# Patient Record
Sex: MALE | Race: WHITE | Employment: OTHER | URBAN - METROPOLITAN AREA
[De-identification: names, ages, dates, MRNs, and addresses within clinical notes are randomized per-mention and may not be internally consistent; named-entity substitution may affect disease eponyms.]

---

## 2018-05-22 ENCOUNTER — TRANSFERRED RECORDS (OUTPATIENT)
Dept: HEALTH INFORMATION MANAGEMENT | Facility: CLINIC | Age: 68
End: 2018-05-22

## 2018-05-23 ENCOUNTER — TRANSFERRED RECORDS (OUTPATIENT)
Dept: HEALTH INFORMATION MANAGEMENT | Facility: CLINIC | Age: 68
End: 2018-05-23

## 2018-08-23 ENCOUNTER — TRANSFERRED RECORDS (OUTPATIENT)
Dept: HEALTH INFORMATION MANAGEMENT | Facility: CLINIC | Age: 68
End: 2018-08-23

## 2018-08-27 ENCOUNTER — TRANSFERRED RECORDS (OUTPATIENT)
Dept: HEALTH INFORMATION MANAGEMENT | Facility: CLINIC | Age: 68
End: 2018-08-27

## 2018-08-29 ENCOUNTER — TRANSFERRED RECORDS (OUTPATIENT)
Dept: HEALTH INFORMATION MANAGEMENT | Facility: CLINIC | Age: 68
End: 2018-08-29

## 2018-08-30 ENCOUNTER — TRANSFERRED RECORDS (OUTPATIENT)
Dept: HEALTH INFORMATION MANAGEMENT | Facility: CLINIC | Age: 68
End: 2018-08-30

## 2018-08-30 ENCOUNTER — MEDICAL CORRESPONDENCE (OUTPATIENT)
Dept: HEALTH INFORMATION MANAGEMENT | Facility: CLINIC | Age: 68
End: 2018-08-30

## 2018-08-31 ENCOUNTER — TRANSFERRED RECORDS (OUTPATIENT)
Dept: HEALTH INFORMATION MANAGEMENT | Facility: CLINIC | Age: 68
End: 2018-08-31

## 2018-08-31 LAB
CREAT SERPL-MCNC: 0.89 MG/DL (ref 0.7–1.2)
GFR SERPL CREATININE-BSD FRML MDRD: >60 ML/MIN/1.73M2
GLUCOSE SERPL-MCNC: 91 MG/DL (ref 70–100)
INR PPP: 1.3 (ref 0.9–1.1)
POTASSIUM SERPL-SCNC: 4 MEQ/L (ref 3.4–5.1)

## 2018-09-02 ENCOUNTER — TRANSFERRED RECORDS (OUTPATIENT)
Dept: HEALTH INFORMATION MANAGEMENT | Facility: CLINIC | Age: 68
End: 2018-09-02

## 2018-09-06 NOTE — TELEPHONE ENCOUNTER
Date of appointment: 9/10/18    Diagnosis/reason for appointment: Malignant spinal tumor  Referring provider/facility: Aron Pedraza / [facility]   Who called: pt daughter Vane    Recent Studies  Imagin18 MR Thoracic Spine, 18 CT needle Bx, 18 MR Lumbar Spine,                  18 CT Abd Pelv   Pathology: 18  Labs: 9/3/18  Previous chemo/radiation (if known):    Records requested from: [facility] (in Care Everywhere)  Records received from:    Additional information: imaging is viewable

## 2018-09-07 ENCOUNTER — TRANSFERRED RECORDS (OUTPATIENT)
Dept: HEALTH INFORMATION MANAGEMENT | Facility: CLINIC | Age: 68
End: 2018-09-07

## 2018-09-10 ENCOUNTER — ONCOLOGY VISIT (OUTPATIENT)
Dept: ONCOLOGY | Facility: CLINIC | Age: 68
End: 2018-09-10
Attending: INTERNAL MEDICINE
Payer: COMMERCIAL

## 2018-09-10 ENCOUNTER — PRE VISIT (OUTPATIENT)
Dept: ONCOLOGY | Facility: CLINIC | Age: 68
End: 2018-09-10

## 2018-09-10 VITALS
SYSTOLIC BLOOD PRESSURE: 127 MMHG | OXYGEN SATURATION: 96 % | TEMPERATURE: 95.9 F | BODY MASS INDEX: 32.65 KG/M2 | RESPIRATION RATE: 16 BRPM | DIASTOLIC BLOOD PRESSURE: 72 MMHG | HEART RATE: 56 BPM | HEIGHT: 72 IN | WEIGHT: 241.06 LBS

## 2018-09-10 DIAGNOSIS — C64.1 RENAL CELL CARCINOMA, RIGHT (H): Primary | ICD-10-CM

## 2018-09-10 PROCEDURE — 99205 OFFICE O/P NEW HI 60 MIN: CPT | Mod: GC | Performed by: INTERNAL MEDICINE

## 2018-09-10 PROCEDURE — G0463 HOSPITAL OUTPT CLINIC VISIT: HCPCS | Mod: ZF

## 2018-09-10 RX ORDER — POLYETHYLENE GLYCOL 3350 17 G/17G
17 POWDER, FOR SOLUTION ORAL DAILY PRN
COMMUNITY
Start: 2018-09-07

## 2018-09-10 RX ORDER — AMOXICILLIN 250 MG
2 CAPSULE ORAL 2 TIMES DAILY
Status: ON HOLD | COMMUNITY
Start: 2018-09-07 | End: 2019-01-01

## 2018-09-10 RX ORDER — OXYCODONE HCL 20 MG/1
20 TABLET, FILM COATED, EXTENDED RELEASE ORAL
COMMUNITY
Start: 2018-09-07 | End: 2018-09-26

## 2018-09-10 RX ORDER — CYCLOBENZAPRINE HCL 10 MG
10 TABLET ORAL
COMMUNITY
Start: 2018-09-07 | End: 2018-11-08 | Stop reason: ALTCHOICE

## 2018-09-10 RX ORDER — LANOLIN ALCOHOL/MO/W.PET/CERES
3 CREAM (GRAM) TOPICAL
COMMUNITY
Start: 2018-09-07 | End: 2019-01-01

## 2018-09-10 RX ORDER — ATORVASTATIN CALCIUM 20 MG/1
20 TABLET, FILM COATED ORAL
COMMUNITY
Start: 2017-11-24 | End: 2019-04-08

## 2018-09-10 RX ORDER — DEXAMETHASONE 4 MG/1
4 TABLET ORAL
COMMUNITY
Start: 2018-09-07 | End: 2019-02-11

## 2018-09-10 RX ORDER — OXYCODONE HYDROCHLORIDE 10 MG/1
10 TABLET ORAL
COMMUNITY
Start: 2018-09-07 | End: 2019-02-14 | Stop reason: DRUGHIGH

## 2018-09-10 ASSESSMENT — PAIN SCALES - GENERAL: PAINLEVEL: MODERATE PAIN (5)

## 2018-09-10 NOTE — LETTER
9/10/2018       RE: Javon Bianchi  [address] [id number]     Dear Colleague,    Thank you for referring your patient, Javon Bianchi, to the [facility]. Please see a copy of my visit note below.    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 At that time, the pain was a stabbing like in the middle of lower back with burning like pain wrapping around this left hip to knee area. No trauma. He had MRI on 5/23/18 showed mild degenerative change with bulging disks L2-S1. A small benign hemangioma was noted in L2, no other marrow signal abnormality. Since then, the pain failed to improve despite steroid injections. In addition, he actually lost 25 lbs weight unintentionally since June along with chills few time a day. Overtime, the pain got worse to the point he could not ambulate after short distance and developed muscle weakness in lower extremities over time requiring a walker. Finally, he went to ER ([facility] in [place]) on 8/23/18, CT A/P was unremarkable. He was referred to neurology with obtaining MRI L spine. MRI on 8/30/18 revealed a pathological fracture at L3 vertebral body with height loss at 40% and diffuse involvement of a neoplastic process. IR guided biopsy on 8/31/18 showed poorly differentiated carcinoma. Its IHC was suggestive of possible renal cell carcinoma per pathology report (Renal cell immunochemistry is positive and along with the negative staining for CK7 and CK20 suggests the possibility of  a renal carcinoma, however most renal cell carcinomas also express PAX8 which is negative in this case). The patient was evaluated by neurosurgery who recommended no surgical intervention. He was discharged with TLSO brace and walker. He is scheduled to start palliative XRT locally from 9/11, planned 10 fractions. Today he was referred to Dr. De Luna for further evaluation. Of note, CT chest/abdomen/pelvis at OSH was negative for primary lesion.          Review of Systems:   ROS: Comprehensive 10 point ROS negative except for that detailed above.         Past Medical History:   Hyperlipidemia on lipitor and aspirin  Cholecystectomy         Past Surgical History:   Cholecystectomy         Social History:     Social History     Social History     Marital status: Single     Spouse name: N/A     Number of children: N/A     Years of education: N/A     Occupational History     Not on file.     Social History Main Topics     Smoking status: Never Smoker     Smokeless tobacco: Never Used     Alcohol use Not on file     Drug use: Not on file     Sexual activity: Not on file     Other Topics Concern     Not on file     Social History Narrative     No narrative on file            Family History:   Father - cancer involved kidney/prostate (?prostate cancer)  No breast cancer         Allergies:   No Known Allergies         Home Medications:     Current Outpatient Prescriptions   Medication     aspirin 81 MG tablet     atorvastatin (LIPITOR) 20 MG tablet     cyclobenzaprine (FLEXERIL) 10 MG tablet     dexamethasone (DECADRON) 4 MG tablet     melatonin 3 MG tablet     oxyCODONE (OXYCONTIN) 20 MG 12 hr tablet     oxyCODONE IR (ROXICODONE) 10 MG tablet     polyethylene glycol (MIRALAX/GLYCOLAX) Packet     senna-docusate (SENOKOT-S;PERICOLACE) 8.6-50 MG per tablet     Pseudoephedrine-APAP  MG TABS     No current facility-administered medications for this visit.             Physical Exam:     /72  Pulse 56  Temp 95.9  F  (35.5  C) (Tympanic)  Resp 16  Ht 1.829 m (6')  Wt 109.3 kg (241 lb 1 oz)  SpO2 96%  BMI 32.69 kg/m2  Vitals:    09/10/18 1043   Weight: 109.3 kg (241 lb 1 oz)     ECO  General:  no acute distress. On TLSO brace.  Heme/Lymph: No overt bleeding. No cervical, axillary, or inguinal  lymph adenopathy.  Skin: No concerning lesions or rash on exposed surfaces.  HEENT: NCAT. anicteric sclera. Oral mucosa pink and moist with no lesions or thrush.  Respiratory: Non-labored breathing, good air exchange, lungs clear to auscultation bilaterally.  Cardiovascular: Regular rate and rhythm. No murmur or rub.   Abdomen: Normoactive bowel sounds. Abdomen soft, non-distended, and non-tender. No palpable masses or organomegaly.  Extremities: grossly normal, non-tender, no edema. Good strength and ROM.  Neurologic: A&O x 3, CNs 2-12 grossly intact, speech normal, Lt LE strength 4 out of 5, Rt LE intact strength   Psychiatric: Mentation and affect appear normal.           Data:          Labs:   OSH labs on 18 showed mild anemia (Hgb 12.4), WBC 8.1K, . Calcium was normal 9.9.         Images:     18 Lt Hip Xray  Impression:Degenerative change of both hips. No acute fracture.   CT A/P 18  Impression:  No acute findings in the abdomen or pelvis.    MR LUMBAR SPINE WO W CONTRAST 18     Impression:  1. There is a pathological fracture at L3 vertebral body with height loss at 40%. Diffuse involvement of a neoplastic process. This extends into the canal and there is moderate to severe canal stenosis at this level. Soft tissue involvement particularly involves the left foramen at L3-4. Canal is also involved at L2-3 and extending into the left foramen. Multiple posterior elements are involved with neoplastic process and soft tissue involvement is concerning for metastatic involvement as well posteriorly along with the left ilium. Did speak with the MRI intact. Patient is following up with neurology tomorrow  morning. The canal stenosis at L3-4 was also evident and similar on most recent CT 8/23/2018.    MRI T spine 9/2/2018  IMPRESSION: No evidence of metastatic disease to the thoracic spine. Multiple chronic compression deformities with accentuated thoracic kyphosis noted.   CT chest 9/6/2018  IMPRESSION: Small bilateral noncalcified indeterminate pulmonary nodules. No dominant mass or adenopathy.         Pathology:     08/31/2018  Bone, L3, biopsy- positive for poorly differentiated carcinoma  The H&E sections show extensively remodeled bone with a limited amount of fibrotic marrow space.  There are a few cells entrapped within the fibrous connective tissue which cannot be further characterized on H&E sections due to significant crush artifact.  By immunochemical analysis these cells appear strongly positive for pan keratin but are negative for cytokeratin 7 and 20.  The cells also coexpress .  PSA , PSAP, and NKX3.1 are negative excluding prostate carcinoma.  P40 is negative excluding squamous carcinoma.  Renal cell immunochemistry is positive and along with the negative staining for CK7 and CK20 suggests the possibility of a renal carcinoma, however most renal cell carcinomas also express PAX8 which is negative in this case.  Clinical/radiographic correlation is suggested.         Assessment :   The patient was found L3 mass lesion after 3 month refractory back pain and Lt radiculopathy. Initial MRI L spine in 5/2018 was unremarkable but repeated MRI detected a mass lesion which biopsy showed poorly differentiated carcinoma. IHC findings suggested possible renal cell carcinoma but PAX8 was negative. CT images did not show any primary mass in kidneys nor showed other metastatic lesions. Plan to receive palliative XRT locally.    Given atypical IHC finding in a setting of absence of primary renal cancer, we would like to have our pathologist to review the slide to confirm the diagnosis. If it turns out renal  cell carcinoma, his disease is so far at least intermediate risk based on performance status. In this case, the patient could be eligible for the trial with IL2 and Nivolumab. Once he completed XRT, we will have MRI Abdomen to evaluate possible renal mass. He will also have CT chest to rule out metastatic lesions. His case will be reviewed in the tumor conference.           Plan:   - will have pathologist to review the slides  - MRI A/P to assess renal mass after completing palliative XRT  - CT chest for staging  - Case will be discussed in tumor conference    Patient was seen, care plan discussed and staffed with Dr. De Luna.  Se maria e Mace MD  [facility]  Hematology oncology and transplantation fellow  Pager [phone]    Provider Disclosure:  Please refer to the note by the fellow for H&P and physical exam and assessment and plan for details.     I agree with above History, Review of Systems, Physical exam and Plan. I have reviewed the content of the documentation and have edited it as needed. I have personally performed the services documented here and the documentation accurately represents those services and the decisions I have made.     I spent 60 minutes with the patient and over 50% of the time was spent on  counseling and coordinating care.    Malathi De Luna MD  Hematology Oncology and Transplantation  [facility]           Again, thank you for allowing me to participate in the care of your patient.      Sincerely,    Malathi De Luna MD

## 2018-09-10 NOTE — PROGRESS NOTES
[facility] CANCER CLINIC AT [facility]  HEMATOLOGY AND ONCOLOGY  NEW PATIENT VISIT    NAME: Javon Bianchi LEON: Sep 10, 2018   MRN: [id number]      REFERRING PHYSICIAN: LIANNE Wellington       Chief complaint:   Metastatic L3 lesion from poorly differentiated carcinoma, possible renal origin         History of Present Illness:     Javon Bianchi is a 68 year old male with a history significant for hyperlipidemia who had been in his usual health until he developed back pain in May this year. At that time, the pain was a stabbing like in the middle of lower back with burning like pain wrapping around this left hip to knee area. No trauma. He had MRI on 5/23/18 showed mild degenerative change with bulging disks L2-S1. A small benign hemangioma was noted in L2, no other marrow signal abnormality. Since then, the pain failed to improve despite steroid injections. In addition, he actually lost 25 lbs weight unintentionally since June along with chills few time a day. Overtime, the pain got worse to the point he could not ambulate after short distance and developed muscle weakness in lower extremities over time requiring a walker. Finally, he went to ER ([facility] in [place]) on 8/23/18, CT A/P was unremarkable. He was referred to neurology with obtaining MRI L spine. MRI on 8/30/18 revealed a pathological fracture at L3 vertebral body with height loss at 40% and diffuse involvement of a neoplastic process. IR guided biopsy on 8/31/18 showed poorly differentiated carcinoma. Its IHC was suggestive of possible renal cell carcinoma per pathology report (Renal cell immunochemistry is positive and along with the negative staining for CK7 and CK20 suggests the possibility of a renal carcinoma, however most renal cell carcinomas also express PAX8 which is negative in this case). The patient was evaluated by neurosurgery who recommended no surgical intervention. He was discharged with TLSO brace and walker. He is  scheduled to start palliative XRT locally from , planned 10 fractions. Today he was referred to Dr. De Luna for further evaluation. Of note, CT chest/abdomen/pelvis at OSH was negative for primary lesion.          Review of Systems:   ROS: Comprehensive 10 point ROS negative except for that detailed above.         Past Medical History:   Hyperlipidemia on lipitor and aspirin  Cholecystectomy         Past Surgical History:   Cholecystectomy         Social History:     Social History     Social History     Marital status: Single     Spouse name: N/A     Number of children: N/A     Years of education: N/A     Occupational History     Not on file.     Social History Main Topics     Smoking status: Never Smoker     Smokeless tobacco: Never Used     Alcohol use Not on file     Drug use: Not on file     Sexual activity: Not on file     Other Topics Concern     Not on file     Social History Narrative     No narrative on file            Family History:   Father - cancer involved kidney/prostate (?prostate cancer)  No breast cancer         Allergies:   No Known Allergies         Home Medications:     Current Outpatient Prescriptions   Medication     aspirin 81 MG tablet     atorvastatin (LIPITOR) 20 MG tablet     cyclobenzaprine (FLEXERIL) 10 MG tablet     dexamethasone (DECADRON) 4 MG tablet     melatonin 3 MG tablet     oxyCODONE (OXYCONTIN) 20 MG 12 hr tablet     oxyCODONE IR (ROXICODONE) 10 MG tablet     polyethylene glycol (MIRALAX/GLYCOLAX) Packet     senna-docusate (SENOKOT-S;PERICOLACE) 8.6-50 MG per tablet     Pseudoephedrine-APAP  MG TABS     No current facility-administered medications for this visit.             Physical Exam:     /72  Pulse 56  Temp 95.9  F (35.5  C) (Tympanic)  Resp 16  Ht 1.829 m (6')  Wt 109.3 kg (241 lb 1 oz)  SpO2 96%  BMI 32.69 kg/m2  Vitals:    09/10/18 1043   Weight: 109.3 kg (241 lb 1 oz)     ECO  General:  no acute distress. On TLSO brace.  Heme/Lymph: No overt  bleeding. No cervical, axillary, or inguinal  lymph adenopathy.  Skin: No concerning lesions or rash on exposed surfaces.  HEENT: NCAT. anicteric sclera. Oral mucosa pink and moist with no lesions or thrush.  Respiratory: Non-labored breathing, good air exchange, lungs clear to auscultation bilaterally.  Cardiovascular: Regular rate and rhythm. No murmur or rub.   Abdomen: Normoactive bowel sounds. Abdomen soft, non-distended, and non-tender. No palpable masses or organomegaly.  Extremities: grossly normal, non-tender, no edema. Good strength and ROM.  Neurologic: A&O x 3, CNs 2-12 grossly intact, speech normal, Lt LE strength 4 out of 5, Rt LE intact strength   Psychiatric: Mentation and affect appear normal.           Data:          Labs:   OSH labs on 8/31/18 showed mild anemia (Hgb 12.4), WBC 8.1K, . Calcium was normal 9.9.         Images:     8/23/18 Lt Hip Xray  Impression:Degenerative change of both hips. No acute fracture.   CT A/P 8/23/18  Impression:  No acute findings in the abdomen or pelvis.    MR LUMBAR SPINE WO W CONTRAST 8/29/18     Impression:  1. There is a pathological fracture at L3 vertebral body with height loss at 40%. Diffuse involvement of a neoplastic process. This extends into the canal and there is moderate to severe canal stenosis at this level. Soft tissue involvement particularly involves the left foramen at L3-4. Canal is also involved at L2-3 and extending into the left foramen. Multiple posterior elements are involved with neoplastic process and soft tissue involvement is concerning for metastatic involvement as well posteriorly along with the left ilium. Did speak with the MRI intact. Patient is following up with neurology tomorrow morning. The canal stenosis at L3-4 was also evident and similar on most recent CT 8/23/2018.    MRI T spine 9/2/2018  IMPRESSION: No evidence of metastatic disease to the thoracic spine. Multiple chronic compression deformities with accentuated  thoracic kyphosis noted.   CT chest 9/6/2018  IMPRESSION: Small bilateral noncalcified indeterminate pulmonary nodules. No dominant mass or adenopathy.         Pathology:     08/31/2018  Bone, L3, biopsy- positive for poorly differentiated carcinoma  The H&E sections show extensively remodeled bone with a limited amount of fibrotic marrow space.  There are a few cells entrapped within the fibrous connective tissue which cannot be further characterized on H&E sections due to significant crush artifact.  By immunochemical analysis these cells appear strongly positive for pan keratin but are negative for cytokeratin 7 and 20.  The cells also coexpress .  PSA , PSAP, and NKX3.1 are negative excluding prostate carcinoma.  P40 is negative excluding squamous carcinoma.  Renal cell immunochemistry is positive and along with the negative staining for CK7 and CK20 suggests the possibility of a renal carcinoma, however most renal cell carcinomas also express PAX8 which is negative in this case.  Clinical/radiographic correlation is suggested.         Assessment :   The patient was found L3 mass lesion after 3 month refractory back pain and Lt radiculopathy. Initial MRI L spine in 5/2018 was unremarkable but repeated MRI detected a mass lesion which biopsy showed poorly differentiated carcinoma. IHC findings suggested possible renal cell carcinoma but PAX8 was negative. CT images did not show any primary mass in kidneys nor showed other metastatic lesions. Plan to receive palliative XRT locally.    Given atypical IHC finding in a setting of absence of primary renal cancer, we would like to have our pathologist to review the slide to confirm the diagnosis. If it turns out renal cell carcinoma, his disease is so far at least intermediate risk based on performance status. In this case, the patient could be eligible for the trial with IL2 and Nivolumab. Once he completed XRT, we will have MRI Abdomen to evaluate possible  renal mass. He will also have CT chest to rule out metastatic lesions. His case will be reviewed in the tumor conference.           Plan:   - will have pathologist to review the slides  - MRI A/P to assess renal mass after completing palliative XRT  - CT chest for staging  - Case will be discussed in tumor conference    Patient was seen, care plan discussed and staffed with Dr. De Luna.  Se maria e Mace MD  [facility]  Hematology oncology and transplantation fellow  Pager [phone]    Provider Disclosure:  Please refer to the note by the fellow for H&P and physical exam and assessment and plan for details.     I agree with above History, Review of Systems, Physical exam and Plan. I have reviewed the content of the documentation and have edited it as needed. I have personally performed the services documented here and the documentation accurately represents those services and the decisions I have made.     I spent 60 minutes with the patient and over 50% of the time was spent on  counseling and coordinating care.    Malathi De Luna MD  Hematology Oncology and Transplantation  [facility]

## 2018-09-10 NOTE — MR AVS SNAPSHOT
After Visit Summary   9/10/2018    Javon Bianchi    MRN: [id number]           Patient Information     Date Of Birth          1950        Visit Information        Provider Department      9/10/2018 10:30 AM Malathi De Luna MD [facility]        Today's Diagnoses     Renal cell carcinoma, right (H)    -  1       Follow-ups after your visit        Your next 10 appointments already scheduled     Sep 25, 2018  4:00 PM CDT   MR ABDOMEN & PELVIS W/O & W CONTRAST with UC1   [facility] ([facility])    [address] [address] [phone]           Take your medicines as usual, unless your doctor tells you not to. Bring a list of your current medicines to your exam (including vitamins, minerals and over-the-counter drugs). Also bring the results of similar scans you may have had.    You may or may not receive IV contrast for this exam pending the discretion of the Radiologist.   Do not eat or drink for 6 hours prior to exam.  The MRI machine uses a strong magnet. Please wear clothes without metal (snaps, zippers). A sweatsuit works well, or we may give you a hospital gown.  Please remove any body piercings and hair extensions before you arrive. You will also remove watches, jewelry, hairpins, wallets, dentures, partial dental plates and hearing aids. You may wear contact lenses, and you may be able to wear your rings. We have a safe place to keep your personal items, but it is safer to leave them at home.  **IMPORTANT** THE INSTRUCTIONS BELOW ARE ONLY FOR THOSE PATIENTS WHO HAVE BEEN PRESCRIBED SEDATION OR GENERAL ANESTHESIA DURING THEIR MRI PROCEDURE:  IF YOUR DOCTOR PRESCRIBED ORAL SEDATION (take medicine to help you relax during your exam):   You must get the medicine from your doctor (oral medication) before you arrive. Bring the medicine to the exam. Do not take it at home. You ll be told when to take it  upon arriving for your exam.   Arrive one hour early. Bring someone who can take you home after the test. Your medicine will make you sleepy. After the exam, you may not drive, take a bus or take a taxi by yourself.  IF YOUR DOCTOR PRESCRIBED IV SEDATION:   Arrive one hour early. Bring someone who can take you home after the test. Your medicine will make you sleepy. After the exam, you may not drive, take a bus or take a taxi by yourself.   No eating 6 hours before your exam. You may have clear liquids up until 4 hours before your exam. (Clear liquids include water, clear tea, black coffee and fruit juice without pulp.)  IF YOUR DOCTOR PRESCRIBED ANESTHESIA (be asleep for your exam):   Arrive 1 1/2 hours early. Bring someone who can take you home after the test. You may not drive, take a bus or take a taxi by yourself.   No eating 8 hours before your exam. You may have clear liquids up until 4 hours before your exam. (Clear liquids include water, clear tea, black coffee and fruit juice without pulp.)   You will spend four to five hours in the recovery room.  If you have any questions, please contact your Imaging Department exam site.            Sep 25, 2018  5:00 PM CDT   CT CHEST W/O & W CONTRAST with UCCT2   [facility] ([facility])    [address] [place] [address] [phone]           How do I prepare for my exam? (Food and drink instructions) **You will have contrast for this exam.** Do not eat or drink for 2 hours before your exam. If you need to take medicine, you may take it with small sips of water. (We may ask you to take liquid medicine as well.)  The day before your exam, drink extra fluids at least six 8-ounce glasses (unless your doctor tells you to restrict your fluids).  How do I prepare for my exam? (Other instructions) Patients over 70 or patients with diabetes or kidney problems: If you haven t had a blood test (creatinine  test) within the last 30 days, the Cardiologist/Radiologist may require you to get this test prior to your exam.  What should I wear: Please wear loose clothing, such as a sweat suit or jogging clothes.  Avoid snaps, zippers and other metal. We may ask you to undress and put on a hospital gown.  How long does the exam take: Most scans take less than 20 minutes.  What should I bring: Please bring any scans or X-rays taken at other hospitals, if similar tests were done. Also bring a list of your medicines, including vitamins, minerals and over-the-counter drugs. It is safest to leave personal items at home.  Do I need a :  No  is needed.  What do I need to tell my doctor? Be sure to tell your doctor: * If you have any allergies. * If there s any chance you are pregnant. * If you are breastfeeding. * If you have diabetes as your medication may need to be adjusted for this exam.  What should I do after the exam: No restrictions, You may resume normal activities.  What is this test: A CT (computed tomography) scan is a series of pictures that allows us to look inside your body. The scanner creates images of the body in cross sections, much like slices of bread. This helps us see any problems more clearly. You may receive contrast (X-ray dye) before or during your scan. Contrast is given through an IV (small needle in your arm).  Who should I call with questions: If you have any questions, please call the Imaging Department where you will have your exam. Directions, parking instructions, and other information is available on our website, [email or web address].            Sep 26, 2018  8:45 AM CDT   (Arrive by 8:30 AM)   Return Visit with Malathi De Luna MD   [facility] ([facility])    [address]  [address]  [facility] [address] [phone]              Future tests that were ordered for you today     Open Future Orders        Priority Expected Expires  Ordered    MRI Abdomen & pelvis w & wo contrast Routine 9/25/2018 12/9/2018 9/10/2018    CT Chest w & w/o contrast Routine 9/13/2018 9/10/2019 9/10/2018    MRI Abdomen w & w/o contrast Routine  12/9/2018 9/10/2018    CT Outside Read Routine  9/10/2019 9/10/2018            Who to contact     If you have questions or need follow up information about today's clinic visit or your schedule please contact [facility] directly at [phone].  Normal or non-critical lab and imaging results will be communicated to you by WOW[employer]hart, letter or phone within 4 business days after the clinic has received the results. If you do not hear from us within 7 days, please contact the clinic through [employer] or phone. If you have a critical or abnormal lab result, we will notify you by phone as soon as possible.  Submit refill requests through [employer] or call your pharmacy and they will forward the refill request to us. Please allow 3 business days for your refill to be completed.          Additional Information About Your Visit        WOWashhart Information     [employer] gives you secure access to your electronic health record. If you see a primary care provider, you can also send messages to your care team and make appointments. If you have questions, please call your primary care clinic.  If you do not have a primary care provider, please call [phone] and they will assist you.        Care EveryWhere ID     This is your Care EveryWhere ID. This could be used by other organizations to access your [facility] medical records  [id number]        Your Vitals Were     Pulse Temperature Respirations Height Pulse Oximetry BMI (Body Mass Index)    56 95.9  F (35.5  C) (Tympanic) 16 1.829 m (6') 96% 32.69 kg/m2       Blood Pressure from Last 3 Encounters:   09/10/18 127/72    Weight from Last 3 Encounters:   09/10/18 109.3 kg (241 lb 1 oz)              Information about OPIOIDS     PRESCRIPTION OPIOIDS: WHAT YOU NEED TO KNOW    We gave you an opioid (narcotic) pain medicine. It is important to manage your pain, but opioids are not always the best choice. You should first try all the other options your care team gave you. Take this medicine for as short a time (and as few doses) as possible.    Some activities can increase your pain, such as bandage changes or therapy sessions. It may help to take your pain medicine 30 to 60 minutes before these activities. Reduce your stress by getting enough sleep, working on hobbies you enjoy and practicing relaxation or meditation. Talk to your care team about ways to manage your pain beyond prescription opioids.    These medicines have risks:    DO NOT drive when on new or higher doses of pain medicine. These medicines can affect your alertness and reaction times, and you could be arrested for driving under the influence (DUI). If you need to use opioids long-term, talk to your care team about driving.    DO NOT operate heavy machinery    DO NOT do any other dangerous activities while taking these medicines.    DO NOT drink any alcohol while taking these medicines.     If the opioid prescribed includes acetaminophen, DO NOT take with any other medicines that contain acetaminophen. Read all labels carefully. Look for the word  acetaminophen  or  Tylenol.  Ask your pharmacist if you have questions or are unsure.    You can get addicted to pain medicines, especially if you have a history of addiction (chemical, alcohol or substance dependence). Talk to your care team about ways to reduce this risk.    All opioids tend to cause constipation. Drink plenty of water and eat foods that have a lot of fiber, such as fruits, vegetables, prune juice, apple juice and high-fiber cereal. Take a laxative (Miralax, milk of magnesia, Colace, Senna) if you don t move your bowels at least every other day. Other side effects include upset stomach, sleepiness, dizziness, throwing up, tolerance (needing more of the  medicine to have the same effect), physical dependence and slowed breathing.    Store your pills in a secure place, locked if possible. We will not replace any lost or stolen medicine. If you don t finish your medicine, please throw away (dispose) as directed by your pharmacist. The [place] Pollution Control Agency has more information about safe disposal: https://www.pca.[facility].mn.us/living-green/managing-unwanted-medications         Primary Care Provider Office Phone # Fax #    S Conrado Wellington [phone]       [facility] [address] [place] [address]        Equal Access to Services     [facility] : Hadii mohamud dawn hadabida Somary, waaxsara gutiérrez, qamelvin kaalmasara wayne, george iverson . So [facility] [phone].    ATENCIÓN: Si habla español, tiene a ortega disposición servicios gratuitos de asistencia lingüística. [facility] [phone].    We comply with applicable federal civil rights laws and [place] laws. We do not discriminate on the basis of race, color, national origin, age, disability, sex, sexual orientation, or gender identity.            Thank you!     Thank you for choosing [facility]  for your care. Our goal is always to provide you with excellent care. Hearing back from our patients is one way we can continue to improve our services. Please take a few minutes to complete the written survey that you may receive in the mail after your visit with us. Thank you!             Your Updated Medication List - Protect others around you: Learn how to safely use, store and throw away your medicines at www.disposemymeds.org.          This list is accurate as of 9/10/18 12:16 PM.  Always use your most recent med list.                   Brand Name Dispense Instructions for use Diagnosis    aspirin 81 MG tablet      Take 81 mg by mouth        atorvastatin 20 MG tablet    LIPITOR     Take 20 mg by mouth        cyclobenzaprine 10 MG tablet     FLEXERIL     Take 10 mg by mouth        dexamethasone 4 MG tablet    DECADRON     Take 4 mg by mouth        melatonin 3 MG tablet      Take 3 mg by mouth        * oxyCODONE IR 10 MG tablet    ROXICODONE     Take 10 mg by mouth        * oxyCODONE 20 MG 12 hr tablet    OxyCONTIN     Take 20 mg by mouth        polyethylene glycol Packet    MIRALAX/GLYCOLAX     Take 1 packet by mouth        Pseudoephedrine-APAP  MG Tabs           senna-docusate 8.6-50 MG per tablet    SENOKOT-S;PERICOLACE          * Notice:  This list has 2 medication(s) that are the same as other medications prescribed for you. Read the directions carefully, and ask your doctor or other care provider to review them with you.

## 2018-09-10 NOTE — NURSING NOTE
Oncology Rooming Note    September 10, 2018 10:44 AM   Javon Bianchi is a 68 year old male who presents for:    Chief Complaint   Patient presents with     Oncology Clinic Visit     New Pt. Renal Carcinoma     Initial Vitals: /72  Pulse 56  Temp 95.9  F (35.5  C) (Tympanic)  Resp 16  Ht 1.829 m (6')  Wt 109.3 kg (241 lb 1 oz)  SpO2 96%  BMI 32.69 kg/m2 Estimated body mass index is 32.69 kg/(m^2) as calculated from the following:    Height as of this encounter: 1.829 m (6').    Weight as of this encounter: 109.3 kg (241 lb 1 oz). Body surface area is 2.36 meters squared.  Moderate Pain (5) Comment: L2 thru L4   No LMP for male patient.  Allergies reviewed: Yes  Medications reviewed: Yes    Medications: Medication refills not needed today.  Pharmacy name entered into [employer]: [facility] - [address]    Clinical concerns: new patient here today for consult for Malignant spinal tumor and possible Renal Carcinoma. Dr. De Luna was notified.    10 minutes for nursing intake (face to face time)     Negro Cárdenas CMA

## 2018-09-11 ENCOUNTER — HOSPITAL ENCOUNTER (INPATIENT)
Dept: GENERAL RADIOLOGY | Facility: CLINIC | Age: 68
End: 2018-09-11
Attending: INTERNAL MEDICINE

## 2018-09-12 ENCOUNTER — DOCUMENTATION ONLY (OUTPATIENT)
Dept: ONCOLOGY | Facility: CLINIC | Age: 68
End: 2018-09-12

## 2018-09-12 NOTE — PROGRESS NOTES
Per RN[facility]'s request,  completed and faxed [facility] referral for lodging dates 9/25 - 9/27. [facility] will contact Patient directly for confirmation of reservation.  will continue to provide support as needed.      Sissy Scales (Martens), JIL, MSW   - [facility]  Phone: [phone]  Pager: [phone]  9/12/2018

## 2018-09-24 ENCOUNTER — TRANSFERRED RECORDS (OUTPATIENT)
Dept: HEALTH INFORMATION MANAGEMENT | Facility: CLINIC | Age: 68
End: 2018-09-24

## 2018-09-25 ENCOUNTER — RADIANT APPOINTMENT (OUTPATIENT)
Dept: MRI IMAGING | Facility: CLINIC | Age: 68
End: 2018-09-25
Attending: INTERNAL MEDICINE
Payer: MEDICARE

## 2018-09-25 ENCOUNTER — RADIANT APPOINTMENT (OUTPATIENT)
Dept: CT IMAGING | Facility: CLINIC | Age: 68
End: 2018-09-25
Attending: INTERNAL MEDICINE
Payer: MEDICARE

## 2018-09-25 DIAGNOSIS — C64.1 RENAL CELL CARCINOMA, RIGHT (H): ICD-10-CM

## 2018-09-25 LAB
ALBUMIN SERPL-MCNC: 2.3 G/DL (ref 3.4–5)
ALP SERPL-CCNC: 78 U/L (ref 40–150)
ALT SERPL W P-5'-P-CCNC: 16 U/L (ref 0–70)
ANION GAP SERPL CALCULATED.3IONS-SCNC: 6 MMOL/L (ref 3–14)
AST SERPL W P-5'-P-CCNC: 22 U/L (ref 0–45)
BASOPHILS # BLD AUTO: 0 10E9/L (ref 0–0.2)
BASOPHILS NFR BLD AUTO: 0 %
BILIRUB SERPL-MCNC: 0.6 MG/DL (ref 0.2–1.3)
BUN SERPL-MCNC: 16 MG/DL (ref 7–30)
CALCIUM SERPL-MCNC: 7.8 MG/DL (ref 8.5–10.1)
CHLORIDE SERPL-SCNC: 96 MMOL/L (ref 94–109)
CO2 SERPL-SCNC: 28 MMOL/L (ref 20–32)
CREAT SERPL-MCNC: 0.68 MG/DL (ref 0.66–1.25)
DIFFERENTIAL METHOD BLD: ABNORMAL
EOSINOPHIL # BLD AUTO: 0 10E9/L (ref 0–0.7)
EOSINOPHIL NFR BLD AUTO: 0 %
ERYTHROCYTE [DISTWIDTH] IN BLOOD BY AUTOMATED COUNT: 14.7 % (ref 10–15)
GFR SERPL CREATININE-BSD FRML MDRD: >90 ML/MIN/1.7M2
GLUCOSE SERPL-MCNC: 112 MG/DL (ref 70–99)
HCT VFR BLD AUTO: 29.7 % (ref 40–53)
HGB BLD-MCNC: 9.9 G/DL (ref 13.3–17.7)
IMM GRANULOCYTES # BLD: 0 10E9/L (ref 0–0.4)
IMM GRANULOCYTES NFR BLD: 0.8 %
LYMPHOCYTES # BLD AUTO: 0.2 10E9/L (ref 0.8–5.3)
LYMPHOCYTES NFR BLD AUTO: 3.9 %
MCH RBC QN AUTO: 30.6 PG (ref 26.5–33)
MCHC RBC AUTO-ENTMCNC: 33.3 G/DL (ref 31.5–36.5)
MCV RBC AUTO: 92 FL (ref 78–100)
MONOCYTES # BLD AUTO: 0.9 10E9/L (ref 0–1.3)
MONOCYTES NFR BLD AUTO: 22.9 %
NEUTROPHILS # BLD AUTO: 2.8 10E9/L (ref 1.6–8.3)
NEUTROPHILS NFR BLD AUTO: 72.4 %
NRBC # BLD AUTO: 0 10*3/UL
NRBC BLD AUTO-RTO: 0 /100
PLATELET # BLD AUTO: 182 10E9/L (ref 150–450)
PLATELET # BLD EST: ABNORMAL 10*3/UL
POTASSIUM SERPL-SCNC: 4 MMOL/L (ref 3.4–5.3)
PROT SERPL-MCNC: 5.8 G/DL (ref 6.8–8.8)
PSA SERPL-MCNC: 5.26 UG/L (ref 0–4)
RBC # BLD AUTO: 3.24 10E12/L (ref 4.4–5.9)
SODIUM SERPL-SCNC: 130 MMOL/L (ref 133–144)
WBC # BLD AUTO: 3.9 10E9/L (ref 4–11)

## 2018-09-25 RX ORDER — GADOBUTROL 604.72 MG/ML
10 INJECTION INTRAVENOUS ONCE
Status: COMPLETED | OUTPATIENT
Start: 2018-09-25 | End: 2018-09-25

## 2018-09-25 RX ORDER — IOPAMIDOL 755 MG/ML
118 INJECTION, SOLUTION INTRAVASCULAR ONCE
Status: COMPLETED | OUTPATIENT
Start: 2018-09-25 | End: 2018-09-25

## 2018-09-25 RX ADMIN — GADOBUTROL 10 ML: 604.72 INJECTION INTRAVENOUS at 14:43

## 2018-09-25 RX ADMIN — IOPAMIDOL 118 ML: 755 INJECTION, SOLUTION INTRAVASCULAR at 15:36

## 2018-09-25 NOTE — DISCHARGE INSTRUCTIONS

## 2018-09-25 NOTE — DISCHARGE INSTRUCTIONS
MRI Contrast Discharge Instructions    The IV contrast you received today will pass out of your body in your  urine. This will happen in the next 24 hours. You will not feel this process.  Your urine will not change color.    Drink at least 4 extra glasses of water or juice today (unless your doctor  has restricted your fluids). This reduces the stress on your kidneys.  You may take your regular medicines.    If you are on dialysis: It is best to have dialysis today.    If you have a reaction: Most reactions happen right away. If you have  any new symptoms after leaving the hospital (such as hives or swelling),  call your hospital at the correct number below. Or call your family doctor.  If you have breathing distress or wheezing, call 911.    Special instructions: ***    I have read and understand the above information.    Signature:______________________________________ Date:___________    Staff:__________________________________________ Date:___________     Time:__________    [facility] Departments:    ___[place]: [phone]  ___[facility]: [phone]  ___[place]: [phone] ___[facility]: [phone]  ___[facility]: [phone]  ___[facility]: [phone]  ___[phone]  ___[facility]: [phone]  ___[phone]

## 2018-09-26 ENCOUNTER — CARE COORDINATION (OUTPATIENT)
Dept: ONCOLOGY | Facility: CLINIC | Age: 68
End: 2018-09-26

## 2018-09-26 ENCOUNTER — ONCOLOGY VISIT (OUTPATIENT)
Dept: ONCOLOGY | Facility: CLINIC | Age: 68
End: 2018-09-26
Attending: INTERNAL MEDICINE
Payer: COMMERCIAL

## 2018-09-26 ENCOUNTER — ONCOLOGY VISIT (OUTPATIENT)
Dept: ONCOLOGY | Facility: CLINIC | Age: 68
End: 2018-09-26

## 2018-09-26 ENCOUNTER — TELEPHONE (OUTPATIENT)
Dept: ONCOLOGY | Facility: CLINIC | Age: 68
End: 2018-09-26

## 2018-09-26 VITALS
DIASTOLIC BLOOD PRESSURE: 79 MMHG | TEMPERATURE: 97 F | SYSTOLIC BLOOD PRESSURE: 135 MMHG | WEIGHT: 241.13 LBS | BODY MASS INDEX: 32.66 KG/M2 | HEART RATE: 70 BPM | RESPIRATION RATE: 16 BRPM | OXYGEN SATURATION: 96 % | HEIGHT: 72 IN

## 2018-09-26 DIAGNOSIS — Z79.899 ENCOUNTER FOR LONG-TERM (CURRENT) USE OF MEDICATIONS: ICD-10-CM

## 2018-09-26 DIAGNOSIS — C79.51 BONE METASTASIS: ICD-10-CM

## 2018-09-26 DIAGNOSIS — C64.1 RENAL CELL CARCINOMA, RIGHT (H): ICD-10-CM

## 2018-09-26 DIAGNOSIS — C64.2 RENAL CELL CARCINOMA, LEFT (H): Primary | ICD-10-CM

## 2018-09-26 DIAGNOSIS — C64.1 RENAL CELL CARCINOMA, RIGHT (H): Primary | ICD-10-CM

## 2018-09-26 PROCEDURE — 99215 OFFICE O/P EST HI 40 MIN: CPT | Mod: ZP | Performed by: INTERNAL MEDICINE

## 2018-09-26 PROCEDURE — G0463 HOSPITAL OUTPT CLINIC VISIT: HCPCS | Mod: ZF

## 2018-09-26 RX ORDER — OXYCODONE HCL 20 MG/1
20 TABLET, FILM COATED, EXTENDED RELEASE ORAL
COMMUNITY
Start: 2018-09-14 | End: 2018-11-08 | Stop reason: ALTCHOICE

## 2018-09-26 RX ORDER — METHYLPREDNISOLONE 4 MG
TABLET, DOSE PACK ORAL
COMMUNITY
Start: 2018-07-16 | End: 2018-11-08

## 2018-09-26 RX ORDER — OXYCODONE HCL 20 MG/1
20 TABLET, FILM COATED, EXTENDED RELEASE ORAL EVERY 8 HOURS
Qty: 90 TABLET | Refills: 0 | Status: SHIPPED | OUTPATIENT
Start: 2018-09-26 | End: 2018-10-22

## 2018-09-26 RX ORDER — DEXAMETHASONE 4 MG/1
TABLET ORAL
Refills: 0 | COMMUNITY
Start: 2018-09-24 | End: 2018-11-08

## 2018-09-26 RX ORDER — OXYCODONE HYDROCHLORIDE 10 MG/1
10 TABLET ORAL
COMMUNITY
Start: 2018-09-14 | End: 2019-02-14 | Stop reason: DRUGHIGH

## 2018-09-26 ASSESSMENT — PAIN SCALES - GENERAL: PAINLEVEL: MODERATE PAIN (5)

## 2018-09-26 NOTE — ORAL ONC MGMT
Oral Chemotherapy Monitoring Program    Primary Oncologist: Dr. De Luna  Primary Oncology Clinic: [facility]  Cancer Diagnosis: RCC    Drug: Cabometyx  Start Date: 9/26/2018  Dose is appropriate for patient.   Expected duration of therapy: Until disease progression or unacceptable toxicity    Drug Interaction Assessment: No drug interactions found at this time.     Drugs checked include: Aspirin -AtorvaSTATin -Cabozantinib -Cyclobenzaprine -Dexamethasone -Melatonin -MethylPREDNISolone -OxyCODONE -Polyethylene Glycol 3350 -Pseudoephedrine -Acetaminophen -RaNITIdine -Senna -Docusate      Lab Monitoring Plan  CBC and CMP monthly  Subjective/Objective:  Javon Bianchi is a 68 year old male seen in clinic for an initial visit for oral chemotherapy education.     ORAL CHEMOTHERAPY 9/26/2018   Drug Name Cabometyx (cabozantinib)   Current Dosage 60mg   Current Schedule Daily   Cycle Details Continuous   Any new drug interactions? No   Is the dose as ordered appropriate for the patient? Yes       Vitals:  BP:   BP Readings from Last 1 Encounters:   09/26/18 135/79     Wt Readings from Last 1 Encounters:   09/26/18 109.4 kg (241 lb 2 oz)     Estimated body surface area is 2.36 meters squared as calculated from the following:    Height as of an earlier encounter on 9/26/18: 1.829 m (6').    Weight as of an earlier encounter on 9/26/18: 109.4 kg (241 lb 2 oz).      Labs:  _  Result Component Current Result Ref Range   Sodium 130 (L) (9/25/2018) 133 - 144 mmol/L     _  Result Component Current Result Ref Range   Potassium 4.0 (9/25/2018) 3.4 - 5.3 mmol/L     _  Result Component Current Result Ref Range   Calcium 7.8 (L) (9/25/2018) 8.5 - 10.1 mg/dL     No results found for Mag within last 30 days.     No results found for Phos within last 30 days.     _  Result Component Current Result Ref Range   Albumin 2.3 (L) (9/25/2018) 3.4 - 5.0 g/dL     _  Result Component Current Result Ref Range   Urea Nitrogen 16 (9/25/2018) 7  - 30 mg/dL     _  Result Component Current Result Ref Range   Creatinine 0.68 (9/25/2018) 0.66 - 1.25 mg/dL       _  Result Component Current Result Ref Range   AST 22 (9/25/2018) 0 - 45 U/L     _  Result Component Current Result Ref Range   ALT 16 (9/25/2018) 0 - 70 U/L     _  Result Component Current Result Ref Range   Bilirubin Total 0.6 (9/25/2018) 0.2 - 1.3 mg/dL       _  Result Component Current Result Ref Range   WBC 3.9 (L) (9/25/2018) 4.0 - 11.0 10e9/L     _  Result Component Current Result Ref Range   Hemoglobin 9.9 (L) (9/25/2018) 13.3 - 17.7 g/dL     _  Result Component Current Result Ref Range   Platelet Count 182 (9/25/2018) 150 - 450 10e9/L     _  Result Component Current Result Ref Range   Absolute Neutrophil 2.8 (9/25/2018) 1.6 - 8.3 10e9/L       Assessment:  Patient is appropriate to start therapy.    Plan:  Basic chemotherapy teaching was reviewed with the patient and the patient's family including indication, start date of therapy, dose, administration, adverse effects, missed doses, food and drug interactions, monitoring, side effect management, office contact information, and safe handling. Written materials were provided and all questions answered to Omega's stated satisfaction.    Follow-Up:  About one week after start of Cabometyx     Ash Mullins PharmD  [facility]  [phone]  September 26, 2018

## 2018-09-26 NOTE — MR AVS SNAPSHOT
After Visit Summary   9/26/2018    Javon Bianchi    MRN: [id number]           Patient Information     Date Of Birth          1950        Visit Information        Provider Department      9/26/2018 10:31 AM Ash Mullins RPH [facility]        Today's Diagnoses     Renal cell carcinoma, left (H)    -  1    Encounter for long-term (current) use of medications           Follow-ups after your visit        Future tests that were ordered for you today     Open Standing Orders        Priority Remaining Interval Expires Ordered    Comprehensive metabolic panel Routine 99/99 every 4 weeks and as needed 9/26/2019 9/26/2018    CBC with platelets differential Routine 99/99 every 4 weeks and as needed 9/26/2019 9/26/2018    Protein  random urine with Creat Ratio Routine 12/12 every 4 weeks 9/26/2019 9/26/2018          Open Future Orders        Priority Expected Expires Ordered    CBC with platelets differential Routine 10/15/2018 9/26/2019 9/26/2018    Comprehensive metabolic panel Routine 10/15/2018 9/26/2019 9/26/2018    TSH with free T4 reflex Routine 10/15/2018 9/26/2019 9/26/2018            Who to contact     If you have questions or need follow up information about today's clinic visit or your schedule please contact [facility] directly at [phone].  Normal or non-critical lab and imaging results will be communicated to you by MyChart, letter or phone within 4 business days after the clinic has received the results. If you do not hear from us within 7 days, please contact the clinic through MyChart or phone. If you have a critical or abnormal lab result, we will notify you by phone as soon as possible.  Submit refill requests through [employer] or call your pharmacy and they will forward the refill request to us. Please allow 3 business days for your refill to be completed.          Additional Information About Your Visit        MyChart Information      [employer] gives you secure access to your electronic health record. If you see a primary care provider, you can also send messages to your care team and make appointments. If you have questions, please call your primary care clinic.  If you do not have a primary care provider, please call [phone] and they will assist you.        Care EveryWhere ID     This is your Care EveryWhere ID. This could be used by other organizations to access your [facility] medical records  [id number]         Blood Pressure from Last 3 Encounters:   09/26/18 135/79   09/10/18 127/72    Weight from Last 3 Encounters:   09/26/18 109.4 kg (241 lb 2 oz)   09/10/18 109.3 kg (241 lb 1 oz)                 Today's Medication Changes          These changes are accurate as of 9/26/18 10:51 AM.  If you have any questions, ask your nurse or doctor.               Start taking these medicines.        Dose/Directions    Cabozantinib S-Malate 60 MG   Commonly known as:  CABOMETYX   Used for:  Renal cell carcinoma, right (H)   Started by:  Malathi De Luna MD        Dose:  60 mg   Take 1 tablet (60 mg) by mouth daily   Quantity:  30 tablet   Refills:  11         These medicines have changed or have updated prescriptions.        Dose/Directions    * oxyCODONE IR 10 MG tablet   Commonly known as:  ROXICODONE   This may have changed:  Another medication with the same name was changed. Make sure you understand how and when to take each.   Changed by:  Malathi De Luna MD        Dose:  10 mg   Take 10 mg by mouth   Refills:  0       * oxyCODONE 20 MG 12 hr tablet   Commonly known as:  OxyCONTIN   This may have changed:  Another medication with the same name was changed. Make sure you understand how and when to take each.   Changed by:  Malathi De Luna MD        Dose:  20 mg   Take 20 mg by mouth   Refills:  0       * oxyCODONE 20 MG 12 hr tablet   Commonly known as:  OxyCONTIN   This may have changed:  when to take this   Used for:  Renal cell carcinoma, right  (H)   Changed by:  Malathi De Luna MD        Dose:  20 mg   Take 1 tablet (20 mg) by mouth every 8 hours   Quantity:  90 tablet   Refills:  0       * Notice:  This list has 3 medication(s) that are the same as other medications prescribed for you. Read the directions carefully, and ask your doctor or other care provider to review them with you.         Where to get your medicines      Some of these will need a paper prescription and others can be bought over the counter.  Ask your nurse if you have questions.     Bring a paper prescription for each of these medications     oxyCODONE 20 MG 12 hr tablet         Call your pharmacy to confirm that your medication is ready for pickup. It may take up to 24 hours for them to receive the prescription. If the prescription is not ready within 3 business days, please contact your clinic or your provider.     We will let you know when these medications are ready. If you don't hear back within 3 business days, please contact us.     Cabozantinib S-Malate 60 MG                Primary Care Provider Office Phone # Fax #    S Conrado [place] [phone]       [place] [address] [place] [address]        Equal Access to Services     [facility]: Hadii mohamud Lopez, shaka gutiérrez, george lawrence . So [facility] [phone].    ATENCIÓN: Si habla español, tiene a ortega disposición servicios gratuitos de asistencia lingüística. [facility] [phone].    We comply with applicable federal civil rights laws and [place] laws. We do not discriminate on the basis of race, color, national origin, age, disability, sex, sexual orientation, or gender identity.            Thank you!     Thank you for choosing [facility]  for your care. Our goal is always to provide you with excellent care. Hearing back from our patients is one way we can continue to improve our services. Please take a few  minutes to complete the written survey that you may receive in the mail after your visit with us. Thank you!             Your Updated Medication List - Protect others around you: Learn how to safely use, store and throw away your medicines at www.disposemymeds.org.          This list is accurate as of 9/26/18 10:51 AM.  Always use your most recent med list.                   Brand Name Dispense Instructions for use Diagnosis    aspirin 81 MG tablet      Take 81 mg by mouth        atorvastatin 20 MG tablet    LIPITOR     Take 20 mg by mouth        Cabozantinib S-Malate 60 MG    CABOMETYX    30 tablet    Take 1 tablet (60 mg) by mouth daily    Renal cell carcinoma, right (H)       cyclobenzaprine 10 MG tablet    FLEXERIL     Take 10 mg by mouth        * dexamethasone 4 MG tablet    DECADRON     Take 4 mg by mouth        * dexamethasone 4 MG tablet    DECADRON     TAKE BY MOUTH FOUR TIMES DAILY ACCORDING TO TAPER SCHEDULE        melatonin 3 MG tablet      Take 3 mg by mouth        methylPREDNISolone 4 MG tablet    MEDROL DOSEPAK          * oxyCODONE IR 10 MG tablet    ROXICODONE     Take 10 mg by mouth        * oxyCODONE 20 MG 12 hr tablet    OxyCONTIN     Take 20 mg by mouth        * oxyCODONE 20 MG 12 hr tablet    OxyCONTIN    90 tablet    Take 1 tablet (20 mg) by mouth every 8 hours    Renal cell carcinoma, right (H)       polyethylene glycol Packet    MIRALAX/GLYCOLAX     Take 1 packet by mouth        Pseudoephedrine-APAP  MG Tabs           senna-docusate 8.6-50 MG per tablet    SENOKOT-S;PERICOLACE          ZANTAC 150 MG tablet   Generic drug:  ranitidine      Take 150 mg by mouth 2 times daily        * Notice:  This list has 5 medication(s) that are the same as other medications prescribed for you. Read the directions carefully, and ask your doctor or other care provider to review them with you.

## 2018-09-26 NOTE — MR AVS SNAPSHOT
After Visit Summary   9/26/2018    Javon Bianchi    MRN: [id number]           Patient Information     Date Of Birth          1950        Visit Information        Provider Department      9/26/2018 8:45 AM Malathi De Luna MD [facility]        Today's Diagnoses     Renal cell carcinoma, right (H)    -  1       Follow-ups after your visit        Additional Services     PALLIATIVE CARE REFERRAL       Your provider has referred you to Palliative Care Services.        Please be aware that coverage of these services is subject to the terms and limitations of your health insurance plan.  Call member services at your health plan with any benefit or coverage questions.      Please bring the following with you to your appointment:    (1) Any X-Rays, CTs or MRIs which have been performed.  Contact the facility where they were done to arrange for  prior to your scheduled appointment.   (2) If you have recently seen a provider outside of the [facility], please bring your most recent clinic note and/or imaging results  (3) List of current medications - please bring ALL of the medications that you are currently taking (in their original bottles) to your appointment  (4) This referral request  (5) Any documents/labs given to you for this referral    Services Requested: Consult and make recommendations                  Future tests that were ordered for you today     Open Standing Orders        Priority Remaining Interval Expires Ordered    Comprehensive metabolic panel Routine 99/99 every 4 weeks and as needed 9/26/2019 9/26/2018    CBC with platelets differential Routine 99/99 every 4 weeks and as needed 9/26/2019 9/26/2018    Protein  random urine with Creat Ratio Routine 12/12 every 4 weeks 9/26/2019 9/26/2018          Open Future Orders        Priority Expected Expires Ordered    CBC with platelets differential Routine 10/15/2018 9/26/2019 9/26/2018    Comprehensive metabolic  panel Routine 10/15/2018 9/26/2019 9/26/2018    TSH with free T4 reflex Routine 10/15/2018 9/26/2019 9/26/2018            Who to contact     If you have questions or need follow up information about today's clinic visit or your schedule please contact [facility] directly at [phone].  Normal or non-critical lab and imaging results will be communicated to you by [employer], letter or phone within 4 business days after the clinic has received the results. If you do not hear from us within 7 days, please contact the clinic through [employer] or phone. If you have a critical or abnormal lab result, we will notify you by phone as soon as possible.  Submit refill requests through [employer] or call your pharmacy and they will forward the refill request to us. Please allow 3 business days for your refill to be completed.          Additional Information About Your Visit        [employer] Information     [employer] gives you secure access to your electronic health record. If you see a primary care provider, you can also send messages to your care team and make appointments. If you have questions, please call your primary care clinic.  If you do not have a primary care provider, please call [phone] and they will assist you.        Care EveryWhere ID     This is your Care EveryWhere ID. This could be used by other organizations to access your [facility] medical records  [id number]        Your Vitals Were     Pulse Temperature Respirations Height Pulse Oximetry BMI (Body Mass Index)    70 97  F (36.1  C) (Oral) 16 1.829 m (6') 96% 32.7 kg/m2       Blood Pressure from Last 3 Encounters:   09/26/18 135/79   09/10/18 127/72    Weight from Last 3 Encounters:   09/26/18 109.4 kg (241 lb 2 oz)   09/10/18 109.3 kg (241 lb 1 oz)              We Performed the Following     PALLIATIVE CARE REFERRAL          Today's Medication Changes          These changes are accurate as of 9/26/18 10:53 AM.  If you have any questions, ask  your nurse or doctor.               Start taking these medicines.        Dose/Directions    Cabozantinib S-Malate 60 MG   Commonly known as:  CABOMETYX   Used for:  Renal cell carcinoma, right (H)   Started by:  Malathi De Luna MD        Dose:  60 mg   Take 1 tablet (60 mg) by mouth daily   Quantity:  30 tablet   Refills:  11         These medicines have changed or have updated prescriptions.        Dose/Directions    * oxyCODONE IR 10 MG tablet   Commonly known as:  ROXICODONE   This may have changed:  Another medication with the same name was changed. Make sure you understand how and when to take each.   Changed by:  Malathi De Luna MD        Dose:  10 mg   Take 10 mg by mouth   Refills:  0       * oxyCODONE 20 MG 12 hr tablet   Commonly known as:  OxyCONTIN   This may have changed:  Another medication with the same name was changed. Make sure you understand how and when to take each.   Changed by:  Malathi De Luna MD        Dose:  20 mg   Take 20 mg by mouth   Refills:  0       * oxyCODONE 20 MG 12 hr tablet   Commonly known as:  OxyCONTIN   This may have changed:  when to take this   Used for:  Renal cell carcinoma, right (H)   Changed by:  Malathi De Luna MD        Dose:  20 mg   Take 1 tablet (20 mg) by mouth every 8 hours   Quantity:  90 tablet   Refills:  0       * Notice:  This list has 3 medication(s) that are the same as other medications prescribed for you. Read the directions carefully, and ask your doctor or other care provider to review them with you.         Where to get your medicines      Some of these will need a paper prescription and others can be bought over the counter.  Ask your nurse if you have questions.     Bring a paper prescription for each of these medications     oxyCODONE 20 MG 12 hr tablet         Call your pharmacy to confirm that your medication is ready for pickup. It may take up to 24 hours for them to receive the prescription. If the prescription is not ready within 3 business  days, please contact your clinic or your provider.     We will let you know when these medications are ready. If you don't hear back within 3 business days, please contact us.     Cabozantinib S-Malate 60 MG               Information about OPIOIDS     PRESCRIPTION OPIOIDS: WHAT YOU NEED TO KNOW   We gave you an opioid (narcotic) pain medicine. It is important to manage your pain, but opioids are not always the best choice. You should first try all the other options your care team gave you. Take this medicine for as short a time (and as few doses) as possible.    Some activities can increase your pain, such as bandage changes or therapy sessions. It may help to take your pain medicine 30 to 60 minutes before these activities. Reduce your stress by getting enough sleep, working on hobbies you enjoy and practicing relaxation or meditation. Talk to your care team about ways to manage your pain beyond prescription opioids.    These medicines have risks:    DO NOT drive when on new or higher doses of pain medicine. These medicines can affect your alertness and reaction times, and you could be arrested for driving under the influence (DUI). If you need to use opioids long-term, talk to your care team about driving.    DO NOT operate heavy machinery    DO NOT do any other dangerous activities while taking these medicines.    DO NOT drink any alcohol while taking these medicines.     If the opioid prescribed includes acetaminophen, DO NOT take with any other medicines that contain acetaminophen. Read all labels carefully. Look for the word  acetaminophen  or  Tylenol.  Ask your pharmacist if you have questions or are unsure.    You can get addicted to pain medicines, especially if you have a history of addiction (chemical, alcohol or substance dependence). Talk to your care team about ways to reduce this risk.    All opioids tend to cause constipation. Drink plenty of water and eat foods that have a lot of fiber, such as  fruits, vegetables, prune juice, apple juice and high-fiber cereal. Take a laxative (Miralax, milk of magnesia, Colace, Senna) if you don t move your bowels at least every other day. Other side effects include upset stomach, sleepiness, dizziness, throwing up, tolerance (needing more of the medicine to have the same effect), physical dependence and slowed breathing.    Store your pills in a secure place, locked if possible. We will not replace any lost or stolen medicine. If you don t finish your medicine, please throw away (dispose) as directed by your pharmacist. The [place] Pollution Control Agency has more information about safe disposal: https://www.pca.[email or web address].us/living-green/managing-unwanted-medications         Primary Care Provider Office Phone # Fax #    S Conrado [place] [phone]       [place] [address] [place] [address]        Equal Access to Services     [facility]KO[facility] RAMIREZ : Hadii aad ku hadasho Somary, waaxda luqadaha, qaybta kaalmada ademarcelinoyasara, george iverson . So [facility] [phone].    ATENCIÓN: Si habla español, tiene a ortega disposición servicios gratuitos de asistencia lingüística. Kuldip al [phone].    We comply with applicable federal civil rights laws and [place] laws. We do not discriminate on the basis of race, color, national origin, age, disability, sex, sexual orientation, or gender identity.            Thank you!     Thank you for choosing [facility]  for your care. Our goal is always to provide you with excellent care. Hearing back from our patients is one way we can continue to improve our services. Please take a few minutes to complete the written survey that you may receive in the mail after your visit with us. Thank you!             Your Updated Medication List - Protect others around you: Learn how to safely use, store and throw away your medicines at www.disposemymeds.org.          This list is accurate  as of 9/26/18 10:53 AM.  Always use your most recent med list.                   Brand Name Dispense Instructions for use Diagnosis    aspirin 81 MG tablet      Take 81 mg by mouth        atorvastatin 20 MG tablet    LIPITOR     Take 20 mg by mouth        Cabozantinib S-Malate 60 MG    CABOMETYX    30 tablet    Take 1 tablet (60 mg) by mouth daily    Renal cell carcinoma, right (H)       cyclobenzaprine 10 MG tablet    FLEXERIL     Take 10 mg by mouth        * dexamethasone 4 MG tablet    DECADRON     Take 4 mg by mouth        * dexamethasone 4 MG tablet    DECADRON     TAKE BY MOUTH FOUR TIMES DAILY ACCORDING TO TAPER SCHEDULE        melatonin 3 MG tablet      Take 3 mg by mouth        methylPREDNISolone 4 MG tablet    MEDROL DOSEPAK          * oxyCODONE IR 10 MG tablet    ROXICODONE     Take 10 mg by mouth        * oxyCODONE 20 MG 12 hr tablet    OxyCONTIN     Take 20 mg by mouth        * oxyCODONE 20 MG 12 hr tablet    OxyCONTIN    90 tablet    Take 1 tablet (20 mg) by mouth every 8 hours    Renal cell carcinoma, right (H)       polyethylene glycol Packet    MIRALAX/GLYCOLAX     Take 1 packet by mouth        Pseudoephedrine-APAP  MG Tabs           senna-docusate 8.6-50 MG per tablet    SENOKOT-S;PERICOLACE          ZANTAC 150 MG tablet   Generic drug:  ranitidine      Take 150 mg by mouth 2 times daily        * Notice:  This list has 5 medication(s) that are the same as other medications prescribed for you. Read the directions carefully, and ask your doctor or other care provider to review them with you.

## 2018-09-26 NOTE — PROGRESS NOTES
September 26, 2018       Chief complaint:   Metastatic Carcinoma with bone metastases, likely renal origin.  Cabozantinib: Start Sep end 2018         History of Present Illness:     Javon Bianchi is a 68 year old male with a history significant for hyperlipidemia who had been in his usual health until he developed back pain in May this year. At that time, the pain was a stabbing like in the middle of lower back with burning like pain wrapping around this left hip to knee area. No trauma. He had MRI on 5/23/18 showed mild degenerative change with bulging disks L2-S1. A small benign hemangioma was noted in L2, no other marrow signal abnormality. Since then, the pain failed to improve despite steroid injections. In addition, he actually lost 25 lbs weight unintentionally since June along with chills few time a day. Overtime, the pain got worse to the point he could not ambulate after short distance and developed muscle weakness in lower extremities over time requiring a walker. Finally, he went to ER ([facility] in [place]) on 8/23/18, CT A/P was unremarkable. He was referred to neurology with obtaining MRI L spine. MRI on 8/30/18 revealed a pathological fracture at L3 vertebral body with height loss at 40% and diffuse involvement of a neoplastic process. IR guided biopsy on 8/31/18 showed poorly differentiated carcinoma. Its IHC was suggestive of possible renal cell carcinoma per pathology report (Renal cell immunochemistry is positive and along with the negative staining for CK7 and CK20 suggests the possibility of a renal carcinoma, however most renal cell carcinomas also express PAX8 which is negative in this case). The patient was evaluated by neurosurgery who recommended no surgical intervention. He was discharged with TLSO brace and walker. He is scheduled to start palliative XRT locally from 9/11, planned 10 fractions. Today he was referred to Dr. De Luna for further evaluation. Of note, CT  chest/abdomen/pelvis at OSH was negative for primary lesion.     We had recommended additional workup to complete the staging and he presents today to discuss the results and treatment plan.    Interval history: No new complaints since last visit.  He has completed radiation to his lumbar spine.  Reports some improvement in pain although currently does have a pain at the level of 5.  Taking OxyContin 20 mg twice a day and as needed oxycodone.    PAST MEDICAL SURGICAL HISTORY:Reviewed.  SOCIAL HISTORY: Reviewed.     MEDICATIONS: Reviewed.          Home Medications:     Current Outpatient Prescriptions   Medication     aspirin 81 MG tablet     atorvastatin (LIPITOR) 20 MG tablet     Cabozantinib S-Malate (CABOMETYX) 60 MG     cyclobenzaprine (FLEXERIL) 10 MG tablet     melatonin 3 MG tablet     methylPREDNISolone (MEDROL DOSEPAK) 4 MG tablet     oxyCODONE (OXYCONTIN) 20 MG 12 hr tablet     oxyCODONE (OXYCONTIN) 20 MG 12 hr tablet     oxyCODONE IR (ROXICODONE) 10 MG tablet     polyethylene glycol (MIRALAX/GLYCOLAX) Packet     ranitidine (ZANTAC) 150 MG tablet     senna-docusate (SENOKOT-S;PERICOLACE) 8.6-50 MG per tablet     dexamethasone (DECADRON) 4 MG tablet     Pseudoephedrine-APAP  MG TABS     No current facility-administered medications for this visit.        ECOG performance status of 1.   In a brace.   HEENT: No icterus, no pallor. Moist mucous membranes. Oropharynx is clear.   NECK: Supple  LUNGS: Bilateral clear to ausculation  CARDIOVASCULAR: Regular rate and rhythm, no murmurs, gallops or rubs.   ABDOMEN: Soft, nontender and nondistended.   EXTREMITIES: No cyanosis, no clubbing, no edema.   NEUROLOGIC: No focal deficits.  Labs: Not clinically significant    IMAGING: Reviewed images and report of CT Chest and MRI A/P  No significant change in size of numerous bilateral solid pulmonary  nodules compared to 9/6/2018.   1. No intra-abdominal source of the patient's poorly differentiated  carcinoma  identified.  2. Large infiltrative L3 metastasis which involves the L2 and L4  vertebral bodies as detailed above. Additionally, there is extension  into the left posterior iliac bone and paraspinal musculature  posterior to the iliac bones bilaterally. At the L3 level, the tumor  extends into the spinal canal resulting in severe spinal cord  narrowing and obliteration of the left L3-4 neural foramen, similar to  8/29/2018.         Assessment /Plan     68-year-old male with metastatic infiltrative lesion in L3 extending to L2 and L4, likely primary malignancy being renal cell carcinoma.  However he does not have any evidence of a primary renal mass on imaging.  We are awaiting the pathology consult for the biopsies.  I had a long discussion with the patient that based on the available pathology data, we will start treatment as a renal cell carcinoma.  PAX8 stain was negative,  and the clear cell features were not seen (it was a poorly differentiated carcinoma).     I had a long discussion with him that I would recommend cabozantinib given the extensive disease burden of this agent in the bones and recent data from CABOSUN demonstrating superiority over sunitinib in initial setting.   157 patients were randomly assigned (cabozantinib, n = 79; sunitinib, n = 78). Compared with sunitinib, cabozantinib treatment significantly increased median PFS (8.2 v 5.6 months) and was associated with a 34% reduction in rate of progression or death (adjusted hazard ratio, 0.66; 95% CI, 0.46 to 0.95; one-sided P = .012). MENA was 33% (95% CI, 23% to 44%) for cabozantinib versus 12% (95% CI, 5.4% to 21%) for sunitinib. All-causality grade 3 or 4 adverse events were 67% for cabozantinib and 68% for sunitinib and included diarrhea (cabozantinib, 10% v sunitinib, 11%), fatigue (6% v 15%), hypertension (28% v 22%), palmar-plantar erythrodysesthesia (8% v 4%), and hematologic adverse events (3% v 22%).    I discussed all the side effects of  cabozantinib and after verbalizing understanding, patient provided verbal consent.  We will obtain prior authorization and arrange for drug asap.  Pain: I provided him prescription for OxyContin 20 mg to be taken every 8 hours instead of every 12 hours.  Continue as needed oxycodone.  I will also set him up for pain team referral ASAP.   We will set him up for physical therapy referral as well.    Return to clinic in third week of October with labs and see me.      Malathi De Luna MD  Hematology Oncology and Transplantation  [facility]

## 2018-09-26 NOTE — NURSING NOTE
Oncology Rooming Note    September 26, 2018 8:56 AM   Javon Bianchi is a 68 year old male who presents for:    Chief Complaint   Patient presents with     Oncology Clinic Visit     Return: Malignant Spinal Tumor, Poss Renal Carcinoma     Initial Vitals: /79  Pulse 70  Temp 97  F (36.1  C) (Oral)  Resp 16  Ht 1.829 m (6')  Wt 109.4 kg (241 lb 2 oz)  SpO2 96%  BMI 32.7 kg/m2 Estimated body mass index is 32.7 kg/(m^2) as calculated from the following:    Height as of this encounter: 1.829 m (6').    Weight as of this encounter: 109.4 kg (241 lb 2 oz). Body surface area is 2.36 meters squared.  Moderate Pain (5) Comment: left leg 4   No LMP for male patient.  Allergies reviewed: Yes  Medications reviewed: Yes    Medications: MEDICATION REFILLS NEEDED TODAY. Provider was notified.  ?  Pharmacy name entered into [facility]: [facility] - [address]    Clinical concerns: new concerns are that he is struggling with Bowels,  Dr. De Luna was notified.    10 minutes for nursing intake (face to face time)     Negro Cárdenas CMA

## 2018-09-26 NOTE — LETTER
9/26/2018       RE: Javon Bianchi  [address] [address]     Dear Colleague,    Thank you for referring your patient, Javon Bianchi, to the [facility]. Please see a copy of my visit note below.    See Oral Onc Mgmt note.    Again, thank you for allowing me to participate in the care of your patient.      Sincerely,    Ash Mullins RPH

## 2018-09-26 NOTE — LETTER
9/26/2018       RE: Javon Bianchi  [address] [address]     Dear Colleague,    Thank you for referring your patient, Javon Bianchi, to the [facility]. Please see a copy of my visit note below.            September 26, 2018       Chief complaint:   Metastatic Carcinoma with bone metastases, likely renal origin.  Cabozantinib: Start Sep end 2018         History of Present Illness:     Javon Bianchi is a 68 year old male with a history significant for hyperlipidemia who had been in his usual health until he developed back pain in May this year. At that time, the pain was a stabbing like in the middle of lower back with burning like pain wrapping around this left hip to knee area. No trauma. He had MRI on 5/23/18 showed mild degenerative change with bulging disks L2-S1. A small benign hemangioma was noted in L2, no other marrow signal abnormality. Since then, the pain failed to improve despite steroid injections. In addition, he actually lost 25 lbs weight unintentionally since June along with chills few time a day. Overtime, the pain got worse to the point he could not ambulate after short distance and developed muscle weakness in lower extremities over time requiring a walker. Finally, he went to ER ([facility] in [place]) on 8/23/18, CT A/P was unremarkable. He was referred to neurology with obtaining MRI L spine. MRI on 8/30/18 revealed a pathological fracture at L3 vertebral body with height loss at 40% and diffuse involvement of a neoplastic process. IR guided biopsy on 8/31/18 showed poorly differentiated carcinoma. Its IHC was suggestive of possible renal cell carcinoma per pathology report (Renal cell immunochemistry is positive and along with the negative staining for CK7 and CK20 suggests the possibility of a renal carcinoma, however most renal cell carcinomas also express PAX8 which is negative in this case). The patient was evaluated by neurosurgery who  recommended no surgical intervention. He was discharged with TLSO brace and walker. He is scheduled to start palliative XRT locally from 9/11, planned 10 fractions. Today he was referred to Dr. De Luna for further evaluation. Of note, CT chest/abdomen/pelvis at OSH was negative for primary lesion.     We had recommended additional workup to complete the staging and he presents today to discuss the results and treatment plan.    Interval history: No new complaints since last visit.  He has completed radiation to his lumbar spine.  Reports some improvement in pain although currently does have a pain at the level of 5.  Taking OxyContin 20 mg twice a day and as needed oxycodone.    PAST MEDICAL SURGICAL HISTORY:Reviewed.  SOCIAL HISTORY: Reviewed.     MEDICATIONS: Reviewed.          Home Medications:     Current Outpatient Prescriptions   Medication     aspirin 81 MG tablet     atorvastatin (LIPITOR) 20 MG tablet     Cabozantinib S-Malate (CABOMETYX) 60 MG     cyclobenzaprine (FLEXERIL) 10 MG tablet     melatonin 3 MG tablet     methylPREDNISolone (MEDROL DOSEPAK) 4 MG tablet     oxyCODONE (OXYCONTIN) 20 MG 12 hr tablet     oxyCODONE (OXYCONTIN) 20 MG 12 hr tablet     oxyCODONE IR (ROXICODONE) 10 MG tablet     polyethylene glycol (MIRALAX/GLYCOLAX) Packet     ranitidine (ZANTAC) 150 MG tablet     senna-docusate (SENOKOT-S;PERICOLACE) 8.6-50 MG per tablet     dexamethasone (DECADRON) 4 MG tablet     Pseudoephedrine-APAP  MG TABS     No current facility-administered medications for this visit.        ECOG performance status of 1.   In a brace.   HEENT: No icterus, no pallor. Moist mucous membranes. Oropharynx is clear.   NECK: Supple  LUNGS: Bilateral clear to ausculation  CARDIOVASCULAR: Regular rate and rhythm, no murmurs, gallops or rubs.   ABDOMEN: Soft, nontender and nondistended.   EXTREMITIES: No cyanosis, no clubbing, no edema.   NEUROLOGIC: No focal deficits.  Labs: Not clinically significant    IMAGING:  Reviewed images and report of CT Chest and MRI A/P  No significant change in size of numerous bilateral solid pulmonary  nodules compared to 9/6/2018.   1. No intra-abdominal source of the patient's poorly differentiated  carcinoma identified.  2. Large infiltrative L3 metastasis which involves the L2 and L4  vertebral bodies as detailed above. Additionally, there is extension  into the left posterior iliac bone and paraspinal musculature  posterior to the iliac bones bilaterally. At the L3 level, the tumor  extends into the spinal canal resulting in severe spinal cord  narrowing and obliteration of the left L3-4 neural foramen, similar to  8/29/2018.         Assessment /Plan     68-year-old male with metastatic infiltrative lesion in L3 extending to L2 and L4, likely primary malignancy being renal cell carcinoma.  However he does not have any evidence of a primary renal mass on imaging.  We are awaiting the pathology consult for the biopsies.  I had a long discussion with the patient that based on the available pathology data, we will start treatment as a renal cell carcinoma.  PAX8 stain was negative,  and the clear cell features were not seen (it was a poorly differentiated carcinoma).     I had a long discussion with him that I would recommend cabozantinib given the extensive disease burden of this agent in the bones and recent data from CABOSUN demonstrating superiority over sunitinib in initial setting.   157 patients were randomly assigned (cabozantinib, n = 79; sunitinib, n = 78). Compared with sunitinib, cabozantinib treatment significantly increased median PFS (8.2 v 5.6 months) and was associated with a 34% reduction in rate of progression or death (adjusted hazard ratio, 0.66; 95% CI, 0.46 to 0.95; one-sided P = .012). MENA was 33% (95% CI, 23% to 44%) for cabozantinib versus 12% (95% CI, 5.4% to 21%) for sunitinib. All-causality grade 3 or 4 adverse events were 67% for cabozantinib and 68% for sunitinib  and included diarrhea (cabozantinib, 10% v sunitinib, 11%), fatigue (6% v 15%), hypertension (28% v 22%), palmar-plantar erythrodysesthesia (8% v 4%), and hematologic adverse events (3% v 22%).    I discussed all the side effects of cabozantinib and after verbalizing understanding, patient provided verbal consent.  We will obtain prior authorization and arrange for drug asap.  Pain: I provided him prescription for OxyContin 20 mg to be taken every 8 hours instead of every 12 hours.  Continue as needed oxycodone.  I will also set him up for pain team referral ASAP.   We will set him up for physical therapy referral as well.    Return to clinic in third week of October with labs and see me.      Malathi De Luna MD  Hematology Oncology and Transplantation  [facility]

## 2018-09-27 ENCOUNTER — TELEPHONE (OUTPATIENT)
Dept: ONCOLOGY | Facility: CLINIC | Age: 68
End: 2018-09-27

## 2018-09-27 NOTE — TELEPHONE ENCOUNTER
Prior Authorization Approval    Authorization Effective Date: 9/27/2018  Authorization Expiration Date: 9/27/2019  Medication: Cabometyx-PA Approved  Approved Dose/Quantity: 60mg, 30 for 30 day supply  Reference #: [id number]   Insurance Company: [employer] - Phone [phone] Fax [phone]  Expected CoPay:    $3,135.87.  CoPay Card Available: No   Foundation Assistance Needed:  Yes-[facility] is open for Renal Cell, will call pt after 9 am to see if he qualifies  Which Pharmacy is filling the prescription (Not needed for infusion/clinic administered): [facility] [place], [place] - [address] [place] [address] [phone]  Pharmacy Notified: Yes  Patient Notified: Yes

## 2018-09-27 NOTE — TELEPHONE ENCOUNTER
Patient is approved to receive a $5,900 zee through the [facility] for Renal Cell Carcinoma related copays. His Cabometyx copay will be $0 and first shipment being received on 9/28.

## 2018-09-28 PROBLEM — C64.1 RENAL CELL CARCINOMA, RIGHT (H): Status: ACTIVE | Noted: 2018-09-28

## 2018-09-28 LAB — TESTOST SERPL-MCNC: 22 NG/DL (ref 240–950)

## 2018-09-28 NOTE — PROGRESS NOTES
Met with patient, and family to discuss resources available at the [facility]. Provided patient with  My Cancer Guidebook . Reviewed administration, side effects and ongoing symptom support management. Provided phone numbers for triage and after hours care. Reviewed most concerning symptoms that warrant an immediate call to the clinic nurse line.

## 2018-10-01 ENCOUNTER — CARE COORDINATION (OUTPATIENT)
Dept: ONCOLOGY | Facility: CLINIC | Age: 68
End: 2018-10-01

## 2018-10-01 ENCOUNTER — DOCUMENTATION ONLY (OUTPATIENT)
Dept: ONCOLOGY | Facility: CLINIC | Age: 68
End: 2018-10-01

## 2018-10-01 DIAGNOSIS — C64.1 RENAL CELL CARCINOMA, RIGHT (H): Primary | ICD-10-CM

## 2018-10-01 DIAGNOSIS — C79.51 BONE METASTASIS: ICD-10-CM

## 2018-10-01 NOTE — PROGRESS NOTES
TC from pt asking why his testosterone level is so low - he viewed it on My Chart. Message sent to Dr. De Luna to review and give feedback. Await response.     Pt also would like to see palliative care in [place] as this is much closer to his home, however palliative care services are not available at [facility]. Pt stated he would be fine going to [place]. Referral placed.

## 2018-10-01 NOTE — PROGRESS NOTES
Per RNCC's request,  completed and faxed [facility] referral for lodging dates 10/21 - 10/22. [facility] will contact Patient directly for confirmation of reservation.  will continue to provide support as needed.      Sissy Scales (Martens), JIL, MSW   - [facility]  Phone: [phone]  Pager: [phone]  10/1/2018

## 2018-10-04 NOTE — PROGRESS NOTES
TC to pt per Dr. De Luna:    RE: Lab Result  Received: Yesterday       Malathi De Luna MD Jankovich, Diana, PAUL                   I do not know the reason.   must have been a chronic issue just never detected. Does not impact his current treatment.            Previous Messages       ----- Message -----      From: Vivienne Rashid RN      Sent: 10/2/2018   9:23 AM        To: Malathi De Luna MD   Subject: Lab Result                                       Hello!     [employer] called about his testosterone level and why it is so low. Would you please review and let me know what to tell him? Thanks!     -Vivienne          No answer. LM reviewed Dr. De Luna's response per above. Message sent to pt on My Chart as well.

## 2018-10-12 ENCOUNTER — DOCUMENTATION ONLY (OUTPATIENT)
Dept: PHARMACY | Facility: CLINIC | Age: 68
End: 2018-10-12

## 2018-10-12 NOTE — PROGRESS NOTES
Oral Chemotherapy Monitoring Program  Patient Follow Up    Primary Oncologist: Dr. De Luna  Primary Oncology Clinic: [facility]  Cancer Diagnosis: RCC    Therapy History:  Cabozantinib 60mg daily started 9/28/18    Drug Interaction Assessment: None    Lab Monitoring Plan  Monitoring plan:   C1D1+   CMP, CBC, urine random protein, BP C2D1+ Call, BP, CMP, CBC, urine random protein  C3D1+ Call, BP, CMP, CBC, urine random protein C4D1+ Call, BP, CMP, CBC, urine random protein C5D1+ Call, BP, CMP, CBC, urine random protein C6D1+ Call, BP, CMP, CBC, urine random protein   C1D8+  C2D8+  C3D8+  C4D8+  C5D8+  C6D8+    C1D15+ Call, BP C2D15+  C3D15+  C4D15+  C5D15+  C6D15+    C1D22+  C2D22+  C3D22+  C4D22+  C5D22+  C6D22+        Subjective/Objective:  Javon Bianchi is a 68 year old male contacted by phone for a follow-up visit for oral chemotherapy.    ORAL CHEMOTHERAPY 9/26/2018 10/12/2018   Drug Name Cabometyx (cabozantinib) Cabometyx (cabozantinib)   Current Dosage 60mg 60mg   Current Schedule Daily Daily   Cycle Details Continuous Continuous   Start Date of Last Cycle - 9/28/2018   Planned next cycle start date - 10/28/2018   Doses missed in last 2 weeks - 0   Adherence Assessment - Adherent   Adverse Effects - No AE identified during assessment   Home BPs - all BPs<140/90   Any new drug interactions? No No   Is the dose as ordered appropriate for the patient? Yes Yes   Is the patient currently in pain? - No   Has the patient been assessed within the past 6 months for depression? - Yes   Has the patient missed any days of school, work, or other routine activity? - No       Last PHQ-2 Score on record: No flowsheet data found.    Patient does not report depression symptoms.      Vitals:  BP:   BP Readings from Last 1 Encounters:   09/26/18 135/79     Wt Readings from Last 1 Encounters:   09/26/18 109.4 kg (241 lb 2 oz)     Estimated body surface area is 2.36 meters squared as calculated from the following:    Height as of  9/26/18: 1.829 m (6').    Weight as of 9/26/18: 109.4 kg (241 lb 2 oz).    Labs:  Lab Results   Component Value Date     09/25/2018      Lab Results   Component Value Date    POTASSIUM 4.0 09/25/2018     Lab Results   Component Value Date    AUREA 7.8 09/25/2018     Lab Results   Component Value Date    ALBUMIN 2.3 09/25/2018     No results found for: MAG  No results found for: PHOS  Lab Results   Component Value Date    BUN 16 09/25/2018     Lab Results   Component Value Date    CR 0.68 09/25/2018       Lab Results   Component Value Date    AST 22 09/25/2018     Lab Results   Component Value Date    ALT 16 09/25/2018     Lab Results   Component Value Date    BILITOTAL 0.6 09/25/2018       Lab Results   Component Value Date    WBC 3.9 09/25/2018     Lab Results   Component Value Date    HGB 9.9 09/25/2018     Lab Results   Component Value Date     09/25/2018     Lab Results   Component Value Date    ANEU 2.8 09/25/2018         Assessment & Plan:  I spoke with Javon today. He reports starting therapy on 9/28/18. He reports doing very well with on issues or concerns. He has not missed any doses. He is compliant with therapy. He reported minor mouth sores which have resolved. He will be in 10/22 to see Dr. De Luna. We will follow up at that time and assess labs.    Follow-Up:  In 10 days at MD appt    Refill Due:  Due by 10/28    Paulette Nobles, PharmD, BCPS, BCOP   Hematology/Oncology Clinical Pharmacist  [facility]   of Pharmacy  [facility]   [email or web address]

## 2018-10-18 ENCOUNTER — TELEPHONE (OUTPATIENT)
Dept: ONCOLOGY | Facility: CLINIC | Age: 68
End: 2018-10-18

## 2018-10-18 DIAGNOSIS — C64.1 RENAL CELL CARCINOMA, RIGHT (H): Primary | ICD-10-CM

## 2018-10-18 RX ORDER — DOXYCYCLINE HYCLATE 100 MG
100 TABLET ORAL 2 TIMES DAILY
Qty: 14 TABLET | Refills: 0 | Status: SHIPPED | OUTPATIENT
Start: 2018-10-18 | End: 2019-02-11

## 2018-10-18 NOTE — TELEPHONE ENCOUNTER
[facility] Telephone Triage Note    Assessment: Patient's spouse Patrizia called in to triage reporting the following symptoms: fever of 100.8 currently (Tmax 101.4 this AM). Also reporting pimple like wound inside nose. Red/warm with discharge. Pt' most recent WBC 3.9, ANC 2.8.    Recommendations: Discussed with Dr. DeL una at 1455.  Recommends Doxycycline 100mg BID for 7 days. If patient is not improving in 48 hours (fever not coming down, fever rising, symptoms worsening) proceed to ED. If patient is improving, call triage line with update. Also recommend holding oral chemo until wound has healed. Writer instructed patient on recommendations, agrees with plan. Rx Escribed to [facility].     Follow-Up: Instructed patient to seek care immediately for worsening symptoms, including: fever, chest pain, shortness of breath, dizziness. Patient voiced understanding of advice and/or instructions given.     Ricarda Calles RN   [facility] Triage

## 2018-10-22 ENCOUNTER — APPOINTMENT (OUTPATIENT)
Dept: LAB | Facility: CLINIC | Age: 68
End: 2018-10-22
Attending: INTERNAL MEDICINE
Payer: MEDICARE

## 2018-10-22 ENCOUNTER — ONCOLOGY VISIT (OUTPATIENT)
Dept: ONCOLOGY | Facility: CLINIC | Age: 68
End: 2018-10-22
Attending: INTERNAL MEDICINE
Payer: MEDICARE

## 2018-10-22 VITALS
SYSTOLIC BLOOD PRESSURE: 122 MMHG | RESPIRATION RATE: 16 BRPM | DIASTOLIC BLOOD PRESSURE: 81 MMHG | TEMPERATURE: 98.6 F | BODY MASS INDEX: 32.95 KG/M2 | WEIGHT: 242.95 LBS | HEART RATE: 85 BPM | OXYGEN SATURATION: 96 %

## 2018-10-22 DIAGNOSIS — Z79.899 ENCOUNTER FOR LONG-TERM (CURRENT) USE OF MEDICATIONS: ICD-10-CM

## 2018-10-22 DIAGNOSIS — C64.1 RENAL CELL CARCINOMA, RIGHT (H): ICD-10-CM

## 2018-10-22 DIAGNOSIS — C64.2 RENAL CELL CARCINOMA, LEFT (H): ICD-10-CM

## 2018-10-22 LAB
ALBUMIN SERPL-MCNC: 2.5 G/DL (ref 3.4–5)
ALP SERPL-CCNC: 116 U/L (ref 40–150)
ALT SERPL W P-5'-P-CCNC: 39 U/L (ref 0–70)
ANION GAP SERPL CALCULATED.3IONS-SCNC: 6 MMOL/L (ref 3–14)
ANISOCYTOSIS BLD QL SMEAR: SLIGHT
AST SERPL W P-5'-P-CCNC: 52 U/L (ref 0–45)
BASOPHILS # BLD AUTO: 0 10E9/L (ref 0–0.2)
BASOPHILS NFR BLD AUTO: 0 %
BILIRUB SERPL-MCNC: 1.2 MG/DL (ref 0.2–1.3)
BUN SERPL-MCNC: 9 MG/DL (ref 7–30)
CALCIUM SERPL-MCNC: 8.2 MG/DL (ref 8.5–10.1)
CHLORIDE SERPL-SCNC: 104 MMOL/L (ref 94–109)
CO2 SERPL-SCNC: 26 MMOL/L (ref 20–32)
CREAT SERPL-MCNC: 0.86 MG/DL (ref 0.66–1.25)
DIFFERENTIAL METHOD BLD: ABNORMAL
EOSINOPHIL # BLD AUTO: 0.1 10E9/L (ref 0–0.7)
EOSINOPHIL NFR BLD AUTO: 1.8 %
ERYTHROCYTE [DISTWIDTH] IN BLOOD BY AUTOMATED COUNT: 17.2 % (ref 10–15)
GFR SERPL CREATININE-BSD FRML MDRD: 88 ML/MIN/1.7M2
GLUCOSE SERPL-MCNC: 82 MG/DL (ref 70–99)
HCT VFR BLD AUTO: 35.5 % (ref 40–53)
HGB BLD-MCNC: 12.1 G/DL (ref 13.3–17.7)
LYMPHOCYTES # BLD AUTO: 0.6 10E9/L (ref 0.8–5.3)
LYMPHOCYTES NFR BLD AUTO: 21.4 %
MCH RBC QN AUTO: 30.7 PG (ref 26.5–33)
MCHC RBC AUTO-ENTMCNC: 34.1 G/DL (ref 31.5–36.5)
MCV RBC AUTO: 90 FL (ref 78–100)
MONOCYTES # BLD AUTO: 1.1 10E9/L (ref 0–1.3)
MONOCYTES NFR BLD AUTO: 38.4 %
NEUTROPHILS # BLD AUTO: 1.1 10E9/L (ref 1.6–8.3)
NEUTROPHILS NFR BLD AUTO: 38.4 %
PLATELET # BLD AUTO: 152 10E9/L (ref 150–450)
PLATELET # BLD EST: ABNORMAL 10*3/UL
POIKILOCYTOSIS BLD QL SMEAR: SLIGHT
POLYCHROMASIA BLD QL SMEAR: SLIGHT
POTASSIUM SERPL-SCNC: 3.6 MMOL/L (ref 3.4–5.3)
PROT SERPL-MCNC: 6.1 G/DL (ref 6.8–8.8)
RBC # BLD AUTO: 3.94 10E12/L (ref 4.4–5.9)
SODIUM SERPL-SCNC: 137 MMOL/L (ref 133–144)
TSH SERPL DL<=0.005 MIU/L-ACNC: 2.31 MU/L (ref 0.4–4)
WBC # BLD AUTO: 2.8 10E9/L (ref 4–11)

## 2018-10-22 PROCEDURE — G0008 ADMIN INFLUENZA VIRUS VAC: HCPCS

## 2018-10-22 PROCEDURE — 99215 OFFICE O/P EST HI 40 MIN: CPT | Mod: ZP | Performed by: INTERNAL MEDICINE

## 2018-10-22 PROCEDURE — 25000128 H RX IP 250 OP 636: Mod: ZF | Performed by: INTERNAL MEDICINE

## 2018-10-22 PROCEDURE — 90662 IIV NO PRSV INCREASED AG IM: CPT | Mod: ZF | Performed by: INTERNAL MEDICINE

## 2018-10-22 PROCEDURE — 80053 COMPREHEN METABOLIC PANEL: CPT | Performed by: INTERNAL MEDICINE

## 2018-10-22 PROCEDURE — 36415 COLL VENOUS BLD VENIPUNCTURE: CPT

## 2018-10-22 PROCEDURE — 84443 ASSAY THYROID STIM HORMONE: CPT | Performed by: INTERNAL MEDICINE

## 2018-10-22 PROCEDURE — 85025 COMPLETE CBC W/AUTO DIFF WBC: CPT | Performed by: INTERNAL MEDICINE

## 2018-10-22 PROCEDURE — G0463 HOSPITAL OUTPT CLINIC VISIT: HCPCS | Mod: ZF

## 2018-10-22 RX ORDER — OXYCODONE HCL 20 MG/1
20 TABLET, FILM COATED, EXTENDED RELEASE ORAL EVERY 8 HOURS
Qty: 90 TABLET | Refills: 0 | Status: SHIPPED | OUTPATIENT
Start: 2018-10-22 | End: 2018-10-22

## 2018-10-22 RX ORDER — MORPHINE SULFATE 30 MG/1
30 TABLET, FILM COATED, EXTENDED RELEASE ORAL EVERY 8 HOURS
Qty: 90 TABLET | Refills: 0 | Status: SHIPPED | OUTPATIENT
Start: 2018-10-22 | End: 2018-11-08

## 2018-10-22 RX ADMIN — INFLUENZA A VIRUS A/MICHIGAN/45/2015 X-275 (H1N1) ANTIGEN (FORMALDEHYDE INACTIVATED), INFLUENZA A VIRUS A/SINGAPORE/INFIMH-16-0019/2016 IVR-186 (H3N2) ANTIGEN (FORMALDEHYDE INACTIVATED), AND INFLUENZA B VIRUS B/MARYLAND/15/2016 BX-69A (A B/COLORADO/6/2017-LIKE VIRUS) ANTIGEN (FORMALDEHYDE INACTIVATED) 0.5 ML: 60; 60; 60 INJECTION, SUSPENSION INTRAMUSCULAR at 11:12

## 2018-10-22 ASSESSMENT — PAIN SCALES - GENERAL: PAINLEVEL: MODERATE PAIN (4)

## 2018-10-22 NOTE — NURSING NOTE
Javon Bianchi      1.  Has the patient received the information for the influenza vaccine? YES    2.  Does the patient have any of the following contraindications?     Allergy to eggs? No     Allergic reaction to previous influenza vaccines? No     Any other problems to previous influenza vaccines? No     Paralyzed by Guillain-[place] syndrome? No     Currently pregnant? NO     Current moderate or severe illness? No     Allergy to contact lens solution? No    3.  The vaccine has been administered in the usual fashion and the patient was instructed to wait 20 minutes before leaving the building in the event of an allergic reaction: YES    Vaccination given by Leonila Navarrete    Recorded by Leonila Navarrete

## 2018-10-22 NOTE — NURSING NOTE
Oncology Rooming Note    October 22, 2018 10:25 AM   Javon Bianchi is a 68 year old male who presents for:    Chief Complaint   Patient presents with     Labs Only     Labs via venipuncture     Oncology Clinic Visit     Return; Renal Cell Carcinoma     Initial Vitals: /81  Pulse 85  Temp 98.6  F (37  C) (Oral)  Resp 16  Wt 110.2 kg (242 lb 15.2 oz)  SpO2 96%  BMI 32.95 kg/m2 Estimated body mass index is 32.95 kg/(m^2) as calculated from the following:    Height as of 9/26/18: 1.829 m (6').    Weight as of this encounter: 110.2 kg (242 lb 15.2 oz). Body surface area is 2.37 meters squared.  Moderate Pain (4) Comment: Data Unavailable   No LMP for male patient.  Allergies reviewed: Yes  Medications reviewed: Yes    Medications: MEDICATION REFILLS NEEDED TODAY. Provider was notified.  Pharmacy name entered into [employer]: [facility] - [address]    Clinical concerns: Patient states he will need a refill of Oxycontin however, he would like to inquire about another Pain Medication, due to insurance purposes. Patient states he is very tired today and his wife states he seems more lethargic the past several days. Also would like a flu shot. Annamarie was notified.    8 minutes for nursing intake (face to face time)     Greta Mcintosh MA

## 2018-10-22 NOTE — LETTER
10/22/2018       RE: Javon Bianchi  [address] [id number]     Dear Colleague,    Thank you for referring your patient, Javon Bianchi, to the [facility]. Please see a copy of my visit note below.            October 22, 2018       Chief complaint:   Metastatic Carcinoma with bone metastases, likely renal origin.  Cabozantinib: Start September 28, 2018         History of Present Illness:     Javon Bianchi is a 68 year old male with a history significant for hyperlipidemia who had been in his usual health until he developed back pain in May this year. At that time, the pain was a stabbing like in the middle of lower back with burning like pain wrapping around this left hip to knee area. No trauma. He had MRI on 5/23/18 showed mild degenerative change with bulging disks L2-S1. A small benign hemangioma was noted in L2, no other marrow signal abnormality. Since then, the pain failed to improve despite steroid injections. In addition, he actually lost 25 lbs weight unintentionally since June along with chills few time a day. Overtime, the pain got worse to the point he could not ambulate after short distance and developed muscle weakness in lower extremities over time requiring a walker. Finally, he went to ER ([facility] in [place]) on 8/23/18, CT A/P was unremarkable. He was referred to neurology with obtaining MRI L spine. MRI on 8/30/18 revealed a pathological fracture at L3 vertebral body with height loss at 40% and diffuse involvement of a neoplastic process. IR guided biopsy on 8/31/18 showed poorly differentiated carcinoma. Its IHC was suggestive of possible renal cell carcinoma per pathology report (Renal cell immunochemistry is positive and along with the negative staining for CK7 and CK20 suggests the possibility of a renal carcinoma, however most renal cell carcinomas also express PAX8 which is negative in this case). The patient was evaluated by neurosurgery  who recommended no surgical intervention. He was discharged with TLSO brace and walker. He is scheduled to start palliative XRT locally from 9/11, planned 10 fractions. Today he was referred to Dr. De Luna for further evaluation. Of note, CT chest/abdomen/pelvis at OSH was negative for primary lesion.     Cabozantinib 60 mg started on September 29, 2018.  He returns today for interval visit, to ensure he is tolerating the cabozantinib well.    Interval history: Mr. Bianchi returns today with his family.  He is feeling better since starting doxycycline and his fevers have decreased.  He thinks that his nasal wound is healing, though he is still blowing his nose and finding some clots.  Today is day #4 of 7 on the doxycycline.  Has been holding his cabozantinib during this time.  No fatigue or diarrhea now or prior to this infection.  He is taking MS Contin for pain control three times per day with no breakthrough pain medication requirement.  He requests to switch to morphine due to insurance reasons.      PAST MEDICAL SURGICAL HISTORY:Reviewed.  SOCIAL HISTORY: Reviewed.     MEDICATIONS: Reviewed.          Home Medications:     Current Outpatient Prescriptions   Medication     atorvastatin (LIPITOR) 20 MG tablet     cyclobenzaprine (FLEXERIL) 10 MG tablet     doxycycline (VIBRA-TABS) 100 MG tablet     morphine (MS CONTIN) 30 MG 12 hr tablet     senna-docusate (SENOKOT-S;PERICOLACE) 8.6-50 MG per tablet     aspirin 81 MG tablet     Cabozantinib S-Malate (CABOMETYX) 60 MG     dexamethasone (DECADRON) 4 MG tablet     melatonin 3 MG tablet     methylPREDNISolone (MEDROL DOSEPAK) 4 MG tablet     oxyCODONE (OXYCONTIN) 20 MG 12 hr tablet     polyethylene glycol (MIRALAX/GLYCOLAX) Packet     Pseudoephedrine-APAP  MG TABS     ranitidine (ZANTAC) 150 MG tablet     Current Facility-Administered Medications   Medication     influenza Vac Split High-Dose (FLUZONE) injection 0.5 mL       ECOG performance status of 1.   In a  brace.    HEENT: No icterus, no pallor. TMs clear bilaterally; nasal mucosa bilaterally is injected, swollen and with scabs; no exudates  NECK: Supple  LUNGS: Bilateral clear to ausculation  CARDIOVASCULAR: Regular rate and rhythm, no murmurs, gallops or rubs; 2+ pitting edema to the knees bilaterally  ABDOMEN: Soft, nontender and nondistended.   EXTREMITIES: No cyanosis, no clubbing, no edema.   NEUROLOGIC: Significant LLE weakness at the hip and knee  Labs: Kidney and liver function good.  WBC stable, Hgb improved.    IMAGING: Reviewed images and report of CT Chest and MRI A/P  No significant change in size of numerous bilateral solid pulmonary  nodules compared to 9/6/2018.   1. No intra-abdominal source of the patient's poorly differentiated  carcinoma identified.  2. Large infiltrative L3 metastasis which involves the L2 and L4  vertebral bodies as detailed above. Additionally, there is extension  into the left posterior iliac bone and paraspinal musculature  posterior to the iliac bones bilaterally. At the L3 level, the tumor  extends into the spinal canal resulting in severe spinal cord  narrowing and obliteration of the left L3-4 neural foramen, similar to  8/29/2018.         Assessment /Plan     68-year-old male with metastatic infiltrative lesion in L3 extending to L2 and L4, likely primary malignancy being renal cell carcinoma.  However he does not have any evidence of a primary renal mass on imaging.    Based on the available pathology data,  We offered treatment for renal cell carcinoma with cabozantinib (CABOSUN data demonstrating cabozantinib superiority over sunitinib in the initial setting).  PAX8 stain was negative,  and the clear cell features were not seen (it was a poorly differentiated carcinoma).   He has been on cabozantinib for over 3 weeks,  doing well on cabozantinib.    Nasal wound appears to be healing, so okay to restart this on Friday of this week (10/26).    Pain: Will switch to MS  contin for insurance reasons, provided refills to last until his Palliative Care visit in November.      Agent and his family had multiple questions about monitoring the cancer without scans and we reassured them that at this point unless patient has significantly new or worsening symptoms, restaging scans are the best way to assess for disease response.    Return to clinic on November 14, 2018 with MRI, CT, labs and MD visit.    Annabelle Calderón MD  Hematology-Oncology-Transplant Fellow    Provider Disclosure:  Please refer to the note by the fellow for H&P and physical exam and assessment and plan for details.     I agree with above History, Review of Systems, Physical exam and Plan. I have reviewed the content of the documentation and have edited it as needed. I have personally performed the services documented here and the documentation accurately represents those services and the decisions I have made.       Malathi De Luna MD  Hematology Oncology and Transplantation  [facility]

## 2018-10-22 NOTE — MR AVS SNAPSHOT
After Visit Summary   10/22/2018    Javon Bianchi    MRN: [id number]           Patient Information     Date Of Birth          1950        Visit Information        Provider Department      10/22/2018 10:15 AM Malathi De Luna MD [facility]        Today's Diagnoses     Renal cell carcinoma, right (H)        Renal cell carcinoma, left (H)        Encounter for long-term (current) use of medications           Follow-ups after your visit        Your next 10 appointments already scheduled     Nov 08, 2018  1:15 PM CST   New Visit with Nan Gonzalez MD   [facility]    [address] [place] [address] [id number] [phone]            Nov 13, 2018  4:15 PM CST   LAB with  LAB   [facility] ([facility])    [address] [place] [address] [id number] [phone]           Please do not eat 10-12 hours before your appointment if you are coming in fasting for labs on lipids, cholesterol, or glucose (sugar). This does not apply to pregnant women. Water, hot tea and black coffee (with nothing added) are okay. Do not drink other fluids, diet soda or chew gum.            Nov 13, 2018  4:40 PM CST   CT CHEST/ABDOMEN/PELVIS W CONTRAST with UCCT1   [facility] CT ([facility])    [address] [place] [address] [id number] [phone]           How do I prepare for my exam? (Food and drink instructions) To prepare: Do not eat or drink for 2 hours before your exam. If you need to take medicine, you may take it with small sips of water. (We may ask you to take liquid medicine as well.)  How do I prepare for my exam? (Other instructions) Please arrive 30 minutes early for your CT.  Once in the department you might be asked to drink water 15-20 minutes prior to your exam.  If indicated you may be asked to drink an oral contrast in advance  of your CT.  If this is the case, the imaging team will let you know or be in contact with you prior to your appointment  Patients over 70 or patients with diabetes or kidney problems: If you haven t had a blood test (creatinine test) within the last 30 days, the Cardiologist/Radiologist may require you to get this test prior to your exam.  If you have diabetes:  Continue to take your metformin medication on the day of your exam  What should I wear: Please wear loose clothing, such as a sweat suit or jogging clothes. Avoid snaps, zippers and other metal. We may ask you to undress and put on a hospital gown.  How long does the exam take: Most scans take less than 20 minutes.  What should I bring: Please bring any scans or X-rays taken at other hospitals, if similar tests were done. Also bring a list of your medicines, including vitamins, minerals and over-the-counter drugs. It is safest to leave personal items at home.  Do I need a : No  is needed.  What do I need to tell my doctor? Be sure to tell your doctor: * If you have any allergies. * If there s any chance you are pregnant. * If you are breastfeeding.  What should I do after the exam: No restrictions, You may resume normal activities.  What is this test: A CT (computed tomography) scan is a series of pictures that allows us to look inside your body. The scanner creates images of the body in cross sections, much like slices of bread. This helps us see any problems more clearly. You may receive contrast (X-ray dye) before or during your scan. You will be asked to drink the contrast.  Who should I call with questions: If you have any questions, please call the Imaging Department where you will have your exam. Directions, parking instructions, and other information is available on our website, [email or web address].            Nov 13, 2018  5:30 PM CST   MR LUMBAR SPINE W CONTRAST with [id number]   [facility] ([facility]  [place])    [address] [id number] [phone]           How do I prepare for my exam? (Food and drink instructions) **If you will be receiving sedation or general anesthesia, please see special notes below.**  How do I prepare for my exam? (Other instructions) Take your medicines as usual, unless your doctor tells you not to. You may or may not receive intravenous (IV) contrast for this exam pending the discretion of the Radiologist.  You do not need to do anything special to prepare.  **If you will be receiving sedation or general anesthesia, please see special notes below.**  What should I wear: The MRI machine uses a strong magnet. Please wear clothes without metal (snaps, zippers). A sweatsuit works well, or we may give you a hospital gown. Please remove any body piercings and hair extensions before you arrive. You will also remove watches, jewelry, hairpins, wallets, dentures, partial dental plates and hearing aids. You may wear contact lenses, and you may be able to wear your rings. We have a safe place to keep your personal items, but it is safer to leave them at home.  How long does the exam take: Most tests take 30 to 60 minutes.  HOWEVER, IF YOUR DOCTOR PRESCRIBES ANESTHESIA please plan on spending four to five hours in the recovery room.  What should I bring:  Bring a list of your current medicines to your exam (including vitamins, minerals and over-the-counter drugs).  Do I need a :  **If you will be receiving sedation or general anesthesia, please see special notes below.**  What should I do after the exam: No Restrictions, You may resume normal activities.  What is this test: MRI (magnetic resonance imaging) uses a strong magnet and radio waves to look inside the body. An MRA (magnetic resonance angiogram) does the same thing, but it lets us look at your blood vessels. A computer turns the radio waves into pictures showing cross sections of the body, much  like slices of bread. This helps us see any problems more clearly. You may receive fluid (called  contrast ) before or during your scan. The fluid helps us see the pictures better. We give the fluid through an IV (small needle in your arm).  Who should I call with questions:  Please call the Imaging Department at your exam site with any questions. Directions, parking instructions, and other information is available on our website, [email or web address].  How do I prepare if I m having sedation or anesthesia? **IMPORTANT** THE INSTRUCTIONS BELOW ARE ONLY FOR THOSE PATIENTS WHO HAVE BEEN TOLD THEY WILL RECEIVE SEDATION OR GENERAL ANESTHESIA DURING THEIR MRI PROCEDURE:  IF YOU WILL RECEIVE SEDATION (take medicine to help you relax during your exam): You must get the medicine from your doctor before you arrive. Bring the medicine to the exam. Do not take it at home. Arrive one hour early. Bring someone who can take you home after the test. Your medicine will make you sleepy. After the exam, you may not drive, take a bus or take a taxi by yourself. No eating 8 hours before your exam. You may have clear liquids up until 4 hours before your exam. (Clear liquids include water, clear tea, black coffee and fruit juice without pulp.)  IF YOU WILL RECEIVE ANESTHESIA (be asleep for your exam): Arrive 1 1/2 hours early. Bring someone who can take you home after the test. You may not drive, take a bus or take a taxi by yourself. No eating 8 hours before your exam. You may have clear liquids up until 4 hours before your exam. (Clear liquids include water, clear tea, black coffee and fruit juice without pulp.)            Nov 14, 2018  8:15 AM CST   (Arrive by 8:00 AM)   Return Visit with Malathi De Luna MD   [facility] ([facility])    [address]  [address]  [facility] [address] [phone]              Future tests that were ordered for you today     Open Future Orders         Priority Expected Expires Ordered    CBC with platelets differential Routine 11/13/2018 10/22/2019 10/22/2018    Comprehensive metabolic panel Routine 11/13/2018 10/22/2019 10/22/2018    TSH with free T4 reflex Routine 11/13/2018 10/22/2019 10/22/2018    CT Chest/Abdomen/Pelvis w Contrast Routine 11/13/2018 10/22/2019 10/22/2018    MR Lumbar Spine w Contrast Routine 11/13/2018 10/22/2019 10/22/2018            Who to contact     If you have questions or need follow up information about today's clinic visit or your schedule please contact [facility] directly at [phone].  Normal or non-critical lab and imaging results will be communicated to you by [employer], letter or phone within 4 business days after the clinic has received the results. If you do not hear from us within 7 days, please contact the clinic through [employer] or phone. If you have a critical or abnormal lab result, we will notify you by phone as soon as possible.  Submit refill requests through [employer] or call your pharmacy and they will forward the refill request to us. Please allow 3 business days for your refill to be completed.          Additional Information About Your Visit        [employer] [facility] Information     [employer] gives you secure access to your electronic health record. If you see a primary care provider, you can also send messages to your care team and make appointments. If you have questions, please call your primary care clinic.  If you do not have a primary care provider, please call [phone] and they will assist you.        Care EveryWhere ID     This is your Care EveryWhere ID. This could be used by other organizations to access your [facility] medical records  [id number]        Your Vitals Were     Pulse Temperature Respirations Pulse Oximetry BMI (Body Mass Index)       85 98.6  F (37  C) (Oral) 16 96% 32.95 kg/m2        Blood Pressure from Last 3 Encounters:   10/22/18 122/81   09/26/18 135/79   09/10/18 127/72     Weight from Last 3 Encounters:   10/22/18 110.2 kg (242 lb 15.2 oz)   09/26/18 109.4 kg (241 lb 2 oz)   09/10/18 109.3 kg (241 lb 1 oz)              We Performed the Following     CBC with platelets differential     Comprehensive metabolic panel     TSH with free T4 reflex          Today's Medication Changes          These changes are accurate as of 10/22/18 11:33 AM.  If you have any questions, ask your nurse or doctor.               Start taking these medicines.        Dose/Directions    morphine 30 MG 12 hr tablet   Commonly known as:  MS CONTIN   Used for:  Renal cell carcinoma, right (H), Renal cell carcinoma, left (H)   Started by:  Malathi De Luna MD        Dose:  30 mg   Take 1 tablet (30 mg) by mouth every 8 hours   Quantity:  90 tablet   Refills:  0         These medicines have changed or have updated prescriptions.        Dose/Directions    oxyCODONE 20 MG 12 hr tablet   Commonly known as:  OxyCONTIN   This may have changed:  Another medication with the same name was removed. Continue taking this medication, and follow the directions you see here.   Changed by:  Malathi De Luna MD        Dose:  20 mg   Take 20 mg by mouth   Refills:  0            Where to get your medicines      Some of these will need a paper prescription and others can be bought over the counter.  Ask your nurse if you have questions.     Bring a paper prescription for each of these medications     morphine 30 MG 12 hr tablet               Information about OPIOIDS     PRESCRIPTION OPIOIDS: WHAT YOU NEED TO KNOW   We gave you an opioid (narcotic) pain medicine. It is important to manage your pain, but opioids are not always the best choice. You should first try all the other options your care team gave you. Take this medicine for as short a time (and as few doses) as possible.    Some activities can increase your pain, such as bandage changes or therapy sessions. It may help to take your pain medicine 30 to 60 minutes before these  activities. Reduce your stress by getting enough sleep, working on hobbies you enjoy and practicing relaxation or meditation. Talk to your care team about ways to manage your pain beyond prescription opioids.    These medicines have risks:    DO NOT drive when on new or higher doses of pain medicine. These medicines can affect your alertness and reaction times, and you could be arrested for driving under the influence (DUI). If you need to use opioids long-term, talk to your care team about driving.    DO NOT operate heavy machinery    DO NOT do any other dangerous activities while taking these medicines.    DO NOT drink any alcohol while taking these medicines.     If the opioid prescribed includes acetaminophen, DO NOT take with any other medicines that contain acetaminophen. Read all labels carefully. Look for the word  acetaminophen  or  Tylenol.  Ask your pharmacist if you have questions or are unsure.    You can get addicted to pain medicines, especially if you have a history of addiction (chemical, alcohol or substance dependence). Talk to your care team about ways to reduce this risk.    All opioids tend to cause constipation. Drink plenty of water and eat foods that have a lot of fiber, such as fruits, vegetables, prune juice, apple juice and high-fiber cereal. Take a laxative (Miralax, milk of magnesia, Colace, Senna) if you don t move your bowels at least every other day. Other side effects include upset stomach, sleepiness, dizziness, throwing up, tolerance (needing more of the medicine to have the same effect), physical dependence and slowed breathing.    Store your pills in a secure place, locked if possible. We will not replace any lost or stolen medicine. If you don t finish your medicine, please throw away (dispose) as directed by your pharmacist. The [place] Pollution Control Agency has more information about safe disposal: [email or web address]://[email or web address].[email or web address]/living-[email or web address]/[email or web address]-unwanted-medications          Primary Care Provider Office Phone # Fax #    S Conrado [place] [phone]       [place] [address] [place] [address]        Equal Access to Services     NIKOLE RAMIREZ : Hadii aad ku hadshayanjaymie Analisaali, wajoelda luzeyad, qateresata kabuzzda tone, george subramanian kevinmarcelino marymaurifrederick messina. So [facility] [phone].    ATENCIÓN: Si habla español, tiene a ortega disposición servicios gratuitos de asistencia lingüística. Llame al [phone].    We comply with applicable federal civil rights laws and [place] laws. We do not discriminate on the basis of race, color, national origin, age, disability, sex, sexual orientation, or gender identity.            Thank you!     Thank you for choosing [facility]  for your care. Our goal is always to provide you with excellent care. Hearing back from our patients is one way we can continue to improve our services. Please take a few minutes to complete the written survey that you may receive in the mail after your visit with us. Thank you!             Your Updated Medication List - Protect others around you: Learn how to safely use, store and throw away your medicines at www.disposemymeds.org.          This list is accurate as of 10/22/18 11:33 AM.  Always use your most recent med list.                   Brand Name Dispense Instructions for use Diagnosis    aspirin 81 MG tablet      Take 81 mg by mouth        atorvastatin 20 MG tablet    LIPITOR     Take 20 mg by mouth        Cabozantinib S-Malate 60 MG    CABOMETYX    30 tablet    Take 1 tablet (60 mg) by mouth daily    Renal cell carcinoma, right (H)       cyclobenzaprine 10 MG tablet    FLEXERIL     Take 10 mg by mouth        dexamethasone 4 MG tablet    DECADRON     TAKE BY MOUTH FOUR TIMES DAILY ACCORDING TO TAPER SCHEDULE        doxycycline 100 MG tablet    VIBRA-TABS    14 tablet    Take 1 tablet (100 mg) by mouth 2 times daily for 7 days    Renal cell carcinoma, right (H)        melatonin 3 MG tablet      Take 3 mg by mouth        methylPREDNISolone 4 MG tablet    MEDROL DOSEPAK          morphine 30 MG 12 hr tablet    MS CONTIN    90 tablet    Take 1 tablet (30 mg) by mouth every 8 hours    Renal cell carcinoma, right (H), Renal cell carcinoma, left (H)       oxyCODONE 20 MG 12 hr tablet    OxyCONTIN     Take 20 mg by mouth        polyethylene glycol Packet    MIRALAX/GLYCOLAX     Take 1 packet by mouth        Pseudoephedrine-APAP  MG Tabs           senna-docusate 8.6-50 MG per tablet    SENOKOT-S;PERICOLACE          ZANTAC 150 MG tablet   Generic drug:  ranitidine      Take 150 mg by mouth 2 times daily

## 2018-10-22 NOTE — PROGRESS NOTES
October 22, 2018       Chief complaint:   Metastatic Carcinoma with bone metastases, likely renal origin.  Cabozantinib: Start September 28, 2018         History of Present Illness:     Javon Bianchi is a 68 year old male with a history significant for hyperlipidemia who had been in his usual health until he developed back pain in May this year. At that time, the pain was a stabbing like in the middle of lower back with burning like pain wrapping around this left hip to knee area. No trauma. He had MRI on 5/23/18 showed mild degenerative change with bulging disks L2-S1. A small benign hemangioma was noted in L2, no other marrow signal abnormality. Since then, the pain failed to improve despite steroid injections. In addition, he actually lost 25 lbs weight unintentionally since June along with chills few time a day. Overtime, the pain got worse to the point he could not ambulate after short distance and developed muscle weakness in lower extremities over time requiring a walker. Finally, he went to ER ([facility] in [place]) on 8/23/18, CT A/P was unremarkable. He was referred to neurology with obtaining MRI L spine. MRI on 8/30/18 revealed a pathological fracture at L3 vertebral body with height loss at 40% and diffuse involvement of a neoplastic process. IR guided biopsy on 8/31/18 showed poorly differentiated carcinoma. Its IHC was suggestive of possible renal cell carcinoma per pathology report (Renal cell immunochemistry is positive and along with the negative staining for CK7 and CK20 suggests the possibility of a renal carcinoma, however most renal cell carcinomas also express PAX8 which is negative in this case). The patient was evaluated by neurosurgery who recommended no surgical intervention. He was discharged with TLSO brace and walker. He is scheduled to start palliative XRT locally from 9/11, planned 10 fractions. Today he was referred to Dr. De Luna for further evaluation. Of note, CT  chest/abdomen/pelvis at OSH was negative for primary lesion.     Cabozantinib 60 mg started on September 29, 2018.  He returns today for interval visit, to ensure he is tolerating the cabozantinib well.    Interval history: Mr. Bianchi returns today with his family.  He is feeling better since starting doxycycline and his fevers have decreased.  He thinks that his nasal wound is healing, though he is still blowing his nose and finding some clots.  Today is day #4 of 7 on the doxycycline.  Has been holding his cabozantinib during this time.  No fatigue or diarrhea now or prior to this infection.  He is taking MS Contin for pain control three times per day with no breakthrough pain medication requirement.  He requests to switch to morphine due to insurance reasons.      PAST MEDICAL SURGICAL HISTORY:Reviewed.  SOCIAL HISTORY: Reviewed.     MEDICATIONS: Reviewed.          Home Medications:     Current Outpatient Prescriptions   Medication     atorvastatin (LIPITOR) 20 MG tablet     cyclobenzaprine (FLEXERIL) 10 MG tablet     doxycycline (VIBRA-TABS) 100 MG tablet     morphine (MS CONTIN) 30 MG 12 hr tablet     senna-docusate (SENOKOT-S;PERICOLACE) 8.6-50 MG per tablet     aspirin 81 MG tablet     Cabozantinib S-Malate (CABOMETYX) 60 MG     dexamethasone (DECADRON) 4 MG tablet     melatonin 3 MG tablet     methylPREDNISolone (MEDROL DOSEPAK) 4 MG tablet     oxyCODONE (OXYCONTIN) 20 MG 12 hr tablet     polyethylene glycol (MIRALAX/GLYCOLAX) Packet     Pseudoephedrine-APAP  MG TABS     ranitidine (ZANTAC) 150 MG tablet     Current Facility-Administered Medications   Medication     influenza Vac Split High-Dose (FLUZONE) injection 0.5 mL       ECOG performance status of 1.   In a brace.    HEENT: No icterus, no pallor. TMs clear bilaterally; nasal mucosa bilaterally is injected, swollen and with scabs; no exudates  NECK: Supple  LUNGS: Bilateral clear to ausculation  CARDIOVASCULAR: Regular rate and rhythm, no  murmurs, gallops or rubs; 2+ pitting edema to the knees bilaterally  ABDOMEN: Soft, nontender and nondistended.   EXTREMITIES: No cyanosis, no clubbing, no edema.   NEUROLOGIC: Significant LLE weakness at the hip and knee  Labs: Kidney and liver function good.  WBC stable, Hgb improved.    IMAGING: Reviewed images and report of CT Chest and MRI A/P  No significant change in size of numerous bilateral solid pulmonary  nodules compared to 9/6/2018.   1. No intra-abdominal source of the patient's poorly differentiated  carcinoma identified.  2. Large infiltrative L3 metastasis which involves the L2 and L4  vertebral bodies as detailed above. Additionally, there is extension  into the left posterior iliac bone and paraspinal musculature  posterior to the iliac bones bilaterally. At the L3 level, the tumor  extends into the spinal canal resulting in severe spinal cord  narrowing and obliteration of the left L3-4 neural foramen, similar to  8/29/2018.         Assessment /Plan     68-year-old male with metastatic infiltrative lesion in L3 extending to L2 and L4, likely primary malignancy being renal cell carcinoma.  However he does not have any evidence of a primary renal mass on imaging.    Based on the available pathology data,  We offered treatment for renal cell carcinoma with cabozantinib (CABOSUN data demonstrating cabozantinib superiority over sunitinib in the initial setting).  PAX8 stain was negative,  and the clear cell features were not seen (it was a poorly differentiated carcinoma).   He has been on cabozantinib for over 3 weeks,  doing well on cabozantinib.    Nasal wound appears to be healing, so okay to restart this on Friday of this week (10/26).    Pain: Will switch to MS contin for insurance reasons, provided refills to last until his Palliative Care visit in November.      Agent and his family had multiple questions about monitoring the cancer without scans and we reassured them that at this point  unless patient has significantly new or worsening symptoms, restaging scans are the best way to assess for disease response.    Return to clinic on November 14, 2018 with MRI, CT, labs and MD visit.    Annabelle Calderón MD  Hematology-Oncology-Transplant Fellow    Provider Disclosure:  Please refer to the note by the fellow for H&P and physical exam and assessment and plan for details.     I agree with above History, Review of Systems, Physical exam and Plan. I have reviewed the content of the documentation and have edited it as needed. I have personally performed the services documented here and the documentation accurately represents those services and the decisions I have made.       Malathi De Luna MD  Hematology Oncology and Transplantation  [facility]

## 2018-10-29 ENCOUNTER — CARE COORDINATION (OUTPATIENT)
Dept: ONCOLOGY | Facility: CLINIC | Age: 68
End: 2018-10-29

## 2018-10-29 NOTE — PROGRESS NOTES
"TC from pt's wife, Patrizia with a condition update. Pt has had increased anxiety lately and had to go to the ED when it became intolerable. He was given Vistaril to be taken PRN, but has now started to take it on a schedule rather than PRN, and now feels better. He completed his doxy last Friday, and then developed a rash on his peter area that has since improved. He restarted his chemo on 10/26/2018, and has c/o episodes of hot flashes alternating with chills. She also stated that he sometimes looks \"ashen\". He's been afebrile. Pt also has been experiencing some claustrophobia with his clamshell brace, but this is better with the Vistaril as well. She was wondering if pt could start seeing a palliative care provider in [place] rather than driving to the [facility]. Writer told her this is just fine and to let clinic know if he needs a referral, and also to call back with further questions or concerns. Patrizia stated understanding.     "

## 2018-11-02 ENCOUNTER — TELEPHONE (OUTPATIENT)
Dept: PHARMACY | Facility: CLINIC | Age: 68
End: 2018-11-02

## 2018-11-02 NOTE — TELEPHONE ENCOUNTER
Oral Chemotherapy Monitoring Program  Patient Follow Up     Primary Oncologist: Dr. De Luna  Primary Oncology Clinic: [facility]  Cancer Diagnosis: RCC     Therapy History:  Cabozantinib 60mg daily started 9/28/18     Drug Interaction Assessment: None     Lab Monitoring Plan                Monitoring plan:   C1D1+   CMP, CBC, urine random protein, BP C2D1+ Call, BP, CMP, CBC, urine random protein  C3D1+ Call, BP, CMP, CBC, urine random protein C4D1+ Call, BP, CMP, CBC, urine random protein C5D1+ Call, BP, CMP, CBC, urine random protein C6D1+ Call, BP, CMP, CBC, urine random protein   C1D8+   C2D8+   C3D8+   C4D8+   C5D8+   C6D8+     C1D15+ Call, BP C2D15+   C3D15+   C4D15+   C5D15+   C6D15+     C1D22+   C2D22+   C3D22+   C4D22+   C5D22+   C6D22+        Subjective/Objective:  Javon Bianchi is a 68 year old male contacted by phone for a follow-up visit for oral chemotherapy.  Omega stated he is doing well and has not noticed any new side effects since restarting the cabozantinib 10/26/18. He didn't like the way the urea cream felt on his hands so he just switched to a hand lotion without urea. Denies any s/sx of HFS, diarrhea, constipation, N/V. He is checking his blood pressure and they are all ~120s/80.    ORAL CHEMOTHERAPY 9/26/2018 10/12/2018 11/2/2018   Drug Name Cabometyx (cabozantinib) Cabometyx (cabozantinib) Cabometyx (cabozantinib)   Current Dosage 60mg 60mg 60mg   Current Schedule Daily Daily Daily   Cycle Details Continuous Continuous Continuous   Start Date of Last Cycle - 9/28/2018 -   Planned next cycle start date - 10/28/2018 -   Doses missed in last 2 weeks - 0 0   Adherence Assessment - Adherent Adherent   Adverse Effects - No AE identified during assessment No AE identified during assessment   Home BPs - all BPs<140/90 all BPs<140/90   Any new drug interactions? No No No   Is the dose as ordered appropriate for the patient? Yes Yes Yes   Is the patient currently in pain? - No No   Has the patient been  assessed within the past 6 months for depression? - Yes -   Has the patient missed any days of school, work, or other routine activity? - No -       Last PHQ-2 Score on record: No flowsheet data found.    Patient does not report depression symptoms.      Vitals:  BP:   BP Readings from Last 1 Encounters:   10/22/18 122/81     Wt Readings from Last 1 Encounters:   10/22/18 110.2 kg (242 lb 15.2 oz)     Estimated body surface area is 2.37 meters squared as calculated from the following:    Height as of 9/26/18: 1.829 m (6').    Weight as of 10/22/18: 110.2 kg (242 lb 15.2 oz).    Labs:  _  Result Component Current Result Ref Range   Sodium 137 (10/22/2018) 133 - 144 mmol/L     _  Result Component Current Result Ref Range   Potassium 3.6 (10/22/2018) 3.4 - 5.3 mmol/L     _  Result Component Current Result Ref Range   Calcium 8.2 (L) (10/22/2018) 8.5 - 10.1 mg/dL     No results found for Mag within last 30 days.     No results found for Phos within last 30 days.     _  Result Component Current Result Ref Range   Albumin 2.5 (L) (10/22/2018) 3.4 - 5.0 g/dL     _  Result Component Current Result Ref Range   Urea Nitrogen 9 (10/22/2018) 7 - 30 mg/dL     _  Result Component Current Result Ref Range   Creatinine 0.86 (10/22/2018) 0.66 - 1.25 mg/dL       _  Result Component Current Result Ref Range   AST 52 (H) (10/22/2018) 0 - 45 U/L     _  Result Component Current Result Ref Range   ALT 39 (10/22/2018) 0 - 70 U/L     _  Result Component Current Result Ref Range   Bilirubin Total 1.2 (10/22/2018) 0.2 - 1.3 mg/dL       _  Result Component Current Result Ref Range   WBC 2.8 (L) (10/22/2018) 4.0 - 11.0 10e9/L     _  Result Component Current Result Ref Range   Hemoglobin 12.1 (L) (10/22/2018) 13.3 - 17.7 g/dL     _  Result Component Current Result Ref Range   Platelet Count 152 (10/22/2018) 150 - 450 10e9/L     _  Result Component Current Result Ref Range   Absolute Neutrophil 1.1 (L) (10/22/2018) 1.6 - 8.3 10e9/L            Assessment:  Omega is tolerating the cabozantinib therapy well.     Plan:  Continue cabozantinib as prescribed.    Follow-Up:  Will follow up with Dr. De Luna's appointment and lab results 11/14/18.    Reddy Alexander, PharmD, [facility]  Clinical Oncology Pharmacist  November 2, 2018

## 2018-11-07 NOTE — PROGRESS NOTES
Palliative Care Outpatient Clinic Consultation Note    Patient:  Javon Bianchi    This note was written using voice recognition. I did proof read, but might have missed some things. Please contact me for major errors.    Chief Complaint:   Javon Bianchi 68 year old male who is presenting to the palliative medicine clinic today at the request of Dr De Luna for a palliative care consultation secondary to renal cell carcinoma, pain.   The patient's primary care provider is:  LIANNE Wellington.   The patient is here ***    History of Present Illness:  Medical History:  - presumed Renal Cell Carcinoma metastatic to spine, diagnosed Aug 2018. No renal mass on imaging   - on cabozantinib   - pathological fracture at L3, managed conservatively     Introduced the scope of our practice to ***. Discussed our potential roles for symptom management, support/coping, and decisional support (aka goals of care).  ***    Patient's Disease Understanding: ***  Prognostic information: ***    Coping:  ***    Social History  Living Situation: ***  Children: ***  Actual/Potential Caregiver(s): ***  Support System: ***  Occupation: ***  Hobbies: ***  Substance Use/History of misuse: ***  Financial Concerns: ***  Spiritual Background: ***  Spiritual Concerns/Needs: ***  Social History   Substance Use Topics     Smoking status: Never Smoker     Smokeless tobacco: Never Used     Alcohol use Not on file         Sexual Health:  How has the illness/treatment affected intimacy and body image? ***  - physical: ***  - emotional: ***  - relationship: ***  - environmental: ***    Advance Care Planning:  Goals of Care: ***  Decision Making Capacity: ***  Advance Directive:    No document in EHR ***  Where is written copy located: ***  Health Care Agent Contact Information: ***  POLST:   ***    Recommendations & Counseling:  ***      Return to clinic in ***.    Data and Chart Review:    REVIEW OF SYSTEMS:   ROS: 10 point ROS neg other than the symptoms  noted above in the HPI *** and here:  Palliative Symptom Review (0=no symptom/no concern, 1=mild, 2=moderate, 3=severe):      Pain: ***      Fatigue: ***      Nausea: ***      Constipation: ***      Diarrhea: ***      Depressive Symptoms: ***      Anxiety: ***      Drowsiness: ***      Poor Appetite: ***      Shortness of Breath: ***      Insomnia: ***      Other: ***      Overall (0 good/no concerns, 3 very poor):  ***    Impressions:  Palliative Performance Score:  ***      Physical Exam:  There were no vitals taken for this visit.    CONSTIT: awake, appears ***comfortable  EENT: MM***, EOMI, no icterus  NECK:  RESP: reg, *** effort  CARDIOVASC: RRR, no m/r/g  GI: ***  :  MSK:moves x4, *** edema  SKIN:  warm, no rash, no obvious lesions  LYMPH:  NEURO: alert, oriented x3  PSYCH: *** affect, memory and thought process intact    Medications, current, pertinent:  MS Contin 30mg q8h  Oxycontin ***  Cyclobenzaprine 10mg ***    Dexamethasone 4mg daily ***    : ***    No Known Allergies  Past Medical History:   Diagnosis Date     Bone metastasis (H) 09/28/2018     Renal cell carcinoma, right (H) 09/28/2018     No past surgical history on file.    Family History  No family history on file.  Patient's Involvement with Prior History of Serious Illness in Family: ***    Data Reviewed:  LABS:   GFR 88  Albumin 2.5    WBC 2.8        Opioid Risk Tool:   Opioid Risk Tool (ORT):    Family History of Substance Abuse:        Alcohol = {ORT FHalcohol:724345}       Illegal Drugs = {ORT FHillicits:757902}       Prescription Drugs = {ORT FHRx:701524}      Personal History of Substance Abuse:       Alcohol = {ORT PHalcohol:011027}       Illegal Drugs = {ORT PHillicits:379431}       Prescription Drugs = {ORT PHRx:646542}        Age: {ORT age:172639}      History of Pre-adolescent Sexual Abuse: {ORT sexual abuse:225099}      Psychological Disease: {ORT psych 2:811695}      ORT Score = ***        0-3: Low risk for opioid abuse        4-7: Moderate risk for opioid abuse       >/= 8: High risk for opioid abuse    [facility] Outpatient Palliative Care Opioid Prescribing Safety Plan    Opioid Safety Education: Reviewed by *** on ***  Opioid Risk Assessment: Performed by *** on ***.  ORT score of ***.   Mood Disorder Assessment: Performed by *** on ***.     reviewed with every prescription, last reviewed by Nan Gonzalez on 11/07/2018 ***    Additional recommendations based on patient's prognosis and indication for opioids include the following:    {{Clinicians, these are for chronic opioid prescribing in palliative clinic. Not necessarily for one-time acute pain prescribing. Delete unnecessary text including opioid indication descriptions and non-applicable safety plans, but leave the appropriate safety plan in your note and carry it forward to subsequent notes so it is easily accessible.     Strong opioid indication = Pain directly from active cancer or other direct/obvious tissue injury pain including recent surgical pain and wound pain.    Moderate indication = chronic pain after curative cancer treatment (eg neuropathy, osteonecrosis, in-remission myeloma bone pain, radiation plexopathies); pain from serious, life-limiting, non-modifiable, functionally impairing illness that is not primarily amenable to rehabilitation and has not responded adequately to non-opioid therapies (this could include ALS, sickle cell disease, other neurodegenerative diseases/spasticity syndromes, severe systemic sclerosis or inflammatory arthritides, chronic chest wall pain from late stage CF, abdominal pain from decompensated cirrhosis without available disease modifying options, etc).    Weak indication = all others, including chronic non-cancer pain (eg cLBP, fibromyalgia, chronic headaches) in patients who also have active cancer. With rare exceptions we should not prescribe patients opioids for those indications, outside of helping patients to taper off. For  patients with short prognoses who are chronically on opioids, however, the burdens to the patient of tapering off is typically not worth the benefit due to lack of time for rehabilitation, etc.}}        Expected prognosis: shorter  Risk: Low or Medium (ORT 0-7)  No further recommendations.     Expected prognosis: shorter  Risk: High (ORT>7)  Baseline UDT performed on ***.   Pill Counts to be performed, plan and last count: ***.   Naloxone Script provided: ***.   Counseling Support Plan:  ***.     Expected prognosis >1 year  Risk: Low (ORT<4)  Indication for Opioid Use: Weak  Baseline and Annual UDT: Date of last urine drug screen was ***.   Counseling Support Plan: ***.   Naloxone Script provided if patient also on benzodiazepine: ***  Tapering plan: ***    Expected prognosis >1 year  Risk: Low (ORT>4)  Indication for Opioid Use: Moderate or Strong  Baseline UDT performed on: ***  Naloxone Script provided if patient also on benzodiazepine: ***  Indications for Tapering reviewed: ***    Prognosis >1 year  Risk: Medium (ORT4-7)  Indication for Opioid Use: Weak  Counseling Support Plan: ***  Baseline and Annual UDT last performed on: ***  Naloxone Script provided if patient also on benzodiazepine: ***  Tapering Plan: ***    Prognosis >1 year  Risk: Medium (ORT4-7)  Indication for Opioid Use: Moderate or Strong  Baseline and Annual UDT last performed on: ***  Naloxone Script provided if patient also on benzodiazepine: ***   Indications for Tapering reviewed: ***    Prognosis >1 year  Risk: High (ORT >7)  Indication for Opioid Use: Weak  Avoid Opioids  Tapering plan: ***    Prognosis >1 year  Risk: High (ORT>7)  Indication for Opioid Use: Moderate or Strong  Baseline and Ongoing UDT last performed on:***  Naloxone Script provided on: ***.   Pill Counts to be performed, plan & last pill count: ***  Indications for Tapering reviewed: ***  Counseling Support: ***    Nan Gonzalez MD  Palliative Medicine  Pager  [phone]

## 2018-11-08 ENCOUNTER — ONCOLOGY VISIT (OUTPATIENT)
Dept: ONCOLOGY | Facility: CLINIC | Age: 68
End: 2018-11-08
Payer: COMMERCIAL

## 2018-11-08 VITALS
SYSTOLIC BLOOD PRESSURE: 125 MMHG | HEIGHT: 72 IN | DIASTOLIC BLOOD PRESSURE: 77 MMHG | TEMPERATURE: 97.6 F | HEART RATE: 65 BPM | OXYGEN SATURATION: 97 % | BODY MASS INDEX: 32.94 KG/M2 | RESPIRATION RATE: 16 BRPM | WEIGHT: 243.19 LBS

## 2018-11-08 DIAGNOSIS — C64.1 RENAL CELL CARCINOMA, RIGHT (H): ICD-10-CM

## 2018-11-08 DIAGNOSIS — C64.2 RENAL CELL CARCINOMA, LEFT (H): ICD-10-CM

## 2018-11-08 PROCEDURE — 99205 OFFICE O/P NEW HI 60 MIN: CPT | Performed by: HOSPITALIST

## 2018-11-08 RX ORDER — MORPHINE SULFATE 15 MG/1
15 TABLET, FILM COATED, EXTENDED RELEASE ORAL EVERY 24 HOURS
Qty: 30 TABLET | Refills: 0 | Status: SHIPPED | OUTPATIENT
Start: 2018-11-08 | End: 2019-02-12

## 2018-11-08 RX ORDER — MORPHINE SULFATE 30 MG/1
30 TABLET, FILM COATED, EXTENDED RELEASE ORAL EVERY 24 HOURS
Qty: 90 TABLET | Refills: 0 | COMMUNITY
Start: 2018-11-08 | End: 2019-02-12

## 2018-11-08 ASSESSMENT — PAIN SCALES - GENERAL: PAINLEVEL: MODERATE PAIN (4)

## 2018-11-08 NOTE — PATIENT INSTRUCTIONS
Patient Instructions      Expand All Collapse All    Thank you for coming into the Palliative Care Clinic today.     1. Pain:  - take the long-acting morphine (MS Contin) as follows: 15mg in the morning and 30mg in the evening. Please continue taking the doses about 12 hours apart  - take tylenol Extra Strength 2 tablets up to every 6 hours as needed for pain  - take oxycodone 5mg up to every 4 hours as needed for pain that is not controlled with tylenol    2. Anxiety:   - we will call you with more information about a counselor closer to home or an appointment with ALEJANDRO Xiao (the palliative  her at [place])    Call if your symptoms change and the medications we have prescribed are not working. Do not take your medications at any other dose or frequently than how they are prescribed.     Call us at least a week in advance if you need a medication refill    Please bring all your pill bottles to your next appointment.     Failure to follow these instructions may result in the palliative care team not being able to prescribe your medications.    Return to clinic in 1 month for a follow up.     You can reach the Palliative Care Team during business hours at the following number:    - [phone]    To reach the Palliative Care Provider on-call After-hours or on holidays and weekends, call: [phone].  Please note that we are not able to provide pain medication refills on evenings or weekends.

## 2018-11-08 NOTE — LETTER
11/8/2018         RE: Javon Bianchi  [address] [address]        Dear Colleague,    Thank you for referring your patient, Javon Bianchi, to the [facility]. Please see a copy of my visit note below.    Palliative Care Outpatient Clinic Consultation Note    Patient:  Javon Bianchi    This note was written using voice recognition. I did proof read, but might have missed some things. Please contact me for major errors.    Chief Complaint:   Javon Bianchi 68 year old male who is presenting to the palliative medicine clinic today at the request of Dr De Luna for a palliative care consultation secondary to renal cell carcinoma, pain.   The patient's primary care provider is:  LIANNE Wellington.   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 No renal mass on imaging   - on cabozantinib   - pathological fracture at L3, managed conservatively     Introduced the scope of our practice to Omega and Patrizia. Discussed our potential roles for symptom management, support/coping, and decisional support (aka goals of care).    Omega had back soreness and stiffness throughout the past spring. He initially addressed those with chiropractic treatments and massage. Since his pain worsened he eventually got more workup and eventually a spinal mass was found after initial treatment for degenerative disc disease. Pathology consistent with renal origin, but imaging hasn't shown any renal mass.    His pain has improved markedly by now. It's worst in the morning and at those times he currently has a level of 2-3/10. He is taking MS Contin 30mg every 12h. Initially he took it every 8 hours, which was reduced to every 12 hours by his PCP last week. Hasn't needed any short-acting pain med since he increased the long-acting oxycontin weeks ago.   He wears a clam-shell brace during the  "day which provides comfort.  Endorses feeling sleepy and having difficulties focusing since he started the long-acting opioids.   Has mild intermittent constipation, manages with prune juice and senna 1-2 bid.    He developed anxiety attacks in September. No h/o anxiety prior to the cancer diagnosis. He gets relief with hydroxyzine, now takes four doses a day scheduled. The attacks tend to occur in the early mornings. Pt states that he needs to be in control and feels he has no control in his current situation. We discussed the option of starting an serotonin specific reuptake inhibitor, which he declines at this time. He is very interested in counseling, though.     His sleep quality is satisfactory. He has always slept in stretches of a few hours and continues to do so. He feels well rested in the mornings.     Coping:  Anxiety as above. In addition he has scans coming up next week, which he is understandably anxious about. Both state that they \"haven't thought about a plan B\" should the scans show a lack of response to the treatments.     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 Course complicated by significant anxiety    Anxiety: currently has acceptable control with hydroxyzine q6h. Declines serotonin specific reuptake inhibitor at this time  - will work on finding a counselor closer to his home. Pt ok coming here to meet with ALEJANDRO Xiao should we not find someone who has a timely opening    Pain: much improved. It is unclear to me whether this is due to his relative rapid increase " in opioid dosing or whether we are beginning to see a response to the cabozantinib.   - decrease MS Contin to 15mg in am, continue 30mg in pm  - discussed appropriate use of oxycodone prn breakthrough pain  - encouraged patient to use acetaminophen over oxycodone as much as possible  - encouraged patient to continue with PT exercises    Return to clinic in 1 month.    Data and Chart Review:    REVIEW OF SYSTEMS:   ROS: 10 point ROS neg other than the symptoms noted above in the HPI    Impressions:  Palliative Performance Score:  60      Physical Exam:  /77 (BP Location: Right arm)  Pulse 65  Temp 97.6  F (36.4  C) (Oral)  Resp 16  Ht 1.829 m (6')  Wt 110.3 kg (243 lb 3 oz)  SpO2 97%  BMI 32.98 kg/m2    CONSTIT: awake, appears comfortable, but anxious  EENT: MMM, EOMI, no icterus  RESP: reg, nl effort  MSK:moves x4, wears clam-shell brace  SKIN:  warm, no rash, no obvious lesions  NEURO: alert, oriented x3  PSYCH: mildly anxious affect, memory and thought process intact    Medications, current, pertinent:  MS Contin 30mg q12h  Oxycodone 5mg prn - hasn't used for weeks  acetaminophen prn - hasn't been using  Cyclobenzaprine 10mg  - not taking    Hydroxyzine 25mg q6h    : ok    No Known Allergies  Past Medical History:   Diagnosis Date     Bone metastasis (H) 09/28/2018     Renal cell carcinoma, right (H) 09/28/2018     No past surgical history on file.    Family History  No family history on file.    Data Reviewed:  LABS:   GFR 88  Albumin 2.5    WBC 2.8        Opioid Risk Tool:   Opioid Risk Tool (ORT):    Family History of Substance Abuse:        Alcohol = 0 pt (no)       Illegal Drugs = 0 pt (no)       Prescription Drugs = 0 pt (no)      Personal History of Substance Abuse:       Alcohol = 0 pt (no)       Illegal Drugs = 0 pt (no)       Prescription Drugs = 0 pt (no)        Age: 0 pt (age < 16; age > 45)      History of Pre-adolescent Sexual Abuse: 0 pt (no)      Psychological Disease: 0 pt  (none)      ORT Score = 0        0-3: Low risk for opioid abuse       4-7: Moderate risk for opioid abuse       >/= 8: High risk for opioid abuse    [facility] Outpatient Palliative Care Opioid Prescribing Safety Plan    Opioid Safety Education: Reviewed by Nan Gonzalez  on 11/8/2018  Opioid Risk Assessment: Performed by Nan Gonzalez  on 11/8/2018 .  ORT score of 0.   Mood Disorder Assessment: Performed by Nan Gonzalez  on 11/8/2018.     reviewed with every prescription, last reviewed by Nan Gonzalez on 11/08/2018     Additional recommendations based on patient's prognosis and indication for opioids include the following:    Expected prognosis: shorter  Risk: Low or Medium (ORT 0-7)  No further recommendations.       Nan Gonzalez MD  Palliative Medicine  Pager [phone]        Again, thank you for allowing me to participate in the care of your patient.        Sincerely,        Nan Gonzalez MD

## 2018-11-08 NOTE — NURSING NOTE
Oncology Rooming Note    November 8, 2018 1:25 PM   Javon Bianchi is a 68 year old male who presents for:    Chief Complaint   Patient presents with     Oncology Clinic Visit     New Patient      Initial Vitals: /77 (BP Location: Right arm)  Pulse 65  Temp 97.6  F (36.4  C) (Oral)  Resp 16  Ht 1.829 m (6')  Wt 110.3 kg (243 lb 3 oz)  SpO2 97%  BMI 32.98 kg/m2 Estimated body mass index is 32.98 kg/(m^2) as calculated from the following:    Height as of this encounter: 1.829 m (6').    Weight as of this encounter: 110.3 kg (243 lb 3 oz). Body surface area is 2.37 meters squared.  Moderate Pain (4) Comment: Data Unavailable   No LMP for male patient.  Allergies reviewed: Yes  Medications reviewed: Yes    Medications: Medication refills not needed today.  Pharmacy name entered into [facility]: [facility] - [address]      5 minutes for nursing intake (face to face time)     Geeta Dumont LPN

## 2018-11-08 NOTE — PROGRESS NOTES
Palliative Care Outpatient Clinic Consultation Note    Patient:  Javon Bianchi    This note was written using voice recognition. I did proof read, but might have missed some things. Please contact me for major errors.    Chief Complaint:   Javon Bianchi 68 year old male who is presenting to the palliative medicine clinic today at the request of Dr De Luna for a palliative care consultation secondary to renal cell carcinoma, pain.   The patient's primary care provider is:  LIANNE Wellington.   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 No renal mass on imaging   - on cabozantinib   - pathological fracture at L3, managed conservatively     Introduced the scope of our practice to Omega and Patrizia. Discussed our potential roles for symptom management, support/coping, and decisional support (aka goals of care).    Omega had back soreness and stiffness throughout the past spring. He initially addressed those with chiropractic treatments and massage. Since his pain worsened he eventually got more workup and eventually a spinal mass was found after initial treatment for degenerative disc disease. Pathology consistent with renal origin, but imaging hasn't shown any renal mass.    His pain has improved markedly by now. It's worst in the morning and at those times he currently has a level of 2-3/10. He is taking MS Contin 30mg every 12h. Initially he took it every 8 hours, which was reduced to every 12 hours by his PCP last week. Hasn't needed any short-acting pain med since he increased the long-acting oxycontin weeks ago.   He wears a clam-shell brace during the day which provides comfort.  Endorses feeling sleepy and having difficulties focusing since he started the long-acting opioids.   Has mild intermittent constipation, manages with prune juice and senna 1-2 bid.    He developed anxiety attacks in  "September. No h/o anxiety prior to the cancer diagnosis. He gets relief with hydroxyzine, now takes four doses a day scheduled. The attacks tend to occur in the early mornings. Pt states that he needs to be in control and feels he has no control in his current situation. We discussed the option of starting an serotonin specific reuptake inhibitor, which he declines at this time. He is very interested in counseling, though.     His sleep quality is satisfactory. He has always slept in stretches of a few hours and continues to do so. He feels well rested in the mornings.     Coping:  Anxiety as above. In addition he has scans coming up next week, which he is understandably anxious about. Both state that they \"haven't thought about a plan B\" should the scans show a lack of response to the treatments.     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 Course complicated by significant anxiety    Anxiety: currently has acceptable control with hydroxyzine q6h. Declines serotonin specific reuptake inhibitor at this time  - will work on finding a counselor closer to his home. Pt ok coming here to meet with ALEJANDRO Xiao should we not find someone who has a timely opening    Pain: much improved. It is unclear to me whether this is due to his relative rapid increase in opioid dosing or whether we are beginning to see a response to the cabozantinib.   - decrease MS Contin to 15mg in am, continue 30mg in pm  - discussed appropriate use of oxycodone prn breakthrough pain  - encouraged patient to use " acetaminophen over oxycodone as much as possible  - encouraged patient to continue with PT exercises    Return to clinic in 1 month.    Data and Chart Review:    REVIEW OF SYSTEMS:   ROS: 10 point ROS neg other than the symptoms noted above in the HPI    Impressions:  Palliative Performance Score:  60      Physical Exam:  /77 (BP Location: Right arm)  Pulse 65  Temp 97.6  F (36.4  C) (Oral)  Resp 16  Ht 1.829 m (6')  Wt 110.3 kg (243 lb 3 oz)  SpO2 97%  BMI 32.98 kg/m2    CONSTIT: awake, appears comfortable, but anxious  EENT: MMM, EOMI, no icterus  RESP: reg, nl effort  MSK:moves x4, wears clam-shell brace  SKIN:  warm, no rash, no obvious lesions  NEURO: alert, oriented x3  PSYCH: mildly anxious affect, memory and thought process intact    Medications, current, pertinent:  MS Contin 30mg q12h  Oxycodone 5mg prn - hasn't used for weeks  acetaminophen prn - hasn't been using  Cyclobenzaprine 10mg  - not taking    Hydroxyzine 25mg q6h    : ok    No Known Allergies  Past Medical History:   Diagnosis Date     Bone metastasis (H) 09/28/2018     Renal cell carcinoma, right (H) 09/28/2018     No past surgical history on file.    Family History  No family history on file.    Data Reviewed:  LABS:   GFR 88  Albumin 2.5    WBC 2.8        Opioid Risk Tool:   Opioid Risk Tool (ORT):    Family History of Substance Abuse:        Alcohol = 0 pt (no)       Illegal Drugs = 0 pt (no)       Prescription Drugs = 0 pt (no)      Personal History of Substance Abuse:       Alcohol = 0 pt (no)       Illegal Drugs = 0 pt (no)       Prescription Drugs = 0 pt (no)        Age: 0 pt (age < 16; age > 45)      History of Pre-adolescent Sexual Abuse: 0 pt (no)      Psychological Disease: 0 pt (none)      ORT Score = 0        0-3: Low risk for opioid abuse       4-7: Moderate risk for opioid abuse       >/= 8: High risk for opioid abuse    [facility] Opioid Prescribing Safety Plan    Opioid Safety Education:  Reviewed by Nan Gonzalez  on 11/8/2018  Opioid Risk Assessment: Performed by Nan Gonzalez  on 11/8/2018 .  ORT score of 0.   Mood Disorder Assessment: Performed by Nan Gonzalez  on 11/8/2018.     reviewed with every prescription, last reviewed by Nan Gonzalez on 11/08/2018     Additional recommendations based on patient's prognosis and indication for opioids include the following:    Expected prognosis: shorter  Risk: Low or Medium (ORT 0-7)  No further recommendations.       Nan Gonzalez MD  Palliative Medicine  Pager [phone]

## 2018-11-08 NOTE — MR AVS SNAPSHOT
After Visit Summary   11/8/2018    Javon Bianchi    MRN: [id number]           Patient Information     Date Of Birth          1950        Visit Information        Provider Department      11/8/2018 1:15 PM Nan Gonzalez MD [facility]        Today's Diagnoses     Renal cell carcinoma, right (H)        Renal cell carcinoma, left (H)          Care Instructions    Patient Instructions      Expand All Collapse All    Thank you for coming into the Palliative Care Clinic today.     1. Pain:  - take the long-acting morphine (MS Contin) as follows: 15mg in the morning and 30mg in the evening. Please continue taking the doses about 12 hours apart  - take tylenol Extra Strength 2 tablets up to every 6 hours as needed for pain  - take oxycodone 5mg up to every 4 hours as needed for pain that is not controlled with tylenol    2. Anxiety:   - we will call you with more information about a counselor closer to home or an appointment with ALEJANDRO Xiao (the palliative  her at [place])    Call if your symptoms change and the medications we have prescribed are not working. Do not take your medications at any other dose or frequently than how they are prescribed.     Call us at least a week in advance if you need a medication refill    Please bring all your pill bottles to your next appointment.     Failure to follow these instructions may result in the palliative care team not being able to prescribe your medications.    Return to clinic in 1 month for a follow up.     You can reach the Palliative Care Team during business hours at the following number:    - [phone]    To reach the Palliative Care Provider on-call After-hours or on holidays and weekends, call: [phone].  Please note that we are not able to provide pain medication refills on evenings or weekends.                     Follow-ups after your visit        Your next 10 appointments already  scheduled     Nov 13, 2018  4:15 PM CST   LAB with  LAB    Health Lab ([facility])    [address]  1st Floor  [facility] [address] [phone]           Please do not eat 10-12 hours before your appointment if you are coming in fasting for labs on lipids, cholesterol, or glucose (sugar). This does not apply to pregnant women. Water, hot tea and black coffee (with nothing added) are okay. Do not drink other fluids, diet soda or chew gum.            Nov 13, 2018  4:40 PM CST   CT CHEST/ABDOMEN/PELVIS W CONTRAST with [id number]   [facility] CT ([facility])    [address]  1st Floor  [facility] [address] [phone]           How do I prepare for my exam? (Food and drink instructions) To prepare: Do not eat or drink for 2 hours before your exam. If you need to take medicine, you may take it with small sips of water. (We may ask you to take liquid medicine as well.)  How do I prepare for my exam? (Other instructions) Please arrive 30 minutes early for your CT.  Once in the department you might be asked to drink water 15-20 minutes prior to your exam.  If indicated you may be asked to drink an oral contrast in advance of your CT.  If this is the case, the imaging team will let you know or be in contact with you prior to your appointment  Patients over 70 or patients with diabetes or kidney problems: If you haven t had a blood test (creatinine test) within the last 30 days, the Cardiologist/Radiologist may require you to get this test prior to your exam.  If you have diabetes:  Continue to take your metformin medication on the day of your exam  What should I wear: Please wear loose clothing, such as a sweat suit or jogging clothes. Avoid snaps, zippers and other metal. We may ask you to undress and put on a hospital gown.  How long does the exam take: Most scans take less than 20 minutes.  What should I bring: Please bring any  scans or X-rays taken at other hospitals, if similar tests were done. Also bring a list of your medicines, including vitamins, minerals and over-the-counter drugs. It is safest to leave personal items at home.  Do I need a : No  is needed.  What do I need to tell my doctor? Be sure to tell your doctor: * If you have any allergies. * If there s any chance you are pregnant. * If you are breastfeeding.  What should I do after the exam: No restrictions, You may resume normal activities.  What is this test: A CT (computed tomography) scan is a series of pictures that allows us to look inside your body. The scanner creates images of the body in cross sections, much like slices of bread. This helps us see any problems more clearly. You may receive contrast (X-ray dye) before or during your scan. You will be asked to drink the contrast.  Who should I call with questions: If you have any questions, please call the Imaging Department where you will have your exam. Directions, parking instructions, and other information is available on our website, [email or web address].            Nov 13, 2018  5:30 PM CST   MR LUMBAR SPINE W CONTRAST with [id number]   [facility] MRI ([facility])    [address] [address] [phone]           How do I prepare for my exam? (Food and drink instructions) **If you will be receiving sedation or general anesthesia, please see special notes below.**  How do I prepare for my exam? (Other instructions) Take your medicines as usual, unless your doctor tells you not to. You may or may not receive intravenous (IV) contrast for this exam pending the discretion of the Radiologist.  You do not need to do anything special to prepare.  **If you will be receiving sedation or general anesthesia, please see special notes below.**  What should I wear: The MRI machine uses a strong magnet. Please wear clothes without metal  (snaps, zippers). A sweatsuit works well, or we may give you a hospital gown. Please remove any body piercings and hair extensions before you arrive. You will also remove watches, jewelry, hairpins, wallets, dentures, partial dental plates and hearing aids. You may wear contact lenses, and you may be able to wear your rings. We have a safe place to keep your personal items, but it is safer to leave them at home.  How long does the exam take: Most tests take 30 to 60 minutes.  HOWEVER, IF YOUR DOCTOR PRESCRIBES ANESTHESIA please plan on spending four to five hours in the recovery room.  What should I bring:  Bring a list of your current medicines to your exam (including vitamins, minerals and over-the-counter drugs).  Do I need a :  **If you will be receiving sedation or general anesthesia, please see special notes below.**  What should I do after the exam: No Restrictions, You may resume normal activities.  What is this test: MRI (magnetic resonance imaging) uses a strong magnet and radio waves to look inside the body. An MRA (magnetic resonance angiogram) does the same thing, but it lets us look at your blood vessels. A computer turns the radio waves into pictures showing cross sections of the body, much like slices of bread. This helps us see any problems more clearly. You may receive fluid (called  contrast ) before or during your scan. The fluid helps us see the pictures better. We give the fluid through an IV (small needle in your arm).  Who should I call with questions:  Please call the Imaging Department at your exam site with any questions. Directions, parking instructions, and other information is available on our website, [email or web address].  How do I prepare if I m having sedation or anesthesia? **IMPORTANT** THE INSTRUCTIONS BELOW ARE ONLY FOR THOSE PATIENTS WHO HAVE BEEN TOLD THEY WILL RECEIVE SEDATION OR GENERAL ANESTHESIA DURING THEIR MRI PROCEDURE:  IF YOU WILL RECEIVE SEDATION (take medicine  to help you relax during your exam): You must get the medicine from your doctor before you arrive. Bring the medicine to the exam. Do not take it at home. Arrive one hour early. Bring someone who can take you home after the test. Your medicine will make you sleepy. After the exam, you may not drive, take a bus or take a taxi by yourself. No eating 8 hours before your exam. You may have clear liquids up until 4 hours before your exam. (Clear liquids include water, clear tea, black coffee and fruit juice without pulp.)  IF YOU WILL RECEIVE ANESTHESIA (be asleep for your exam): Arrive 1 1/2 hours early. Bring someone who can take you home after the test. You may not drive, take a bus or take a taxi by yourself. No eating 8 hours before your exam. You may have clear liquids up until 4 hours before your exam. (Clear liquids include water, clear tea, black coffee and fruit juice without pulp.)            Nov 14, 2018  8:15 AM CST   (Arrive by 8:00 AM)   Return Visit with Malathi De Luna MD   [facility] ([facility])    [address]  [address]  [facility] [address] [phone]            Dec 27, 2018  2:45 PM CST   [facility]    [address] [place] [address] [phone]              Who to contact     If you have questions or need follow up information about today's clinic visit or your schedule please contact [facility] directly at [phone].  Normal or non-critical lab and imaging results will be communicated to you by MyChart, letter or phone within 4 business days after the clinic has received the results. If you do not hear from us within 7 days, please contact the clinic through MyChart or phone. If you have a critical or abnormal lab result, we will notify you by phone as soon as possible.  Submit refill requests through  [employer] or call your pharmacy and they will forward the refill request to us. Please allow 3 business days for your refill to be completed.          Additional Information About Your Visit        [employer] Information     [employer] gives you secure access to your electronic health record. If you see a primary care provider, you can also send messages to your care team and make appointments. If you have questions, please call your primary care clinic.  If you do not have a primary care provider, please call [phone] and they will assist you.      [employer] is an electronic gateway that provides easy, online access to your medical records. With [employer], you can request a clinic appointment, read your test results, renew a prescription or communicate with your care team.     To access your existing account, please contact your [facility] or call [phone] for assistance.        Care EveryWhere ID     This is your Care EveryWhere ID. This could be used by other organizations to access your [place] medical records  [id number]        Your Vitals Were     Pulse Temperature Respirations Height Pulse Oximetry BMI (Body Mass Index)    65 97.6  F (36.4  C) (Oral) 16 1.829 m (6') 97% 32.98 kg/m2       Blood Pressure from Last 3 Encounters:   11/08/18 125/77   10/22/18 122/81   09/26/18 135/79    Weight from Last 3 Encounters:   11/08/18 110.3 kg (243 lb 3 oz)   10/22/18 110.2 kg (242 lb 15.2 oz)   09/26/18 109.4 kg (241 lb 2 oz)              Today, you had the following     No orders found for display         Today's Medication Changes          These changes are accurate as of 11/8/18  2:26 PM.  If you have any questions, ask your nurse or doctor.               These medicines have changed or have updated prescriptions.        Dose/Directions    * morphine 30 MG 12 hr tablet   Commonly known as:  MS CONTIN   This may have changed:    - when to take this  - additional instructions   Used for:   Renal cell carcinoma, right (H), Renal cell carcinoma, left (H)   Changed by:  Nan Gonzalez MD        Dose:  30 mg   Take 1 tablet (30 mg) by mouth every 24 hours Take at 6pm   Quantity:  90 tablet   Refills:  0       * morphine 15 MG 12 hr tablet   Commonly known as:  MS CONTIN   This may have changed:  You were already taking a medication with the same name, and this prescription was added. Make sure you understand how and when to take each.   Used for:  Renal cell carcinoma, right (H)   Changed by:  Nan Gonzalez MD        Dose:  15 mg   Take 1 tablet (15 mg) by mouth every 24 hours Take at 6am   Quantity:  30 tablet   Refills:  0       * Notice:  This list has 2 medication(s) that are the same as other medications prescribed for you. Read the directions carefully, and ask your doctor or other care provider to review them with you.      Stop taking these medicines if you haven't already. Please contact your care team if you have questions.     cyclobenzaprine 10 MG tablet   Commonly known as:  FLEXERIL   Stopped by:  Nan Gonzalez MD           oxyCODONE 20 MG 12 hr tablet   Commonly known as:  OxyCONTIN   Stopped by:  Nan Gonzalez MD                Where to get your medicines      Some of these will need a paper prescription and others can be bought over the counter.  Ask your nurse if you have questions.     Bring a paper prescription for each of these medications     morphine 15 MG 12 hr tablet               Information about OPIOIDS     PRESCRIPTION OPIOIDS: WHAT YOU NEED TO KNOW   We gave you an opioid (narcotic) pain medicine. It is important to manage your pain, but opioids are not always the best choice. You should first try all the other options your care team gave you. Take this medicine for as short a time (and as few doses) as possible.    Some activities can increase your pain, such as bandage changes or therapy sessions. It may help to take your pain  medicine 30 to 60 minutes before these activities. Reduce your stress by getting enough sleep, working on hobbies you enjoy and practicing relaxation or meditation. Talk to your care team about ways to manage your pain beyond prescription opioids.    These medicines have risks:    DO NOT drive when on new or higher doses of pain medicine. These medicines can affect your alertness and reaction times, and you could be arrested for driving under the influence (DUI). If you need to use opioids long-term, talk to your care team about driving.    DO NOT operate heavy machinery    DO NOT do any other dangerous activities while taking these medicines.    DO NOT drink any alcohol while taking these medicines.     If the opioid prescribed includes acetaminophen, DO NOT take with any other medicines that contain acetaminophen. Read all labels carefully. Look for the word  acetaminophen  or  Tylenol.  Ask your pharmacist if you have questions or are unsure.    You can get addicted to pain medicines, especially if you have a history of addiction (chemical, alcohol or substance dependence). Talk to your care team about ways to reduce this risk.    All opioids tend to cause constipation. Drink plenty of water and eat foods that have a lot of fiber, such as fruits, vegetables, prune juice, apple juice and high-fiber cereal. Take a laxative (Miralax, milk of magnesia, Colace, Senna) if you don t move your bowels at least every other day. Other side effects include upset stomach, sleepiness, dizziness, throwing up, tolerance (needing more of the medicine to have the same effect), physical dependence and slowed breathing.    Store your pills in a secure place, locked if possible. We will not replace any lost or stolen medicine. If you don t finish your medicine, please throw away (dispose) as directed by your pharmacist. The [place] Pollution Control Agency has more information about safe disposal:  https://[email or web address].[email or web address].mn.us/living-green/managing-unwanted-medications         Primary Care Provider Office Phone # Fax #    S Conrado [place] [phone]       [place] [address] [address] [place] [address]        Equal Access to Services     NIKOLE RAMIREZ : Patrick dawn hadshayano Soomaali, waaxda luqadaha, qaybta kaalmada adeegyada, george hernandezmaurifrederick messina. So [facility] [phone].    ATENCIÓN: Si habla español, tiene a ortega disposición servicios gratuitos de asistencia lingüística. Kuldip al [phone].    We comply with applicable federal civil rights laws and [place] laws. We do not discriminate on the basis of race, color, national origin, age, disability, sex, sexual orientation, or gender identity.            Thank you!     Thank you for choosing [facility]  for your care. Our goal is always to provide you with excellent care. Hearing back from our patients is one way we can continue to improve our services. Please take a few minutes to complete the written survey that you may receive in the mail after your visit with us. Thank you!             Your Updated Medication List - Protect others around you: Learn how to safely use, store and throw away your medicines at www.disposemymeds.org.          This list is accurate as of 11/8/18  2:26 PM.  Always use your most recent med list.                   Brand Name Dispense Instructions for use Diagnosis    aspirin 81 MG tablet      Take 81 mg by mouth        atorvastatin 20 MG tablet    LIPITOR     Take 20 mg by mouth        Cabozantinib S-Malate 60 MG    CABOMETYX    30 tablet    Take 1 tablet (60 mg) by mouth daily    Renal cell carcinoma, right (H)       melatonin 3 MG tablet      Take 3 mg by mouth        * morphine 30 MG 12 hr tablet    MS CONTIN    90 tablet    Take 1 tablet (30 mg) by mouth every 24 hours Take at 6pm    Renal cell carcinoma, right (H), Renal cell carcinoma, left (H)       * morphine 15 MG 12  hr tablet    MS CONTIN    30 tablet    Take 1 tablet (15 mg) by mouth every 24 hours Take at 6am    Renal cell carcinoma, right (H)       polyethylene glycol Packet    MIRALAX/GLYCOLAX     Take 1 packet by mouth        Pseudoephedrine-APAP  MG Tabs           senna-docusate 8.6-50 MG per tablet    SENOKOT-S;PERICOLACE          ZANTAC 150 MG tablet   Generic drug:  ranitidine      Take 150 mg by mouth 2 times daily        * Notice:  This list has 2 medication(s) that are the same as other medications prescribed for you. Read the directions carefully, and ask your doctor or other care provider to review them with you.

## 2018-11-09 ENCOUNTER — TELEPHONE (OUTPATIENT)
Dept: ONCOLOGY | Facility: CLINIC | Age: 68
End: 2018-11-09

## 2018-11-09 NOTE — TELEPHONE ENCOUNTER
Called Omega at home to follow up on distress screen indicating increased worry and depressed mood.  Omega had an appointment with Dr. Gonzalez (palliative care) on 11/8/18 during which he asked for mental health resources in the [place] area.      I spoke to Omega on the phone and confirmed this.  Sent him several mental health resources via e-mail.  He is aware of how to contact me should needs continue.    FABIAN Xiao, [facility]   Palliative Care    Pgr:[phone]  Ph: [phone]

## 2018-11-13 ENCOUNTER — TELEPHONE (OUTPATIENT)
Dept: ONCOLOGY | Facility: CLINIC | Age: 68
End: 2018-11-13

## 2018-11-13 ENCOUNTER — RADIANT APPOINTMENT (OUTPATIENT)
Dept: CT IMAGING | Facility: CLINIC | Age: 68
End: 2018-11-13
Attending: INTERNAL MEDICINE
Payer: MEDICARE

## 2018-11-13 ENCOUNTER — RADIANT APPOINTMENT (OUTPATIENT)
Dept: MRI IMAGING | Facility: CLINIC | Age: 68
End: 2018-11-13
Attending: INTERNAL MEDICINE
Payer: MEDICARE

## 2018-11-13 DIAGNOSIS — C64.2 RENAL CELL CARCINOMA, LEFT (H): ICD-10-CM

## 2018-11-13 DIAGNOSIS — Z79.899 ENCOUNTER FOR LONG-TERM (CURRENT) USE OF MEDICATIONS: ICD-10-CM

## 2018-11-13 DIAGNOSIS — C64.1 RENAL CELL CARCINOMA, RIGHT (H): ICD-10-CM

## 2018-11-13 LAB
ALBUMIN SERPL-MCNC: 2.2 G/DL (ref 3.4–5)
ALP SERPL-CCNC: 124 U/L (ref 40–150)
ALT SERPL W P-5'-P-CCNC: 42 U/L (ref 0–70)
ANION GAP SERPL CALCULATED.3IONS-SCNC: 6 MMOL/L (ref 3–14)
ANISOCYTOSIS BLD QL SMEAR: SLIGHT
AST SERPL W P-5'-P-CCNC: 43 U/L (ref 0–45)
BASOPHILS # BLD AUTO: 0 10E9/L (ref 0–0.2)
BASOPHILS NFR BLD AUTO: 0 %
BILIRUB SERPL-MCNC: 0.8 MG/DL (ref 0.2–1.3)
BUN SERPL-MCNC: 6 MG/DL (ref 7–30)
CALCIUM SERPL-MCNC: 7.3 MG/DL (ref 8.5–10.1)
CHLORIDE SERPL-SCNC: 105 MMOL/L (ref 94–109)
CO2 SERPL-SCNC: 26 MMOL/L (ref 20–32)
CREAT SERPL-MCNC: 0.78 MG/DL (ref 0.66–1.25)
CREAT UR-MCNC: 93 MG/DL
DIFFERENTIAL METHOD BLD: ABNORMAL
EOSINOPHIL # BLD AUTO: 0.1 10E9/L (ref 0–0.7)
EOSINOPHIL NFR BLD AUTO: 2 %
ERYTHROCYTE [DISTWIDTH] IN BLOOD BY AUTOMATED COUNT: 19 % (ref 10–15)
GFR SERPL CREATININE-BSD FRML MDRD: >90 ML/MIN/1.7M2
GLUCOSE SERPL-MCNC: 82 MG/DL (ref 70–99)
HCT VFR BLD AUTO: 30.9 % (ref 40–53)
HGB BLD-MCNC: 10.2 G/DL (ref 13.3–17.7)
LYMPHOCYTES # BLD AUTO: 0.7 10E9/L (ref 0.8–5.3)
LYMPHOCYTES NFR BLD AUTO: 22 %
MCH RBC QN AUTO: 30.5 PG (ref 26.5–33)
MCHC RBC AUTO-ENTMCNC: 33 G/DL (ref 31.5–36.5)
MCV RBC AUTO: 93 FL (ref 78–100)
MONOCYTES # BLD AUTO: 0.9 10E9/L (ref 0–1.3)
MONOCYTES NFR BLD AUTO: 29 %
NEUTROPHILS # BLD AUTO: 1.4 10E9/L (ref 1.6–8.3)
NEUTROPHILS NFR BLD AUTO: 47 %
PLATELET # BLD AUTO: 121 10E9/L (ref 150–450)
POIKILOCYTOSIS BLD QL SMEAR: SLIGHT
POLYCHROMASIA BLD QL SMEAR: SLIGHT
POTASSIUM SERPL-SCNC: 3.6 MMOL/L (ref 3.4–5.3)
PROT SERPL-MCNC: 4.8 G/DL (ref 6.8–8.8)
PROT UR-MCNC: 0.07 G/L
PROT/CREAT 24H UR: 0.08 G/G CR (ref 0–0.2)
RBC # BLD AUTO: 3.34 10E12/L (ref 4.4–5.9)
SODIUM SERPL-SCNC: 137 MMOL/L (ref 133–144)
TSH SERPL DL<=0.005 MIU/L-ACNC: 3.44 MU/L (ref 0.4–4)
WBC # BLD AUTO: 3 10E9/L (ref 4–11)

## 2018-11-13 RX ORDER — IOPAMIDOL 755 MG/ML
135 INJECTION, SOLUTION INTRAVASCULAR ONCE
Status: COMPLETED | OUTPATIENT
Start: 2018-11-13 | End: 2018-11-13

## 2018-11-13 RX ORDER — GADOBUTROL 604.72 MG/ML
10 INJECTION INTRAVENOUS ONCE
Status: COMPLETED | OUTPATIENT
Start: 2018-11-13 | End: 2018-11-13

## 2018-11-13 RX ADMIN — IOPAMIDOL 135 ML: 755 INJECTION, SOLUTION INTRAVASCULAR at 16:03

## 2018-11-13 RX ADMIN — GADOBUTROL 10 ML: 604.72 INJECTION INTRAVENOUS at 17:59

## 2018-11-13 NOTE — DISCHARGE INSTRUCTIONS

## 2018-11-13 NOTE — TELEPHONE ENCOUNTER
Per Jennifer West's request,  completed and faxed [facility] [place] referral for lodging dates 11/13 - 11/14. [place] will contact Patient directly for confirmation of reservation.  will continue to provide support as needed.        Sissy Scales ([place]), [facility], MSW   - [facility]  Phone: [phone]  Pager: [phone]  11/13/2018

## 2018-11-14 ENCOUNTER — ONCOLOGY VISIT (OUTPATIENT)
Dept: ONCOLOGY | Facility: CLINIC | Age: 68
End: 2018-11-14
Attending: INTERNAL MEDICINE
Payer: COMMERCIAL

## 2018-11-14 VITALS
HEIGHT: 72 IN | DIASTOLIC BLOOD PRESSURE: 87 MMHG | TEMPERATURE: 97.4 F | BODY MASS INDEX: 32.92 KG/M2 | HEART RATE: 67 BPM | RESPIRATION RATE: 16 BRPM | OXYGEN SATURATION: 95 % | SYSTOLIC BLOOD PRESSURE: 140 MMHG | WEIGHT: 243.06 LBS

## 2018-11-14 DIAGNOSIS — C79.51 BONE METASTASIS: Primary | ICD-10-CM

## 2018-11-14 PROCEDURE — 99214 OFFICE O/P EST MOD 30 MIN: CPT | Mod: GC | Performed by: INTERNAL MEDICINE

## 2018-11-14 PROCEDURE — G0463 HOSPITAL OUTPT CLINIC VISIT: HCPCS | Mod: ZF

## 2018-11-14 RX ORDER — HYDROXYZINE HYDROCHLORIDE 25 MG/1
TABLET, FILM COATED ORAL
Refills: 3 | Status: ON HOLD | COMMUNITY
Start: 2018-11-01 | End: 2019-03-14

## 2018-11-14 ASSESSMENT — PAIN SCALES - GENERAL: PAINLEVEL: MILD PAIN (2)

## 2018-11-14 NOTE — DISCHARGE INSTRUCTIONS
MRI Contrast Discharge Instructions    The IV contrast you received today will pass out of your body in your  urine. This will happen in the next 24 hours. You will not feel this process.  Your urine will not change color.    Drink at least 4 extra glasses of water or juice today (unless your doctor  has restricted your fluids). This reduces the stress on your kidneys.  You may take your regular medicines.    If you are on dialysis: It is best to have dialysis today.    If you have a reaction: Most reactions happen right away. If you have  any new symptoms after leaving the hospital (such as hives or swelling),  call your hospital at the correct number below. Or call your family doctor.  If you have breathing distress or wheezing, call 911.    Special instructions: ***    I have read and understand the above information.    Signature:______________________________________ Date:___________    Staff:__________________________________________ Date:___________     Time:__________    [place] [facility] Departments:    ___[place]: [phone]  ___[facility]: [phone]  ___[place]: [phone] ___[facility]: [phone]  ___[facility]: [phone]  ___[facility]: [phone]  ___[phone]  ___[facility]: [phone]  ___[phone]

## 2018-11-14 NOTE — PROGRESS NOTES
Nov 14, 2018       Chief complaint:   Metastatic Carcinoma with bone metastases, likely renal origin (no primary renal mass)  Cabozantinib: Start September 28, 2018          History of Present Illness:      Javon Bianchi is a 68 year old male with a history significant for hyperlipidemia who had been in his usual health until he developed back pain in May this year. At that time, the pain was a stabbing like in the middle of lower back with burning like pain wrapping around this left hip to knee area. No trauma. He had MRI on 5/23/18 showed mild degenerative change with bulging disks L2-S1. A small benign hemangioma was noted in L2, no other marrow signal abnormality. Since then, the pain failed to improve despite steroid injections. In addition, he actually lost 25 lbs weight unintentionally since June along with chills few time a day. Overtime, the pain got worse to the point he could not ambulate after short distance and developed muscle weakness in lower extremities over time requiring a walker. Finally, he went to ER ([facility] in [place]) on 8/23/18, CT A/P was unremarkable. He was referred to neurology with obtaining MRI L spine. MRI on 8/30/18 revealed a pathological fracture at L3 vertebral body with height loss at 40% and diffuse involvement of a neoplastic process. IR guided biopsy on 8/31/18 showed poorly differentiated carcinoma. Its IHC was suggestive of possible renal cell carcinoma per pathology report (Renal cell immunochemistry is positive and along with the negative staining for CK7 and CK20 suggests the possibility of a renal carcinoma, however most renal cell carcinomas also express PAX8 which is negative in this case). Of note, CT chest/abdomen/pelvis at OSH was negative for primary lesion. The patient was evaluated by neurosurgery who recommended no surgical intervention. He was discharged with TLSO brace and walker. He had palliative XRT locally from 9/11 for 10 fractions (Done in  Brained).      Cabozantinib 60 mg started on September 28, 2018.  He returns today for interval visit.      Interval history: Mr. Bianchi returns today with his family. He reports he is feeling better, his back and leg pain has been better controlled, his MS contin dose was decreased from 30mg q12h to 15mg q12h. He has better feeling and muscle strength of both legs, less numbness of tingling sensation of legs as well.  He is still having intermittent hot flush and chills,and anxiety.  His weight is relatively stable for the past 2-3 months. He feels his energy level is improving overall, though at times he feels tired. He was having intermittent diarrhea after initiated on Cabozantinib, symptoms are not severe and he has not concerns about it so far.    No chest pain, no sob, no abdominal pain, no dysuria, no hematuria, other review of systems are all negative.        PAST MEDICAL SURGICAL HISTORY:Reviewed.  SOCIAL HISTORY: Reviewed.      MEDICATIONS: Reviewed.           Home Medications:          Current Outpatient Prescriptions   Medication     atorvastatin (LIPITOR) 20 MG tablet     cyclobenzaprine (FLEXERIL) 10 MG tablet     doxycycline (VIBRA-TABS) 100 MG tablet     morphine (MS CONTIN) 30 MG 12 hr tablet     senna-docusate (SENOKOT-S;PERICOLACE) 8.6-50 MG per tablet     aspirin 81 MG tablet     Cabozantinib S-Malate (CABOMETYX) 60 MG     dexamethasone (DECADRON) 4 MG tablet     melatonin 3 MG tablet     methylPREDNISolone (MEDROL DOSEPAK) 4 MG tablet     oxyCODONE (OXYCONTIN) 20 MG 12 hr tablet     polyethylene glycol (MIRALAX/GLYCOLAX) Packet     Pseudoephedrine-APAP  MG TABS     ranitidine (ZANTAC) 150 MG tablet          Current Facility-Administered Medications   Medication     influenza Vac Split High-Dose (FLUZONE) injection 0.5 mL       Temp: 97.4  F (36.3  C) Temp src: Oral BP: 140/87 Pulse: 67   Resp: 16 SpO2: 95 %      ECOG performance status of 1.     In a brace.    HEENT: No icterus, no  pallor. TMs clear bilaterally; nasal mucosa bilaterally is injected, swollen and with scabs; no exudates  NECK: Supple, no cervical lymphadenopathy   LUNGS: Bilateral clear to ausculation, no wheezing or crackles.   CARDIOVASCULAR: Regular rate and rhythm, no murmurs, gallops or rubs; 2+ pitting edema to the knees bilaterally  ABDOMEN: Soft, nontender and nondistended.   EXTREMITIES: No cyanosis, no clubbing, no edema.   NEUROLOGIC: Still has weakness of LLE weakness at the hip and knee, but it is improving.   Labs: Kidney and liver function good.  WBC stable, Hgb down from 12.1 to 10.2, TSH 3.44 (normal).     IMAGING: Reviewed images and report of CT Chest and MRI A/P with patient and family.   CT c/a/p of 11/13/18 result:   1. Bilateral pulmonary nodules measuring up to 7 mm in the right upper  lobe, not significantly changed when compared to 9/6/2018. No new or  enlarging pulmonary nodules. Attention on follow-up studies.  2. Enhancing tubular structure in hepatic segment 5/8, likely  representing a vascular shunt, as described above. No suspicious liver lesions.  3. No intra-abdominal source of the patient's poorly differentiated carcinoma.  4. Again noted stable metastatic bone lesions as described above,  predominantly a compression pathologic fracture of the vertebral body  L3 extending along the posterior elements, left paraspinal  musculature, left psoas muscle extending within the spinal canal  compressing the spinal cord and obliterating the left lateral recess  with nerve root compression at this level, as described above.  [Result: Compression pathologic fracture at the vertebral body L3 with  involvement of the spinal canal, as described above.]              Assessment /Plan      68-year-old male with metastatic infiltrative lesion in L3 extending to L2 and L4, likely primary malignancy being renal cell carcinoma.  However he does not have any evidence of a primary renal mass on imaging.    Based on  the available pathology data,  We offered treatment for renal cell carcinoma with cabozantinib (CABOS data demonstrating cabozantinib superiority over sunitinib in the initial setting).  PAX8 stain was negative, and the clear cell features were not seen (it was a poorly differentiated carcinoma).     - He has been on cabozantinib for over 6 weeks,  doing well on cabozantinib with minimal side effects. Repeat image on 11/13/18 showed overall stable disease. Patient is doing better clinically. So will continued with Cabozantinib at 60mg daily, and follow his at 6 weeks with repeat lab of CBC and CMP.   - Follow with orthopedic surgeon regarding to when to take off his brace and the level of activity he can be involved.  - Restaging image result reviewed with patient and family. Explained he has overall stable disease and improvement of his clinical symptoms which is promsing, since he has only been on cabozantinib for 6 weeks, it is likely too soon to determine an effect.     Pain: On MS contin, dose decreased to 15mg q12h (50% dose compared to initiation of treatment), he is following with Palliative Medicine for pain control, next visit in the end of December.          Return to clinic in 6 weeks with labs and follow up with Dr De Luna.    Patient was discussed with Dr De Luna.       Dao Schneider MD/MPH  Hematology-Oncology-Transplant Fellow  Pager#[phone]    Provider Disclosure:  Please refer to the note by the fellow for H&P and physical exam and assessment and plan for details.     I agree with above History, Review of Systems, Physical exam and Plan. I have reviewed the content of the documentation and have edited it as needed. I have personally performed the services documented here and the documentation accurately represents those services and the decisions I have made.       Malathi De Luna MD  Hematology Oncology and Transplantation  [facility]

## 2018-11-14 NOTE — NURSING NOTE
"Oncology Rooming Note    November 14, 2018 8:17 AM   Javon Bianchi is a 68 year old male who presents for:    Chief Complaint   Patient presents with     Oncology Clinic Visit     Return: Renal CA     Initial Vitals: /87  Pulse 67  Temp 97.4  F (36.3  C) (Oral)  Resp 16  Ht 1.829 m (6')  Wt 110.3 kg (243 lb 1 oz)  SpO2 95%  BMI 32.97 kg/m2 Estimated body mass index is 32.97 kg/(m^2) as calculated from the following:    Height as of this encounter: 1.829 m (6').    Weight as of this encounter: 110.3 kg (243 lb 1 oz). Body surface area is 2.37 meters squared.  Mild Pain (2) Comment: Data Unavailable   No LMP for male patient.  Allergies reviewed: Yes  Medications reviewed: Yes    Medications: Medication refills not needed today.  Pharmacy name entered into [facility]: [facility] - [address]    Clinical concerns: new concerns today are that he is having \"hot flashes and chills\" at any given time or point, doesn't seem to be any rhyme or reason.   He describes it as being \"chilled to the core\" Dr. De Luna was notified.    10 minutes for nursing intake (face to face time)     Negro Cárdenas CMA              "

## 2018-11-14 NOTE — LETTER
11/14/2018       RE: Javon Bianchi  [address] [address]     Dear Colleague,    Thank you for referring your patient, Javon Bianchi, to the [facility]. Please see a copy of my visit note below.    Nov 14, 2018       Chief complaint:   Metastatic Carcinoma with bone metastases, likely renal origin (no primary renal mass)  Cabozantinib: Start September 28, 2018          History of Present Illness:      Javon Bianchi is a 68 year old male with a history significant for hyperlipidemia who had been in his usual health until he developed back pain in May this year. At that time, the pain was a stabbing like in the middle of lower back with burning like pain wrapping around this left hip to knee area. No trauma. He had MRI on 5/23/18 showed mild degenerative change with bulging disks L2-S1. A small benign hemangioma was noted in L2, no other marrow signal abnormality. Since then, the pain failed to improve despite steroid injections. In addition, he actually lost 25 lbs weight unintentionally since June along with chills few time a day. Overtime, the pain got worse to the point he could not ambulate after short distance and developed muscle weakness in lower extremities over time requiring a walker. Finally, he went to ER ([facility] in [place]) on 8/23/18, CT A/P was unremarkable. He was referred to neurology with obtaining MRI L spine. MRI on 8/30/18 revealed a pathological fracture at L3 vertebral body with height loss at 40% and diffuse involvement of a neoplastic process. IR guided biopsy on 8/31/18 showed poorly differentiated carcinoma. Its IHC was suggestive of possible renal cell carcinoma per pathology report (Renal cell immunochemistry is positive and along with the negative staining for CK7 and CK20 suggests the possibility of a renal carcinoma, however most renal cell carcinomas also express PAX8 which is negative in this case). Of note, CT  chest/abdomen/pelvis at OSH was negative for primary lesion. The patient was evaluated by neurosurgery who recommended no surgical intervention. He was discharged with TLSO brace and walker. He had palliative XRT locally from 9/11 for 10 fractions (Done in [place]rained).      Cabozantinib 60 mg started on September 28, 2018.  He returns today for interval visit.      Interval history: Mr. Bianchi returns today with his family. He  reports he is feeling better, his back and leg pain has been better controlled, his MS contin dose was decreased from 30mg q12h to 15mg q12h. He has better feeling and muscle strength of both legs, less numbness of tingling sensation of legs as well.  He is still having intermittent hot flush and chills,and anxiety.  His weight is relatively stable for the past 2-3 months. He feels his energy level is improving overall, though at times he feels tired. He was having intermittent diarrhea after initiated on Cabozantinib, symptoms are not severe and he has not concerns about it so far.    No chest pain, no sob, no abdominal pain, no dysuria, no hematuria, other review of systems are all negative.        PAST MEDICAL SURGICAL HISTORY:Reviewed.  SOCIAL HISTORY: Reviewed.      MEDICATIONS: Reviewed.           Home Medications:          Current Outpatient Prescriptions   Medication     atorvastatin (LIPITOR) 20 MG tablet     cyclobenzaprine (FLEXERIL) 10 MG tablet     doxycycline (VIBRA-TABS) 100 MG tablet     morphine (MS CONTIN) 30 MG 12 hr tablet     senna-docusate (SENOKOT-S;PERICOLACE) 8.6-50 MG per tablet     aspirin 81 MG tablet     Cabozantinib S-Malate (CABOMETYX) 60 MG     dexamethasone (DECADRON) 4 MG tablet     melatonin 3 MG tablet     methylPREDNISolone (MEDROL DOSEPAK) 4 MG tablet     oxyCODONE (OXYCONTIN) 20 MG 12 hr tablet     polyethylene glycol (MIRALAX/GLYCOLAX) Packet     Pseudoephedrine-APAP  MG TABS     ranitidine (ZANTAC) 150 MG tablet          Current  Facility-Administered Medications   Medication     influenza Vac Split High-Dose (FLUZONE) injection 0.5 mL       Temp: 97.4  F (36.3  C) Temp src: Oral BP: 140/87 Pulse: 67   Resp: 16 SpO2: 95 %      ECOG performance status of 1.     In a brace.    HEENT: No icterus, no pallor. TMs clear bilaterally; nasal mucosa bilaterally is injected, swollen and with scabs; no exudates  NECK: Supple, no cervical lymphadenopathy   LUNGS: Bilateral clear to ausculation, no wheezing or crackles.   CARDIOVASCULAR: Regular rate and rhythm, no murmurs, gallops or rubs; 2+ pitting edema to the knees bilaterally  ABDOMEN: Soft, nontender and nondistended.   EXTREMITIES: No cyanosis, no clubbing, no edema.   NEUROLOGIC: Still has weakness of LLE weakness at the hip and knee, but it is improving.   Labs: Kidney and liver function good.  WBC stable, Hgb down from 12.1 to 10.2, TSH 3.44 (normal).     IMAGING: Reviewed images and report of CT Chest and MRI A/P with patient and family.   CT c/a/p of 11/13/18 result:   1. Bilateral pulmonary nodules measuring up to 7 mm in the right upper  lobe, not significantly changed when compared to 9/6/2018. No new or  enlarging pulmonary nodules. Attention on follow-up studies.  2. Enhancing tubular structure in hepatic segment 5/8, likely  representing a vascular shunt, as described above. No suspicious liver lesions.  3. No intra-abdominal source of the patient's poorly differentiated carcinoma.  4. Again noted stable metastatic bone lesions as described above,  predominantly a compression pathologic fracture of the vertebral body  L3 extending along the posterior elements, left paraspinal  musculature, left psoas muscle extending within the spinal canal  compressing the spinal cord and obliterating the left lateral recess  with nerve root compression at this level, as described above.  [Result: Compression pathologic fracture at the vertebral body L3 with  involvement of the spinal canal, as  described above.]              Assessment /Plan      68-year-old male with metastatic infiltrative lesion in L3 extending to L2 and L4, likely primary malignancy being renal cell carcinoma.  However he does not have any evidence of a primary renal mass on imaging.    Based on the available pathology data,  We offered treatment for renal cell carcinoma with cabozantinib (CABOSUN data demonstrating cabozantinib superiority over sunitinib in the initial setting).  PAX8 stain was negative, and the clear cell features were not seen (it was a poorly differentiated carcinoma).     - He has been on cabozantinib for over 6 weeks,  doing well on cabozantinib with minimal side effects. Repeat image on 11/13/18 showed overall stable disease. Patient is doing better clinically. So will continued with Cabozantinib at 60mg daily, and follow his at 6 weeks with repeat lab of CBC and CMP.   - Follow with orthopedic surgeon regarding to when to take off his brace and the level of activity he can be involved.  - Restaging image result reviewed with patient and family. Explained he has overall stable disease and improvement of his clinical symptoms which is promsing, since he has only been on cabozantinib for 6 weeks, it is likely too soon to determine an effect.     Pain: On MS contin, dose decreased to 15mg q12h (50% dose compared to initiation of treatment), he is following with Palliative Medicine for pain control, next visit in the end of December.          Return to clinic in 6 weeks with labs and follow up with Dr De Luna.    Patient was discussed with Dr De Luna.       Dao Schneider MD/MPH  Hematology-Oncology-Transplant Fellow  Pager#[phone]      Again, thank you for allowing me to participate in the care of your patient.      Sincerely,    Malathi De Luna MD

## 2018-11-14 NOTE — MR AVS SNAPSHOT
After Visit Summary   11/14/2018    Javon Bianchi    MRN: [id number]           Patient Information     Date Of Birth          1950        Visit Information        Provider Department      11/14/2018 8:15 AM Malathi De Luna MD [facility]        Today's Diagnoses     Bone metastasis (H)    -  1       Follow-ups after your visit        Your next 10 appointments already scheduled     Dec 27, 2018  2:45 PM CST   Return Visit with Nan Gonzalez MD   [facility]    [address] [place] [address] [id number] [phone]            Jan 17, 2019  9:30 AM CST   LAB with  LAB   [facility] ([facility])    [address] [place] [address] Floor  [facility] [address] [phone]           Please do not eat 10-12 hours before your appointment if you are coming in fasting for labs on lipids, cholesterol, or glucose (sugar). This does not apply to pregnant women. Water, hot tea and black coffee (with nothing added) are okay. Do not drink other fluids, diet soda or chew gum.            Jan 17, 2019 10:15 AM CST   (Arrive by 10:00 AM)   Return Visit with Malathi De Luna MD   [facility] ([facility])    [address] [place] [address]  [address] [place] [address] [phone]              Future tests that were ordered for you today     Open Future Orders        Priority Expected Expires Ordered    Comprehensive metabolic panel Routine 1/7/2019 11/14/2019 11/14/2018    CBC with platelets differential Routine 1/7/2019 11/14/2019 11/14/2018            Who to contact     If you have questions or need follow up information about today's clinic visit or your schedule please contact [facility] directly at [phone].  Normal or non-critical lab and imaging results will be communicated to you by MyChart, letter or phone within 4 business days after  the clinic has received the results. If you do not hear from us within 7 days, please contact the clinic through [employer] or phone. If you have a critical or abnormal lab result, we will notify you by phone as soon as possible.  Submit refill requests through [employer] or call your pharmacy and they will forward the refill request to us. Please allow 3 business days for your refill to be completed.          Additional Information About Your Visit        [employer] Information     [employer] gives you secure access to your electronic health record. If you see a primary care provider, you can also send messages to your care team and make appointments. If you have questions, please call your primary care clinic.  If you do not have a primary care provider, please call [phone] and they will assist you.        Care EveryWhere ID     This is your Care EveryWhere ID. This could be used by other organizations to access your [place] medical records  [id number]        Your Vitals Were     Pulse Temperature Respirations Height Pulse Oximetry BMI (Body Mass Index)    67 97.4  F (36.3  C) (Oral) 16 1.829 m (6') 95% 32.97 kg/m2       Blood Pressure from Last 3 Encounters:   11/14/18 140/87   11/08/18 125/77   10/22/18 122/81    Weight from Last 3 Encounters:   11/14/18 110.3 kg (243 lb 1 oz)   11/08/18 110.3 kg (243 lb 3 oz)   10/22/18 110.2 kg (242 lb 15.2 oz)               Primary Care Provider Office Phone # Fax #    S Conr[address] [place] [phone]       [place] [address] [address] [place] [address]        Equal Access to Services     [facility] : Hadii mohamud alcantara Somary, waaxda luqadaha, qaybta kaalgeorge lujan. So [facility] [phone].    ATENCIÓN: Si habla español, tiene a ortega disposición servicios gratuitos de asistencia lingüística. Llame al [phone].    We comply with applicable federal civil rights laws and [place] laws. We do not discriminate on the  basis of race, color, national origin, age, disability, sex, sexual orientation, or gender identity.            Thank you!     Thank you for choosing [facility]  for your care. Our goal is always to provide you with excellent care. Hearing back from our patients is one way we can continue to improve our services. Please take a few minutes to complete the written survey that you may receive in the mail after your visit with us. Thank you!             Your Updated Medication List - Protect others around you: Learn how to safely use, store and throw away your medicines at www.disposemymeds.org.          This list is accurate as of 11/14/18  9:03 AM.  Always use your most recent med list.                   Brand Name Dispense Instructions for use Diagnosis    aspirin 81 MG tablet      Take 81 mg by mouth        atorvastatin 20 MG tablet    LIPITOR     Take 20 mg by mouth        Cabozantinib S-Malate 60 MG    CABOMETYX    30 tablet    Take 1 tablet (60 mg) by mouth daily    Renal cell carcinoma, right (H)       hydrOXYzine 25 MG tablet    ATARAX     TAKE 1 TABLET EVERY SIX HOURS AS NEEDED FOR ANXIETY        melatonin 3 MG tablet      Take 3 mg by mouth        * morphine 30 MG 12 hr tablet    MS CONTIN    90 tablet    Take 1 tablet (30 mg) by mouth every 24 hours Take at 6pm    Renal cell carcinoma, right (H), Renal cell carcinoma, left (H)       * morphine 15 MG 12 hr tablet    MS CONTIN    30 tablet    Take 1 tablet (15 mg) by mouth every 24 hours Take at 6am    Renal cell carcinoma, right (H)       polyethylene glycol Packet    MIRALAX/GLYCOLAX     Take 1 packet by mouth        Pseudoephedrine-APAP  MG Tabs           senna-docusate 8.6-50 MG per tablet    SENOKOT-S;PERICOLACE          ZANTAC 150 MG tablet   Generic drug:  ranitidine      Take 150 mg by mouth 2 times daily        * Notice:  This list has 2 medication(s) that are the same as other medications prescribed for you. Read the  directions carefully, and ask your doctor or other care provider to review them with you.

## 2018-11-14 NOTE — TELEPHONE ENCOUNTER
FUTURE VISIT INFORMATION      FUTURE VISIT INFORMATION:    Date: 11/15/18    Time: 1300    Location: ORTHO  REFERRAL INFORMATION:    Referring provider:  DR DE JESUS    Referring providers clinic:  [facility]    Reason for visit/diagnosis  SPINE FX    RECORDS REQUESTED FROM:       Clinic name Comments Records Status Imaging Status                                         RECORDS AND IMAGES IN CHART

## 2018-11-15 ENCOUNTER — OFFICE VISIT (OUTPATIENT)
Dept: ORTHOPEDICS | Facility: CLINIC | Age: 68
End: 2018-11-15
Payer: MEDICARE

## 2018-11-15 ENCOUNTER — PRE VISIT (OUTPATIENT)
Dept: ORTHOPEDICS | Facility: CLINIC | Age: 68
End: 2018-11-15

## 2018-11-15 ENCOUNTER — RADIANT APPOINTMENT (OUTPATIENT)
Dept: GENERAL RADIOLOGY | Facility: CLINIC | Age: 68
End: 2018-11-15
Attending: ORTHOPAEDIC SURGERY
Payer: MEDICARE

## 2018-11-15 VITALS — HEIGHT: 72 IN | BODY MASS INDEX: 32.91 KG/M2 | WEIGHT: 243 LBS

## 2018-11-15 DIAGNOSIS — M48.50XA PATHOLOGIC COMPRESSION FRACTURE OF SPINE (H): ICD-10-CM

## 2018-11-15 DIAGNOSIS — M48.50XA PATHOLOGIC COMPRESSION FRACTURE OF SPINE (H): Primary | ICD-10-CM

## 2018-11-15 DIAGNOSIS — M48.50XA PATHOLOGICAL COMPRESSION FRACTURE OF VERTEBRA, INITIAL ENCOUNTER (H): Primary | ICD-10-CM

## 2018-11-15 ASSESSMENT — PATIENT HEALTH QUESTIONNAIRE - PHQ9
SUM OF ALL RESPONSES TO PHQ QUESTIONS 1-9: 13
SUM OF ALL RESPONSES TO PHQ QUESTIONS 1-9: 13

## 2018-11-15 ASSESSMENT — ENCOUNTER SYMPTOMS
INCREASED ENERGY: 0
WHEEZING: 0
BLOATING: 0
POLYDIPSIA: 0
ABDOMINAL PAIN: 0
JAUNDICE: 0
DYSPNEA ON EXERTION: 1
SORE THROAT: 1
SNORES LOUDLY: 0
BLOOD IN STOOL: 0
COUGH: 1
PALPITATIONS: 0
RECTAL PAIN: 0
POLYPHAGIA: 0
HALLUCINATIONS: 0
LEG PAIN: 0
VOMITING: 0
SKIN CHANGES: 0
SHORTNESS OF BREATH: 1
WEIGHT GAIN: 0
HOARSE VOICE: 1
NAUSEA: 0
DIARRHEA: 1
PANIC: 1
TROUBLE SWALLOWING: 1
POOR WOUND HEALING: 1
LIGHT-HEADEDNESS: 1
POSTURAL DYSPNEA: 0
BOWEL INCONTINENCE: 0
INSOMNIA: 0
EXERCISE INTOLERANCE: 1
SMELL DISTURBANCE: 0
NERVOUS/ANXIOUS: 1
SINUS PAIN: 0
CONSTIPATION: 1
HEARTBURN: 0
NAIL CHANGES: 0
CHILLS: 1
HYPOTENSION: 0
SYNCOPE: 0
SLEEP DISTURBANCES DUE TO BREATHING: 0
DECREASED CONCENTRATION: 1
WEIGHT LOSS: 0
FATIGUE: 1
ALTERED TEMPERATURE REGULATION: 1
SINUS CONGESTION: 1
NIGHT SWEATS: 1
NECK MASS: 0
SPUTUM PRODUCTION: 1
TASTE DISTURBANCE: 1
HYPERTENSION: 0
ORTHOPNEA: 0
HEMOPTYSIS: 0
DECREASED APPETITE: 1
COUGH DISTURBING SLEEP: 0
FEVER: 1
DEPRESSION: 1

## 2018-11-15 NOTE — LETTER
"11/15/2018       RE: Javon Bianchi  [address] [facility] [address]     Dear Colleague,    Thank you for referring your patient, Javon Bianchi, to the [facility]. Please see a copy of my visit note below.    REASON FOR CONSULTATION: Consult (pathological fx of vertebra. )     REFERRING PHYSICIAN: Referred Self   PCP:LIANNE Wellington    Prior surgeries: none    History of Present Illness:  Mr. Bianchi is a 69 yo M presenting for evaluation of pathologic fracture of his L3 vertebrae from a presumed Renal Cell Carcinoma metastatic lesion. Patient reports he has had mild thoracic back pain for several years but developed a new lower back pain this past spring with associated left leg anterior thigh numbness and weakness.  The patient subsequently underwent 4 steroid injections into his lumbar spine without noted benefit.  Due to the persistence of his pain he was re-evaluated again in August with an MRI of his spine and found to have a large lesion involving his L3 vertebra.  He was initially seen by a neurosurgeon in [place] who performed a CT-guided biopsy that was concerning for renal cell carcinoma based on pathology results.  Per the patient, the neurosurgeon stated his vertebral lesion was \"inoperable,\" and subsequently prescribed him a TLSO brace and walker.  Since that time the patient has been seeing Dr. De Luna for oncologic treatment. Further imaging has not revealed a primary kidney mass and the patient has initiated chemotherapy as well as finished 10 sessions of radiation to the L3 vertebral lesion.  Patient is also managed by the palliative care team.  He is currently taking morphine 30 mg nightly, MS Contin 15 mg once every morning and Tylenol as needed.  Per the patient, after extensive workup with imaging there is no obvious source of primary tumor and he is unsure of his ultimate prognosis.  However, in the last month he " has noticed significant improvement in his back and left leg pain with continued treatment - he notes that he now has complete resolution of his left anterior thigh pain.  He has been begun physical therapy and his main question today are regarding how long he should continue to wear his TLSO brace and if he can safely proceed with advancing physical therapy.    Back 100%, Leg 0%, (Left leg symptoms prior, now resolved)    Previous treatment:   Palliative XRT 10 sessions - complete  Chemotherapy - Cabozantinib  TLSO brace  PT    Oswestry (SPIKE) Questionnaire    OSWESTRY DISABILITY INDEX 11/15/2018   Count 10   Sum 17   Oswestry Score (%) 34      Visual Analog Pain Scale  Back Pain Scale 0-10: 3  Right leg pain: 0  Left leg pain: 1    PROMIS-10 Scores  Global Mental Health Score: (P) 8  Global Physical Health Score: (P) 12  PROMIS TOTAL - SUBSCORES: (P) 20    ROS:  A 12-point review of systems was completed and is negative except for otherwise noted above in the history of present illness.    Med Hx:  Past Medical History:   Diagnosis Date     Bone metastasis (H) 09/28/2018     Renal cell carcinoma, right (H) 09/28/2018       Surg Hx:  No past surgical history on file.    Allergies:  No Known Allergies    Meds:  Current Outpatient Prescriptions   Medication     aspirin 81 MG tablet     atorvastatin (LIPITOR) 20 MG tablet     Cabozantinib S-Malate (CABOMETYX) 60 MG     hydrOXYzine (ATARAX) 25 MG tablet     melatonin 3 MG tablet     morphine (MS CONTIN) 15 MG 12 hr tablet     morphine (MS CONTIN) 30 MG 12 hr tablet     polyethylene glycol (MIRALAX/GLYCOLAX) Packet     Pseudoephedrine-APAP  MG TABS     ranitidine (ZANTAC) 150 MG tablet     senna-docusate (SENOKOT-S;PERICOLACE) 8.6-50 MG per tablet     No current facility-administered medications for this visit.        Fam Hx:  No family history on file.    P/S Hx:  Social History   Substance Use Topics     Smoking status: Never Smoker     Smokeless tobacco: Never  Used     Alcohol use Not on file         Physical Exam:  Very pleasant, healthy appearing, alert, oriented x 3, cooperative.  Normal mood and affect.  Not in cardiorespiratory distress.  Ht 1.829 m (6')  Wt 110.2 kg (243 lb)  BMI 32.96 kg/m2  Slight forward leaning kyphotic posture.  Normal gait without assistive device.  No antalgia / imbalance.  Back: no deformity, no skin lesions or surgical scars.  Localizes pain at midline lumbar spine in region of L3  Tenderness: (+) midline, (-) paraspinal, (-) R and L PSIS.       Lumbar Spine:    Appearance - No gross stepoffs or deformities    Motor -     L2-3: Hip flexion R 4/5  And L 3/5 strength          L3/4:  Knee extension R 5/5 and L 5/5 strength         L4/5:  Foot dorsiflexion R 5/5 L 4/5 and       EHL dorsiflexion R 5/5 L 5/5 strength         S1:  Plantarflexion/Peroneal Muscles  R 5/5 and L 5/5 strength    Sensation: intact to light touch L3-S1 distribution BLE, however notes it is diminished on left L3-L5        Straight leg raise negative b/l      Neurologic:      REFLEXES Right Left   Patella 2+ 2+   Ankle jerk 2+ 2+   Babinski No upgoing great toe No upgoing great toe   Clonus 0 beats 0 beats     Hip Exam:  No pain with hip log roll and no tenderness over the greater trochanters.    Alignment:  Patient stands with positive forward sagittal balance.    Imaging:  EOS complete standing xrays of spine reveal minimal vertebral body collapse of the L3 vertebra. In addition, there is sagittal imbalance primarily through the thoracic spine secondary to spondylosis with resulting hyperkyphosis.  MRI of the lumbar spine on 11/13/18 demonstrates diffuse tumor involvement at the level of L3 vertebra that is ventral and dorsal with adjacent dorsal involvement at the levels of L2 and L4. There is improvement in spinal canal stenosis at the L3-4 level from prior MRI studies and evidence of continued left L3-4 foraminal stenosis.     Impression:   - Metastatic Renal Cell  Carcinoma with lesion of the L3 vertebral body without significant vertebral body collapse     Plan:   At this time it appears that the patient has had minimal collapse and height loss of his L3 vertebra. The patient can discontinue the TLSO brace at this time and use his smaller soft lumbar brace as needed for comfort. The patient can also proceed with physical therapy core stabilizing exercises, lower extremity strengthening and general conditioning. He is safe to use a cane for ambulation at this time, as well. We did note that the patient should gradually increase his activity and stop with new onset of pain. If he does develop worsening back pain this would warrant a repeat CT of his lumbar spine for further evaluation of lumbar vertebral body collapse. We also discussed the possible roles for surgical intervention. We do not think that surgical resection is an option at this time, but discussed the possible role for ablation and kyphoplasty should he begin to develop more profound collapse of his L3 vertebra. At this time we will plan on seeing him back in clinic for follow-up in January with repeat EOS lumbar lateral xray.    All questions and concerns were answered to the patient's apparent satisfaction before leaving the clinic.     Nino Rush MD  Orthopaedic Surgery PGY1  Pager: [phone]    Attestation:  I (Dr. Vickey Brower - Spine Surgeon) have personally evaluated patient with PGY-1 Adri, and agree with findings and plan outlined in the note.  I discussed at length with the patient/family, explained the nature of spinal condition, and formulated workup and/or treatment plan together.  All questions were answered to the best of my ability and to patient's apparent satisfaction.    68/m, with metastatic L3 vertebral body lesion 2' to RCCA.  Initially presented with LBP; workup revealed the lesion; biopsy performed, consistent with RCCA, although workup failed to identify primary lesion.   Underwent radiation tx and chemotx.  Currently back pain much improved.  Main question is whether he should continue fulltime TLSO wear.  Neuro intact.    I discussed with patient and family.  May now wean off TLSO wear.  If sxs recu, may need further intervention treatment.  Options:  - Kyphoplasty with radiofrequency ablation (RFA)   - Curative wide resection L3, with spinal reconstruction, fusion, instrumentation.  I discussed above options with patient.    I also subsequently discussed with oncologist Dr. Malathi De Luna over the phone.  We agreed that if patient will need something done in future, preferable to do smaller intervention (kyphoplasty RFA) instead of bigger, riskier more morbid wide resection.    RTC Jan/Feb 2019 (at same time as next Oncology appt) with rpt EOS lumbar lat.  TT > 45 mins, > 50% CC.      Again, thank you for allowing me to participate in the care of your patient.      Sincerely,    Vickey Brower MD

## 2018-11-15 NOTE — NURSING NOTE
Reason For Visit:   Chief Complaint   Patient presents with     Consult     pathological fx of vertebra.        Primary MD: LIANNE Wellington      ?  No  Occupation retired salesman   Currently working? No.  Work status?  Retired.  Date of injury: None    Date of surgery: None    Smoker: No      Ht 1.829 m (6')  Wt 110.2 kg (243 lb)  BMI 32.96 kg/m2    Pain Assessment  Patient Currently in Pain: Yes  0-10 Pain Scale: 3  Primary Pain Location: Back  Pain Descriptors: Discomfort, Dull    Oswestry (SPIKE) Questionnaire    OSWESTRY DISABILITY INDEX 11/15/2018   Count 10   Sum 17   Oswestry Score (%) 34        Visual Analog Pain Scale  Back Pain Scale 0-10: 3  Right leg pain: 0  Left leg pain: 1    Promis 10 Assessment    PROMIS 10 11/15/2018   In general, would you say your health is: Good   In general, would you say your quality of life is: Good   In general, how would you rate your physical health? Fair   In general, how would you rate your mental health, including your mood and your ability to think? Fair   In general, how would you rate your satisfaction with your social activities and relationships? Fair   In general, please rate how well you carry out your usual social activities and roles Fair   To what extent are you able to carry out your everyday physical activities such as walking, climbing stairs, carrying groceries, or moving a chair? A little   How often have you been bothered by emotional problems such as feeling anxious, depressed or irritable? Always   How would you rate your fatigue on average? Mild   How would you rate your pain on average?   0 = No Pain  to  10 = Worst Imaginable Pain 3   In general, would you say your health is: 3   In general, would you say your quality of life is: 3   In general, how would you rate your physical health? 2   In general, how would you rate your mental health, including your mood and your ability to think? 2   In general, how would you rate your  satisfaction with your social activities and relationships? 2   In general, please rate how well you carry out your usual social activities and roles. (This includes activities at home, at work and in your community, and responsibilities as a parent, child, spouse, employee, friend, etc.) 2   To what extent are you able to carry out your everyday physical activities such as walking, climbing stairs, carrying groceries, or moving a chair? 2   In the past 7 days, how often have you been bothered by emotional problems such as feeling anxious, depressed, or irritable? 5   In the past 7 days, how would you rate your fatigue on average? 2   In the past 7 days, how would you rate your pain on average, where 0 means no pain, and 10 means worst imaginable pain? 3   Global Mental Health Score 8   Global Physical Health Score 12   PROMIS TOTAL - SUBSCORES 20                Alondra Villa LPN

## 2018-11-15 NOTE — PROGRESS NOTES
"REASON FOR CONSULTATION: Consult (pathological fx of vertebra. )     REFERRING PHYSICIAN: Referred Self   PCP:LIANNE Wellington    Prior surgeries: none    History of Present Illness:  Mr. Bianchi is a 67 yo M presenting for evaluation of pathologic fracture of his L3 vertebrae from a presumed Renal Cell Carcinoma metastatic lesion. Patient reports he has had mild thoracic back pain for several years but developed a new lower back pain this past spring with associated left leg anterior thigh numbness and weakness.  The patient subsequently underwent 4 steroid injections into his lumbar spine without noted benefit.  Due to the persistence of his pain he was re-evaluated again in August with an MRI of his spine and found to have a large lesion involving his L3 vertebra.  He was initially seen by a neurosurgeon in [place] who performed a CT-guided biopsy that was concerning for renal cell carcinoma based on pathology results.  Per the patient, the neurosurgeon stated his vertebral lesion was \"inoperable,\" and subsequently prescribed him a TLSO brace and walker.  Since that time the patient has been seeing Dr. De Luna for oncologic treatment. Further imaging has not revealed a primary kidney mass and the patient has initiated chemotherapy as well as finished 10 sessions of radiation to the L3 vertebral lesion.  Patient is also managed by the palliative care team.  He is currently taking morphine 30 mg nightly, MS Contin 15 mg once every morning and Tylenol as needed.  Per the patient, after extensive workup with imaging there is no obvious source of primary tumor and he is unsure of his ultimate prognosis.  However, in the last month he has noticed significant improvement in his back and left leg pain with continued treatment - he notes that he now has complete resolution of his left anterior thigh pain.  He has been begun physical therapy and his main question today are regarding how long he should continue to wear his " TLSO brace and if he can safely proceed with advancing physical therapy.    Back 100%, Leg 0%, (Left leg symptoms prior, now resolved)    Previous treatment:   Palliative XRT 10 sessions - complete  Chemotherapy - Cabozantinib  TLSO brace  PT    Oswestry (SPIKE) Questionnaire    OSWESTRY DISABILITY INDEX 11/15/2018   Count 10   Sum 17   Oswestry Score (%) 34      Visual Analog Pain Scale  Back Pain Scale 0-10: 3  Right leg pain: 0  Left leg pain: 1    PROMIS-10 Scores  Global Mental Health Score: (P) 8  Global Physical Health Score: (P) 12  PROMIS TOTAL - SUBSCORES: (P) 20    ROS:  A 12-point review of systems was completed and is negative except for otherwise noted above in the history of present illness.    Med Hx:  Past Medical History:   Diagnosis Date     Bone metastasis (H) 09/28/2018     Renal cell carcinoma, right (H) 09/28/2018       Surg Hx:  No past surgical history on file.    Allergies:  No Known Allergies    Meds:  Current Outpatient Prescriptions   Medication     aspirin 81 MG tablet     atorvastatin (LIPITOR) 20 MG tablet     Cabozantinib S-Malate (CABOMETYX) 60 MG     hydrOXYzine (ATARAX) 25 MG tablet     melatonin 3 MG tablet     morphine (MS CONTIN) 15 MG 12 hr tablet     morphine (MS CONTIN) 30 MG 12 hr tablet     polyethylene glycol (MIRALAX/GLYCOLAX) Packet     Pseudoephedrine-APAP  MG TABS     ranitidine (ZANTAC) 150 MG tablet     senna-docusate (SENOKOT-S;PERICOLACE) 8.6-50 MG per tablet     No current facility-administered medications for this visit.        Fam Hx:  No family history on file.    P/S Hx:  Social History   Substance Use Topics     Smoking status: Never Smoker     Smokeless tobacco: Never Used     Alcohol use Not on file         Physical Exam:  Very pleasant, healthy appearing, alert, oriented x 3, cooperative.  Normal mood and affect.  Not in cardiorespiratory distress.  Ht 1.829 m (6')  Wt 110.2 kg (243 lb)  BMI 32.96 kg/m2  Slight forward leaning kyphotic  posture.  Normal gait without assistive device.  No antalgia / imbalance.  Back: no deformity, no skin lesions or surgical scars.  Localizes pain at midline lumbar spine in region of L3  Tenderness: (+) midline, (-) paraspinal, (-) R and L PSIS.       Lumbar Spine:    Appearance - No gross stepoffs or deformities    Motor -     L2-3: Hip flexion R 4/5  And L 3/5 strength          L3/4:  Knee extension R 5/5 and L 5/5 strength         L4/5:  Foot dorsiflexion R 5/5 L 4/5 and       EHL dorsiflexion R 5/5 L 5/5 strength         S1:  Plantarflexion/Peroneal Muscles  R 5/5 and L 5/5 strength    Sensation: intact to light touch L3-S1 distribution BLE, however notes it is diminished on left L3-L5        Straight leg raise negative b/l      Neurologic:      REFLEXES Right Left   Patella 2+ 2+   Ankle jerk 2+ 2+   Babinski No upgoing great toe No upgoing great toe   Clonus 0 beats 0 beats     Hip Exam:  No pain with hip log roll and no tenderness over the greater trochanters.    Alignment:  Patient stands with positive forward sagittal balance.    Imaging:  EOS complete standing xrays of spine reveal minimal vertebral body collapse of the L3 vertebra. In addition, there is sagittal imbalance primarily through the thoracic spine secondary to spondylosis with resulting hyperkyphosis.  MRI of the lumbar spine on 11/13/18 demonstrates diffuse tumor involvement at the level of L3 vertebra that is ventral and dorsal with adjacent dorsal involvement at the levels of L2 and L4. There is improvement in spinal canal stenosis at the L3-4 level from prior MRI studies and evidence of continued left L3-4 foraminal stenosis.     Impression:   - Metastatic Renal Cell Carcinoma with lesion of the L3 vertebral body without significant vertebral body collapse     Plan:   At this time it appears that the patient has had minimal collapse and height loss of his L3 vertebra. The patient can discontinue the TLSO brace at this time and use his  smaller soft lumbar brace as needed for comfort. The patient can also proceed with physical therapy core stabilizing exercises, lower extremity strengthening and general conditioning. He is safe to use a cane for ambulation at this time, as well. We did note that the patient should gradually increase his activity and stop with new onset of pain. If he does develop worsening back pain this would warrant a repeat CT of his lumbar spine for further evaluation of lumbar vertebral body collapse. We also discussed the possible roles for surgical intervention. We do not think that surgical resection is an option at this time, but discussed the possible role for ablation and kyphoplasty should he begin to develop more profound collapse of his L3 vertebra. At this time we will plan on seeing him back in clinic for follow-up in January with repeat EOS lumbar lateral xray.    All questions and concerns were answered to the patient's apparent satisfaction before leaving the clinic.     Nino Rush MD  Orthopaedic Surgery PGY1  Pager: [phone]    Attestation:  I (Dr. Vickey Brower - Spine Surgeon) have personally evaluated patient with PGY-1 Adri, and agree with findings and plan outlined in the note.  I discussed at length with the patient/family, explained the nature of spinal condition, and formulated workup and/or treatment plan together.  All questions were answered to the best of my ability and to patient's apparent satisfaction.    68/m, with metastatic L3 vertebral body lesion 2' to RCCA.  Initially presented with LBP; workup revealed the lesion; biopsy performed, consistent with RCCA, although workup failed to identify primary lesion.  Underwent radiation tx and chemotx.  Currently back pain much improved.  Main question is whether he should continue fulltime TLSO wear.  Neuro intact.    I discussed with patient and family.  May now wean off TLSO wear.  If sxs recu, may need further intervention treatment.   Options:  - Kyphoplasty with radiofrequency ablation (RFA)   - Curative wide resection L3, with spinal reconstruction, fusion, instrumentation.  I discussed above options with patient.    I also subsequently discussed with oncologist Dr. Malathi De Luna over the phone.  We agreed that if patient will need something done in future, preferable to do smaller intervention (kyphoplasty RFA) instead of bigger, riskier more morbid wide resection.    RTC Jan/Feb 2019 (at same time as next Oncology appt) with rpt EOS lumbar lat.  TT > 45 mins, > 50% CC.    Answers for HPI/ROS submitted by the patient on 11/15/2018   General Symptoms: Yes  Skin Symptoms: Yes  HENT Symptoms: Yes  EYE SYMPTOMS: No  HEART SYMPTOMS: Yes  LUNG SYMPTOMS: Yes  INTESTINAL SYMPTOMS: Yes  URINARY SYMPTOMS: No  REPRODUCTIVE SYMPTOMS: No  SKELETAL SYMPTOMS: No  BLOOD SYMPTOMS: No  NERVOUS SYSTEM SYMPTOMS: No  MENTAL HEALTH SYMPTOMS: Yes  Fever: Yes  Loss of appetite: Yes  Weight loss: No  Weight gain: No  Fatigue: Yes  Night sweats: Yes  Chills: Yes  Increased stress: Yes  Excessive hunger: No  Excessive thirst: No  Feeling hot or cold when others believe the temperature is normal: Yes  Loss of height: No  Post-operative complications: No  Surgical site pain: No  Hallucinations: No  Change in or Loss of Energy: No  Hyperactivity: No  Confusion: No  Changes in hair: No  Changes in moles/birth marks: No  Itching: No  Rashes: Yes  Changes in nails: No  Acne: No  Change in facial hair: No  Warts: No  Non-healing sores: Yes  Scarring: No  Flaking of skin: No  Color changes of hands/feet in cold : No  Sun sensitivity: No  Skin thickening: No  Ear pain: No  Ear discharge: No  Hearing loss: No  Tinnitus: Yes  Nosebleeds: No  Congestion: Yes  Sinus pain: No  Trouble swallowing: Yes   Voice hoarseness: Yes  Mouth sores: Yes  Sore throat: Yes  Tooth pain: No  Gum tenderness: No  Bleeding gums: No  Change in taste: Yes  Change in sense of smell: No  Dry mouth:  Yes  Hearing aid used: No  Neck lump: No  Cough: Yes  Sputum or phlegm: Yes  Coughing up blood: No  Difficulty breating or shortness of breath: Yes  Snoring: No  Wheezing: No  Difficulty breathing on exertion: Yes  Nighttime Cough: No  Difficulty breathing when lying flat: No  Chest pain or pressure: No  Fast or irregular heartbeat: No  Pain in legs with walking: No  Trouble breathing while lying down: No  Fingers or toes appear blue: No  High blood pressure: No  Low blood pressure: No  Fainting: No  Murmurs: No  Pacemaker: No  Varicose veins: No  Edema or swelling: Yes  Wake up at night with shortness of breath: No  Light-headedness: Yes  Exercise intolerance: Yes  Heart burn or indigestion: No  Nausea: No  Vomiting: No  Abdominal pain: No  Bloating: No  Constipation: Yes  Diarrhea: Yes  Blood in stool: No  Black stools: No  Rectal or Anal pain: No  Fecal incontinence: No  Yellowing of skin or eyes: No  Vomit with blood: No  Change in stools: No  Nervous or Anxious: Yes  Depression: Yes  Trouble sleeping: No  Trouble thinking or concentrating: Yes  Mood changes: Yes  Panic attacks: Yes  PHQ-2 Score: 5  PHQ9 TOTAL SCORE: 13

## 2018-11-15 NOTE — MR AVS SNAPSHOT
After Visit Summary   11/15/2018    Javon Bianchi    MRN: [id number]           Patient Information     Date Of Birth          1950        Visit Information        Provider Department      11/15/2018 1:00 PM Vickey Brower MD [facility]        Today's Diagnoses     Pathological compression fracture of vertebra, initial encounter (H)    -  1       Follow-ups after your visit        Additional Services     PHYSICAL THERAPY REFERRAL       If you have not heard from the scheduling office within 2 business days, please call [phone] for all locations, with the exception of [place], please call [phone] and [place], please call [phone].    Please be aware that coverage of these services is subject to the terms and limitations of your health insurance plan.  Call member services at your health plan with any benefit or coverage questions.                  Follow-up notes from your care team     Return in about 3 months (around 2/15/2019).      Your next 10 appointments already scheduled     Dec 27, 2018  2:45 PM CST   [facility] Visit with Nan Gonzalez MD   [facility]    [address] [place] [id number] [phone]            Jan 17, 2019  9:30 AM CST   LAB with  LAB   [facility] ([facility])    [address] [place] [id number] [phone]           Please do not eat 10-12 hours before your appointment if you are coming in fasting for labs on lipids, cholesterol, or glucose (sugar). This does not apply to pregnant women. Water, hot tea and black coffee (with nothing added) are okay. Do not drink other fluids, diet soda or chew gum.            Jan 17, 2019 10:15 AM CST   (Arrive by 10:00 AM)   Return Visit with Malathi De Luna MD   [facility] ([facility])    [address] [place] [address]  [address]  [facility] [address] [phone]              Who to contact     Please call your clinic at [phone] to:    Ask questions about your health    Make or cancel appointments    Discuss your medicines    Learn about your test results    Speak to your doctor            Additional Information About Your Visit        MyChart Information     [employer] gives you secure access to your electronic health record. If you see a primary care provider, you can also send messages to your care team and make appointments. If you have questions, please call your primary care clinic.  If you do not have a primary care provider, please call [phone] and they will assist you.      [employer] is an electronic gateway that provides easy, online access to your medical records. With [employer], you can request a clinic appointment, read your test results, renew a prescription or communicate with your care team.     To access your existing account, please contact your [facility] or call [phone] for assistance.        Care EveryWhere ID     This is your Care EveryWhere ID. This could be used by other organizations to access your [place] medical records  [id number]        Your Vitals Were     Height BMI (Body Mass Index)                1.829 m (6') 32.96 kg/m2           Blood Pressure from Last 3 Encounters:   11/14/18 140/87   11/08/18 125/77   10/22/18 122/81    Weight from Last 3 Encounters:   11/15/18 110.2 kg (243 lb)   11/14/18 110.3 kg (243 lb 1 oz)   11/08/18 110.3 kg (243 lb 3 oz)               Primary Care Provider Office Phone # Fax #    S Conrado [place] [phone]       [place] [address] [place] [address]        Equal Access to Services     [facility] : Hadii mohamud Lopez, shaka gutiérrez, george lawrence. So [facility] [phone].    ATENCIÓN: Si habla español, tiene a ortega disposición  servicios gratuitos de asistencia lingüística. Kuldip redd [phone].    We comply with applicable federal civil rights laws and [place] laws. We do not discriminate on the basis of race, color, national origin, age, disability, sex, sexual orientation, or gender identity.            Thank you!     Thank you for choosing [facility]  for your care. Our goal is always to provide you with excellent care. Hearing back from our patients is one way we can continue to improve our services. Please take a few minutes to complete the written survey that you may receive in the mail after your visit with us. Thank you!             Your Updated Medication List - Protect others around you: Learn how to safely use, store and throw away your medicines at www.disposemymeds.org.          This list is accurate as of 11/15/18 11:59 PM.  Always use your most recent med list.                   Brand Name Dispense Instructions for use Diagnosis    aspirin 81 MG tablet    ASA     Take 81 mg by mouth        atorvastatin 20 MG tablet    LIPITOR     Take 20 mg by mouth        Cabozantinib S-Malate 60 MG    CABOMETYX    30 tablet    Take 1 tablet (60 mg) by mouth daily    Renal cell carcinoma, right (H)       hydrOXYzine 25 MG tablet    ATARAX     TAKE 1 TABLET EVERY SIX HOURS AS NEEDED FOR ANXIETY        melatonin 3 MG tablet      Take 3 mg by mouth        * morphine 30 MG CR tablet    MS CONTIN    90 tablet    Take 1 tablet (30 mg) by mouth every 24 hours Take at 6pm    Renal cell carcinoma, right (H), Renal cell carcinoma, left (H)       * morphine 15 MG CR tablet    MS CONTIN    30 tablet    Take 1 tablet (15 mg) by mouth every 24 hours Take at 6am    Renal cell carcinoma, right (H)       polyethylene glycol Packet    MIRALAX/GLYCOLAX     Take 1 packet by mouth        Pseudoephedrine-APAP  MG Tabs           senna-docusate 8.6-50 MG per tablet    SENOKOT-S;PERICOLACE          ZANTAC 150 MG tablet   Generic drug:   ranitidine      Take 150 mg by mouth 2 times daily        * Notice:  This list has 2 medication(s) that are the same as other medications prescribed for you. Read the directions carefully, and ask your doctor or other care provider to review them with you.

## 2018-11-16 ENCOUNTER — TELEPHONE (OUTPATIENT)
Dept: ONCOLOGY | Facility: CLINIC | Age: 68
End: 2018-11-16

## 2018-11-16 ASSESSMENT — PATIENT HEALTH QUESTIONNAIRE - PHQ9: SUM OF ALL RESPONSES TO PHQ QUESTIONS 1-9: 13

## 2018-11-16 NOTE — TELEPHONE ENCOUNTER
Pt called in to triage reporting R thumb pain x2 days. Joint/crease of thumb is red, swollen and painful. Has been applying Triple A antibiotic ointment to the area twice daily and it looks the same. Denied any fevers, chills, drainage or radiating pain down the hand or arm. Advised pt be seen by local PCP and if any questions regarding treatment call us back. Pt verbalized understanding.

## 2018-11-24 ENCOUNTER — TELEPHONE (OUTPATIENT)
Dept: OTHER | Facility: CLINIC | Age: 68
End: 2018-11-24

## 2018-11-24 ENCOUNTER — NURSE TRIAGE (OUTPATIENT)
Dept: NURSING | Facility: CLINIC | Age: 68
End: 2018-11-24

## 2018-11-24 NOTE — TELEPHONE ENCOUNTER
Javon Larsen's wife was the caller.  Javon is being treated for cellulitis of his left thumb with clindamycin. He started this 11/16/2018.  He has since developed same symptoms of his other thumb.  I'm unsure if Javon's oncologist was made aware of this.  Now, Javon has same redness and tenderness of his left heel.  He's still on the clindamycin.    I asked to have the on call for Dr LIANNE De Luna paged to call Suzie, [phone]. I was told Dr Pirmo Mason is the on call.    The page went out at 11:05 a.m.  I asked Suzie to call JOSE back if she hasn't received a call by 11:15 a.m. and request a second page.  She stated understanding and agreement.    Marlena HUTCHINSON RN [facility]

## 2018-11-25 ENCOUNTER — NURSE TRIAGE (OUTPATIENT)
Dept: NURSING | Facility: CLINIC | Age: 68
End: 2018-11-25

## 2018-11-25 NOTE — TELEPHONE ENCOUNTER
Spots on thumb, It was treated. Then one on other thumb. Now it's moved to his heel. Yesterday spoke with on call MD. It was recommended to go to the ER. Told they should do IV treatment. ER didn't think that is necessary. Attributed spot on heel to a bone spur. He sent them home. Spot is there today and it's blistery and bubbly. Can a picture be sent and evaluated?  The ER took a picture in [place], [place].  The appointment is at 9:45 a.m. Tomorrow. I advised they bring the photo they wanted to send today. I explained I have no way of receiving the picture and entering it into the chart. They feel the MD at the ER yesterday didn't take his symptoms seriously enough.

## 2018-11-25 NOTE — TELEPHONE ENCOUNTER
I received call yesterday regarding Mr Bianchi having redness of his thumb and heal. He was recently treated with clindamycin for redness of his other thumb, that got better.  No fever or pain of involved areas. No peeling or blistering. 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 However I would like him to be evaluated to make sure he does not have cellulitis. Advised him to go to local ED to be evaluated as he lives in [place]. Advised him to hold dose on Sunday and call the cancer clinic on Monday for further advise.

## 2018-11-26 ENCOUNTER — TELEPHONE (OUTPATIENT)
Dept: ONCOLOGY | Facility: CLINIC | Age: 68
End: 2018-11-26

## 2018-11-26 NOTE — TELEPHONE ENCOUNTER
Attempted to reach Vane. No answer. [facility] clinic. Await response.     Spoke with pt who stated Vane is looking for resources for covering the cost of his medications. Message sent to oral chemo pharmacists regarding help with financial concerns. Message also contained condition update as pt is being treated for a skin infection on both his hand and one of his foot. Message sent to Dr. De Luna as well.

## 2018-11-26 NOTE — TELEPHONE ENCOUNTER
----- Message from Alice Thomas sent at 11/26/2018 10:53 AM CST -----  Regarding: Call daughter Vane?  Arvind Palafox  Javon's daughter called and was hoping to connect with you regarding resources for her dad, who sees Dr. De Luna. She wasn't sure if you are the correct point person, but it sounds like he and his wife and daughter are maybe feeling a little overwhelmed by all of the doctors appointments (with different specialties, it sounds like) and information they are getting. Unfortunately at the time she called in, I didn't have a chance to explore it further, so I asked if she'd like you to give her a call and she said yes. I told her I'd pass along the message. Her name is Vane and her contact # is [phone].    Thank you,  Alice CHAMPION, Clinic Coordinator  [facility]  [phone]

## 2018-11-27 ENCOUNTER — TELEPHONE (OUTPATIENT)
Dept: PHARMACY | Facility: CLINIC | Age: 68
End: 2018-11-27

## 2018-11-27 NOTE — ORAL ONC MGMT
Oral Chemotherapy Monitoring Program  Patient Follow Up      Primary Oncologist: Dr. De Luna  Primary Oncology Clinic: [facility]  Cancer Diagnosis: RCC      Therapy History:  Cabozantinib 60mg daily started 9/28/18      Drug Interaction Assessment: None      Lab Monitoring Plan                            Monitoring plan:   C1D1+   CMP, CBC, urine random protein, BP C2D1+ Call, BP, CMP, CBC, urine random protein  C3D1+ Call, BP, CMP, CBC, urine random protein C4D1+ Call, BP, CMP, CBC, urine random protein C5D1+ Call, BP, CMP, CBC, urine random protein C6D1+ Call, BP, CMP, CBC, urine random protein   C1D8+    C2D8+    C3D8+    C4D8+    C5D8+    C6D8+      C1D15+ Call, BP C2D15+    C3D15+    C4D15+    C5D15+    C6D15+      C1D22+    C2D22+    C3D22+    C4D22+    C5D22+    C6D22+          Subjective/Objective:  Javon Bianchi is a 68 year old male contacted by phone for a follow-up visit for oral chemotherapy. He confirmed correct dosing of cabometyx 60 mg daily. He denies any missed doses or medication changes. Medication list reviewed and no concerning drug interactions. He had an I&D done for a lower leg infection and Dr. De Luna would like him to hold cabometyx for 4 days to promote wound healing. Patient verbalized understanding.     He is experiencing mouth sores associated with mild pain. No open sores are present in the mouth and the soreness doesn't interfere with ability to eat or drink. He is using salt water rinses once daily. He denies diarrhea and reports more constipation likely secondary to oral morphine. Having regular, soft stools. Having mild hand foot skin reaction associated with cabometyx. Skin is soft and tender with some cracking but no pain and isn't interfering with daily activities. Not currently utilizing any intervention for this.     ORAL CHEMOTHERAPY 9/26/2018 10/12/2018 11/2/2018 11/27/2018   Drug Name Cabometyx (cabozantinib) Cabometyx (cabozantinib) Cabometyx (cabozantinib) Cabometyx  (cabozantinib)   Current Dosage 60mg 60mg 60mg 60mg   Current Schedule Daily Daily Daily Daily   Cycle Details Continuous Continuous Continuous Continuous   Start Date of Last Cycle - 9/28/2018 - 11/27/2018   Planned next cycle start date - 10/28/2018 - 12/27/2018   Doses missed in last 2 weeks - 0 0 0   Adherence Assessment - Adherent Adherent Adherent   Adverse Effects - No AE identified during assessment No AE identified during assessment Palmar-plantar erythrodysethesia syndrome;Oral mucositis   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 No No No No   Is the dose as ordered appropriate for the patient? Yes Yes Yes Yes   Is the patient currently in pain? - No No No   Has the patient been assessed within the past 6 months for depression? - Yes - -   Has the patient missed any days of school, work, or other routine activity? - No - -       Vitals:  BP:   BP Readings from Last 1 Encounters:   11/14/18 140/87     Wt Readings from Last 1 Encounters:   11/15/18 110.2 kg (243 lb)     Estimated body surface area is 2.37 meters squared as calculated from the following:    Height as of 11/15/18: 1.829 m (6').    Weight as of 11/15/18: 110.2 kg (243 lb).    Labs:  _  Result Component Current Result Ref Range   Sodium 137 (11/13/2018) 133 - 144 mmol/L     _  Result Component Current Result Ref Range   Potassium 3.6 (11/13/2018) 3.4 - 5.3 mmol/L     _  Result Component Current Result Ref Range   Calcium 7.3 (L) (11/13/2018) 8.5 - 10.1 mg/dL     No results found for Mag within last 30 days.     No results found for Phos within last 30 days.     _  Result Component Current Result Ref Range   Albumin 2.2 (L) (11/13/2018) 3.4 - 5.0 g/dL      _  Result Component Current Result Ref Range   Urea Nitrogen 6 (L) (11/13/2018) 7 - 30 mg/dL     _  Result Component Current Result Ref Range   Creatinine 0.78 (11/13/2018) 0.66 - 1.25 mg/dL       _  Result Component Current Result Ref Range   AST 43 (11/13/2018) 0 - 45 U/L     _  Result Component Current Result Ref Range   ALT 42 (11/13/2018) 0 - 70 U/L     _  Result Component Current Result Ref Range   Bilirubin Total 0.8 (11/13/2018) 0.2 - 1.3 mg/dL       _  Result Component Current Result Ref Range   WBC 3.0 (L) (11/13/2018) 4.0 - 11.0 10e9/L     _  Result Component Current Result Ref Range   Hemoglobin 10.2 (L) (11/13/2018) 13.3 - 17.7 g/dL     _  Result Component Current Result Ref Range   Platelet Count 121 (L) (11/13/2018) 150 - 450 10e9/L     _  Result Component Current Result Ref Range   Absolute Neutrophil 1.4 (L) (11/13/2018) 1.6 - 8.3 10e9/L       Assessment:  Omega is tolerating therapy with mild side effects. Mouth sores and hand foot skin reaction plan discussed below.     Plan:  Hold cabometyx from 11/27 through 11/30. Patient instructed to call us if wound looks like it is worsening (i.e. Increased redness, swelling, drainage) on Friday prior to restarting.   Mouth sores: recommended continued use of salt water and baking soda rinse. Omega said it wasn't particularly bothersome to him.  Hand foot skin reaction: apply urea based moisturizer 1-2 times daily. Patient verbalized understanding of the plan above.     Follow-Up:  12/4/18 - determine if restarted cabometyx  12/27/18 - monthly follow up assessment    Refill Due:  Last picked up 10/31.     Dimitri Hoffman, PharmD, MS  Hematology/Oncology Clinical Pharmacist  [facility]  [facility]

## 2018-11-30 ENCOUNTER — TRANSFERRED RECORDS (OUTPATIENT)
Dept: HEALTH INFORMATION MANAGEMENT | Facility: CLINIC | Age: 68
End: 2018-11-30

## 2018-12-03 ENCOUNTER — NURSE TRIAGE (OUTPATIENT)
Dept: NURSING | Facility: CLINIC | Age: 68
End: 2018-12-03

## 2018-12-03 ENCOUNTER — TELEPHONE (OUTPATIENT)
Dept: ONCOLOGY | Facility: CLINIC | Age: 68
End: 2018-12-03

## 2018-12-03 DIAGNOSIS — G62.9 NEUROPATHY: Primary | ICD-10-CM

## 2018-12-03 RX ORDER — GABAPENTIN 100 MG/1
100 CAPSULE ORAL 2 TIMES DAILY
Qty: 90 CAPSULE | Refills: 1 | Status: CANCELLED | OUTPATIENT
Start: 2018-12-03

## 2018-12-03 NOTE — ORAL ONC MGMT
Oral Chemotherapy Monitoring Program     Placed call to patient in follow up of symptoms. Javon reports that he resumed Cabometyx 60mg daily on 12/1/2018. He said that since then is not feeling as well. He said that he has cracks on his hands with some associated pain 4/10. He has some pain in his feet near his toes 5-6/10. He said he is using a gold bond lotion on his hands that has some urea in it. He said he is drinking about 30 ounces of water per day. He said his appetite is reduced.     He would like to continue with cabometyx but also be seen by one of our providers this week to determine the best course of treatment going forward.    Sent message to schedulers to set an appointment for him here in the clinic this week. All questions answered to Javon's stated satisfaction. He will call with interim questions or concerns. He thanked me for the call and care.    Ash MoralesD  [facility]  [phone]  December 3, 2018

## 2018-12-03 NOTE — TELEPHONE ENCOUNTER
Clinic Action Needed:Please call back Pt to advise.   Reason for Call: From   Pt called.  Seen by  [facility] Dr De Luna- [facility] .  Prefers to speak to [facility] clinic directly and declines triage .   Patient Recommendations/Teaching:  Routed to:  Verbalizes understanding and denies further questions and will call back if triage of  symptoms  or questions requested   ..  Mona Muller RN  - [facility]

## 2018-12-03 NOTE — TELEPHONE ENCOUNTER
[facility] Telephone Triage Note     Assessment: Patient's wife Patrizia called in to triage reporting the following symptoms: aching pain located hand and foot and rated  moderate. Reporting ongoing hand/foot pain. Has been to ED for this issue, seen PCP and Podiatry (see notes in chart).Reports red blotches and swelling on both hands and feet. Denies fever or any other symptoms. Does report increase nausea lately. Patrizia reports patient's chemo was held 11/26 after ED visit, and resumed 12/1. Would like to discuss with care team if restarting chemo is the best choice at this time? Would it be beneficial for patient to see Dr. De Luna sooner than scheduled 1/17/19 visit?      Recommendations: In123peopleet message routed to care team for recommendations. See chemo pharmacy note for f/u plan.     Ricarda Calles RN   [facility] Triage

## 2018-12-04 ENCOUNTER — TELEPHONE (OUTPATIENT)
Dept: ONCOLOGY | Facility: CLINIC | Age: 68
End: 2018-12-04

## 2018-12-04 NOTE — ORAL ONC MGMT
Oral Chemotherapy Monitoring Program      Placed call to patient in follow up Cabometyx. Javon reports that he resumed Cabometyx 60mg daily on 12/1/2018. He confirmed his last dose of Cabometyx was the evening of 12/3/2018.   Per Dr. De Luna EPIC in-basket- Informed Javon to HOLD Cabometyx until HFS better and appointment with Dr. De Luna and labs scheduled at 12:15 on 12/10/2018 and will determine plan of care.   Javon verbalized understanding he will HOLD Cabometyx starting today 12/4/2018 and plan for Dr. De Luna appointment 12/10/2018.     He appreciated call and will call back with questions or if symptoms worsen.       Thank you for the opportunity to participate in the care of the above patient,  Reddy Marshall, PharmD  Hematology/Oncology Clinical Pharmacist  [facility]   [phone]      
No significant past surgical history

## 2018-12-10 ENCOUNTER — ONCOLOGY VISIT (OUTPATIENT)
Dept: ONCOLOGY | Facility: CLINIC | Age: 68
End: 2018-12-10
Attending: INTERNAL MEDICINE
Payer: MEDICARE

## 2018-12-10 ENCOUNTER — APPOINTMENT (OUTPATIENT)
Dept: LAB | Facility: CLINIC | Age: 68
End: 2018-12-10
Attending: INTERNAL MEDICINE
Payer: MEDICARE

## 2018-12-10 VITALS
DIASTOLIC BLOOD PRESSURE: 76 MMHG | WEIGHT: 236.6 LBS | SYSTOLIC BLOOD PRESSURE: 124 MMHG | TEMPERATURE: 97.9 F | HEART RATE: 80 BPM | OXYGEN SATURATION: 96 % | BODY MASS INDEX: 32.09 KG/M2 | RESPIRATION RATE: 16 BRPM

## 2018-12-10 DIAGNOSIS — G62.9 NEUROPATHY: ICD-10-CM

## 2018-12-10 LAB
ALBUMIN SERPL-MCNC: 3 G/DL (ref 3.4–5)
ALP SERPL-CCNC: 106 U/L (ref 40–150)
ALT SERPL W P-5'-P-CCNC: 35 U/L (ref 0–70)
ANION GAP SERPL CALCULATED.3IONS-SCNC: 7 MMOL/L (ref 3–14)
ANISOCYTOSIS BLD QL SMEAR: ABNORMAL
AST SERPL W P-5'-P-CCNC: 38 U/L (ref 0–45)
BASOPHILS # BLD AUTO: 0 10E9/L (ref 0–0.2)
BASOPHILS NFR BLD AUTO: 0 %
BILIRUB SERPL-MCNC: 1.2 MG/DL (ref 0.2–1.3)
BUN SERPL-MCNC: 8 MG/DL (ref 7–30)
CALCIUM SERPL-MCNC: 8.3 MG/DL (ref 8.5–10.1)
CHLORIDE SERPL-SCNC: 107 MMOL/L (ref 94–109)
CO2 SERPL-SCNC: 26 MMOL/L (ref 20–32)
CREAT SERPL-MCNC: 0.71 MG/DL (ref 0.66–1.25)
DIFFERENTIAL METHOD BLD: ABNORMAL
EOSINOPHIL # BLD AUTO: 0.1 10E9/L (ref 0–0.7)
EOSINOPHIL NFR BLD AUTO: 2.6 %
ERYTHROCYTE [DISTWIDTH] IN BLOOD BY AUTOMATED COUNT: 20 % (ref 10–15)
GFR SERPL CREATININE-BSD FRML MDRD: >90 ML/MIN/1.7M2
GLUCOSE SERPL-MCNC: 91 MG/DL (ref 70–99)
HCT VFR BLD AUTO: 40.9 % (ref 40–53)
HGB BLD-MCNC: 13.6 G/DL (ref 13.3–17.7)
LYMPHOCYTES # BLD AUTO: 0.9 10E9/L (ref 0.8–5.3)
LYMPHOCYTES NFR BLD AUTO: 20 %
MCH RBC QN AUTO: 31.7 PG (ref 26.5–33)
MCHC RBC AUTO-ENTMCNC: 33.3 G/DL (ref 31.5–36.5)
MCV RBC AUTO: 95 FL (ref 78–100)
MONOCYTES # BLD AUTO: 1.3 10E9/L (ref 0–1.3)
MONOCYTES NFR BLD AUTO: 30.4 %
NEUTROPHILS # BLD AUTO: 2 10E9/L (ref 1.6–8.3)
NEUTROPHILS NFR BLD AUTO: 47 %
PLATELET # BLD AUTO: 176 10E9/L (ref 150–450)
PLATELET # BLD EST: ABNORMAL 10*3/UL
POIKILOCYTOSIS BLD QL SMEAR: SLIGHT
POTASSIUM SERPL-SCNC: 4.1 MMOL/L (ref 3.4–5.3)
PROT SERPL-MCNC: 6.4 G/DL (ref 6.8–8.8)
RBC # BLD AUTO: 4.29 10E12/L (ref 4.4–5.9)
SODIUM SERPL-SCNC: 140 MMOL/L (ref 133–144)
WBC # BLD AUTO: 4.3 10E9/L (ref 4–11)

## 2018-12-10 PROCEDURE — 99214 OFFICE O/P EST MOD 30 MIN: CPT | Mod: ZP | Performed by: INTERNAL MEDICINE

## 2018-12-10 PROCEDURE — 36415 COLL VENOUS BLD VENIPUNCTURE: CPT

## 2018-12-10 PROCEDURE — 85025 COMPLETE CBC W/AUTO DIFF WBC: CPT | Performed by: INTERNAL MEDICINE

## 2018-12-10 PROCEDURE — G0463 HOSPITAL OUTPT CLINIC VISIT: HCPCS | Mod: ZF

## 2018-12-10 PROCEDURE — 80053 COMPREHEN METABOLIC PANEL: CPT | Performed by: INTERNAL MEDICINE

## 2018-12-10 RX ORDER — DIAZEPAM 10 MG
10 TABLET ORAL ONCE
Qty: 5 TABLET | Refills: 0 | Status: SHIPPED | OUTPATIENT
Start: 2018-12-10 | End: 2018-12-10

## 2018-12-10 RX ORDER — DIAZEPAM 10 MG
10 TABLET ORAL ONCE
Qty: 5 TABLET | Refills: 0 | Status: SHIPPED | OUTPATIENT
Start: 2018-12-10 | End: 2019-04-24

## 2018-12-10 ASSESSMENT — PAIN SCALES - GENERAL: PAINLEVEL: MODERATE PAIN (5)

## 2018-12-10 NOTE — NURSING NOTE
Chief Complaint   Patient presents with     Blood Draw     labs drawn by venipuncture by rn in lab.  vs taken.     Labs drawn with IV start by RN in lab.  Vital signs taken.  Pt checked in to next appointment.  Christina Villavicencio RN

## 2018-12-10 NOTE — NURSING NOTE
Oncology Rooming Note    December 10, 2018 12:22 PM   Javon Bianchi is a 68 year old male who presents for:    Chief Complaint   Patient presents with     Blood Draw     labs drawn by venipuncture by rn in lab.  vs taken.     Oncology Clinic Visit     Renal Cell CA; Return     Initial Vitals: /76 (BP Location: Right arm, Patient Position: Sitting, Cuff Size: Adult Regular)   Pulse 80   Temp 97.9  F (36.6  C) (Oral)   Resp 16   Wt 107.3 kg (236 lb 9.6 oz)   SpO2 96%   BMI 32.09 kg/m   Estimated body mass index is 32.09 kg/m  as calculated from the following:    Height as of 11/15/18: 1.829 m (6').    Weight as of this encounter: 107.3 kg (236 lb 9.6 oz). Body surface area is 2.33 meters squared.  Moderate Pain (5) Comment: hands, feet and back   No LMP for male patient.  Allergies reviewed: Yes  Medications reviewed: Yes    Medications: Medication refills not needed today.  Pharmacy name entered into [facility]: [facility] - [address]    Clinical concerns:  Pt concerned about pain in hands and feet.  Provider notified    8 minutes for nursing intake (face to face time)     Suzie Gonzalez CMA

## 2018-12-10 NOTE — LETTER
12/10/2018       RE: Javon Bianchi  [address]  [place] [address]     Dear Colleague,    Thank you for referring your patient, Javon Bianchi, to the [facility]. Please see a copy of my visit note below.    Dec 10, 2018          Chief complaint:   Metastatic Carcinoma with bone metastases, likely renal origin (no primary renal mass)  Cabozantinib: Start September 28, 2018          History of Present Illness:      Javon Bianchi is a 68 year old male with a history significant for hyperlipidemia who had been in his usual health until he developed back pain in May this year. At that time, the pain was a stabbing like in the middle of lower back with burning like pain wrapping around this left hip to knee area. No trauma. He had MRI on 5/23/18 showed mild degenerative change with bulging disks L2-S1. A small benign hemangioma was noted in L2, no other marrow signal abnormality. Since then, the pain failed to improve despite steroid injections. In addition, he actually lost 25 lbs weight unintentionally since June along with chills few time a day. Overtime, the pain got worse to the point he could not ambulate after short distance and developed muscle weakness in lower extremities over time requiring a walker. Finally, he went to ER ([facility] in [place]) on 8/23/18, CT A/P was unremarkable. He was referred to neurology with obtaining MRI L spine. MRI on 8/30/18 revealed a pathological fracture at L3 vertebral body with height loss at 40% and diffuse involvement of a neoplastic process. IR guided biopsy on 8/31/18 showed poorly differentiated carcinoma. Its IHC was suggestive of possible renal cell carcinoma per pathology report (Renal cell immunochemistry is positive and along with the negative staining for CK7 and CK20 suggests the possibility of a renal carcinoma, however most renal cell carcinomas also express PAX8 which is negative in this case). Of note, CT  chest/abdomen/pelvis at OSH was negative for primary lesion. The patient was evaluated by neurosurgery who recommended no surgical intervention. He was discharged with TLSO brace and walker. He had palliative XRT locally from 9/11 for 10 fractions (Done in [place]).      Cabozantinib 60 mg started on September 28, 2018.  He returns today for interval visit.      Interval history:   Patient had developed hand foot syndrome, we asked him to hold off cabozantinib until gets better.  Denies diarrhea, rash.   Overall doing well.   His pain is much better.       PAST MEDICAL SURGICAL HISTORY:Reviewed.  SOCIAL HISTORY: Reviewed.      MEDICATIONS: Reviewed.           Home Medications:          Current Outpatient Prescriptions   Medication     atorvastatin (LIPITOR) 20 MG tablet     cyclobenzaprine (FLEXERIL) 10 MG tablet     doxycycline (VIBRA-TABS) 100 MG tablet     morphine (MS CONTIN) 30 MG 12 hr tablet     senna-docusate (SENOKOT-S;PERICOLACE) 8.6-50 MG per tablet     aspirin 81 MG tablet     Cabozantinib S-Malate (CABOMETYX) 60 MG     dexamethasone (DECADRON) 4 MG tablet     melatonin 3 MG tablet     methylPREDNISolone (MEDROL DOSEPAK) 4 MG tablet     oxyCODONE (OXYCONTIN) 20 MG 12 hr tablet     polyethylene glycol (MIRALAX/GLYCOLAX) Packet     Pseudoephedrine-APAP  MG TABS     ranitidine (ZANTAC) 150 MG tablet          Current Facility-Administered Medications   Medication     influenza Vac Split High-Dose (FLUZONE) injection 0.5 mL                          ECOG performance status of 1.   Vital signs: reviewed from chart   HEENT: No icterus, no pallor. Moist mucous membranes. Oropharynx is clear.   NECK: Supple  LUNGS: Bilateral clear to ausculation  CARDIOVASCULAR: Regular rate and rhythm, no murmurs, gallops or rubs.   ABDOMEN: Soft, nontender and nondistended.   EXTREMITIES: No cyanosis, no clubbing, no edema.   NEUROLOGIC: No focal deficits.  SKIN: no rash   Labs: not clinically significant              Assessment /Plan      68-year-old male with metastatic infiltrative lesion in L3 extending to L2 and L4, likely primary malignancy being renal cell carcinoma.  However he does not have any evidence of a primary renal mass on imaging.    Based on the available pathology data,  We offered treatment for renal cell carcinoma with cabozantinib (CABOSUN data demonstrating cabozantinib superiority over sunitinib in the initial setting).  PAX8 stain was negative, and the clear cell features were not seen (it was a poorly differentiated carcinoma).   He had a favorable response to cabozantinib on recent scans.  We discussed that we will reduce cabozantinib dose to 40 mg for better tolerability.  We also discussed measures for hand foot syndrome and that he needs to hold cabozantinib if gets worse again.  Pain: On MS contin, following with Palliative Medicine for pain control, next visit in the end of December.    Anxiety; Encouraged to follow with psychologist.     Return to clinic in 4 weeks with labs and imaging.     Malathi De Luna MD  Hematology Oncology and Transplantation  [facility]

## 2018-12-11 ENCOUNTER — TELEPHONE (OUTPATIENT)
Dept: SPIRITUAL SERVICES | Facility: CLINIC | Age: 68
End: 2018-12-11

## 2018-12-11 ENCOUNTER — TELEPHONE (OUTPATIENT)
Dept: NUTRITION | Facility: CLINIC | Age: 68
End: 2018-12-11

## 2018-12-11 NOTE — TELEPHONE ENCOUNTER
Nutrition Services:    Called patient today per his request to speak with RD from oncology distress screening.  He has concern with his weight (8), losing weight and concern with poor taste of foods.      He would like to meet face-to-face if able as well as set up appt via phone when his wife is available.   He will plan to call RD at a later time when him, his wife and RD are avail.      RD contact provided.     Deepthi Sotelo RD, LD  [facility]  [phone]  Pager: [phone]

## 2018-12-11 NOTE — TELEPHONE ENCOUNTER
"SPIRITUAL HEALTH SERVICES  Telephone Encounter     Reason: Referred to contact Omega through his response to the oncology distress screen. \"Do you struggle with the loss of meaning and blanca in your life? : QUITE A BIT\"    Intervention: Reviewed documentation. I contacted Omega and talked briefly about his serious illness circumstances. He indicated his desire and need to process his situation and related meanings and that he had connected with a psychologist in the [place] area. He plans to return to return to this psychologist \"to talk about the cancer.\"     He also requested information about chaplaincy care, including how to contact chaplains.    Plan: I will send him an introductory email. I have no plans for additional follow-up.    Charli Frazier MDiv, [facility]  Staff       "

## 2018-12-21 ENCOUNTER — DOCUMENTATION ONLY (OUTPATIENT)
Dept: ONCOLOGY | Facility: CLINIC | Age: 68
End: 2018-12-21

## 2018-12-21 NOTE — PROGRESS NOTES
Oral Chemotherapy Monitoring Program    Encounter for documentation only - updated oral chemo flowsheet to reflect two previous Cabometyx holds due to infection (11/26-11/30) and then due to adverse effects of drug (HFS and nausea - 12/4-12/10).     Patient restarted drug 12/10. Our team will call patient to conduct an adherence and safety assessment next week.    Deepthi King  Pharmacy Intern   [facility]  Oral Chemotherapy Monitoring Program  [phone]

## 2018-12-26 ENCOUNTER — TELEPHONE (OUTPATIENT)
Dept: ONCOLOGY | Facility: CLINIC | Age: 68
End: 2018-12-26

## 2018-12-26 NOTE — ORAL ONC MGMT
"Oral Chemotherapy Monitoring Program    Primary Oncologist: Annamarie   Primary Oncology Clinic: [facility]  Cancer Diagnosis: Likely renal cell with bone mets    Therapy History:  Cabometyx started: 18  Cabometyx 60mg: HELD - due to infection  Cabometyx 60m2018 - 12/3/18  Cabometyx 60mg: HELD 18 - 18 due to N and HFS  Cabometyx 60m18 - 18  Cabometyx 40m18 - 18  Cabometyx 40mg: HELD  - in order to enjoy the holidays.     Drug Interaction Assessment: No new medications at this time    Lab Monitoring Plan  Monthly CMP, CBC, urine protein and BP.     Subjective/Objective:  Javon Bianchi is a 68 year old male contacted by phone for a follow-up visit for oral chemotherapy.  Patient reports holding therapy since the , in effort to enjoy the holiday time with his family. Prior to holding therapy, he was struggling with anxiety induced nausea, without vomiting; starting symptoms of HFS with pain and fatigue/lethargy. Patient has chronic anxiety and nasal drainage issues.     Anxiety: Since last speaking, with the patient, he did go into the ER due to severe anxiety on the . They did a chest xray and the patient reported that it was \"good\". The patient received a dose of ativan and reported it provided a lot of relief. Patient continues on hydroxyzine 25mg q6 hours (6am, noon, 6pm and midnight) for anxiety. Reports his anxiety correlates with his thickening of drainage. It starts to increase in the afternoon and then worsens through the night with difficulty sleeping.   Drainage: He also went to see an ENT and was told to try mucinex. He has been taking mucinex since the  and says it might be helping a bit. The patient's biggest concern is his drainage and anxiety in the evenings due to the thick, dried mucus feeling like he is choking. This is a chronic issue that he has had for a long time.   Fatigue: The patients wife " said they decided to hold is cabometyx over the holidays so he could be alert. And she reports he is doing much better over these last few days, even able to walk without his assistance devices. Asked if they could decrease his morphine a bit to help with his sedation. He does spend more than half his waking hours in bed or in a chair, while on cabo, but is able to dress himself, bath himself and complete most ADLs.   Nausea Patient reported that his nausea was due to uncontrolled anxiety, not his cabometyx. Appetite is decreased and would be interested in seeing a dietitian. Limited diet (pudding, jello, soup, no milk products).   HFS Patient's HFS symptoms resolved about 2 days ago, was able to walk without assistance device, due to increased energy as well as lack of pain in his feet. His pain was there around 12/21, but since he stopped treatment, his symptoms have resolved.   Patient also reports intermittent hot flash with shivering, that occur at random. Denies fevers.   Sleeping well.   BP: <140/90, except one during anxiety attach of 138/101.     ORAL CHEMOTHERAPY 11/26/2018 11/27/2018 11/30/2018 12/3/2018 12/4/2018 12/9/2018 12/26/2018   Drug Name Cabometyx (cabozantinib) Cabometyx (cabozantinib) Cabometyx (cabozantinib) Cabometyx (cabozantinib) Cabometyx (cabozantinib) Cabometyx (cabozantinib) Cabometyx (cabozantinib)   Current Dosage 60mg 60mg 60mg 60mg 60mg 60mg 40mg   Current Schedule Daily Daily Daily Daily Daily Daily Daily   Cycle Details Drug on Hold Continuous Drug on Hold Continuous Drug on Hold Drug on Hold Drug on Hold   Start Date of Last Cycle - 11/27/2018 - 12/1/2018 - 12/10/2018 12/12/2018   Planned next cycle start date - 12/27/2018 - - - - -   Doses missed in last 2 weeks - 0 - 0 - - -   Adherence Assessment - Adherent - Adherent - - Non-adherent   Reason for Non-adherence - - - - - - Drug on hold;Wanted to avoid side-effects   Adverse Effects - Palmar-plantar erythrodysethesia  syndrome;Oral mucositis - Palmar-plantar erythrodysethesia syndrome;Nausea - - -   Nausea - - - Grade 2 - - -   Pharmacist Intervention(nausea) - - - Yes - - -   Intervention(s) - - - Referral to oncology provider - - -   Palmar-plantar Erythrodysethesia syndrome[hand-foot syndrome] - Grade 1 - Grade 2 - - -   Pharmacist Intervention(Palmar-plantar) - Yes - Yes - - -   Intervention(s) - OTC recommendation - Referral to oncology provider - - -   Oral Mucositis - Grade 1 - - - - -   Pharmacist Intervention(mucositis) - No - - - - -   Home BPs - all BPs<140/90 - - - - all BPs<140/90   Any new drug interactions? - No - - - - -   Is the dose as ordered appropriate for the patient? - Yes - - - - -   Is the patient currently in pain? - No - - - - -   Has the patient been assessed within the past 6 months for depression? - - - - - - -   Has the patient missed any days of school, work, or other routine activity? - - - - - - -       Last PHQ-2 Score on record:   PHQ-2 ( 1999 [employer]) 11/15/2018   Q1: Little interest or pleasure in doing things 2   Q2: Feeling down, depressed or hopeless 3   PHQ-2 Score 5   Q1: Little interest or pleasure in doing things More than half the days   Q2: Feeling down, depressed or hopeless Nearly every day   PHQ-2 Score 5     Vitals:  BP:   BP Readings from Last 1 Encounters:   12/10/18 124/76     Wt Readings from Last 1 Encounters:   12/10/18 107.3 kg (236 lb 9.6 oz)     Estimated body surface area is 2.33 meters squared as calculated from the following:    Height as of 11/15/18: 1.829 m (6').    Weight as of 12/10/18: 107.3 kg (236 lb 9.6 oz).    Labs:  _  Result Component Current Result Ref Range   Sodium 140 (12/10/2018) 133 - 144 mmol/L     _  Result Component Current Result Ref Range   Potassium 4.1 (12/10/2018) 3.4 - 5.3 mmol/L     _  Result Component Current Result Ref Range   Calcium 8.3 (L) (12/10/2018) 8.5 - 10.1 mg/dL     No results found for Mag within last 30 days.     No results  found for Phos within last 30 days.     _  Result Component Current Result Ref Range   Albumin 3.0 (L) (12/10/2018) 3.4 - 5.0 g/dL     _  Result Component Current Result Ref Range   Urea Nitrogen 8 (12/10/2018) 7 - 30 mg/dL     _  Result Component Current Result Ref Range   Creatinine 0.71 (12/10/2018) 0.66 - 1.25 mg/dL       _  Result Component Current Result Ref Range   AST 38 (12/10/2018) 0 - 45 U/L     _  Result Component Current Result Ref Range   ALT 35 (12/10/2018) 0 - 70 U/L     _  Result Component Current Result Ref Range   Bilirubin Total 1.2 (12/10/2018) 0.2 - 1.3 mg/dL       _  Result Component Current Result Ref Range   WBC 4.3 (12/10/2018) 4.0 - 11.0 10e9/L     _  Result Component Current Result Ref Range   Hemoglobin 13.6 (12/10/2018) 13.3 - 17.7 g/dL     _  Result Component Current Result Ref Range   Platelet Count 176 (12/10/2018) 150 - 450 10e9/L     _  Result Component Current Result Ref Range   Absolute Neutrophil 2.0 (12/10/2018) 1.6 - 8.3 10e9/L       Assessment:  Patient has been holding cabozantinib for 4 days, in effort to enjoy the holidays.   Due to resolution of HFS and fatigue, patient will restart treatment tonight, cabozantinib 40mg daily. Patient will try to do Palliative care visit over the phone tomorrow.   BP: controlled  Anxiety: not controlled, recent ER visit. Will recommend SNRI duloxetine 30mg, x 1 week, then increase to 60mg daily. Current treatment of hydroxyzine has anticholinergic properties which may be contributing to the drying/thickening of his drainage. This maybe helping lessen the drainage, but may be worsening the thickening/choking sensation he experiences in the evening.    Nausea: due to anxiety. Will recommend low does ativan 0.5mg prn severe anxiety/nausea.   HFS: resolved now, may necessitate lower cabo dose to facilitate function and ability to walk.   Plan:  Will send in[employer]ceptet message to Dr. De Luna and Dr. Gonzalez regarding symptoms and restarting of therapy.      Follow-Up:  12/28/18 - check inbasket to see if Dr. De Luna or Carlos has responded regarding plan of care.   1/17/19 - review Micah visit    Refill Due:    Dec 2019.       Cayla Davis, PharmD, MPH, BCOP  Hematology/Oncology Clinical Pharmacist   [facility]   [email or web address]

## 2019-01-01 ENCOUNTER — HOSPITAL ENCOUNTER (INPATIENT)
Facility: CLINIC | Age: 69
Setting detail: SURGERY ADMIT
End: 2019-01-01
Attending: ORTHOPAEDIC SURGERY | Admitting: ORTHOPAEDIC SURGERY
Payer: MEDICARE

## 2019-01-01 ENCOUNTER — TELEPHONE (OUTPATIENT)
Dept: ONCOLOGY | Facility: CLINIC | Age: 69
End: 2019-01-01

## 2019-01-01 ENCOUNTER — ONCOLOGY VISIT (OUTPATIENT)
Dept: ONCOLOGY | Facility: CLINIC | Age: 69
End: 2019-01-01
Attending: INTERNAL MEDICINE
Payer: MEDICARE

## 2019-01-01 ENCOUNTER — OFFICE VISIT (OUTPATIENT)
Dept: ORTHOPEDICS | Facility: CLINIC | Age: 69
End: 2019-01-01
Payer: MEDICARE

## 2019-01-01 ENCOUNTER — ANCILLARY PROCEDURE (OUTPATIENT)
Dept: GENERAL RADIOLOGY | Facility: CLINIC | Age: 69
End: 2019-01-01
Attending: ORTHOPAEDIC SURGERY
Payer: MEDICARE

## 2019-01-01 ENCOUNTER — APPOINTMENT (OUTPATIENT)
Dept: GENERAL RADIOLOGY | Facility: CLINIC | Age: 69
DRG: 478 | End: 2019-01-01
Attending: ORTHOPAEDIC SURGERY
Payer: MEDICARE

## 2019-01-01 ENCOUNTER — TELEPHONE (OUTPATIENT)
Dept: ORTHOPEDICS | Facility: CLINIC | Age: 69
End: 2019-01-01

## 2019-01-01 ENCOUNTER — CARE COORDINATION (OUTPATIENT)
Dept: ONCOLOGY | Facility: CLINIC | Age: 69
End: 2019-01-01

## 2019-01-01 ENCOUNTER — HOSPITAL ENCOUNTER (OUTPATIENT)
Dept: CT IMAGING | Facility: CLINIC | Age: 69
End: 2019-11-19
Attending: INTERNAL MEDICINE
Payer: MEDICARE

## 2019-01-01 ENCOUNTER — INFUSION THERAPY VISIT (OUTPATIENT)
Dept: TRANSPLANT | Facility: CLINIC | Age: 69
End: 2019-01-01
Attending: INTERNAL MEDICINE
Payer: MEDICARE

## 2019-01-01 ENCOUNTER — APPOINTMENT (OUTPATIENT)
Dept: LAB | Facility: CLINIC | Age: 69
End: 2019-01-01
Attending: INTERNAL MEDICINE
Payer: MEDICARE

## 2019-01-01 ENCOUNTER — HOSPITAL ENCOUNTER (OUTPATIENT)
Facility: CLINIC | Age: 69
Setting detail: OBSERVATION
Discharge: HOME | End: 2019-11-13
Attending: EMERGENCY MEDICINE | Admitting: INTERNAL MEDICINE
Payer: MEDICARE

## 2019-01-01 ENCOUNTER — HOSPITAL ENCOUNTER (OUTPATIENT)
Dept: NUCLEAR MEDICINE | Facility: CLINIC | Age: 69
Setting detail: NUCLEAR MEDICINE
End: 2019-11-19
Attending: INTERNAL MEDICINE
Payer: MEDICARE

## 2019-01-01 ENCOUNTER — OFFICE VISIT (OUTPATIENT)
Dept: FAMILY MEDICINE | Facility: CLINIC | Age: 69
End: 2019-01-01
Payer: MEDICARE

## 2019-01-01 ENCOUNTER — OFFICE VISIT (OUTPATIENT)
Dept: SURGERY | Facility: CLINIC | Age: 69
End: 2019-01-01
Payer: MEDICARE

## 2019-01-01 ENCOUNTER — HOSPITAL ENCOUNTER (OUTPATIENT)
Facility: CLINIC | Age: 69
Discharge: HOME | DRG: 478 | End: 2019-11-02
Attending: ORTHOPAEDIC SURGERY | Admitting: ORTHOPAEDIC SURGERY
Payer: MEDICARE

## 2019-01-01 ENCOUNTER — TELEPHONE (OUTPATIENT)
Dept: CARE COORDINATION | Facility: CLINIC | Age: 69
End: 2019-01-01

## 2019-01-01 ENCOUNTER — ANESTHESIA EVENT (OUTPATIENT)
Dept: SURGERY | Facility: CLINIC | Age: 69
DRG: 478 | End: 2019-01-01
Payer: MEDICARE

## 2019-01-01 ENCOUNTER — ANESTHESIA (OUTPATIENT)
Dept: SURGERY | Facility: CLINIC | Age: 69
DRG: 478 | End: 2019-01-01
Payer: MEDICARE

## 2019-01-01 ENCOUNTER — PREP FOR PROCEDURE (OUTPATIENT)
Dept: ORTHOPEDICS | Facility: CLINIC | Age: 69
End: 2019-01-01

## 2019-01-01 ENCOUNTER — PRE VISIT (OUTPATIENT)
Dept: SURGERY | Facility: CLINIC | Age: 69
End: 2019-01-01

## 2019-01-01 ENCOUNTER — APPOINTMENT (OUTPATIENT)
Dept: CT IMAGING | Facility: CLINIC | Age: 69
End: 2019-01-01
Attending: EMERGENCY MEDICINE
Payer: MEDICARE

## 2019-01-01 ENCOUNTER — PATIENT OUTREACH (OUTPATIENT)
Dept: CARE COORDINATION | Facility: CLINIC | Age: 69
End: 2019-01-01

## 2019-01-01 ENCOUNTER — HOSPITAL ENCOUNTER (OUTPATIENT)
Dept: MRI IMAGING | Facility: CLINIC | Age: 69
Discharge: HOME | End: 2019-11-19
Attending: INTERNAL MEDICINE | Admitting: INTERNAL MEDICINE
Payer: MEDICARE

## 2019-01-01 VITALS
DIASTOLIC BLOOD PRESSURE: 61 MMHG | SYSTOLIC BLOOD PRESSURE: 115 MMHG | OXYGEN SATURATION: 97 % | HEART RATE: 67 BPM | BODY MASS INDEX: 28.63 KG/M2 | WEIGHT: 200 LBS | TEMPERATURE: 98.2 F | RESPIRATION RATE: 14 BRPM | HEIGHT: 70 IN

## 2019-01-01 VITALS
HEIGHT: 70 IN | WEIGHT: 187 LBS | SYSTOLIC BLOOD PRESSURE: 113 MMHG | TEMPERATURE: 98.5 F | RESPIRATION RATE: 14 BRPM | DIASTOLIC BLOOD PRESSURE: 58 MMHG | HEART RATE: 70 BPM | BODY MASS INDEX: 26.77 KG/M2 | OXYGEN SATURATION: 96 %

## 2019-01-01 VITALS
HEIGHT: 70 IN | HEART RATE: 59 BPM | DIASTOLIC BLOOD PRESSURE: 81 MMHG | BODY MASS INDEX: 29.29 KG/M2 | RESPIRATION RATE: 14 BRPM | WEIGHT: 204.6 LBS | SYSTOLIC BLOOD PRESSURE: 134 MMHG | OXYGEN SATURATION: 99 % | TEMPERATURE: 98.8 F

## 2019-01-01 VITALS
SYSTOLIC BLOOD PRESSURE: 120 MMHG | BODY MASS INDEX: 26.99 KG/M2 | HEIGHT: 70 IN | RESPIRATION RATE: 18 BRPM | TEMPERATURE: 97.9 F | OXYGEN SATURATION: 97 % | WEIGHT: 188.49 LBS | DIASTOLIC BLOOD PRESSURE: 72 MMHG | HEART RATE: 60 BPM

## 2019-01-01 VITALS
DIASTOLIC BLOOD PRESSURE: 68 MMHG | HEART RATE: 63 BPM | WEIGHT: 205 LBS | HEIGHT: 70 IN | BODY MASS INDEX: 29.35 KG/M2 | SYSTOLIC BLOOD PRESSURE: 130 MMHG | OXYGEN SATURATION: 98 %

## 2019-01-01 VITALS
OXYGEN SATURATION: 95 % | RESPIRATION RATE: 12 BRPM | SYSTOLIC BLOOD PRESSURE: 103 MMHG | HEART RATE: 93 BPM | BODY MASS INDEX: 29.31 KG/M2 | DIASTOLIC BLOOD PRESSURE: 64 MMHG | TEMPERATURE: 97.5 F | WEIGHT: 204.3 LBS

## 2019-01-01 VITALS
TEMPERATURE: 97.8 F | SYSTOLIC BLOOD PRESSURE: 109 MMHG | DIASTOLIC BLOOD PRESSURE: 68 MMHG | BODY MASS INDEX: 29.35 KG/M2 | HEART RATE: 55 BPM | HEIGHT: 70 IN | WEIGHT: 205 LBS | RESPIRATION RATE: 12 BRPM | OXYGEN SATURATION: 98 %

## 2019-01-01 VITALS
RESPIRATION RATE: 16 BRPM | TEMPERATURE: 97.5 F | DIASTOLIC BLOOD PRESSURE: 66 MMHG | SYSTOLIC BLOOD PRESSURE: 115 MMHG | WEIGHT: 191.8 LBS | OXYGEN SATURATION: 96 % | BODY MASS INDEX: 27.52 KG/M2 | HEART RATE: 76 BPM

## 2019-01-01 VITALS
OXYGEN SATURATION: 96 % | SYSTOLIC BLOOD PRESSURE: 129 MMHG | DIASTOLIC BLOOD PRESSURE: 73 MMHG | BODY MASS INDEX: 27.96 KG/M2 | TEMPERATURE: 97.3 F | HEART RATE: 66 BPM | WEIGHT: 195.3 LBS | HEIGHT: 70 IN

## 2019-01-01 VITALS — HEIGHT: 70 IN | BODY MASS INDEX: 29.36 KG/M2 | WEIGHT: 205.1 LBS

## 2019-01-01 VITALS
HEIGHT: 70 IN | WEIGHT: 200.4 LBS | SYSTOLIC BLOOD PRESSURE: 112 MMHG | RESPIRATION RATE: 16 BRPM | TEMPERATURE: 98.2 F | BODY MASS INDEX: 28.69 KG/M2 | HEART RATE: 68 BPM | DIASTOLIC BLOOD PRESSURE: 68 MMHG | OXYGEN SATURATION: 98 %

## 2019-01-01 DIAGNOSIS — R63.4 WEIGHT LOSS: ICD-10-CM

## 2019-01-01 DIAGNOSIS — C64.9 RENAL CELL CARCINOMA, UNSPECIFIED LATERALITY (H): ICD-10-CM

## 2019-01-01 DIAGNOSIS — M79.2 NEUROPATHIC PAIN: ICD-10-CM

## 2019-01-01 DIAGNOSIS — R21 RASH: ICD-10-CM

## 2019-01-01 DIAGNOSIS — M54.16 LUMBAR RADICULOPATHY: ICD-10-CM

## 2019-01-01 DIAGNOSIS — G89.3 CANCER ASSOCIATED PAIN: ICD-10-CM

## 2019-01-01 DIAGNOSIS — Z51.5 ENCOUNTER FOR PALLIATIVE CARE: ICD-10-CM

## 2019-01-01 DIAGNOSIS — D64.9 SEVERE ANEMIA: ICD-10-CM

## 2019-01-01 DIAGNOSIS — Z01.818 PREOP EXAMINATION: Primary | ICD-10-CM

## 2019-01-01 DIAGNOSIS — C79.51 BONE METASTASIS: ICD-10-CM

## 2019-01-01 DIAGNOSIS — C64.9 RENAL CELL CARCINOMA, UNSPECIFIED LATERALITY (H): Primary | ICD-10-CM

## 2019-01-01 DIAGNOSIS — E03.9 HYPOTHYROIDISM, UNSPECIFIED TYPE: ICD-10-CM

## 2019-01-01 DIAGNOSIS — C64.9 METASTATIC RENAL CELL CARCINOMA, UNSPECIFIED LATERALITY (H): ICD-10-CM

## 2019-01-01 DIAGNOSIS — G89.29 CHRONIC MIDLINE LOW BACK PAIN WITHOUT SCIATICA: ICD-10-CM

## 2019-01-01 DIAGNOSIS — D64.9 SEVERE ANEMIA: Primary | ICD-10-CM

## 2019-01-01 DIAGNOSIS — E83.39 HYPOPHOSPHATEMIA: ICD-10-CM

## 2019-01-01 DIAGNOSIS — M54.16 LUMBAR RADICULOPATHY: Primary | ICD-10-CM

## 2019-01-01 DIAGNOSIS — M48.062 LUMBAR STENOSIS WITH NEUROGENIC CLAUDICATION: ICD-10-CM

## 2019-01-01 DIAGNOSIS — G89.3 CHRONIC PAIN DUE TO NEOPLASM: ICD-10-CM

## 2019-01-01 DIAGNOSIS — C79.51 BONE METASTASIS: Primary | ICD-10-CM

## 2019-01-01 DIAGNOSIS — Z98.890 S/P SPINAL SURGERY: Primary | ICD-10-CM

## 2019-01-01 DIAGNOSIS — R60.9 EDEMA, UNSPECIFIED TYPE: ICD-10-CM

## 2019-01-01 DIAGNOSIS — C79.51 METASTATIC CANCER TO SPINE (H): ICD-10-CM

## 2019-01-01 DIAGNOSIS — K59.03 CONSTIPATION DUE TO OPIOID THERAPY: ICD-10-CM

## 2019-01-01 DIAGNOSIS — Z85.528 PERSONAL HISTORY OF RENAL CELL CANCER: ICD-10-CM

## 2019-01-01 DIAGNOSIS — T40.2X5A CONSTIPATION DUE TO OPIOID THERAPY: ICD-10-CM

## 2019-01-01 DIAGNOSIS — B02.8 HERPES ZOSTER WITH OTHER COMPLICATION: Primary | ICD-10-CM

## 2019-01-01 DIAGNOSIS — Z01.818 PRE-OPERATIVE EXAMINATION: ICD-10-CM

## 2019-01-01 DIAGNOSIS — R11.0 NAUSEA: ICD-10-CM

## 2019-01-01 DIAGNOSIS — B02.9 HERPES ZOSTER WITHOUT COMPLICATION: ICD-10-CM

## 2019-01-01 DIAGNOSIS — M54.50 CHRONIC MIDLINE LOW BACK PAIN WITHOUT SCIATICA: ICD-10-CM

## 2019-01-01 DIAGNOSIS — D64.9 ANEMIA, UNSPECIFIED TYPE: Primary | ICD-10-CM

## 2019-01-01 DIAGNOSIS — C64.2 RENAL CELL CARCINOMA, LEFT (H): Primary | ICD-10-CM

## 2019-01-01 DIAGNOSIS — Z01.818 PRE-OPERATIVE EXAMINATION: Primary | ICD-10-CM

## 2019-01-01 LAB
ABO + RH BLD: NORMAL
ABO + RH BLD: NORMAL
ALBUMIN SERPL-MCNC: 2.2 G/DL (ref 3.4–5)
ALBUMIN SERPL-MCNC: 2.4 G/DL (ref 3.4–5)
ALBUMIN SERPL-MCNC: 2.7 G/DL (ref 3.4–5)
ALBUMIN SERPL-MCNC: 2.8 G/DL (ref 3.4–5)
ALP SERPL-CCNC: 82 U/L (ref 40–150)
ALP SERPL-CCNC: 82 U/L (ref 40–150)
ALP SERPL-CCNC: 83 U/L (ref 40–150)
ALP SERPL-CCNC: 92 U/L (ref 40–150)
ALT SERPL W P-5'-P-CCNC: 11 U/L (ref 0–70)
ALT SERPL W P-5'-P-CCNC: 16 U/L (ref 0–70)
ALT SERPL W P-5'-P-CCNC: <6 U/L (ref 0–70)
ALT SERPL W P-5'-P-CCNC: <6 U/L (ref 0–70)
ANION GAP SERPL CALCULATED.3IONS-SCNC: 4 MMOL/L (ref 3–14)
ANION GAP SERPL CALCULATED.3IONS-SCNC: 6 MMOL/L (ref 3–14)
ANION GAP SERPL CALCULATED.3IONS-SCNC: 6 MMOL/L (ref 3–14)
ANION GAP SERPL CALCULATED.3IONS-SCNC: 8 MMOL/L (ref 3–14)
ANION GAP SERPL CALCULATED.3IONS-SCNC: 8 MMOL/L (ref 3–14)
ANISOCYTOSIS BLD QL SMEAR: SLIGHT
AST SERPL W P-5'-P-CCNC: 24 U/L (ref 0–45)
AST SERPL W P-5'-P-CCNC: 35 U/L (ref 0–45)
AST SERPL W P-5'-P-CCNC: 41 U/L (ref 0–45)
AST SERPL W P-5'-P-CCNC: 53 U/L (ref 0–45)
BASOPHILS # BLD AUTO: 0 10E9/L (ref 0–0.2)
BASOPHILS # BLD AUTO: 0.1 10E9/L (ref 0–0.2)
BASOPHILS NFR BLD AUTO: 0 %
BASOPHILS NFR BLD AUTO: 0 %
BASOPHILS NFR BLD AUTO: 0.2 %
BASOPHILS NFR BLD AUTO: 1 %
BILIRUB SERPL-MCNC: 0.3 MG/DL (ref 0.2–1.3)
BILIRUB SERPL-MCNC: 0.3 MG/DL (ref 0.2–1.3)
BILIRUB SERPL-MCNC: 0.4 MG/DL (ref 0.2–1.3)
BILIRUB SERPL-MCNC: 0.4 MG/DL (ref 0.2–1.3)
BLD GP AB SCN SERPL QL: NORMAL
BLD PROD TYP BPU: NORMAL
BLD PROD TYP BPU: NORMAL
BLD UNIT ID BPU: 0
BLOOD BANK CMNT PATIENT-IMP: NORMAL
BLOOD PRODUCT CODE: NORMAL
BPU ID: NORMAL
BUN SERPL-MCNC: 10 MG/DL (ref 7–30)
BUN SERPL-MCNC: 10 MG/DL (ref 7–30)
BUN SERPL-MCNC: 12 MG/DL (ref 7–30)
BUN SERPL-MCNC: 6 MG/DL (ref 7–30)
BUN SERPL-MCNC: 7 MG/DL (ref 7–30)
CALCIUM SERPL-MCNC: 7.4 MG/DL (ref 8.5–10.1)
CALCIUM SERPL-MCNC: 7.6 MG/DL (ref 8.5–10.1)
CALCIUM SERPL-MCNC: 8 MG/DL (ref 8.5–10.1)
CALCIUM SERPL-MCNC: 8.1 MG/DL (ref 8.5–10.1)
CALCIUM SERPL-MCNC: 8.1 MG/DL (ref 8.5–10.1)
CHLORIDE SERPL-SCNC: 102 MMOL/L (ref 94–109)
CHLORIDE SERPL-SCNC: 105 MMOL/L (ref 94–109)
CHLORIDE SERPL-SCNC: 110 MMOL/L (ref 94–109)
CO2 SERPL-SCNC: 23 MMOL/L (ref 20–32)
CO2 SERPL-SCNC: 24 MMOL/L (ref 20–32)
CO2 SERPL-SCNC: 26 MMOL/L (ref 20–32)
CO2 SERPL-SCNC: 27 MMOL/L (ref 20–32)
CO2 SERPL-SCNC: 29 MMOL/L (ref 20–32)
COPATH REPORT: NORMAL
CREAT BLD-MCNC: 0.8 MG/DL (ref 0.66–1.25)
CREAT SERPL-MCNC: 0.78 MG/DL (ref 0.66–1.25)
CREAT SERPL-MCNC: 0.8 MG/DL (ref 0.66–1.25)
CREAT SERPL-MCNC: 0.87 MG/DL (ref 0.66–1.25)
CREAT SERPL-MCNC: 0.92 MG/DL (ref 0.66–1.25)
CREAT SERPL-MCNC: 1.09 MG/DL (ref 0.66–1.25)
DACRYOCYTES BLD QL SMEAR: SLIGHT
DIFFERENTIAL METHOD BLD: ABNORMAL
EOSINOPHIL # BLD AUTO: 0 10E9/L (ref 0–0.7)
EOSINOPHIL # BLD AUTO: 0.1 10E9/L (ref 0–0.7)
EOSINOPHIL NFR BLD AUTO: 0 %
EOSINOPHIL NFR BLD AUTO: 0 %
EOSINOPHIL NFR BLD AUTO: 0.7 %
EOSINOPHIL NFR BLD AUTO: 1.7 %
ERYTHROCYTE [DISTWIDTH] IN BLOOD BY AUTOMATED COUNT: 15.1 % (ref 10–15)
ERYTHROCYTE [DISTWIDTH] IN BLOOD BY AUTOMATED COUNT: 16 % (ref 10–15)
ERYTHROCYTE [DISTWIDTH] IN BLOOD BY AUTOMATED COUNT: 16 % (ref 10–15)
ERYTHROCYTE [DISTWIDTH] IN BLOOD BY AUTOMATED COUNT: 16.3 % (ref 10–15)
ERYTHROCYTE [DISTWIDTH] IN BLOOD BY AUTOMATED COUNT: 17 % (ref 10–15)
FERRITIN SERPL-MCNC: 1068 NG/ML (ref 26–388)
FOLATE SERPL-MCNC: 3.4 NG/ML
GFR SERPL CREATININE-BSD FRML MDRD: 68 ML/MIN/{1.73_M2}
GFR SERPL CREATININE-BSD FRML MDRD: 83 ML/MIN/{1.73_M2}
GFR SERPL CREATININE-BSD FRML MDRD: 87 ML/MIN/{1.73_M2}
GFR SERPL CREATININE-BSD FRML MDRD: 90 ML/MIN/{1.73_M2}
GFR SERPL CREATININE-BSD FRML MDRD: >90 ML/MIN/{1.73_M2}
GFR SERPL CREATININE-BSD FRML MDRD: >90 ML/MIN/{1.73_M2}
GLUCOSE BLDC GLUCOMTR-MCNC: 85 MG/DL (ref 70–99)
GLUCOSE BLDC GLUCOMTR-MCNC: 98 MG/DL (ref 70–99)
GLUCOSE SERPL-MCNC: 102 MG/DL (ref 70–99)
GLUCOSE SERPL-MCNC: 105 MG/DL (ref 70–99)
GLUCOSE SERPL-MCNC: 88 MG/DL (ref 70–99)
GLUCOSE SERPL-MCNC: 91 MG/DL (ref 70–99)
GLUCOSE SERPL-MCNC: 94 MG/DL (ref 70–99)
HCT VFR BLD AUTO: 23.8 % (ref 40–53)
HCT VFR BLD AUTO: 24.9 % (ref 40–53)
HCT VFR BLD AUTO: 25.6 % (ref 40–53)
HCT VFR BLD AUTO: 26.1 % (ref 40–53)
HCT VFR BLD AUTO: 35.4 % (ref 40–53)
HGB BLD-MCNC: 10.9 G/DL (ref 13.3–17.7)
HGB BLD-MCNC: 7.4 G/DL (ref 13.3–17.7)
HGB BLD-MCNC: 7.5 G/DL (ref 13.3–17.7)
HGB BLD-MCNC: 7.7 G/DL (ref 13.3–17.7)
HGB BLD-MCNC: 7.7 G/DL (ref 13.3–17.7)
HGB BLD-MCNC: 8.1 G/DL (ref 13.3–17.7)
HGB BLD-MCNC: 8.2 G/DL (ref 13.3–17.7)
HGB BLD-MCNC: 8.6 G/DL (ref 13.3–17.7)
IMM GRANULOCYTES # BLD: 0 10E9/L (ref 0–0.4)
IMM GRANULOCYTES NFR BLD: 0.9 %
IRON SATN MFR SERPL: 19 % (ref 15–46)
IRON SERPL-MCNC: 22 UG/DL (ref 35–180)
LIPASE SERPL-CCNC: 41 U/L (ref 73–393)
LYMPHOCYTES # BLD AUTO: 0 10E9/L (ref 0.8–5.3)
LYMPHOCYTES # BLD AUTO: 0.2 10E9/L (ref 0.8–5.3)
LYMPHOCYTES # BLD AUTO: 0.2 10E9/L (ref 0.8–5.3)
LYMPHOCYTES # BLD AUTO: 0.4 10E9/L (ref 0.8–5.3)
LYMPHOCYTES NFR BLD AUTO: 0 %
LYMPHOCYTES NFR BLD AUTO: 2 %
LYMPHOCYTES NFR BLD AUTO: 5.2 %
LYMPHOCYTES NFR BLD AUTO: 7.8 %
MAGNESIUM SERPL-MCNC: 1.4 MG/DL (ref 1.6–2.3)
MAGNESIUM SERPL-MCNC: 1.6 MG/DL (ref 1.6–2.3)
MAGNESIUM SERPL-MCNC: 1.8 MG/DL (ref 1.6–2.3)
MAGNESIUM SERPL-MCNC: 1.9 MG/DL (ref 1.6–2.3)
MCH RBC QN AUTO: 32.1 PG (ref 26.5–33)
MCH RBC QN AUTO: 32.5 PG (ref 26.5–33)
MCH RBC QN AUTO: 33.1 PG (ref 26.5–33)
MCH RBC QN AUTO: 34.4 PG (ref 26.5–33)
MCH RBC QN AUTO: 34.6 PG (ref 26.5–33)
MCHC RBC AUTO-ENTMCNC: 30.8 G/DL (ref 31.5–36.5)
MCHC RBC AUTO-ENTMCNC: 30.9 G/DL (ref 31.5–36.5)
MCHC RBC AUTO-ENTMCNC: 31.1 G/DL (ref 31.5–36.5)
MCHC RBC AUTO-ENTMCNC: 31.4 G/DL (ref 31.5–36.5)
MCHC RBC AUTO-ENTMCNC: 31.6 G/DL (ref 31.5–36.5)
MCV RBC AUTO: 104 FL (ref 78–100)
MCV RBC AUTO: 104 FL (ref 78–100)
MCV RBC AUTO: 105 FL (ref 78–100)
MCV RBC AUTO: 110 FL (ref 78–100)
MCV RBC AUTO: 111 FL (ref 78–100)
METAMYELOCYTES # BLD: 0.1 10E9/L
METAMYELOCYTES NFR BLD MANUAL: 1 %
MONOCYTES # BLD AUTO: 1.1 10E9/L (ref 0–1.3)
MONOCYTES # BLD AUTO: 1.7 10E9/L (ref 0–1.3)
MONOCYTES # BLD AUTO: 1.8 10E9/L (ref 0–1.3)
MONOCYTES # BLD AUTO: 2.3 10E9/L (ref 0–1.3)
MONOCYTES NFR BLD AUTO: 26.5 %
MONOCYTES NFR BLD AUTO: 28 %
MONOCYTES NFR BLD AUTO: 30 %
MONOCYTES NFR BLD AUTO: 37.4 %
MYCOBACTERIUM SPEC CULT: NORMAL
MYCOBACTERIUM SPEC CULT: NORMAL
NEUTROPHILS # BLD AUTO: 2.5 10E9/L (ref 1.6–8.3)
NEUTROPHILS # BLD AUTO: 2.8 10E9/L (ref 1.6–8.3)
NEUTROPHILS # BLD AUTO: 4.2 10E9/L (ref 1.6–8.3)
NEUTROPHILS # BLD AUTO: 5.1 10E9/L (ref 1.6–8.3)
NEUTROPHILS NFR BLD AUTO: 53.1 %
NEUTROPHILS NFR BLD AUTO: 66.5 %
NEUTROPHILS NFR BLD AUTO: 67 %
NEUTROPHILS NFR BLD AUTO: 71 %
NRBC # BLD AUTO: 0 10*3/UL
NRBC BLD AUTO-RTO: 0 /100
NUM BPU REQUESTED: 1
OVALOCYTES BLD QL SMEAR: SLIGHT
PHOSPHATE SERPL-MCNC: 1.9 MG/DL (ref 2.5–4.5)
PHOSPHATE SERPL-MCNC: 2.8 MG/DL (ref 2.5–4.5)
PLATELET # BLD AUTO: 210 10E9/L (ref 150–450)
PLATELET # BLD AUTO: 242 10E9/L (ref 150–450)
PLATELET # BLD AUTO: 256 10E9/L (ref 150–450)
PLATELET # BLD AUTO: 263 10E9/L (ref 150–450)
PLATELET # BLD AUTO: 287 10E9/L (ref 150–450)
PLATELET # BLD EST: ABNORMAL 10*3/UL
POIKILOCYTOSIS BLD QL SMEAR: SLIGHT
POTASSIUM SERPL-SCNC: 3.3 MMOL/L (ref 3.4–5.3)
POTASSIUM SERPL-SCNC: 3.5 MMOL/L (ref 3.4–5.3)
POTASSIUM SERPL-SCNC: 3.5 MMOL/L (ref 3.4–5.3)
POTASSIUM SERPL-SCNC: 3.6 MMOL/L (ref 3.4–5.3)
POTASSIUM SERPL-SCNC: 4.3 MMOL/L (ref 3.4–5.3)
PROCALCITONIN SERPL-MCNC: 0.19 NG/ML
PROT SERPL-MCNC: 6 G/DL (ref 6.8–8.8)
PROT SERPL-MCNC: 6.4 G/DL (ref 6.8–8.8)
PROT SERPL-MCNC: 6.4 G/DL (ref 6.8–8.8)
PROT SERPL-MCNC: 6.6 G/DL (ref 6.8–8.8)
RBC # BLD AUTO: 2.24 10E12/L (ref 4.4–5.9)
RBC # BLD AUTO: 2.28 10E12/L (ref 4.4–5.9)
RBC # BLD AUTO: 2.37 10E12/L (ref 4.4–5.9)
RBC # BLD AUTO: 2.45 10E12/L (ref 4.4–5.9)
RBC # BLD AUTO: 3.4 10E12/L (ref 4.4–5.9)
SODIUM SERPL-SCNC: 134 MMOL/L (ref 133–144)
SODIUM SERPL-SCNC: 135 MMOL/L (ref 133–144)
SODIUM SERPL-SCNC: 138 MMOL/L (ref 133–144)
SODIUM SERPL-SCNC: 139 MMOL/L (ref 133–144)
SODIUM SERPL-SCNC: 142 MMOL/L (ref 133–144)
SPECIMEN EXP DATE BLD: NORMAL
SPECIMEN SOURCE: NORMAL
T4 FREE SERPL-MCNC: 0.81 NG/DL (ref 0.76–1.46)
TIBC SERPL-MCNC: 114 UG/DL (ref 240–430)
TRANSFUSION STATUS PATIENT QL: NORMAL
TRANSFUSION STATUS PATIENT QL: NORMAL
TSH SERPL DL<=0.005 MIU/L-ACNC: 6.25 MU/L (ref 0.4–4)
VIT B12 SERPL-MCNC: 490 PG/ML (ref 193–986)
WBC # BLD AUTO: 4.3 10E9/L (ref 4–11)
WBC # BLD AUTO: 4.8 10E9/L (ref 4–11)
WBC # BLD AUTO: 5.8 10E9/L (ref 4–11)
WBC # BLD AUTO: 5.9 10E9/L (ref 4–11)
WBC # BLD AUTO: 7.6 10E9/L (ref 4–11)

## 2019-01-01 PROCEDURE — 82746 ASSAY OF FOLIC ACID SERUM: CPT | Performed by: EMERGENCY MEDICINE

## 2019-01-01 PROCEDURE — 25000128 H RX IP 250 OP 636: Performed by: NURSE ANESTHETIST, CERTIFIED REGISTERED

## 2019-01-01 PROCEDURE — 25500064 ZZH RX 255 OP 636: Performed by: INTERNAL MEDICINE

## 2019-01-01 PROCEDURE — 83735 ASSAY OF MAGNESIUM: CPT | Performed by: PHYSICIAN ASSISTANT

## 2019-01-01 PROCEDURE — 99217 ZZC OBSERVATION CARE DISCHARGE: CPT | Performed by: PHYSICIAN ASSISTANT

## 2019-01-01 PROCEDURE — 99214 OFFICE O/P EST MOD 30 MIN: CPT | Mod: ZP | Performed by: PHYSICIAN ASSISTANT

## 2019-01-01 PROCEDURE — 25000125 ZZHC RX 250: Performed by: NURSE ANESTHETIST, CERTIFIED REGISTERED

## 2019-01-01 PROCEDURE — 96375 TX/PRO/DX INJ NEW DRUG ADDON: CPT

## 2019-01-01 PROCEDURE — 36415 COLL VENOUS BLD VENIPUNCTURE: CPT | Performed by: HOSPITALIST

## 2019-01-01 PROCEDURE — 25000132 ZZH RX MED GY IP 250 OP 250 PS 637: Mod: GY | Performed by: NURSE PRACTITIONER

## 2019-01-01 PROCEDURE — 80053 COMPREHEN METABOLIC PANEL: CPT | Performed by: INTERNAL MEDICINE

## 2019-01-01 PROCEDURE — 12000001 ZZH R&B MED SURG/OB UMMC

## 2019-01-01 PROCEDURE — 25000128 H RX IP 250 OP 636: Performed by: EMERGENCY MEDICINE

## 2019-01-01 PROCEDURE — 84443 ASSAY THYROID STIM HORMONE: CPT | Performed by: INTERNAL MEDICINE

## 2019-01-01 PROCEDURE — 40000986 XR LUMBAR SPINE 2-3 VIEWS

## 2019-01-01 PROCEDURE — 36000076 ZZH SURGERY LEVEL 6 EA 15 ADDTL MIN - UMMC: Performed by: ORTHOPAEDIC SURGERY

## 2019-01-01 PROCEDURE — A9503 TC99M MEDRONATE: HCPCS | Performed by: INTERNAL MEDICINE

## 2019-01-01 PROCEDURE — G0378 HOSPITAL OBSERVATION PER HR: HCPCS

## 2019-01-01 PROCEDURE — 36000078 ZZH SURGERY LEVEL 6 W FLUORO 1ST 30 MIN - UMMC: Performed by: ORTHOPAEDIC SURGERY

## 2019-01-01 PROCEDURE — 86901 BLOOD TYPING SEROLOGIC RH(D): CPT | Performed by: INTERNAL MEDICINE

## 2019-01-01 PROCEDURE — 99215 OFFICE O/P EST HI 40 MIN: CPT | Performed by: INTERNAL MEDICINE

## 2019-01-01 PROCEDURE — 25000566 ZZH SEVOFLURANE, EA 15 MIN: Performed by: ORTHOPAEDIC SURGERY

## 2019-01-01 PROCEDURE — 99225 ZZC SUBSEQUENT OBSERVATION CARE,LEVEL II: CPT | Performed by: PHYSICIAN ASSISTANT

## 2019-01-01 PROCEDURE — 96374 THER/PROPH/DIAG INJ IV PUSH: CPT | Mod: 59

## 2019-01-01 PROCEDURE — 83540 ASSAY OF IRON: CPT | Performed by: PHYSICIAN ASSISTANT

## 2019-01-01 PROCEDURE — 36415 COLL VENOUS BLD VENIPUNCTURE: CPT | Performed by: PHYSICIAN ASSISTANT

## 2019-01-01 PROCEDURE — 74177 CT ABD & PELVIS W/CONTRAST: CPT

## 2019-01-01 PROCEDURE — 25800030 ZZH RX IP 258 OP 636: Performed by: EMERGENCY MEDICINE

## 2019-01-01 PROCEDURE — 25000128 H RX IP 250 OP 636: Performed by: STUDENT IN AN ORGANIZED HEALTH CARE EDUCATION/TRAINING PROGRAM

## 2019-01-01 PROCEDURE — 82728 ASSAY OF FERRITIN: CPT | Performed by: PHYSICIAN ASSISTANT

## 2019-01-01 PROCEDURE — 71000014 ZZH RECOVERY PHASE 1 LEVEL 2 FIRST HR: Performed by: ORTHOPAEDIC SURGERY

## 2019-01-01 PROCEDURE — 27211024 ZZHC OR SUPPLY OTHER OPNP: Performed by: ORTHOPAEDIC SURGERY

## 2019-01-01 PROCEDURE — 78306 BONE IMAGING WHOLE BODY: CPT

## 2019-01-01 PROCEDURE — 25000132 ZZH RX MED GY IP 250 OP 250 PS 637: Mod: GY | Performed by: INTERNAL MEDICINE

## 2019-01-01 PROCEDURE — 99205 OFFICE O/P NEW HI 60 MIN: CPT | Performed by: NURSE PRACTITIONER

## 2019-01-01 PROCEDURE — 0QU03JZ SUPPLEMENT LUMBAR VERTEBRA WITH SYNTHETIC SUBSTITUTE, PERCUTANEOUS APPROACH: ICD-10-PCS | Performed by: ORTHOPAEDIC SURGERY

## 2019-01-01 PROCEDURE — 85025 COMPLETE CBC W/AUTO DIFF WBC: CPT | Performed by: HOSPITALIST

## 2019-01-01 PROCEDURE — 86900 BLOOD TYPING SEROLOGIC ABO: CPT | Performed by: INTERNAL MEDICINE

## 2019-01-01 PROCEDURE — 00000146 ZZHCL STATISTIC GLUCOSE BY METER IP

## 2019-01-01 PROCEDURE — 88311 DECALCIFY TISSUE: CPT | Performed by: ORTHOPAEDIC SURGERY

## 2019-01-01 PROCEDURE — 99285 EMERGENCY DEPT VISIT HI MDM: CPT | Mod: 25

## 2019-01-01 PROCEDURE — 88307 TISSUE EXAM BY PATHOLOGIST: CPT | Performed by: ORTHOPAEDIC SURGERY

## 2019-01-01 PROCEDURE — 25000132 ZZH RX MED GY IP 250 OP 250 PS 637: Mod: GY | Performed by: ANESTHESIOLOGY

## 2019-01-01 PROCEDURE — 88311 DECALCIFY TISSUE: CPT | Mod: 26 | Performed by: ORTHOPAEDIC SURGERY

## 2019-01-01 PROCEDURE — 25000125 ZZHC RX 250: Performed by: ANESTHESIOLOGY

## 2019-01-01 PROCEDURE — 25000125 ZZHC RX 250: Performed by: INTERNAL MEDICINE

## 2019-01-01 PROCEDURE — 27810169 ZZH OR IMPLANT GENERAL: Performed by: ORTHOPAEDIC SURGERY

## 2019-01-01 PROCEDURE — 0QS03ZZ REPOSITION LUMBAR VERTEBRA, PERCUTANEOUS APPROACH: ICD-10-PCS | Performed by: ORTHOPAEDIC SURGERY

## 2019-01-01 PROCEDURE — 71260 CT THORAX DX C+: CPT

## 2019-01-01 PROCEDURE — 83550 IRON BINDING TEST: CPT | Performed by: PHYSICIAN ASSISTANT

## 2019-01-01 PROCEDURE — 85025 COMPLETE CBC W/AUTO DIFF WBC: CPT | Performed by: INTERNAL MEDICINE

## 2019-01-01 PROCEDURE — 40000278 XR SURGERY CARM FLUORO LESS THAN 5 MIN: Mod: TC

## 2019-01-01 PROCEDURE — 25000125 ZZHC RX 250: Performed by: ORTHOPAEDIC SURGERY

## 2019-01-01 PROCEDURE — 25800030 ZZH RX IP 258 OP 636: Performed by: ANESTHESIOLOGY

## 2019-01-01 PROCEDURE — 99220 ZZC INITIAL OBSERVATION CARE,LEVL III: CPT | Performed by: INTERNAL MEDICINE

## 2019-01-01 PROCEDURE — 25000132 ZZH RX MED GY IP 250 OP 250 PS 637: Mod: GY | Performed by: STUDENT IN AN ORGANIZED HEALTH CARE EDUCATION/TRAINING PROGRAM

## 2019-01-01 PROCEDURE — 99207 ZZC CDG-CODE CATEGORY CHANGED: CPT | Performed by: NURSE PRACTITIONER

## 2019-01-01 PROCEDURE — G0463 HOSPITAL OUTPT CLINIC VISIT: HCPCS | Mod: ZF

## 2019-01-01 PROCEDURE — 80048 BASIC METABOLIC PNL TOTAL CA: CPT | Performed by: PHYSICIAN ASSISTANT

## 2019-01-01 PROCEDURE — 80053 COMPREHEN METABOLIC PANEL: CPT | Performed by: PHYSICIAN ASSISTANT

## 2019-01-01 PROCEDURE — 0QB03ZX EXCISION OF LUMBAR VERTEBRA, PERCUTANEOUS APPROACH, DIAGNOSTIC: ICD-10-PCS | Performed by: ORTHOPAEDIC SURGERY

## 2019-01-01 PROCEDURE — 25000128 H RX IP 250 OP 636: Performed by: ANESTHESIOLOGY

## 2019-01-01 PROCEDURE — 86850 RBC ANTIBODY SCREEN: CPT | Performed by: INTERNAL MEDICINE

## 2019-01-01 PROCEDURE — 82565 ASSAY OF CREATININE: CPT

## 2019-01-01 PROCEDURE — 83735 ASSAY OF MAGNESIUM: CPT | Performed by: EMERGENCY MEDICINE

## 2019-01-01 PROCEDURE — 36415 COLL VENOUS BLD VENIPUNCTURE: CPT | Performed by: STUDENT IN AN ORGANIZED HEALTH CARE EDUCATION/TRAINING PROGRAM

## 2019-01-01 PROCEDURE — 36415 COLL VENOUS BLD VENIPUNCTURE: CPT

## 2019-01-01 PROCEDURE — 40000170 ZZH STATISTIC PRE-PROCEDURE ASSESSMENT II: Performed by: ORTHOPAEDIC SURGERY

## 2019-01-01 PROCEDURE — 27210794 ZZH OR GENERAL SUPPLY STERILE: Performed by: ORTHOPAEDIC SURGERY

## 2019-01-01 PROCEDURE — 25000132 ZZH RX MED GY IP 250 OP 250 PS 637: Mod: GY | Performed by: PHYSICIAN ASSISTANT

## 2019-01-01 PROCEDURE — 34300033 ZZH RX 343: Performed by: INTERNAL MEDICINE

## 2019-01-01 PROCEDURE — 82607 VITAMIN B-12: CPT | Performed by: EMERGENCY MEDICINE

## 2019-01-01 PROCEDURE — 85018 HEMOGLOBIN: CPT | Performed by: STUDENT IN AN ORGANIZED HEALTH CARE EDUCATION/TRAINING PROGRAM

## 2019-01-01 PROCEDURE — P9016 RBC LEUKOCYTES REDUCED: HCPCS | Performed by: INTERNAL MEDICINE

## 2019-01-01 PROCEDURE — 40000893 ZZH STATISTIC PT IP EVAL DEFER: Performed by: PHYSICAL THERAPIST

## 2019-01-01 PROCEDURE — 01NB3ZZ RELEASE LUMBAR NERVE, PERCUTANEOUS APPROACH: ICD-10-PCS | Performed by: ORTHOPAEDIC SURGERY

## 2019-01-01 PROCEDURE — 84439 ASSAY OF FREE THYROXINE: CPT | Performed by: INTERNAL MEDICINE

## 2019-01-01 PROCEDURE — 96365 THER/PROPH/DIAG IV INF INIT: CPT

## 2019-01-01 PROCEDURE — 96366 THER/PROPH/DIAG IV INF ADDON: CPT

## 2019-01-01 PROCEDURE — 25000128 H RX IP 250 OP 636: Performed by: PHYSICIAN ASSISTANT

## 2019-01-01 PROCEDURE — 85018 HEMOGLOBIN: CPT | Performed by: ANESTHESIOLOGY

## 2019-01-01 PROCEDURE — 25500064 ZZH RX 255 OP 636: Performed by: ORTHOPAEDIC SURGERY

## 2019-01-01 PROCEDURE — 86923 COMPATIBILITY TEST ELECTRIC: CPT | Performed by: INTERNAL MEDICINE

## 2019-01-01 PROCEDURE — 88307 TISSUE EXAM BY PATHOLOGIST: CPT | Mod: 26 | Performed by: ORTHOPAEDIC SURGERY

## 2019-01-01 PROCEDURE — 37000009 ZZH ANESTHESIA TECHNICAL FEE, EACH ADDTL 15 MIN: Performed by: ORTHOPAEDIC SURGERY

## 2019-01-01 PROCEDURE — 99214 OFFICE O/P EST MOD 30 MIN: CPT | Performed by: INTERNAL MEDICINE

## 2019-01-01 PROCEDURE — 36415 COLL VENOUS BLD VENIPUNCTURE: CPT | Performed by: ANESTHESIOLOGY

## 2019-01-01 PROCEDURE — 84145 PROCALCITONIN (PCT): CPT | Performed by: EMERGENCY MEDICINE

## 2019-01-01 PROCEDURE — 96361 HYDRATE IV INFUSION ADD-ON: CPT

## 2019-01-01 PROCEDURE — 83690 ASSAY OF LIPASE: CPT | Performed by: PHYSICIAN ASSISTANT

## 2019-01-01 PROCEDURE — 85018 HEMOGLOBIN: CPT | Performed by: PHYSICIAN ASSISTANT

## 2019-01-01 PROCEDURE — 36430 TRANSFUSION BLD/BLD COMPNT: CPT

## 2019-01-01 PROCEDURE — 25000125 ZZHC RX 250: Performed by: EMERGENCY MEDICINE

## 2019-01-01 PROCEDURE — A9585 GADOBUTROL INJECTION: HCPCS | Performed by: INTERNAL MEDICINE

## 2019-01-01 PROCEDURE — 80053 COMPREHEN METABOLIC PANEL: CPT | Performed by: EMERGENCY MEDICINE

## 2019-01-01 PROCEDURE — G0463 HOSPITAL OUTPT CLINIC VISIT: HCPCS

## 2019-01-01 PROCEDURE — 25000131 ZZH RX MED GY IP 250 OP 636 PS 637: Mod: GY | Performed by: NURSE PRACTITIONER

## 2019-01-01 PROCEDURE — 85025 COMPLETE CBC W/AUTO DIFF WBC: CPT | Performed by: EMERGENCY MEDICINE

## 2019-01-01 PROCEDURE — 84100 ASSAY OF PHOSPHORUS: CPT | Performed by: PHYSICIAN ASSISTANT

## 2019-01-01 PROCEDURE — 84100 ASSAY OF PHOSPHORUS: CPT | Performed by: EMERGENCY MEDICINE

## 2019-01-01 PROCEDURE — 25800030 ZZH RX IP 258 OP 636: Performed by: INTERNAL MEDICINE

## 2019-01-01 PROCEDURE — 37000008 ZZH ANESTHESIA TECHNICAL FEE, 1ST 30 MIN: Performed by: ORTHOPAEDIC SURGERY

## 2019-01-01 PROCEDURE — 71000015 ZZH RECOVERY PHASE 1 LEVEL 2 EA ADDTL HR: Performed by: ORTHOPAEDIC SURGERY

## 2019-01-01 PROCEDURE — 83735 ASSAY OF MAGNESIUM: CPT | Performed by: INTERNAL MEDICINE

## 2019-01-01 PROCEDURE — 25800030 ZZH RX IP 258 OP 636: Performed by: NURSE ANESTHETIST, CERTIFIED REGISTERED

## 2019-01-01 PROCEDURE — 0S503ZZ DESTRUCTION OF LUMBAR VERTEBRAL JOINT, PERCUTANEOUS APPROACH: ICD-10-PCS | Performed by: ORTHOPAEDIC SURGERY

## 2019-01-01 PROCEDURE — 72197 MRI PELVIS W/O & W/DYE: CPT

## 2019-01-01 PROCEDURE — 99214 OFFICE O/P EST MOD 30 MIN: CPT | Performed by: NURSE PRACTITIONER

## 2019-01-01 PROCEDURE — 99215 OFFICE O/P EST HI 40 MIN: CPT | Mod: ZP | Performed by: INTERNAL MEDICINE

## 2019-01-01 DEVICE — BONE CEMENT KYPHX HV-R C01A: Type: IMPLANTABLE DEVICE | Site: SPINE LUMBAR | Status: FUNCTIONAL

## 2019-01-01 RX ORDER — MAGNESIUM SULFATE HEPTAHYDRATE 40 MG/ML
4 INJECTION, SOLUTION INTRAVENOUS EVERY 4 HOURS PRN
Status: DISCONTINUED | OUTPATIENT
Start: 2019-01-01 | End: 2019-01-01

## 2019-01-01 RX ORDER — MORPHINE SULFATE 15 MG/1
TABLET, FILM COATED, EXTENDED RELEASE ORAL
Qty: 60 TABLET | Refills: 0 | Status: SHIPPED | OUTPATIENT
Start: 2019-01-01 | End: 2020-01-01

## 2019-01-01 RX ORDER — DIPHENHYDRAMINE HCL 12.5MG/5ML
12.5 LIQUID (ML) ORAL EVERY 6 HOURS PRN
Status: DISCONTINUED | OUTPATIENT
Start: 2019-01-01 | End: 2019-01-01 | Stop reason: HOSPADM

## 2019-01-01 RX ORDER — FLUTICASONE PROPIONATE 50 MCG
1 SPRAY, SUSPENSION (ML) NASAL DAILY PRN
Status: DISCONTINUED | OUTPATIENT
Start: 2019-01-01 | End: 2019-01-01 | Stop reason: HOSPADM

## 2019-01-01 RX ORDER — FENTANYL CITRATE 50 UG/ML
INJECTION, SOLUTION INTRAMUSCULAR; INTRAVENOUS PRN
Status: DISCONTINUED | OUTPATIENT
Start: 2019-01-01 | End: 2019-01-01

## 2019-01-01 RX ORDER — POLYETHYLENE GLYCOL 3350 17 G/17G
17 POWDER, FOR SOLUTION ORAL DAILY
Status: DISCONTINUED | OUTPATIENT
Start: 2019-01-01 | End: 2019-01-01 | Stop reason: HOSPADM

## 2019-01-01 RX ORDER — DIMENHYDRINATE 50 MG/ML
25 INJECTION, SOLUTION INTRAMUSCULAR; INTRAVENOUS
Status: DISCONTINUED | OUTPATIENT
Start: 2019-01-01 | End: 2019-01-01 | Stop reason: HOSPADM

## 2019-01-01 RX ORDER — LIDOCAINE 40 MG/G
CREAM TOPICAL
Status: DISCONTINUED | OUTPATIENT
Start: 2019-01-01 | End: 2019-01-01 | Stop reason: HOSPADM

## 2019-01-01 RX ORDER — MORPHINE SULFATE 15 MG/1
15 TABLET, FILM COATED, EXTENDED RELEASE ORAL 2 TIMES DAILY
Status: DISCONTINUED | OUTPATIENT
Start: 2019-01-01 | End: 2019-01-01 | Stop reason: HOSPADM

## 2019-01-01 RX ORDER — MAGNESIUM SULFATE HEPTAHYDRATE 40 MG/ML
4 INJECTION, SOLUTION INTRAVENOUS EVERY 4 HOURS PRN
Status: DISCONTINUED | OUTPATIENT
Start: 2019-01-01 | End: 2019-01-01 | Stop reason: HOSPADM

## 2019-01-01 RX ORDER — HYDROMORPHONE HYDROCHLORIDE 2 MG/1
2-4 TABLET ORAL
Status: DISCONTINUED | OUTPATIENT
Start: 2019-01-01 | End: 2019-01-01

## 2019-01-01 RX ORDER — METOPROLOL TARTRATE 1 MG/ML
1-2 INJECTION, SOLUTION INTRAVENOUS EVERY 5 MIN PRN
Status: DISCONTINUED | OUTPATIENT
Start: 2019-01-01 | End: 2019-01-01 | Stop reason: HOSPADM

## 2019-01-01 RX ORDER — VALACYCLOVIR HYDROCHLORIDE 1 G/1
1000 TABLET, FILM COATED ORAL 3 TIMES DAILY
Qty: 21 TABLET | Refills: 0 | Status: SHIPPED | OUTPATIENT
Start: 2019-01-01 | End: 2019-01-01

## 2019-01-01 RX ORDER — AMOXICILLIN 250 MG
3-4 CAPSULE ORAL 2 TIMES DAILY
Status: DISCONTINUED | OUTPATIENT
Start: 2019-01-01 | End: 2019-01-01 | Stop reason: HOSPADM

## 2019-01-01 RX ORDER — ONDANSETRON 2 MG/ML
4 INJECTION INTRAMUSCULAR; INTRAVENOUS ONCE
Status: COMPLETED | OUTPATIENT
Start: 2019-01-01 | End: 2019-01-01

## 2019-01-01 RX ORDER — MULTIVITAMIN,THERAPEUTIC
1 TABLET ORAL AT BEDTIME
Status: DISCONTINUED | OUTPATIENT
Start: 2019-01-01 | End: 2019-01-01 | Stop reason: HOSPADM

## 2019-01-01 RX ORDER — DULOXETIN HYDROCHLORIDE 60 MG/1
60 CAPSULE, DELAYED RELEASE ORAL DAILY
Status: DISCONTINUED | OUTPATIENT
Start: 2019-01-01 | End: 2019-01-01 | Stop reason: HOSPADM

## 2019-01-01 RX ORDER — PROCHLORPERAZINE MALEATE 5 MG
5 TABLET ORAL EVERY 6 HOURS PRN
Status: DISCONTINUED | OUTPATIENT
Start: 2019-01-01 | End: 2019-01-01 | Stop reason: HOSPADM

## 2019-01-01 RX ORDER — LIDOCAINE HYDROCHLORIDE 20 MG/ML
INJECTION, SOLUTION INFILTRATION; PERINEURAL PRN
Status: DISCONTINUED | OUTPATIENT
Start: 2019-01-01 | End: 2019-01-01

## 2019-01-01 RX ORDER — LIDOCAINE 4 G/G
1 PATCH TOPICAL
Status: DISCONTINUED | OUTPATIENT
Start: 2019-01-01 | End: 2019-01-01

## 2019-01-01 RX ORDER — ACETAMINOPHEN 650 MG/1
650 SUPPOSITORY RECTAL EVERY 4 HOURS PRN
Status: DISCONTINUED | OUTPATIENT
Start: 2019-01-01 | End: 2019-01-01

## 2019-01-01 RX ORDER — ACETAMINOPHEN 325 MG/1
650 TABLET ORAL EVERY 4 HOURS PRN
Status: DISCONTINUED | OUTPATIENT
Start: 2019-01-01 | End: 2019-01-01

## 2019-01-01 RX ORDER — MORPHINE SULFATE 15 MG/1
15 TABLET, FILM COATED, EXTENDED RELEASE ORAL DAILY
Status: DISCONTINUED | OUTPATIENT
Start: 2019-01-01 | End: 2019-01-01 | Stop reason: HOSPADM

## 2019-01-01 RX ORDER — FOLIC ACID 1 MG/1
1 TABLET ORAL DAILY
Qty: 30 TABLET | Refills: 0 | Status: SHIPPED | OUTPATIENT
Start: 2019-01-01 | End: 2019-01-01

## 2019-01-01 RX ORDER — ACETAMINOPHEN 325 MG/1
650 TABLET ORAL EVERY 4 HOURS PRN
Status: DISCONTINUED | OUTPATIENT
Start: 2019-01-01 | End: 2019-01-01 | Stop reason: HOSPADM

## 2019-01-01 RX ORDER — HYDROMORPHONE HYDROCHLORIDE 2 MG/1
2-4 TABLET ORAL
Status: DISCONTINUED | OUTPATIENT
Start: 2019-01-01 | End: 2019-01-01 | Stop reason: HOSPADM

## 2019-01-01 RX ORDER — PROCHLORPERAZINE MALEATE 5 MG
5 TABLET ORAL EVERY 6 HOURS PRN
Qty: 180 TABLET | COMMUNITY
Start: 2019-01-01 | End: 2019-01-01

## 2019-01-01 RX ORDER — MORPHINE SULFATE 15 MG/1
30 TABLET, FILM COATED, EXTENDED RELEASE ORAL 2 TIMES DAILY
Status: DISCONTINUED | OUTPATIENT
Start: 2019-01-01 | End: 2019-01-01 | Stop reason: HOSPADM

## 2019-01-01 RX ORDER — TRIAMCINOLONE ACETONIDE 1 MG/G
CREAM TOPICAL 2 TIMES DAILY
Qty: 30 G | Refills: 3 | Status: SHIPPED | OUTPATIENT
Start: 2019-01-01

## 2019-01-01 RX ORDER — ONDANSETRON 8 MG/1
8 TABLET, FILM COATED ORAL EVERY 8 HOURS PRN
COMMUNITY
End: 2020-01-01

## 2019-01-01 RX ORDER — NALOXONE HYDROCHLORIDE 0.4 MG/ML
.1-.4 INJECTION, SOLUTION INTRAMUSCULAR; INTRAVENOUS; SUBCUTANEOUS
Status: DISCONTINUED | OUTPATIENT
Start: 2019-01-01 | End: 2019-01-01 | Stop reason: HOSPADM

## 2019-01-01 RX ORDER — CEFAZOLIN SODIUM 1 G/3ML
1 INJECTION, POWDER, FOR SOLUTION INTRAMUSCULAR; INTRAVENOUS EVERY 8 HOURS
Status: COMPLETED | OUTPATIENT
Start: 2019-01-01 | End: 2019-01-01

## 2019-01-01 RX ORDER — PROPOFOL 10 MG/ML
INJECTION, EMULSION INTRAVENOUS PRN
Status: DISCONTINUED | OUTPATIENT
Start: 2019-01-01 | End: 2019-01-01

## 2019-01-01 RX ORDER — MORPHINE SULFATE 30 MG/1
30 TABLET, FILM COATED, EXTENDED RELEASE ORAL DAILY
Qty: 30 TABLET | Refills: 0 | Status: SHIPPED | OUTPATIENT
Start: 2019-01-01 | End: 2019-01-01

## 2019-01-01 RX ORDER — GABAPENTIN 100 MG/1
100 CAPSULE ORAL 3 TIMES DAILY
Status: DISCONTINUED | OUTPATIENT
Start: 2019-01-01 | End: 2019-01-01 | Stop reason: HOSPADM

## 2019-01-01 RX ORDER — LORATADINE 10 MG/1
10 TABLET ORAL
Status: DISCONTINUED | OUTPATIENT
Start: 2019-01-01 | End: 2019-01-01 | Stop reason: HOSPADM

## 2019-01-01 RX ORDER — FENTANYL CITRATE 50 UG/ML
25-50 INJECTION, SOLUTION INTRAMUSCULAR; INTRAVENOUS
Status: DISCONTINUED | OUTPATIENT
Start: 2019-01-01 | End: 2019-01-01 | Stop reason: HOSPADM

## 2019-01-01 RX ORDER — SODIUM CHLORIDE, SODIUM LACTATE, POTASSIUM CHLORIDE, CALCIUM CHLORIDE 600; 310; 30; 20 MG/100ML; MG/100ML; MG/100ML; MG/100ML
INJECTION, SOLUTION INTRAVENOUS CONTINUOUS PRN
Status: DISCONTINUED | OUTPATIENT
Start: 2019-01-01 | End: 2019-01-01

## 2019-01-01 RX ORDER — CALCIUM CARBONATE 500 MG/1
1000 TABLET, CHEWABLE ORAL 4 TIMES DAILY PRN
Status: DISCONTINUED | OUTPATIENT
Start: 2019-01-01 | End: 2019-01-01 | Stop reason: HOSPADM

## 2019-01-01 RX ORDER — GADOBUTROL 604.72 MG/ML
10 INJECTION INTRAVENOUS ONCE
Status: COMPLETED | OUTPATIENT
Start: 2019-01-01 | End: 2019-01-01

## 2019-01-01 RX ORDER — HYDROMORPHONE HYDROCHLORIDE 2 MG/1
2-4 TABLET ORAL EVERY 6 HOURS PRN
Qty: 30 TABLET | Refills: 0 | Status: SHIPPED | OUTPATIENT
Start: 2019-01-01 | End: 2019-01-01

## 2019-01-01 RX ORDER — MAGNESIUM SULFATE HEPTAHYDRATE 40 MG/ML
2 INJECTION, SOLUTION INTRAVENOUS DAILY PRN
Status: DISCONTINUED | OUTPATIENT
Start: 2019-01-01 | End: 2019-01-01 | Stop reason: HOSPADM

## 2019-01-01 RX ORDER — LIDOCAINE 4 G/G
1-2 PATCH TOPICAL
Status: DISCONTINUED | OUTPATIENT
Start: 2019-01-01 | End: 2019-01-01 | Stop reason: HOSPADM

## 2019-01-01 RX ORDER — BUPIVACAINE HYDROCHLORIDE AND EPINEPHRINE 2.5; 5 MG/ML; UG/ML
INJECTION, SOLUTION INFILTRATION; PERINEURAL PRN
Status: DISCONTINUED | OUTPATIENT
Start: 2019-01-01 | End: 2019-01-01 | Stop reason: HOSPADM

## 2019-01-01 RX ORDER — ONDANSETRON 4 MG/1
4 TABLET, ORALLY DISINTEGRATING ORAL 3 TIMES DAILY
Status: DISCONTINUED | OUTPATIENT
Start: 2019-01-01 | End: 2019-01-01 | Stop reason: HOSPADM

## 2019-01-01 RX ORDER — HYDROMORPHONE HYDROCHLORIDE 1 MG/ML
0.3 INJECTION, SOLUTION INTRAMUSCULAR; INTRAVENOUS; SUBCUTANEOUS
Status: DISCONTINUED | OUTPATIENT
Start: 2019-01-01 | End: 2019-01-01 | Stop reason: HOSPADM

## 2019-01-01 RX ORDER — DEXAMETHASONE 4 MG/1
8 TABLET ORAL DAILY
Qty: 4 TABLET | Refills: 0 | Status: SHIPPED | OUTPATIENT
Start: 2019-01-01 | End: 2019-01-01

## 2019-01-01 RX ORDER — PREGABALIN 50 MG/1
50 CAPSULE ORAL 2 TIMES DAILY
Qty: 60 CAPSULE | Refills: 1 | Status: SHIPPED | OUTPATIENT
Start: 2019-01-01 | End: 2020-01-01

## 2019-01-01 RX ORDER — MORPHINE SULFATE 15 MG/1
TABLET, FILM COATED, EXTENDED RELEASE ORAL
Qty: 60 TABLET | Refills: 0 | Status: SHIPPED | OUTPATIENT
Start: 2019-01-01 | End: 2019-01-01

## 2019-01-01 RX ORDER — POTASSIUM CL/LIDO/0.9 % NACL 10MEQ/0.1L
10 INTRAVENOUS SOLUTION, PIGGYBACK (ML) INTRAVENOUS
Status: DISCONTINUED | OUTPATIENT
Start: 2019-01-01 | End: 2019-01-01 | Stop reason: HOSPADM

## 2019-01-01 RX ORDER — HEPARIN SODIUM,PORCINE 10 UNIT/ML
5 VIAL (ML) INTRAVENOUS
Status: CANCELLED | OUTPATIENT
Start: 2019-01-01

## 2019-01-01 RX ORDER — MEPERIDINE HYDROCHLORIDE 25 MG/ML
12.5 INJECTION INTRAMUSCULAR; INTRAVENOUS; SUBCUTANEOUS
Status: DISCONTINUED | OUTPATIENT
Start: 2019-01-01 | End: 2019-01-01 | Stop reason: HOSPADM

## 2019-01-01 RX ORDER — ALBUTEROL SULFATE 0.83 MG/ML
2.5 SOLUTION RESPIRATORY (INHALATION) EVERY 4 HOURS PRN
Status: DISCONTINUED | OUTPATIENT
Start: 2019-01-01 | End: 2019-01-01 | Stop reason: HOSPADM

## 2019-01-01 RX ORDER — CEFAZOLIN SODIUM 1 G/3ML
1 INJECTION, POWDER, FOR SOLUTION INTRAMUSCULAR; INTRAVENOUS SEE ADMIN INSTRUCTIONS
Status: DISCONTINUED | OUTPATIENT
Start: 2019-01-01 | End: 2019-01-01 | Stop reason: HOSPADM

## 2019-01-01 RX ORDER — HYDROMORPHONE HYDROCHLORIDE 1 MG/ML
.2-.4 INJECTION, SOLUTION INTRAMUSCULAR; INTRAVENOUS; SUBCUTANEOUS
Status: DISCONTINUED | OUTPATIENT
Start: 2019-01-01 | End: 2019-01-01 | Stop reason: HOSPADM

## 2019-01-01 RX ORDER — UREA 40 %
CREAM (GRAM) TOPICAL 2 TIMES DAILY
Qty: 28 G | Refills: 3 | Status: SHIPPED | OUTPATIENT
Start: 2019-01-01 | End: 2020-01-01

## 2019-01-01 RX ORDER — FOLIC ACID 1 MG/1
1 TABLET ORAL DAILY
Qty: 30 TABLET | Refills: 0 | Status: SHIPPED | OUTPATIENT
Start: 2019-01-01 | End: 2020-01-01

## 2019-01-01 RX ORDER — DIPHENHYDRAMINE HYDROCHLORIDE 50 MG/ML
12.5 INJECTION INTRAMUSCULAR; INTRAVENOUS EVERY 6 HOURS PRN
Status: DISCONTINUED | OUTPATIENT
Start: 2019-01-01 | End: 2019-01-01 | Stop reason: HOSPADM

## 2019-01-01 RX ORDER — POTASSIUM CHLORIDE 1.5 G/1.58G
20-40 POWDER, FOR SOLUTION ORAL
Status: DISCONTINUED | OUTPATIENT
Start: 2019-01-01 | End: 2019-01-01 | Stop reason: HOSPADM

## 2019-01-01 RX ORDER — HYDROMORPHONE HYDROCHLORIDE 2 MG/1
2-4 TABLET ORAL
Qty: 60 TABLET | Refills: 0 | Status: SHIPPED | OUTPATIENT
Start: 2019-01-01 | End: 2019-01-01

## 2019-01-01 RX ORDER — MORPHINE SULFATE 15 MG/1
TABLET, FILM COATED, EXTENDED RELEASE ORAL 3 TIMES DAILY
COMMUNITY
End: 2019-01-01

## 2019-01-01 RX ORDER — SODIUM CHLORIDE, SODIUM LACTATE, POTASSIUM CHLORIDE, CALCIUM CHLORIDE 600; 310; 30; 20 MG/100ML; MG/100ML; MG/100ML; MG/100ML
INJECTION, SOLUTION INTRAVENOUS CONTINUOUS
Status: DISCONTINUED | OUTPATIENT
Start: 2019-01-01 | End: 2019-01-01 | Stop reason: HOSPADM

## 2019-01-01 RX ORDER — FOLIC ACID 1 MG/1
1 TABLET ORAL DAILY
Status: DISCONTINUED | OUTPATIENT
Start: 2019-01-01 | End: 2019-01-01 | Stop reason: HOSPADM

## 2019-01-01 RX ORDER — GUAIFENESIN AND DEXTROMETHORPHAN HYDROBROMIDE 600; 30 MG/1; MG/1
1 TABLET, EXTENDED RELEASE ORAL EVERY 12 HOURS
Status: DISCONTINUED | OUTPATIENT
Start: 2019-01-01 | End: 2019-01-01 | Stop reason: HOSPADM

## 2019-01-01 RX ORDER — ACETAMINOPHEN 325 MG/1
975 TABLET ORAL EVERY 8 HOURS
Status: DISCONTINUED | OUTPATIENT
Start: 2019-01-01 | End: 2019-01-01 | Stop reason: HOSPADM

## 2019-01-01 RX ORDER — ONDANSETRON 2 MG/ML
4 INJECTION INTRAMUSCULAR; INTRAVENOUS EVERY 6 HOURS PRN
Status: DISCONTINUED | OUTPATIENT
Start: 2019-01-01 | End: 2019-01-01 | Stop reason: HOSPADM

## 2019-01-01 RX ORDER — LIDOCAINE 4 G/G
1 PATCH TOPICAL
Status: DISCONTINUED | OUTPATIENT
Start: 2019-01-01 | End: 2019-01-01 | Stop reason: HOSPADM

## 2019-01-01 RX ORDER — CEFAZOLIN SODIUM 2 G/100ML
2 INJECTION, SOLUTION INTRAVENOUS
Status: COMPLETED | OUTPATIENT
Start: 2019-01-01 | End: 2019-01-01

## 2019-01-01 RX ORDER — ONDANSETRON 4 MG/1
4 TABLET, ORALLY DISINTEGRATING ORAL EVERY 6 HOURS PRN
Status: DISCONTINUED | OUTPATIENT
Start: 2019-01-01 | End: 2019-01-01 | Stop reason: HOSPADM

## 2019-01-01 RX ORDER — AMOXICILLIN 250 MG
2 CAPSULE ORAL 2 TIMES DAILY
Status: DISCONTINUED | OUTPATIENT
Start: 2019-01-01 | End: 2019-01-01 | Stop reason: HOSPADM

## 2019-01-01 RX ORDER — IPRATROPIUM BROMIDE AND ALBUTEROL SULFATE 2.5; .5 MG/3ML; MG/3ML
3 SOLUTION RESPIRATORY (INHALATION)
Status: DISCONTINUED | OUTPATIENT
Start: 2019-01-01 | End: 2019-01-01 | Stop reason: HOSPADM

## 2019-01-01 RX ORDER — METOCLOPRAMIDE 5 MG/1
5 TABLET ORAL EVERY 6 HOURS PRN
Status: DISCONTINUED | OUTPATIENT
Start: 2019-01-01 | End: 2019-01-01 | Stop reason: HOSPADM

## 2019-01-01 RX ORDER — ACETAMINOPHEN 500 MG
1000 TABLET ORAL 3 TIMES DAILY
Status: DISCONTINUED | OUTPATIENT
Start: 2019-01-01 | End: 2019-01-01 | Stop reason: HOSPADM

## 2019-01-01 RX ORDER — DEXAMETHASONE 4 MG/1
4 TABLET ORAL DAILY
Qty: 3 TABLET | Refills: 0 | Status: SHIPPED | OUTPATIENT
Start: 2019-01-01 | End: 2019-01-01

## 2019-01-01 RX ORDER — AMOXICILLIN 250 MG
2 CAPSULE ORAL 2 TIMES DAILY
Status: DISCONTINUED | OUTPATIENT
Start: 2019-01-01 | End: 2019-01-01

## 2019-01-01 RX ORDER — BISACODYL 10 MG
10 SUPPOSITORY, RECTAL RECTAL DAILY PRN
Status: DISCONTINUED | OUTPATIENT
Start: 2019-01-01 | End: 2019-01-01 | Stop reason: HOSPADM

## 2019-01-01 RX ORDER — IOPAMIDOL 755 MG/ML
123 INJECTION, SOLUTION INTRAVASCULAR ONCE
Status: COMPLETED | OUTPATIENT
Start: 2019-01-01 | End: 2019-01-01

## 2019-01-01 RX ORDER — MORPHINE SULFATE 15 MG/1
TABLET, FILM COATED, EXTENDED RELEASE ORAL
Qty: 120 TABLET | Refills: 0 | Status: SHIPPED | OUTPATIENT
Start: 2019-01-01 | End: 2019-01-01

## 2019-01-01 RX ORDER — HYDROMORPHONE HYDROCHLORIDE 2 MG/1
2-4 TABLET ORAL
Qty: 60 TABLET | Refills: 0 | Status: SHIPPED | OUTPATIENT
Start: 2019-01-01 | End: 2020-01-01

## 2019-01-01 RX ORDER — FLUTICASONE PROPIONATE 50 MCG
1 SPRAY, SUSPENSION (ML) NASAL DAILY PRN
COMMUNITY

## 2019-01-01 RX ORDER — GABAPENTIN 100 MG/1
100 CAPSULE ORAL
Status: COMPLETED | OUTPATIENT
Start: 2019-01-01 | End: 2019-01-01

## 2019-01-01 RX ORDER — MORPHINE SULFATE 30 MG/1
30 TABLET, FILM COATED, EXTENDED RELEASE ORAL DAILY
Qty: 30 TABLET | Refills: 0 | Status: SHIPPED | OUTPATIENT
Start: 2019-01-01 | End: 2020-01-01

## 2019-01-01 RX ORDER — HYDROMORPHONE HYDROCHLORIDE 1 MG/ML
.3-.5 INJECTION, SOLUTION INTRAMUSCULAR; INTRAVENOUS; SUBCUTANEOUS EVERY 10 MIN PRN
Status: DISCONTINUED | OUTPATIENT
Start: 2019-01-01 | End: 2019-01-01 | Stop reason: HOSPADM

## 2019-01-01 RX ORDER — ONDANSETRON 2 MG/ML
INJECTION INTRAMUSCULAR; INTRAVENOUS PRN
Status: DISCONTINUED | OUTPATIENT
Start: 2019-01-01 | End: 2019-01-01

## 2019-01-01 RX ORDER — KETOROLAC TROMETHAMINE 30 MG/ML
INJECTION, SOLUTION INTRAMUSCULAR; INTRAVENOUS PRN
Status: DISCONTINUED | OUTPATIENT
Start: 2019-01-01 | End: 2019-01-01

## 2019-01-01 RX ORDER — PREGABALIN 25 MG/1
50 CAPSULE ORAL 2 TIMES DAILY
Status: DISCONTINUED | OUTPATIENT
Start: 2019-01-01 | End: 2019-01-01 | Stop reason: HOSPADM

## 2019-01-01 RX ORDER — HYDRALAZINE HYDROCHLORIDE 20 MG/ML
2.5-5 INJECTION INTRAMUSCULAR; INTRAVENOUS EVERY 10 MIN PRN
Status: DISCONTINUED | OUTPATIENT
Start: 2019-01-01 | End: 2019-01-01 | Stop reason: HOSPADM

## 2019-01-01 RX ORDER — MORPHINE SULFATE 30 MG/1
TABLET, FILM COATED, EXTENDED RELEASE ORAL
Qty: 30 TABLET | Refills: 0 | OUTPATIENT
Start: 2019-01-01

## 2019-01-01 RX ORDER — MORPHINE SULFATE 30 MG/1
30 TABLET, FILM COATED, EXTENDED RELEASE ORAL EVERY MORNING
Status: DISCONTINUED | OUTPATIENT
Start: 2019-01-01 | End: 2019-01-01 | Stop reason: HOSPADM

## 2019-01-01 RX ORDER — EVEROLIMUS 5 MG/1
5 TABLET ORAL DAILY
Qty: 28 TABLET | Refills: 0 | Status: SHIPPED | OUTPATIENT
Start: 2019-01-01 | End: 2020-01-01

## 2019-01-01 RX ORDER — POTASSIUM CHLORIDE 29.8 MG/ML
20 INJECTION INTRAVENOUS
Status: DISCONTINUED | OUTPATIENT
Start: 2019-01-01 | End: 2019-01-01 | Stop reason: HOSPADM

## 2019-01-01 RX ORDER — LIDOCAINE HYDROCHLORIDE ANHYDROUS AND DEXTROSE MONOHYDRATE .8; 5 G/100ML; G/100ML
2 INJECTION, SOLUTION INTRAVENOUS CONTINUOUS
Status: DISCONTINUED | OUTPATIENT
Start: 2019-01-01 | End: 2019-01-01 | Stop reason: CLARIF

## 2019-01-01 RX ORDER — KETAMINE HYDROCHLORIDE 10 MG/ML
INJECTION, SOLUTION INTRAMUSCULAR; INTRAVENOUS PRN
Status: DISCONTINUED | OUTPATIENT
Start: 2019-01-01 | End: 2019-01-01

## 2019-01-01 RX ORDER — EPHEDRINE SULFATE 50 MG/ML
INJECTION, SOLUTION INTRAMUSCULAR; INTRAVENOUS; SUBCUTANEOUS PRN
Status: DISCONTINUED | OUTPATIENT
Start: 2019-01-01 | End: 2019-01-01

## 2019-01-01 RX ORDER — DEXAMETHASONE 4 MG/1
4 TABLET ORAL DAILY
Status: DISCONTINUED | OUTPATIENT
Start: 2019-01-01 | End: 2019-01-01 | Stop reason: HOSPADM

## 2019-01-01 RX ORDER — LEVOTHYROXINE SODIUM 25 UG/1
25 TABLET ORAL DAILY
Qty: 30 TABLET | Refills: 3 | Status: SHIPPED | OUTPATIENT
Start: 2019-01-01 | End: 2020-01-01

## 2019-01-01 RX ORDER — ACETAMINOPHEN 325 MG/1
975 TABLET ORAL
Status: DISCONTINUED | OUTPATIENT
Start: 2019-01-01 | End: 2019-01-01 | Stop reason: HOSPADM

## 2019-01-01 RX ORDER — ONDANSETRON 2 MG/ML
4 INJECTION INTRAMUSCULAR; INTRAVENOUS EVERY 30 MIN PRN
Status: DISCONTINUED | OUTPATIENT
Start: 2019-01-01 | End: 2019-01-01 | Stop reason: HOSPADM

## 2019-01-01 RX ORDER — PROCHLORPERAZINE 25 MG
12.5 SUPPOSITORY, RECTAL RECTAL EVERY 12 HOURS PRN
Status: DISCONTINUED | OUTPATIENT
Start: 2019-01-01 | End: 2019-01-01 | Stop reason: HOSPADM

## 2019-01-01 RX ORDER — VITAMIN B COMPLEX
2000 TABLET ORAL DAILY
Status: DISCONTINUED | OUTPATIENT
Start: 2019-01-01 | End: 2019-01-01 | Stop reason: HOSPADM

## 2019-01-01 RX ORDER — MULTIVITAMIN,THERAPEUTIC
1 TABLET ORAL AT BEDTIME
COMMUNITY
Start: 2019-01-01

## 2019-01-01 RX ORDER — DEXAMETHASONE 4 MG/1
8 TABLET ORAL DAILY
Status: DISCONTINUED | OUTPATIENT
Start: 2019-01-01 | End: 2019-01-01 | Stop reason: HOSPADM

## 2019-01-01 RX ORDER — LIDOCAINE 4 G/G
1-2 PATCH TOPICAL
Status: DISCONTINUED | OUTPATIENT
Start: 2019-01-01 | End: 2019-01-01

## 2019-01-01 RX ORDER — FUROSEMIDE 20 MG
20 TABLET ORAL DAILY
Qty: 30 TABLET | Refills: 1 | Status: SHIPPED | OUTPATIENT
Start: 2019-01-01 | End: 2020-01-01

## 2019-01-01 RX ORDER — HEPARIN SODIUM (PORCINE) LOCK FLUSH IV SOLN 100 UNIT/ML 100 UNIT/ML
5 SOLUTION INTRAVENOUS
Status: CANCELLED | OUTPATIENT
Start: 2019-01-01

## 2019-01-01 RX ORDER — METOCLOPRAMIDE HYDROCHLORIDE 5 MG/ML
5 INJECTION INTRAMUSCULAR; INTRAVENOUS EVERY 6 HOURS PRN
Status: DISCONTINUED | OUTPATIENT
Start: 2019-01-01 | End: 2019-01-01 | Stop reason: HOSPADM

## 2019-01-01 RX ORDER — POTASSIUM CHLORIDE 7.45 MG/ML
10 INJECTION INTRAVENOUS
Status: DISCONTINUED | OUTPATIENT
Start: 2019-01-01 | End: 2019-01-01 | Stop reason: HOSPADM

## 2019-01-01 RX ORDER — DULOXETIN HYDROCHLORIDE 30 MG/1
30 CAPSULE, DELAYED RELEASE ORAL DAILY
Qty: 7 CAPSULE | Refills: 0 | Status: SHIPPED | OUTPATIENT
Start: 2019-01-01 | End: 2019-01-01

## 2019-01-01 RX ORDER — CHOLECALCIFEROL (VITAMIN D3) 50 MCG
2000 TABLET ORAL DAILY
COMMUNITY

## 2019-01-01 RX ORDER — TC 99M MEDRONATE 20 MG/10ML
20-30 INJECTION, POWDER, LYOPHILIZED, FOR SOLUTION INTRAVENOUS ONCE
Status: COMPLETED | OUTPATIENT
Start: 2019-01-01 | End: 2019-01-01

## 2019-01-01 RX ORDER — IOPAMIDOL 755 MG/ML
94 INJECTION, SOLUTION INTRAVASCULAR ONCE
Status: COMPLETED | OUTPATIENT
Start: 2019-01-01 | End: 2019-01-01

## 2019-01-01 RX ORDER — PREGABALIN 50 MG/1
50 CAPSULE ORAL 2 TIMES DAILY
Status: DISCONTINUED | OUTPATIENT
Start: 2019-01-01 | End: 2019-01-01 | Stop reason: HOSPADM

## 2019-01-01 RX ORDER — ACETAMINOPHEN 325 MG/1
975 TABLET ORAL ONCE
Status: COMPLETED | OUTPATIENT
Start: 2019-01-01 | End: 2019-01-01

## 2019-01-01 RX ORDER — MORPHINE SULFATE 15 MG/1
30 TABLET, FILM COATED, EXTENDED RELEASE ORAL ONCE
Status: COMPLETED | OUTPATIENT
Start: 2019-01-01 | End: 2019-01-01

## 2019-01-01 RX ORDER — ONDANSETRON 4 MG/1
4 TABLET, ORALLY DISINTEGRATING ORAL EVERY 30 MIN PRN
Status: DISCONTINUED | OUTPATIENT
Start: 2019-01-01 | End: 2019-01-01 | Stop reason: HOSPADM

## 2019-01-01 RX ORDER — POTASSIUM CHLORIDE 1500 MG/1
20-40 TABLET, EXTENDED RELEASE ORAL
Status: DISCONTINUED | OUTPATIENT
Start: 2019-01-01 | End: 2019-01-01 | Stop reason: HOSPADM

## 2019-01-01 RX ORDER — MORPHINE SULFATE 15 MG/1
TABLET, FILM COATED, EXTENDED RELEASE ORAL
Qty: 60 TABLET | Refills: 0 | OUTPATIENT
Start: 2019-01-01

## 2019-01-01 RX ORDER — AMOXICILLIN 250 MG
2-4 CAPSULE ORAL 2 TIMES DAILY
COMMUNITY
Start: 2019-01-01

## 2019-01-01 RX ORDER — METHOCARBAMOL 750 MG/1
750 TABLET, FILM COATED ORAL EVERY 6 HOURS PRN
Status: DISCONTINUED | OUTPATIENT
Start: 2019-01-01 | End: 2019-01-01 | Stop reason: HOSPADM

## 2019-01-01 RX ADMIN — MORPHINE SULFATE 30 MG: 30 TABLET, EXTENDED RELEASE ORAL at 08:21

## 2019-01-01 RX ADMIN — LIDOCAINE 1 PATCH: 560 PATCH PERCUTANEOUS; TOPICAL; TRANSDERMAL at 14:07

## 2019-01-01 RX ADMIN — IOPAMIDOL 123 ML: 755 INJECTION, SOLUTION INTRAVENOUS at 11:02

## 2019-01-01 RX ADMIN — ONDANSETRON 4 MG: 2 INJECTION INTRAMUSCULAR; INTRAVENOUS at 18:50

## 2019-01-01 RX ADMIN — POTASSIUM CHLORIDE 40 MEQ: 1500 TABLET, EXTENDED RELEASE ORAL at 10:46

## 2019-01-01 RX ADMIN — CEFAZOLIN SODIUM 2 G: 2 INJECTION, SOLUTION INTRAVENOUS at 16:48

## 2019-01-01 RX ADMIN — PREGABALIN 50 MG: 25 CAPSULE ORAL at 22:15

## 2019-01-01 RX ADMIN — FENTANYL CITRATE 25 MCG: 50 INJECTION INTRAMUSCULAR; INTRAVENOUS at 19:26

## 2019-01-01 RX ADMIN — FENTANYL CITRATE 50 MCG: 50 INJECTION, SOLUTION INTRAMUSCULAR; INTRAVENOUS at 16:30

## 2019-01-01 RX ADMIN — SENNOSIDES AND DOCUSATE SODIUM 2 TABLET: 8.6; 5 TABLET ORAL at 08:20

## 2019-01-01 RX ADMIN — ACETAMINOPHEN 1000 MG: 500 TABLET, FILM COATED ORAL at 08:06

## 2019-01-01 RX ADMIN — POTASSIUM PHOSPHATE, MONOBASIC AND POTASSIUM PHOSPHATE, DIBASIC 20 MMOL: 224; 236 INJECTION, SOLUTION INTRAVENOUS at 02:15

## 2019-01-01 RX ADMIN — CEFAZOLIN 1 G: 1 INJECTION, POWDER, FOR SOLUTION INTRAMUSCULAR; INTRAVENOUS at 01:46

## 2019-01-01 RX ADMIN — ACETAMINOPHEN 650 MG: 325 TABLET, FILM COATED ORAL at 12:15

## 2019-01-01 RX ADMIN — OMEPRAZOLE 40 MG: 20 CAPSULE, DELAYED RELEASE ORAL at 08:06

## 2019-01-01 RX ADMIN — GADOBUTROL 10 ML: 604.72 INJECTION INTRAVENOUS at 12:18

## 2019-01-01 RX ADMIN — MORPHINE SULFATE 15 MG: 15 TABLET, EXTENDED RELEASE ORAL at 22:17

## 2019-01-01 RX ADMIN — MELATONIN 2000 UNITS: at 10:12

## 2019-01-01 RX ADMIN — SODIUM CHLORIDE, POTASSIUM CHLORIDE, SODIUM LACTATE AND CALCIUM CHLORIDE: 600; 310; 30; 20 INJECTION, SOLUTION INTRAVENOUS at 16:21

## 2019-01-01 RX ADMIN — LIDOCAINE HYDROCHLORIDE 100 MG: 20 INJECTION, SOLUTION INFILTRATION; PERINEURAL at 16:30

## 2019-01-01 RX ADMIN — ROCURONIUM BROMIDE 25 MG: 10 INJECTION INTRAVENOUS at 17:04

## 2019-01-01 RX ADMIN — ROCURONIUM BROMIDE 10 MG: 10 INJECTION INTRAVENOUS at 18:19

## 2019-01-01 RX ADMIN — ONDANSETRON 4 MG: 4 TABLET, ORALLY DISINTEGRATING ORAL at 22:16

## 2019-01-01 RX ADMIN — MORPHINE SULFATE 30 MG: 15 TABLET, FILM COATED, EXTENDED RELEASE ORAL at 05:29

## 2019-01-01 RX ADMIN — Medication 5 MG: at 18:00

## 2019-01-01 RX ADMIN — MELATONIN 5 MG TABLET 5 MG: at 23:39

## 2019-01-01 RX ADMIN — Medication 50 MG: at 16:34

## 2019-01-01 RX ADMIN — FENTANYL CITRATE 50 MCG: 50 INJECTION, SOLUTION INTRAMUSCULAR; INTRAVENOUS at 16:57

## 2019-01-01 RX ADMIN — Medication 1 TABLET: at 08:21

## 2019-01-01 RX ADMIN — PHENYLEPHRINE HYDROCHLORIDE 50 MCG: 10 INJECTION INTRAVENOUS at 17:07

## 2019-01-01 RX ADMIN — HYDROMORPHONE HYDROCHLORIDE 0.3 MG: 1 INJECTION, SOLUTION INTRAMUSCULAR; INTRAVENOUS; SUBCUTANEOUS at 19:57

## 2019-01-01 RX ADMIN — PHENYLEPHRINE HYDROCHLORIDE 50 MCG: 10 INJECTION INTRAVENOUS at 17:52

## 2019-01-01 RX ADMIN — PROPOFOL 120 MG: 10 INJECTION, EMULSION INTRAVENOUS at 16:30

## 2019-01-01 RX ADMIN — ACETAMINOPHEN 975 MG: 325 TABLET, FILM COATED ORAL at 05:29

## 2019-01-01 RX ADMIN — SODIUM CHLORIDE 69 ML: 9 INJECTION, SOLUTION INTRAVENOUS at 20:04

## 2019-01-01 RX ADMIN — Medication 2 G: at 16:52

## 2019-01-01 RX ADMIN — MELATONIN 5 MG TABLET 5 MG: at 22:17

## 2019-01-01 RX ADMIN — LIDOCAINE HYDROCHLORIDE 2 MG/KG/HR: 20 INJECTION, SOLUTION INTRAVENOUS at 16:52

## 2019-01-01 RX ADMIN — POLYETHYLENE GLYCOL 3350 17 G: 17 POWDER, FOR SOLUTION ORAL at 13:05

## 2019-01-01 RX ADMIN — CEFAZOLIN 1 G: 1 INJECTION, POWDER, FOR SOLUTION INTRAMUSCULAR; INTRAVENOUS at 10:21

## 2019-01-01 RX ADMIN — IOPAMIDOL 94 ML: 755 INJECTION, SOLUTION INTRAVENOUS at 20:04

## 2019-01-01 RX ADMIN — SODIUM CHLORIDE, POTASSIUM CHLORIDE, SODIUM LACTATE AND CALCIUM CHLORIDE: 600; 310; 30; 20 INJECTION, SOLUTION INTRAVENOUS at 18:22

## 2019-01-01 RX ADMIN — MAGNESIUM SULFATE HEPTAHYDRATE 2 G: 40 INJECTION, SOLUTION INTRAVENOUS at 12:02

## 2019-01-01 RX ADMIN — HYDROMORPHONE HYDROCHLORIDE 4 MG: 2 TABLET ORAL at 01:46

## 2019-01-01 RX ADMIN — MORPHINE SULFATE 30 MG: 15 TABLET, FILM COATED, EXTENDED RELEASE ORAL at 22:16

## 2019-01-01 RX ADMIN — CALCIUM CARBONATE 600 MG (1,500 MG)-VITAMIN D3 400 UNIT TABLET 1 TABLET: at 10:12

## 2019-01-01 RX ADMIN — FENTANYL CITRATE 25 MCG: 50 INJECTION INTRAMUSCULAR; INTRAVENOUS at 19:33

## 2019-01-01 RX ADMIN — HYDROMORPHONE HYDROCHLORIDE 2 MG: 2 TABLET ORAL at 17:08

## 2019-01-01 RX ADMIN — ACETAMINOPHEN 1000 MG: 500 TABLET, FILM COATED ORAL at 13:05

## 2019-01-01 RX ADMIN — SENNOSIDES AND DOCUSATE SODIUM 2 TABLET: 8.6; 5 TABLET ORAL at 22:15

## 2019-01-01 RX ADMIN — FOLIC ACID 1 MG: 1 TABLET ORAL at 08:21

## 2019-01-01 RX ADMIN — MORPHINE SULFATE 15 MG: 15 TABLET, EXTENDED RELEASE ORAL at 13:25

## 2019-01-01 RX ADMIN — DEXAMETHASONE 8 MG: 4 TABLET ORAL at 08:21

## 2019-01-01 RX ADMIN — Medication 1 TABLET: at 19:57

## 2019-01-01 RX ADMIN — GUAIFENESIN AND DEXTROMETHORPHAN HYDROBROMIDE 1 TABLET: 600; 30 TABLET, EXTENDED RELEASE ORAL at 10:11

## 2019-01-01 RX ADMIN — SODIUM CHLORIDE, POTASSIUM CHLORIDE, SODIUM LACTATE AND CALCIUM CHLORIDE 1000 ML: 600; 310; 30; 20 INJECTION, SOLUTION INTRAVENOUS at 23:39

## 2019-01-01 RX ADMIN — SODIUM CHLORIDE, POTASSIUM CHLORIDE, SODIUM LACTATE AND CALCIUM CHLORIDE: 600; 310; 30; 20 INJECTION, SOLUTION INTRAVENOUS at 17:01

## 2019-01-01 RX ADMIN — ROCURONIUM BROMIDE 50 MG: 10 INJECTION INTRAVENOUS at 16:30

## 2019-01-01 RX ADMIN — SODIUM CHLORIDE 1000 ML: 9 INJECTION, SOLUTION INTRAVENOUS at 19:05

## 2019-01-01 RX ADMIN — SENNOSIDES AND DOCUSATE SODIUM 2 TABLET: 8.6; 5 TABLET ORAL at 08:06

## 2019-01-01 RX ADMIN — DEXMEDETOMIDINE HYDROCHLORIDE 0.5 MCG/KG/HR: 100 INJECTION, SOLUTION INTRAVENOUS at 17:07

## 2019-01-01 RX ADMIN — SENNOSIDES AND DOCUSATE SODIUM 2 TABLET: 8.6; 5 TABLET ORAL at 10:11

## 2019-01-01 RX ADMIN — PHENYLEPHRINE HYDROCHLORIDE 50 MCG: 10 INJECTION INTRAVENOUS at 17:30

## 2019-01-01 RX ADMIN — SENNOSIDES AND DOCUSATE SODIUM 2 TABLET: 8.6; 5 TABLET ORAL at 23:38

## 2019-01-01 RX ADMIN — ONDANSETRON 4 MG: 2 INJECTION INTRAMUSCULAR; INTRAVENOUS at 19:05

## 2019-01-01 RX ADMIN — POLYETHYLENE GLYCOL 3350 17 G: 17 POWDER, FOR SOLUTION ORAL at 08:25

## 2019-01-01 RX ADMIN — DULOXETINE HYDROCHLORIDE 60 MG: 60 CAPSULE, DELAYED RELEASE ORAL at 10:12

## 2019-01-01 RX ADMIN — PREGABALIN 50 MG: 50 CAPSULE ORAL at 11:59

## 2019-01-01 RX ADMIN — SENNOSIDES AND DOCUSATE SODIUM 3 TABLET: 8.6; 5 TABLET ORAL at 19:56

## 2019-01-01 RX ADMIN — OMEPRAZOLE 40 MG: 20 CAPSULE, DELAYED RELEASE ORAL at 10:11

## 2019-01-01 RX ADMIN — SUGAMMADEX 180 MG: 100 INJECTION, SOLUTION INTRAVENOUS at 19:00

## 2019-01-01 RX ADMIN — HYDROMORPHONE HYDROCHLORIDE 2 MG: 2 TABLET ORAL at 21:15

## 2019-01-01 RX ADMIN — ACETAMINOPHEN 975 MG: 325 TABLET, FILM COATED ORAL at 22:16

## 2019-01-01 RX ADMIN — MORPHINE SULFATE 15 MG: 15 TABLET, EXTENDED RELEASE ORAL at 23:38

## 2019-01-01 RX ADMIN — HYDROMORPHONE HYDROCHLORIDE 0.3 MG: 1 INJECTION, SOLUTION INTRAMUSCULAR; INTRAVENOUS; SUBCUTANEOUS at 19:45

## 2019-01-01 RX ADMIN — Medication 5 MG: at 18:03

## 2019-01-01 RX ADMIN — GABAPENTIN 100 MG: 100 CAPSULE ORAL at 12:15

## 2019-01-01 RX ADMIN — ACETAMINOPHEN 1000 MG: 500 TABLET, FILM COATED ORAL at 08:21

## 2019-01-01 RX ADMIN — OMEPRAZOLE 40 MG: 20 CAPSULE, DELAYED RELEASE ORAL at 08:21

## 2019-01-01 RX ADMIN — LORATADINE 10 MG: 10 TABLET ORAL at 11:59

## 2019-01-01 RX ADMIN — PHENYLEPHRINE HYDROCHLORIDE 50 MCG: 10 INJECTION INTRAVENOUS at 17:43

## 2019-01-01 RX ADMIN — LIDOCAINE 1 PATCH: 560 PATCH PERCUTANEOUS; TOPICAL; TRANSDERMAL at 10:22

## 2019-01-01 RX ADMIN — MELATONIN TAB 3 MG 10 MG: 3 TAB at 22:15

## 2019-01-01 RX ADMIN — MORPHINE SULFATE 30 MG: 30 TABLET, EXTENDED RELEASE ORAL at 08:06

## 2019-01-01 RX ADMIN — PREGABALIN 50 MG: 50 CAPSULE ORAL at 19:57

## 2019-01-01 RX ADMIN — KETAMINE HYDROCHLORIDE 10 MG/HR: 100 INJECTION, SOLUTION, CONCENTRATE INTRAMUSCULAR; INTRAVENOUS at 16:52

## 2019-01-01 RX ADMIN — HYDROMORPHONE HYDROCHLORIDE 4 MG: 2 TABLET ORAL at 10:12

## 2019-01-01 RX ADMIN — FENTANYL CITRATE 25 MCG: 50 INJECTION, SOLUTION INTRAMUSCULAR; INTRAVENOUS at 19:08

## 2019-01-01 RX ADMIN — MORPHINE SULFATE 30 MG: 15 TABLET, EXTENDED RELEASE ORAL at 13:26

## 2019-01-01 RX ADMIN — LIDOCAINE 2 PATCH: 560 PATCH PERCUTANEOUS; TOPICAL; TRANSDERMAL at 11:59

## 2019-01-01 RX ADMIN — FENTANYL CITRATE 50 MCG: 50 INJECTION INTRAMUSCULAR; INTRAVENOUS at 19:40

## 2019-01-01 RX ADMIN — ONDANSETRON 4 MG: 4 TABLET, ORALLY DISINTEGRATING ORAL at 07:28

## 2019-01-01 RX ADMIN — KETOROLAC TROMETHAMINE 30 MG: 30 INJECTION, SOLUTION INTRAMUSCULAR at 18:50

## 2019-01-01 RX ADMIN — ONDANSETRON 4 MG: 4 TABLET, ORALLY DISINTEGRATING ORAL at 13:05

## 2019-01-01 RX ADMIN — TC 99M MEDRONATE 22.6 MCI.: 20 INJECTION, POWDER, LYOPHILIZED, FOR SOLUTION INTRAVENOUS at 10:35

## 2019-01-01 RX ADMIN — PREGABALIN 50 MG: 25 CAPSULE ORAL at 10:11

## 2019-01-01 RX ADMIN — ACETAMINOPHEN 1000 MG: 500 TABLET, FILM COATED ORAL at 19:56

## 2019-01-01 RX ADMIN — FOLIC ACID 1 MG: 1 TABLET ORAL at 10:59

## 2019-01-01 RX ADMIN — THERA TABS 1 TABLET: TAB at 22:16

## 2019-01-01 ASSESSMENT — PAIN SCALES - GENERAL
PAINLEVEL: SEVERE PAIN (6)
PAINLEVEL: MODERATE PAIN (5)
PAINLEVEL: EXTREME PAIN (8)
PAINLEVEL: MILD PAIN (3)
PAINLEVEL: SEVERE PAIN (7)
PAINLEVEL: MILD PAIN (3)
PAINLEVEL: MODERATE PAIN (5)
PAINLEVEL: MILD PAIN (3)

## 2019-01-01 ASSESSMENT — ACTIVITIES OF DAILY LIVING (ADL)
DRESS: 0-->INDEPENDENT
SWALLOWING: 0-->SWALLOWS FOODS/LIQUIDS WITHOUT DIFFICULTY
COGNITION: 0 - NO COGNITION ISSUES REPORTED
ADLS_ACUITY_SCORE: 14
TOILETING: 0-->INDEPENDENT
ADLS_ACUITY_SCORE: 12
FALL_HISTORY_WITHIN_LAST_SIX_MONTHS: NO
ADLS_ACUITY_SCORE: 14
AMBULATION: 1-->ASSISTIVE EQUIPMENT
BATHING: 0-->INDEPENDENT
TRANSFERRING: 0-->INDEPENDENT
RETIRED_EATING: 0-->INDEPENDENT
ADLS_ACUITY_SCORE: 14
RETIRED_COMMUNICATION: 0-->UNDERSTANDS/COMMUNICATES WITHOUT DIFFICULTY

## 2019-01-01 ASSESSMENT — LIFESTYLE VARIABLES: TOBACCO_USE: 0

## 2019-01-01 ASSESSMENT — ENCOUNTER SYMPTOMS
DIARRHEA: 0
POSTURAL DYSPNEA: 1
DIARRHEA: 1
SHORTNESS OF BREATH: 1
STIFFNESS: 1
FATIGUE: 0
DYSURIA: 0
SNORES LOUDLY: 0
BLOOD IN STOOL: 0
FREQUENCY: 0
NAIL CHANGES: 0
WHEEZING: 0
APPETITE CHANGE: 1
JOINT SWELLING: 0
NAUSEA: 0
HEARTBURN: 0
VOMITING: 0
DIFFICULTY URINATING: 0
CONSTIPATION: 0
FEVER: 0
BLOOD IN STOOL: 0
CHILLS: 0
SPUTUM PRODUCTION: 1
BLOATING: 0
COUGH DISTURBING SLEEP: 0
VOMITING: 0
DYSPNEA ON EXERTION: 1
JAUNDICE: 0
HEMATURIA: 0
ABDOMINAL PAIN: 0
ARTHRALGIAS: 1
COUGH: 0
SKIN CHANGES: 0
BOWEL INCONTINENCE: 0
POOR WOUND HEALING: 0
SEIZURES: 0
MUSCLE WEAKNESS: 1
CONSTIPATION: 1
NAUSEA: 0
ABDOMINAL PAIN: 1
UNEXPECTED WEIGHT CHANGE: 1
MUSCLE CRAMPS: 1
HEMOPTYSIS: 0
RECTAL PAIN: 0

## 2019-01-01 ASSESSMENT — ANXIETY QUESTIONNAIRES
5. BEING SO RESTLESS THAT IT IS HARD TO SIT STILL: SEVERAL DAYS
7. FEELING AFRAID AS IF SOMETHING AWFUL MIGHT HAPPEN: NOT AT ALL
GAD7 TOTAL SCORE: 1
4. TROUBLE RELAXING: NOT AT ALL
1. FEELING NERVOUS, ANXIOUS, OR ON EDGE: NOT AT ALL
2. NOT BEING ABLE TO STOP OR CONTROL WORRYING: NOT AT ALL
3. WORRYING TOO MUCH ABOUT DIFFERENT THINGS: NOT AT ALL
GAD7 TOTAL SCORE: 1
7. FEELING AFRAID AS IF SOMETHING AWFUL MIGHT HAPPEN: NOT AT ALL
6. BECOMING EASILY ANNOYED OR IRRITABLE: NOT AT ALL
GAD7 TOTAL SCORE: 1

## 2019-01-01 ASSESSMENT — MIFFLIN-ST. JEOR
SCORE: 1691.12
SCORE: 1691.58
SCORE: 1670.26
SCORE: 1615.83
SCORE: 1616.25
SCORE: 1668.44
SCORE: 1691.12
SCORE: 1647.12
SCORE: 1617.64
SCORE: 1609.48
SCORE: 1689.31

## 2019-01-01 ASSESSMENT — COPD QUESTIONNAIRES: COPD: 0

## 2019-01-01 ASSESSMENT — COLUMBIA-SUICIDE SEVERITY RATING SCALE - C-SSRS: IS THE PATIENT NOT ABLE TO COMPLETE C-SSRS: REFUSES TO ANSWER

## 2019-01-02 NOTE — PROGRESS NOTES
Dec 10, 2018          Chief complaint:   Metastatic Carcinoma with bone metastases, likely renal origin (no primary renal mass)  Cabozantinib: Start September 28, 2018          History of Present Illness:      Javon Bianchi is a 68 year old male with a history significant for hyperlipidemia who had been in his usual health until he developed back pain in May this year. At that time, the pain was a stabbing like in the middle of lower back with burning like pain wrapping around this left hip to knee area. No trauma. He had MRI on 5/23/18 showed mild degenerative change with bulging disks L2-S1. A small benign hemangioma was noted in L2, no other marrow signal abnormality. Since then, the pain failed to improve despite steroid injections. In addition, he actually lost 25 lbs weight unintentionally since June along with chills few time a day. Overtime, the pain got worse to the point he could not ambulate after short distance and developed muscle weakness in lower extremities over time requiring a walker. Finally, he went to ER ([facility] in [place]) on 8/23/18, CT A/P was unremarkable. He was referred to neurology with obtaining MRI L spine. MRI on 8/30/18 revealed a pathological fracture at L3 vertebral body with height loss at 40% and diffuse involvement of a neoplastic process. IR guided biopsy on 8/31/18 showed poorly differentiated carcinoma. Its IHC was suggestive of possible renal cell carcinoma per pathology report (Renal cell immunochemistry is positive and along with the negative staining for CK7 and CK20 suggests the possibility of a renal carcinoma, however most renal cell carcinomas also express PAX8 which is negative in this case). Of note, CT chest/abdomen/pelvis at OSH was negative for primary lesion. The patient was evaluated by neurosurgery who recommended no surgical intervention. He was discharged with TLSO brace and walker. He had palliative XRT locally from 9/11 for 10 fractions (Done in  [place]).      Cabozantinib 60 mg started on September 28, 2018.  He returns today for interval visit.      Interval history:   Patient had developed hand foot syndrome, we asked him to hold off cabozantinib until gets better.  Denies diarrhea, rash.   Overall doing well.   His pain is much better.       PAST MEDICAL SURGICAL HISTORY:Reviewed.  SOCIAL HISTORY: Reviewed.      MEDICATIONS: Reviewed.           Home Medications:          Current Outpatient Prescriptions   Medication     atorvastatin (LIPITOR) 20 MG tablet     cyclobenzaprine (FLEXERIL) 10 MG tablet     doxycycline (VIBRA-TABS) 100 MG tablet     morphine (MS CONTIN) 30 MG 12 hr tablet     senna-docusate (SENOKOT-S;PERICOLACE) 8.6-50 MG per tablet     aspirin 81 MG tablet     Cabozantinib S-Malate (CABOMETYX) 60 MG     dexamethasone (DECADRON) 4 MG tablet     melatonin 3 MG tablet     methylPREDNISolone (MEDROL DOSEPAK) 4 MG tablet     oxyCODONE (OXYCONTIN) 20 MG 12 hr tablet     polyethylene glycol (MIRALAX/GLYCOLAX) Packet     Pseudoephedrine-APAP  MG TABS     ranitidine (ZANTAC) 150 MG tablet          Current Facility-Administered Medications   Medication     influenza Vac Split High-Dose (FLUZONE) injection 0.5 mL                          ECOG performance status of 1.   Vital signs: reviewed from chart   HEENT: No icterus, no pallor. Moist mucous membranes. Oropharynx is clear.   NECK: Supple  LUNGS: Bilateral clear to ausculation  CARDIOVASCULAR: Regular rate and rhythm, no murmurs, gallops or rubs.   ABDOMEN: Soft, nontender and nondistended.   EXTREMITIES: No cyanosis, no clubbing, no edema.   NEUROLOGIC: No focal deficits.  SKIN: no rash   Labs: not clinically significant             Assessment /Plan      68-year-old male with metastatic infiltrative lesion in L3 extending to L2 and L4, likely primary malignancy being renal cell carcinoma.  However he does not have any evidence of a primary renal mass on imaging.    Based on the available  pathology data,  We offered treatment for renal cell carcinoma with cabozantinib (CABOSUN data demonstrating cabozantinib superiority over sunitinib in the initial setting).  PAX8 stain was negative, and the clear cell features were not seen (it was a poorly differentiated carcinoma).   He had a favorable response to cabozantinib on recent scans.  We discussed that we will reduce cabozantinib dose to 40 mg for better tolerability.  We also discussed measures for hand foot syndrome and that he needs to hold cabozantinib if gets worse again.  Pain: On MS contin, following with Palliative Medicine for pain control, next visit in the end of December.    Anxiety; Encouraged to follow with psychologist.     Return to clinic in 4 weeks with labs and imaging.     Malathi De Luna MD  Hematology Oncology and Transplantation  [facility]

## 2019-01-03 ENCOUNTER — TELEPHONE (OUTPATIENT)
Dept: ONCOLOGY | Facility: CLINIC | Age: 69
End: 2019-01-03

## 2019-01-03 NOTE — TELEPHONE ENCOUNTER
Per Patient's request,  completed and faxed [facility] referral for lodging dates 1/16 - 1/17. [facility] will contact Patient directly for confirmation of reservation.  will continue to provide support as needed.      Sissy Scales ([place]), [facility], MSW   - [facility]  Phone: [phone]  Pager: [phone]  1/3/2019

## 2019-01-07 ENCOUNTER — TELEPHONE (OUTPATIENT)
Dept: PHARMACY | Facility: CLINIC | Age: 69
End: 2019-01-07

## 2019-01-07 NOTE — ORAL ONC MGMT
Oral Chemotherapy Monitoring Program     Primary Oncologist: Annamarie       Primary Oncology Clinic: [facility]  Cancer Diagnosis: Likely renal cell with bone mets     Therapy History:  Cabometyx started: 18  Cabometyx 60mg: HELD - due to infection  Cabometyx 60m2018 - 12/3/18  Cabometyx 60mg: HELD 18 - 18 due to N and HFS  Cabometyx 60m18 - 18  Cabometyx 40m18 - 18  Cabometyx 40mg: HELD  - in order to enjoy the holidays.   Cabometyx 40m/26 (restarted after treatment holiday during the holidays)     Drug Interaction Assessment: No new medications at this time     Lab Monitoring Plan  Monthly CMP, CBC, urine protein and BP.     Subjective/Objective:  Javon Bianchi is a 68 year old male contacted by phone for a follow-up visit for oral chemotherapy. He reports restarting cabometyx 40mg daily on 18 and reports that the previous side effects are more manageable since restarting. Nasal drainage has significantly reduced since starting mucinex. This has greatly reduced his anxiety in the evenings due to the thick, dried mucus making it feel like he is choking, and he was very happy to report the reduction in anxiety, and he state it is much more manageable now and I'm also starting to see a counselor to discuss these issues. For his previous report of hand foot skin reaction of his feet, he states he has tenderness on the bottom of his feet when walking but he denies pain associated with this. He states that it does not interfere with his ability to do daily activities or get around. He has been applying moisturizer to his feet once daily. Omega and his wife reported they are feeling better about the cabometyx since restarting therapy.     ORAL CHEMOTHERAPY 2018 2018 12/3/2018 2018 2018 2018 2019   Drug Name Cabometyx (cabozantinib) Cabometyx (cabozantinib) Cabometyx (cabozantinib) Cabometyx (cabozantinib)  Cabometyx (cabozantinib) Cabometyx (cabozantinib) Cabometyx (cabozantinib)   Current Dosage 60mg 60mg 60mg 60mg 60mg 40mg 40mg   Current Schedule Daily Daily Daily Daily Daily Daily Daily   Cycle Details Continuous Drug on Hold Continuous Drug on Hold Drug on Hold Drug on Hold Continuous   Start Date of Last Cycle 11/27/2018 - 12/1/2018 - 12/10/2018 12/12/2018 12/26/2018   Planned next cycle start date 12/27/2018 - - - - - 1/19/2019   Doses missed in last 2 weeks 0 - 0 - - - -   Adherence Assessment Adherent - Adherent - - Non-adherent Non-adherent   Reason for Non-adherence - - - - - Drug on hold;Wanted to avoid side-effects Drug on hold;Wanted to avoid side-effects   Adherence Intervention Recommended - - - - - - None   Adverse Effects Palmar-plantar erythrodysethesia syndrome;Oral mucositis - Palmar-plantar erythrodysethesia syndrome;Nausea - - - Palmar-plantar erythrodysethesia syndrome   Nausea - - Grade 2 - - - -   Pharmacist Intervention(nausea) - - Yes - - - -   Intervention(s) - - Referral to oncology provider - - - -   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 No - - - - - No   Is the dose as ordered appropriate for the patient? Yes - - - - - Yes   Is the patient currently in pain? No - - - - - -   Has the patient been assessed within the past 6 months for depression? - - - - - - -   Has the patient missed any days of school, work, or other routine activity? - - - - - - -       Vitals:  BP:   BP Readings from Last 1 Encounters:   12/10/18 124/76     Wt Readings from Last 1 Encounters:   12/10/18 107.3 kg (236 lb 9.6 oz)     Estimated body  surface area is 2.33 meters squared as calculated from the following:    Height as of 11/15/18: 1.829 m (6').    Weight as of 12/10/18: 107.3 kg (236 lb 9.6 oz).    Labs:  _  Result Component Current Result Ref Range   Sodium 140 (12/10/2018) 133 - 144 mmol/L     _  Result Component Current Result Ref Range   Potassium 4.1 (12/10/2018) 3.4 - 5.3 mmol/L     _  Result Component Current Result Ref Range   Calcium 8.3 (L) (12/10/2018) 8.5 - 10.1 mg/dL     No results found for Mag within last 30 days.     No results found for Phos within last 30 days.     _  Result Component Current Result Ref Range   Albumin 3.0 (L) (12/10/2018) 3.4 - 5.0 g/dL     _  Result Component Current Result Ref Range   Urea Nitrogen 8 (12/10/2018) 7 - 30 mg/dL     _  Result Component Current Result Ref Range   Creatinine 0.71 (12/10/2018) 0.66 - 1.25 mg/dL       _  Result Component Current Result Ref Range   AST 38 (12/10/2018) 0 - 45 U/L     _  Result Component Current Result Ref Range   ALT 35 (12/10/2018) 0 - 70 U/L     _  Result Component Current Result Ref Range   Bilirubin Total 1.2 (12/10/2018) 0.2 - 1.3 mg/dL       _  Result Component Current Result Ref Range   WBC 4.3 (12/10/2018) 4.0 - 11.0 10e9/L     _  Result Component Current Result Ref Range   Hemoglobin 13.6 (12/10/2018) 13.3 - 17.7 g/dL     _  Result Component Current Result Ref Range   Platelet Count 176 (12/10/2018) 150 - 450 10e9/L     _  Result Component Current Result Ref Range   Absolute Neutrophil 2.0 (12/10/2018) 1.6 - 8.3 10e9/L       Assessment:  Omega is tolerating therapy better since restarting on 12/26/18. Nasal drainage relieved with use of mucinex which has improved anxiety. He is also now seeing a counselor to discuss his anxiety specifically around his diagnosis and prognosis. Hand food skin reaction present (grade 1) but not interfering with daily activities. Will continue to monitor at upcoming visits.     Plan:  Continue cabometyx 40mg  daily    Follow-Up:  1/17/19: review appt with Dr. De Luna  2/6/19: monthly assessment    Refill Due:  1/17/19 - will  at [facility] following appointment with Dr. De Luna. He indicated new insurance so pharmacy liaison is looking into coverage.     Dimitri Hoffman, CarmenD, MS  Hematology/Oncology Clinical Pharmacist  [facility]  [facility]

## 2019-01-15 ENCOUNTER — ONCOLOGY VISIT (OUTPATIENT)
Dept: ONCOLOGY | Facility: CLINIC | Age: 69
End: 2019-01-15
Attending: INTERNAL MEDICINE
Payer: COMMERCIAL

## 2019-01-15 VITALS
OXYGEN SATURATION: 98 % | WEIGHT: 236.5 LBS | HEART RATE: 63 BPM | TEMPERATURE: 97.8 F | SYSTOLIC BLOOD PRESSURE: 130 MMHG | DIASTOLIC BLOOD PRESSURE: 77 MMHG | HEIGHT: 72 IN | RESPIRATION RATE: 16 BRPM | BODY MASS INDEX: 32.03 KG/M2

## 2019-01-15 DIAGNOSIS — M79.672 PAIN IN BOTH FEET: ICD-10-CM

## 2019-01-15 DIAGNOSIS — F41.9 ANXIETY: ICD-10-CM

## 2019-01-15 DIAGNOSIS — M79.671 PAIN IN BOTH FEET: ICD-10-CM

## 2019-01-15 DIAGNOSIS — C79.51 BONE METASTASIS: Primary | ICD-10-CM

## 2019-01-15 DIAGNOSIS — Z51.5 ENCOUNTER FOR PALLIATIVE CARE: ICD-10-CM

## 2019-01-15 PROCEDURE — 99215 OFFICE O/P EST HI 40 MIN: CPT | Performed by: INTERNAL MEDICINE

## 2019-01-15 PROCEDURE — G0463 HOSPITAL OUTPT CLINIC VISIT: HCPCS

## 2019-01-15 RX ORDER — DULOXETIN HYDROCHLORIDE 30 MG/1
CAPSULE, DELAYED RELEASE ORAL
Qty: 60 CAPSULE | Refills: 0 | Status: SHIPPED | OUTPATIENT
Start: 2019-01-15 | End: 2019-02-12

## 2019-01-15 ASSESSMENT — MIFFLIN-ST. JEOR: SCORE: 1870.76

## 2019-01-15 ASSESSMENT — PAIN SCALES - GENERAL: PAINLEVEL: MODERATE PAIN (4)

## 2019-01-15 NOTE — PROGRESS NOTES
Oncology Rooming Note    January 15, 2019 12:39 PM   Javon Bianchi is a 68 year old male who presents for:    Chief Complaint   Patient presents with     Oncology Clinic Visit     Bone metastasis (H)     Initial Vitals: /77 (BP Location: Left arm, Patient Position: Sitting, Cuff Size: Adult Regular)   Pulse 63   Temp 97.8  F (36.6  C) (Oral)   Resp 16   Ht 1.829 m (6')   Wt 107.3 kg (236 lb 8 oz)   SpO2 98%   BMI 32.08 kg/m   Estimated body mass index is 32.08 kg/m  as calculated from the following:    Height as of this encounter: 1.829 m (6').    Weight as of this encounter: 107.3 kg (236 lb 8 oz). Body surface area is 2.33 meters squared.  Moderate Pain (4) Comment: Data Unavailable   No LMP for male patient.  Allergies reviewed: Yes  Medications reviewed: Yes    Medications: Medication refills not needed today.  Pharmacy name entered into [facility]: [facility] - [address]    Clinical concerns: no    5 minutes for nursing intake (face to face time)          Alma Marks MA

## 2019-01-15 NOTE — LETTER
1/15/2019         RE: Javon Bianchi  [address]  [place] [address]        Dear Colleague,    Thank you for referring your patient, Javon Bianchi, to the [facility]. Please see a copy of my visit note below.    Oncology Rooming Note    January 15, 2019 12:39 PM   Javon Bianchi is a 68 year old male who presents for:    Chief Complaint   Patient presents with     Oncology Clinic Visit     Bone metastasis (H)     Initial Vitals: /77 (BP Location: Left arm, Patient Position: Sitting, Cuff Size: Adult Regular)   Pulse 63   Temp 97.8  F (36.6  C) (Oral)   Resp 16   Ht 1.829 m (6')   Wt 107.3 kg (236 lb 8 oz)   SpO2 98%   BMI 32.08 kg/m    Estimated body mass index is 32.08 kg/m  as calculated from the following:    Height as of this encounter: 1.829 m (6').    Weight as of this encounter: 107.3 kg (236 lb 8 oz). Body surface area is 2.33 meters squared.  Moderate Pain (4) Comment: Data Unavailable   No LMP for male patient.  Allergies reviewed: Yes  Medications reviewed: Yes    Medications: Medication refills not needed today.  Pharmacy name entered into [facility]: [facility] - [place], [place] - [address]    Clinical concerns: no    5 minutes for nursing intake (face to face time)          Alma Marks MA                Palliative Care Outpatient Clinic Consultation Note    Patient:  Javon Bianchi    Chief Complaint:   Javon Bianchi 68 year old male who is presenting to the palliative medicine clinic today accompanied by his wife Suzie at the request of Dr. Gonzalez for a palliative care consultation secondary to renal cell carcinoma.   The patient's primary care provider is:  LIANNE Wellington.     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  He was seen by my colleague, Dr. Gonzalez, for initial outpatient consultation in November 2018.  Since he was last seen by Dr. Gonzalez, he notes that he  "has been on and off chemotherapy (currently off due to a sore on his foot). Has been on Cabometyx.  He had a sore on his foot from cabometyx previously, and he was taken off the chemotherapy. He has a local Podiatrist who saw him last time for this, and he notes that he had a \"blood flow leg test\" last week. Has not gotten this information yet.     If he is going to be on chemotherapy for the rest of his life, \"will this be worth it?\" Is not sure the goal of his cancer treatments but believes it to be cure.     Notes that he has a tough period in the afternoon with malaise.     His back and feet hurt with the cabometyx, and he feels like he has the flu coming on. Feet have been so painful that he needs to use a walker or cane to ambulate. Has been taking MS Contin 15mg/30mg at bedtime. Also has oxycodone at home which he hasn't been taking. Starting to feel a little like his nerve is \"being pinched\" in the back again over the past few days (no loss of control of bowel or bladder, no saddle anesthesia).    Struggling with anxiety. Has been taking the hydroxyzine four times per day and is noting dry mouth/phlegm, was in the ED with anxiety and phlegm recently.  Started on Mucinex which is helping a little. Has been struggling with anxiety since his diagnosis. This escalated in September.       Social History  No identified changes.   Social History     Tobacco Use     Smoking status: Never Smoker     Smokeless tobacco: Never Used   Substance Use Topics     Alcohol use: Not on file     Drug use: Not on file       Advance Care Planning:  Advance Directive:    Not on file.            Physical Exam:   Vitals were reviewed  /77 (BP Location: Left arm, Patient Position: Sitting, Cuff Size: Adult Regular)   Pulse 63   Temp 97.8  F (36.6  C) (Oral)   Resp 16   Ht 1.829 m (6')   Wt 107.3 kg (236 lb 8 oz)   SpO2 98%   BMI 32.08 kg/m     General: Alert, comfortable appearing male in no acute distress.   Eyes: Pupils " "equal, sclera clear.   ENT: MMM.   Resp: Unlabored on room air.   Ext: Warm and well perfused.  No pedal edema. Plantar aspects of bilateral feet are without erythema.  Right foot has two rings of previously ruptured bullae surrounding healthy appearing skin.   Neuro: No facial asymmetry.  Spontaneous movements grossly non-focal.  Gait assisted by cane.   Psych: Alert, appropriately interactive, full affect, normal sensorium.     Data Reviewed:  LABS:   Lab Results   Component Value Date    WBC 4.3 12/10/2018    HGB 13.6 12/10/2018    HCT 40.9 12/10/2018     12/10/2018     12/10/2018    POTASSIUM 4.1 12/10/2018    CHLORIDE 107 12/10/2018    CO2 26 12/10/2018    BUN 8 12/10/2018    CR 0.71 12/10/2018    GLC 91 12/10/2018    AST 38 12/10/2018    ALT 35 12/10/2018    ALKPHOS 106 12/10/2018    BILITOTAL 1.2 12/10/2018    INR 1.3 (A) 08/31/2018     IMAGING:   Last CT C/A/P 11/13/18  \"IMPRESSION:  1. Bilateral pulmonary nodules measuring up to 7 mm in the right upper  lobe, not significantly changed when compared to 9/6/2018. No new or  enlarging pulmonary nodules. Attention on follow-up studies.  2. Enhancing tubular structure in hepatic segment 5/8, likely  representing a vascular shunt, as described above. No suspicious liver  lesions.  3. No intra-abdominal source of the patient's poorly differentiated  carcinoma.  4. Again noted stable metastatic bone lesions as described above,  predominantly a compression pathologic fracture of the vertebral body  L3 extending along the posterior elements, left paraspinal  musculature, left psoas muscle extending within the spinal canal  compressing the spinal cord and obliterating the left lateral recess  with nerve root compression at this level, as described above.     [Result: Compression pathologic fracture at the vertebral body L3 with  involvement of the spinal canal, as described above.]\"    [place]  - Use of controlled substances consistent with history. "     Impressions:    Javon is a 68 year old man with likely metastatic renal cell carcinoma on cabometyx who has been struggling with pain associated with hand/foot syndrome.  He has conflicting wishes, both to be more comfortable, and also to decrease his opioid use.  Will see if topical morphine is covered, and otherwise he may benefit from changing his MSContin to 30mg in the day and 15mg at night, since most of his pain is currently during the day.     Recommendations & Counselin. Symptoms:  Dry mouth/excessive thick phlegm may be worsened by hydroxyzine use.  Will start Cymbalta to treat anxiety and radicular pain, side effects reviewed. Will also try morphine gel if covered by insurance.   -Will reach out to Dr. De Luna to let her know about return of radicular pain.  I am not clear if Omega has planned upcoming scans.     2. Disease Understanding: I shared with Omega my understanding this the goals of his treatments are Palliative intent.  He feels that he has heard something different from Dr. De Luna.  He reports that he would find it helpful to clarify this, and I have encouraged him to discuss this further with Dr. De Luna this week.       [facility] Outpatient Palliative Care Opioid Prescribing Safety Plan     Opioid Safety Education: Reviewed by Nan Gonzalez  on 2018  Opioid Risk Assessment: Performed by Nan Gonzalez  on 2018 .  ORT score of 0.   Mood Disorder Assessment: Performed by Nan Gonzalez  on 2018.     reviewed with every prescription, last reviewed by Carly Mills on 01/15/19     Additional recommendations based on patient's prognosis and indication for opioids include the following:     Expected prognosis: shorter  Risk: Low or Medium (ORT 0-7)  No further recommendations.     RTC 1-2 months for a follow up.     Thank you for involving me in the care of this patient.     Carly Mills MD / Palliative Medicine / Pager [phone] / After-Hours  Answering Service [phone] / Main Palliative Clinic - [facility] [phone] / [facility] Inpatient Team Consult Pager [phone] (answered 8am-430pm M-F)    Additional History Reviewed:   No Known Allergies  Current Outpatient Medications   Medication Sig Dispense Refill     atorvastatin (LIPITOR) 20 MG tablet Take 20 mg by mouth       Cabozantinib S-Malate (CABOMETYX) 40 MG Take 1 tablet (40 mg) by mouth daily 30 tablet 11     hydrOXYzine (ATARAX) 25 MG tablet TAKE 1 TABLET EVERY SIX HOURS AS NEEDED FOR ANXIETY  3     morphine (MS CONTIN) 15 MG 12 hr tablet Take 1 tablet (15 mg) by mouth every 24 hours Take at 6am 30 tablet 0     morphine (MS CONTIN) 30 MG 12 hr tablet Take 1 tablet (30 mg) by mouth every 24 hours Take at 6pm 90 tablet 0     senna-docusate (SENOKOT-S;PERICOLACE) 8.6-50 MG per tablet        aspirin 81 MG tablet Take 81 mg by mouth       melatonin 3 MG tablet Take 3 mg by mouth       polyethylene glycol (MIRALAX/GLYCOLAX) Packet Take 1 packet by mouth       Pseudoephedrine-APAP  MG TABS        ranitidine (ZANTAC) 150 MG tablet Take 150 mg by mouth 2 times daily       Past Medical History:   Diagnosis Date     Bone metastasis (H) 09/28/2018     Renal cell carcinoma, right (H) 09/28/2018     No past surgical history on file.  No family history on file.    Face to face time: 59 minutes, with >50% of time devoted to patient counseling.     Again, thank you for allowing me to participate in the care of your patient.        Sincerely,        Carly Mills MD

## 2019-01-15 NOTE — PATIENT INSTRUCTIONS
Thank you for coming into the Palliative Care Clinic today.     1. Question for Dr. De Luna: Can my cancer be cured?   2. Start Cymbalta 30mg daily for one week, then increase to 60mg daily thereafter.   3. Okay to use hydroxyzine 25mg every 6 hours as needed for breakthrough anxiety.   4. Try topical morphine up to four times daily for breakthrough pain.     Return to clinic 1-2 months for a follow up.     You can reach the Palliative Care Team during business hours at the following number: [phone] (Palliative Clinic Nurse Line).     To reach the Palliative Care Provider on-call after-hours or on holidays and weekends, call: [phone].  Please note that we are not able to provide pain medication refills on evenings or weekends.     ==================================================================    [facility] Palliative Care [facility]   The [facility] Palliative Care Team is committed to treating your pain and other symptoms. This handout is for all patients treated with opioid medications in our clinics.     What are opioid medicines?   Opioid medications are used to ease some types of pain and shortness of breath. They are sometimes called narcotics. They include: Morphine (MS Contin, Roxanol), Oxycodone (OxyContin, OxyFast, Percocet), Hydrocodone (Vicodin, Norco), Hydromorphone (Dilaudid), Fentanyl (Duragesic), Methadone (Dolophine).   Are opioid medicines safe to take?   Opioid medicines are safe when you follow the directions from your doctor or medical provider.  Opioids can cause serious side effects and become unsafe if you do not follow your instructions.   To make sure you are taking opioid medicines safely, we may ask you to bring in your pill bottles or to allow us to test your urine. Our goal is to keep you safe and to improve your ability to function & be active. If we don t think opioids are safely doing that, we will work with you to taper you off of  them.   Do not drive unless your medical provider tells you it is safe.   Do not take opioids with alcohol or anxiety/sleep medicines unless your doctor tells you it is ok to do so.   Are opioids addictive?   Addiction means you crave a drug and have trouble limiting the amount you use.   Addiction is more likely to happen if you take opioids to relieve stress or to feel altered. If you or your loved one feels this way when taking opioid medicines, let your medical provider know. We may refer you for additional assessment or services if this is a concern.   Your body will get used to the opioid medicines if you take them for more than two weeks in a row. This means if you stop them suddenly, you may have withdrawal. If this occurs, you will feel uncomfortable (nausea, diarrhea, increased pain). This does not mean you are addicted. Never stop taking your pain medicines all at once unless your medical provider tells you to. If you think you need less medicine, your medical provider will help you to safely lower your dose.   What is the safest way to store opioids?   Keep your medicines in a safe place away from children, teens, pets, and visitors. Be careful about who knows that you have these medicines.   How do I get rid of my old opioids?   Opioids should be brought to your [facility] drop-off site, or you can purchase a disposal kit from your local pharmacy. If neither of these options is available, the Food and Drug Administration recommends that you flush unused opioids down the toilet.   Do not share or sell your pain medicines. This is illegal and very dangerous. We cannot prescribe opioid pain medicines to you if do this.   How do I get refills?   Opioid medicines need signed paper prescriptions. This means it may take longer to refill than other medicines. Our clinic cannot prescribe them on weekends or at night.     Give us one week s notice when requesting a refill. This gives us the time we need to get it  signed and back to you. It may be possible to mail prescriptions to you.

## 2019-01-15 NOTE — PROGRESS NOTES
"Palliative Care Outpatient Clinic Consultation Note    Patient:  Javon Bianchi    Chief Complaint:   Javon Bianchi 68 year old male who is presenting to the palliative medicine clinic today accompanied by his wife Suzie at the request of Dr. Gonzalez for a palliative care consultation secondary to renal cell carcinoma.   The patient's primary care provider is:  LIANNE Wellington.     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  He was seen by my colleague, Dr. Gonzalez, for initial outpatient consultation in November 2018.  Since he was last seen by Dr. Gonzalez, he notes that he has been on and off chemotherapy (currently off due to a sore on his foot). Has been on Cabometyx.  He had a sore on his foot from cabometyx previously, and he was taken off the chemotherapy. He has a local Podiatrist who saw him last time for this, and he notes that he had a \"blood flow leg test\" last week. Has not gotten this information yet.     If he is going to be on chemotherapy for the rest of his life, \"will this be worth it?\" Is not sure the goal of his cancer treatments but believes it to be cure.     Notes that he has a tough period in the afternoon with malaise.     His back and feet hurt with the cabometyx, and he feels like he has the flu coming on. Feet have been so painful that he needs to use a walker or cane to ambulate. Has been taking MS Contin 15mg/30mg at bedtime. Also has oxycodone at home which he hasn't been taking. Starting to feel a little like his nerve is \"being pinched\" in the back again over the past few days (no loss of control of bowel or bladder, no saddle anesthesia).    Struggling with anxiety. Has been taking the hydroxyzine four times per day and is noting dry mouth/phlegm, was in the ED with anxiety and phlegm recently.  Started on Mucinex which is helping a little. Has been struggling with anxiety since his diagnosis. This escalated in " "September.       Social History  No identified changes.   Social History     Tobacco Use     Smoking status: Never Smoker     Smokeless tobacco: Never Used   Substance Use Topics     Alcohol use: Not on file     Drug use: Not on file       Advance Care Planning:  Advance Directive:    Not on file.            Physical Exam:   Vitals were reviewed  /77 (BP Location: Left arm, Patient Position: Sitting, Cuff Size: Adult Regular)   Pulse 63   Temp 97.8  F (36.6  C) (Oral)   Resp 16   Ht 1.829 m (6')   Wt 107.3 kg (236 lb 8 oz)   SpO2 98%   BMI 32.08 kg/m    General: Alert, comfortable appearing male in no acute distress.   Eyes: Pupils equal, sclera clear.   ENT: MMM.   Resp: Unlabored on room air.   Ext: Warm and well perfused.  No pedal edema. Plantar aspects of bilateral feet are without erythema.  Right foot has two rings of previously ruptured bullae surrounding healthy appearing skin.   Neuro: No facial asymmetry.  Spontaneous movements grossly non-focal.  Gait assisted by cane.   Psych: Alert, appropriately interactive, full affect, normal sensorium.     Data Reviewed:  LABS:   Lab Results   Component Value Date    WBC 4.3 12/10/2018    HGB 13.6 12/10/2018    HCT 40.9 12/10/2018     12/10/2018     12/10/2018    POTASSIUM 4.1 12/10/2018    CHLORIDE 107 12/10/2018    CO2 26 12/10/2018    BUN 8 12/10/2018    CR 0.71 12/10/2018    GLC 91 12/10/2018    AST 38 12/10/2018    ALT 35 12/10/2018    ALKPHOS 106 12/10/2018    BILITOTAL 1.2 12/10/2018    INR 1.3 (A) 08/31/2018     IMAGING:   Last CT C/A/P 11/13/18  \"IMPRESSION:  1. Bilateral pulmonary nodules measuring up to 7 mm in the right upper  lobe, not significantly changed when compared to 9/6/2018. No new or  enlarging pulmonary nodules. Attention on follow-up studies.  2. Enhancing tubular structure in hepatic segment 5/8, likely  representing a vascular shunt, as described above. No suspicious liver  lesions.  3. No intra-abdominal source " "of the patient's poorly differentiated  carcinoma.  4. Again noted stable metastatic bone lesions as described above,  predominantly a compression pathologic fracture of the vertebral body  L3 extending along the posterior elements, left paraspinal  musculature, left psoas muscle extending within the spinal canal  compressing the spinal cord and obliterating the left lateral recess  with nerve root compression at this level, as described above.     [Result: Compression pathologic fracture at the vertebral body L3 with  involvement of the spinal canal, as described above.]\"    [place]  - Use of controlled substances consistent with history.     Impressions:    Javon is a 68 year old man with likely metastatic renal cell carcinoma on cabometyx who has been struggling with pain associated with hand/foot syndrome.  He has conflicting wishes, both to be more comfortable, and also to decrease his opioid use.  Will see if topical morphine is covered, and otherwise he may benefit from changing his MSContin to 30mg in the day and 15mg at night, since most of his pain is currently during the day.     Recommendations & Counselin. Symptoms:  Dry mouth/excessive thick phlegm may be worsened by hydroxyzine use.  Will start Cymbalta to treat anxiety and radicular pain, side effects reviewed. Will also try morphine gel if covered by insurance.   -Will reach out to Dr. De Luna to let her know about return of radicular pain.  I am not clear if Omega has planned upcoming scans.     2. Disease Understanding: I shared with Omega my understanding this the goals of his treatments are Palliative intent.  He feels that he has heard something different from Dr. De Luna.  He reports that he would find it helpful to clarify this, and I have encouraged him to discuss this further with Dr. De Luna this week.       [facility] Outpatient Palliative Care Opioid Prescribing Safety Plan     Opioid Safety Education: Reviewed by Nan Gonzalez  on " 11/8/2018  Opioid Risk Assessment: Performed by Nan Gonzalez  on 11/8/2018 .  ORT score of 0.   Mood Disorder Assessment: Performed by Nan Gonzalez  on 11/8/2018.     reviewed with every prescription, last reviewed by Carly Mills on 01/15/19     Additional recommendations based on patient's prognosis and indication for opioids include the following:     Expected prognosis: shorter  Risk: Low or Medium (ORT 0-7)  No further recommendations.     RTC 1-2 months for a follow up.     Thank you for involving me in the care of this patient.     Carly Mills MD / Palliative Medicine / Pager [phone] / After-Hours Answering Service [phone] / Main Palliative Clinic - [facility] [phone] / [facility] Inpatient Team Consult Pager [phone] (answered 8am-430pm M-F)    Additional History Reviewed:   No Known Allergies  Current Outpatient Medications   Medication Sig Dispense Refill     atorvastatin (LIPITOR) 20 MG tablet Take 20 mg by mouth       Cabozantinib S-Malate (CABOMETYX) 40 MG Take 1 tablet (40 mg) by mouth daily 30 tablet 11     hydrOXYzine (ATARAX) 25 MG tablet TAKE 1 TABLET EVERY SIX HOURS AS NEEDED FOR ANXIETY  3     morphine (MS CONTIN) 15 MG 12 hr tablet Take 1 tablet (15 mg) by mouth every 24 hours Take at 6am 30 tablet 0     morphine (MS CONTIN) 30 MG 12 hr tablet Take 1 tablet (30 mg) by mouth every 24 hours Take at 6pm 90 tablet 0     senna-docusate (SENOKOT-S;PERICOLACE) 8.6-50 MG per tablet        aspirin 81 MG tablet Take 81 mg by mouth       melatonin 3 MG tablet Take 3 mg by mouth       polyethylene glycol (MIRALAX/GLYCOLAX) Packet Take 1 packet by mouth       Pseudoephedrine-APAP  MG TABS        ranitidine (ZANTAC) 150 MG tablet Take 150 mg by mouth 2 times daily       Past Medical History:   Diagnosis Date     Bone metastasis (H) 09/28/2018     Renal cell carcinoma, right (H) 09/28/2018     No past surgical history on file.  No family history  on file.    Face to face time: 59 minutes, with >50% of time devoted to patient counseling.

## 2019-01-17 ENCOUNTER — DOCUMENTATION ONLY (OUTPATIENT)
Dept: ONCOLOGY | Facility: CLINIC | Age: 69
End: 2019-01-17

## 2019-01-17 ENCOUNTER — ONCOLOGY VISIT (OUTPATIENT)
Dept: ONCOLOGY | Facility: CLINIC | Age: 69
End: 2019-01-17
Attending: INTERNAL MEDICINE
Payer: MEDICARE

## 2019-01-17 VITALS
DIASTOLIC BLOOD PRESSURE: 77 MMHG | SYSTOLIC BLOOD PRESSURE: 130 MMHG | TEMPERATURE: 98.6 F | BODY MASS INDEX: 31.34 KG/M2 | HEIGHT: 72 IN | WEIGHT: 231.4 LBS | HEART RATE: 65 BPM | OXYGEN SATURATION: 98 % | RESPIRATION RATE: 14 BRPM

## 2019-01-17 DIAGNOSIS — C79.51 BONE METASTASIS: ICD-10-CM

## 2019-01-17 DIAGNOSIS — G62.9 NEUROPATHY: ICD-10-CM

## 2019-01-17 DIAGNOSIS — C64.2 MALIGNANT NEOPLASM OF KIDNEY EXCLUDING RENAL PELVIS, LEFT (H): Primary | ICD-10-CM

## 2019-01-17 LAB
ALBUMIN SERPL-MCNC: 3 G/DL (ref 3.4–5)
ALP SERPL-CCNC: 115 U/L (ref 40–150)
ALT SERPL W P-5'-P-CCNC: 21 U/L (ref 0–70)
ANION GAP SERPL CALCULATED.3IONS-SCNC: 6 MMOL/L (ref 3–14)
ANISOCYTOSIS BLD QL SMEAR: SLIGHT
AST SERPL W P-5'-P-CCNC: 33 U/L (ref 0–45)
BASOPHILS # BLD AUTO: 0 10E9/L (ref 0–0.2)
BASOPHILS NFR BLD AUTO: 0.9 %
BILIRUB SERPL-MCNC: 0.8 MG/DL (ref 0.2–1.3)
BUN SERPL-MCNC: 6 MG/DL (ref 7–30)
CALCIUM SERPL-MCNC: 8.1 MG/DL (ref 8.5–10.1)
CHLORIDE SERPL-SCNC: 104 MMOL/L (ref 94–109)
CO2 SERPL-SCNC: 30 MMOL/L (ref 20–32)
CREAT SERPL-MCNC: 0.71 MG/DL (ref 0.66–1.25)
DIFFERENTIAL METHOD BLD: ABNORMAL
EOSINOPHIL # BLD AUTO: 0.1 10E9/L (ref 0–0.7)
EOSINOPHIL NFR BLD AUTO: 1.7 %
ERYTHROCYTE [DISTWIDTH] IN BLOOD BY AUTOMATED COUNT: 15.5 % (ref 10–15)
GFR SERPL CREATININE-BSD FRML MDRD: >90 ML/MIN/{1.73_M2}
GLUCOSE SERPL-MCNC: 84 MG/DL (ref 70–99)
HCT VFR BLD AUTO: 39 % (ref 40–53)
HGB BLD-MCNC: 13 G/DL (ref 13.3–17.7)
LYMPHOCYTES # BLD AUTO: 0.4 10E9/L (ref 0.8–5.3)
LYMPHOCYTES NFR BLD AUTO: 7.8 %
MCH RBC QN AUTO: 32.8 PG (ref 26.5–33)
MCHC RBC AUTO-ENTMCNC: 33.3 G/DL (ref 31.5–36.5)
MCV RBC AUTO: 99 FL (ref 78–100)
MONOCYTES # BLD AUTO: 1.7 10E9/L (ref 0–1.3)
MONOCYTES NFR BLD AUTO: 34.8 %
NEUTROPHILS # BLD AUTO: 2.7 10E9/L (ref 1.6–8.3)
NEUTROPHILS NFR BLD AUTO: 54.8 %
PLATELET # BLD AUTO: 164 10E9/L (ref 150–450)
PLATELET # BLD EST: ABNORMAL 10*3/UL
POTASSIUM SERPL-SCNC: 3.2 MMOL/L (ref 3.4–5.3)
PROT SERPL-MCNC: 6.2 G/DL (ref 6.8–8.8)
RBC # BLD AUTO: 3.96 10E12/L (ref 4.4–5.9)
SODIUM SERPL-SCNC: 140 MMOL/L (ref 133–144)
WBC # BLD AUTO: 4.9 10E9/L (ref 4–11)

## 2019-01-17 PROCEDURE — 85025 COMPLETE CBC W/AUTO DIFF WBC: CPT | Performed by: INTERNAL MEDICINE

## 2019-01-17 PROCEDURE — 99215 OFFICE O/P EST HI 40 MIN: CPT | Mod: ZP | Performed by: INTERNAL MEDICINE

## 2019-01-17 PROCEDURE — 36415 COLL VENOUS BLD VENIPUNCTURE: CPT | Performed by: INTERNAL MEDICINE

## 2019-01-17 PROCEDURE — 80053 COMPREHEN METABOLIC PANEL: CPT | Performed by: INTERNAL MEDICINE

## 2019-01-17 PROCEDURE — G0463 HOSPITAL OUTPT CLINIC VISIT: HCPCS | Mod: ZF

## 2019-01-17 RX ORDER — POTASSIUM CHLORIDE 1.5 G/1.58G
20 POWDER, FOR SOLUTION ORAL DAILY
Qty: 30 PACKET | Refills: 0 | Status: SHIPPED | OUTPATIENT
Start: 2019-01-17 | End: 2019-02-11

## 2019-01-17 ASSESSMENT — PAIN SCALES - GENERAL: PAINLEVEL: MODERATE PAIN (4)

## 2019-01-17 ASSESSMENT — MIFFLIN-ST. JEOR: SCORE: 1847.75

## 2019-01-17 NOTE — PROGRESS NOTES
Per [facility]'s request,  completed and faxed [facility] referral for lodging dates 2/5 - 2/7. [facility] will contact Patient directly for confirmation of reservation.  will continue to provide support as needed.      Sissy Scales (Martens), JIL, MSW   - [facility]  Phone: [phone]  Pager: [phone]  1/17/2019

## 2019-01-17 NOTE — LETTER
1/17/2019       RE: Javon Bianchi  [address]  [place] [address]     Dear Colleague,    Thank you for referring your patient, Javon Bianchi, to the [facility]. Please see a copy of my visit note below.    January 17, 2019         Chief complaint:   Metastatic Carcinoma with bone metastases, likely renal origin (no primary renal mass)  Cabozantinib: Started 60 mg daily on September 28, 2018.  Dec 10, 2018: dose reduced cabozantinib to 40 mg a day.  Jan 17, 2019: dose reduced cabozantinib to 29 mg a day.          History of Present Illness:      Javon Bianchi is a 68 year old male with a history significant for hyperlipidemia who had been in his usual health until he developed back pain in May this year. At that time, the pain was a stabbing like in the middle of lower back with burning like pain wrapping around this left hip to knee area. No trauma. He had MRI on 5/23/18 showed mild degenerative change with bulging disks L2-S1. A small benign hemangioma was noted in L2, no other marrow signal abnormality. Since then, the pain failed to improve despite steroid injections. In addition, he actually lost 25 lbs weight unintentionally since June along with chills few time a day. Overtime, the pain got worse to the point he could not ambulate after short distance and developed muscle weakness in lower extremities over time requiring a walker. Finally, he went to ER ([facility] in [place]) on 8/23/18, CT A/P was unremarkable. He was referred to neurology with obtaining MRI L spine. MRI on 8/30/18 revealed a pathological fracture at L3 vertebral body with height loss at 40% and diffuse involvement of a neoplastic process. IR guided biopsy on 8/31/18 showed poorly differentiated carcinoma. Its IHC was suggestive of possible renal cell carcinoma per pathology report (Renal cell immunochemistry is positive and along with the negative staining for CK7 and CK20 suggests the possibility  of a renal carcinoma, however most renal cell carcinomas also express PAX8 which is negative in this case). Of note, CT chest/abdomen/pelvis at OSH was negative for primary lesion. The patient was evaluated by neurosurgery who recommended no surgical intervention. He was discharged with TLSO brace and walker. He had palliative XRT locally from 9/11 for 10 fractions (Done in [place]).      Cabozantinib 60 mg started on September 28, 2018.  He returns today for interval visit.      Interval history:   Patient developed significant symptoms in feet even on the 40 mg dose, reports being unable to walk due to blisters on feet. Does not have peeling of skin in hands.  He has been holding the dose of cabozantinib since last 5 days and reports that the skin on soles of  feet has returned to normal.  Denies diarrhea, rash.   Pain well controlled overall on MS contin 15 mg AM, 30 mg PM. Takes tylenol for breakthrough.  Takes hydroxyzine for anxiety per his palliative care provider.  Has mild neuropathic pain in the back off and on.       PAST MEDICAL SURGICAL HISTORY:Reviewed.  SOCIAL HISTORY: Reviewed.      MEDICATIONS: Reviewed.           Home Medications:          Current Outpatient Prescriptions   Medication     atorvastatin (LIPITOR) 20 MG tablet     cyclobenzaprine (FLEXERIL) 10 MG tablet     doxycycline (VIBRA-TABS) 100 MG tablet     morphine (MS CONTIN) 30 MG 12 hr tablet     senna-docusate (SENOKOT-S;PERICOLACE) 8.6-50 MG per tablet     aspirin 81 MG tablet     Cabozantinib S-Malate (CABOMETYX) 60 MG     dexamethasone (DECADRON) 4 MG tablet     melatonin 3 MG tablet     methylPREDNISolone (MEDROL DOSEPAK) 4 MG tablet     oxyCODONE (OXYCONTIN) 20 MG 12 hr tablet     polyethylene glycol (MIRALAX/GLYCOLAX) Packet     Pseudoephedrine-APAP  MG TABS     ranitidine (ZANTAC) 150 MG tablet          Current Facility-Administered Medications   Medication     influenza Vac Split High-Dose (FLUZONE) injection 0.5 mL        Temp: 98.6  F (37  C) Temp src: Oral BP: 130/77 Pulse: 65   Resp: 14 SpO2: 98 %      ECOG performance status of 1.   Vital signs: reviewed from chart   HEENT: No icterus, no pallor. Moist mucous membranes. Oropharynx is clear.   NECK: Supple  LUNGS: Bilateral clear to ausculation  CARDIOVASCULAR: Regular rate and rhythm, no murmurs, gallops or rubs.   ABDOMEN: Soft, nontender and nondistended.   EXTREMITIES: No cyanosis, no clubbing, no edema. Right foot sole, no active blister, previous site of blister has healed.   NEUROLOGIC: No focal deficits.  Labs: not clinically significant   Potassium-3.2 mmol/l            Assessment /Plan      68-year-old male with metastatic infiltrative lesion in L3 extending to L2 and L4, likely primary malignancy being renal cell carcinoma.  However he does not have any evidence of a primary renal mass on imaging.    Based on the available pathology data,  We offered treatment for renal cell carcinoma with cabozantinib (CABOSUN data demonstrating cabozantinib superiority over sunitinib in the initial setting).  PAX8 stain was negative, and the clear cell features were not seen (it was a poorly differentiated carcinoma).   He had a favorable response to cabozantinib on recent scans.  However, despite dose reduction to 40 mg a day, he continues to have disabling symptoms on the skin of feet.  I had a long discussion that we will further reduce the dose of cabozantinib to 20 mg a day as per the guidelines.  We discussed that response is not dose dependant and every patient metabolizes the drug differently.    Sent new prescription to pharmacy.    Hypokalemia: Provided prescription for Kcl 20 meq, take for 5 days, discussed taking potassium rich foods.    Fatigue and hoarseness of voice: Secondary to cabozantinib, should improve with reduced dose of cabozantinib.    We had a very long discussion about managing patient's anxiety issues and he will follow locally to discuss starting  ativan.    I also recommended they discuss with their pain provider to increase MS contin to 30 mg BID and add MSIR as breakthrough if needed.    RTC to see me on Feb 6 with labs, CT, MRI L spine 1 day prior.  Will also follow up with orthopedics.    Patient and family were in agreement with the plan.    Malathi De Luna MD  Hematology Oncology and Transplantation  [facility]

## 2019-01-17 NOTE — PROGRESS NOTES
January 17, 2019         Chief complaint:   Metastatic Carcinoma with bone metastases, likely renal origin (no primary renal mass)  Cabozantinib: Started 60 mg daily on September 28, 2018.  Dec 10, 2018: dose reduced cabozantinib to 40 mg a day.  Jan 17, 2019: dose reduced cabozantinib to 29 mg a day.          History of Present Illness:      Javon Bianchi is a 68 year old male with a history significant for hyperlipidemia who had been in his usual health until he developed back pain in May this year. At that time, the pain was a stabbing like in the middle of lower back with burning like pain wrapping around this left hip to knee area. No trauma. He had MRI on 5/23/18 showed mild degenerative change with bulging disks L2-S1. A small benign hemangioma was noted in L2, no other marrow signal abnormality. Since then, the pain failed to improve despite steroid injections. In addition, he actually lost 25 lbs weight unintentionally since June along with chills few time a day. Overtime, the pain got worse to the point he could not ambulate after short distance and developed muscle weakness in lower extremities over time requiring a walker. Finally, he went to ER ([facility] in [place]) on 8/23/18, CT A/P was unremarkable. He was referred to neurology with obtaining MRI L spine. MRI on 8/30/18 revealed a pathological fracture at L3 vertebral body with height loss at 40% and diffuse involvement of a neoplastic process. IR guided biopsy on 8/31/18 showed poorly differentiated carcinoma. Its IHC was suggestive of possible renal cell carcinoma per pathology report (Renal cell immunochemistry is positive and along with the negative staining for CK7 and CK20 suggests the possibility of a renal carcinoma, however most renal cell carcinomas also express PAX8 which is negative in this case). Of note, CT chest/abdomen/pelvis at OS was negative for primary lesion. The patient was evaluated by neurosurgery who recommended no  surgical intervention. He was discharged with TLSO brace and walker. He had palliative XRT locally from 9/11 for 10 fractions (Done in [place]).      Cabozantinib 60 mg started on September 28, 2018.  He returns today for interval visit.      Interval history:   Patient developed significant symptoms in feet even on the 40 mg dose, reports being unable to walk due to blisters on feet. Does not have peeling of skin in hands.  He has been holding the dose of cabozantinib since last 5 days and reports that the skin on soles of  feet has returned to normal.  Denies diarrhea, rash.   Pain well controlled overall on MS contin 15 mg AM, 30 mg PM. Takes tylenol for breakthrough.  Takes hydroxyzine for anxiety per his palliative care provider.  Has mild neuropathic pain in the back off and on.       PAST MEDICAL SURGICAL HISTORY:Reviewed.  SOCIAL HISTORY: Reviewed.      MEDICATIONS: Reviewed.           Home Medications:          Current Outpatient Prescriptions   Medication     atorvastatin (LIPITOR) 20 MG tablet     cyclobenzaprine (FLEXERIL) 10 MG tablet     doxycycline (VIBRA-TABS) 100 MG tablet     morphine (MS CONTIN) 30 MG 12 hr tablet     senna-docusate (SENOKOT-S;PERICOLACE) 8.6-50 MG per tablet     aspirin 81 MG tablet     Cabozantinib S-Malate (CABOMETYX) 60 MG     dexamethasone (DECADRON) 4 MG tablet     melatonin 3 MG tablet     methylPREDNISolone (MEDROL DOSEPAK) 4 MG tablet     oxyCODONE (OXYCONTIN) 20 MG 12 hr tablet     polyethylene glycol (MIRALAX/GLYCOLAX) Packet     Pseudoephedrine-APAP  MG TABS     ranitidine (ZANTAC) 150 MG tablet          Current Facility-Administered Medications   Medication     influenza Vac Split High-Dose (FLUZONE) injection 0.5 mL       Temp: 98.6  F (37  C) Temp src: Oral BP: 130/77 Pulse: 65   Resp: 14 SpO2: 98 %      ECOG performance status of 1.   Vital signs: reviewed from chart   HEENT: No icterus, no pallor. Moist mucous membranes. Oropharynx is clear.   NECK:  Supple  LUNGS: Bilateral clear to ausculation  CARDIOVASCULAR: Regular rate and rhythm, no murmurs, gallops or rubs.   ABDOMEN: Soft, nontender and nondistended.   EXTREMITIES: No cyanosis, no clubbing, no edema. Right foot sole, no active blister, previous site of blister has healed.   NEUROLOGIC: No focal deficits.  Labs: not clinically significant   Potassium-3.2 mmol/l            Assessment /Plan      68-year-old male with metastatic infiltrative lesion in L3 extending to L2 and L4, likely primary malignancy being renal cell carcinoma.  However he does not have any evidence of a primary renal mass on imaging.    Based on the available pathology data,  We offered treatment for renal cell carcinoma with cabozantinib (CABOSUN data demonstrating cabozantinib superiority over sunitinib in the initial setting).  PAX8 stain was negative, and the clear cell features were not seen (it was a poorly differentiated carcinoma).   He had a favorable response to cabozantinib on recent scans.  However, despite dose reduction to 40 mg a day, he continues to have disabling symptoms on the skin of feet.  I had a long discussion that we will further reduce the dose of cabozantinib to 20 mg a day as per the guidelines.  We discussed that response is not dose dependant and every patient metabolizes the drug differently.    Sent new prescription to pharmacy.    Hypokalemia: Provided prescription for Kcl 20 meq, take for 5 days, discussed taking potassium rich foods.    Fatigue and hoarseness of voice: Secondary to cabozantinib, should improve with reduced dose of cabozantinib.    We had a very long discussion about managing patient's anxiety issues and he will follow locally to discuss starting ativan.    I also recommended they discuss with their pain provider to increase MS contin to 30 mg BID and add MSIR as breakthrough if needed.    RTC to see me on Feb 6 with labs, CT, MRI L spine 1 day prior.  Will also follow up with  orthopedics.    Patient and family were in agreement with the plan.    Malathi De Luna MD  Hematology Oncology and Transplantation  [facility]

## 2019-01-17 NOTE — NURSING NOTE
"Oncology Rooming Note    January 17, 2019 10:57 AM   Javon Bianchi is a 68 year old male who presents for:    Chief Complaint   Patient presents with     Oncology Clinic Visit     [facility] RETURN- RENAL CA     Initial Vitals: /77 (BP Location: Right arm, Patient Position: Chair, Cuff Size: Adult Regular)   Pulse 65   Temp 98.6  F (37  C) (Oral)   Resp 14   Ht 1.829 m (6' 0.01\")   Wt 105 kg (231 lb 6.4 oz)   SpO2 98%   BMI 31.38 kg/m   Estimated body mass index is 31.38 kg/m  as calculated from the following:    Height as of this encounter: 1.829 m (6' 0.01\").    Weight as of this encounter: 105 kg (231 lb 6.4 oz). Body surface area is 2.31 meters squared.  Moderate Pain (4) Comment: Data Unavailable   No LMP for male patient.  Allergies reviewed: Yes  Medications reviewed: Yes    Medications: MEDICATION REFILLS NEEDED TODAY. Provider was notified.  Pharmacy name entered into [facility]: [facility] - [address]    Clinical concerns: Persistent nose running. Refill on chemo medication. Annamarie was notified.    10 minutes for nursing intake (face to face time)     Wyatt Alanis LPN            "

## 2019-01-25 ENCOUNTER — DOCUMENTATION ONLY (OUTPATIENT)
Dept: CARE COORDINATION | Facility: CLINIC | Age: 69
End: 2019-01-25

## 2019-01-31 DIAGNOSIS — M54.50 LOW BACK PAIN: Primary | ICD-10-CM

## 2019-02-01 ASSESSMENT — ENCOUNTER SYMPTOMS
BLOOD IN STOOL: 0
BLOATING: 0
DIARRHEA: 1
SLEEP DISTURBANCES DUE TO BREATHING: 0
HYPERTENSION: 0
INCREASED ENERGY: 1
EXERCISE INTOLERANCE: 1
RECTAL PAIN: 0
CONSTIPATION: 1
WEIGHT LOSS: 0
BOWEL INCONTINENCE: 0
NAUSEA: 0
NECK PAIN: 0
JAUNDICE: 0
SYNCOPE: 0
HEARTBURN: 0
JOINT SWELLING: 0
STIFFNESS: 1
WEIGHT GAIN: 0
DECREASED APPETITE: 0
POLYDIPSIA: 0
HALLUCINATIONS: 0
MYALGIAS: 1
POLYPHAGIA: 0
HYPOTENSION: 0
FATIGUE: 1
ABDOMINAL PAIN: 0
ALTERED TEMPERATURE REGULATION: 1
LIGHT-HEADEDNESS: 0
PALPITATIONS: 0
BACK PAIN: 1
MUSCLE WEAKNESS: 1
LEG PAIN: 1
VOMITING: 0
NIGHT SWEATS: 1
FEVER: 0
CHILLS: 1
ORTHOPNEA: 0
MUSCLE CRAMPS: 1
ARTHRALGIAS: 1

## 2019-02-05 ENCOUNTER — ANCILLARY PROCEDURE (OUTPATIENT)
Dept: CT IMAGING | Facility: CLINIC | Age: 69
End: 2019-02-05
Payer: MEDICARE

## 2019-02-05 ENCOUNTER — ANCILLARY PROCEDURE (OUTPATIENT)
Dept: MRI IMAGING | Facility: CLINIC | Age: 69
End: 2019-02-05
Payer: MEDICARE

## 2019-02-05 DIAGNOSIS — C64.2 MALIGNANT NEOPLASM OF KIDNEY EXCLUDING RENAL PELVIS, LEFT (H): ICD-10-CM

## 2019-02-05 LAB
ALBUMIN SERPL-MCNC: 3 G/DL (ref 3.4–5)
ALP SERPL-CCNC: 130 U/L (ref 40–150)
ALT SERPL W P-5'-P-CCNC: 22 U/L (ref 0–70)
ANION GAP SERPL CALCULATED.3IONS-SCNC: 5 MMOL/L (ref 3–14)
ANISOCYTOSIS BLD QL SMEAR: SLIGHT
AST SERPL W P-5'-P-CCNC: 34 U/L (ref 0–45)
BASOPHILS # BLD AUTO: 0 10E9/L (ref 0–0.2)
BASOPHILS NFR BLD AUTO: 0 %
BILIRUB SERPL-MCNC: 0.8 MG/DL (ref 0.2–1.3)
BUN SERPL-MCNC: 7 MG/DL (ref 7–30)
CALCIUM SERPL-MCNC: 8.1 MG/DL (ref 8.5–10.1)
CHLORIDE SERPL-SCNC: 106 MMOL/L (ref 94–109)
CO2 SERPL-SCNC: 30 MMOL/L (ref 20–32)
CREAT SERPL-MCNC: 0.68 MG/DL (ref 0.66–1.25)
DIFFERENTIAL METHOD BLD: ABNORMAL
EOSINOPHIL # BLD AUTO: 0 10E9/L (ref 0–0.7)
EOSINOPHIL NFR BLD AUTO: 0.9 %
ERYTHROCYTE [DISTWIDTH] IN BLOOD BY AUTOMATED COUNT: 15.2 % (ref 10–15)
GFR SERPL CREATININE-BSD FRML MDRD: >90 ML/MIN/{1.73_M2}
GLUCOSE SERPL-MCNC: 82 MG/DL (ref 70–99)
HCT VFR BLD AUTO: 40.8 % (ref 40–53)
HGB BLD-MCNC: 12.8 G/DL (ref 13.3–17.7)
LYMPHOCYTES # BLD AUTO: 0.5 10E9/L (ref 0.8–5.3)
LYMPHOCYTES NFR BLD AUTO: 12.2 %
MCH RBC QN AUTO: 31.4 PG (ref 26.5–33)
MCHC RBC AUTO-ENTMCNC: 31.4 G/DL (ref 31.5–36.5)
MCV RBC AUTO: 100 FL (ref 78–100)
MONOCYTES # BLD AUTO: 1.5 10E9/L (ref 0–1.3)
MONOCYTES NFR BLD AUTO: 34.8 %
NEUTROPHILS # BLD AUTO: 2.2 10E9/L (ref 1.6–8.3)
NEUTROPHILS NFR BLD AUTO: 52.1 %
PLATELET # BLD AUTO: 144 10E9/L (ref 150–450)
PLATELET # BLD EST: ABNORMAL 10*3/UL
POIKILOCYTOSIS BLD QL SMEAR: SLIGHT
POTASSIUM SERPL-SCNC: 3.8 MMOL/L (ref 3.4–5.3)
PROT SERPL-MCNC: 6.3 G/DL (ref 6.8–8.8)
RBC # BLD AUTO: 4.08 10E12/L (ref 4.4–5.9)
SODIUM SERPL-SCNC: 141 MMOL/L (ref 133–144)
T4 FREE SERPL-MCNC: 1.14 NG/DL (ref 0.76–1.46)
TSH SERPL DL<=0.005 MIU/L-ACNC: 7.88 MU/L (ref 0.4–4)
WBC # BLD AUTO: 4.3 10E9/L (ref 4–11)

## 2019-02-05 RX ORDER — IOPAMIDOL 755 MG/ML
135 INJECTION, SOLUTION INTRAVASCULAR ONCE
Status: COMPLETED | OUTPATIENT
Start: 2019-02-05 | End: 2019-02-05

## 2019-02-05 RX ORDER — GADOBUTROL 604.72 MG/ML
10 INJECTION INTRAVENOUS ONCE
Status: COMPLETED | OUTPATIENT
Start: 2019-02-05 | End: 2019-02-05

## 2019-02-05 RX ADMIN — GADOBUTROL 10 ML: 604.72 INJECTION INTRAVENOUS at 15:27

## 2019-02-05 RX ADMIN — IOPAMIDOL 135 ML: 755 INJECTION, SOLUTION INTRAVASCULAR at 15:23

## 2019-02-05 NOTE — DISCHARGE INSTRUCTIONS

## 2019-02-05 NOTE — DISCHARGE INSTRUCTIONS
MRI Contrast Discharge Instructions    The IV contrast you received today will pass out of your body in your  urine. This will happen in the next 24 hours. You will not feel this process.  Your urine will not change color.    Drink at least 4 extra glasses of water or juice today (unless your doctor  has restricted your fluids). This reduces the stress on your kidneys.  You may take your regular medicines.    If you are on dialysis: It is best to have dialysis today.    If you have a reaction: Most reactions happen right away. If you have  any new symptoms after leaving the hospital (such as hives or swelling),  call your hospital at the correct number below. Or call your family doctor.  If you have breathing distress or wheezing, call 911.    Special instructions: ***    I have read and understand the above information.    Signature:______________________________________ Date:___________    Staff:__________________________________________ Date:___________     Time:__________    [place] [facility] Departments:    ___[place]: [phone]  ___[facility]: [phone]  ___[place]: [phone] ___[facility]: [phone]  ___[facility]: [phone]  ___[facility]: [phone]  ___[phone]  ___[facility]: [phone]  ___[phone]

## 2019-02-06 ENCOUNTER — DOCUMENTATION ONLY (OUTPATIENT)
Dept: ONCOLOGY | Facility: CLINIC | Age: 69
End: 2019-02-06

## 2019-02-06 ENCOUNTER — ONCOLOGY VISIT (OUTPATIENT)
Dept: ONCOLOGY | Facility: CLINIC | Age: 69
End: 2019-02-06
Attending: INTERNAL MEDICINE
Payer: MEDICARE

## 2019-02-06 VITALS
OXYGEN SATURATION: 97 % | DIASTOLIC BLOOD PRESSURE: 71 MMHG | BODY MASS INDEX: 31.21 KG/M2 | HEART RATE: 63 BPM | SYSTOLIC BLOOD PRESSURE: 121 MMHG | RESPIRATION RATE: 16 BRPM | TEMPERATURE: 97.1 F | HEIGHT: 72 IN | WEIGHT: 230.4 LBS

## 2019-02-06 DIAGNOSIS — C79.51 BONE METASTASIS: Primary | ICD-10-CM

## 2019-02-06 DIAGNOSIS — C64.9 RENAL CELL CARCINOMA, UNSPECIFIED LATERALITY (H): ICD-10-CM

## 2019-02-06 PROCEDURE — G0463 HOSPITAL OUTPT CLINIC VISIT: HCPCS | Mod: ZF

## 2019-02-06 PROCEDURE — 99215 OFFICE O/P EST HI 40 MIN: CPT | Mod: ZP | Performed by: INTERNAL MEDICINE

## 2019-02-06 RX ORDER — MEPERIDINE HYDROCHLORIDE 25 MG/ML
25 INJECTION INTRAMUSCULAR; INTRAVENOUS; SUBCUTANEOUS EVERY 30 MIN PRN
Status: CANCELLED | OUTPATIENT
Start: 2019-02-11

## 2019-02-06 RX ORDER — SODIUM CHLORIDE 9 MG/ML
1000 INJECTION, SOLUTION INTRAVENOUS CONTINUOUS PRN
Status: CANCELLED
Start: 2019-02-11

## 2019-02-06 RX ORDER — LORAZEPAM 2 MG/ML
0.5 INJECTION INTRAMUSCULAR EVERY 4 HOURS PRN
Status: CANCELLED
Start: 2019-02-11

## 2019-02-06 RX ORDER — EPINEPHRINE 0.3 MG/.3ML
0.3 INJECTION SUBCUTANEOUS EVERY 5 MIN PRN
Status: CANCELLED | OUTPATIENT
Start: 2019-02-11

## 2019-02-06 RX ORDER — METHYLPREDNISOLONE SODIUM SUCCINATE 125 MG/2ML
125 INJECTION, POWDER, LYOPHILIZED, FOR SOLUTION INTRAMUSCULAR; INTRAVENOUS
Status: CANCELLED
Start: 2019-02-11

## 2019-02-06 RX ORDER — ALBUTEROL SULFATE 0.83 MG/ML
2.5 SOLUTION RESPIRATORY (INHALATION)
Status: CANCELLED | OUTPATIENT
Start: 2019-02-11

## 2019-02-06 RX ORDER — EPINEPHRINE 1 MG/ML
0.3 INJECTION, SOLUTION INTRAMUSCULAR; SUBCUTANEOUS EVERY 5 MIN PRN
Status: CANCELLED | OUTPATIENT
Start: 2019-02-11

## 2019-02-06 RX ORDER — DIPHENHYDRAMINE HYDROCHLORIDE 50 MG/ML
50 INJECTION INTRAMUSCULAR; INTRAVENOUS
Status: CANCELLED
Start: 2019-02-11

## 2019-02-06 RX ORDER — ALBUTEROL SULFATE 90 UG/1
1-2 AEROSOL, METERED RESPIRATORY (INHALATION)
Status: CANCELLED
Start: 2019-02-11

## 2019-02-06 ASSESSMENT — MIFFLIN-ST. JEOR: SCORE: 1843.22

## 2019-02-06 ASSESSMENT — PAIN SCALES - GENERAL: PAINLEVEL: MODERATE PAIN (5)

## 2019-02-06 NOTE — LETTER
2/6/2019       RE: Javon Bianchi  [address]  [place] [address]     Dear Colleague,    Thank you for referring your patient, Javon Bianchi, to the [facility]. Please see a copy of my visit note below.    February 6, 2019       Chief complaint:   Metastatic Carcinoma with bone metastases, likely renal origin (no primary renal mass)  Cabozantinib: Started 60 mg daily on September 28, 2018.  Dec 10, 2018: dose reduced cabozantinib to 40 mg a day.  Jan 17, 2019: dose reduced cabozantinib to 29 mg a day.  Feb 11, 2019: C1D1 Ipilimumab/Nivolumab           History of Present Illness:      Javon Bianchi is a 68 year old male with a history significant for hyperlipidemia who had been in his usual health until he developed back pain in May this year. At that time, the pain was a stabbing like in the middle of lower back with burning like pain wrapping around this left hip to knee area. No trauma. He had MRI on 5/23/18 showed mild degenerative change with bulging disks L2-S1. A small benign hemangioma was noted in L2, no other marrow signal abnormality. Since then, the pain failed to improve despite steroid injections. In addition, he actually lost 25 lbs weight unintentionally since June along with chills few time a day. Overtime, the pain got worse to the point he could not ambulate after short distance and developed muscle weakness in lower extremities over time requiring a walker. Finally, he went to ER ([facility] in [place]) on 8/23/18, CT A/P was unremarkable. He was referred to neurology with obtaining MRI L spine. MRI on 8/30/18 revealed a pathological fracture at L3 vertebral body with height loss at 40% and diffuse involvement of a neoplastic process. IR guided biopsy on 8/31/18 showed poorly differentiated carcinoma. Its IHC was suggestive of possible renal cell carcinoma per pathology report (Renal cell immunochemistry is positive and along with the negative staining for  CK7 and CK20 suggests the possibility of a renal carcinoma, however most renal cell carcinomas also express PAX8 which is negative in this case). Of note, CT chest/abdomen/pelvis at OSH was negative for primary lesion. The patient was evaluated by neurosurgery who recommended no surgical intervention. He was discharged with TLSO brace and walker. He had palliative XRT locally from 9/11 for 10 fractions (Done in [place]).      He was started on cabozantinib 60 mg initially, requiring dose reduction to 20 mg for issues with hand foot syndrome and poor tolerability.  Switched to Ipi/nivo due to progression in bone disease on 2/6/19.      Interval history:   HFS better with reduced dose of cabozantinib.  Has hoarseness of voice.  Reports taking on MSCOntin BID for pain, not requiring prn meds.  Has neuropathic pain in left leg.  Reports mild  pain in both hips.       PAST MEDICAL SURGICAL HISTORY:Reviewed.  SOCIAL HISTORY: Reviewed.      MEDICATIONS: Reviewed.           Home Medications:          Current Outpatient Prescriptions   Medication     atorvastatin (LIPITOR) 20 MG tablet     cyclobenzaprine (FLEXERIL) 10 MG tablet     doxycycline (VIBRA-TABS) 100 MG tablet     morphine (MS CONTIN) 30 MG 12 hr tablet     senna-docusate (SENOKOT-S;PERICOLACE) 8.6-50 MG per tablet     aspirin 81 MG tablet     Cabozantinib S-Malate (CABOMETYX) 60 MG     dexamethasone (DECADRON) 4 MG tablet     melatonin 3 MG tablet     methylPREDNISolone (MEDROL DOSEPAK) 4 MG tablet     oxyCODONE (OXYCONTIN) 20 MG 12 hr tablet     polyethylene glycol (MIRALAX/GLYCOLAX) Packet     Pseudoephedrine-APAP  MG TABS     ranitidine (ZANTAC) 150 MG tablet          Current Facility-Administered Medications   Medication     influenza Vac Split High-Dose (FLUZONE) injection 0.5 mL       Temp: 97.1  F (36.2  C) Temp src: Oral BP: 121/71 Pulse: 63   Resp: 16 SpO2: 97 %      ECOG performance status of 1.   Vital signs: reviewed from chart   HEENT: No  icterus, no pallor. Moist mucous membranes. Oropharynx is clear.   NECK: Supple  LUNGS: Bilateral clear to ausculation  CARDIOVASCULAR: Regular rate and rhythm, no murmurs, gallops or rubs.   ABDOMEN: Soft, nontender and nondistended.   EXTREMITIES: No cyanosis, no clubbing, no edema. Right foot sole, no active blister, previous site of blister has healed.   NEUROLOGIC: No focal deficits.  Labs: not clinically significant   IMAGING: Reviewed images and report of CT TAP   Mixed sclerotic appearance of the posterior acetabulum as seen on  image 593 series 7. Mixed lytic sclerotic changes are noted in the  right iliac bone on image 435 series 7 and possibly on the left as  well as seen on images 434 through 444 of series 7. Sclerotic changes  are noted in bilateral inferior pubic rami on images 633 series 7.  These findings were barely perceptible on prior exam and are certainly  new since August 2018,concerning for metastatic disease.              Assessment /Plan      68-year-old male with metastatic infiltrative lesion in L3 extending to L2 and L4, likely primary malignancy being renal cell carcinoma.  However he does not have any evidence of a primary renal mass on imaging.    Based on the available pathology data,  We offered treatment for renal cell carcinoma with cabozantinib (CABOSUN data demonstrating cabozantinib superiority over sunitinib in the initial setting).  PAX8 stain was negative, and the clear cell features were not seen (it was a poorly differentiated carcinoma).   He had a favorable response to cabozantinib on initial scans. However, tolerability remained an issue.  I had a long discussion with him and his family that his recent scans show new  bony disease progression in pelvis, while L3 is stable.    We will plan to start him on Ipilimumab and Nivolumab based on NCCN Guidelines.    We will use Nivolumab and ipilimumab  administered intravenously at a dose of 3 mg per kilogram over a period of 60  minutes and 1 mg per kilogram over a period of 30 minutes, respectively, every 3 weeks for four doses. Then will switch to maintenance dose nivolumab at 480 mg every 4 weeks.    I discussed all the potential and serious side effects with Ipi/Nivo and patient verbalized understanding and provided verbal consent.    Plan:   Baseline Bone scan.  C1D1 Ipi/Nivo 2/11/19.  RTC  to see me on 2/25/19 with labs for symptom check and prior to his trip to [place].     Pain: per local provider.     Bone metastases: Will plan to start xgeva at next visit. Recommended he start taking C-Vit D.    RTC to see me on Feb 25 with labs, bone scan.     Patient and family were in agreement with the plan.      Malathi De Luna MD

## 2019-02-06 NOTE — PROGRESS NOTES
February 6, 2019       Chief complaint:   Metastatic Carcinoma with bone metastases, likely renal origin (no primary renal mass)  Cabozantinib: Started 60 mg daily on September 28, 2018.  Dec 10, 2018: dose reduced cabozantinib to 40 mg a day.  Jan 17, 2019: dose reduced cabozantinib to 29 mg a day.  Feb 11, 2019: C1D1 Ipilimumab/Nivolumab           History of Present Illness:      Javon Bianchi is a 68 year old male with a history significant for hyperlipidemia who had been in his usual health until he developed back pain in May this year. At that time, the pain was a stabbing like in the middle of lower back with burning like pain wrapping around this left hip to knee area. No trauma. He had MRI on 5/23/18 showed mild degenerative change with bulging disks L2-S1. A small benign hemangioma was noted in L2, no other marrow signal abnormality. Since then, the pain failed to improve despite steroid injections. In addition, he actually lost 25 lbs weight unintentionally since June along with chills few time a day. Overtime, the pain got worse to the point he could not ambulate after short distance and developed muscle weakness in lower extremities over time requiring a walker. Finally, he went to ER ([facility] in [place]) on 8/23/18, CT A/P was unremarkable. He was referred to neurology with obtaining MRI L spine. MRI on 8/30/18 revealed a pathological fracture at L3 vertebral body with height loss at 40% and diffuse involvement of a neoplastic process. IR guided biopsy on 8/31/18 showed poorly differentiated carcinoma. Its IHC was suggestive of possible renal cell carcinoma per pathology report (Renal cell immunochemistry is positive and along with the negative staining for CK7 and CK20 suggests the possibility of a renal carcinoma, however most renal cell carcinomas also express PAX8 which is negative in this case). Of note, CT chest/abdomen/pelvis at OSH was negative for primary lesion. The patient was  evaluated by neurosurgery who recommended no surgical intervention. He was discharged with TLSO brace and walker. He had palliative XRT locally from 9/11 for 10 fractions (Done in [place]).      He was started on cabozantinib 60 mg initially, requiring dose reduction to 20 mg for issues with hand foot syndrome and poor tolerability.  Switched to Ipi/nivo due to progression in bone disease on 2/6/19.      Interval history:   HFS better with reduced dose of cabozantinib.  Has hoarseness of voice.  Reports taking on MSCOntin BID for pain, not requiring prn meds.  Has neuropathic pain in left leg.  Reports mild  pain in both hips.       PAST MEDICAL SURGICAL HISTORY:Reviewed.  SOCIAL HISTORY: Reviewed.      MEDICATIONS: Reviewed.           Home Medications:          Current Outpatient Prescriptions   Medication     atorvastatin (LIPITOR) 20 MG tablet     cyclobenzaprine (FLEXERIL) 10 MG tablet     doxycycline (VIBRA-TABS) 100 MG tablet     morphine (MS CONTIN) 30 MG 12 hr tablet     senna-docusate (SENOKOT-S;PERICOLACE) 8.6-50 MG per tablet     aspirin 81 MG tablet     Cabozantinib S-Malate (CABOMETYX) 60 MG     dexamethasone (DECADRON) 4 MG tablet     melatonin 3 MG tablet     methylPREDNISolone (MEDROL DOSEPAK) 4 MG tablet     oxyCODONE (OXYCONTIN) 20 MG 12 hr tablet     polyethylene glycol (MIRALAX/GLYCOLAX) Packet     Pseudoephedrine-APAP  MG TABS     ranitidine (ZANTAC) 150 MG tablet          Current Facility-Administered Medications   Medication     influenza Vac Split High-Dose (FLUZONE) injection 0.5 mL       Temp: 97.1  F (36.2  C) Temp src: Oral BP: 121/71 Pulse: 63   Resp: 16 SpO2: 97 %      ECOG performance status of 1.   Vital signs: reviewed from chart   HEENT: No icterus, no pallor. Moist mucous membranes. Oropharynx is clear.   NECK: Supple  LUNGS: Bilateral clear to ausculation  CARDIOVASCULAR: Regular rate and rhythm, no murmurs, gallops or rubs.   ABDOMEN: Soft, nontender and nondistended.    EXTREMITIES: No cyanosis, no clubbing, no edema. Right foot sole, no active blister, previous site of blister has healed.   NEUROLOGIC: No focal deficits.  Labs: not clinically significant   IMAGING: Reviewed images and report of CT TAP   Mixed sclerotic appearance of the posterior acetabulum as seen on  image 593 series 7. Mixed lytic sclerotic changes are noted in the  right iliac bone on image 435 series 7 and possibly on the left as  well as seen on images 434 through 444 of series 7. Sclerotic changes  are noted in bilateral inferior pubic rami on images 633 series 7.  These findings were barely perceptible on prior exam and are certainly  new since August 2018,concerning for metastatic disease.              Assessment /Plan      68-year-old male with metastatic infiltrative lesion in L3 extending to L2 and L4, likely primary malignancy being renal cell carcinoma.  However he does not have any evidence of a primary renal mass on imaging.    Based on the available pathology data,  We offered treatment for renal cell carcinoma with cabozantinib (CABOSUN data demonstrating cabozantinib superiority over sunitinib in the initial setting).  PAX8 stain was negative, and the clear cell features were not seen (it was a poorly differentiated carcinoma).   He had a favorable response to cabozantinib on initial scans. However, tolerability remained an issue.  I had a long discussion with him and his family that his recent scans show new  bony disease progression in pelvis, while L3 is stable.    We will plan to start him on Ipilimumab and Nivolumab based on NCCN Guidelines.    We will use Nivolumab and ipilimumab  administered intravenously at a dose of 3 mg per kilogram over a period of 60 minutes and 1 mg per kilogram over a period of 30 minutes, respectively, every 3 weeks for four doses. Then will switch to maintenance dose nivolumab at 480 mg every 4 weeks.    I discussed all the potential and serious side effects  with Ipi/Nivo and patient verbalized understanding and provided verbal consent.    Plan:   Baseline Bone scan.  C1D1 Ipi/Nivo 2/11/19.  RTC  to see me on 2/25/19 with labs for symptom check and prior to his trip to [place].     Pain: per local provider.     Bone metastases: Will plan to start xgeva at next visit. Recommended he start taking C-Vit D.    RTC to see me on Feb 25 with labs, bone scan.     Patient and family were in agreement with the plan.    Malathi De Luna MD  Hematology Oncology and Transplantation  [facility]

## 2019-02-06 NOTE — NURSING NOTE
"Oncology Rooming Note    February 6, 2019 11:36 AM   Javon Bianchi is a 68 year old male who presents for:    Chief Complaint   Patient presents with     Oncology Clinic Visit     [facility] RETURN- RENAL CA     Initial Vitals: /71 (BP Location: Right arm, Patient Position: Chair, Cuff Size: Adult Regular)   Pulse 63   Temp 97.1  F (36.2  C) (Oral)   Resp 16   Ht 1.829 m (6' 0.01\")   Wt 104.5 kg (230 lb 6.4 oz)   SpO2 97%   BMI 31.24 kg/m   Estimated body mass index is 31.24 kg/m  as calculated from the following:    Height as of this encounter: 1.829 m (6' 0.01\").    Weight as of this encounter: 104.5 kg (230 lb 6.4 oz). Body surface area is 2.3 meters squared.  Moderate Pain (5) Comment: Data Unavailable   No LMP for male patient.  Allergies reviewed: Yes  Medications reviewed: Yes    Medications: MEDICATION REFILLS NEEDED TODAY. Provider was notified.  Pharmacy name entered into [facility]: [facility] - [address]    Clinical concerns: Cabometyx refill. Annamarie was notified.    10 minutes for nursing intake (face to face time)     Wyatt Alanis LPN            "

## 2019-02-06 NOTE — PROGRESS NOTES
Per Patient's request,  completed and faxed [facility] referral for lodging dates 2/24 - 2/25. [facility] will contact Patient directly for confirmation of reservation.  will continue to provide support as needed.        Sissy Scales (Mart[place]), [facility], MSW   - [facility]  Phone: [phone]  Pager: [phone]  2/6/2019

## 2019-02-07 ENCOUNTER — ANCILLARY PROCEDURE (OUTPATIENT)
Dept: GENERAL RADIOLOGY | Facility: CLINIC | Age: 69
End: 2019-02-07
Attending: ORTHOPAEDIC SURGERY
Payer: MEDICARE

## 2019-02-07 ENCOUNTER — OFFICE VISIT (OUTPATIENT)
Dept: ORTHOPEDICS | Facility: CLINIC | Age: 69
End: 2019-02-07
Payer: MEDICARE

## 2019-02-07 VITALS — HEIGHT: 72 IN | BODY MASS INDEX: 31.15 KG/M2 | WEIGHT: 230 LBS

## 2019-02-07 DIAGNOSIS — M54.50 LOW BACK PAIN: ICD-10-CM

## 2019-02-07 DIAGNOSIS — M48.50XD PATHOLOGICAL COMPRESSION FRACTURE OF VERTEBRA WITH ROUTINE HEALING, SUBSEQUENT ENCOUNTER: Primary | ICD-10-CM

## 2019-02-07 RX ORDER — CABOZANTINIB 20 MG/1
TABLET ORAL
COMMUNITY
Start: 2019-01-17 | End: 2019-04-08

## 2019-02-07 ASSESSMENT — MIFFLIN-ST. JEOR: SCORE: 1841.27

## 2019-02-07 NOTE — LETTER
2/7/2019       RE: Javon Bianchi  [address] [facility] [address]     Dear Colleague,    Thank you for referring your patient, Javon Bianchi, to the [facility] at [facility]. Please see a copy of my visit note below.    Reason for Visit: f/u    Last Visit: 11/15/18    Previous Impression:  - Metastatic Renal Cell Carcinoma with lesion of the L3 vertebral body without significant vertebral body collapse     Previous Plan:  I discussed with patient and family.  May now wean off TLSO wear.  If sxs recu, may need further intervention treatment.  Options:  - Kyphoplasty with radiofrequency ablation (RFA)   - Curative wide resection L3, with spinal reconstruction, fusion, instrumentation.  I discussed above options with patient.     I also subsequently discussed with oncologist Dr. Malathi De Luna over the phone.  We agreed that if patient will need something done in future, preferable to do smaller intervention (kyphoplasty RFA) instead of bigger, riskier more morbid wide resection.      S>  68/m, here for f/u.    Patient is accompanied by wife and daughter.  Since last visit he has been off the TLSO.  Overall he feels that his pain is now better compared to back in May or June.  However it is not completely gone and he still gets 4 out of 10 pain from time to time.  He feels that his pain is simply being masked by morphine use.  He currently takes morphine 45 mg/day (30 mg during the day and 15 mg at night) he desires to eventually get off the morphine.    At his last visit we discussed the options of a percutaneous procedure (kyphoplasty with radiofrequency ablation) versus a larger corpectomy procedure.  I had communicated with his oncologist Dr. De Luna at that time and we agreed that a corpectomy procedure is likely out of the question.  If he will need something then we would likely offer her the percutaneous option.    He is now getting off a  medication and will be starting immunotherapy.  He is scheduled for a PET scan next week and will see Dr. De Luna again on February 26.      Oswestry (SPIKE) Questionnaire    OSWESTRY DISABILITY INDEX 2/1/2019   Count 10   Sum 13   Oswestry Score (%) 26      Last visit SPIKE 34%      Visual Analog Pain Scale  Back Pain Scale 0-10: 5  Right leg pain: 0  Left leg pain: 2.5    PROMIS-10 Scores  Global Mental Health Score: (P) 11  Global Physical Health Score: (P) 12  PROMIS TOTAL - SUBSCORES: (P) 23    O>   Alert, oriented x 3, cooperative.  Not in CP distress.  Ht 1.829 m (6')   Wt 104.3 kg (230 lb)   BMI 31.19 kg/m     Ambulates independently.   Grossly neurologically intact.  Detailed exam not performed today; please see previous note.    Imaging:   Repeat lumbar MRI 2/5/2019 was compared with that from November 2018.  There has been no significant interval change.  There has been no further collapse of the L3 vertebral body.  The stenosis is likewise stable and unchanged.  There are no apparent new metastatic fractures.    A>  Pathologic fracture L3 vertebral body secondary to renal cell metastasis, with stable fracture appearance and stenosis.    P>   I had a good long discussion with the patient and his family.  We again discussed the option of kyphoplasty with radiofrequency ablation.  He is quite interested in this even though he admits that his pain now is better than it used to be.  He said he would like to get off morphine and he thinks that undergoing this procedure might help him get off the morphine and have less pain afterwards.  We discussed the potential risks of going through general anesthesia as well as surgical risks including but not limited to nerve injury, vascular or other visceral injury from needle malposition, wound infection and most commonly inadequate pain relief.  I sent an in basket message to his oncologist Dr. Malathi De Luna.  He will get his PET scan next week and will see Dr. De Luna on  February 26.  He will then let us know if he still wishes to proceed with the procedure.    Return to clinic as needed.  Total visit time exceeded 15 minutes more than half spent in counseling and coordination of care    Vickey Brower MD    Orthopaedic Spine Surgery  Dept Orthopaedic Surgery, [facility]  [phone] office, [phone] pager  [email or web address]

## 2019-02-07 NOTE — PROGRESS NOTES
Reason for Visit: f/u    Last Visit: 11/15/18    Previous Impression:  - Metastatic Renal Cell Carcinoma with lesion of the L3 vertebral body without significant vertebral body collapse     Previous Plan:  I discussed with patient and family.  May now wean off TLSO wear.  If sxs recu, may need further intervention treatment.  Options:  - Kyphoplasty with radiofrequency ablation (RFA)   - Curative wide resection L3, with spinal reconstruction, fusion, instrumentation.  I discussed above options with patient.     I also subsequently discussed with oncologist Dr. Malathi De Luna over the phone.  We agreed that if patient will need something done in future, preferable to do smaller intervention (kyphoplasty RFA) instead of bigger, riskier more morbid wide resection.      S>  68/m, here for f/u.    Patient is accompanied by wife and daughter.  Since last visit he has been off the TLSO.  Overall he feels that his pain is now better compared to back in May or June.  However it is not completely gone and he still gets 4 out of 10 pain from time to time.  He feels that his pain is simply being masked by morphine use.  He currently takes morphine 45 mg/day (30 mg during the day and 15 mg at night) he desires to eventually get off the morphine.    At his last visit we discussed the options of a percutaneous procedure (kyphoplasty with radiofrequency ablation) versus a larger corpectomy procedure.  I had communicated with his oncologist Dr. De Luna at that time and we agreed that a corpectomy procedure is likely out of the question.  If he will need something then we would likely offer her the percutaneous option.    He is now getting off a medication and will be starting immunotherapy.  He is scheduled for a PET scan next week and will see Dr. De Luna again on February 26.      Oswestry (SPIKE) Questionnaire    OSWESTRY DISABILITY INDEX 2/1/2019   Count 10   Sum 13   Oswestry Score (%) 26      Last visit SPIKE 34%      Visual Analog Pain  Scale  Back Pain Scale 0-10: 5  Right leg pain: 0  Left leg pain: 2.5    PROMIS-10 Scores  Global Mental Health Score: (P) 11  Global Physical Health Score: (P) 12  PROMIS TOTAL - SUBSCORES: (P) 23    O>   Alert, oriented x 3, cooperative.  Not in CP distress.  Ht 1.829 m (6')   Wt 104.3 kg (230 lb)   BMI 31.19 kg/m    Ambulates independently.   Grossly neurologically intact.  Detailed exam not performed today; please see previous note.    Imaging:   Repeat lumbar MRI 2/5/2019 was compared with that from November 2018.  There has been no significant interval change.  There has been no further collapse of the L3 vertebral body.  The stenosis is likewise stable and unchanged.  There are no apparent new metastatic fractures.    A>  Pathologic fracture L3 vertebral body secondary to renal cell metastasis, with stable fracture appearance and stenosis.    P>   I had a good long discussion with the patient and his family.  We again discussed the option of kyphoplasty with radiofrequency ablation.  He is quite interested in this even though he admits that his pain now is better than it used to be.  He said he would like to get off morphine and he thinks that undergoing this procedure might help him get off the morphine and have less pain afterwards.  We discussed the potential risks of going through general anesthesia as well as surgical risks including but not limited to nerve injury, vascular or other visceral injury from needle malposition, wound infection and most commonly inadequate pain relief.  I sent an in basket message to his oncologist Dr. Malathi De Luna.  He will get his PET scan next week and will see Dr. De Luna on February 26.  He will then let us know if he still wishes to proceed with the procedure.    Return to clinic as needed.  Total visit time exceeded 15 minutes more than half spent in counseling and coordination of care      Vickey Brower MD    Orthopaedic Spine Surgery  Dept  Orthopaedic Surgery, [facility]  [phone] office, [phone] pager  [email or web address]

## 2019-02-07 NOTE — NURSING NOTE
Reason For Visit:   Chief Complaint   Patient presents with     RECHECK     F/U Fracture of vertebra.         Primary MD: LIANNE Wellington        ?  No  Occupation retired salesman   Currently working? No.  Work status?  Retired.  Date of injury: None     Date of surgery: None     Smoker: No    Ht 1.829 m (6')   Wt 104.3 kg (230 lb)   BMI 31.19 kg/m      Pain Assessment  Patient Currently in Pain: Yes  0-10 Pain Scale: 5  Primary Pain Location: Back  Pain Descriptors: Aching    Oswestry (SPIKE) Questionnaire    OSWESTRY DISABILITY INDEX 2/1/2019   Count 10   Sum 13   Oswestry Score (%) 26            Neck Disability Index (NDI) Questionnaire    No flowsheet data found.           Visual Analog Pain Scale  Back Pain Scale 0-10: 5  Right leg pain: 0  Left leg pain: 2.5    Promis 10 Assessment    PROMIS 10 2/1/2019   In general, would you say your health is: Fair   In general, would you say your quality of life is: Fair   In general, how would you rate your physical health? Fair   In general, how would you rate your mental health, including your mood and your ability to think? Fair   In general, how would you rate your satisfaction with your social activities and relationships? Good   In general, please rate how well you carry out your usual social activities and roles Fair   To what extent are you able to carry out your everyday physical activities such as walking, climbing stairs, carrying groceries, or moving a chair? Moderately   How often have you been bothered by emotional problems such as feeling anxious, depressed or irritable? Rarely   How would you rate your fatigue on average? Moderate   How would you rate your pain on average?   0 = No Pain  to  10 = Worst Imaginable Pain 3   In general, would you say your health is: 2   In general, would you say your quality of life is: 2   In general, how would you rate your physical health? 2   In general, how would you rate your mental health, including your  mood and your ability to think? 2   In general, how would you rate your satisfaction with your social activities and relationships? 3   In general, please rate how well you carry out your usual social activities and roles. (This includes activities at home, at work and in your community, and responsibilities as a parent, child, spouse, employee, friend, etc.) 2   To what extent are you able to carry out your everyday physical activities such as walking, climbing stairs, carrying groceries, or moving a chair? 3   In the past 7 days, how often have you been bothered by emotional problems such as feeling anxious, depressed, or irritable? 2   In the past 7 days, how would you rate your fatigue on average? 3   In the past 7 days, how would you rate your pain on average, where 0 means no pain, and 10 means worst imaginable pain? 3   Global Mental Health Score 11   Global Physical Health Score 12   PROMIS TOTAL - SUBSCORES 23                Alondra Villa LPN

## 2019-02-11 ENCOUNTER — APPOINTMENT (OUTPATIENT)
Dept: LAB | Facility: CLINIC | Age: 69
End: 2019-02-11
Attending: INTERNAL MEDICINE
Payer: MEDICARE

## 2019-02-11 ENCOUNTER — INFUSION THERAPY VISIT (OUTPATIENT)
Dept: ONCOLOGY | Facility: CLINIC | Age: 69
End: 2019-02-11
Attending: INTERNAL MEDICINE
Payer: MEDICARE

## 2019-02-11 VITALS
RESPIRATION RATE: 16 BRPM | BODY MASS INDEX: 31.27 KG/M2 | TEMPERATURE: 98.4 F | DIASTOLIC BLOOD PRESSURE: 77 MMHG | WEIGHT: 230.6 LBS | SYSTOLIC BLOOD PRESSURE: 129 MMHG | HEART RATE: 64 BPM | OXYGEN SATURATION: 97 %

## 2019-02-11 DIAGNOSIS — C64.9 RENAL CELL CARCINOMA, UNSPECIFIED LATERALITY (H): ICD-10-CM

## 2019-02-11 DIAGNOSIS — C64.2 RENAL CELL CARCINOMA, LEFT (H): Primary | ICD-10-CM

## 2019-02-11 DIAGNOSIS — Z79.899 ENCOUNTER FOR LONG-TERM (CURRENT) USE OF MEDICATIONS: ICD-10-CM

## 2019-02-11 DIAGNOSIS — C79.51 BONE METASTASIS: ICD-10-CM

## 2019-02-11 LAB
ALBUMIN SERPL-MCNC: 2.7 G/DL (ref 3.4–5)
ALP SERPL-CCNC: 115 U/L (ref 40–150)
ALT SERPL W P-5'-P-CCNC: 18 U/L (ref 0–70)
ANION GAP SERPL CALCULATED.3IONS-SCNC: 7 MMOL/L (ref 3–14)
ANISOCYTOSIS BLD QL SMEAR: SLIGHT
AST SERPL W P-5'-P-CCNC: 36 U/L (ref 0–45)
BASOPHILS # BLD AUTO: 0 10E9/L (ref 0–0.2)
BASOPHILS NFR BLD AUTO: 0 %
BILIRUB SERPL-MCNC: 0.8 MG/DL (ref 0.2–1.3)
BUN SERPL-MCNC: 8 MG/DL (ref 7–30)
CALCIUM SERPL-MCNC: 8.1 MG/DL (ref 8.5–10.1)
CHLORIDE SERPL-SCNC: 103 MMOL/L (ref 94–109)
CO2 SERPL-SCNC: 27 MMOL/L (ref 20–32)
CREAT SERPL-MCNC: 0.65 MG/DL (ref 0.66–1.25)
DIFFERENTIAL METHOD BLD: ABNORMAL
EOSINOPHIL # BLD AUTO: 0.1 10E9/L (ref 0–0.7)
EOSINOPHIL NFR BLD AUTO: 1.7 %
ERYTHROCYTE [DISTWIDTH] IN BLOOD BY AUTOMATED COUNT: 15.1 % (ref 10–15)
GFR SERPL CREATININE-BSD FRML MDRD: >90 ML/MIN/{1.73_M2}
GLUCOSE SERPL-MCNC: 87 MG/DL (ref 70–99)
HCT VFR BLD AUTO: 37.2 % (ref 40–53)
HGB BLD-MCNC: 12.5 G/DL (ref 13.3–17.7)
LYMPHOCYTES # BLD AUTO: 0.3 10E9/L (ref 0.8–5.3)
LYMPHOCYTES NFR BLD AUTO: 7.8 %
MCH RBC QN AUTO: 32.8 PG (ref 26.5–33)
MCHC RBC AUTO-ENTMCNC: 33.6 G/DL (ref 31.5–36.5)
MCV RBC AUTO: 98 FL (ref 78–100)
MONOCYTES # BLD AUTO: 1.4 10E9/L (ref 0–1.3)
MONOCYTES NFR BLD AUTO: 32.2 %
NEUTROPHILS # BLD AUTO: 2.6 10E9/L (ref 1.6–8.3)
NEUTROPHILS NFR BLD AUTO: 58.3 %
PLATELET # BLD AUTO: 158 10E9/L (ref 150–450)
PLATELET # BLD EST: ABNORMAL 10*3/UL
POTASSIUM SERPL-SCNC: 4.1 MMOL/L (ref 3.4–5.3)
PROT SERPL-MCNC: 5.9 G/DL (ref 6.8–8.8)
RBC # BLD AUTO: 3.81 10E12/L (ref 4.4–5.9)
SODIUM SERPL-SCNC: 138 MMOL/L (ref 133–144)
T4 FREE SERPL-MCNC: 1.08 NG/DL (ref 0.76–1.46)
TSH SERPL DL<=0.005 MIU/L-ACNC: 9.5 MU/L (ref 0.4–4)
WBC # BLD AUTO: 4.4 10E9/L (ref 4–11)

## 2019-02-11 PROCEDURE — 25000128 H RX IP 250 OP 636: Mod: ZF | Performed by: INTERNAL MEDICINE

## 2019-02-11 PROCEDURE — 84439 ASSAY OF FREE THYROXINE: CPT | Performed by: INTERNAL MEDICINE

## 2019-02-11 PROCEDURE — 84443 ASSAY THYROID STIM HORMONE: CPT | Performed by: INTERNAL MEDICINE

## 2019-02-11 PROCEDURE — 80053 COMPREHEN METABOLIC PANEL: CPT | Performed by: INTERNAL MEDICINE

## 2019-02-11 PROCEDURE — 96413 CHEMO IV INFUSION 1 HR: CPT

## 2019-02-11 PROCEDURE — 85025 COMPLETE CBC W/AUTO DIFF WBC: CPT | Performed by: INTERNAL MEDICINE

## 2019-02-11 PROCEDURE — 25800030 ZZH RX IP 258 OP 636: Mod: ZF | Performed by: INTERNAL MEDICINE

## 2019-02-11 RX ORDER — METHYLPREDNISOLONE SODIUM SUCCINATE 125 MG/2ML
125 INJECTION, POWDER, LYOPHILIZED, FOR SOLUTION INTRAMUSCULAR; INTRAVENOUS
Status: CANCELLED
Start: 2019-02-11

## 2019-02-11 RX ORDER — ALBUTEROL SULFATE 90 UG/1
1-2 AEROSOL, METERED RESPIRATORY (INHALATION)
Status: CANCELLED
Start: 2019-02-11

## 2019-02-11 RX ORDER — SODIUM CHLORIDE 9 MG/ML
1000 INJECTION, SOLUTION INTRAVENOUS CONTINUOUS PRN
Status: CANCELLED
Start: 2019-02-11

## 2019-02-11 RX ORDER — DIPHENHYDRAMINE HYDROCHLORIDE 50 MG/ML
50 INJECTION INTRAMUSCULAR; INTRAVENOUS
Status: CANCELLED
Start: 2019-02-11

## 2019-02-11 RX ORDER — LORAZEPAM 2 MG/ML
0.5 INJECTION INTRAMUSCULAR EVERY 4 HOURS PRN
Status: CANCELLED
Start: 2019-02-11

## 2019-02-11 RX ORDER — ALBUTEROL SULFATE 0.83 MG/ML
2.5 SOLUTION RESPIRATORY (INHALATION)
Status: CANCELLED | OUTPATIENT
Start: 2019-02-11

## 2019-02-11 RX ORDER — MEPERIDINE HYDROCHLORIDE 25 MG/ML
25 INJECTION INTRAMUSCULAR; INTRAVENOUS; SUBCUTANEOUS EVERY 30 MIN PRN
Status: CANCELLED | OUTPATIENT
Start: 2019-02-11

## 2019-02-11 RX ORDER — EPINEPHRINE 0.3 MG/.3ML
0.3 INJECTION SUBCUTANEOUS EVERY 5 MIN PRN
Status: CANCELLED | OUTPATIENT
Start: 2019-02-11

## 2019-02-11 RX ORDER — EPINEPHRINE 1 MG/ML
0.3 INJECTION, SOLUTION INTRAMUSCULAR; SUBCUTANEOUS EVERY 5 MIN PRN
Status: CANCELLED | OUTPATIENT
Start: 2019-02-11

## 2019-02-11 RX ADMIN — SODIUM CHLORIDE 250 ML: 9 INJECTION, SOLUTION INTRAVENOUS at 14:04

## 2019-02-11 RX ADMIN — SODIUM CHLORIDE 480 MG: 9 INJECTION, SOLUTION INTRAVENOUS at 14:04

## 2019-02-11 ASSESSMENT — PAIN SCALES - GENERAL: PAINLEVEL: SEVERE PAIN (6)

## 2019-02-11 NOTE — PATIENT INSTRUCTIONS
Contact Numbers    [facility] Main Line: [phone]  [facility] Triage and after hours / weekends / holidays:  [phone]      Please call the triage or after hours line if you experience a temperature greater than or equal to 100.5, shaking chills, have uncontrolled nausea, vomiting and/or diarrhea, dizziness, shortness of breath, chest pain, bleeding, unexplained bruising, or if you have any other new/concerning symptoms, questions or concerns.      If you are having any concerning symptoms or wish to speak to a provider before your next infusion visit, please call your care coordinator or triage to notify them so we can adequately serve you.     If you need a refill on a narcotic prescription or other medication, please call before your infusion appointment.                 February 2019 Sunday Monday Tuesday Wednesday Thursday Friday Saturday                            1     2       3     4     5    LAB   3:00 PM   (15 min.)    LAB   [facility] Lab    CT CHEST/ABDOMEN/PELVIS W   3:40 PM   (20 min.)   [id number]   [facility] CT    MR LUMBAR SPINE WWO   4:00 PM   (45 min.)   [id number]   [facility] MRI 6    [facility] RETURN  11:30 AM   (30 min.)   Malathi De Luna MD   [facility] 7    [facility] RETURN SPINE   8:15 AM   (15 min.)   Vickey Brower MD   [facility]    XR LUMBAR SPINE 1 VIEW   8:25 AM   (20 min.)   [id number]   [facility] Xray 8     9       10     11    P [facility] LAB DRAW  12:15 PM   (15 min.)   [facility] DRAW   [facility] Lab Draw    UMP ONC INFUSION 60   1:00 PM   (60 min.)    ONCOLOGY INFUSION   [facility] 12    NM INJ  11:00 AM   (15 min.)   SHNMINJ   [facility] Nuclear Medicine    NM BONE SCAN WHOLE BODY   2:00 PM   (60 min.)   SHNM1   [facility] Nuclear Medicine    RETURN   3:30 PM   (30 min.)   Carly Mills MD   [facility] 13     14     15     16       17     18      19     20     21     22     23       24     25    [facility] DRAW   8:45 AM   (15 min.)    [facility] LAB DRAW   [facility] Lab Draw    [facility] RETURN   9:00 AM   (30 min.)   Malathi De Luna MD   [facility] 26 27 28 March 2019 Sunday Monday Tuesday Wednesday Thursday Friday Saturday                            1     2       3     4     5     6     7     8     9       10     11     12     13     14     15     16       17     18     19     20     21     22     23       24     25     26     27     28     29     30       31                                                  Recent Results (from the past 24 hour(s))   CBC with platelets differential    Collection Time: 02/11/19 12:11 PM   Result Value Ref Range    WBC 4.4 4.0 - 11.0 10e9/L    RBC Count 3.81 (L) 4.4 - 5.9 10e12/L    Hemoglobin 12.5 (L) 13.3 - 17.7 g/dL    Hematocrit 37.2 (L) 40.0 - 53.0 %    MCV 98 78 - 100 fl    MCH 32.8 26.5 - 33.0 pg    MCHC 33.6 31.5 - 36.5 g/dL    RDW 15.1 (H) 10.0 - 15.0 %    Platelet Count 158 150 - 450 10e9/L    Diff Method Manual Differential     % Neutrophils 58.3 %    % Lymphocytes 7.8 %    % Monocytes 32.2 %    % Eosinophils 1.7 %    % Basophils 0.0 %    Absolute Neutrophil 2.6 1.6 - 8.3 10e9/L    Absolute Lymphocytes 0.3 (L) 0.8 - 5.3 10e9/L    Absolute Monocytes 1.4 (H) 0.0 - 1.3 10e9/L    Absolute Eosinophils 0.1 0.0 - 0.7 10e9/L    Absolute Basophils 0.0 0.0 - 0.2 10e9/L    Anisocytosis Slight     Platelet Estimate Confirming automated cell count    Comprehensive metabolic panel    Collection Time: 02/11/19 12:11 PM   Result Value Ref Range    Sodium 138 133 - 144 mmol/L    Potassium 4.1 3.4 - 5.3 mmol/L    Chloride 103 94 - 109 mmol/L    Carbon Dioxide 27 20 - 32 mmol/L    Anion Gap 7 3 - 14 mmol/L    Glucose 87 70 - 99 mg/dL    Urea Nitrogen 8 7 - 30 mg/dL    Creatinine 0.65 (L) 0.66 - 1.25 mg/dL    GFR Estimate >90 >60 mL/min/[1.73_m2]    GFR Estimate If Black  >90 >60 mL/min/[1.73_m2]    Calcium 8.1 (L) 8.5 - 10.1 mg/dL    Bilirubin Total 0.8 0.2 - 1.3 mg/dL    Albumin 2.7 (L) 3.4 - 5.0 g/dL    Protein Total 5.9 (L) 6.8 - 8.8 g/dL    Alkaline Phosphatase 115 40 - 150 U/L    ALT 18 0 - 70 U/L    AST 36 0 - 45 U/L   TSH with free T4 reflex    Collection Time: 02/11/19 12:11 PM   Result Value Ref Range    TSH 9.50 (H) 0.40 - 4.00 mU/L   T4 free    Collection Time: 02/11/19 12:11 PM   Result Value Ref Range    T4 Free 1.08 0.76 - 1.46 ng/dL

## 2019-02-11 NOTE — NURSING NOTE
Chief Complaint   Patient presents with     Blood Draw     Labs drawn via PIV by RN in lab. VS taken.      Labs drawn via peripheral IV. Vital signs taken. Checked into next appointment.     Miriam Gomes RN

## 2019-02-11 NOTE — PROGRESS NOTES
Infusion Nursing Note:  Javon Bianchi presents today for Cycle 1 Day 1 Nivolumab.    Patient seen by provider today: No   present during visit today: Not Applicable.    Note: Patient is new to the infusion room today and is receiving Nivolumab for the first time.  Patient oriented to infusion room, location of bathrooms and nutrition stations, and call light.  Verified that patient recieved written Nivolumab information previously. Verbally reviewed Nivolumab teaching, side effects, take-home medications, and follow-up schedule with patient. Patient instructed to call triage with any questions or if he experiences a temperature >100.5, shaking chills, uncontrolled nausea/vomiting/diarrhea, dizziness, shortness of breath, bleeding not relieved with pressure, or with any other concerns.  Instructed patient to call the after hours nurse line or [phone] on nights/weekends/holidays.    Patient was scheduled to receive ipilimumab and nivolumab today; however, due to insurance reasons he will be receiving nivolumab only. Pt was made aware prior to arriving infusion, and is disappointed but understanding. Dr. De Luna paged to change order set.     Intravenous Access:  Peripheral IV placed by lab.    Treatment Conditions:  Lab Results   Component Value Date    HGB 12.5 02/11/2019     Lab Results   Component Value Date    WBC 4.4 02/11/2019      Lab Results   Component Value Date    ANEU 2.6 02/11/2019     Lab Results   Component Value Date     02/11/2019      Lab Results   Component Value Date     02/11/2019                   Lab Results   Component Value Date    POTASSIUM 4.1 02/11/2019              Lab Results   Component Value Date    CR 0.65 02/11/2019                   Lab Results   Component Value Date    AUREA 8.1 02/11/2019                Lab Results   Component Value Date    BILITOTAL 0.8 02/11/2019           Lab Results   Component Value Date    ALBUMIN 2.7 02/11/2019                     Lab Results   Component Value Date    ALT 18 02/11/2019           Lab Results   Component Value Date    AST 36 02/11/2019       Results reviewed, labs MET treatment parameters, ok to proceed with treatment.      Post Infusion Assessment:  Patient tolerated infusion without incident.  Blood return noted pre and post infusion.  Site patent and intact, free from redness, edema or discomfort.  No evidence of extravasations.  Access discontinued per protocol.    Discharge Plan:   Patient declined prescription refills.  Discharge instructions reviewed with: Patient and Family.  Patient and/or family verbalized understanding of discharge instructions and all questions answered.  Copy of AVS reviewed with patient and/or family.  Patient will return 2/25/2019 for next MD appointment.  Patient discharged in stable condition accompanied by: wife.  Departure Mode: Ambulatory.    Veronica Bernabe RN

## 2019-02-12 ENCOUNTER — ONCOLOGY VISIT (OUTPATIENT)
Dept: ONCOLOGY | Facility: CLINIC | Age: 69
End: 2019-02-12
Attending: INTERNAL MEDICINE
Payer: MEDICARE

## 2019-02-12 ENCOUNTER — HOSPITAL ENCOUNTER (OUTPATIENT)
Dept: NUCLEAR MEDICINE | Facility: CLINIC | Age: 69
Setting detail: NUCLEAR MEDICINE
End: 2019-02-12
Attending: INTERNAL MEDICINE
Payer: MEDICARE

## 2019-02-12 ENCOUNTER — HOSPITAL ENCOUNTER (OUTPATIENT)
Dept: GENERAL RADIOLOGY | Facility: CLINIC | Age: 69
Discharge: HOME | End: 2019-02-12
Attending: INTERNAL MEDICINE | Admitting: INTERNAL MEDICINE
Payer: MEDICARE

## 2019-02-12 VITALS
HEART RATE: 67 BPM | HEIGHT: 72 IN | RESPIRATION RATE: 16 BRPM | SYSTOLIC BLOOD PRESSURE: 122 MMHG | BODY MASS INDEX: 31.27 KG/M2 | OXYGEN SATURATION: 98 % | DIASTOLIC BLOOD PRESSURE: 76 MMHG | TEMPERATURE: 97.6 F

## 2019-02-12 DIAGNOSIS — C79.51 BONE METASTASIS: ICD-10-CM

## 2019-02-12 DIAGNOSIS — F41.9 ANXIETY: ICD-10-CM

## 2019-02-12 DIAGNOSIS — C64.9 RENAL CELL CARCINOMA, UNSPECIFIED LATERALITY (H): Primary | ICD-10-CM

## 2019-02-12 DIAGNOSIS — Z51.5 ENCOUNTER FOR PALLIATIVE CARE: ICD-10-CM

## 2019-02-12 DIAGNOSIS — G89.3 CANCER ASSOCIATED PAIN: ICD-10-CM

## 2019-02-12 PROCEDURE — 99214 OFFICE O/P EST MOD 30 MIN: CPT | Performed by: INTERNAL MEDICINE

## 2019-02-12 PROCEDURE — 73552 X-RAY EXAM OF FEMUR 2/>: CPT | Mod: LT

## 2019-02-12 PROCEDURE — 34300033 ZZH RX 343: Performed by: INTERNAL MEDICINE

## 2019-02-12 PROCEDURE — A9503 TC99M MEDRONATE: HCPCS | Performed by: INTERNAL MEDICINE

## 2019-02-12 PROCEDURE — 78306 BONE IMAGING WHOLE BODY: CPT

## 2019-02-12 PROCEDURE — G0463 HOSPITAL OUTPT CLINIC VISIT: HCPCS

## 2019-02-12 RX ORDER — TC 99M MEDRONATE 20 MG/10ML
25 INJECTION, POWDER, LYOPHILIZED, FOR SOLUTION INTRAVENOUS ONCE
Status: COMPLETED | OUTPATIENT
Start: 2019-02-12 | End: 2019-02-12

## 2019-02-12 RX ORDER — OXYCODONE HYDROCHLORIDE 5 MG/1
5 TABLET ORAL EVERY 4 HOURS PRN
Qty: 120 TABLET | Refills: 0 | Status: SHIPPED | OUTPATIENT
Start: 2019-02-12 | End: 2019-02-14

## 2019-02-12 RX ORDER — MORPHINE SULFATE 30 MG/1
30 TABLET, FILM COATED, EXTENDED RELEASE ORAL EVERY 12 HOURS
Qty: 60 TABLET | Refills: 0 | Status: SHIPPED | OUTPATIENT
Start: 2019-02-12 | End: 2019-02-26

## 2019-02-12 RX ORDER — DULOXETIN HYDROCHLORIDE 60 MG/1
60 CAPSULE, DELAYED RELEASE ORAL DAILY
Qty: 90 CAPSULE | Refills: 3 | Status: ON HOLD | OUTPATIENT
Start: 2019-02-12 | End: 2019-01-01

## 2019-02-12 RX ADMIN — TC 99M MEDRONATE 25.3 MCI.: 20 INJECTION, POWDER, LYOPHILIZED, FOR SOLUTION INTRAVENOUS at 11:01

## 2019-02-12 ASSESSMENT — PAIN SCALES - GENERAL: PAINLEVEL: SEVERE PAIN (6)

## 2019-02-12 NOTE — PROGRESS NOTES
Oncology Rooming Note    February 12, 2019 3:29 PM   Javon Bianchi is a 68 year old male who presents for:    Chief Complaint   Patient presents with     Oncology Clinic Visit     Bone metastasis (H)     Initial Vitals: /76 (BP Location: Right arm, Patient Position: Sitting, Cuff Size: Adult Regular)   Pulse 67   Temp 97.6  F (36.4  C) (Oral)   Resp 16   Ht 1.829 m (6')   SpO2 98%   BMI 31.27 kg/m   Estimated body mass index is 31.27 kg/m  as calculated from the following:    Height as of this encounter: 1.829 m (6').    Weight as of 2/11/19: 104.6 kg (230 lb 9.6 oz). Body surface area is 2.31 meters squared.  Severe Pain (6) Comment: Data Unavailable   No LMP for male patient.  Allergies reviewed: Yes  Medications reviewed: Yes    Medications: Medication refills not needed today.  Pharmacy name entered into [employer]: [facility] - [address]    Clinical concerns: no   5 minutes for nursing intake (face to face time)          Alma Marks MA

## 2019-02-12 NOTE — LETTER
2/12/2019         RE: Javon Bianchi  [address]  [place] [address]        Dear Colleague,    Thank you for referring your patient, Javon Bianchi, to the [facility]. Please see a copy of my visit note below.    Palliative Care Outpatient Clinic Progress Note    Patient Name:  Javon Bianchi  Primary Provider:  LIANNE Wellington    Chief Complaint/Identifying Information: Worsening pain in the left leg  Metastatic Renal Cell Carcinoma on Cabometyx  History of L3 pathologic fracture found at diagnosis      [facility] Outpatient Palliative Care Opioid Prescribing Safety Plan     Opioid Safety Education: Reviewed by Nan Gonzalez  on 11/8/2018  Opioid Risk Assessment: Performed by Nan Gonzalez  on 11/8/2018 .  ORT score of 0.   Mood Disorder Assessment: Performed by Nan Gonzalez  on 11/8/2018.     reviewed with every prescription, last reviewed by Carly Mills on  02/12/19     Additional recommendations based on patient's prognosis and indication for opioids include the following:     Expected prognosis: shorter  Risk: Low or Medium (ORT 0-7)  No further recommendations.     Interim History:  Javon Bianchi 68 year old male returns to be seen by palliative care today, accompanied by his wife Suzie.  Javon Bianchi was last seen by me one month ago at which time he was noting a return of his left sided radicular pain.  He also had marked anxiety and was started on Cymbalta to address both of these issues. Omega ultimately did not tolerate cabometyx even at decreased dosing and was rotated to Ipilimumab/Nivolumab.  He has also followed up with Dr. Brower with plan for kyphoplasty possibly next month.     Since the patient was last seen by me, he notes that his pain in the left leg is worsening. This hurts constantly, worse with walking. He has been taking the MS Contin 30mg during the day, 15mg at bedtime.  He has also started Cymbalta, notes that he has no  anxiety.  Stopped Atarax.  Dry mouth is better.  Has struggled with constipation the last few days.  Has just increased senna to 2 tabs twice daily.     Coping:  Worries about his prognosis, which is new for him.  No SI.     Social History:  Pertinent changes to social history/social situation since last visit: Was supposed to travel to [place] next month but worries that this won't be possible.  Social History     Tobacco Use     Smoking status: Never Smoker     Smokeless tobacco: Never Used   Substance Use Topics     Alcohol use: Not on file     Drug use: Not on file              Physical Exam:   Vitals were reviewed  /76 (BP Location: Right arm, Patient Position: Sitting, Cuff Size: Adult Regular)   Pulse 67   Temp 97.6  F (36.4  C) (Oral)   Resp 16   Ht 1.829 m (6')   SpO2 98%   BMI 31.27 kg/m     General: Alert, comfortable appearing male in no acute distress.   Eyes: Pupils equal, sclera clear.   ENT: MMM.   Resp: Unlabored on room air.   Back: TLSO brace in place.   Abd: Soft, non-distended, non-tender to palpation, active bowel sounds.   Ext: New mild TTP of the left greater trochanter.   Neuro: No facial asymmetry.  Spontaneous movements grossly non-focal.  Using a wheelchair for transport.   Psych: Alert, appropriately interactive, full affect, clear sensorium.       Impression & Recommendations & Counseling:  Javon is a 68 year old man with likely metastatic renal cell carcinoma  now rotated to Ipilimumab/Nivolumab who has worsening pain in the left hip.  This was previously associated with low back pain which has now resolved, and he has a preliminary reading on his bone scan from just prior to my visit which shows metastatic disease in the left femur.  Subsequent radiographs performed today are pending but per my reading I don't see any evidence of fracture. Preliminary bone scan result was shared with Omega.      His pain is suboptimally controlled but his total opioid daily needs are hard to  "know given that Omega has been using MS Contin only to control his pain. Will plan to increase his MS Contin to equivalent dosing AM/PM as noted below, and restart oxycodone.  Once daily opioid needs are better understood, I anticipate that Omega will need to escalate his MS Contin.     Corticosteroids were considered but ultimately deferred due to immunotherapy. I anticipate that radiation will also be considered by Omega's Oncology team once his imaging from today is finalized.     Anxiety is now well controlled with the addition of Cymbalta.  Dry mouth has resolved with cessation of Atarax.     Omega and I also discussed his prognosis which Omega has now come to understand as that he has an incurable disease.  He worries that he needs to start \"getting his affairs in order.\"  We reviewed the typical trajectory of incurable cancers to include rotating cancer treatments, which are often changed when they stop being effective in disease control or cause intolerable side effects, and eventual declining physical stamina.  I have encouraged Omega to take advantage of the energy he has now to focus on the activities that are particularly meaningful to him.      1. Increase MS Contin to 30mg twice daily.   2. Restart oxycodone 5mg every 4 hours as needed for pain.   3. Can increase senna to 4 tabs twice daily if needed, along with Miralax 17g twice daily.   4. Cymbalta refilled.   5. Okay to take Tylenol 500-1000mg every 8 hours as needed for pain.       RTC 1 month for a follow up. Will plan for close phone follow up this week for further opioid titration.     Additional information reviewed today:   No Known Allergies  Current Outpatient Medications   Medication Sig Dispense Refill     aspirin 81 MG tablet Take 81 mg by mouth       atorvastatin (LIPITOR) 20 MG tablet Take 20 mg by mouth       CABOMETYX 20 MG        DULoxetine (CYMBALTA) 30 MG capsule Start with 30mg daily for one week, then increase to 60mg daily. 60 capsule " "0     hydrOXYzine (ATARAX) 25 MG tablet TAKE 1 TABLET EVERY SIX HOURS AS NEEDED FOR ANXIETY  3     melatonin 3 MG tablet Take 3 mg by mouth       morphine (MS CONTIN) 15 MG 12 hr tablet Take 1 tablet (15 mg) by mouth every 24 hours Take at 6am 30 tablet 0     morphine (MS CONTIN) 30 MG 12 hr tablet Take 1 tablet (30 mg) by mouth every 24 hours Take at 6pm 90 tablet 0     polyethylene glycol (MIRALAX/GLYCOLAX) Packet Take 1 packet by mouth       Pseudoephedrine-APAP  MG TABS        senna-docusate (SENOKOT-S;PERICOLACE) 8.6-50 MG per tablet        Past Medical History:   Diagnosis Date     Bone metastasis (H) 09/28/2018     Renal cell carcinoma, right (H) 09/28/2018     No past surgical history on file.    Key Data Reviewed:  LABS:   Lab Results   Component Value Date    WBC 4.4 02/11/2019    HGB 12.5 (L) 02/11/2019    HCT 37.2 (L) 02/11/2019     02/11/2019     02/11/2019    POTASSIUM 4.1 02/11/2019    CHLORIDE 103 02/11/2019    CO2 27 02/11/2019    BUN 8 02/11/2019    CR 0.65 (L) 02/11/2019    GLC 87 02/11/2019    AST 36 02/11/2019    ALT 18 02/11/2019    ALKPHOS 115 02/11/2019    BILITOTAL 0.8 02/11/2019    INR 1.3 (A) 08/31/2018     IMAGING:   MRI Lumbar spine 2/5/19  \"Impression: Overall no significant change in metastatic involvement of  the spine and pelvis with pathologic fracture of L3 that is stable.  Stable epidural extension producing moderate spinal canal stenosis at  the level of L3 with milder stenosis above and below this level..\"    For today's imaging, see above.     [place]  - Use of controlled substances consistent with history.     Thank you for continuing to involve me in the care of this patient.     Carly Mills MD / Palliative Medicine / Pager [phone] / After-Hours Answering Service [phone] / Main Palliative Clinic - [facility] [phone] / [facility] Inpatient Team Consult Pager [phone] (answered 8am-430pm M-F)    Face to face time: 33 minutes, " with >50% of time devoted to patient counseling.         Oncology Rooming Note    February 12, 2019 3:29 PM   Javon Bianchi is a 68 year old male who presents for:    Chief Complaint   Patient presents with     Oncology Clinic Visit     Bone metastasis (H)     Initial Vitals: /76 (BP Location: Right arm, Patient Position: Sitting, Cuff Size: Adult Regular)   Pulse 67   Temp 97.6  F (36.4  C) (Oral)   Resp 16   Ht 1.829 m (6')   SpO2 98%   BMI 31.27 kg/m    Estimated body mass index is 31.27 kg/m  as calculated from the following:    Height as of this encounter: 1.829 m (6').    Weight as of 2/11/19: 104.6 kg (230 lb 9.6 oz). Body surface area is 2.31 meters squared.  Severe Pain (6) Comment: Data Unavailable   No LMP for male patient.  Allergies reviewed: Yes  Medications reviewed: Yes    Medications: Medication refills not needed today.  Pharmacy name entered into [employer]: [facility] - [address]    Clinical concerns: no   5 minutes for nursing intake (face to face time)          Alma Marks MA            Again, thank you for allowing me to participate in the care of your patient.        Sincerely,        Carly Mills MD

## 2019-02-12 NOTE — PATIENT INSTRUCTIONS
Thank you for coming into the Palliative Care Clinic today.     1. Increase MS Contin to 30mg twice daily.   2. Restart oxycodone 5mg every 4 hours as needed for pain.   3. Can increase senna to 4 tabs twice daily if needed, along with Miralax 17g twice daily.   4. Cymbalta refilled.   5. Okay to take Tylenol 500-1000mg every 8 hours as needed for pain.     Return to clinic 1 month for a follow up.     You can reach the Palliative Care Team during business hours at the following number: [phone] (Palliative Clinic Nurse Line).     To reach the Palliative Care Provider on-call after-hours or on holidays and weekends, call: [phone].  Please note that we are not able to provide pain medication refills on evenings or weekends.     ==================================================================    [facility] Palliative [facility] Clin[facility]   The [facility] Palliative Care Team is committed to treating your pain and other symptoms. This handout is for all patients treated with opioid medications in our clinics.     What are opioid medicines?   Opioid medications are used to ease some types of pain and shortness of breath. They are sometimes called narcotics. They include: Morphine (MS Contin, Roxanol), Oxycodone (OxyContin, OxyFast, Percocet), Hydrocodone (Vicodin, Norco), Hydromorphone (Dilaudid), Fentanyl (Duragesic), Methadone (Dolophine).   Are opioid medicines safe to take?   Opioid medicines are safe when you follow the directions from your doctor or medical provider.  Opioids can cause serious side effects and become unsafe if you do not follow your instructions.   To make sure you are taking opioid medicines safely, we may ask you to bring in your pill bottles or to allow us to test your urine. Our goal is to keep you safe and to improve your ability to function & be active. If we don t think opioids are safely doing that, we will work with you to taper you off of them.    Do not drive unless your medical provider tells you it is safe.   Do not take opioids with alcohol or anxiety/sleep medicines unless your doctor tells you it is ok to do so.   Are opioids addictive?   Addiction means you crave a drug and have trouble limiting the amount you use.   Addiction is more likely to happen if you take opioids to relieve stress or to feel altered. If you or your loved one feels this way when taking opioid medicines, let your medical provider know. We may refer you for additional assessment or services if this is a concern.   Your body will get used to the opioid medicines if you take them for more than two weeks in a row. This means if you stop them suddenly, you may have withdrawal. If this occurs, you will feel uncomfortable (nausea, diarrhea, increased pain). This does not mean you are addicted. Never stop taking your pain medicines all at once unless your medical provider tells you to. If you think you need less medicine, your medical provider will help you to safely lower your dose.   What is the safest way to store opioids?   Keep your medicines in a safe place away from children, teens, pets, and visitors. Be careful about who knows that you have these medicines.   How do I get rid of my old opioids?   Opioids should be brought to your [facility] drop-off site, or you can purchase a disposal kit from your local pharmacy. If neither of these options is available, the Food and Drug Administration recommends that you flush unused opioids down the toilet.   Do not share or sell your pain medicines. This is illegal and very dangerous. We cannot prescribe opioid pain medicines to you if do this.   How do I get refills?   Opioid medicines need signed paper prescriptions. This means it may take longer to refill than other medicines. Our clinic cannot prescribe them on weekends or at night.     Give us one week s notice when requesting a refill. This gives us the time we need to get it signed  and back to you. It may be possible to mail prescriptions to you.

## 2019-02-12 NOTE — PROGRESS NOTES
Palliative Care Outpatient Clinic Progress Note    Patient Name:  Javon Bianchi  Primary Provider:  LIANNE Wellington    Chief Complaint/Identifying Information: Worsening pain in the left leg  Metastatic Renal Cell Carcinoma on Cabometyx  History of L3 pathologic fracture found at diagnosis      [facility] Palliative Care Opioid Prescribing Safety Plan     Opioid Safety Education: Reviewed by Nan Gonzalez  on 11/8/2018  Opioid Risk Assessment: Performed by Nan Gonzalez  on 11/8/2018 .  ORT score of 0.   Mood Disorder Assessment: Performed by Nan Gonzalez  on 11/8/2018.     reviewed with every prescription, last reviewed by Carly Mills on 02/12/19     Additional recommendations based on patient's prognosis and indication for opioids include the following:     Expected prognosis: shorter  Risk: Low or Medium (ORT 0-7)  No further recommendations.     Interim History:  Javon Bianchi 68 year old male returns to be seen by palliative care today, accompanied by his wife Suzie.  Javon Bianchi was last seen by me one month ago at which time he was noting a return of his left sided radicular pain.  He also had marked anxiety and was started on Cymbalta to address both of these issues. Omega ultimately did not tolerate cabometyx even at decreased dosing and was rotated to Ipilimumab/Nivolumab.  He has also followed up with Dr. Brower with plan for kyphoplasty possibly next month.     Since the patient was last seen by me, he notes that his pain in the left leg is worsening. This hurts constantly, worse with walking. He has been taking the MS Contin 30mg during the day, 15mg at bedtime.  He has also started Cymbalta, notes that he has no anxiety.  Stopped Atarax.  Dry mouth is better.  Has struggled with constipation the last few days.  Has just increased senna to 2 tabs twice daily.     Coping:  Worries about his prognosis, which is new for him.  No SI.     Social  History:  Pertinent changes to social history/social situation since last visit: Was supposed to travel to [place] next month but worries that this won't be possible.  Social History     Tobacco Use     Smoking status: Never Smoker     Smokeless tobacco: Never Used   Substance Use Topics     Alcohol use: Not on file     Drug use: Not on file              Physical Exam:   Vitals were reviewed  /76 (BP Location: Right arm, Patient Position: Sitting, Cuff Size: Adult Regular)   Pulse 67   Temp 97.6  F (36.4  C) (Oral)   Resp 16   Ht 1.829 m (6')   SpO2 98%   BMI 31.27 kg/m    General: Alert, comfortable appearing male in no acute distress.   Eyes: Pupils equal, sclera clear.   ENT: MMM.   Resp: Unlabored on room air.   Back: TLSO brace in place.   Abd: Soft, non-distended, non-tender to palpation, active bowel sounds.   Ext: New mild TTP of the left greater trochanter.   Neuro: No facial asymmetry.  Spontaneous movements grossly non-focal.  Using a wheelchair for transport.   Psych: Alert, appropriately interactive, full affect, clear sensorium.       Impression & Recommendations & Counseling:  Javon is a 68 year old man with likely metastatic renal cell carcinoma now rotated to Ipilimumab/Nivolumab who has worsening pain in the left hip.  This was previously associated with low back pain which has now resolved, and he has a preliminary reading on his bone scan from just prior to my visit which shows metastatic disease in the left femur.  Subsequent radiographs performed today are pending but per my reading I don't see any evidence of fracture. Preliminary bone scan result was shared with Omega.      His pain is suboptimally controlled but his total opioid daily needs are hard to know given that Omega has been using MS Contin only to control his pain. Will plan to increase his MS Contin to equivalent dosing AM/PM as noted below, and restart oxycodone.  Once daily opioid needs are better understood, I anticipate  "that Omega will need to escalate his MS Contin.     Corticosteroids were considered but ultimately deferred due to immunotherapy. I anticipate that radiation will also be considered by Omega's Oncology team once his imaging from today is finalized.     Anxiety is now well controlled with the addition of Cymbalta.  Dry mouth has resolved with cessation of Atarax.     Omega and I also discussed his prognosis which Omega has now come to understand as that he has an incurable disease.  He worries that he needs to start \"getting his affairs in order.\"  We reviewed the typical trajectory of incurable cancers to include rotating cancer treatments, which are often changed when they stop being effective in disease control or cause intolerable side effects, and eventual declining physical stamina.  I have encouraged Omega to take advantage of the energy he has now to focus on the activities that are particularly meaningful to him.      1. Increase MS Contin to 30mg twice daily.   2. Restart oxycodone 5mg every 4 hours as needed for pain.   3. Can increase senna to 4 tabs twice daily if needed, along with Miralax 17g twice daily.   4. Cymbalta refilled.   5. Okay to take Tylenol 500-1000mg every 8 hours as needed for pain.       RTC 1 month for a follow up. Will plan for close phone follow up this week for further opioid titration.     Additional information reviewed today:   No Known Allergies  Current Outpatient Medications   Medication Sig Dispense Refill     aspirin 81 MG tablet Take 81 mg by mouth       atorvastatin (LIPITOR) 20 MG tablet Take 20 mg by mouth       CABOMETYX 20 MG        DULoxetine (CYMBALTA) 30 MG capsule Start with 30mg daily for one week, then increase to 60mg daily. 60 capsule 0     hydrOXYzine (ATARAX) 25 MG tablet TAKE 1 TABLET EVERY SIX HOURS AS NEEDED FOR ANXIETY  3     melatonin 3 MG tablet Take 3 mg by mouth       morphine (MS CONTIN) 15 MG 12 hr tablet Take 1 tablet (15 mg) by mouth every 24 hours " "Take at 6am 30 tablet 0     morphine (MS CONTIN) 30 MG 12 hr tablet Take 1 tablet (30 mg) by mouth every 24 hours Take at 6pm 90 tablet 0     polyethylene glycol (MIRALAX/GLYCOLAX) Packet Take 1 packet by mouth       Pseudoephedrine-APAP  MG TABS        senna-docusate (SENOKOT-S;PERICOLACE) 8.6-50 MG per tablet        Past Medical History:   Diagnosis Date     Bone metastasis (H) 09/28/2018     Renal cell carcinoma, right (H) 09/28/2018     No past surgical history on file.    Key Data Reviewed:  LABS:   Lab Results   Component Value Date    WBC 4.4 02/11/2019    HGB 12.5 (L) 02/11/2019    HCT 37.2 (L) 02/11/2019     02/11/2019     02/11/2019    POTASSIUM 4.1 02/11/2019    CHLORIDE 103 02/11/2019    CO2 27 02/11/2019    BUN 8 02/11/2019    CR 0.65 (L) 02/11/2019    GLC 87 02/11/2019    AST 36 02/11/2019    ALT 18 02/11/2019    ALKPHOS 115 02/11/2019    BILITOTAL 0.8 02/11/2019    INR 1.3 (A) 08/31/2018     IMAGING:   MRI Lumbar spine 2/5/19  \"Impression: Overall no significant change in metastatic involvement of  the spine and pelvis with pathologic fracture of L3 that is stable.  Stable epidural extension producing moderate spinal canal stenosis at  the level of L3 with milder stenosis above and below this level..\"    For today's imaging, see above.     [place]  - Use of controlled substances consistent with history.     Thank you for continuing to involve me in the care of this patient.     Carly Mills MD / Palliative Medicine / Pager [phone] / After-Hours Answering Service [phone] / Main Palliative Clinic - [facility] [phone] / [facility] Inpatient Team Consult Pager [phone] (answered 8am-430pm M-F)    Face to face time: 33 minutes, with >50% of time devoted to patient counseling.       "

## 2019-02-14 ENCOUNTER — TELEPHONE (OUTPATIENT)
Dept: ONCOLOGY | Facility: CLINIC | Age: 69
End: 2019-02-14

## 2019-02-14 DIAGNOSIS — G89.3 CANCER ASSOCIATED PAIN: ICD-10-CM

## 2019-02-14 RX ORDER — OXYCODONE HYDROCHLORIDE 5 MG/1
5-10 TABLET ORAL EVERY 4 HOURS PRN
Qty: 120 TABLET | Refills: 0 | COMMUNITY
Start: 2019-02-14 | End: 2019-02-28

## 2019-02-14 NOTE — PROGRESS NOTES
ORAL CHEMOTHERAPY DISCONTINUATION       Primary Oncologist:  Dr. De Luna  Primary Oncology Clinic: [facility]  Cancer Diagnosis:  Renal Cell Carcinoma  Therapy History:  Start Date: 9/28/18  Held 11/27-11/30 due to I & D of foot wound  Resumed 12/1 and held 12/4-12/12  Decrease dose to 40 mg 12/14  Held 12/21-12/27 due to fatigue and anxiety  Decreased dose to 20 mg daily on 1/17    Therapy Ended On:  2/6/2019  Reason For Discontinuation: unacceptable toxicity    Additional Notes:  Thank you for the opportunity to be a part in this patient's oral chemotherapy. The oncology pharmacy will no longer be following this patient for oral chemotherapy. If there are an questions or the plan changes, feel free to contact us.    Kristen Weiler, Pharmacy Intern  Oral Chemotherapy Monitoring Program   [facility]  [phone]

## 2019-02-14 NOTE — TELEPHONE ENCOUNTER
Dr Mills has prescribed an increase to the Oxycodone of 5-10mgs q4 s needed for pain.    This change was communicated to Omega and he verbalized understanding of the changes

## 2019-02-14 NOTE — TELEPHONE ENCOUNTER
Call made to Omega to check in after changes made to pain meds 02/12.    Omega and wife Suzie confirm that they have increased MS Contin to 30mgs Q12  Oxycodone to 5mgs q4 hours as needed    He used 5 doses of the Oxycodone yesterday and was able to sleep well overnight only waking to use the bathroom not for pain. Omega  States that pain to left leg can be reduced by lying down and increases when in a chair.    Omega states that he is having daily bowel movements    I will communicate his condition to Dr Mills for next steps

## 2019-02-15 ENCOUNTER — CARE COORDINATION (OUTPATIENT)
Dept: ONCOLOGY | Facility: CLINIC | Age: 69
End: 2019-02-15

## 2019-02-15 NOTE — PROGRESS NOTES
TC from pt asking about the progress on free drug from [facility] as well as the results of his bone scan. Told pt that our infusion finance specialists are working on obtaining free drug for pt. Writer didn't review pt's bone scan results in depth, but explained that there are signs of progression just like his CT showed that was reviewed during his most recent visit with Dr. De Luna. Encouraged pt to request Dr. De Luna review his bone scan with him during his next office visit. Patient stated understanding of all and agreed to call clinic with future questions or concerns.

## 2019-02-21 ENCOUNTER — TELEPHONE (OUTPATIENT)
Dept: CARE COORDINATION | Facility: CLINIC | Age: 69
End: 2019-02-21

## 2019-02-21 NOTE — TELEPHONE ENCOUNTER
Per Patient's request,  completed and faxed [employer] request for lodging dates 2/25/2019 - 2/26/2019. [employer] will contact Patient for confirmation of reservation.  will continue to provide support as needed.    Clair Husain [facility]  Outpatient Specialty Clinics  Direct Phone: [phone]  Pager:  [phone]

## 2019-02-23 ENCOUNTER — APPOINTMENT (OUTPATIENT)
Dept: GENERAL RADIOLOGY | Facility: CLINIC | Age: 69
End: 2019-02-23
Attending: EMERGENCY MEDICINE
Payer: MEDICARE

## 2019-02-23 ENCOUNTER — TELEPHONE (OUTPATIENT)
Dept: NURSING | Facility: CLINIC | Age: 69
End: 2019-02-23

## 2019-02-23 ENCOUNTER — HOSPITAL ENCOUNTER (EMERGENCY)
Facility: CLINIC | Age: 69
Discharge: HOME | End: 2019-02-23
Attending: EMERGENCY MEDICINE | Admitting: EMERGENCY MEDICINE
Payer: MEDICARE

## 2019-02-23 VITALS
BODY MASS INDEX: 31.15 KG/M2 | SYSTOLIC BLOOD PRESSURE: 118 MMHG | HEART RATE: 76 BPM | DIASTOLIC BLOOD PRESSURE: 78 MMHG | OXYGEN SATURATION: 97 % | TEMPERATURE: 99.2 F | RESPIRATION RATE: 16 BRPM | WEIGHT: 230 LBS | HEIGHT: 72 IN

## 2019-02-23 DIAGNOSIS — G89.3 PAIN FROM BONE METASTASES (H): ICD-10-CM

## 2019-02-23 DIAGNOSIS — M79.651 PAIN IN BOTH THIGHS: ICD-10-CM

## 2019-02-23 DIAGNOSIS — M79.652 PAIN IN BOTH THIGHS: ICD-10-CM

## 2019-02-23 DIAGNOSIS — C79.51 PAIN FROM BONE METASTASES (H): ICD-10-CM

## 2019-02-23 LAB
ALBUMIN SERPL-MCNC: 2.8 G/DL (ref 3.4–5)
ALP SERPL-CCNC: 109 U/L (ref 40–150)
ALT SERPL W P-5'-P-CCNC: 18 U/L (ref 0–70)
ANION GAP SERPL CALCULATED.3IONS-SCNC: 6 MMOL/L (ref 3–14)
ANISOCYTOSIS BLD QL SMEAR: SLIGHT
AST SERPL W P-5'-P-CCNC: 41 U/L (ref 0–45)
BASOPHILS # BLD AUTO: 0 10E9/L (ref 0–0.2)
BASOPHILS NFR BLD AUTO: 0 %
BILIRUB SERPL-MCNC: 0.8 MG/DL (ref 0.2–1.3)
BUN SERPL-MCNC: 7 MG/DL (ref 7–30)
CALCIUM SERPL-MCNC: 8.4 MG/DL (ref 8.5–10.1)
CHLORIDE SERPL-SCNC: 101 MMOL/L (ref 94–109)
CO2 SERPL-SCNC: 31 MMOL/L (ref 20–32)
CREAT SERPL-MCNC: 0.72 MG/DL (ref 0.66–1.25)
DIFFERENTIAL METHOD BLD: ABNORMAL
EOSINOPHIL # BLD AUTO: 0.1 10E9/L (ref 0–0.7)
EOSINOPHIL NFR BLD AUTO: 1 %
ERYTHROCYTE [DISTWIDTH] IN BLOOD BY AUTOMATED COUNT: 15 % (ref 10–15)
GFR SERPL CREATININE-BSD FRML MDRD: >90 ML/MIN/{1.73_M2}
GLUCOSE SERPL-MCNC: 87 MG/DL (ref 70–99)
HCT VFR BLD AUTO: 34.7 % (ref 40–53)
HGB BLD-MCNC: 11 G/DL (ref 13.3–17.7)
LYMPHOCYTES # BLD AUTO: 0.4 10E9/L (ref 0.8–5.3)
LYMPHOCYTES NFR BLD AUTO: 7.7 %
MACROCYTES BLD QL SMEAR: PRESENT
MCH RBC QN AUTO: 32.4 PG (ref 26.5–33)
MCHC RBC AUTO-ENTMCNC: 31.7 G/DL (ref 31.5–36.5)
MCV RBC AUTO: 102 FL (ref 78–100)
MONOCYTES # BLD AUTO: 1.5 10E9/L (ref 0–1.3)
MONOCYTES NFR BLD AUTO: 27.9 %
NEUTROPHILS # BLD AUTO: 3.4 10E9/L (ref 1.6–8.3)
NEUTROPHILS NFR BLD AUTO: 63.4 %
NRBC # BLD AUTO: 0.1 10*3/UL
NRBC BLD AUTO-RTO: 1 /100
PLATELET # BLD AUTO: 212 10E9/L (ref 150–450)
PLATELET # BLD EST: ABNORMAL 10*3/UL
POTASSIUM SERPL-SCNC: 3.7 MMOL/L (ref 3.4–5.3)
PROT SERPL-MCNC: 6.3 G/DL (ref 6.8–8.8)
RBC # BLD AUTO: 3.39 10E12/L (ref 4.4–5.9)
SODIUM SERPL-SCNC: 139 MMOL/L (ref 133–144)
WBC # BLD AUTO: 5.3 10E9/L (ref 4–11)

## 2019-02-23 PROCEDURE — 85025 COMPLETE CBC W/AUTO DIFF WBC: CPT | Performed by: EMERGENCY MEDICINE

## 2019-02-23 PROCEDURE — 25000131 ZZH RX MED GY IP 250 OP 636 PS 637: Performed by: EMERGENCY MEDICINE

## 2019-02-23 PROCEDURE — 72220 X-RAY EXAM SACRUM TAILBONE: CPT

## 2019-02-23 PROCEDURE — 80053 COMPREHEN METABOLIC PANEL: CPT | Performed by: EMERGENCY MEDICINE

## 2019-02-23 PROCEDURE — 72170 X-RAY EXAM OF PELVIS: CPT

## 2019-02-23 PROCEDURE — A9270 NON-COVERED ITEM OR SERVICE: HCPCS | Mod: GY | Performed by: EMERGENCY MEDICINE

## 2019-02-23 PROCEDURE — 99285 EMERGENCY DEPT VISIT HI MDM: CPT | Mod: 25 | Performed by: EMERGENCY MEDICINE

## 2019-02-23 PROCEDURE — 99285 EMERGENCY DEPT VISIT HI MDM: CPT | Mod: Z6 | Performed by: EMERGENCY MEDICINE

## 2019-02-23 PROCEDURE — 96374 THER/PROPH/DIAG INJ IV PUSH: CPT | Performed by: EMERGENCY MEDICINE

## 2019-02-23 PROCEDURE — 73552 X-RAY EXAM OF FEMUR 2/>: CPT | Mod: RT

## 2019-02-23 PROCEDURE — 25000128 H RX IP 250 OP 636: Performed by: EMERGENCY MEDICINE

## 2019-02-23 PROCEDURE — 96376 TX/PRO/DX INJ SAME DRUG ADON: CPT | Performed by: EMERGENCY MEDICINE

## 2019-02-23 RX ORDER — DEXAMETHASONE 2 MG/1
2 TABLET ORAL ONCE
Status: COMPLETED | OUTPATIENT
Start: 2019-02-23 | End: 2019-02-23

## 2019-02-23 RX ORDER — HYDROMORPHONE HYDROCHLORIDE 1 MG/ML
0.5 INJECTION, SOLUTION INTRAMUSCULAR; INTRAVENOUS; SUBCUTANEOUS
Status: COMPLETED | OUTPATIENT
Start: 2019-02-23 | End: 2019-02-23

## 2019-02-23 RX ORDER — DEXAMETHASONE 2 MG/1
2 TABLET ORAL 2 TIMES DAILY WITH MEALS
Qty: 14 TABLET | Refills: 0 | Status: SHIPPED | OUTPATIENT
Start: 2019-02-23 | End: 2019-02-26

## 2019-02-23 RX ORDER — HYDROMORPHONE HYDROCHLORIDE 1 MG/ML
0.5 INJECTION, SOLUTION INTRAMUSCULAR; INTRAVENOUS; SUBCUTANEOUS
Status: DISCONTINUED | OUTPATIENT
Start: 2019-02-23 | End: 2019-02-23 | Stop reason: HOSPADM

## 2019-02-23 RX ADMIN — DEXAMETHASONE 2 MG: 2 TABLET ORAL at 20:25

## 2019-02-23 RX ADMIN — Medication 0.5 MG: at 20:04

## 2019-02-23 RX ADMIN — Medication 0.5 MG: at 17:02

## 2019-02-23 ASSESSMENT — ENCOUNTER SYMPTOMS
EYE REDNESS: 0
COLOR CHANGE: 0
HEADACHES: 0
DIFFICULTY URINATING: 0
SHORTNESS OF BREATH: 0
ARTHRALGIAS: 1
CONFUSION: 0
NECK STIFFNESS: 0
ABDOMINAL PAIN: 0
FEVER: 0

## 2019-02-23 ASSESSMENT — MIFFLIN-ST. JEOR: SCORE: 1841.27

## 2019-02-23 NOTE — ED PROVIDER NOTES
History     Chief Complaint   Patient presents with     Hip Pain     Left     HPI  Javon Bianchi is a 68 year old male with a history of renal cell carcinoma with bone metastasis who presents to the Emergency Department for the evaluation of left hip pain. Patient has known metastases with bone scan on 2/12 indicating lesions involving the sacrum, L3 vertebra, iliac and ischial bones. He reports he has had worsening left hip, tail bone, and left femur pain for the past week and the pain is beginning to spread to his right hip. He states he was unable to sleep last night and is unable to control pain. Pain does not worsen upon palpation. Denies any recent falls. He has fasted 6 hours prior to arrival.     I have reviewed the Medications, Allergies, Past Medical and Surgical History, and Social History in the [employer] system.  Past Medical History:   Diagnosis Date     Bone metastasis (H) 09/28/2018     Renal cell carcinoma, right (H) 09/28/2018       History reviewed. No pertinent surgical history.    No family history on file.    Social History     Tobacco Use     Smoking status: Never Smoker     Smokeless tobacco: Never Used   Substance Use Topics     Alcohol use: Not on file       Current Facility-Administered Medications   Medication     HYDROmorphone (PF) (DILAUDID) injection 0.5 mg     Current Outpatient Medications   Medication     aspirin 81 MG tablet     atorvastatin (LIPITOR) 20 MG tablet     CABOMETYX 20 MG     DULoxetine (CYMBALTA) 60 MG capsule     hydrOXYzine (ATARAX) 25 MG tablet     melatonin 3 MG tablet     morphine (MS CONTIN) 30 MG CR tablet     oxyCODONE (ROXICODONE) 5 MG tablet     polyethylene glycol (MIRALAX/GLYCOLAX) Packet     Pseudoephedrine-APAP  MG TABS     senna-docusate (SENOKOT-S;PERICOLACE) 8.6-50 MG per tablet      No Known Allergies    Review of Systems   Constitutional: Negative for fever.   HENT: Negative for congestion.    Eyes: Negative for redness.   Respiratory: Negative  for shortness of breath.    Cardiovascular: Negative for chest pain.   Gastrointestinal: Negative for abdominal pain.   Genitourinary: Negative for difficulty urinating.   Musculoskeletal: Positive for arthralgias. Negative for neck stiffness.   Skin: Negative for color change.   Neurological: Negative for headaches.   Psychiatric/Behavioral: Negative for confusion.   All other systems reviewed and are negative.      Physical Exam   BP: 124/70  Pulse: 67  Heart Rate: 67  Temp: 99.2  F (37.3  C)  Resp: 18  Height: 182.9 cm (6')  Weight: 104.3 kg (230 lb)  SpO2: 99 %      Physical Exam   Constitutional: He is oriented to person, place, and time. He appears well-developed and well-nourished. No distress.   HENT:   Head: Normocephalic and atraumatic.   Mouth/Throat: No oropharyngeal exudate.   Eyes: Pupils are equal, round, and reactive to light. Right eye exhibits no discharge. Left eye exhibits no discharge. No scleral icterus.   Neck: Normal range of motion. Neck supple.   Cardiovascular: Normal rate, regular rhythm, normal heart sounds and intact distal pulses. Exam reveals no gallop and no friction rub.   No murmur heard.  Pulmonary/Chest: Effort normal and breath sounds normal. No respiratory distress. He has no wheezes. He exhibits no tenderness.   Abdominal: Soft. Bowel sounds are normal. He exhibits no distension. There is no tenderness.   Musculoskeletal: Normal range of motion. He exhibits no edema, tenderness or deformity.   Bilateral thighs hips and sacrum.  Pain worse on the left than the right.  Minimally tender.   Neurological: He is alert and oriented to person, place, and time. No cranial nerve deficit.   Skin: Skin is warm and dry. No rash noted. He is not diaphoretic. No erythema. No pallor.   Psychiatric: He has a normal mood and affect.   Nursing note and vitals reviewed.      ED Course   4:38 PM  The patient was seen and examined by Shaka Del Castillo DO in Room HWX.        Procedures              Critical Care time:  none             Labs Ordered and Resulted from Time of ED Arrival Up to the Time of Departure from the ED - No data to display         Assessments & Plan (with Medical Decision Making)   This 68-year-old male with hip and leg pain bilaterally, worse on the left with known bone metastasis to the area.  Symptoms have become progressively worse in the past several days.  He has no known injury or acute events that may have caused this pain.  On exam he did have some tenderness but it was generally unchanged with palpation.  We obtained plain films which showed no signs of pathologic fracture, possible new metastasis to the R patella.  Lab work showed no acute abnormalities. It appears pain is secondary to worsening bone metastasis. Patient was given hydromorphone in the emergency department for pain control. I discussed the case with Oncology who recommends increasing home oxycodone to 15 mg every 4 hours for pain and adding dexamethasone 2mg twice daily. I discussed all results with patient and family. Will discharge home with return precautions. Discussed reasons to return to the emergency department.  Patient understands and agrees with this plan.     I have reviewed the nursing notes.    I have reviewed the findings, diagnosis, plan and need for follow up with the patient.       Medication List      ASK your doctor about these medications    diazepam 10 MG tablet  Commonly known as:  VALIUM  10 mg, Oral, ONCE  Ask about: Should I take this medication?            Final diagnoses:   None     IBharat, am serving as a trained medical scribe to document services personally performed by Shaka Del Castillo DO, based on the provider's statements to me.   Shaka BARTLETT DO, was physically present and have reviewed and verified the accuracy of this note documented by Bharat Felix.    2/23/2019   [facility], [place], EMERGENCY DEPARTMENT     Shaka Del Castillo DO  02/25/19 9942

## 2019-02-23 NOTE — ED AVS SNAPSHOT
[facility], [place], Emergency Department  [address] [place] [id number]  Phone:  [phone]                                    Javon Bianchi   MRN: [id number]    Department:  [facility], Emergency Department   Date of Visit:  2/23/2019           After Visit Summary Signature Page    I have received my discharge instructions, and my questions have been answered. I have discussed any challenges I see with this plan with the nurse or doctor.    ..........................................................................................................................................  Patient/Patient Representative Signature      ..........................................................................................................................................  Patient Representative Print Name and Relationship to Patient    ..................................................               ................................................  Date                                   Time    ..........................................................................................................................................  Reviewed by Signature/Title    ...................................................              ..............................................  Date                                               Time          22EPIC Rev 08/18

## 2019-02-23 NOTE — ED TRIAGE NOTES
ED TRIAGE    Medical / Trauma C/o:  68-yr male patient - presenting to ED for worsening Left hip/groin pain; radiates to LLE.  HX:  Bone CA.    Duration of C/o:  2 day    Contributing Factors / Concerning HX:  See HX    Significant Med's / Tx's:  See med's    Febrile / Afebrile:  99.2F, at triage.    Patient Vitals for the past 24 hrs:   BP Temp Temp src Pulse Heart Rate Resp SpO2 Height Weight   02/23/19 1558 124/70 99.2  F (37.3  C) Oral 67 67 18 99 % 1.829 m (6') 104.3 kg (230 lb)       Roscoe Henao  February 23, 2019  4:00 PM

## 2019-02-23 NOTE — TELEPHONE ENCOUNTER
Yina states Omega has cancer, is taking two different pain meds, see epic and today his hip pain is worse and is also having new pain in left thigh and groin area. Rates pain 7/10, medications are not helping. Denies triage, would like to go to ER.

## 2019-02-24 NOTE — DISCHARGE INSTRUCTIONS
Increase your oxycodone from 5-10mg every 4 hours for pain to 10-15mg every 4 hours for pain. Continue with scheduled oncology follow-up. Return to the emergency department if symptoms continue, get worse, there are any new symptoms or any cause for concern.

## 2019-02-26 ENCOUNTER — APPOINTMENT (OUTPATIENT)
Dept: LAB | Facility: CLINIC | Age: 69
End: 2019-02-26
Attending: PHYSICIAN ASSISTANT
Payer: MEDICARE

## 2019-02-26 ENCOUNTER — ONCOLOGY VISIT (OUTPATIENT)
Dept: ONCOLOGY | Facility: CLINIC | Age: 69
End: 2019-02-26
Attending: PHYSICIAN ASSISTANT
Payer: MEDICARE

## 2019-02-26 VITALS
RESPIRATION RATE: 18 BRPM | OXYGEN SATURATION: 96 % | HEART RATE: 69 BPM | DIASTOLIC BLOOD PRESSURE: 87 MMHG | BODY MASS INDEX: 30.81 KG/M2 | WEIGHT: 227.2 LBS | TEMPERATURE: 97.8 F | SYSTOLIC BLOOD PRESSURE: 137 MMHG

## 2019-02-26 DIAGNOSIS — G89.3 CANCER ASSOCIATED PAIN: ICD-10-CM

## 2019-02-26 DIAGNOSIS — C79.51 BONE METASTASIS: Primary | ICD-10-CM

## 2019-02-26 DIAGNOSIS — C64.9 RENAL CELL CARCINOMA, UNSPECIFIED LATERALITY (H): ICD-10-CM

## 2019-02-26 LAB
ALBUMIN SERPL-MCNC: 3.2 G/DL (ref 3.4–5)
ALP SERPL-CCNC: 111 U/L (ref 40–150)
ALT SERPL W P-5'-P-CCNC: 23 U/L (ref 0–70)
ANION GAP SERPL CALCULATED.3IONS-SCNC: 10 MMOL/L (ref 3–14)
ANISOCYTOSIS BLD QL SMEAR: SLIGHT
AST SERPL W P-5'-P-CCNC: 68 U/L (ref 0–45)
BASOPHILS # BLD AUTO: 0 10E9/L (ref 0–0.2)
BASOPHILS NFR BLD AUTO: 0 %
BILIRUB SERPL-MCNC: 0.6 MG/DL (ref 0.2–1.3)
BUN SERPL-MCNC: 10 MG/DL (ref 7–30)
CALCIUM SERPL-MCNC: 8.4 MG/DL (ref 8.5–10.1)
CHLORIDE SERPL-SCNC: 106 MMOL/L (ref 94–109)
CO2 SERPL-SCNC: 26 MMOL/L (ref 20–32)
CREAT SERPL-MCNC: 0.67 MG/DL (ref 0.66–1.25)
DIFFERENTIAL METHOD BLD: ABNORMAL
EOSINOPHIL # BLD AUTO: 0 10E9/L (ref 0–0.7)
EOSINOPHIL NFR BLD AUTO: 0 %
ERYTHROCYTE [DISTWIDTH] IN BLOOD BY AUTOMATED COUNT: 15.1 % (ref 10–15)
GFR SERPL CREATININE-BSD FRML MDRD: >90 ML/MIN/{1.73_M2}
GLUCOSE SERPL-MCNC: 90 MG/DL (ref 70–99)
HCT VFR BLD AUTO: 34.3 % (ref 40–53)
HGB BLD-MCNC: 11 G/DL (ref 13.3–17.7)
LYMPHOCYTES # BLD AUTO: 0.6 10E9/L (ref 0.8–5.3)
LYMPHOCYTES NFR BLD AUTO: 6.1 %
MCH RBC QN AUTO: 32 PG (ref 26.5–33)
MCHC RBC AUTO-ENTMCNC: 32.1 G/DL (ref 31.5–36.5)
MCV RBC AUTO: 100 FL (ref 78–100)
MONOCYTES # BLD AUTO: 2.9 10E9/L (ref 0–1.3)
MONOCYTES NFR BLD AUTO: 27.8 %
NEUTROPHILS # BLD AUTO: 6.9 10E9/L (ref 1.6–8.3)
NEUTROPHILS NFR BLD AUTO: 66.1 %
PLATELET # BLD AUTO: 240 10E9/L (ref 150–450)
PLATELET # BLD EST: ABNORMAL 10*3/UL
POTASSIUM SERPL-SCNC: 3.6 MMOL/L (ref 3.4–5.3)
PROT SERPL-MCNC: 6.7 G/DL (ref 6.8–8.8)
RBC # BLD AUTO: 3.44 10E12/L (ref 4.4–5.9)
SODIUM SERPL-SCNC: 141 MMOL/L (ref 133–144)
WBC # BLD AUTO: 10.5 10E9/L (ref 4–11)

## 2019-02-26 PROCEDURE — 96372 THER/PROPH/DIAG INJ SC/IM: CPT

## 2019-02-26 PROCEDURE — 99215 OFFICE O/P EST HI 40 MIN: CPT | Mod: ZP | Performed by: PHYSICIAN ASSISTANT

## 2019-02-26 PROCEDURE — 25000128 H RX IP 250 OP 636: Mod: ZF | Performed by: PHYSICIAN ASSISTANT

## 2019-02-26 PROCEDURE — 80053 COMPREHEN METABOLIC PANEL: CPT | Performed by: INTERNAL MEDICINE

## 2019-02-26 PROCEDURE — G0463 HOSPITAL OUTPT CLINIC VISIT: HCPCS | Mod: ZF,25

## 2019-02-26 PROCEDURE — 85025 COMPLETE CBC W/AUTO DIFF WBC: CPT | Performed by: INTERNAL MEDICINE

## 2019-02-26 RX ORDER — OXYCODONE HYDROCHLORIDE 10 MG/1
15-20 TABLET ORAL EVERY 4 HOURS PRN
Qty: 240 TABLET | Refills: 0 | Status: SHIPPED | OUTPATIENT
Start: 2019-02-26 | End: 2019-03-01 | Stop reason: ALTCHOICE

## 2019-02-26 RX ORDER — MORPHINE SULFATE 30 MG/1
TABLET, FILM COATED, EXTENDED RELEASE ORAL
Qty: 60 TABLET | Refills: 0 | Status: SHIPPED | OUTPATIENT
Start: 2019-02-26 | End: 2019-02-28

## 2019-02-26 RX ORDER — MORPHINE SULFATE 15 MG/1
TABLET, FILM COATED, EXTENDED RELEASE ORAL
Qty: 30 TABLET | Refills: 0 | Status: SHIPPED | OUTPATIENT
Start: 2019-02-26 | End: 2019-02-28 | Stop reason: DRUGHIGH

## 2019-02-26 RX ADMIN — DENOSUMAB 120 MG: 120 INJECTION SUBCUTANEOUS at 10:13

## 2019-02-26 ASSESSMENT — PAIN SCALES - GENERAL: PAINLEVEL: SEVERE PAIN (6)

## 2019-02-26 NOTE — PROGRESS NOTES
[facility]  FOLLOW-UP VISIT NOTE  Date of visit: Feb 26, 2019          REASON FOR VISIT: metastatic poorly differentiated carcinoma, suspected RCC    TREATMENT HISTORY:  Cabozantinib: Started 60 mg daily on September 28, 2018.  Dec 10, 2018: dose reduced cabozantinib to 40 mg a day.  Jan 17, 2019: dose reduced cabozantinib to 29 mg a day.  Feb 11, 2019: C1D1 Nivolumab     HPI: (details adopted from Dr De Luna's consult note) Javon Bianchi is a 68 year old male with a history significant for hyperlipidemia who had been in his usual health until he developed back pain in May this year. At that time, the pain was a stabbing like in the middle of lower back with burning like pain wrapping around this left hip to knee area. No trauma. He had MRI on 5/23/18 showed mild degenerative change with bulging disks L2-S1. A small benign hemangioma was noted in L2, no other marrow signal abnormality. Since then, the pain failed to improve despite steroid injections. In addition, he actually lost 25 lbs weight unintentionally since June along with chills few time a day. Overtime, the pain got worse to the point he could not ambulate after short distance and developed muscle weakness in lower extremities over time requiring a walker. Finally, he went to ER ([facility] in [place]) on 8/23/18, CT A/P was unremarkable. He was referred to neurology with obtaining MRI L spine. MRI on 8/30/18 revealed a pathological fracture at L3 vertebral body with height loss at 40% and diffuse involvement of a neoplastic process. IR guided biopsy on 8/31/18 showed poorly differentiated carcinoma. Its IHC was suggestive of possible renal cell carcinoma per pathology report (Renal cell immunochemistry is positive and along with the negative staining for CK7 and CK20 suggests the possibility of a renal carcinoma, however most renal cell carcinomas also express PAX8 which is negative in this case). Of note, CT  chest/abdomen/pelvis at OSH was negative for primary lesion. The patient was evaluated by neurosurgery who recommended no surgical intervention. He was discharged with TLSO brace and walker. He had palliative XRT locally from 9/11 for 10 fractions (Done in [place]).      He was started on cabozantinib 60 mg initially, requiring dose reduction to 20 mg for issues with hand foot syndrome and poor tolerability.  Plan was to switch to Ipi/nivo due to progression in bone disease on 2/6/19, however Rubenrvkarthik was not covered, thus given Opdivo only.       INTERVAL HISTORY: Omega in with his wife Suzie and son Niko today.  He was in the ED for worsening pain on Saturday night.  They increased his oxycodone from 10 mg to 15 mg q4 hours, this has helped but he still is in constant pain.  Rates lumbar at 4/10, bilateral hips at 5/10 and bilateral femurs at 5/10 and this is while on the MS contin 30 mg BID and the oxycodone 15 mg q4. He is eating okay and weight has been relatively stable. He is up on and off all night long with constant pain.      He and his family have multiple questions today, namely,   1. Pain control is poor  2. Therapy goals  3. Discussing hospice and overall prognosis     EXAM:  /87   Pulse 69   Temp 97.8  F (36.6  C) (Oral)   Resp 18   Wt 103.1 kg (227 lb 3.2 oz)   SpO2 96%   BMI 30.81 kg/m    Wt Readings from Last 4 Encounters:   02/26/19 103.1 kg (227 lb 3.2 oz)   02/23/19 104.3 kg (230 lb)   02/11/19 104.6 kg (230 lb 9.6 oz)   02/07/19 104.3 kg (230 lb)     No full exam today, as the entire visit was discussing coordination of care and plan.     LABS:    2/11/2019 12:11 2/23/2019 16:58 2/26/2019 08:28   WBC 4.4 5.3 10.5   Hemoglobin 12.5 (L) 11.0 (L) 11.0 (L)   Hematocrit 37.2 (L) 34.7 (L) 34.3 (L)   Platelet Count 158 212 240   RBC Count 3.81 (L) 3.39 (L) 3.44 (L)   MCV 98 102 (H) 100      2/11/2019 12:11 2/23/2019 16:58   Sodium 138 139   Potassium 4.1 3.7   Chloride 103 101   Carbon  Dioxide 27 31   Urea Nitrogen 8 7   Creatinine 0.65 (L) 0.72   GFR Estimate >90 >90   GFR Estimate If Black >90 >90   Calcium 8.1 (L) 8.4 (L)   Anion Gap 7 6   Albumin 2.7 (L) 2.8 (L)   Protein Total 5.9 (L) 6.3 (L)   Bilirubin Total 0.8 0.8   Alkaline Phosphatase 115 109   ALT 18 18   AST 36 41     IMAGING:   I reviewed actual images of the CT CAP, MRI lumbar spine and bone scan.  We also reviewed the results of all the XRs.     ASSESSMENT/PLAN: metastatic poorly differentiated carcinoma, thought to be RCC, here with worsening bone pains and concern regarding goals of care    ONC- reviewed his extensive disease and challenges with interpreting the pathology and concerns with progression on Cabometyx.  I think we should have the pathology reviewed here to see if our team finds anything helpful.  It might be helpful to order genetic testing of the tumor as well.  He is agreeable to both.  We can work to see if the Yervoy can be covered, but in the meantime stay on the Opdivo q4 weeks and I'll see him back in 2 weeks.  He would like to go to [place] and I think this is fine given the stability of his counts, as long as we can get his pain under better control.    We also discussed his prognosis and given worsening disease and challenges knowing what his cancer may be responsive to, we may be looking at 3-6 months.  Discussed home hospice and goals of care and he was appreciative of the information.     BONE- worsening bone pains in the entire pelvis, hips and femur with extensive disease in the left femur.  Reviewed the following plan  1. Increase MS Contin from 30 mg in AM and PM ---> 30 mg in the AM and 45 mg in the PM considering he is miserable all night.   2. Increase oxycodone from 15 mg q4 ---> 15-20 mg q3-4 hours  3. [place] for medical cannabis  4. Schedule a follow up with local radiation oncologist to consider treatment to painful mets  5. Start oral Calcium and vitamin D  6. Start Xgeva q4 w  7. Taper Dex, not  helping (given from ED).  Take 2 mg today and tomorrow and then be done  8. Continue home PT to help lower extremity weakness     HEME mild leukocytosis noted today, secondary to dex.  No s/s of infection     GI continue senna and prune juice to stay with BM daily.     Radha St PA-C        Over 40  min of direct face to face time spent with patient with more than 50% time spent in counseling and coordinating care.

## 2019-02-26 NOTE — NURSING NOTE
Oncology Rooming Note    February 26, 2019 8:52 AM   Javon Bianchi is a 68 year old male who presents for:    Chief Complaint   Patient presents with     Oncology Clinic Visit     Renal Cell Ca , Labs      Initial Vitals: /87   Pulse 69   Temp 97.8  F (36.6  C) (Oral)   Resp 18   Wt 103.1 kg (227 lb 3.2 oz)   SpO2 96%   BMI 30.81 kg/m   Estimated body mass index is 30.81 kg/m  as calculated from the following:    Height as of 2/23/19: 1.829 m (6').    Weight as of this encounter: 103.1 kg (227 lb 3.2 oz). Body surface area is 2.29 meters squared.  Severe Pain (6) Comment: Data Unavailable   No LMP for male patient.  Allergies reviewed: Yes  Medications reviewed: Yes    Medications: MEDICATION REFILLS NEEDED TODAY. Provider was notified.  Pharmacy name entered into [facility]: [facility] - [address]    Clinical concerns: Refills Oxycodone 5 mg  Radha was notified.      Tracey Padilla MA

## 2019-02-26 NOTE — NURSING NOTE
1 Xgeva injection given in right arm subcutaneously. Patient tolerated injection.    Diana Smalls LPN

## 2019-02-27 ENCOUNTER — TELEPHONE (OUTPATIENT)
Dept: ONCOLOGY | Facility: CLINIC | Age: 69
End: 2019-02-27

## 2019-02-27 NOTE — TELEPHONE ENCOUNTER
Call from pts wife, Suzie. Reports Omega having 10/10 pain in pelvis, bilateral hips and femur.   Saw Radha St PA-C yesterday and increased PM MS Contin dose from 30-45mg and Oxy to 15-20mg q 3-4 hours.    Discussed medical cannabis and radiation.     Paged to Radha to advise.     Per Radha, pt to be evaluated in local ED today for pain control. Pt will check in with us after visit.     Pt in agreement with plan.

## 2019-02-28 ENCOUNTER — TELEPHONE (OUTPATIENT)
Dept: ONCOLOGY | Facility: CLINIC | Age: 69
End: 2019-02-28

## 2019-02-28 ENCOUNTER — PATIENT OUTREACH (OUTPATIENT)
Dept: ONCOLOGY | Facility: CLINIC | Age: 69
End: 2019-02-28

## 2019-02-28 DIAGNOSIS — G89.3 CANCER ASSOCIATED PAIN: Primary | ICD-10-CM

## 2019-02-28 DIAGNOSIS — R52 PAIN: ICD-10-CM

## 2019-02-28 DIAGNOSIS — C64.9 RENAL CELL CARCINOMA, UNSPECIFIED LATERALITY (H): ICD-10-CM

## 2019-02-28 DIAGNOSIS — C79.51 BONE METASTASIS: ICD-10-CM

## 2019-02-28 PROCEDURE — 00000346 ZZHCL STATISTIC REVIEW OUTSIDE SLIDES TC 88321: Performed by: INTERNAL MEDICINE

## 2019-02-28 RX ORDER — CELECOXIB 200 MG/1
200 CAPSULE ORAL 2 TIMES DAILY PRN
Qty: 60 CAPSULE | Refills: 2 | Status: SHIPPED | OUTPATIENT
Start: 2019-02-28 | End: 2019-04-25

## 2019-02-28 RX ORDER — OXYCODONE HYDROCHLORIDE 5 MG/1
10-15 TABLET ORAL
Qty: 120 TABLET | Refills: 0 | COMMUNITY
Start: 2019-02-28 | End: 2019-03-01

## 2019-02-28 RX ORDER — MORPHINE SULFATE 30 MG/1
60 TABLET, FILM COATED, EXTENDED RELEASE ORAL EVERY 12 HOURS
Qty: 60 TABLET | Refills: 0 | Status: ON HOLD | COMMUNITY
Start: 2019-02-28 | End: 2019-03-14

## 2019-02-28 NOTE — PROGRESS NOTES
TC to [facility] in [place] to find out what they need in order to get pt scheduled for a consult and start planning treatment. Clinic staff stated they can see notes from [place], but they need recent imaging sent to them via [employer] on a CD.     Writer called film room and requested CD be made of CT CAP and MRI spine from 2/6/2019 as well as bone scan from 2/12/2019 and mailed to [facility]. Rad [place] [address] [place]  [address]. Gail in the film room stated she will take care of it after fax request is received. Fax request sent to [phone]

## 2019-02-28 NOTE — PROGRESS NOTES
"VM from pt's wife stating that pt continues to have intractable pain. He took 45 mg MS Contin at HS last night, as well as oxycodone 20 mg at 2130, 0000, and 0300. At 0400, pt was awake, confused, unsteady, and weak which cleared up by 0600. His worst pain is in his coccyx and rates it 8/10. This makes it difficult to sit on the commode. Laying down, pt rates his pain 5/10. They are looking for more guidance for pain control, and and wondering if palliative care or hospice can get involved for symptom management.     Attempted to reach pt's wife on home phone. No answer. No option to leave message.     Attempted to reach pt on mobile phone. No answer. LM stating our recommendation is that pt go to the ED. This is based on Radha St's recommendation from yesterday, as well as the fact that it is clear that pt needs more than PO pain medications.     Return TC from pt and his wife, Patrizia. They stated that they went to the ED yesterday and pt was given a dose of IV dilaudid which worked well, but the pain had returned by the time they got home and took a dose of oxycodone. This led to being \"miserable\" all night. The ED also did an xray and ruled out fracture.     Today he reports feeling much better as opposed to yesterday before going to the ED. He has been taking oxycodone 20 mg every 3-4 hours, but still reports pain at 5/10. Pt is wondering if this is what he should expect, or if there are other medication options for him.     Patrizia also asked about getting an egg crate or gel chair pad, or even a donut seat cushion ordered to see if this will help. They are were registered by Radha for the [place] medical marijuana program on 02/26/2019 and haven't heard back yet. Explained that initial process of getting started on the program can take up to a month and to wait for an email from the program.     They also reported stopping into the [facility] in [place] yesterday, and they were told the " radiation oncologist wants input and recommendations from [facility] before planning treatment. Writer to consult med onc team and will reach out to [facility] onc with a response.

## 2019-02-28 NOTE — TELEPHONE ENCOUNTER
Dr Mills Prescribes    Increase MS Contin to 60mgs Q12  Decrease Oxycodone to 10 - 15 mgs q3 hrs prn   Add Celebrex 200mgs BID as needed - script sent to [place] pharmacy    Call made to patients wife to explain changes to medications - she verbalized understanding of these instructions

## 2019-02-28 NOTE — TELEPHONE ENCOUNTER
Contacted by patient oncology team to report increased left sided lower back and tailbone.    Patients analgesia has been increased to 02/27 to  MS Contin 30mgs in the a.m. And to 45mgs in the evening  Oxycodone to 15-20mgs q3 prn.    He attended [facility] ED in [place] on 02/27 for imaging and where he received 0.5mgs of Dilaudid IM     Omega is having to use Oxycodone 20mgs q3 around the clock in addition to the MS Contin to treat pain. Javon states pain is bearable if he lies still, however very difficult to stand, mobilize or get out of bed. He states that he feels sedated and his wife reports he was confused in the early hours of this morning.    Patients denies the presence of constipation.    Patient instructed to add Tylenol Q8hrs as prescribed by Dr Mills.  At patients request education given on where to purchase pressure relieving cushions to relieve pressure and pain.     I will contact Dr Mills for next steps

## 2019-02-28 NOTE — PROGRESS NOTES
Discussed case with Uzma Bernal in palliative care who recommended reaching out to Charli Juarez and Dr. Mills at [facility] to see if they could review pt's concerns and make recommendations. [email or web address] message sent. Await reply.

## 2019-03-01 ENCOUNTER — TELEPHONE (OUTPATIENT)
Dept: ONCOLOGY | Facility: CLINIC | Age: 69
End: 2019-03-01

## 2019-03-01 DIAGNOSIS — G89.3 CANCER ASSOCIATED PAIN: Primary | ICD-10-CM

## 2019-03-01 DIAGNOSIS — G89.3 CANCER ASSOCIATED PAIN: ICD-10-CM

## 2019-03-01 RX ORDER — MORPHINE SULFATE 15 MG/1
15 TABLET ORAL
Qty: 60 TABLET | Refills: 0 | Status: SHIPPED | OUTPATIENT
Start: 2019-03-01 | End: 2019-04-09

## 2019-03-01 RX ORDER — MORPHINE SULFATE 15 MG/1
15 TABLET ORAL
Qty: 60 TABLET | Refills: 0 | Status: SHIPPED | OUTPATIENT
Start: 2019-03-01 | End: 2019-03-01

## 2019-03-01 NOTE — TELEPHONE ENCOUNTER
"Contacted Omega to check in on his pain control after we were updated that he was having persistently uncontrolled pain and had confusion two nights ago.      Omega tells me that he had a \"great night.\"  He has not needed any PRN oxycodone since his phone call to us yesterday and has had no further confusion.  He did escalate his MS Contin to 60mg BID as directed, and has supplemented his pain control with Tylenol and the Celebrex, taking these as prescribed.  The coccyx pillow is also helping.       His coccyx pain is better controlled this morning, and he was able to tolerate ambulating to the bathroom last night.     His spirits are improved with improved pain control and his wife confirms that Omega appears more comfortable, has not had further sedation or confusion.     Will plan to rotate oxycodone to MSIR 15mg q3h PRN breakthrough pain.     Suzie has been instructed to flush Omega's oxycodone when the MSIR arrives.     Carly Mills MD   Palliative Medicine  "

## 2019-03-01 NOTE — TELEPHONE ENCOUNTER
Patient rotated to MSIR from Oxycodone by Dr Mills  as patient experiencing sedation and confusion.    Script to be sent to patients home address by overnight fedex

## 2019-03-04 LAB — COPATH REPORT: NORMAL

## 2019-03-05 ENCOUNTER — PATIENT OUTREACH (OUTPATIENT)
Dept: ONCOLOGY | Facility: CLINIC | Age: 69
End: 2019-03-05

## 2019-03-05 DIAGNOSIS — C79.51 BONE METASTASIS: Primary | ICD-10-CM

## 2019-03-05 NOTE — PROGRESS NOTES
TC to pt following review of pathology consult result with Radha St. Pathology consult is still not conclusive as it wasn't when the original path was done at [facility], and the current recommendation is to re-biopsy to have a new specimen to work with.     Pt stated he is willing to have another biopsy here at the . Radha St is working to try get pt in for bx without a consult prior. Appt was made for bx with IR for 03/14/2019. Pt is to see Radha and get nivo infusion as well as an MRI of his lumbar spine and pelvis before bx. Informed pt of plan, and he stated understanding. [facility] request put in now that pt will need to extend stay to 03/14/2019.

## 2019-03-06 DIAGNOSIS — C79.51 BONE METASTASIS: Primary | ICD-10-CM

## 2019-03-07 ENCOUNTER — TRANSFERRED RECORDS (OUTPATIENT)
Dept: HEALTH INFORMATION MANAGEMENT | Facility: CLINIC | Age: 69
End: 2019-03-07

## 2019-03-07 ENCOUNTER — TELEPHONE (OUTPATIENT)
Dept: ONCOLOGY | Facility: CLINIC | Age: 69
End: 2019-03-07

## 2019-03-07 ENCOUNTER — ALLIED HEALTH/NURSE VISIT (OUTPATIENT)
Dept: ONCOLOGY | Facility: CLINIC | Age: 69
End: 2019-03-07

## 2019-03-07 DIAGNOSIS — Z71.9 VISIT FOR COUNSELING: Primary | ICD-10-CM

## 2019-03-07 NOTE — TELEPHONE ENCOUNTER
Patient called to report that he attended the Cannabis dispensary yesterday and took the first dose last night that did not agree with him causing a sleepless night and bad dreams.    Javon states that he is experiencing good pain relief with the prescribed MS Contin, Tylenol and Celebrex and has not required Morphine IR for breakthrough pain.    Omega will call the dispensary for advice on use of the cannabis and limit its use to avoid the side effects experienced

## 2019-03-08 ENCOUNTER — DOCUMENTATION ONLY (OUTPATIENT)
Dept: RADIOLOGY | Facility: CLINIC | Age: 69
End: 2019-03-08

## 2019-03-08 NOTE — PROGRESS NOTES
Patient to be placed on Interventional Radiology schedule for a CT  Guided  Biopsy of Left iliac bone or possible the left femur Radiologist choice    Discussed with Dr. Fabian Jhaveri IR [facility]  [phone] Call pager  [phone] pager

## 2019-03-08 NOTE — PROGRESS NOTES
Per Patient's request,  completed and faxed [employer] request for lodging dates 3/11/2019 - 3/12/2019. [employer] will contact Patient for confirmation of reservation.  will continue to provide support as needed.    Cl[facility]  Outpatient Specialty Clinics  Direct Phone: [phone]  Pager:  [phone]

## 2019-03-11 DIAGNOSIS — C79.51 BONE METASTASIS: Primary | ICD-10-CM

## 2019-03-12 ENCOUNTER — HOSPITAL ENCOUNTER (OUTPATIENT)
Dept: MRI IMAGING | Facility: CLINIC | Age: 69
Discharge: HOME | End: 2019-03-12
Attending: PHYSICIAN ASSISTANT | Admitting: PHYSICIAN ASSISTANT
Payer: MEDICARE

## 2019-03-12 ENCOUNTER — INFUSION THERAPY VISIT (OUTPATIENT)
Dept: ONCOLOGY | Facility: CLINIC | Age: 69
End: 2019-03-12
Attending: PHYSICIAN ASSISTANT
Payer: MEDICARE

## 2019-03-12 ENCOUNTER — TELEPHONE (OUTPATIENT)
Dept: INTERVENTIONAL RADIOLOGY/VASCULAR | Facility: CLINIC | Age: 69
End: 2019-03-12

## 2019-03-12 ENCOUNTER — HOSPITAL ENCOUNTER (OUTPATIENT)
Dept: MRI IMAGING | Facility: CLINIC | Age: 69
End: 2019-03-12
Attending: PHYSICIAN ASSISTANT
Payer: MEDICARE

## 2019-03-12 VITALS
TEMPERATURE: 98.3 F | WEIGHT: 225.7 LBS | DIASTOLIC BLOOD PRESSURE: 68 MMHG | SYSTOLIC BLOOD PRESSURE: 116 MMHG | BODY MASS INDEX: 30.61 KG/M2 | OXYGEN SATURATION: 97 % | RESPIRATION RATE: 16 BRPM | HEART RATE: 71 BPM

## 2019-03-12 DIAGNOSIS — C64.9 RENAL CELL CARCINOMA, UNSPECIFIED LATERALITY (H): Primary | ICD-10-CM

## 2019-03-12 DIAGNOSIS — C79.51 BONE METASTASIS: ICD-10-CM

## 2019-03-12 DIAGNOSIS — C64.9 RENAL CELL CARCINOMA, UNSPECIFIED LATERALITY (H): ICD-10-CM

## 2019-03-12 LAB
ALBUMIN SERPL-MCNC: 2.7 G/DL (ref 3.4–5)
ALP SERPL-CCNC: 124 U/L (ref 40–150)
ALT SERPL W P-5'-P-CCNC: 19 U/L (ref 0–70)
ANION GAP SERPL CALCULATED.3IONS-SCNC: 7 MMOL/L (ref 3–14)
ANISOCYTOSIS BLD QL SMEAR: SLIGHT
AST SERPL W P-5'-P-CCNC: 72 U/L (ref 0–45)
BASOPHILS # BLD AUTO: 0 10E9/L (ref 0–0.2)
BASOPHILS NFR BLD AUTO: 0 %
BILIRUB SERPL-MCNC: 0.9 MG/DL (ref 0.2–1.3)
BUN SERPL-MCNC: 12 MG/DL (ref 7–30)
CALCIUM SERPL-MCNC: 7.8 MG/DL (ref 8.5–10.1)
CHLORIDE SERPL-SCNC: 107 MMOL/L (ref 94–109)
CO2 SERPL-SCNC: 25 MMOL/L (ref 20–32)
CREAT SERPL-MCNC: 0.59 MG/DL (ref 0.66–1.25)
DIFFERENTIAL METHOD BLD: ABNORMAL
EOSINOPHIL # BLD AUTO: 0 10E9/L (ref 0–0.7)
EOSINOPHIL NFR BLD AUTO: 0 %
ERYTHROCYTE [DISTWIDTH] IN BLOOD BY AUTOMATED COUNT: 15.8 % (ref 10–15)
GFR SERPL CREATININE-BSD FRML MDRD: >90 ML/MIN/{1.73_M2}
GLUCOSE SERPL-MCNC: 85 MG/DL (ref 70–99)
HCT VFR BLD AUTO: 33.2 % (ref 40–53)
HGB BLD-MCNC: 10 G/DL (ref 13.3–17.7)
LYMPHOCYTES # BLD AUTO: 1.2 10E9/L (ref 0.8–5.3)
LYMPHOCYTES NFR BLD AUTO: 12.3 %
MCH RBC QN AUTO: 30.1 PG (ref 26.5–33)
MCHC RBC AUTO-ENTMCNC: 30.1 G/DL (ref 31.5–36.5)
MCV RBC AUTO: 100 FL (ref 78–100)
MONOCYTES # BLD AUTO: 2.5 10E9/L (ref 0–1.3)
MONOCYTES NFR BLD AUTO: 26.3 %
NEUTROPHILS # BLD AUTO: 5.8 10E9/L (ref 1.6–8.3)
NEUTROPHILS NFR BLD AUTO: 61.4 %
PLATELET # BLD AUTO: 216 10E9/L (ref 150–450)
PLATELET # BLD EST: ABNORMAL 10*3/UL
POLYCHROMASIA BLD QL SMEAR: ABNORMAL
POTASSIUM SERPL-SCNC: 3.9 MMOL/L (ref 3.4–5.3)
PROT SERPL-MCNC: 6.4 G/DL (ref 6.8–8.8)
RBC # BLD AUTO: 3.32 10E12/L (ref 4.4–5.9)
SODIUM SERPL-SCNC: 138 MMOL/L (ref 133–144)
TSH SERPL DL<=0.005 MIU/L-ACNC: 2.43 MU/L (ref 0.4–4)
WBC # BLD AUTO: 9.5 10E9/L (ref 4–11)

## 2019-03-12 PROCEDURE — 84443 ASSAY THYROID STIM HORMONE: CPT | Performed by: PHYSICIAN ASSISTANT

## 2019-03-12 PROCEDURE — 72197 MRI PELVIS W/O & W/DYE: CPT

## 2019-03-12 PROCEDURE — 85025 COMPLETE CBC W/AUTO DIFF WBC: CPT | Performed by: PHYSICIAN ASSISTANT

## 2019-03-12 PROCEDURE — 25800030 ZZH RX IP 258 OP 636: Mod: ZF | Performed by: PHYSICIAN ASSISTANT

## 2019-03-12 PROCEDURE — 25500064 ZZH RX 255 OP 636: Performed by: PHYSICIAN ASSISTANT

## 2019-03-12 PROCEDURE — G0463 HOSPITAL OUTPT CLINIC VISIT: HCPCS | Mod: ZF

## 2019-03-12 PROCEDURE — 80053 COMPREHEN METABOLIC PANEL: CPT | Performed by: PHYSICIAN ASSISTANT

## 2019-03-12 PROCEDURE — A9585 GADOBUTROL INJECTION: HCPCS | Performed by: PHYSICIAN ASSISTANT

## 2019-03-12 PROCEDURE — 99214 OFFICE O/P EST MOD 30 MIN: CPT | Mod: ZP | Performed by: PHYSICIAN ASSISTANT

## 2019-03-12 PROCEDURE — 96413 CHEMO IV INFUSION 1 HR: CPT

## 2019-03-12 PROCEDURE — 72158 MRI LUMBAR SPINE W/O & W/DYE: CPT

## 2019-03-12 PROCEDURE — 25000128 H RX IP 250 OP 636: Mod: ZF | Performed by: PHYSICIAN ASSISTANT

## 2019-03-12 RX ORDER — MEPERIDINE HYDROCHLORIDE 25 MG/ML
25 INJECTION INTRAMUSCULAR; INTRAVENOUS; SUBCUTANEOUS EVERY 30 MIN PRN
Status: CANCELLED | OUTPATIENT
Start: 2019-03-12

## 2019-03-12 RX ORDER — EPINEPHRINE 1 MG/ML
0.3 INJECTION, SOLUTION INTRAMUSCULAR; SUBCUTANEOUS EVERY 5 MIN PRN
Status: CANCELLED | OUTPATIENT
Start: 2019-03-12

## 2019-03-12 RX ORDER — SODIUM CHLORIDE 9 MG/ML
1000 INJECTION, SOLUTION INTRAVENOUS CONTINUOUS PRN
Status: CANCELLED
Start: 2019-03-12

## 2019-03-12 RX ORDER — MORPHINE SULFATE 60 MG/1
60 TABLET, FILM COATED, EXTENDED RELEASE ORAL EVERY 12 HOURS
Qty: 60 TABLET | Refills: 0 | Status: SHIPPED | OUTPATIENT
Start: 2019-03-12 | End: 2019-04-09

## 2019-03-12 RX ORDER — LORAZEPAM 2 MG/ML
0.5 INJECTION INTRAMUSCULAR EVERY 4 HOURS PRN
Status: CANCELLED
Start: 2019-03-12

## 2019-03-12 RX ORDER — ALBUTEROL SULFATE 90 UG/1
1-2 AEROSOL, METERED RESPIRATORY (INHALATION)
Status: CANCELLED
Start: 2019-03-12

## 2019-03-12 RX ORDER — GADOBUTROL 604.72 MG/ML
10 INJECTION INTRAVENOUS ONCE
Status: COMPLETED | OUTPATIENT
Start: 2019-03-12 | End: 2019-03-12

## 2019-03-12 RX ORDER — ALBUTEROL SULFATE 0.83 MG/ML
2.5 SOLUTION RESPIRATORY (INHALATION)
Status: CANCELLED | OUTPATIENT
Start: 2019-03-12

## 2019-03-12 RX ORDER — DIPHENHYDRAMINE HYDROCHLORIDE 50 MG/ML
50 INJECTION INTRAMUSCULAR; INTRAVENOUS
Status: CANCELLED
Start: 2019-03-12

## 2019-03-12 RX ORDER — EPINEPHRINE 0.3 MG/.3ML
0.3 INJECTION SUBCUTANEOUS EVERY 5 MIN PRN
Status: CANCELLED | OUTPATIENT
Start: 2019-03-12

## 2019-03-12 RX ORDER — METHYLPREDNISOLONE SODIUM SUCCINATE 125 MG/2ML
125 INJECTION, POWDER, LYOPHILIZED, FOR SOLUTION INTRAMUSCULAR; INTRAVENOUS
Status: CANCELLED
Start: 2019-03-12

## 2019-03-12 RX ORDER — MORPHINE SULFATE 60 MG/1
60 TABLET, FILM COATED, EXTENDED RELEASE ORAL EVERY 12 HOURS
Qty: 60 TABLET | Refills: 0 | Status: SHIPPED | OUTPATIENT
Start: 2019-03-12 | End: 2019-03-12

## 2019-03-12 RX ADMIN — GADOBUTROL 10 ML: 604.72 INJECTION INTRAVENOUS at 12:45

## 2019-03-12 RX ADMIN — SODIUM CHLORIDE 480 MG: 9 INJECTION, SOLUTION INTRAVENOUS at 10:32

## 2019-03-12 ASSESSMENT — PAIN SCALES - GENERAL: PAINLEVEL: SEVERE PAIN (7)

## 2019-03-12 NOTE — PROGRESS NOTES
[facility] CANCER CLINIC  FOLLOW-UP VISIT NOTE  Date of visit: Mar 12, 2019          REASON FOR VISIT: metastatic poorly differentiated carcinoma, suspected RCC    TREATMENT HISTORY:  Cabozantinib: Started 60 mg daily on September 28, 2018.  Dec 10, 2018: dose reduced cabozantinib to 40 mg a day.  Jan 17, 2019: dose reduced cabozantinib to 29 mg a day.  Feb 11, 2019: C1D1 Nivolumab     HPI: (details adopted from Dr De Luna's consult note) Javon Bianchi is a 68 year old male with a history significant for hyperlipidemia who had been in his usual health until he developed back pain in May this year. At that time, the pain was a stabbing like in the middle of lower back with burning like pain wrapping around this left hip to knee area. No trauma. He had MRI on 5/23/18 showed mild degenerative change with bulging disks L2-S1. A small benign hemangioma was noted in L2, no other marrow signal abnormality. Since then, the pain failed to improve despite steroid injections. In addition, he actually lost 25 lbs weight unintentionally since June along with chills few time a day. Overtime, the pain got worse to the point he could not ambulate after short distance and developed muscle weakness in lower extremities over time requiring a walker. Finally, he went to ER ([facility] in [place]) on 8/23/18, CT A/P was unremarkable. He was referred to neurology with obtaining MRI L spine. MRI on 8/30/18 revealed a pathological fracture at L3 vertebral body with height loss at 40% and diffuse involvement of a neoplastic process. IR guided biopsy on 8/31/18 showed poorly differentiated carcinoma. Its IHC was suggestive of possible renal cell carcinoma per pathology report (Renal cell immunochemistry is positive and along with the negative staining for CK7 and CK20 suggests the possibility of a renal carcinoma, however most renal cell carcinomas also express PAX8 which is negative in this case). Of note, CT  chest/abdomen/pelvis at OSH was negative for primary lesion. The patient was evaluated by neurosurgery who recommended no surgical intervention. He was discharged with TLSO brace and walker. He had palliative XRT locally from 9/11 for 10 fractions (Done in [place]).      He was started on cabozantinib 60 mg initially, requiring dose reduction to 20 mg for issues with hand foot syndrome and poor tolerability.    Plan was to switch to Ipi/nivo due to progression in bone disease on 2/6/19, however Yervoy was not covered, thus given Opdivo only.       INTERVAL HISTORY: Omega in with his wife Suzie and son Niko and daughter today.  He was in the ED for worsening pain shortly after our last visit.  They increased his MS Contin to 60 mg BID.  He was also able to start medical cannabis and this has helped quite a bit with pain.  Rates lumbar at 4/10, bilateral hips at 5/10 and bilateral femurs at 5/10 today, but overall he as been in the 2-3 range the last few days. He actually in the last 5 days has only taken his break through morphine twice.  He is eating okay and weight has been relatively stable.He does have nocturia but has been drinking quite a bit of fluid as his mouth is dry.      EXAM:  /68 (BP Location: Right arm, Patient Position: Sitting, Cuff Size: Adult Regular)   Pulse 71   Temp 98.3  F (36.8  C) (Oral)   Resp 16   Wt 102.4 kg (225 lb 11.2 oz)   SpO2 97%   BMI 30.61 kg/m    Wt Readings from Last 4 Encounters:   03/12/19 102.4 kg (225 lb 11.2 oz)   02/26/19 103.1 kg (227 lb 3.2 oz)   02/23/19 104.3 kg (230 lb)   02/11/19 104.6 kg (230 lb 9.6 oz)     No full exam today, as the entire visit was discussing coordination of care and plan.   HEENT: [email or web address].[email or web address]. OP shows dry MM.  Neck: no adenopathy  Heart RRR  Lungs CTAB  Abd + bs  1 + ANABELLA    LABS:      2/23/2019 16:58 2/26/2019 08:28 3/12/2019 08:29   WBC 5.3 10.5 9.5   Hemoglobin 11.0 (L) 11.0 (L) 10.0 (L)   Hematocrit 34.7 (L) 34.3 (L) 33.2 (L)    Platelet Count 212 240 216   RBC Count 3.39 (L) 3.44 (L) 3.32 (L)    (H) 100 100   MCH 32.4 32.0 30.1   MCHC 31.7 32.1 30.1 (L)   RDW 15.0 15.1 (H) 15.8 (H)   Diff Method Manual Differential Manual Differential PENDING   % Neutrophils 63.4 66.1 61.4   % Lymphocytes 7.7 6.1 12.3   % Monocytes 27.9 27.8 26.3   % Eosinophils 1.0 0.0 0.0   % Basophils 0.0 0.0 0.0   Nucleated RBCs 1 (H)     Absolute Neutrophil 3.4 6.9 5.8      3/12/2019 08:29   Sodium 138   Potassium 3.9   Chloride 107   Carbon Dioxide 25   Urea Nitrogen 12   Creatinine 0.59 (L)   GFR Estimate >90   GFR Estimate If Black >90   Calcium 7.8 (L)   Anion Gap 7   Albumin 2.7 (L)   Protein Total 6.4 (L)   Bilirubin Total 0.9   Alkaline Phosphatase 124   ALT 19   AST 72 (H)   TSH 2.43       ASSESSMENT/PLAN: metastatic poorly differentiated carcinoma, thought to be RCC, currently being treated with Opdivo    ONC- reviewed his extensive disease and challenges with interpreting the pathology and concerns with progression on Cabometyx.  Our pathology team could not be more helpful with his diagnosis as there were limited tumor cells.  Restaging scans scheduled today.  Will rebiopsy through IR on Thursday.  I will follow up with family next week.  They understand that this may not shed any light on a better way to treat.  For now, will will continue with Opdivo only q4w. We also discussed his prognosis and given worsening disease and challenges knowing what his cancer may be responsive to, we may be looking at 3-6 months.  Discussed home hospice and goals of care and he was appreciative of the information.   -ok for C2 today  -repeat MRIs  -IR biopsy on thursday  -4 weeks labs/Dr De Luna and infusion    BONE- worsening bone pains in the entire pelvis, hips and femur with extensive disease in the left femur- now better management.  Reviewed the following plan  1.  MS Contin from 60 mg BID.  Rarely needing short acting now  2. Has morphine sulfate at home prn  pain,   3. Continue with medical cannabis- this is helping  4. 3/18-starting XRT to left pelvis and left femur from local radiation oncologist for painful mets  5. Contiue oral Calcium and vitamin D  6. Xgeva q4 w- 2/26 was first dose, continue every 4-6 w  7. Continue home PT to help lower extremity weakness     GI continue senna and prune juice to stay with BM daily.     Radha St PA-C

## 2019-03-12 NOTE — NURSING NOTE
Oncology Rooming Note    March 12, 2019 8:36 AM   Javon Bianchi is a 68 year old male who presents for:    Chief Complaint   Patient presents with     Blood Draw     labs drawn with piv start by rn.  vs taken     Oncology Clinic Visit     [facility] RETURN- RENAL CA     Initial Vitals: /68 (BP Location: Right arm, Patient Position: Sitting, Cuff Size: Adult Regular)   Pulse 71   Temp 98.3  F (36.8  C) (Oral)   Resp 16   Wt 102.4 kg (225 lb 11.2 oz)   SpO2 97%   BMI 30.61 kg/m   Estimated body mass index is 30.61 kg/m  as calculated from the following:    Height as of 2/23/19: 1.829 m (6').    Weight as of this encounter: 102.4 kg (225 lb 11.2 oz). Body surface area is 2.28 meters squared.  Severe Pain (7) Comment: Data Unavailable   No LMP for male patient.  Allergies reviewed: Yes  Medications reviewed: Yes    Medications: MEDICATION REFILLS NEEDED TODAY. Provider was notified.  Pharmacy name entered into [facility]: [facility] - [place], [place] - [address] [place] [address]    Clinical concerns: Morphine refill. Pain in left leg has come back about two weeks ago. [place] was notified.      Wyatt Alanis LPN

## 2019-03-12 NOTE — LETTER
3/12/2019      RE: Javon Bianchi  [address]  [facility] [address]       [facility]  FOLLOW-UP VISIT NOTE  Date of visit: Mar 12, 2019          REASON FOR VISIT: metastatic poorly differentiated carcinoma, suspected RCC    TREATMENT HISTORY:  Cabozantinib: Started 60 mg daily on September 28, 2018.  Dec 10, 2018: dose reduced cabozantinib to 40 mg a day.  Jan 17, 2019: dose reduced cabozantinib to 29 mg a day.  Feb 11, 2019: C1D1 Nivolumab     HPI: (details adopted from Dr De Luna's consult note) Javon Bianchi is a 68 year old male with a history significant for hyperlipidemia who had been in his usual health until he developed back pain in May this year. At that time, the pain was a stabbing like in the middle of lower back with burning like pain wrapping around this left hip to knee area. No trauma. He had MRI on 5/23/18 showed mild degenerative change with bulging disks L2-S1. A small benign hemangioma was noted in L2, no other marrow signal abnormality. Since then, the pain failed to improve despite steroid injections. In addition, he actually lost 25 lbs weight unintentionally since June along with chills few time a day. Overtime, the pain got worse to the point he could not ambulate after short distance and developed muscle weakness in lower extremities over time requiring a walker. Finally, he went to ER ([facility] in [place]) on 8/23/18, CT A/P was unremarkable. He was referred to neurology with obtaining MRI L spine. MRI on 8/30/18 revealed a pathological fracture at L3 vertebral body with height loss at 40% and diffuse involvement of a neoplastic process. IR guided biopsy on 8/31/18 showed poorly differentiated carcinoma. Its IHC was suggestive of possible renal cell carcinoma per pathology report (Renal cell immunochemistry is positive and along with the negative staining for CK7 and CK20 suggests the possibility of a renal carcinoma, however most  renal cell carcinomas also express PAX8 which is negative in this case). Of note, CT chest/abdomen/pelvis at OSH was negative for primary lesion. The patient was evaluated by neurosurgery who recommended no surgical intervention. He was discharged with TLSO brace and walker. He had palliative XRT locally from 9/11 for 10 fractions (Done in [place]).      He was started on cabozantinib 60 mg initially, requiring dose reduction to 20 mg for issues with hand foot syndrome and poor tolerability.    Plan was to switch to Ipi/nivo due to progression in bone disease on 2/6/19, however Yervoy was not covered, thus given Opdivo only.       INTERVAL HISTORY: Omega in with his wife Suzie and son Niko and daughter today.  He was in the ED for worsening pain shortly after our last visit.  They increased his MS Contin to 60 mg BID.  He was also able to start medical cannabis and this has helped quite a bit with pain.  Rates lumbar at 4/10, bilateral hips at 5/10 and bilateral femurs at 5/10 today, but overall he as been in the 2-3 range the last few days. He actually in the last 5 days has only taken his break through morphine twice.  He is eating okay and weight has been relatively stable.He does have nocturia but has been drinking quite a bit of fluid as his mouth is dry.      EXAM:  /68 (BP Location: Right arm, Patient Position: Sitting, Cuff Size: Adult Regular)   Pulse 71   Temp 98.3  F (36.8  C) (Oral)   Resp 16   Wt 102.4 kg (225 lb 11.2 oz)   SpO2 97%   BMI 30.61 kg/m     Wt Readings from Last 4 Encounters:   03/12/19 102.4 kg (225 lb 11.2 oz)   02/26/19 103.1 kg (227 lb 3.2 oz)   02/23/19 104.3 kg (230 lb)   02/11/19 104.6 kg (230 lb 9.6 oz)     No full exam today, as the entire visit was discussing coordination of care and plan.   HEENT: TODD.EOMI. OP shows dry MM.  Neck: no adenopathy  Heart RRR  Lungs CTAB  Abd + bs  1 + ANABELLA    LABS:      2/23/2019 16:58 2/26/2019 08:28 3/12/2019 08:29   WBC 5.3 10.5  9.5   Hemoglobin 11.0 (L) 11.0 (L) 10.0 (L)   Hematocrit 34.7 (L) 34.3 (L) 33.2 (L)   Platelet Count 212 240 216   RBC Count 3.39 (L) 3.44 (L) 3.32 (L)    (H) 100 100   MCH 32.4 32.0 30.1   MCHC 31.7 32.1 30.1 (L)   RDW 15.0 15.1 (H) 15.8 (H)   Diff Method Manual Differential Manual Differential PENDING   % Neutrophils 63.4 66.1 61.4   % Lymphocytes 7.7 6.1 12.3   % Monocytes 27.9 27.8 26.3   % Eosinophils 1.0 0.0 0.0   % Basophils 0.0 0.0 0.0   Nucleated RBCs 1 (H)     Absolute Neutrophil 3.4 6.9 5.8      3/12/2019 08:29   Sodium 138   Potassium 3.9   Chloride 107   Carbon Dioxide 25   Urea Nitrogen 12   Creatinine 0.59 (L)   GFR Estimate >90   GFR Estimate If Black >90   Calcium 7.8 (L)   Anion Gap 7   Albumin 2.7 (L)   Protein Total 6.4 (L)   Bilirubin Total 0.9   Alkaline Phosphatase 124   ALT 19   AST 72 (H)   TSH 2.43       ASSESSMENT/PLAN: metastatic poorly differentiated carcinoma, thought to be RCC, currently being treated with Opdivo    ONC- reviewed his extensive disease and challenges with interpreting the pathology and concerns with progression on Cabometyx.  Our pathology team could not be more helpful with his diagnosis as there were limited tumor cells.  Restaging scans scheduled today.  Will rebiopsy through IR on Thursday.  I will follow up with family next week.  They understand that this may not shed any light on a better way to treat.  For now, will will continue with Opdivo only q4w. We also discussed his prognosis and given worsening disease and challenges knowing what his cancer may be responsive to, we may be looking at 3-6 months.  Discussed home hospice and goals of care and he was appreciative of the information.   -ok for C2 today  -repeat MRIs  -IR biopsy on thursday  -4 weeks labs/Dr De Luna and infusion    BONE- worsening bone pains in the entire pelvis, hips and femur with extensive disease in the left femur- now better management.  Reviewed the following plan  1.  MS Contin  from 60 mg BID.  Rarely needing short acting now  2. Has morphine sulfate at home prn pain,   3. Continue with medical cannabis- this is helping  4. 3/18-starting XRT to left pelvis and left femur from local radiation oncologist for painful mets  5. Contiue oral Calcium and vitamin D  6. Xgeva q4 w- 2/26 was first dose, continue every 4-6 w  7. Continue home PT to help lower extremity weakness     GI continue senna and prune juice to stay with BM daily.     Radha St PA-C

## 2019-03-12 NOTE — PATIENT INSTRUCTIONS
Contact numbers:  Triage Main/After hours Line: [phone]    Main (scheduling) line: [phone]    Call with chills and/or temperature greater than or equal to 100.5 and questions or concerns.    If after hours, weekends, or holidays, call main hospital  at  [phone] and ask for Oncology doctor on call.         March 2019 Sunday Monday Tuesday Wednesday Thursday Friday Saturday                            1     2       3     4     5     6     7     8     9       10     11     12    [facility] LAB DRAW   8:00 AM   (15 min.)   [facility] LAB DRAW   [facility] Lab Draw    [facility] RETURN   8:25 AM   (50 min.)   Ruthie St PA-C   [facility] Cancer [facility] ONC INFUSION 60   8:30 AM   (60 min.)    ONCOLOGY INFUSION   [facility]    MR LUMBAR SPINE WWO  12:30 PM   (45 min.)   [address], MRI    MR PELVIS BONE WWO   1:45 PM   (45 min.)   [address], MRI 13     14    PROCEDURE - 4.5 HR  11:00 AM   (270 min.)   U2A ROOM 15   [address] [facility]    CT BONE BIOPSY DEEP  12:30 PM   (90 min.)   UUCT2   [facility], Interventional Radiology 15     16       17     18     19    RETURN   2:30 PM   (30 min.)   Carly Mills MD   [facility] 20     21     22     23       24     25     26     27     28     29     30       31 April 2019 Sunday Monday Tuesday Wednesday Thursday Friday Saturday        1     2     3     4     5     6       7     8     9     10     11     12     13       14     15     16     17     18     19     20       21     22     23     24     25     26     27       28     29     30                                         Lab Results:  Recent Results (from the past 12 hour(s))   Comprehensive metabolic panel    Collection Time: 03/12/19  8:29 AM   Result Value Ref Range    Sodium 138 133 - 144 mmol/L    Potassium 3.9 3.4 - 5.3 mmol/L    Chloride 107 94  - 109 mmol/L    Carbon Dioxide 25 20 - 32 mmol/L    Anion Gap 7 3 - 14 mmol/L    Glucose 85 70 - 99 mg/dL    Urea Nitrogen 12 7 - 30 mg/dL    Creatinine 0.59 (L) 0.66 - 1.25 mg/dL    GFR Estimate >90 >60 mL/min/[1.73_m2]    GFR Estimate If Black >90 >60 mL/min/[1.73_m2]    Calcium 7.8 (L) 8.5 - 10.1 mg/dL    Bilirubin Total 0.9 0.2 - 1.3 mg/dL    Albumin 2.7 (L) 3.4 - 5.0 g/dL    Protein Total 6.4 (L) 6.8 - 8.8 g/dL    Alkaline Phosphatase 124 40 - 150 U/L    ALT 19 0 - 70 U/L    AST 72 (H) 0 - 45 U/L   TSH with free T4 reflex    Collection Time: 03/12/19  8:29 AM   Result Value Ref Range    TSH 2.43 0.40 - 4.00 mU/L   *CBC with platelets differential    Collection Time: 03/12/19  8:29 AM   Result Value Ref Range    WBC 9.5 4.0 - 11.0 10e9/L    RBC Count 3.32 (L) 4.4 - 5.9 10e12/L    Hemoglobin 10.0 (L) 13.3 - 17.7 g/dL    Hematocrit 33.2 (L) 40.0 - 53.0 %     78 - 100 fl    MCH 30.1 26.5 - 33.0 pg    MCHC 30.1 (L) 31.5 - 36.5 g/dL    RDW 15.8 (H) 10.0 - 15.0 %    Platelet Count 216 150 - 450 10e9/L    Diff Method Manual Differential     % Neutrophils 61.4 %    % Lymphocytes 12.3 %    % Monocytes 26.3 %    % Eosinophils 0.0 %    % Basophils 0.0 %    Absolute Neutrophil 5.8 1.6 - 8.3 10e9/L    Absolute Lymphocytes 1.2 0.8 - 5.3 10e9/L    Absolute Monocytes 2.5 (H) 0.0 - 1.3 10e9/L    Absolute Eosinophils 0.0 0.0 - 0.7 10e9/L    Absolute Basophils 0.0 0.0 - 0.2 10e9/L    Anisocytosis Slight     Polychromasia Moderate     Platelet Estimate Confirming automated cell count

## 2019-03-12 NOTE — PROGRESS NOTES
Infusion Nursing Note:  Javon Bianchi presents for D1C2 Nivolumab  Met with MARCO Hu before infusion.    Note: NA.    Pain: pt picking up new scripts for MSIR and MSCR today, no additional interventions needed while in infusion.    Treatment Conditions:  Lab Results   Component Value Date    HGB 10.0 03/12/2019     Lab Results   Component Value Date    WBC 9.5 03/12/2019      Lab Results   Component Value Date    ANEU 5.8 03/12/2019     Lab Results   Component Value Date     03/12/2019      Lab Results   Component Value Date     03/12/2019                   Lab Results   Component Value Date    POTASSIUM 3.9 03/12/2019           No results found for: MAG         Lab Results   Component Value Date    CR 0.59 03/12/2019                   Lab Results   Component Value Date    AUREA 7.8 03/12/2019                Lab Results   Component Value Date    BILITOTAL 0.9 03/12/2019           Lab Results   Component Value Date    ALBUMIN 2.7 03/12/2019                    Lab Results   Component Value Date    ALT 19 03/12/2019           Lab Results   Component Value Date    AST 72 03/12/2019       Results reviewed, labs MET treatment parameters, ok to proceed with treatment.    Intravenous Access:  Peripheral IV placed in lab.    Post Infusion Assessment:  Patient tolerated infusion without incident.  Blood return noted pre and post infusion.  No evidence of extravasations.  PIV left intact for MRI later today.    Discharge Plan:   Prescription refills given for MSIR and MSCR, pt to  downstairs.  Discharge instructions reviewed with: Patient and Family.  Patient and/or family verbalized understanding of discharge instructions and all questions answered.  Copy of AVS reviewed with patient and/or family.  Patient will return in one month for next appointment-- still to be scheduled, pt aware to follow up if appointments are not scheduled in the next couple days  Patient discharged in stable condition  accompanied by: self, wife and daughter.    Helen Freeman RN

## 2019-03-12 NOTE — NURSING NOTE
Chief Complaint   Patient presents with     Blood Draw     labs drawn with piv start by rn.  vs taken     Labs drawn with PIV start by rn.  Pt tolerated well.  VS taken and pt checked in for next appt.  Tigist Bender RN

## 2019-03-13 ENCOUNTER — ANCILLARY PROCEDURE (OUTPATIENT)
Dept: CT IMAGING | Facility: CLINIC | Age: 69
End: 2019-03-13
Attending: PHYSICIAN ASSISTANT
Payer: MEDICARE

## 2019-03-13 ENCOUNTER — TELEPHONE (OUTPATIENT)
Dept: ONCOLOGY | Facility: CLINIC | Age: 69
End: 2019-03-13

## 2019-03-13 DIAGNOSIS — C64.9 RENAL CELL CARCINOMA, UNSPECIFIED LATERALITY (H): ICD-10-CM

## 2019-03-13 DIAGNOSIS — C79.51 BONE METASTASIS: Primary | ICD-10-CM

## 2019-03-13 DIAGNOSIS — C79.51 BONE METASTASIS: ICD-10-CM

## 2019-03-13 NOTE — TELEPHONE ENCOUNTER
Called Omega' cell phone to review worsening bone lesions on MRI and concern re: Left femur and L3 spinal canal stenosis.  Spoke with Dr Wyatt from Ortho/spine regarding the L3 and potential options of osteo cool and kyphoplasty of L3 and stability of left femur.  Will order CT left femur and he will see tomorrow in clinic.  We will try and proceed with IR biopsy tomorrow as well.     Radha St PA-C      IMPRESSION:  1. Extensive marrow changes throughout the visualized  pelvis/innumerable marrow infiltrating lesions. Given clinical history  findings are presumed to represent widely metastatic disease. Concern  for potential for pathologic fracture in the proximal femoral neck  region, left greater than right due to the extensive involvement of  the bone as well as involvement of the lesser trochanter on the left.  Since patient is at risk for a pathologic fracture, recommend  consultation with orthopedic oncology/orthopedic surgery.  2. No soft tissue mass or fluid collections.  3. Findings were discussed with Radha St at 1525 on 3/12/2019.  Recommend consultation with orthopedic surgery, to evaluate if patient  is at risk for pathologic fracture.    Impression:   1. Worsening spinal canal stenosis due to epidural extension of  metastatic disease, now circumferential, between the levels of L1 down  to L4, but causing severe spinal canal stenosis focally and  circumferentially at the level of L3.   2. Regarding the bony metastases, the diffuse lumbar, sacral, and  iliac bone metastases, but the new finding is worsening L1 enhancement  without extension into the spinal canal at that level.  3. Stable L3 pathologic fracture from metastasis, 50% loss of  vertebral body height.  4. Questionable enhancement of the roots of the cauda equina within  the lower  thecal sac, but imaging is degraded due to motion, so this  could be artifactual.

## 2019-03-14 ENCOUNTER — HOSPITAL ENCOUNTER (OUTPATIENT)
Facility: CLINIC | Age: 69
Discharge: HOME | End: 2019-03-14
Attending: RADIOLOGY | Admitting: RADIOLOGY
Payer: MEDICARE

## 2019-03-14 ENCOUNTER — APPOINTMENT (OUTPATIENT)
Dept: INTERVENTIONAL RADIOLOGY/VASCULAR | Facility: CLINIC | Age: 69
End: 2019-03-14
Attending: PHYSICIAN ASSISTANT
Payer: MEDICARE

## 2019-03-14 ENCOUNTER — OFFICE VISIT (OUTPATIENT)
Dept: ORTHOPEDICS | Facility: CLINIC | Age: 69
End: 2019-03-14
Payer: MEDICARE

## 2019-03-14 ENCOUNTER — APPOINTMENT (OUTPATIENT)
Dept: MEDSURG UNIT | Facility: CLINIC | Age: 69
End: 2019-03-14
Attending: RADIOLOGY
Payer: MEDICARE

## 2019-03-14 VITALS
DIASTOLIC BLOOD PRESSURE: 64 MMHG | SYSTOLIC BLOOD PRESSURE: 110 MMHG | BODY MASS INDEX: 30.48 KG/M2 | HEIGHT: 72 IN | RESPIRATION RATE: 18 BRPM | TEMPERATURE: 97.9 F | OXYGEN SATURATION: 96 % | HEART RATE: 79 BPM | WEIGHT: 225 LBS

## 2019-03-14 VITALS — HEIGHT: 72 IN | WEIGHT: 225 LBS | BODY MASS INDEX: 30.48 KG/M2

## 2019-03-14 DIAGNOSIS — C79.51 METASTATIC CANCER TO SPINE (H): Primary | ICD-10-CM

## 2019-03-14 DIAGNOSIS — M48.50XG PATHOLOGICAL COMPRESSION FRACTURE OF VERTEBRA WITH DELAYED HEALING, SUBSEQUENT ENCOUNTER: ICD-10-CM

## 2019-03-14 DIAGNOSIS — M54.16 LUMBAR RADICULOPATHY: ICD-10-CM

## 2019-03-14 DIAGNOSIS — C79.51 BONE METASTASIS: ICD-10-CM

## 2019-03-14 DIAGNOSIS — C64.9 RENAL CELL CARCINOMA, UNSPECIFIED LATERALITY (H): ICD-10-CM

## 2019-03-14 LAB — B-HCG FREE SERPL-ACNC: 1.5 [IU]/L (ref 0.86–1.14)

## 2019-03-14 PROCEDURE — 93010 ELECTROCARDIOGRAM REPORT: CPT | Performed by: INTERNAL MEDICINE

## 2019-03-14 PROCEDURE — 27210909 ZZH NEEDLE CR5

## 2019-03-14 PROCEDURE — 27210777 ZZH KIT CR15

## 2019-03-14 PROCEDURE — 27210903 ZZH KIT CR5

## 2019-03-14 PROCEDURE — 00000468 ZZHCL STATISTIC CYTO QA-OR TOUCH APT TC 88333

## 2019-03-14 PROCEDURE — 85610 PROTHROMBIN TIME: CPT

## 2019-03-14 PROCEDURE — 99153 MOD SED SAME PHYS/QHP EA: CPT

## 2019-03-14 PROCEDURE — 88342 IMHCHEM/IMCYTCHM 1ST ANTB: CPT

## 2019-03-14 PROCEDURE — 25000125 ZZHC RX 250: Performed by: STUDENT IN AN ORGANIZED HEALTH CARE EDUCATION/TRAINING PROGRAM

## 2019-03-14 PROCEDURE — 25000128 H RX IP 250 OP 636: Performed by: STUDENT IN AN ORGANIZED HEALTH CARE EDUCATION/TRAINING PROGRAM

## 2019-03-14 PROCEDURE — 88275 CYTOGENETICS 100-300: CPT | Performed by: PATHOLOGY

## 2019-03-14 PROCEDURE — 88271 CYTOGENETICS DNA PROBE: CPT | Performed by: PATHOLOGY

## 2019-03-14 PROCEDURE — 77012 CT SCAN FOR NEEDLE BIOPSY: CPT

## 2019-03-14 PROCEDURE — 40000167 ZZH STATISTIC PP CARE STAGE 2

## 2019-03-14 PROCEDURE — 25800030 ZZH RX IP 258 OP 636: Performed by: PHYSICIAN ASSISTANT

## 2019-03-14 PROCEDURE — 88305 TISSUE EXAM BY PATHOLOGIST: CPT

## 2019-03-14 PROCEDURE — 88341 IMHCHEM/IMCYTCHM EA ADD ANTB: CPT

## 2019-03-14 PROCEDURE — 40000065 ZZH STATISTIC EKG NON-CHARGEABLE

## 2019-03-14 RX ORDER — DEXTROSE MONOHYDRATE 25 G/50ML
25-50 INJECTION, SOLUTION INTRAVENOUS
Status: DISCONTINUED | OUTPATIENT
Start: 2019-03-14 | End: 2019-03-14 | Stop reason: HOSPADM

## 2019-03-14 RX ORDER — SODIUM CHLORIDE 9 MG/ML
INJECTION, SOLUTION INTRAVENOUS CONTINUOUS
Status: DISCONTINUED | OUTPATIENT
Start: 2019-03-14 | End: 2019-03-14 | Stop reason: HOSPADM

## 2019-03-14 RX ORDER — NALOXONE HYDROCHLORIDE 0.4 MG/ML
.1-.4 INJECTION, SOLUTION INTRAMUSCULAR; INTRAVENOUS; SUBCUTANEOUS
Status: DISCONTINUED | OUTPATIENT
Start: 2019-03-14 | End: 2019-03-14 | Stop reason: HOSPADM

## 2019-03-14 RX ORDER — FENTANYL CITRATE 50 UG/ML
25-50 INJECTION, SOLUTION INTRAMUSCULAR; INTRAVENOUS EVERY 5 MIN PRN
Status: DISCONTINUED | OUTPATIENT
Start: 2019-03-14 | End: 2019-03-14 | Stop reason: HOSPADM

## 2019-03-14 RX ORDER — IBUPROFEN 200 MG
1 CAPSULE ORAL 2 TIMES DAILY
COMMUNITY
End: 2019-01-01

## 2019-03-14 RX ORDER — NICOTINE POLACRILEX 4 MG
15-30 LOZENGE BUCCAL
Status: DISCONTINUED | OUTPATIENT
Start: 2019-03-14 | End: 2019-03-14 | Stop reason: HOSPADM

## 2019-03-14 RX ORDER — LIDOCAINE 40 MG/G
CREAM TOPICAL
Status: DISCONTINUED | OUTPATIENT
Start: 2019-03-14 | End: 2019-03-14 | Stop reason: HOSPADM

## 2019-03-14 RX ORDER — ACETAMINOPHEN 500 MG
1000 TABLET ORAL 3 TIMES DAILY
COMMUNITY
End: 2020-01-01

## 2019-03-14 RX ORDER — FLUMAZENIL 0.1 MG/ML
0.2 INJECTION, SOLUTION INTRAVENOUS
Status: DISCONTINUED | OUTPATIENT
Start: 2019-03-14 | End: 2019-03-14 | Stop reason: HOSPADM

## 2019-03-14 RX ORDER — GUAIFENESIN AND DEXTROMETHORPHAN HYDROBROMIDE 600; 30 MG/1; MG/1
1 TABLET, EXTENDED RELEASE ORAL 2 TIMES DAILY PRN
COMMUNITY

## 2019-03-14 RX ORDER — MULTIVIT-MIN/IRON/FOLIC ACID/K 18-600-40
2000 CAPSULE ORAL DAILY
COMMUNITY
End: 2019-01-01

## 2019-03-14 RX ADMIN — LIDOCAINE HYDROCHLORIDE 7 ML: 10 INJECTION, SOLUTION INFILTRATION; PERINEURAL at 14:29

## 2019-03-14 RX ADMIN — MIDAZOLAM HYDROCHLORIDE 1 MG: 2 INJECTION, SOLUTION INTRAMUSCULAR; INTRAVENOUS at 14:21

## 2019-03-14 RX ADMIN — SODIUM CHLORIDE: 9 INJECTION, SOLUTION INTRAVENOUS at 11:57

## 2019-03-14 RX ADMIN — FENTANYL CITRATE 50 MCG: 50 INJECTION INTRAMUSCULAR; INTRAVENOUS at 14:21

## 2019-03-14 RX ADMIN — MIDAZOLAM HYDROCHLORIDE 1 MG: 2 INJECTION, SOLUTION INTRAMUSCULAR; INTRAVENOUS at 14:13

## 2019-03-14 RX ADMIN — FENTANYL CITRATE 50 MCG: 50 INJECTION INTRAMUSCULAR; INTRAVENOUS at 14:13

## 2019-03-14 ASSESSMENT — ENCOUNTER SYMPTOMS
NECK MASS: 0
MUSCLE WEAKNESS: 1
ALTERED TEMPERATURE REGULATION: 1
CHILLS: 1
PALPITATIONS: 0
WEIGHT LOSS: 0
TROUBLE SWALLOWING: 0
SINUS CONGESTION: 0
HYPOTENSION: 0
HALLUCINATIONS: 1
MYALGIAS: 1
EXERCISE INTOLERANCE: 1
SLEEP DISTURBANCES DUE TO BREATHING: 0
FEVER: 0
INCREASED ENERGY: 0
JOINT SWELLING: 0
ARTHRALGIAS: 1
FATIGUE: 1
SMELL DISTURBANCE: 0
WEIGHT GAIN: 0
LIGHT-HEADEDNESS: 0
ORTHOPNEA: 0
DECREASED APPETITE: 0
STIFFNESS: 1
LEG PAIN: 1
POLYPHAGIA: 0
SYNCOPE: 0
SORE THROAT: 0
POLYDIPSIA: 0
MUSCLE CRAMPS: 0
SINUS PAIN: 0
TASTE DISTURBANCE: 0
HYPERTENSION: 0
NIGHT SWEATS: 1
HOARSE VOICE: 1
BACK PAIN: 1
NECK PAIN: 0

## 2019-03-14 ASSESSMENT — MIFFLIN-ST. JEOR
SCORE: 1818.59
SCORE: 1818.59

## 2019-03-14 NOTE — IP AVS SNAPSHOT
[address] [address]                                    After Visit Summary   3/14/2019    Javon Bianchi    MRN: [id number]           After Visit Summary Signature Page    I have received my discharge instructions, and my questions have been answered. I have discussed any challenges I see with this plan with the nurse or doctor.    ..........................................................................................................................................  Patient/Patient Representative Signature      ..........................................................................................................................................  Patient Representative Print Name and Relationship to Patient    ..................................................               ................................................  Date                                   Time    ..........................................................................................................................................  Reviewed by Signature/Title    ...................................................              ..............................................  Date                                               Time          22EPIC Rev 08/18

## 2019-03-14 NOTE — PROGRESS NOTES
Reason for Visit: f/u for mets L3    Last Visit: 2/7/19    Previous Impression:  Pathologic fracture L3 vertebral body secondary to renal cell metastasis, with stable fracture appearance and stenosis.    Previous Plan:  I had a good long discussion with the patient and his family.  We again discussed the option of kyphoplasty with radiofrequency ablation.  He is quite interested in this even though he admits that his pain now is better than it used to be.  He said he would like to get off morphine and he thinks that undergoing this procedure might help him get off the morphine and have less pain afterwards.  We discussed the potential risks of going through general anesthesia as well as surgical risks including but not limited to nerve injury, vascular or other visceral injury from needle malposition, wound infection and most commonly inadequate pain relief.  I sent an in basket message to his oncologist Dr. Malathi De Luna.  He will get his PET scan next week and will see Dr. De Luna on February 26.  He will then let us know if he still wishes to proceed with the procedure.     Return to clinic as needed.       S>  70/m.  I received call yesterday from Oncology PA re patient's new MRI; with progression of systemic renal CA spread.  Here to discuss options.  Accompanied by wife.    Symptoms worsening.  Main complaint is Left thigh pain, numbness, tingling in ~ L3 pattern.  Also with sacra/coggygeal pain radiating to left buttock / ischial tuberosity area.  Not aggravated by wt bearing.  In fact, walking on it seems to make it feel better.    Had ~ 4 injections at [facility] in [facility] in summer 2018.  None of them worked.  Was told there was nothing more they could do for him.  He is thus quite hesitant to undergonaother injection.  However, we do not know what level exactly those injections were done at.    Received call yesterday from oncology PA.  He had new pelvic MRI which showed progression of metastatic  lesion in his left proximal femur as well as at other sites.  They are suspecting or wondering if his pain may be secondary to impending impending pathologic fracture.  I offered to see patient today    He is scheduled for iliac crest bone biopsy today.  Scheduled to start radiation treatment to his pelvis and left femur on Monday.  Next              Oswestry (SPIKE) Questionnaire    OSWESTRY DISABILITY INDEX 3/14/2019   Count 10   Sum 23   Oswestry Score (%) 46            Visual Analog Pain Scale  Back Pain Scale 0-10: 3  Right leg pain: 5  Left leg pain: 6    PROMIS-10 Scores  Global Mental Health Score: (P) 12  Global Physical Health Score: (P) 11  PROMIS TOTAL - SUBSCORES: (P) 23    O>   Alert, oriented x 3, cooperative.  Not in CP distress.  Ht 1.829 m (6')   Wt 102.1 kg (225 lb)   BMI 30.52 kg/m    Ambulates independently.   Grossly neurologically intact.  Negative L hip IR / impingement test.      Imaging:   Pelvis MRI from yesterday reviewed.  This showed increased signal change within the left proximal femur with no obvious break.  There are smaller lesions as well on the right femur in the pelvis.    Recent lumbar MRI reviewed.  This showed the previously noted L3 pathologic Vertebral compression fracture.  There also spots smaller spots or lesions at the other levels and mostly the spinous processes.  There is some epidural disease at the L3 level with stenosis at this level primarily on the left side.  The left L3-4 foramen is obscured by likely tumor tissue and compressing or enveloping the exiting L3 nerve root.  The lesion also extends outside the foramen laterally.    Left femur CT taken yesterday showed no structural compromise of the left proximal femur no concern for impending pathologic fracture.    A>  1.  Left L3 radiculopathy secondary to metastatic spinal disease secondary to renal cancer.    2.  Pathologic compression fracture L3.    3.  Metastatic lesion left proximal femur without  impending pathologic fracture.    P>   Had good discussoin with patinet and wife.  - Will retrieve reports from [facility] re injections.    Recommend either:  - Left L3-4 TFESI.  - Decompression with fusion (may need to do complete facetectomy to achieve adequate decompression).    Inbasket message sent to Dr. De Luna (oncology) and PA (Radha St).  Scheduled to start radiation tx to L pelvis/thigh on Monday.  Scheduled for iliac crest biopsy today.    RTC prn.  Will wait to hear back from Oncology, and review of previous injections.  If these were same injection, will skip another injection.  Will consider surgery at that point.    TT > 40 mins, > 50% CC.      Vickey Brower MD    Orthopaedic Spine Surgery  Dept Orthopaedic Surgery, [facility]  [phone] office, [phone] pager  [email or web address]    Addendum:  Was able to review faxed notes from pain management center.  Procedure is as follows:    6/4/2018: Left L3-4 transforaminal epidural steroid injection by Dr. Kt Paris.  6/22/2018: Left sacroiliac joint injection by Dr. Paris.  7/16/2018: Aborted left L3-4 transforaminal epidural steroid injection (Dr. Paris).  Aborted because patient could not tolerate the discomfort associated with the needle injection.  Thus medication was not injected.  7/23/2018: Left L2-3 and L3-4 transforaminal epidural steroid injection by Dr. Paris.  8/27/2018: Left L2-3 and L3-4 transforaminal epidural steroid injection by Dr. Paris.    Because above procedures already included L L3-4 TFESI (4x, including aborted procedure), and with patient's report to me that none of them ever really helped, will at this point recommend L3-4 decompression and fusion (posterolateral fusion, left complete facetectomy).  Surg request placed.

## 2019-03-14 NOTE — PROGRESS NOTES
Pt arrived to floor with RN from IR s/p Bone biopsy. VSS. Left Iliac dressing CDI, no hematoma. Pt denies pain. Wife at bedside. Tolerating regular diet.

## 2019-03-14 NOTE — PROGRESS NOTES
1145  Prep is complete for procedure.  Omega is here for Bone Bx.  Wife, Suzie, is here with him.  They are staying at [facility].  Omega is having pain in lower back and Left leg from hip to knee.  He states this is moderate in nature.  He is on a routine pain management administration.CTRN

## 2019-03-14 NOTE — NURSING NOTE
Reason For Visit:   Chief Complaint   Patient presents with     RECHECK     F/U fracture of vertebra. Discuss options.      Primary MD: LIANNE Wellington        ?  No  Occupation retired salesman   Currently working? No.  Work status?  Retired.  Date of injury: None     Date of surgery: None     Smoker: No      Ht 1.829 m (6')   Wt 102.1 kg (225 lb)   BMI 30.52 kg/m      Pain Assessment  Patient Currently in Pain: Yes  0-10 Pain Scale: 5  Primary Pain Location: Back  Other Pain Locations: left  Pain Descriptors: Discomfort    Oswestry (SPIKE) Questionnaire    OSWESTRY DISABILITY INDEX 3/14/2019   Count 10   Sum 23   Oswestry Score (%) 46          Visual Analog Pain Scale  Back Pain Scale 0-10: 3  Right leg pain: 5  Left leg pain: 6    Promis 10 Assessment    PROMIS 10 3/14/2019   In general, would you say your health is: Good   In general, would you say your quality of life is: Good   In general, how would you rate your physical health? Fair   In general, how would you rate your mental health, including your mood and your ability to think? Good   In general, how would you rate your satisfaction with your social activities and relationships? Good   In general, please rate how well you carry out your usual social activities and roles Poor   To what extent are you able to carry out your everyday physical activities such as walking, climbing stairs, carrying groceries, or moving a chair? Moderately   How often have you been bothered by emotional problems such as feeling anxious, depressed or irritable? Sometimes   How would you rate your fatigue on average? Moderate   How would you rate your pain on average?   0 = No Pain  to  10 = Worst Imaginable Pain 5   In general, would you say your health is: 3   In general, would you say your quality of life is: 3   In general, how would you rate your physical health? 2   In general, how would you rate your mental health, including your mood and your ability to think?  3   In general, how would you rate your satisfaction with your social activities and relationships? 3   In general, please rate how well you carry out your usual social activities and roles. (This includes activities at home, at work and in your community, and responsibilities as a parent, child, spouse, employee, friend, etc.) 1   To what extent are you able to carry out your everyday physical activities such as walking, climbing stairs, carrying groceries, or moving a chair? 3   In the past 7 days, how often have you been bothered by emotional problems such as feeling anxious, depressed, or irritable? 3   In the past 7 days, how would you rate your fatigue on average? 3   In the past 7 days, how would you rate your pain on average, where 0 means no pain, and 10 means worst imaginable pain? 5   Global Mental Health Score 12   Global Physical Health Score 11   PROMIS TOTAL - SUBSCORES 23                Alondra Villa LPN

## 2019-03-14 NOTE — IP AVS SNAPSHOT
MRN:[id number]                      After Visit Summary   3/14/2019    Javon Bianchi    MRN: [id number]           Visit Information        Department      3/14/2019  9:34 AM [address] [facility]          Review of your medicines      UNREVIEWED medicines. Ask your doctor about these medicines       Dose / Directions   acetaminophen 500 MG tablet  Commonly known as:  TYLENOL      Dose:  1000 mg  Take 1,000 mg by mouth 2 times daily  Refills:  0     atorvastatin 20 MG tablet  Commonly known as:  LIPITOR      Dose:  20 mg  Take 20 mg by mouth  Refills:  0     CABOMETYX 20 MG  Generic drug:  Cabozantinib S-Malate      Refills:  0     calcium carbonate 500 mg (elemental) 1250 (500 Ca) MG Tabs tablet  Commonly known as:  OSCAL 500      Dose:  1 tablet  Take 1 tablet by mouth 2 times daily  Refills:  0     celecoxib 200 MG capsule  Commonly known as:  celeBREX  Used for:  Cancer associated pain      Dose:  200 mg  Take 1 capsule (200 mg) by mouth 2 times daily as needed for moderate pain  Quantity:  60 capsule  Refills:  2     diazepam 10 MG tablet  Commonly known as:  VALIUM  Used for:  Neuropathy  Ask about: Should I take this medication?      Dose:  10 mg  Take 1 tablet (10 mg) by mouth once for 1 dose  Quantity:  5 tablet  Refills:  0     DULoxetine 60 MG capsule  Commonly known as:  CYMBALTA  Used for:  Anxiety      Dose:  60 mg  Take 1 capsule (60 mg) by mouth daily  Quantity:  90 capsule  Refills:  3     melatonin 3 MG tablet      Dose:  3 mg  Take 3 mg by mouth  Refills:  0     * morphine 15 MG IR tablet  Commonly known as:  MSIR  Used for:  Cancer associated pain      Dose:  15 mg  Take 1 tablet (15 mg) by mouth every 3 hours as needed for severe pain  Quantity:  60 tablet  Refills:  0     * morphine 60 MG 12 hr tablet  Commonly known as:  MS CONTIN  Used for:  Renal cell carcinoma, unspecified laterality (H)      Dose:  60 mg  Take 1 tablet (60 mg) by mouth every 12 hours  Quantity:  60  tablet  Refills:  0     MUCINEX DM  MG 12 hr tablet      Dose:  1 tablet  Take 1 tablet by mouth every 12 hours  Refills:  0     polyethylene glycol packet  Commonly known as:  MIRALAX/GLYCOLAX      Dose:  1 packet  Take 1 packet by mouth  Refills:  0     Pseudoephedrine-APAP  MG Tabs      Refills:  0     senna-docusate 8.6-50 MG tablet  Commonly known as:  SENOKOT-S/PERICOLACE      Refills:  0     Vitamin D (Cholecalciferol) 1000 units Tabs      Dose:  1000 Units  Take 1,000 Units by mouth 2 times daily  Refills:  0         * This list has 2 medication(s) that are the same as other medications prescribed for you. Read the directions carefully, and ask your doctor or other care provider to review them with you.            CONTINUE these medicines which have NOT CHANGED       Dose / Directions   medical cannabis (Patient's own supply.  Not a prescription)      Dose:  1 Dose  1 Dose See Admin Instructions (This is NOT a prescription, and does not certify that the patient has a qualifying medical condition for medical cannabis.  The purpose of this order is  to document that the patient reports taking medical cannabis.)  Refills:  0              Protect others around you: Learn how to safely use, store and throw away your medicines at www.disposemymeds.org.       Follow-ups after your visit       Your next 10 appointments already scheduled    Mar 19, 2019  2:30 PM CDT  Return Visit with Carly Mills MD  [facility] ([facility]) [facility]  [address] [facility] [address]  [facility] [address]  [phone]   Apr 08, 2019 11:00 AM CDT  [facility] with  eRALOS3 LAB DRAW  [facility] ([facility]) [address]  [address]  [facility] [id number] [phone]   Apr 08, 2019 11:30 AM CDT  (Arrive by 11:15 AM)  Return Visit with Malathi De Luna MD  [facility] ([facility]) [address]  [address]  [place] [address]  [phone]   Apr 08, 2019  1:00 PM CDT  Infusion 60 with UC ONCOLOGY INFUSION, UC 11 ATC  [facility] ([facility]) [address]  [address]  [place] [address]  [phone]      Care Instructions       Further instructions from your care team       [facility]    Interventional Radiology  Patient Instructions Following Bone Biopsy Left Lower Back    AFTER YOU GO HOME  ? If you were given sedation DO NOT drive or operate machinery at home or at work for at least 24 hours  ? DO relax and take it easy for 48 hours, no strenuous activity for 24 hours  ? DO drink plenty of fluids  ? DO resume your regular diet, unless otherwise instructed by your Primary Physician  ? Keep the dressing dry and in place for 24 hours.  ? DO NOT SMOKE FOR AT LEAST 24 HOURS, if you have been given any medications that were to help you relax or sedate you during your procedure  ? DO NOT drink alcoholic beverages the day of your procedure  ? DO NOT do any strenuous exercise or lifting (> 10 lbs) for at least 7 days following your procedure  ? DO NOT take a bath or shower for at least 12 hours following your procedure  ? Remove dressing after shower the next day. Replace with Band aid for 5 days.  Never leave a wet dressing in place.  ? DO NOT make any important or legal decisions for 24 hours following your procedure  ? There should be minimum drainage from the biopsy site    CALL THE PHYSICIAN IF:  ? You start bleeding from the procedure site.  If you do start to bleed from that site, lie down flat and hold pressure on the site for a minimum of 10 minutes.  Your physician will tell you if you need to return to the hospital  ? You develop nausea or vomiting  ? You have excessive swelling, redness, or tenderness at the site  ? You have drainage that looks like it is infected.  ? You  experience severe pain  ? You develop hives or a rash or unexplained itching  ? You develop shortness of breath  ? You develop a temperature of 101 degrees F or greater  ?       [facility] INTERVENTIONAL RADIOLOGY DEPARTMENT  Procedure Physician: Dr. Roland Cade                                    Date of procedure: March 14, 2019    Telephone Numbers: [phone] Monday-Friday 8:00 am to 4:30 pm    [phone] After 4:30 pm Monday-Friday, Weekends & Holidays.   Ask for the Interventional Radiologist on call.  Someone is on call 24 hrs/day    [facility] toll free number: [phone] Monday-Friday 8:00 am to 4:30 pm    [facility] Emergency Dept: [phone]          Additional Information About Your Visit       [employer] Information    [employer] gives you secure access to your electronic health record. If you see a primary care provider, you can also send messages to your care team and make appointments. If you have questions, please call your primary care clinic.  If you do not have a primary care provider, please call [phone] and they will assist you.       Care EveryWhere ID    This is your Care EveryWhere ID. This could be used by other organizations to access your [facility] medical records  [id number]       Your Vitals Were     Blood Pressure   106/56 (BP Location: Left arm)          Pulse   79          Temperature   97.9  F (36.6  C) (Oral)          Respirations   18          Height   1.829 m (6')             Weight   102.1 kg (225 lb)    Pulse Oximetry   96%    BMI (Body Mass Index)   30.52 kg/m           Primary Care Provider Office Phone # Fax #    S Conrado Wellington [phone]      Equal Access to Services    [facility] : Hadii aad ku hadasho Soomaali, waaxda luqadaha, qaybta kaalmada adeegalex, george messina. So [facility] [phone].    ATENCIÓN: Si habla español, tiene a ortega disposición servicios gratuitos de asistencia lingüística. Llame al [phone].    We comply  with applicable federal civil rights laws and [place] laws. We do not discriminate on the basis of race, color, national origin, age, disability, sex, sexual orientation, or gender identity.           Thank you!    Thank you for choosing [facility] for your care. Our goal is always to provide you with excellent care. Hearing back from our patients is one way we can continue to improve our services. Please take a few minutes to complete the written survey that you may receive in the mail after you visit with us. Thank you!            Medication List      Medications          Morning Afternoon Evening Bedtime As Needed    medical cannabis (Patient's own supply.  Not a prescription)  INSTRUCTIONS:  1 Dose See Admin Instructions (This is NOT a prescription, and does not certify that the patient has a qualifying medical condition for medical cannabis.  The purpose of this order is  to document that the patient reports taking medical cannabis.)                       ASK your doctor about these medications          Morning Afternoon Evening Bedtime As Needed    acetaminophen 500 MG tablet  Also known as:  TYLENOL  INSTRUCTIONS:  Take 1,000 mg by mouth 2 times daily                     atorvastatin 20 MG tablet  Also known as:  LIPITOR  INSTRUCTIONS:  Take 20 mg by mouth                     CABOMETYX 20 MG  Generic drug:  Cabozantinib S-Malate                     calcium carbonate 500 mg (elemental) 1250 (500 Ca) MG Tabs tablet  Also known as:  OSCAL 500  INSTRUCTIONS:  Take 1 tablet by mouth 2 times daily                     celecoxib 200 MG capsule  Also known as:  celeBREX  INSTRUCTIONS:  Take 1 capsule (200 mg) by mouth 2 times daily as needed for moderate pain                     diazepam 10 MG tablet  Also known as:  VALIUM  INSTRUCTIONS:  Take 1 tablet (10 mg) by mouth once for 1 dose  Ask about: Should I take this medication?                     DULoxetine 60 MG capsule  Also known as:  CYMBALTA  INSTRUCTIONS:  Take  1 capsule (60 mg) by mouth daily                     melatonin 3 MG tablet  INSTRUCTIONS:  Take 3 mg by mouth                     * morphine 15 MG IR tablet  Also known as:  MSIR  INSTRUCTIONS:  Take 1 tablet (15 mg) by mouth every 3 hours as needed for severe pain                     * morphine 60 MG 12 hr tablet  Also known as:  MS CONTIN  INSTRUCTIONS:  Take 1 tablet (60 mg) by mouth every 12 hours                     MUCINEX DM  MG 12 hr tablet  INSTRUCTIONS:  Take 1 tablet by mouth every 12 hours                     polyethylene glycol packet  Also known as:  MIRALAX/GLYCOLAX  INSTRUCTIONS:  Take 1 packet by mouth                     Pseudoephedrine-APAP  MG Tabs                     senna-docusate 8.6-50 MG tablet  Also known as:  SENOKOT-S/PERICOLACE                     Vitamin D (Cholecalciferol) 1000 units Tabs  INSTRUCTIONS:  Take 1,000 Units by mouth 2 times daily                        * This list has 2 medication(s) that are the same as other medications prescribed for you. Read the directions carefully, and ask your doctor or other care provider to review them with you.

## 2019-03-14 NOTE — NURSING NOTE
Danyel Martines is a 8 year old male presenting with a sore throat for the past 2-3 days.  Throat swab was taken and sent to lab.  Denies known Latex allergy or symptoms of Latex sensitivity.  Medication and allergy lists verified and updated with pt.  Sue Galarza MD    Visit Vitals  BP (!) 88/58   Pulse 98   Temp 97.8 °F (36.6 °C) (Tympanic)   Wt 49.2 kg   SpO2 99%        Teaching Flowsheet   Relevant Diagnosis: METS to Spine   Teaching Topic: preop      Person(s) involved in teaching:   Patient and Wife     Motivation Level:  Asks Questions: Yes  Eager to Learn: Yes  Cooperative: Yes  Receptive (willing/able to accept information): Yes  Any cultural factors/Spiritism beliefs that may influence understanding or compliance? No       Patient and Family demonstrates understanding of the following:  Reason for the appointment, diagnosis and treatment plan: Yes  Knowledge of proper use of medications and conditions for which they are ordered (with special attention to potential side effects or drug interactions): Yes  Which situations necessitate calling provider and whom to contact: Yes       Teaching Concerns Addressed:        Proper use and care of meds. (medical equip, care aids, etc.): Yes  Nutritional needs and diet plan: Yes  Pain management techniques: Yes  Wound Care: Yes  How and/when to access community resources: Yes     Instructional Materials Used/Given: preop pkt     Time spent with patient: 15 minutes.

## 2019-03-14 NOTE — PROCEDURES
Interventional Radiology Brief Post Procedure Note    Procedure: CT BONE BIOPSY DEEP    Proceduralist: Roland Cade MD and Khurram Sierra MD    Assistant: None    Time Out: Prior to the start of the procedure and with procedural staff participation, I verbally confirmed the patient s identity using two indicators, relevant allergies, that the procedure was appropriate and matched the consent or emergent situation, and that the correct equipment/implants were available. Immediately prior to starting the procedure I conducted the Time Out with the procedural staff and re-confirmed the patient s name, procedure, and site/side. (The Joint Commission universal protocol was followed.)  Yes    Medications   Medication Event Details Admin User Admin Time       Sedation: IR Nurse Monitored Care   Post Procedure Summary:  Prior to the start of the procedure and with procedural staff participation, I verbally confirmed the patient s identity using two indicators, relevant allergies, that the procedure was appropriate and matched the consent or emergent situation, and that the correct equipment/implants were available. Immediately prior to starting the procedure I conducted the Time Out with the procedural staff and re-confirmed the patient s name, procedure, and site/side. (The Joint Commission universal protocol was followed.)  Yes       Sedatives: Fentanyl and Midazolam (Versed)    Vital signs, airway and pulse oximetry were monitored and remained stable throughout the procedure and sedation was maintained until the procedure was complete.  The patient was monitored by staff until sedation discharge criteria were met.    Patient tolerance: Patient tolerated the procedure well with no immediate complications.    Time of sedation in minutes: 30 Minutes minutes from beginning to end of physician one to one monitoring.          Findings: L illiac bone lesional biopsy performed, 6 core samples given to path.     Estimated  Blood Loss: Minimal    SPECIMENS: Core needle biopsy specimens sent for pathological analysis    Complications: 1. None     Condition: Stable    Plan:   -To 2A for 2 hour monitoring while laying on the site, then can discharge per protocol    Comments: See dictated procedure note for full details.    Roland Cade MD

## 2019-03-14 NOTE — DISCHARGE INSTRUCTIONS
[facility]    Interventional Radiology  Patient Instructions Following Bone Biopsy Left Lower Back    AFTER YOU GO HOME  ? If you were given sedation DO NOT drive or operate machinery at home or at work for at least 24 hours  ? DO relax and take it easy for 48 hours, no strenuous activity for 24 hours  ? DO drink plenty of fluids  ? DO resume your regular diet, unless otherwise instructed by your Primary Physician  ? Keep the dressing dry and in place for 24 hours.  ? DO NOT SMOKE FOR AT LEAST 24 HOURS, if you have been given any medications that were to help you relax or sedate you during your procedure  ? DO NOT drink alcoholic beverages the day of your procedure  ? DO NOT do any strenuous exercise or lifting (> 10 lbs) for at least 7 days following your procedure  ? DO NOT take a bath or shower for at least 12 hours following your procedure  ? Remove dressing after shower the next day. Replace with Band aid for 5 days.  Never leave a wet dressing in place.  ? DO NOT make any important or legal decisions for 24 hours following your procedure  ? There should be minimum drainage from the biopsy site    CALL THE PHYSICIAN IF:  ? You start bleeding from the procedure site.  If you do start to bleed from that site, lie down flat and hold pressure on the site for a minimum of 10 minutes.  Your physician will tell you if you need to return to the hospital  ? You develop nausea or vomiting  ? You have excessive swelling, redness, or tenderness at the site  ? You have drainage that looks like it is infected.  ? You experience severe pain  ? You develop hives or a rash or unexplained itching  ? You develop shortness of breath  ? You develop a temperature of 101 degrees F or greater  ?       [facility] INTERVENTIONAL RADIOLOGY DEPARTMENT  Procedure Physician: Dr. Roland Cade                                    Date of procedure: March 14, 2019    Telephone Numbers: [phone] Monday-Friday 8:00 am to 4:30  pm    [phone] After 4:30 pm Monday-Friday, Weekends & Holidays.   Ask for the Interventional Radiologist on call.  Someone is on call 24 hrs/day    [facility] toll free number: [phone] Monday-Friday 8:00 am to 4:30 pm    [facility] Emergency Dept: [phone]

## 2019-03-14 NOTE — PROGRESS NOTES
1600  Resting, denies pain or needs at this time.  Wife is at bedside.  1630  Up, ambulated in zendejas and to BR with assistance.  Tolerated activity well.  Voided without difficulty.  1725  Discharge instructions reviewed with pt and wife.  Verbally demonstrates understanding of instructions.  1735  Discharged to care of wife per WC accompanied by staff.  CTRN

## 2019-03-14 NOTE — LETTER
3/14/2019      RE: Javon Bianchi  [address] [facility] [address]       Reason for Visit: f/u for mets L3    Last Visit: 2/7/19    Previous Impression:  Pathologic fracture L3 vertebral body secondary to renal cell metastasis, with stable fracture appearance and stenosis.    Previous Plan:  I had a good long discussion with the patient and his family.  We again discussed the option of kyphoplasty with radiofrequency ablation.  He is quite interested in this even though he admits that his pain now is better than it used to be.  He said he would like to get off morphine and he thinks that undergoing this procedure might help him get off the morphine and have less pain afterwards.  We discussed the potential risks of going through general anesthesia as well as surgical risks including but not limited to nerve injury, vascular or other visceral injury from needle malposition, wound infection and most commonly inadequate pain relief.  I sent an in basket message to his oncologist Dr. Malathi De Luna.  He will get his PET scan next week and will see Dr. De Luna on February 26.  He will then let us know if he still wishes to proceed with the procedure.     Return to clinic as needed.     S>  70/m.  I received call yesterday from Oncology PA re patient's new MRI; with progression of systemic renal CA spread.  Here to discuss options.  Accompanied by wife.    Symptoms worsening.  Main complaint is Left thigh pain, numbness, tingling in ~ L3 pattern.  Also with sacra/coggygeal pain radiating to left buttock / ischial tuberosity area.  Not aggravated by wt bearing.  In fact, walking on it seems to make it feel better.    Had ~ 4 injections at [facility] in [facility] in summer 2018.  None of them worked.  Was told there was nothing more they could do for him.  He is thus quite hesitant to undergonaother injection.  However, we do not know what level exactly those injections were done at.    Received call  yesterday from oncology PA.  He had new pelvic MRI which showed progression of metastatic lesion in his left proximal femur as well as at other sites.  They are suspecting or wondering if his pain may be secondary to impending impending pathologic fracture.  I offered to see patient today    He is scheduled for iliac crest bone biopsy today.  Scheduled to start radiation treatment to his pelvis and left femur on Monday.  Next    Oswestry (SPIKE) Questionnaire    OSWESTRY DISABILITY INDEX 3/14/2019   Count 10   Sum 23   Oswestry Score (%) 46        Visual Analog Pain Scale  Back Pain Scale 0-10: 3  Right leg pain: 5  Left leg pain: 6    PROMIS-10 Scores  Global Mental Health Score: (P) 12  Global Physical Health Score: (P) 11  PROMIS TOTAL - SUBSCORES: (P) 23    O>   Alert, oriented x 3, cooperative.  Not in CP distress.  Ht 1.829 m (6')   Wt 102.1 kg (225 lb)   BMI 30.52 kg/m     Ambulates independently.   Grossly neurologically intact.  Negative L hip IR / impingement test.    Imaging:   Pelvis MRI from yesterday reviewed.  This showed increased signal change within the left proximal femur with no obvious break.  There are smaller lesions as well on the right femur in the pelvis.    Recent lumbar MRI reviewed.  This showed the previously noted L3 pathologic Vertebral compression fracture.  There also spots smaller spots or lesions at the other levels and mostly the spinous processes.  There is some epidural disease at the L3 level with stenosis at this level primarily on the left side.  The left L3-4 foramen is obscured by likely tumor tissue and compressing or enveloping the exiting L3 nerve root.  The lesion also extends outside the foramen laterally.    Left femur CT taken yesterday showed no structural compromise of the left proximal femur no concern for impending pathologic fracture.    A>  1.  Left L3 radiculopathy secondary to metastatic spinal disease secondary to renal cancer.    2.  Pathologic compression  fracture L3.    3.  Metastatic lesion left proximal femur without impending pathologic fracture.    P>   Had good discussoin with patinet and wife.  - Will retrieve reports from [facility] re injections.    Recommend either:  - Left L3-4 TFESI.  - Decompression with fusion (may need to do complete facetectomy to achieve adequate decompression).    Inbasket message sent to Dr. De Luna (oncology) and PA (Radha [address]).  Scheduled to start radiation tx to L pelvis/thigh on Monday.  Scheduled for iliac crest biopsy today.    RTC prn.  Will wait to hear back from Oncology, and review of previous injections.  If these were same injection, will skip another injection.  Will consider surgery at that point.    TT > 40 mins, > 50% CC.    Vickey Brower MD    Orthopaedic Spine Surgery  Dept Orthopaedic Surgery, [facility]  [phone] office, [phone] pager  [email or web address]    Addendum:  Was able to review faxed notes from pain management center.  Procedure is as follows:    6/4/2018: Left L3-4 transforaminal epidural steroid injection by Dr. Kt Paris.  6/22/2018: Left sacroiliac joint injection by Dr. Paris.  7/16/2018: Aborted left L3-4 transforaminal epidural steroid injection (Dr. Paris).  Aborted because patient could not tolerate the discomfort associated with the needle injection.  Thus medication was not injected.  7/23/2018: Left L2-3 and L3-4 transforaminal epidural steroid injection by Dr. Paris.  8/27/2018: Left L2-3 and L3-4 transforaminal epidural steroid injection by Dr. Paris.    Because above procedures already included L L3-4 TFESI (4x, including aborted procedure), and with patient's report to me that none of them ever really helped, will at this point recommend L3-4 decompression and fusion (posterolateral fusion, left complete facetectomy).  Surg request placed.

## 2019-03-14 NOTE — PRE-PROCEDURE
Interventional Radiology Pre-Procedure Sedation Assessment   Time of Assessment: 1:23 PM    Expected Level: Moderate Sedation    Indication: Sedation is required for the following type of Procedure: Biopsy    Sedation and procedural consent: Risks, benefits and alternatives were discussed with Patient    PO Intake: Appropriately NPO for procedure    ASA Class: Class 3 - SEVERE SYSTEMIC DISEASE, DEFINITE FUNCTIONAL LIMITATIONS.    Mallampati: Grade 2:  Soft palate, base of uvula, tonsillar pillars, and portion of posterior pharyngeal wall visible    Lungs: Lungs Clear with good breath sounds bilaterally    Heart: Normal heart sounds and rate    History and physical reviewed and no updates needed. I have reviewed the lab findings, diagnostic data, medications, and the plan for sedation. I have determined this patient to be an appropriate candidate for the planned sedation and procedure and have reassessed the patient IMMEDIATELY PRIOR to sedation and procedure.    Brian Sullivan MD

## 2019-03-15 LAB — INTERPRETATION ECG - MUSE: NORMAL

## 2019-03-19 ENCOUNTER — ONCOLOGY VISIT (OUTPATIENT)
Dept: ONCOLOGY | Facility: CLINIC | Age: 69
End: 2019-03-19
Attending: INTERNAL MEDICINE
Payer: MEDICARE

## 2019-03-19 VITALS
SYSTOLIC BLOOD PRESSURE: 102 MMHG | BODY MASS INDEX: 31.37 KG/M2 | OXYGEN SATURATION: 97 % | TEMPERATURE: 97.3 F | DIASTOLIC BLOOD PRESSURE: 65 MMHG | WEIGHT: 231.6 LBS | HEART RATE: 62 BPM | HEIGHT: 72 IN

## 2019-03-19 DIAGNOSIS — G89.3 CANCER ASSOCIATED PAIN: ICD-10-CM

## 2019-03-19 DIAGNOSIS — C64.9 RENAL CELL CARCINOMA, UNSPECIFIED LATERALITY (H): Primary | ICD-10-CM

## 2019-03-19 DIAGNOSIS — Z51.5 ENCOUNTER FOR PALLIATIVE CARE: ICD-10-CM

## 2019-03-19 PROCEDURE — 99214 OFFICE O/P EST MOD 30 MIN: CPT | Performed by: INTERNAL MEDICINE

## 2019-03-19 ASSESSMENT — MIFFLIN-ST. JEOR: SCORE: 1848.53

## 2019-03-19 NOTE — PROGRESS NOTES
"Palliative Care Outpatient Clinic Progress Note    Patient Name:  Javon Bianchi  Primary Provider:  LIANNE Wellington    Chief Complaint/Identifying Information: Worsening pain in the left leg  Metastatic Renal Cell Carcinoma on Cabometyx  History of L3 pathologic fracture found at diagnosis      [facility] Outpatient Palliative Care Opioid Prescribing Safety Plan     Opioid Safety Education: Reviewed by Nan Gonzalez  on 11/8/2018  Opioid Risk Assessment: Performed by Nan Gonzalez  on 11/8/2018 .  ORT score of 0.   Mood Disorder Assessment: Performed by Nan Gonzalez  on 11/8/2018.     reviewed with every prescription, last reviewed by Carly Mills on 03/19/19     Additional recommendations based on patient's prognosis and indication for opioids include the following:     Expected prognosis: shorter  Risk: Low or Medium (ORT 0-7)  No further recommendations.     Interim History:  Javon Bianchi 68 year old male returns to be seen by palliative care today, accompanied by his wife Suzie.  Javon Bianchi was last seen by me one month ago at which time he was having on going suboptimally controlled cancer associated pain. He was started on oxycodone and noted having some confusion and had continued worsening pain for which he was seen in the ED.  His MS Contin was escalated and he was rotated to MSIR.      He has been taking the MS Contin 60mg BID, also APAP 1000mg BID, then Celecoxib 200mg BID. Not needing MSIR lately.  Having pain in the buttocks, \"stiffness\" in the back, radiating down the left leg. Also using cannabis now which he is tolerating better than his first day (see phone note from Charli Amin).     Has seen Orthopedics due to concern for potential impending fracture.  Has not yet heard back on if surgery is going to be offered/recommended for him.     Having strange dreams at night, but no clear hallucinations since his rotation.     Appetite has picked up significantly.  " "    Coping:  Feeling pretty well from a mental standpoint. Has found spiritual peace since he was last seen (\"I just decided that I have a soul and it is going to go to Heaven.\")    Social History:  Pertinent changes to social history/social situation since last visit: Has felt well enough to be active with his grandchildren.   Social History     Tobacco Use     Smoking status: Never Smoker     Smokeless tobacco: Never Used   Substance Use Topics     Alcohol use: Not on file     Drug use: Not on file              Physical Exam:   Vitals were reviewed  /65 (BP Location: Left arm, Patient Position: Chair, Cuff Size: Adult Regular)   Pulse 62   Temp 97.3  F (36.3  C) (Oral)   Ht 1.829 m (6')   Wt 105.1 kg (231 lb 9.6 oz)   SpO2 97%   BMI 31.41 kg/m    General: Alert, comfortable appearing male in no acute distress.   Eyes: Pupils equal, sclera clear.   ENT: MMM.   Resp: Unlabored on room air.   Abd: Soft, non-distended, non-tender to palpation, active bowel sounds.   Ext: Mild TTP of the left greater trochanter is unchanged from the prior visit.   Neuro: No facial asymmetry.  Spontaneous movements grossly non-focal.  Using a cane.   Psych: Alert, appropriately interactive, full affect, clear sensorium.       Impression & Recommendations & Counseling:  Javon is a 68 year old man with likely metastatic renal cell carcinoma now rotated to Ipilimumab/Nivolumab who has cancer associated pain. This is under adequate control with multiple prior adjustments.  Will continue his current regimen of MSContin/MSIR/celecoxib/Acetaminophen.  Will continue.     Omega has been encouraged to use his MSIR if needed for breakthrough pain.     We discussed advance care planning again today.  He has been making some final arrangements now so that Suzie is not burdened by that whenever his death does come.  We will plan to talk about Code Status with POLST form next visit.     RTC 1 month for a follow up.     Additional " information reviewed today:   No Known Allergies  Current Outpatient Medications   Medication Sig Dispense Refill     acetaminophen (TYLENOL) 500 MG tablet Take 1,000 mg by mouth 2 times daily       atorvastatin (LIPITOR) 20 MG tablet Take 20 mg by mouth       calcium carbonate 500 mg, elemental, (OSCAL 500) 1250 (500 Ca) MG TABS tablet Take 1 tablet by mouth 2 times daily       celecoxib (CELEBREX) 200 MG capsule Take 1 capsule (200 mg) by mouth 2 times daily as needed for moderate pain 60 capsule 2     Dextromethorphan-guaiFENesin (MUCINEX DM)  MG 12 hr tablet Take 1 tablet by mouth every 12 hours       DULoxetine (CYMBALTA) 60 MG capsule Take 1 capsule (60 mg) by mouth daily 90 capsule 3     medical cannabis (Patient's own supply.  Not a prescription) 1 Dose See Admin Instructions (This is NOT a prescription, and does not certify that the patient has a qualifying medical condition for medical cannabis.  The purpose of this order is  to document that the patient reports taking medical cannabis.)       melatonin 3 MG tablet Take 3 mg by mouth       morphine (MS CONTIN) 60 MG 12 hr tablet Take 1 tablet (60 mg) by mouth every 12 hours 60 tablet 0     morphine (MSIR) 15 MG IR tablet Take 1 tablet (15 mg) by mouth every 3 hours as needed for severe pain 60 tablet 0     polyethylene glycol (MIRALAX/GLYCOLAX) Packet Take 1 packet by mouth       Pseudoephedrine-APAP  MG TABS        senna-docusate (SENOKOT-S;PERICOLACE) 8.6-50 MG per tablet        Vitamin D, Cholecalciferol, 1000 units TABS Take 1,000 Units by mouth 2 times daily       CABOMETYX 20 MG        Past Medical History:   Diagnosis Date     Bone metastasis (H) 09/28/2018     Renal cell carcinoma, right (H) 09/28/2018     No past surgical history on file.    Key Data Reviewed:  LABS:   Lab Results   Component Value Date    WBC 9.5 03/12/2019    HGB 10.0 (L) 03/12/2019    HCT 33.2 (L) 03/12/2019     03/12/2019     03/12/2019    POTASSIUM  "3.9 03/12/2019    CHLORIDE 107 03/12/2019    CO2 25 03/12/2019    BUN 12 03/12/2019    CR 0.59 (L) 03/12/2019    GLC 85 03/12/2019    AST 72 (H) 03/12/2019    ALT 19 03/12/2019    ALKPHOS 124 03/12/2019    BILITOTAL 0.9 03/12/2019    INR 1.3 (A) 08/31/2018     IMAGING:   MRI Lumbar spine 3/12/19  \"Impression:   1. Worsening spinal canal stenosis due to epidural extension of  metastatic disease, now circumferential, between the levels of L1 down  to L4, but causing severe spinal canal stenosis focally and  circumferentially at the level of L3.   2. Regarding the bony metastases, the diffuse lumbar, sacral, and  iliac bone metastases, but the new finding is worsening L1 enhancement  without extension into the spinal canal at that level.  3. Stable L3 pathologic fracture from metastasis, 50% loss of  vertebral body height.  4. Questionable enhancement of the roots of the cauda equina within  the lower  thecal sac, but imaging is degraded due to motion, so this  could be artifactual.\"    [place]  - Use of controlled substances consistent with history.     Thank you for continuing to involve me in the care of this patient.     Carly Mills MD / Palliative Medicine / Pager [phone] / After-Hours Answering Service [phone] / Main Palliative Clinic - [facility] [phone] / [facility] Inpatient Team Consult Pager [phone] (answered 8am-430pm M-F)      "

## 2019-03-19 NOTE — PROGRESS NOTES
Oncology Rooming Note    March 19, 2019 1:58 PM   Javon Bianchi is a 68 year old male who presents for:    Chief Complaint   Patient presents with     Oncology Clinic Visit     Initial Vitals: /65 (BP Location: Left arm, Patient Position: Chair, Cuff Size: Adult Regular)   Pulse 62   Temp 97.3  F (36.3  C) (Oral)   Ht 1.829 m (6')   Wt 105.1 kg (231 lb 9.6 oz)   SpO2 97%   BMI 31.41 kg/m   Estimated body mass index is 31.41 kg/m  as calculated from the following:    Height as of this encounter: 1.829 m (6').    Weight as of this encounter: 105.1 kg (231 lb 9.6 oz). Body surface area is 2.31 meters squared.  Data Unavailable Comment: left leg pain 3, back 4 bilat wrist 7   No LMP for male patient.  Allergies reviewed: No  Medications reviewed: Yes    Medications: Medication refills not needed today.  Pharmacy name entered into [facility]: [facility] - [address]    Clinical concerns: * doctor was notified.         Yasmine Staley, CMA

## 2019-03-19 NOTE — LETTER
"    3/19/2019         RE: Javon Bianchi  [address]  [facility] [address]        Dear Colleague,    Thank you for referring your patient, Javon Bianchi, to the [facility]. Please see a copy of my visit note below.    Palliative Care Outpatient Clinic Progress Note    Patient Name:  Javon Bianchi  Primary Provider:  LIANNE Wellington    Chief Complaint/Identifying Information: Worsening pain in the left leg  Metastatic Renal Cell Carcinoma on Cabometyx  History of L3 pathologic fracture found at diagnosis      [facility] Outpatient Palliative Care Opioid Prescribing Safety Plan     Opioid Safety Education: Reviewed by Nan Gonzalez  on 11/8/2018  Opioid Risk Assessment: Performed by Nan Gonzalez  on 11/8/2018 .  ORT score of 0.   Mood Disorder Assessment: Performed by Nan Gonzalez  on 11/8/2018.     reviewed with every prescription, last reviewed by Carly Mills on  03/19/19     Additional recommendations based on patient's prognosis and indication for opioids include the following:     Expected prognosis: shorter  Risk: Low or Medium (ORT 0-7)  No further recommendations.     Interim History:  Javon Bianchi 68 year old male returns to be seen by palliative care today, accompanied by his wife Suzie.  Javon Bianchi was last seen by me one month ago at which time he was having on going suboptimally controlled cancer associated pain. He was started on oxycodone and noted having some confusion and had continued worsening pain for which he was seen in the ED.  His MS Contin was escalated and he was rotated to MSIR.      He has been taking the MS Contin 60mg BID, also APAP 1000mg BID, then Celecoxib 200mg BID. Not needing MSIR lately.  Having pain in the buttocks, \"stiffness\" in the back, radiating down the left leg. Also using cannabis now which he is tolerating better than his first day (see phone note from Charli Amin).     Has seen Orthopedics due to concern " "for potential impending fracture.  Has not yet heard back on if surgery is going to be offered/recommended for him.     Having strange dreams at night, but no clear hallucinations since his rotation.     Appetite has picked up significantly.      Coping:  Feeling pretty well from a mental standpoint. Has found spiritual peace since he was last seen (\"I just decided that I have a soul and it is going to go to Heaven.\")    Social History:  Pertinent changes to social history/social situation since last visit: Has felt well enough to be active with his grandchildren.   Social History     Tobacco Use     Smoking status: Never Smoker     Smokeless tobacco: Never Used   Substance Use Topics     Alcohol use: Not on file     Drug use: Not on file              Physical Exam:   Vitals were reviewed  /65 (BP Location: Left arm, Patient Position: Chair, Cuff Size: Adult Regular)   Pulse 62   Temp 97.3  F (36.3  C) (Oral)   Ht 1.829 m (6')   Wt 105.1 kg (231 lb 9.6 oz)   SpO2 97%   BMI 31.41 kg/m     General: Alert, comfortable appearing male in no acute distress.   Eyes: Pupils equal, sclera clear.   ENT: MMM.   Resp: Unlabored on room air.   Abd: Soft, non-distended, non-tender to palpation, active bowel sounds.   Ext: Mild TTP of the left greater trochanter is unchanged from the prior visit.   Neuro: No facial asymmetry.  Spontaneous movements grossly non-focal.  Using a cane.   Psych: Alert, appropriately interactive, full affect, clear sensorium.       Impression & Recommendations & Counseling:  Javon is a 68 year old man with likely metastatic renal cell carcinoma now rotated to Ipilimumab/Nivolumab who has cancer associated pain. This is under adequate control with multiple prior adjustments.  Will continue his current regimen of MSContin/MSIR/celecoxib/Acetaminophen.  Will continue.     Omega has been encouraged to use his MSIR if needed for breakthrough pain.     We discussed advance care planning again " today.  He has been making some final arrangements now so that Suzie is not burdened by that whenever his death does come.  We will plan to talk about Code Status with POLST form next visit.     RTC 1 month for a follow up.     Additional information reviewed today:   No Known Allergies  Current Outpatient Medications   Medication Sig Dispense Refill     acetaminophen (TYLENOL) 500 MG tablet Take 1,000 mg by mouth 2 times daily       atorvastatin (LIPITOR) 20 MG tablet Take 20 mg by mouth       calcium carbonate 500 mg, elemental, (OSCAL 500) 1250 (500 Ca) MG TABS tablet Take 1 tablet by mouth 2 times daily       celecoxib (CELEBREX) 200 MG capsule Take 1 capsule (200 mg) by mouth 2 times daily as needed for moderate pain 60 capsule 2     Dextromethorphan-guaiFENesin (MUCINEX DM)  MG 12 hr tablet Take 1 tablet by mouth every 12 hours       DULoxetine (CYMBALTA) 60 MG capsule Take 1 capsule (60 mg) by mouth daily 90 capsule 3     medical cannabis (Patient's own supply.  Not a prescription) 1 Dose See Admin Instructions (This is NOT a prescription, and does not certify that the patient has a qualifying medical condition for medical cannabis.  The purpose of this order is  to document that the patient reports taking medical cannabis.)       melatonin 3 MG tablet Take 3 mg by mouth       morphine (MS CONTIN) 60 MG 12 hr tablet Take 1 tablet (60 mg) by mouth every 12 hours 60 tablet 0     morphine (MSIR) 15 MG IR tablet Take 1 tablet (15 mg) by mouth every 3 hours as needed for severe pain 60 tablet 0     polyethylene glycol (MIRALAX/GLYCOLAX) Packet Take 1 packet by mouth       Pseudoephedrine-APAP  MG TABS        senna-docusate (SENOKOT-S;PERICOLACE) 8.6-50 MG per tablet        Vitamin D, Cholecalciferol, 1000 units TABS Take 1,000 Units by mouth 2 times daily       CABOMETYX 20 MG        Past Medical History:   Diagnosis Date     Bone metastasis (H) 09/28/2018     Renal cell carcinoma, right (H) 09/28/2018  "    No past surgical history on file.    Key Data Reviewed:  LABS:   Lab Results   Component Value Date    WBC 9.5 03/12/2019    HGB 10.0 (L) 03/12/2019    HCT 33.2 (L) 03/12/2019     03/12/2019     03/12/2019    POTASSIUM 3.9 03/12/2019    CHLORIDE 107 03/12/2019    CO2 25 03/12/2019    BUN 12 03/12/2019    CR 0.59 (L) 03/12/2019    GLC 85 03/12/2019    AST 72 (H) 03/12/2019    ALT 19 03/12/2019    ALKPHOS 124 03/12/2019    BILITOTAL 0.9 03/12/2019    INR 1.3 (A) 08/31/2018     IMAGING:   MRI Lumbar spine 3/12/19  \"Impression:   1. Worsening spinal canal stenosis due to epidural extension of  metastatic disease, now circumferential, between the levels of L1 down  to L4, but causing severe spinal canal stenosis focally and  circumferentially at the level of L3.   2. Regarding the bony metastases, the diffuse lumbar, sacral, and  iliac bone metastases, but the new finding is worsening L1 enhancement  without extension into the spinal canal at that level.  3. Stable L3 pathologic fracture from metastasis, 50% loss of  vertebral body height.  4. Questionable enhancement of the roots of the cauda equina within  the lower  thecal sac, but imaging is degraded due to motion, so this  could be artifactual.\"    [place]  - Use of controlled substances consistent with history.     Thank you for continuing to involve me in the care of this patient.     Carly Mills MD / Palliative Medicine / Pager [phone] / After-Hours Answering Service [phone] / Main Palliative Clinic - [facility] [phone] / [facility] Inpatient Team Consult Pager [phone] (answered 8am-430pm M-F)        Oncology Rooming Note    March 19, 2019 1:58 PM   Jaovn Bianchi is a 68 year old male who presents for:    Chief Complaint   Patient presents with     Oncology Clinic Visit     Initial Vitals: /65 (BP Location: Left arm, Patient Position: Chair, Cuff Size: Adult Regular)   Pulse 62   Temp 97.3  F (36.3  C) " (Oral)   Ht 1.829 m (6')   Wt 105.1 kg (231 lb 9.6 oz)   SpO2 97%   BMI 31.41 kg/m    Estimated body mass index is 31.41 kg/m  as calculated from the following:    Height as of this encounter: 1.829 m (6').    Weight as of this encounter: 105.1 kg (231 lb 9.6 oz). Body surface area is 2.31 meters squared.  Data Unavailable Comment: left leg pain 3, back 4 bilat wrist 7   No LMP for male patient.  Allergies reviewed: No  Medications reviewed: Yes    Medications: Medication refills not needed today.  Pharmacy name entered into [facility]: [facility] - [address]    Clinical concerns: * doctor was notified.         Yasmine Staley CMA              Again, thank you for allowing me to participate in the care of your patient.        Sincerely,        Carly Mills MD

## 2019-03-21 ENCOUNTER — TELEPHONE (OUTPATIENT)
Dept: ORTHOPEDICS | Facility: CLINIC | Age: 69
End: 2019-03-21

## 2019-03-21 ENCOUNTER — DOCUMENTATION ONLY (OUTPATIENT)
Dept: OTHER | Facility: CLINIC | Age: 69
End: 2019-03-21

## 2019-03-21 NOTE — TELEPHONE ENCOUNTER
Returned phone to call to patient who left a message requesting a call back to discuss surgery questions. Patient was unavailable. Detailed message with best call back number of [phone] was left

## 2019-03-26 ENCOUNTER — TELEPHONE (OUTPATIENT)
Dept: ONCOLOGY | Facility: CLINIC | Age: 69
End: 2019-03-26

## 2019-03-26 ENCOUNTER — PATIENT OUTREACH (OUTPATIENT)
Dept: ONCOLOGY | Facility: CLINIC | Age: 69
End: 2019-03-26

## 2019-03-26 NOTE — PROGRESS NOTES
TC from pt stating that he's been having increased, bilateral, UE pain in his shoulders, wrists, and elbows. He thinks it may be related to the increased strain put on these joints from transferring and getting in and out of chairs. He has been needing to use more IR morphine as a result, and has increased his medicinal marijuana dose. Reviewed with pt that it is fine to use more IR morphine as long as he's not exceeding the prescribed amount and frequency. Advised pt call the palliative care team to review his current concern with pain and use of pain meds as they have been the group most recently managing this. Pt stated understanding and will call Dr. Mills's team.     Pt was also wondering about his path result from his bx done on 03/14/2019. Reviewed result with pt and let him know that Dr. De Luna reviewed it as well and stated she wouldn't change anything in terms of his treatment, and plans to continue him on nivolumab. Pt was also wondering if he should still consider spinal surgery as discussed with Dr. Brower. Advised pt to discuss this option with Dr. De Luna during his next visit with her.     [place] message sent to Dr. De Luna and Ruthie St with a condition update.

## 2019-03-26 NOTE — TELEPHONE ENCOUNTER
Dr Mills states that Omega can continue yo use the prescribed medications of Tylenoland Clebrex for the arthritic pain. He should also contact the [place] cannabis program to further explore the treatment plan to aid with pain control.    Omega may also use the prescribed MS IR 15mg tablets if these steps were ineffective at treating the pain.    Call back to patient to give these instructions - patient verbalized understanding

## 2019-03-26 NOTE — TELEPHONE ENCOUNTER
Patient reports that for the last 4 to 5 days he has been experiencing pain in his joints which he describes as arthritic pain. He states that he has experienced this pain before however has not been prescribed medications.    Omega states he aching pain in his upper body joints however now he has pain in hips and knees bi laterally. He asks what to for this pain and asks whether he should increase his medical cannabis or whether another intervention would help this pain.    I will contact Dr Mills with Omega's questions

## 2019-03-29 ENCOUNTER — APPOINTMENT (OUTPATIENT)
Dept: LAB | Facility: CLINIC | Age: 69
End: 2019-03-29
Attending: PHYSICIAN ASSISTANT
Payer: MEDICARE

## 2019-03-29 ENCOUNTER — TELEPHONE (OUTPATIENT)
Dept: ONCOLOGY | Facility: CLINIC | Age: 69
End: 2019-03-29

## 2019-03-29 ENCOUNTER — ONCOLOGY VISIT (OUTPATIENT)
Dept: ONCOLOGY | Facility: CLINIC | Age: 69
End: 2019-03-29
Attending: PHYSICIAN ASSISTANT
Payer: MEDICARE

## 2019-03-29 VITALS
TEMPERATURE: 96.5 F | DIASTOLIC BLOOD PRESSURE: 58 MMHG | WEIGHT: 229.4 LBS | HEART RATE: 72 BPM | SYSTOLIC BLOOD PRESSURE: 114 MMHG | HEIGHT: 72 IN | BODY MASS INDEX: 31.07 KG/M2 | RESPIRATION RATE: 16 BRPM | OXYGEN SATURATION: 97 %

## 2019-03-29 DIAGNOSIS — C79.51 BONE METASTASIS: ICD-10-CM

## 2019-03-29 DIAGNOSIS — M79.602 PAIN IN BOTH UPPER EXTREMITIES: Primary | ICD-10-CM

## 2019-03-29 DIAGNOSIS — C64.2 RENAL CELL CARCINOMA, LEFT (H): ICD-10-CM

## 2019-03-29 DIAGNOSIS — M79.601 PAIN IN BOTH UPPER EXTREMITIES: Primary | ICD-10-CM

## 2019-03-29 DIAGNOSIS — M79.2 NEUROPATHIC PAIN: ICD-10-CM

## 2019-03-29 DIAGNOSIS — Z79.899 ENCOUNTER FOR LONG-TERM (CURRENT) USE OF MEDICATIONS: ICD-10-CM

## 2019-03-29 DIAGNOSIS — M54.2 NECK PAIN: ICD-10-CM

## 2019-03-29 LAB
ALBUMIN SERPL-MCNC: 2.8 G/DL (ref 3.4–5)
ALP SERPL-CCNC: 133 U/L (ref 40–150)
ALT SERPL W P-5'-P-CCNC: 14 U/L (ref 0–70)
ANION GAP SERPL CALCULATED.3IONS-SCNC: 4 MMOL/L (ref 3–14)
ANISOCYTOSIS BLD QL SMEAR: SLIGHT
AST SERPL W P-5'-P-CCNC: 40 U/L (ref 0–45)
BASOPHILS # BLD AUTO: 0 10E9/L (ref 0–0.2)
BASOPHILS NFR BLD AUTO: 0 %
BILIRUB SERPL-MCNC: 0.7 MG/DL (ref 0.2–1.3)
BUN SERPL-MCNC: 12 MG/DL (ref 7–30)
CALCIUM SERPL-MCNC: 8.2 MG/DL (ref 8.5–10.1)
CHLORIDE SERPL-SCNC: 106 MMOL/L (ref 94–109)
CK SERPL-CCNC: 222 U/L (ref 30–300)
CO2 SERPL-SCNC: 28 MMOL/L (ref 20–32)
CREAT SERPL-MCNC: 0.72 MG/DL (ref 0.66–1.25)
CREAT UR-MCNC: 308 MG/DL
CRP SERPL-MCNC: 66.7 MG/L (ref 0–8)
DIFFERENTIAL METHOD BLD: ABNORMAL
EOSINOPHIL # BLD AUTO: 0.1 10E9/L (ref 0–0.7)
EOSINOPHIL NFR BLD AUTO: 1.7 %
ERYTHROCYTE [DISTWIDTH] IN BLOOD BY AUTOMATED COUNT: 16.4 % (ref 10–15)
ERYTHROCYTE [SEDIMENTATION RATE] IN BLOOD BY WESTERGREN METHOD: 87 MM/H (ref 0–20)
GFR SERPL CREATININE-BSD FRML MDRD: >90 ML/MIN/{1.73_M2}
GLUCOSE SERPL-MCNC: 85 MG/DL (ref 70–99)
HCT VFR BLD AUTO: 29.6 % (ref 40–53)
HGB BLD-MCNC: 8.6 G/DL (ref 13.3–17.7)
LYMPHOCYTES # BLD AUTO: 0.2 10E9/L (ref 0.8–5.3)
LYMPHOCYTES NFR BLD AUTO: 2.6 %
MACROCYTES BLD QL SMEAR: PRESENT
MCH RBC QN AUTO: 29.5 PG (ref 26.5–33)
MCHC RBC AUTO-ENTMCNC: 29.1 G/DL (ref 31.5–36.5)
MCV RBC AUTO: 101 FL (ref 78–100)
MONOCYTES # BLD AUTO: 2.4 10E9/L (ref 0–1.3)
MONOCYTES NFR BLD AUTO: 40 %
NEUTROPHILS # BLD AUTO: 3.3 10E9/L (ref 1.6–8.3)
NEUTROPHILS NFR BLD AUTO: 55.7 %
OVALOCYTES BLD QL SMEAR: SLIGHT
PLATELET # BLD AUTO: 214 10E9/L (ref 150–450)
PLATELET # BLD EST: ABNORMAL 10*3/UL
POIKILOCYTOSIS BLD QL SMEAR: SLIGHT
POLYCHROMASIA BLD QL SMEAR: SLIGHT
POTASSIUM SERPL-SCNC: 4.3 MMOL/L (ref 3.4–5.3)
PROT SERPL-MCNC: 6.3 G/DL (ref 6.8–8.8)
PROT UR-MCNC: 0.46 G/L
PROT/CREAT 24H UR: 0.15 G/G CR (ref 0–0.2)
RBC # BLD AUTO: 2.92 10E12/L (ref 4.4–5.9)
SODIUM SERPL-SCNC: 138 MMOL/L (ref 133–144)
WBC # BLD AUTO: 6 10E9/L (ref 4–11)

## 2019-03-29 PROCEDURE — 80053 COMPREHEN METABOLIC PANEL: CPT | Performed by: INTERNAL MEDICINE

## 2019-03-29 PROCEDURE — 84156 ASSAY OF PROTEIN URINE: CPT | Performed by: INTERNAL MEDICINE

## 2019-03-29 PROCEDURE — 99214 OFFICE O/P EST MOD 30 MIN: CPT | Mod: ZP | Performed by: PHYSICIAN ASSISTANT

## 2019-03-29 PROCEDURE — 85025 COMPLETE CBC W/AUTO DIFF WBC: CPT | Performed by: INTERNAL MEDICINE

## 2019-03-29 PROCEDURE — 86140 C-REACTIVE PROTEIN: CPT | Performed by: INTERNAL MEDICINE

## 2019-03-29 PROCEDURE — 85652 RBC SED RATE AUTOMATED: CPT | Performed by: INTERNAL MEDICINE

## 2019-03-29 PROCEDURE — 82550 ASSAY OF CK (CPK): CPT | Performed by: INTERNAL MEDICINE

## 2019-03-29 RX ORDER — LIDOCAINE 50 MG/G
1-3 PATCH TOPICAL EVERY 24 HOURS
Qty: 30 PATCH | Refills: 3 | Status: SHIPPED | OUTPATIENT
Start: 2019-03-29 | End: 2019-05-14

## 2019-03-29 RX ORDER — PREGABALIN 50 MG/1
50 CAPSULE ORAL 2 TIMES DAILY
Qty: 60 CAPSULE | Refills: 1 | Status: SHIPPED | OUTPATIENT
Start: 2019-03-29 | End: 2019-05-21

## 2019-03-29 ASSESSMENT — PAIN SCALES - GENERAL: PAINLEVEL: SEVERE PAIN (7)

## 2019-03-29 ASSESSMENT — MIFFLIN-ST. JEOR: SCORE: 1838.55

## 2019-03-29 NOTE — Clinical Note
3/29/2019       RE: Javon Bianchi  [address]  [facility] [id number]     Dear Colleague,    Thank you for referring your patient, Javon Bianchi, to the [facility]. Please see a copy of my visit note below.    [facility]  FOLLOW-UP VISIT NOTE  Date of visit: Mar 29, 2019    REASON FOR VISIT: metastatic poorly differentiated carcinoma, suspected RCC    TREATMENT HISTORY:  Cabozantinib: Started 60 mg daily on September 28, 2018.  Dec 10, 2018: dose reduced cabozantinib to 40 mg a day.  Jan 17, 2019: dose reduced cabozantinib to 29 mg a day.  Feb 11, 2019: C1D1 Nivolumab   Mar 12, 2019: C2D1 Nivolumab    HPI: (details adopted from Dr De Luna's consult note) Javon Bianchi is a 68 year old male with a history significant for hyperlipidemia who had been in his usual health until he developed back pain in May this year. At that time, the pain was a stabbing like in the middle of lower back with burning like pain wrapping around this left hip to knee area. No trauma. He had MRI on 5/23/18 showed mild degenerative change with bulging disks L2-S1. A small benign hemangioma was noted in L2, no other marrow signal abnormality. Since then, the pain failed to improve despite steroid injections. In addition, he actually lost 25 lbs weight unintentionally since June along with chills few time a day. Overtime, the pain got worse to the point he could not ambulate after short distance and developed muscle weakness in lower extremities over time requiring a walker. Finally, he went to ER ([facility] in [place]) on 8/23/18, CT A/P was unremarkable. He was referred to neurology with obtaining MRI L spine. MRI on 8/30/18 revealed a pathological fracture at L3 vertebral body with height loss at 40% and diffuse involvement of a neoplastic process. IR guided biopsy on 8/31/18 showed poorly differentiated carcinoma. Its IHC was suggestive of possible renal cell  carcinoma per pathology report (Renal cell immunochemistry is positive and along with the negative staining for CK7 and CK20 suggests the possibility of a renal carcinoma, however most renal cell carcinomas also express PAX8 which is negative in this case). Of note, CT chest/abdomen/pelvis at OSH was negative for primary lesion. The patient was evaluated by neurosurgery who recommended no surgical intervention. He was discharged with TLSO brace and walker. He had palliative XRT locally from 9/11 for 10 fractions (Done in [place]).      He was started on cabozantinib 60 mg initially, requiring dose reduction to 20 mg for issues with hand foot syndrome and poor tolerability.    Plan was to switch to Ipi/nivo due to progression in bone disease on 2/6/19, however Ashleigh was not covered, thus given Opdivo only.     INTERVAL HISTORY:  Patient reports that he has developed pain in his upper body over the last week.  He initially noticed swelling in his joints particularly his shoulders and wrists.  Subsequently, the swelling subsided, but he continued to have pain in his shoulders, wrists, and fingers.  He also feels his hands are more weak.  He has again developed leg swelling over the last 2 weeks.  He had leg swelling last year that had resolved.  He tried wearing compression stockings for 3 days, but felt that made his pain worse and did not really seem to help his swelling.  He has been sitting in a lazy boy, but has not been elevating his legs otherwise.  He has continued to take his long-acting morphine as well as taking 1 tablet of short acting morphine during the night and one during the day.  His wife is concerned that he gets too sedated when he takes the morphine tablet.  They have not tried taking a half a tablet.  He does use a walker or cane at home to get around.  He does not need to use any stairs.  His wife also worries about his ability to get around safely after taking the pain medication.  He has been  taking cannabis capsules at night and drops during the morning.  The dose was increased about a week and a half ago.  His wife feels that his mental tracking has been more poor over the last 2-3 days.  He also reports that he has had shooting pain that travels from his low back to his left thigh with associated numbness.  This has been going on for several months, but he has now developed a similar shooting pain and numbness in his right thigh.  He previously tried gabapentin without relief in his symptoms.  He is just completing radiation in [place] to his left femur as well as his left hip and back.  He questions if he should be considering enrolling in hospice.  He has been sleeping poorly due to pain.  He continues to feel tired, but does not necessarily feel more tired than he did previously.  He attributes this to not sleeping well.  He is having regular soft bowel movements. He denies other concerns.    EXAM:   General: The patient is a pleasant male in no acute distress. He is here today in a wheelchair. He is able to get on and off the exam table with minimal assistance. He is here today with his wife and his son.  /58   Pulse 72   Temp 96.5  F (35.8  C) (Oral)   Resp 16   Ht 1.829 m (6')   Wt 104.1 kg (229 lb 6.4 oz)   SpO2 97%   BMI 31.11 kg/m     Wt Readings from Last 10 Encounters:   03/29/19 104.1 kg (229 lb 6.4 oz)   03/19/19 105.1 kg (231 lb 9.6 oz)   03/14/19 102.1 kg (225 lb)   03/14/19 102.1 kg (225 lb)   03/12/19 102.4 kg (225 lb 11.2 oz)   02/26/19 103.1 kg (227 lb 3.2 oz)   02/23/19 104.3 kg (230 lb)   02/11/19 104.6 kg (230 lb 9.6 oz)   02/07/19 104.3 kg (230 lb)   02/06/19 104.5 kg (230 lb 6.4 oz)   HEENT: EOMI, PERRL. Sclerae are anicteric. Oral mucosa is pink and moist with no lesions or thrush.   Lymph: Neck is supple with no lymphadenopathy in the cervical or supraclavicular areas.   Heart: Regular rate and rhythm.   Lungs: Clear to auscultation bilaterally.   Abdomen: Bowel  sounds present, soft, mild diffuse tenderness with no palpable hepatosplenomegaly or masses.   Extremities: 2+ lower extremity edema noted bilaterally from the feet to the thighs.    Skin: No rashes, petechiae, or bruising noted on exposed skin.  Musculoskeletal: Moderate point tenderness in many areas through spine from C-spine down to L-spine. Anterior shoulders are mildly tender bilaterally. Anterior thighs are mildly tender bilaterally.     LABS:    3/29/2019 15:51   Sodium 138   Potassium 4.3   Chloride 106   Carbon Dioxide 28   Urea Nitrogen 12   Creatinine 0.72   GFR Estimate >90   GFR Estimate If Black >90   Calcium 8.2 (L)   Anion Gap 4   Albumin 2.8 (L)   Protein Total 6.3 (L)   Bilirubin Total 0.7   Alkaline Phosphatase 133   ALT 14   AST 40   CK Total 222   Creatinine Urine 308   CRP Inflammation 66.7 (H)   Protein Random Urine 0.46   Protein Total Urine g/gr Creatinine 0.15   Glucose 85   WBC 6.0   Hemoglobin 8.6 (L)   Hematocrit 29.6 (L)   Platelet Count 214   RBC Count 2.92 (L)    (H)   MCH 29.5   MCHC 29.1 (L)   RDW 16.4 (H)   Diff Method Manual Differential   % Neutrophils 55.7   % Lymphocytes 2.6   % Monocytes 40.0   % Eosinophils 1.7   % Basophils 0.0   Absolute Neutrophil 3.3   Absolute Lymphocytes 0.2 (L)   Absolute Monocytes 2.4 (H)   Absolute Eosinophils 0.1   Absolute Basophils 0.0   Anisocytosis Slight   Poikilocytosis Slight   Polychromasia Slight   Ovalocytes Slight   Macrocytes Present   Platelet Estimate Confirming automa...   Sed Rate 87 (H)     3/14/19 surgical pathology:  SPECIMEN(S):   Biopsy, CT guided, left iliac bone lesion     FINAL DIAGNOSIS:   Left iliac bone lesion, CT guided core biopsy:   - High grade carcinoma, see comment     COMMENT:   The biopsy shows a small focus of malignant cells with abundant   eosinophilic cytoplasm and round nuclei with   prominent nucleoli. Immunohistochemical stains show the tumor is strongly   positive for CK AE1/AE3 and PAX8   while CK7  and TTF1 are negative, suggesting a renal cell carcinoma. If   this does represent a renal malignancy,   the lack of diffuse reactivity for CAIX favors a non-clear cell subtype.     ASSESSMENT/PLAN:   Metastatic poorly differentiated carcinoma, thought to be RCC, currently being treated with Opdivo. Patient has received 2 cycles of Opdivo thus far. We reviewed his recent pathology, which remains consistent with RCC. He will f/u with Dr. De Luna on 4/8/19. He questioned potential of hospice, but ultimately decided to hold off on a referral or an informational session.     Pain. Secondary to his disease. He will continue on MS Contin 60 mg bid. He will try cutting the MSIR tablets in half for his daytime doses to minimize daytime sedation. He remains on cannabis. He will try Lyrica 50 mg bid for the neuropathic pain. He did not find relief from gabapentin previously. He will call Dr. Brower's office back about surgery, as I reviewed that the surgery may help some of his neuropathic pain from his back to his legs. He will try Voltaren gel and lidocaine patches if can get approved by insurance. We discussed potential of buying OTC lidocaine patches if rx not covered. He will have a C-spine MRI next week to evaluate for mets that may be contributing to his arm pain. His CRP and ESR are elevated, but CK is normal. Will discuss with Dr. De Luna and Radha St PA-C next week.     Leg swelling. Suspect related to immobility and hypoalbuminemia. He will try leg elevation above the level of his heart and ACE wraps from his feet to his thighs.     Bone mets. Will continue on Xgeva, calcium, and vitamin D.     Note done except discuss with Radha/Annamarie Baez PA-C  [facility]  [address] [place] [id number] [phone]              Again, thank you for allowing me to participate in the care of your patient.      Sincerely,    Lisa Baez PA-C

## 2019-03-29 NOTE — NURSING NOTE
Chief Complaint   Patient presents with     Blood Draw     Labs drawn by MA   Vitals done and labs drawn, see flow sheets.  PABLO IbarraA

## 2019-03-29 NOTE — PROGRESS NOTES
[facility]  FOLLOW-UP VISIT NOTE  Date of visit: Mar 29, 2019    REASON FOR VISIT: metastatic poorly differentiated carcinoma, suspected RCC    TREATMENT HISTORY:  Cabozantinib: Started 60 mg daily on September 28, 2018.  Dec 10, 2018: dose reduced cabozantinib to 40 mg a day.  Jan 17, 2019: dose reduced cabozantinib to 29 mg a day.  Feb 11, 2019: C1D1 Nivolumab   Mar 12, 2019: C2D1 Nivolumab    HPI: (details adopted from Dr De Luna's consult note) Javon Bianchi is a 68 year old male with a history significant for hyperlipidemia who had been in his usual health until he developed back pain in May this year. At that time, the pain was a stabbing like in the middle of lower back with burning like pain wrapping around this left hip to knee area. No trauma. He had MRI on 5/23/18 showed mild degenerative change with bulging disks L2-S1. A small benign hemangioma was noted in L2, no other marrow signal abnormality. Since then, the pain failed to improve despite steroid injections. In addition, he actually lost 25 lbs weight unintentionally since June along with chills few time a day. Overtime, the pain got worse to the point he could not ambulate after short distance and developed muscle weakness in lower extremities over time requiring a walker. Finally, he went to ER ([facility] in [place]) on 8/23/18, CT A/P was unremarkable. He was referred to neurology with obtaining MRI L spine. MRI on 8/30/18 revealed a pathological fracture at L3 vertebral body with height loss at 40% and diffuse involvement of a neoplastic process. IR guided biopsy on 8/31/18 showed poorly differentiated carcinoma. Its IHC was suggestive of possible renal cell carcinoma per pathology report (Renal cell immunochemistry is positive and along with the negative staining for CK7 and CK20 suggests the possibility of a renal carcinoma, however most renal cell carcinomas also express PAX8 which is negative in this  case). Of note, CT chest/abdomen/pelvis at OSH was negative for primary lesion. The patient was evaluated by neurosurgery who recommended no surgical intervention. He was discharged with TLSO brace and walker. He had palliative XRT locally from 9/11 for 10 fractions (Done in [place]).      He was started on cabozantinib 60 mg initially, requiring dose reduction to 20 mg for issues with hand foot syndrome and poor tolerability.    Plan was to switch to Ipi/nivo due to progression in bone disease on 2/6/19, however Ashleigh was not covered, thus given Opdivo only.     INTERVAL HISTORY:  Patient reports that he has developed pain in his upper body over the last week.  He initially noticed swelling in his joints particularly his shoulders and wrists.  Subsequently, the swelling subsided, but he continued to have pain in his shoulders, wrists, and fingers.  He also feels his hands are more weak.  He has again developed leg swelling over the last 2 weeks.  He had leg swelling last year that had resolved.  He tried wearing compression stockings for 3 days, but felt that made his pain worse and did not really seem to help his swelling.  He has been sitting in a lazy boy, but has not been elevating his legs otherwise.  He has continued to take his long-acting morphine as well as taking 1 tablet of short acting morphine during the night and one during the day.  His wife is concerned that he gets too sedated when he takes the morphine tablet.  They have not tried taking a half a tablet.  He does use a walker or cane at home to get around.  He does not need to use any stairs.  His wife also worries about his ability to get around safely after taking the pain medication.  He has been taking cannabis capsules at night and drops during the morning.  The dose was increased about a week and a half ago.  His wife feels that his mental tracking has been more poor over the last 2-3 days.  He also reports that he has had shooting pain  that travels from his low back to his left thigh with associated numbness.  This has been going on for several months, but he has now developed a similar shooting pain and numbness in his right thigh.  He previously tried gabapentin without relief in his symptoms.  He is just completing radiation in [place] to his left femur as well as his left hip and back.  He questions if he should be considering enrolling in hospice.  He has been sleeping poorly due to pain.  He continues to feel tired, but does not necessarily feel more tired than he did previously.  He attributes this to not sleeping well.  He is having regular soft bowel movements. He denies other concerns.    EXAM:   General: The patient is a pleasant male in no acute distress. He is here today in a wheelchair. He is able to get on and off the exam table with minimal assistance. He is here today with his wife and his son.  /58   Pulse 72   Temp 96.5  F (35.8  C) (Oral)   Resp 16   Ht 1.829 m (6')   Wt 104.1 kg (229 lb 6.4 oz)   SpO2 97%   BMI 31.11 kg/m    Wt Readings from Last 10 Encounters:   03/29/19 104.1 kg (229 lb 6.4 oz)   03/19/19 105.1 kg (231 lb 9.6 oz)   03/14/19 102.1 kg (225 lb)   03/14/19 102.1 kg (225 lb)   03/12/19 102.4 kg (225 lb 11.2 oz)   02/26/19 103.1 kg (227 lb 3.2 oz)   02/23/19 104.3 kg (230 lb)   02/11/19 104.6 kg (230 lb 9.6 oz)   02/07/19 104.3 kg (230 lb)   02/06/19 104.5 kg (230 lb 6.4 oz)   HEENT: EOMI, PERRL. Sclerae are anicteric. Oral mucosa is pink and moist with no lesions or thrush.   Lymph: Neck is supple with no lymphadenopathy in the cervical or supraclavicular areas.   Heart: Regular rate and rhythm.   Lungs: Clear to auscultation bilaterally.   Abdomen: Bowel sounds present, soft, mild diffuse tenderness with no palpable hepatosplenomegaly or masses.   Extremities: 2+ lower extremity edema noted bilaterally from the feet to the thighs.    Skin: No rashes, petechiae, or bruising noted on exposed  skin.  Musculoskeletal: Moderate point tenderness in many areas through spine from C-spine down to L-spine. Anterior shoulders are mildly tender bilaterally. Anterior thighs are mildly tender bilaterally.     LABS:    3/29/2019 15:51   Sodium 138   Potassium 4.3   Chloride 106   Carbon Dioxide 28   Urea Nitrogen 12   Creatinine 0.72   GFR Estimate >90   GFR Estimate If Black >90   Calcium 8.2 (L)   Anion Gap 4   Albumin 2.8 (L)   Protein Total 6.3 (L)   Bilirubin Total 0.7   Alkaline Phosphatase 133   ALT 14   AST 40   CK Total 222   Creatinine Urine 308   CRP Inflammation 66.7 (H)   Protein Random Urine 0.46   Protein Total Urine g/gr Creatinine 0.15   Glucose 85   WBC 6.0   Hemoglobin 8.6 (L)   Hematocrit 29.6 (L)   Platelet Count 214   RBC Count 2.92 (L)    (H)   MCH 29.5   MCHC 29.1 (L)   RDW 16.4 (H)   Diff Method Manual Differential   % Neutrophils 55.7   % Lymphocytes 2.6   % Monocytes 40.0   % Eosinophils 1.7   % Basophils 0.0   Absolute Neutrophil 3.3   Absolute Lymphocytes 0.2 (L)   Absolute Monocytes 2.4 (H)   Absolute Eosinophils 0.1   Absolute Basophils 0.0   Anisocytosis Slight   Poikilocytosis Slight   Polychromasia Slight   Ovalocytes Slight   Macrocytes Present   Platelet Estimate Confirming automa...   Sed Rate 87 (H)     3/14/19 surgical pathology:  SPECIMEN(S):   Biopsy, CT guided, left iliac bone lesion     FINAL DIAGNOSIS:   Left iliac bone lesion, CT guided core biopsy:   - High grade carcinoma, see comment     COMMENT:   The biopsy shows a small focus of malignant cells with abundant   eosinophilic cytoplasm and round nuclei with   prominent nucleoli. Immunohistochemical stains show the tumor is strongly   positive for CK AE1/AE3 and PAX8   while CK7 and TTF1 are negative, suggesting a renal cell carcinoma. If   this does represent a renal malignancy,   the lack of diffuse reactivity for CAIX favors a non-clear cell subtype.     ASSESSMENT/PLAN:   Metastatic poorly differentiated  carcinoma, thought to be RCC, currently being treated with Opdivo. Patient has received 2 cycles of Opdivo thus far. We reviewed his recent pathology, which remains consistent with RCC. He will f/u with Dr. De Luna on 4/8/19. Given the poorly differentiated nature of his disease and his continued significant pain, I wonder about the potential of chemotherapy for his disease. I will defer this to Dr. De Luna. He questioned potential of hospice, but ultimately decided to hold off on a referral or an informational session.     Pain. Secondary to his disease. He will continue on MS Contin 60 mg bid. He will try cutting the MSIR tablets in half for his daytime doses to minimize daytime sedation. He remains on cannabis. He will try Lyrica 50 mg bid for the neuropathic pain. He did not find relief from gabapentin previously. He will call Dr. Brower's office back about surgery, as I reviewed that the surgery may help some of his neuropathic pain from his back to his legs. He will try Voltaren gel and lidocaine patches if can get approved by insurance. We discussed potential of buying OTC lidocaine patches if rx not covered. He will have a C-spine MRI next week to evaluate for mets that may be contributing to his arm pain. His CRP and ESR are elevated, but CK is normal.    Leg swelling. Suspect related to immobility and hypoalbuminemia. He will try leg elevation above the level of his heart and ACE wraps from his feet to his thighs.     Bone mets. Will continue on Xgeva, calcium, and vitamin D.     Lisa Baez PA-C  [facility]  [address] [place] [address]  [phone]

## 2019-03-29 NOTE — PATIENT INSTRUCTIONS
Pain: Start on Lyrica 50 mg twice/day for nerve pain. Try Voltaren gel and lidocaine patches to tender areas. Will schedule a cervical spine MRI to evaluate arm and neck pain. Try taking 1/2 tablet of morphine instead of full tablet.  Leg swelling: Try ACE wraps and leg elevation when sitting and lying down.

## 2019-03-29 NOTE — LETTER
3/29/2019      RE: Javon Bianchi  [address]  [place] [address]       [facility] CANCER CLINIC  FOLLOW-UP VISIT NOTE  Date of visit: Mar 29, 2019    REASON FOR VISIT: metastatic poorly differentiated carcinoma, suspected RCC    TREATMENT HISTORY:  Cabozantinib: Started 60 mg daily on September 28, 2018.  Dec 10, 2018: dose reduced cabozantinib to 40 mg a day.  Jan 17, 2019: dose reduced cabozantinib to 29 mg a day.  Feb 11, 2019: C1D1 Nivolumab   Mar 12, 2019: C2D1 Nivolumab    HPI: (details adopted from Dr De Luna's consult note) Javon Bianchi is a 68 year old male with a history significant for hyperlipidemia who had been in his usual health until he developed back pain in May this year. At that time, the pain was a stabbing like in the middle of lower back with burning like pain wrapping around this left hip to knee area. No trauma. He had MRI on 5/23/18 showed mild degenerative change with bulging disks L2-S1. A small benign hemangioma was noted in L2, no other marrow signal abnormality. Since then, the pain failed to improve despite steroid injections. In addition, he actually lost 25 lbs weight unintentionally since June along with chills few time a day. Overtime, the pain got worse to the point he could not ambulate after short distance and developed muscle weakness in lower extremities over time requiring a walker. Finally, he went to ER ([facility] in [place]) on 8/23/18, CT A/P was unremarkable. He was referred to neurology with obtaining MRI L spine. MRI on 8/30/18 revealed a pathological fracture at L3 vertebral body with height loss at 40% and diffuse involvement of a neoplastic process. IR guided biopsy on 8/31/18 showed poorly differentiated carcinoma. Its IHC was suggestive of possible renal cell carcinoma per pathology report (Renal cell immunochemistry is positive and along with the negative staining for CK7 and CK20 suggests the possibility of a renal  carcinoma, however most renal cell carcinomas also express PAX8 which is negative in this case). Of note, CT chest/abdomen/pelvis at OSH was negative for primary lesion. The patient was evaluated by neurosurgery who recommended no surgical intervention. He was discharged with TLSO brace and walker. He had palliative XRT locally from 9/11 for 10 fractions (Done in [place]).      He was started on cabozantinib 60 mg initially, requiring dose reduction to 20 mg for issues with hand foot syndrome and poor tolerability.    Plan was to switch to Ipi/nivo due to progression in bone disease on 2/6/19, however Yervkarthik was not covered, thus given Opdivo only.     INTERVAL HISTORY:  Patient reports that he has developed pain in his upper body over the last week.  He initially noticed swelling in his joints particularly his shoulders and wrists.  Subsequently, the swelling subsided, but he continued to have pain in his shoulders, wrists, and fingers.  He also feels his hands are more weak.  He has again developed leg swelling over the last 2 weeks.  He had leg swelling last year that had resolved.  He tried wearing compression stockings for 3 days, but felt that made his pain worse and did not really seem to help his swelling.  He has been sitting in a lazy boy, but has not been elevating his legs otherwise.  He has continued to take his long-acting morphine as well as taking 1 tablet of short acting morphine during the night and one during the day.  His wife is concerned that he gets too sedated when he takes the morphine tablet.  They have not tried taking a half a tablet.  He does use a walker or cane at home to get around.  He does not need to use any stairs.  His wife also worries about his ability to get around safely after taking the pain medication.  He has been taking cannabis capsules at night and drops during the morning.  The dose was increased about a week and a half ago.  His wife feels that his mental tracking  has been more poor over the last 2-3 days.  He also reports that he has had shooting pain that travels from his low back to his left thigh with associated numbness.  This has been going on for several months, but he has now developed a similar shooting pain and numbness in his right thigh.  He previously tried gabapentin without relief in his symptoms.  He is just completing radiation in [place] to his left femur as well as his left hip and back.  He questions if he should be considering enrolling in hospice.  He has been sleeping poorly due to pain.  He continues to feel tired, but does not necessarily feel more tired than he did previously.  He attributes this to not sleeping well.  He is having regular soft bowel movements. He denies other concerns.    EXAM:   General: The patient is a pleasant male in no acute distress. He is here today in a wheelchair. He is able to get on and off the exam table with minimal assistance. He is here today with his wife and his son.  /58   Pulse 72   Temp 96.5  F (35.8  C) (Oral)   Resp 16   Ht 1.829 m (6')   Wt 104.1 kg (229 lb 6.4 oz)   SpO2 97%   BMI 31.11 kg/m     Wt Readings from Last 10 Encounters:   03/29/19 104.1 kg (229 lb 6.4 oz)   03/19/19 105.1 kg (231 lb 9.6 oz)   03/14/19 102.1 kg (225 lb)   03/14/19 102.1 kg (225 lb)   03/12/19 102.4 kg (225 lb 11.2 oz)   02/26/19 103.1 kg (227 lb 3.2 oz)   02/23/19 104.3 kg (230 lb)   02/11/19 104.6 kg (230 lb 9.6 oz)   02/07/19 104.3 kg (230 lb)   02/06/19 104.5 kg (230 lb 6.4 oz)   HEENT: EOMI, PERRL. Sclerae are anicteric. Oral mucosa is pink and moist with no lesions or thrush.   Lymph: Neck is supple with no lymphadenopathy in the cervical or supraclavicular areas.   Heart: Regular rate and rhythm.   Lungs: Clear to auscultation bilaterally.   Abdomen: Bowel sounds present, soft, mild diffuse tenderness with no palpable hepatosplenomegaly or masses.   Extremities: 2+ lower extremity edema noted bilaterally from  the feet to the thighs.    Skin: No rashes, petechiae, or bruising noted on exposed skin.  Musculoskeletal: Moderate point tenderness in many areas through spine from C-spine down to L-spine. Anterior shoulders are mildly tender bilaterally. Anterior thighs are mildly tender bilaterally.     LABS:    3/29/2019 15:51   Sodium 138   Potassium 4.3   Chloride 106   Carbon Dioxide 28   Urea Nitrogen 12   Creatinine 0.72   GFR Estimate >90   GFR Estimate If Black >90   Calcium 8.2 (L)   Anion Gap 4   Albumin 2.8 (L)   Protein Total 6.3 (L)   Bilirubin Total 0.7   Alkaline Phosphatase 133   ALT 14   AST 40   CK Total 222   Creatinine Urine 308   CRP Inflammation 66.7 (H)   Protein Random Urine 0.46   Protein Total Urine g/gr Creatinine 0.15   Glucose 85   WBC 6.0   Hemoglobin 8.6 (L)   Hematocrit 29.6 (L)   Platelet Count 214   RBC Count 2.92 (L)    (H)   MCH 29.5   MCHC 29.1 (L)   RDW 16.4 (H)   Diff Method Manual Differential   % Neutrophils 55.7   % Lymphocytes 2.6   % Monocytes 40.0   % Eosinophils 1.7   % Basophils 0.0   Absolute Neutrophil 3.3   Absolute Lymphocytes 0.2 (L)   Absolute Monocytes 2.4 (H)   Absolute Eosinophils 0.1   Absolute Basophils 0.0   Anisocytosis Slight   Poikilocytosis Slight   Polychromasia Slight   Ovalocytes Slight   Macrocytes Present   Platelet Estimate Confirming automa...   Sed Rate 87 (H)     3/14/19 surgical pathology:  SPECIMEN(S):   Biopsy, CT guided, left iliac bone lesion     FINAL DIAGNOSIS:   Left iliac bone lesion, CT guided core biopsy:   - High grade carcinoma, see comment     COMMENT:   The biopsy shows a small focus of malignant cells with abundant   eosinophilic cytoplasm and round nuclei with   prominent nucleoli. Immunohistochemical stains show the tumor is strongly   positive for CK AE1/AE3 and PAX8   while CK7 and TTF1 are negative, suggesting a renal cell carcinoma. If   this does represent a renal malignancy,   the lack of diffuse reactivity for CAIX favors a  non-clear cell subtype.     ASSESSMENT/PLAN:   Metastatic poorly differentiated carcinoma, thought to be RCC, currently being treated with Opdivo. Patient has received 2 cycles of Opdivo thus far. We reviewed his recent pathology, which remains consistent with RCC. He will f/u with Dr. De Luna on 4/8/19. Given the poorly differentiated nature of his disease and his continued significant pain, I wonder about the potential of chemotherapy for his disease. I will defer this to Dr. De Luna. He questioned potential of hospice, but ultimately decided to hold off on a referral or an informational session.     Pain. Secondary to his disease. He will continue on MS Contin 60 mg bid. He will try cutting the MSIR tablets in half for his daytime doses to minimize daytime sedation. He remains on cannabis. He will try Lyrica 50 mg bid for the neuropathic pain. He did not find relief from gabapentin previously. He will call Dr. Brower's office back about surgery, as I reviewed that the surgery may help some of his neuropathic pain from his back to his legs. He will try Voltaren gel and lidocaine patches if can get approved by insurance. We discussed potential of buying OTC lidocaine patches if rx not covered. He will have a C-spine MRI next week to evaluate for mets that may be contributing to his arm pain. His CRP and ESR are elevated, but CK is normal.    Leg swelling. Suspect related to immobility and hypoalbuminemia. He will try leg elevation above the level of his heart and ACE wraps from his feet to his thighs.     Bone mets. Will continue on Xgeva, calcium, and vitamin D.     Lisa Baez PA-C  [facility]  [address] [place] [id number] [phone]

## 2019-03-29 NOTE — NURSING NOTE
Oncology Rooming Note    March 29, 2019 4:10 PM   Javon Bianchi is a 68 year old male who presents for:    Chief Complaint   Patient presents with     Blood Draw     Labs drawn by MA     Oncology Clinic Visit     Renal Cell , Pain , edema      Initial Vitals: /58   Pulse 72   Temp 96.5  F (35.8  C) (Oral)   Resp 16   Ht 1.829 m (6')   Wt 104.1 kg (229 lb 6.4 oz)   SpO2 97%   BMI 31.11 kg/m   Estimated body mass index is 31.11 kg/m  as calculated from the following:    Height as of this encounter: 1.829 m (6').    Weight as of this encounter: 104.1 kg (229 lb 6.4 oz). Body surface area is 2.3 meters squared.  Severe Pain (7) Comment: Shoulder, Left Leg, Back   No LMP for male patient.  Allergies reviewed: Yes  Medications reviewed: Yes    Medications: Medication refills not needed today.  Pharmacy name entered into [facility]: [facility] - [address]     Clinical concerns: pain L Leg, L Arm , edema leg pain  N[facility]or  was notified.      Tracey Padilla MA

## 2019-03-29 NOTE — TELEPHONE ENCOUNTER
Patient's wife called stating patient has reported pain to [facility] and to Dr. De Luna.  The advised patient and wife go to the cannabis clinic, however patient's wife feels this will not help them.  She reports his bilateral shoulder pain is 7/10, has bilateral hand and wrist pain - he is using his MS Contin Q12H and has morphine IR 15mg Q3H that he has used 1-2x in the last few days.  Writer encouraged patient's wife to use the IR morphine Q3H as needed for breakthrough pain as that is what it's for - she states she feels he is 'very drowsy and lethargic, his mobility is becoming and issue and he is less confident with his movement.'     Patient's wife states he has his last radiation appointment today.  Writer asked patient's wife what she would like to do, if she would still like to go to the cannabis clinic per Dr. De Luna's direction or if she would like to be seen in clinic?  She states she would like to be seen in clinic.  This writer has placed a hold on a 4:40pm appointment with Lisa Baez PA-C and will request scheduling to complete the appointment with labs before.     Clair López RN BSN   [facility]  Nurse Coordinator

## 2019-04-01 ENCOUNTER — TELEPHONE (OUTPATIENT)
Dept: CARE COORDINATION | Facility: CLINIC | Age: 69
End: 2019-04-01

## 2019-04-01 ENCOUNTER — TRANSFERRED RECORDS (OUTPATIENT)
Dept: HEALTH INFORMATION MANAGEMENT | Facility: CLINIC | Age: 69
End: 2019-04-01

## 2019-04-01 LAB — COPATH REPORT: NORMAL

## 2019-04-01 NOTE — TELEPHONE ENCOUNTER
Per Patient's request,  completed and faxed [employer] request for lodging dates 4/7-4/9, 4/24-4/25. [place] [employer] will contact Patient for confirmation of reservation.  will continue to provide support as needed.    Soo Yeon Han, [facility]  Pager:  [phone]

## 2019-04-02 ENCOUNTER — ANCILLARY PROCEDURE (OUTPATIENT)
Dept: MRI IMAGING | Facility: CLINIC | Age: 69
End: 2019-04-02
Attending: PHYSICIAN ASSISTANT
Payer: MEDICARE

## 2019-04-02 DIAGNOSIS — C64.2 RENAL CELL CARCINOMA, LEFT (H): ICD-10-CM

## 2019-04-02 DIAGNOSIS — M79.602 PAIN IN BOTH UPPER EXTREMITIES: ICD-10-CM

## 2019-04-02 DIAGNOSIS — M79.601 PAIN IN BOTH UPPER EXTREMITIES: ICD-10-CM

## 2019-04-02 DIAGNOSIS — M54.2 NECK PAIN: ICD-10-CM

## 2019-04-02 DIAGNOSIS — C79.51 BONE METASTASIS: ICD-10-CM

## 2019-04-02 RX ORDER — GADOBUTROL 604.72 MG/ML
10 INJECTION INTRAVENOUS ONCE
Status: COMPLETED | OUTPATIENT
Start: 2019-04-02 | End: 2019-04-02

## 2019-04-02 RX ADMIN — GADOBUTROL 10 ML: 604.72 INJECTION INTRAVENOUS at 17:37

## 2019-04-02 NOTE — DISCHARGE INSTRUCTIONS
MRI Contrast Discharge Instructions    The IV contrast you received today will pass out of your body in your  urine. This will happen in the next 24 hours. You will not feel this process.  Your urine will not change color.    Drink at least 4 extra glasses of water or juice today (unless your doctor  has restricted your fluids). This reduces the stress on your kidneys.  You may take your regular medicines.    If you are on dialysis: It is best to have dialysis today.    If you have a reaction: Most reactions happen right away. If you have  any new symptoms after leaving the hospital (such as hives or swelling),  call your hospital at the correct number below. Or call your family doctor.  If you have breathing distress or wheezing, call 911.    Special instructions: ***    I have read and understand the above information.    Signature:______________________________________ Date:___________    Staff:__________________________________________ Date:___________     Time:__________    [facility] Departments:    ___[place]: [phone]  ___[facility]: [phone]  ___[place]: [phone] ___[facility]: [phone]  ___[facility]: [phone]  ___[facility]: [phone]  ___[phone]  ___[facility]: [phone]  ___[phone]

## 2019-04-03 ENCOUNTER — MYC MEDICAL ADVICE (OUTPATIENT)
Dept: ONCOLOGY | Facility: CLINIC | Age: 69
End: 2019-04-03

## 2019-04-03 NOTE — TELEPHONE ENCOUNTER
Spouse called back to update care, they read Nimaya message. Report pt's pain is not significantly relieved by new medications picked up on 3/29. Did not receive the lidocaine patches. Pt tried the voltaren gel 1-2 times for 2 days and reported no relief so stopped taking. He is still taking celebrex and lyrica twice daily.    Advised to continue voltaren gel 4 times daily consistently and can buy lidocaine patch 4% OTC, apply up to 3 patches to most painful areas, keep on for 12 hours and off for 12 hours. Rx may have required a PA but can use OTC strength for now. Let us know by Friday or next Monday if still not noticing any pain relief. Spouse verbalized understanding.

## 2019-04-07 ENCOUNTER — NURSE TRIAGE (OUTPATIENT)
Dept: NURSING | Facility: CLINIC | Age: 69
End: 2019-04-07

## 2019-04-08 ENCOUNTER — ONCOLOGY VISIT (OUTPATIENT)
Dept: ONCOLOGY | Facility: CLINIC | Age: 69
End: 2019-04-08
Attending: INTERNAL MEDICINE
Payer: MEDICARE

## 2019-04-08 ENCOUNTER — APPOINTMENT (OUTPATIENT)
Dept: LAB | Facility: CLINIC | Age: 69
End: 2019-04-08
Attending: INTERNAL MEDICINE
Payer: MEDICARE

## 2019-04-08 VITALS
OXYGEN SATURATION: 94 % | HEART RATE: 74 BPM | SYSTOLIC BLOOD PRESSURE: 117 MMHG | WEIGHT: 231 LBS | BODY MASS INDEX: 31.33 KG/M2 | TEMPERATURE: 98.7 F | DIASTOLIC BLOOD PRESSURE: 74 MMHG

## 2019-04-08 DIAGNOSIS — G89.3 CANCER ASSOCIATED PAIN: ICD-10-CM

## 2019-04-08 DIAGNOSIS — C64.9 RENAL CELL CARCINOMA, UNSPECIFIED LATERALITY (H): Primary | ICD-10-CM

## 2019-04-08 DIAGNOSIS — C64.9 RENAL CELL CARCINOMA, UNSPECIFIED LATERALITY (H): ICD-10-CM

## 2019-04-08 LAB — CRP SERPL-MCNC: 71.1 MG/L (ref 0–8)

## 2019-04-08 PROCEDURE — 36592 COLLECT BLOOD FROM PICC: CPT

## 2019-04-08 PROCEDURE — 86140 C-REACTIVE PROTEIN: CPT | Performed by: INTERNAL MEDICINE

## 2019-04-08 PROCEDURE — 82550 ASSAY OF CK (CPK): CPT | Performed by: INTERNAL MEDICINE

## 2019-04-08 PROCEDURE — 99215 OFFICE O/P EST HI 40 MIN: CPT | Mod: ZP | Performed by: INTERNAL MEDICINE

## 2019-04-08 PROCEDURE — 82552 ASSAY OF CPK IN BLOOD: CPT | Performed by: INTERNAL MEDICINE

## 2019-04-08 PROCEDURE — G0463 HOSPITAL OUTPT CLINIC VISIT: HCPCS | Mod: ZF

## 2019-04-08 RX ORDER — DIAZEPAM 5 MG
10 TABLET ORAL ONCE
Qty: 2 TABLET | Refills: 0 | Status: SHIPPED | OUTPATIENT
Start: 2019-04-08 | End: 2019-04-24

## 2019-04-08 RX ORDER — PREDNISONE 20 MG/1
TABLET ORAL
Qty: 200 TABLET | Refills: 1 | Status: SHIPPED | OUTPATIENT
Start: 2019-04-08 | End: 2019-04-24

## 2019-04-08 RX ORDER — FUROSEMIDE 20 MG
20 TABLET ORAL DAILY
Qty: 14 TABLET | Refills: 0 | Status: SHIPPED | OUTPATIENT
Start: 2019-04-08 | End: 2019-04-29

## 2019-04-08 RX ORDER — PREDNISONE 20 MG/1
20 TABLET ORAL DAILY
Qty: 200 TABLET | Refills: 1 | Status: SHIPPED | OUTPATIENT
Start: 2019-04-08 | End: 2019-04-08

## 2019-04-08 NOTE — TELEPHONE ENCOUNTER
Patients wife Patrizia ayers.  The patient is scheduled to have labs drawn tomorrow.  In the chart I see he is to have a CBC and a CMP drawn tomorrow and I told the family that these labs do not require you to fast.  Also she asked if he had to fast for his chemo.  I looked at his chemo order and did not see any orders that he had to fast so I told her that her did not have to fast for the chemo.

## 2019-04-08 NOTE — TELEPHONE ENCOUNTER
Requested medications Morphine IR and MS Contin   Pill count MS Contin 6 MS IR 15 tabs  PRN use per 24hrs 3 tabs  Days till out  3 and 5  What pain is the medication treating: Buttocks and Back      How is the medication being taken: takes 3 tabs of MSIR per day MS Contin 60mgs q12     Is pain being adequately controlled on the current regimen: YES     Experiencing any side effects from medication: No   Last refill: MS IR 03/04 MS Contin 03/12   Last office visit: 03/19  Scheduled for follow up 04/25    Patient would like script  Sent to House    [place]  Report reviewed.

## 2019-04-08 NOTE — NURSING NOTE
Oncology Rooming Note    April 8, 2019 11:51 AM   Javon Bianchi is a 68 year old male who presents for:    Chief Complaint   Patient presents with     Blood Draw     iv placement with blood draw and vitals     Oncology Clinic Visit     [facility] RETURN- RENAL CA     Initial Vitals: /74 (BP Location: Left arm, Patient Position: Sitting, Cuff Size: Adult Large)   Pulse 74   Temp 98.7  F (37.1  C) (Oral)   Wt 104.8 kg (231 lb)   SpO2 94%   BMI 31.33 kg/m   Estimated body mass index is 31.33 kg/m  as calculated from the following:    Height as of 3/29/19: 1.829 m (6').    Weight as of this encounter: 104.8 kg (231 lb). Body surface area is 2.31 meters squared.  Data Unavailable Comment: Data Unavailable   No LMP for male patient.  Allergies reviewed: Yes  Medications reviewed: Yes    Medications: MEDICATION REFILLS NEEDED TODAY. Provider was notified.  Pharmacy name entered into [employer]: [facility] - [place], [place] - [address] [place] [address]    Clinical concerns: Refill on Morphine 60 mg and 15 mg. Increased joint pain making it difficult to get up from bed or chair. Annamarie was notified.      Wyatt Alanis LPN

## 2019-04-08 NOTE — NURSING NOTE
Chief Complaint   Patient presents with     Blood Draw     iv placement with blood draw and vitals     Iv placed by jo-ann trivedi in left arm and blood drawn from iv.

## 2019-04-09 RX ORDER — MORPHINE SULFATE 15 MG/1
15 TABLET ORAL
Qty: 120 TABLET | Refills: 0 | Status: SHIPPED | OUTPATIENT
Start: 2019-04-09 | End: 2019-07-03

## 2019-04-09 RX ORDER — MORPHINE SULFATE 60 MG/1
60 TABLET, FILM COATED, EXTENDED RELEASE ORAL EVERY 12 HOURS
Qty: 60 TABLET | Refills: 0 | Status: SHIPPED | OUTPATIENT
Start: 2019-04-09 | End: 2019-04-25

## 2019-04-10 LAB
CK BB CFR SERPL ELPH: 0 % (ref 0–0)
CK MACRO1 CFR SERPL: 0 % (ref 0–0)
CK MACRO2 CFR SERPL: 0 % (ref 0–0)
CK MB CFR SERPL ELPH: 0 % (ref 0–4)
CK MM CFR SERPL ELPH: 100 % (ref 96–100)
CK SERPL-CCNC: 223 U/L (ref 20–200)

## 2019-04-12 LAB — COPATH REPORT: NORMAL

## 2019-04-17 NOTE — PROGRESS NOTES
April 8, 2019       Chief complaint:   Metastatic Carcinoma with bone metastases, likely renal origin (no primary renal mass)  Cabozantinib: Started 60 mg daily on September 28, 2018.  Dec 10, 2018: dose reduced cabozantinib to 40 mg a day.  Jan 17, 2019: dose reduced cabozantinib to 29 mg a day.  Feb 11, 2019: C1D1 Nivolumab   3/12/19: c2 Nivolumab          History of Present Illness:      Javon Bianchi is a 68 year old male with a history significant for hyperlipidemia who had been in his usual health until he developed back pain in May this year. At that time, the pain was a stabbing like in the middle of lower back with burning like pain wrapping around this left hip to knee area. No trauma. He had MRI on 5/23/18 showed mild degenerative change with bulging disks L2-S1. A small benign hemangioma was noted in L2, no other marrow signal abnormality. Since then, the pain failed to improve despite steroid injections. In addition, he actually lost 25 lbs weight unintentionally since June along with chills few time a day. Overtime, the pain got worse to the point he could not ambulate after short distance and developed muscle weakness in lower extremities over time requiring a walker. Finally, he went to ER ([facility] in [place]) on 8/23/18, CT A/P was unremarkable. He was referred to neurology with obtaining MRI L spine. MRI on 8/30/18 revealed a pathological fracture at L3 vertebral body with height loss at 40% and diffuse involvement of a neoplastic process. IR guided biopsy on 8/31/18 showed poorly differentiated carcinoma. Its IHC was suggestive of possible renal cell carcinoma per pathology report (Renal cell immunochemistry is positive and along with the negative staining for CK7 and CK20 suggests the possibility of a renal carcinoma, however most renal cell carcinomas also express PAX8 which is negative in this case). Of note, CT chest/abdomen/pelvis at OSH was negative for primary lesion. The patient  was evaluated by neurosurgery who recommended no surgical intervention. He was discharged with TLSO brace and walker. He had palliative XRT locally from 9/11 for 10 fractions (Done in [place]).      He was started on cabozantinib 60 mg initially, requiring dose reduction to 20 mg for issues with hand foot syndrome and poor tolerability.  Switched to Ipi/nivo due to progression in bone disease on 2/6/19.      Interval history:   He reports weakness in his arms and difficulty standing up from sitting position, these symptoms started after starting nivolumab- around cycle 2. He also started Xgeva around that time.   He had develop da significant pain crisis even prior to starting nivolumab and is seeing palliative team.   Appetite is good, pain is better controlled.  On medical marijuana as well.  We had repeated a met site biopsy:  left iliac bone biopsy  as well due to significant disease in bones and the pathology is consistent with RCC (not clear cell type)  Also has pain      PAST MEDICAL SURGICAL HISTORY:Reviewed.  SOCIAL HISTORY: Reviewed.      MEDICATIONS: Reviewed.           Home Medications:          Current Outpatient Prescriptions   Medication     atorvastatin (LIPITOR) 20 MG tablet     cyclobenzaprine (FLEXERIL) 10 MG tablet     doxycycline (VIBRA-TABS) 100 MG tablet     morphine (MS CONTIN) 30 MG 12 hr tablet     senna-docusate (SENOKOT-S;PERICOLACE) 8.6-50 MG per tablet     aspirin 81 MG tablet     Cabozantinib S-Malate (CABOMETYX) 60 MG     dexamethasone (DECADRON) 4 MG tablet     melatonin 3 MG tablet     methylPREDNISolone (MEDROL DOSEPAK) 4 MG tablet     oxyCODONE (OXYCONTIN) 20 MG 12 hr tablet     polyethylene glycol (MIRALAX/GLYCOLAX) Packet     Pseudoephedrine-APAP  MG TABS     ranitidine (ZANTAC) 150 MG tablet          Current Facility-Administered Medications   Medication     influenza Vac Split High-Dose (FLUZONE) injection 0.5 mL                          ECOG performance status of 1.    Vital signs: reviewed from chart   HEENT: No icterus, no pallor. Moist mucous membranes. Oropharynx is clear.   NECK: Supple  LUNGS: Bilateral clear to ausculation  CARDIOVASCULAR: Regular rate and rhythm, no murmurs, gallops or rubs.   ABDOMEN: Soft, nontender and nondistended.   EXTREMITIES: No cyanosis, no clubbing, no edema. Right foot sole, no active blister, previous site of blister has healed.   NEUROLOGIC: No focal deficits.  CBC RESULTS:   Recent Labs   Lab Test 04/08/19  1134   WBC 7.3   RBC 2.70*   HGB 8.2*   HCT 26.7*   MCV 99   MCH 30.4   MCHC 30.7*   RDW 16.6*        Recent Labs   Lab Test 04/08/19  1134 03/29/19  1551    138   POTASSIUM 4.0 4.3   CHLORIDE 106 106   CO2 23 28   ANIONGAP 7 4   GLC 90 85   BUN 11 12   CR 0.62* 0.72   AUREA 8.1* 8.2*     MRI C spine: No metastatic disease.             Assessment /Plan      68-year-old male with metastatic infiltrative lesion in L3 extending to L2 and L4, likely primary malignancy being renal cell carcinoma.  However he does not have any evidence of a primary renal mass on imaging.    Based on the available pathology data,  We offered treatment for renal cell carcinoma with cabozantinib (CABOSUN data demonstrating cabozantinib superiority over sunitinib in the initial setting).  PAX8 stain was negative, and the clear cell features were not seen (it was a poorly differentiated carcinoma).   He had a favorable response to cabozantinib on initial scans. However, tolerability remained an issue and he then progressed.  We started him on Nivolumab, there is encouraging data with IO in non-clear cell carcinoma.     Myalgias: I suspect this could be an immune related AE from Nivolumab. Will repeat CK. Obtain CRP.  (However, this could be a side effect of xgeva as well, this is the 1st dose he has received. Will see how he does with the trial of steroids and will hold xgeva until the etiology is clear).     Start prednisone 80 mg a day with taper by 10  mg every 3 days.  CANCEL nivolumab today.     RTC to see me in 2 weeks with labs, restaging scans.       Pain management: complex needs, will request pall team to continue followup and providing him adequate number of refills.      Bone metastases: On xgeva     Malathi De Luna MD  Hematology Oncology and Transplantation  [facility]

## 2019-04-23 ENCOUNTER — ANCILLARY PROCEDURE (OUTPATIENT)
Dept: MRI IMAGING | Facility: CLINIC | Age: 69
End: 2019-04-23
Attending: INTERNAL MEDICINE
Payer: MEDICARE

## 2019-04-23 ENCOUNTER — ANCILLARY PROCEDURE (OUTPATIENT)
Dept: CT IMAGING | Facility: CLINIC | Age: 69
End: 2019-04-23
Attending: INTERNAL MEDICINE
Payer: MEDICARE

## 2019-04-23 DIAGNOSIS — C64.9 RENAL CELL CARCINOMA, UNSPECIFIED LATERALITY (H): ICD-10-CM

## 2019-04-23 LAB
ALBUMIN SERPL-MCNC: 3.1 G/DL (ref 3.4–5)
ALP SERPL-CCNC: 127 U/L (ref 40–150)
ALT SERPL W P-5'-P-CCNC: 34 U/L (ref 0–70)
ANION GAP SERPL CALCULATED.3IONS-SCNC: 6 MMOL/L (ref 3–14)
AST SERPL W P-5'-P-CCNC: 31 U/L (ref 0–45)
BASOPHILS # BLD AUTO: 0 10E9/L (ref 0–0.2)
BASOPHILS NFR BLD AUTO: 0.1 %
BILIRUB SERPL-MCNC: 0.4 MG/DL (ref 0.2–1.3)
BUN SERPL-MCNC: 12 MG/DL (ref 7–30)
CALCIUM SERPL-MCNC: 8.3 MG/DL (ref 8.5–10.1)
CHLORIDE SERPL-SCNC: 106 MMOL/L (ref 94–109)
CO2 SERPL-SCNC: 27 MMOL/L (ref 20–32)
CREAT SERPL-MCNC: 0.65 MG/DL (ref 0.66–1.25)
CRP SERPL-MCNC: 35.1 MG/L (ref 0–8)
DIFFERENTIAL METHOD BLD: ABNORMAL
EOSINOPHIL # BLD AUTO: 0 10E9/L (ref 0–0.7)
EOSINOPHIL NFR BLD AUTO: 0 %
ERYTHROCYTE [DISTWIDTH] IN BLOOD BY AUTOMATED COUNT: 18.3 % (ref 10–15)
GFR SERPL CREATININE-BSD FRML MDRD: >90 ML/MIN/{1.73_M2}
GLUCOSE SERPL-MCNC: 97 MG/DL (ref 70–99)
HCT VFR BLD AUTO: 34 % (ref 40–53)
HGB BLD-MCNC: 10.2 G/DL (ref 13.3–17.7)
IMM GRANULOCYTES # BLD: 0.5 10E9/L (ref 0–0.4)
IMM GRANULOCYTES NFR BLD: 4.3 %
LYMPHOCYTES # BLD AUTO: 0.2 10E9/L (ref 0.8–5.3)
LYMPHOCYTES NFR BLD AUTO: 2.1 %
MCH RBC QN AUTO: 30.9 PG (ref 26.5–33)
MCHC RBC AUTO-ENTMCNC: 30 G/DL (ref 31.5–36.5)
MCV RBC AUTO: 103 FL (ref 78–100)
MONOCYTES # BLD AUTO: 1 10E9/L (ref 0–1.3)
MONOCYTES NFR BLD AUTO: 8.8 %
NEUTROPHILS # BLD AUTO: 9.2 10E9/L (ref 1.6–8.3)
NEUTROPHILS NFR BLD AUTO: 84.7 %
NRBC # BLD AUTO: 0 10*3/UL
NRBC BLD AUTO-RTO: 0 /100
PLATELET # BLD AUTO: 248 10E9/L (ref 150–450)
POTASSIUM SERPL-SCNC: 5 MMOL/L (ref 3.4–5.3)
PROT SERPL-MCNC: 6.8 G/DL (ref 6.8–8.8)
RBC # BLD AUTO: 3.3 10E12/L (ref 4.4–5.9)
SODIUM SERPL-SCNC: 138 MMOL/L (ref 133–144)
WBC # BLD AUTO: 10.9 10E9/L (ref 4–11)

## 2019-04-23 RX ORDER — IOPAMIDOL 755 MG/ML
135 INJECTION, SOLUTION INTRAVASCULAR ONCE
Status: COMPLETED | OUTPATIENT
Start: 2019-04-23 | End: 2019-04-23

## 2019-04-23 RX ORDER — GADOBUTROL 604.72 MG/ML
10 INJECTION INTRAVENOUS ONCE
Status: COMPLETED | OUTPATIENT
Start: 2019-04-23 | End: 2019-04-23

## 2019-04-23 RX ADMIN — IOPAMIDOL 135 ML: 755 INJECTION, SOLUTION INTRAVASCULAR at 13:16

## 2019-04-23 RX ADMIN — GADOBUTROL 10 ML: 604.72 INJECTION INTRAVENOUS at 13:44

## 2019-04-23 NOTE — DISCHARGE INSTRUCTIONS
MRI Contrast Discharge Instructions    The IV contrast you received today will pass out of your body in your  urine. This will happen in the next 24 hours. You will not feel this process.  Your urine will not change color.    Drink at least 4 extra glasses of water or juice today (unless your doctor  has restricted your fluids). This reduces the stress on your kidneys.  You may take your regular medicines.    If you are on dialysis: It is best to have dialysis today.    If you have a reaction: Most reactions happen right away. If you have  any new symptoms after leaving the hospital (such as hives or swelling),  call your hospital at the correct number below. Or call your family doctor.  If you have breathing distress or wheezing, call 911.    Special instructions: ***    I have read and understand the above information.    Signature:______________________________________ Date:___________    Staff:__________________________________________ Date:___________     Time:__________    [facility] Departments:    ___[place]: [phone]  ___[facility]: [phone]  ___[place]: [phone] ___[facility]: [phone]  ___[facility]: [phone]  ___[facility]: [phone]  ___[phone]  ___[facility]: [phone]  ___[phone]

## 2019-04-23 NOTE — DISCHARGE INSTRUCTIONS

## 2019-04-24 ENCOUNTER — ONCOLOGY VISIT (OUTPATIENT)
Dept: ONCOLOGY | Facility: CLINIC | Age: 69
End: 2019-04-24
Attending: INTERNAL MEDICINE
Payer: MEDICARE

## 2019-04-24 VITALS
HEIGHT: 72 IN | BODY MASS INDEX: 31.4 KG/M2 | HEART RATE: 60 BPM | OXYGEN SATURATION: 98 % | DIASTOLIC BLOOD PRESSURE: 61 MMHG | TEMPERATURE: 97.3 F | RESPIRATION RATE: 16 BRPM | SYSTOLIC BLOOD PRESSURE: 112 MMHG | WEIGHT: 231.8 LBS

## 2019-04-24 DIAGNOSIS — C64.9 RENAL CELL CARCINOMA, UNSPECIFIED LATERALITY (H): ICD-10-CM

## 2019-04-24 PROCEDURE — G0463 HOSPITAL OUTPT CLINIC VISIT: HCPCS | Mod: ZF

## 2019-04-24 PROCEDURE — 99214 OFFICE O/P EST MOD 30 MIN: CPT | Mod: GC | Performed by: INTERNAL MEDICINE

## 2019-04-24 RX ORDER — PREDNISONE 10 MG/1
TABLET ORAL
Qty: 200 TABLET | Refills: 0 | Status: SHIPPED | OUTPATIENT
Start: 2019-04-24 | End: 2019-06-26

## 2019-04-24 ASSESSMENT — PAIN SCALES - GENERAL: PAINLEVEL: MODERATE PAIN (4)

## 2019-04-24 ASSESSMENT — MIFFLIN-ST. JEOR: SCORE: 1849.44

## 2019-04-24 NOTE — PROGRESS NOTES
Date of Visit: 04/24/2019       Chief complaint:   Metastatic Carcinoma with bone metastases, likely renal origin (no primary renal mass)  Cabozantinib: Started 60 mg daily on September 28, 2018.  Dec 10, 2018: dose reduced cabozantinib to 40 mg a day.  Jan 17, 2019: dose reduced cabozantinib to 29 mg a day.  Feb 11, 2019: C1D1 Nivolumab   3/12/19: c2 Nivolumab  4/8/19: held Nivolumab due to myalgias, started prednisone          History of Present Illness:   Javon Bianchi is a 68 year old male with a history significant for hyperlipidemia who had been in his usual health until he developed back pain in May of 2018. At that time, the pain was a stabbing like in the middle of lower back with burning like pain wrapping around this left hip to knee area. No trauma. He had MRI on 5/23/18 showed mild degenerative change with bulging disks L2-S1. A small benign hemangioma was noted in L2, no other marrow signal abnormality. Since then, the pain failed to improve despite steroid injections. In addition, he lost 25 lbs unintentionally since June along with chills a few times a day. Over time, the pain got worse to the point he could not ambulate after short distance and developed muscle weakness in lower extremities over time requiring a walker. Finally, he went to ER ([facility] in [place]) on 8/23/18, CT A/P was unremarkable. He was referred to neurology with obtaining MRI L spine. MRI on 8/30/18 revealed a pathological fracture at L3 vertebral body with height loss at 40% and diffuse involvement of a neoplastic process. IR guided biopsy on 8/31/18 showed poorly differentiated carcinoma. IHC was suggestive of possible renal cell carcinoma per pathology report (Renal cell immunochemistry is positive and along with the negative staining for CK7 and CK20 suggests the possibility of a renal carcinoma, however most renal cell carcinomas also express PAX8 which is negative in this case). Of note, CT chest/abdomen/pelvis at  "[facility] was negative for primary lesion. The patient was evaluated by neurosurgery who recommended no surgical intervention. He was discharged with TLSO brace and walker. He had palliative XRT locally from 9/11 for 10 fractions (Done in [place]).      He was started on cabozantinib 60 mg initially, requiring dose reduction to 20 mg for issues with hand foot syndrome and poor tolerability. Switched to nivolumab due to progression in bone disease on 2/6/19. Had to hold Cycle #3 due to myalgia, which improved with prednisone.     Interval history:   He presents to clinic today for symptom check, accompanied by his wife and son. He reports \"feeling like a new man\" shortly after starting the prednisone. His pain was much better, and is energy got better as well. He tapered the prednisone as he was instructed, and is currently on 30 mg daily. The best benefit he noticed was when the steroid dose was higher. He can lift his arms again, and can get out of a chair unassisted. He continues to have pain in his back and left thigh. Some numbness and tingling of the left leg which is unchanged. He is following with palliative care for his medications. No fevers or sweats. No N/V/D or abdominal pain, and appetite is good. No cough, SOB or chest pain. No rashes or concerning lesions. No new areas of pain.     PAST MEDICAL SURGICAL HISTORY: Reviewed.  SOCIAL HISTORY: Reviewed.   MEDICATIONS: Reviewed.           Home Medications:      Current Outpatient Medications:      acetaminophen (TYLENOL) 500 MG tablet, Take 1,000 mg by mouth 2 times daily, Disp: , Rfl:      calcium carbonate 500 mg, elemental, (OSCAL 500) 1250 (500 Ca) MG TABS tablet, Take 1 tablet by mouth 2 times daily, Disp: , Rfl:      celecoxib (CELEBREX) 200 MG capsule, Take 1 capsule (200 mg) by mouth 2 times daily as needed for moderate pain, Disp: 60 capsule, Rfl: 2     Dextromethorphan-guaiFENesin (MUCINEX DM)  MG 12 hr tablet, Take 1 tablet by mouth every 12 " hours, Disp: , Rfl:      diclofenac (VOLTAREN) 1 % topical gel, Apply 4 g topically 4 times daily To legs, shoulder, or back., Disp: 100 g, Rfl: 1     DULoxetine (CYMBALTA) 60 MG capsule, Take 1 capsule (60 mg) by mouth daily, Disp: 90 capsule, Rfl: 3     furosemide (LASIX) 20 MG tablet, Take 1 tablet (20 mg) by mouth daily, Disp: 14 tablet, Rfl: 0     medical cannabis (Patient's own supply.  Not a prescription), 1 Dose See Admin Instructions (This is NOT a prescription, and does not certify that the patient has a qualifying medical condition for medical cannabis.  The purpose of this order is  to document that the patient reports taking medical cannabis.), Disp: , Rfl:      melatonin 3 MG tablet, Take 3 mg by mouth, Disp: , Rfl:      morphine (MS CONTIN) 60 MG 12 hr tablet, Take 1 tablet (60 mg) by mouth every 12 hours, Disp: 60 tablet, Rfl: 0     morphine (MSIR) 15 MG IR tablet, Take 1 tablet (15 mg) by mouth every 3 hours as needed for severe pain, Disp: 120 tablet, Rfl: 0     polyethylene glycol (MIRALAX/GLYCOLAX) Packet, Take 1 packet by mouth, Disp: , Rfl:      predniSONE (DELTASONE) 20 MG tablet, Take 80 mg once daily with breakfast, decrease by 10 mg every 3 days.., Disp: 200 tablet, Rfl: 1     pregabalin (LYRICA) 50 MG capsule, Take 1 capsule (50 mg) by mouth 2 times daily, Disp: 60 capsule, Rfl: 1     Pseudoephedrine-APAP  MG TABS, , Disp: , Rfl:      senna-docusate (SENOKOT-S;PERICOLACE) 8.6-50 MG per tablet, , Disp: , Rfl:      Vitamin D, Cholecalciferol, 1000 units TABS, Take 1,000 Units by mouth 2 times daily, Disp: , Rfl:      lidocaine (LIDODERM) 5 % patch, Place 1-3 patches onto the skin every 24 hours Apply to painful areas for 12 hours on, 12 hours off. (Patient not taking: Reported on 4/8/2019), Disp: 30 patch, Rfl: 3  No current facility-administered medications for this visit.     PHYSICAL EXAM:   Temp: 97.3  F (36.3  C) Temp src: Oral BP: 112/61 Pulse: 60   Resp: 16 SpO2: 98 %      ECOG  performance status of 1.   GENERAL: Seated in chair in NAD.  HEENT: No icterus, no pallor. Moist mucous membranes. Oropharynx is clear.   NECK: Supple, normal ROM  LUNGS: Bilateral clear to ausculation  CARDIOVASCULAR: Regular rate and rhythm, no murmurs, gallops or rubs.   ABDOMEN: Soft, nontender and nondistended.   EXTREMITIES: No cyanosis, no clubbing, 2+ pitting edema to the knees bilaterally.   NEUROLOGIC: No focal deficits.  LABS/IMAGING:  Recent Labs   Lab Test 04/23/19  1303 04/08/19  1134 03/29/19  1551   WBC 10.9 7.3 6.0   RBC 3.30* 2.70* 2.92*   HGB 10.2* 8.2* 8.6*   HCT 34.0* 26.7* 29.6*   * 99 101*   MCH 30.9 30.4 29.5   MCHC 30.0* 30.7* 29.1*   RDW 18.3* 16.6* 16.4*    208 214   NEUTROPHIL 84.7 73.1 55.7     Recent Labs   Lab Test 04/23/19  1303 04/08/19  1134 03/29/19  1551    137 138   POTASSIUM 5.0 4.0 4.3   CHLORIDE 106 106 106   CO2 27 23 28   ANIONGAP 6 7 4   GLC 97 90 85   BUN 12 11 12   CR 0.65* 0.62* 0.72   AUREA 8.3* 8.1* 8.2*     Recent Labs   Lab Test 04/23/19  1303 04/08/19  1134 03/29/19  1551   PROTTOTAL 6.8 6.3* 6.3*   ALBUMIN 3.1* 2.7* 2.8*   BILITOTAL 0.4 0.7 0.7   ALKPHOS 127 122 133   AST 31 37 40   ALT 34 14 14     MR LUMBAR SPINE W/O & W CONTRAST 4/23/2019 2:48 PM  COMPARISON: Lumbar spine MRI 3/12/2019, CT CAP 4/23/2019  IMPRESSION:   1. Decreased epidural metastatic disease from L1 through L4 compared  to 3/12/2019 resulting in now mild spinal canal stenosis. Persistent  moderate to severe bilateral neural foraminal narrowing at L3-4.  2. Diffuse osseous metastatic disease from L1 to sacrum and iliac  bones. Slightly increased size of enhancing metastatic lesion at L1  compared to the prior examination.  3. Stable height loss associated with pathologic L3 vertebral  compression fracture.    CT CHEST/ABDOMEN/PELVIS W CONTRAST, 4/23/2019 1:30 PM  COMPARISON: MRI 8 3/12/2019; bone scan 2/12/2019; CT 2/5/2019.  IMPRESSION:  1. Extensive metastatic involvement off  the bones, stable from the  prior study. Unchanged pathologic fracture of the vertebral body L3.  Pelvic bone involvement was previously better characterized on MRI  3/12/2019.  2. Multiple bilateral pulmonary nodules, unchanged. No new or  enlarging pulmonary nodules.  3. Left external iliac chain lymphadenopathy, increased in size from  the prior study concerning for metastatic involvement.         Assessment /Plan   68 year old male with metastatic infiltrative lesion in L3 extending to L2 and L4, likely primary malignancy being renal cell carcinoma. However he does not have any evidence of a primary renal mass on imaging. Based on the available pathology data, we offered treatment for renal cell carcinoma with cabozantinib (CABOSUN data demonstrating cabozantinib superiority over sunitinib in the initial setting). PAX8 stain was negative, and the clear cell features were not seen (it was a poorly differentiated carcinoma). He had a favorable response to cabozantinib on initial scans. However, tolerability remained an issue and he then progressed. We started him on Nivolumab, as there is encouraging data with IO in non-clear cell carcinoma.     He tolerated cycle #1 fairly well, but after cycle #2 nivolumab he had myalgias and weakness. Inflammatory markers were elevated, and this was suspicious for an immune related AE from nivolumab. We held cycle #3 and started prednisone. His symptoms improved and he is currently tapering the prednisone.     Imaging obtained yesterday (CT CAP and MRI L-spine) demonstrated response to therapy, with improvement in the epidural metastatic disease from L1-L4.     - Will continue the prednisone taper a bit slower.    30 mg daily for one week,   20 mg daily for one week,   10 mg daily for one week,   5 mg daily until follow-up.  - If not able to taper fully to 5 mg, can self-titrate up again, though goal would be to get him down to 10 mg or less prior to resuming Nivolumab.      -  RTC for labs and PA visit in about 4 weeks. If improved and down to 10 mg prednisone or less, would be OK to resume nivolumab at that time. Will also be due for Xgeva at that time.  - Will transition care to Dr. Anne.  - Can consider another visit with neurosurgery later this summer (after a couple more cycles) for discussion about surgical options for L3 kyphoplasty.     Patient was seen and plan was discussed with attending physician Dr. De Luna.    Kimberly Wu MD/PhD  Heme/Onc Fellow    Provider Disclosure:  Please refer to the note by the fellow for H&P and physical exam and assessment and plan for details.     I agree with above History, Review of Systems, Physical exam and Plan. I have reviewed the content of the documentation and have edited it as needed. I have personally performed the services documented here and the documentation accurately represents those services and the decisions I have made.       Malathi De Luna MD  Hematology Oncology and Transplantation  [facility]

## 2019-04-24 NOTE — NURSING NOTE
Oncology Rooming Note    April 24, 2019 11:17 AM   Javon Bianchi is a 68 year old male who presents for:    Chief Complaint   Patient presents with     Oncology Clinic Visit     RETURN VISIT; RENAL CA 4 WEEK FOLLOW UP; VITALS COMPLETED BY CMA      Initial Vitals: /61   Pulse 60   Temp 97.3  F (36.3  C) (Oral)   Resp 16   Ht 1.829 m (6')   Wt 105.1 kg (231 lb 12.8 oz)   SpO2 98%   BMI 31.44 kg/m   Estimated body mass index is 31.44 kg/m  as calculated from the following:    Height as of this encounter: 1.829 m (6').    Weight as of this encounter: 105.1 kg (231 lb 12.8 oz). Body surface area is 2.31 meters squared.  Moderate Pain (4) Comment: Data Unavailable   No LMP for male patient.  Allergies reviewed: Yes  Medications reviewed: Yes    Medications: Medication refills not needed today.  Pharmacy name entered into Agenda: [facility] - [address]    Clinical concerns: No new concerns today  Dr. De Luna was notified.      Amisha Arredondo

## 2019-04-24 NOTE — LETTER
4/24/2019       RE: Javon Bianchi  [address]  [place] [address]     Dear Colleague,    Thank you for referring your patient, Javon Bianchi, to the [facility]. Please see a copy of my visit note below.    Date of Visit: 04/24/2019       Chief complaint:   Metastatic Carcinoma with bone metastases, likely renal origin (no primary renal mass)  Cabozantinib: Started 60 mg daily on September 28, 2018.  Dec 10, 2018: dose reduced cabozantinib to 40 mg a day.  Jan 17, 2019: dose reduced cabozantinib to 29 mg a day.  Feb 11, 2019: C1D1 Nivolumab   3/12/19: c2 Nivolumab  4/8/19: held Nivolumab due to myalgias, started prednisone          History of Present Illness:   Javon Bianchi is a 68 year old male with a history significant for hyperlipidemia who had been in his usual health until he developed back pain in May of 2018. At that time, the pain was a stabbing like in the middle of lower back with burning like pain wrapping around this left hip to knee area. No trauma. He had MRI on 5/23/18 showed mild degenerative change with bulging disks L2-S1. A small benign hemangioma was noted in L2, no other marrow signal abnormality. Since then, the pain failed to improve despite steroid injections. In addition, he lost 25 lbs unintentionally since June along with chills a few times a day. Over time, the pain got worse to the point he could not ambulate after short distance and developed muscle weakness in lower extremities over time requiring a walker. Finally, he went to ER ([facility] in [place]) on 8/23/18, CT A/P was unremarkable. He was referred to neurology with obtaining MRI L spine. MRI on 8/30/18 revealed a pathological fracture at L3 vertebral body with height loss at 40% and diffuse involvement of a neoplastic process. IR guided biopsy on 8/31/18 showed poorly differentiated carcinoma. IHC was suggestive of possible renal cell carcinoma per pathology report (Renal cell  "immunochemistry is positive and along with the negative staining for CK7 and CK20 suggests the possibility of a renal carcinoma, however most renal cell carcinomas also express PAX8 which is negative in this case). Of note, CT chest/abdomen/pelvis at OSH was negative for primary lesion. The patient was evaluated by neurosurgery who recommended no surgical intervention. He was discharged with TLSO brace and walker. He had palliative XRT locally from 9/11 for 10 fractions (Done in [place]).      He was started on cabozantinib 60 mg initially, requiring dose reduction to 20 mg for issues with hand foot syndrome and poor tolerability. Switched to nivolumab due to progression in bone disease on 2/6/19. Had to hold Cycle #3 due to myalgia, which improved with prednisone.     Interval history:   He presents to clinic today for symptom check, accompanied by his wife and son. He reports \"feeling like a new man\" shortly after starting the prednisone. His pain was much better, and is energy got better as well. He tapered the prednisone as he was instructed, and is currently on 30 mg daily. The best benefit he noticed was when the steroid dose was higher. He can lift his arms again, and can get out of a chair unassisted. He continues to have pain in his back and left thigh. Some numbness and tingling of the left leg which is unchanged. He is following with palliative care for his medications. No fevers or sweats. No N/V/D or abdominal pain, and appetite is good. No cough, SOB or chest pain. No rashes or concerning lesions. No new areas of pain.     PAST MEDICAL SURGICAL HISTORY: Reviewed.  SOCIAL HISTORY: Reviewed.   MEDICATIONS: Reviewed.           Home Medications:      Current Outpatient Medications:      acetaminophen (TYLENOL) 500 MG tablet, Take 1,000 mg by mouth 2 times daily, Disp: , Rfl:      calcium carbonate 500 mg, elemental, (OSCAL 500) 1250 (500 Ca) MG TABS tablet, Take 1 tablet by mouth 2 times daily, Disp: , " Rfl:      celecoxib (CELEBREX) 200 MG capsule, Take 1 capsule (200 mg) by mouth 2 times daily as needed for moderate pain, Disp: 60 capsule, Rfl: 2     Dextromethorphan-guaiFENesin (MUCINEX DM)  MG 12 hr tablet, Take 1 tablet by mouth every 12 hours, Disp: , Rfl:      diclofenac (VOLTAREN) 1 % topical gel, Apply 4 g topically 4 times daily To legs, shoulder, or back., Disp: 100 g, Rfl: 1     DULoxetine (CYMBALTA) 60 MG capsule, Take 1 capsule (60 mg) by mouth daily, Disp: 90 capsule, Rfl: 3     furosemide (LASIX) 20 MG tablet, Take 1 tablet (20 mg) by mouth daily, Disp: 14 tablet, Rfl: 0     medical cannabis (Patient's own supply.  Not a prescription), 1 Dose See Admin Instructions (This is NOT a prescription, and does not certify that the patient has a qualifying medical condition for medical cannabis.  The purpose of this order is  to document that the patient reports taking medical cannabis.), Disp: , Rfl:      melatonin 3 MG tablet, Take 3 mg by mouth, Disp: , Rfl:      morphine (MS CONTIN) 60 MG 12 hr tablet, Take 1 tablet (60 mg) by mouth every 12 hours, Disp: 60 tablet, Rfl: 0     morphine (MSIR) 15 MG IR tablet, Take 1 tablet (15 mg) by mouth every 3 hours as needed for severe pain, Disp: 120 tablet, Rfl: 0     polyethylene glycol (MIRALAX/GLYCOLAX) Packet, Take 1 packet by mouth, Disp: , Rfl:      predniSONE (DELTASONE) 20 MG tablet, Take 80 mg once daily with breakfast, decrease by 10 mg every 3 days.., Disp: 200 tablet, Rfl: 1     pregabalin (LYRICA) 50 MG capsule, Take 1 capsule (50 mg) by mouth 2 times daily, Disp: 60 capsule, Rfl: 1     Pseudoephedrine-APAP  MG TABS, , Disp: , Rfl:      senna-docusate (SENOKOT-S;PERICOLACE) 8.6-50 MG per tablet, , Disp: , Rfl:      Vitamin D, Cholecalciferol, 1000 units TABS, Take 1,000 Units by mouth 2 times daily, Disp: , Rfl:      lidocaine (LIDODERM) 5 % patch, Place 1-3 patches onto the skin every 24 hours Apply to painful areas for 12 hours on, 12  hours off. (Patient not taking: Reported on 4/8/2019), Disp: 30 patch, Rfl: 3  No current facility-administered medications for this visit.     PHYSICAL EXAM:   Temp: 97.3  F (36.3  C) Temp src: Oral BP: 112/61 Pulse: 60   Resp: 16 SpO2: 98 %      ECOG performance status of 1.   GENERAL: Seated in chair in NAD.  HEENT: No icterus, no pallor. Moist mucous membranes. Oropharynx is clear.   NECK: Supple, normal ROM  LUNGS: Bilateral clear to ausculation  CARDIOVASCULAR: Regular rate and rhythm, no murmurs, gallops or rubs.   ABDOMEN: Soft, nontender and nondistended.   EXTREMITIES: No cyanosis, no clubbing, 2+ pitting edema to the knees bilaterally.   NEUROLOGIC: No focal deficits.  LABS/IMAGING:  Recent Labs   Lab Test 04/23/19  1303 04/08/19  1134 03/29/19  1551   WBC 10.9 7.3 6.0   RBC 3.30* 2.70* 2.92*   HGB 10.2* 8.2* 8.6*   HCT 34.0* 26.7* 29.6*   * 99 101*   MCH 30.9 30.4 29.5   MCHC 30.0* 30.7* 29.1*   RDW 18.3* 16.6* 16.4*    208 214   NEUTROPHIL 84.7 73.1 55.7     Recent Labs   Lab Test 04/23/19  1303 04/08/19  1134 03/29/19  1551    137 138   POTASSIUM 5.0 4.0 4.3   CHLORIDE 106 106 106   CO2 27 23 28   ANIONGAP 6 7 4   GLC 97 90 85   BUN 12 11 12   CR 0.65* 0.62* 0.72   AUREA 8.3* 8.1* 8.2*     Recent Labs   Lab Test 04/23/19  1303 04/08/19  1134 03/29/19  1551   PROTTOTAL 6.8 6.3* 6.3*   ALBUMIN 3.1* 2.7* 2.8*   BILITOTAL 0.4 0.7 0.7   ALKPHOS 127 122 133   AST 31 37 40   ALT 34 14 14     MR LUMBAR SPINE W/O & W CONTRAST 4/23/2019 2:48 PM  COMPARISON: Lumbar spine MRI 3/12/2019, CT CAP 4/23/2019  IMPRESSION:   1. Decreased epidural metastatic disease from L1 through L4 compared  to 3/12/2019 resulting in now mild spinal canal stenosis. Persistent  moderate to severe bilateral neural foraminal narrowing at L3-4.  2. Diffuse osseous metastatic disease from L1 to sacrum and iliac  bones. Slightly increased size of enhancing metastatic lesion at L1  compared to the prior examination.  3.  Stable height loss associated with pathologic L3 vertebral  compression fracture.    CT CHEST/ABDOMEN/PELVIS W CONTRAST, 4/23/2019 1:30 PM  COMPARISON: MRI 8 3/12/2019; bone scan 2/12/2019; CT 2/5/2019.  IMPRESSION:  1. Extensive metastatic involvement off the bones, stable from the  prior study. Unchanged pathologic fracture of the vertebral body L3.  Pelvic bone involvement was previously better characterized on MRI  3/12/2019.  2. Multiple bilateral pulmonary nodules, unchanged. No new or  enlarging pulmonary nodules.  3. Left external iliac chain lymphadenopathy, increased in size from  the prior study concerning for metastatic involvement.         Assessment /Plan   68 year old male with metastatic infiltrative lesion in L3 extending to L2 and L4, likely primary malignancy being renal cell carcinoma. However he does not have any evidence of a primary renal mass on imaging. Based on the available pathology data, we offered treatment for renal cell carcinoma with cabozantinib (CABOSUN data demonstrating cabozantinib superiority over sunitinib in the initial setting). PAX8 stain was negative, and the clear cell features were not seen (it was a poorly differentiated carcinoma). He had a favorable response to cabozantinib on initial scans. However, tolerability remained an issue and he then progressed. We started him on Nivolumab, as there is encouraging data with IO in non-clear cell carcinoma.     He tolerated cycle #1 fairly well, but after cycle #2 nivolumab he had myalgias and weakness. Inflammatory markers were elevated, and this was suspicious for an immune related AE from nivolumab. We held cycle #3 and started prednisone. His symptoms improved and he is currently tapering the prednisone.     Imaging obtained yesterday (CT CAP and MRI L-spine) demonstrated response to therapy, with improvement in the epidural metastatic disease from L1-L4.     - Will continue the prednisone taper a bit slower.    30 mg daily  for one week,   20 mg daily for one week,   10 mg daily for one week,   5 mg daily until follow-up.  - If not able to taper fully to 5 mg, can self-titrate up again, though goal would be to get him down to 10 mg or less prior to resuming Nivolumab.      - RTC for labs and PA visit in about 4 weeks. If improved and down to 10 mg prednisone or less, would be OK to resume nivolumab at that time. Will also be due for Xgeva at that time.  - Will transition care to Dr. Anne.  - Can consider another visit with neurosurgery later this summer (after a couple more cycles) for discussion about surgical options for L3 kyphoplasty.     Patient was seen and plan was discussed with attending physician Dr. De Luna.    Kimberly Wu MD/PhD  Heme/Onc Fellow    Provider Disclosure:  Please refer to the note by the fellow for H&P and physical exam and assessment and plan for details.     I agree with above History, Review of Systems, Physical exam and Plan. I have reviewed the content of the documentation and have edited it as needed. I have personally performed the services documented here and the documentation accurately represents those services and the decisions I have made.       Malathi De Luna MD  Hematology Oncology and Transplantation  [facility]

## 2019-04-25 ENCOUNTER — ONCOLOGY VISIT (OUTPATIENT)
Dept: ONCOLOGY | Facility: CLINIC | Age: 69
End: 2019-04-25
Attending: INTERNAL MEDICINE
Payer: MEDICARE

## 2019-04-25 VITALS
SYSTOLIC BLOOD PRESSURE: 143 MMHG | BODY MASS INDEX: 31.29 KG/M2 | OXYGEN SATURATION: 96 % | HEART RATE: 66 BPM | DIASTOLIC BLOOD PRESSURE: 69 MMHG | HEIGHT: 72 IN | RESPIRATION RATE: 16 BRPM | TEMPERATURE: 97.4 F | WEIGHT: 231 LBS

## 2019-04-25 DIAGNOSIS — T40.2X5A CONSTIPATION DUE TO OPIOID THERAPY: ICD-10-CM

## 2019-04-25 DIAGNOSIS — G89.3 CANCER ASSOCIATED PAIN: ICD-10-CM

## 2019-04-25 DIAGNOSIS — C64.9 RENAL CELL CARCINOMA, UNSPECIFIED LATERALITY (H): Primary | ICD-10-CM

## 2019-04-25 DIAGNOSIS — Z51.5 ENCOUNTER FOR PALLIATIVE CARE: ICD-10-CM

## 2019-04-25 DIAGNOSIS — K59.03 CONSTIPATION DUE TO OPIOID THERAPY: ICD-10-CM

## 2019-04-25 LAB
CK BB CFR SERPL ELPH: 0 % (ref 0–0)
CK MACRO1 CFR SERPL: 0 % (ref 0–0)
CK MACRO2 CFR SERPL: 0 % (ref 0–0)
CK MB CFR SERPL ELPH: 0 % (ref 0–4)
CK MM CFR SERPL ELPH: 100 % (ref 96–100)
CK SERPL-CCNC: 41 U/L (ref 20–200)

## 2019-04-25 PROCEDURE — G0463 HOSPITAL OUTPT CLINIC VISIT: HCPCS

## 2019-04-25 PROCEDURE — 99214 OFFICE O/P EST MOD 30 MIN: CPT | Performed by: INTERNAL MEDICINE

## 2019-04-25 RX ORDER — MORPHINE SULFATE 30 MG/1
TABLET, FILM COATED, EXTENDED RELEASE ORAL
Qty: 67 TABLET | Refills: 0 | Status: SHIPPED | OUTPATIENT
Start: 2019-04-25 | End: 2019-05-12

## 2019-04-25 ASSESSMENT — MIFFLIN-ST. JEOR: SCORE: 1845.81

## 2019-04-25 ASSESSMENT — PAIN SCALES - GENERAL: PAINLEVEL: MODERATE PAIN (4)

## 2019-04-25 NOTE — PROGRESS NOTES
Oncology Rooming Note    April 25, 2019 11:07 AM   Javon Bianchi is a 68 year old male who presents for:    Chief Complaint   Patient presents with     Palliative     Initial Vitals: /69   Pulse 66   Temp 97.4  F (36.3  C) (Oral)   Resp 16   Ht 1.829 m (6')   Wt 104.8 kg (231 lb)   SpO2 96%   BMI 31.33 kg/m   Estimated body mass index is 31.33 kg/m  as calculated from the following:    Height as of this encounter: 1.829 m (6').    Weight as of this encounter: 104.8 kg (231 lb). Body surface area is 2.31 meters squared.  Moderate Pain (4) Comment: Data Unavailable   No LMP for male patient.  Allergies reviewed: Yes  Medications reviewed: Yes    Medications: Medication refills not needed today.  Pharmacy name entered into [facility]: [facility] - [place], [place] - [address] [place] [address]    Clinical concerns: questions about medications      Ruth Thorpe, CMA

## 2019-04-25 NOTE — PATIENT INSTRUCTIONS
Thank you for coming into the Palliative Care Clinic today.     1. Stop Celebrex while on steroids.   2. Decrease MS Contin to 30mg in the morning and 60mg in the evening.  After one week, if you aren't needing extra immediate release morphine overnight, you can decrease the MS Contin to 30mg twice daily.   3. Cut Miralax back to 1/2 capful daily.  If stools remain loose, stop the Miralax altogether and continue on with senna only.     Return to clinic 1 month for a follow up.     You can reach the Palliative Care Team during business hours at the following number: [phone] (Palliative Clinic Nurse Line).     To reach the Palliative Care Provider on-call after-hours or on holidays and weekends, call: [phone].  Please note that we are not able to provide pain medication refills on evenings or weekends.     ==================================================================    [facility] Palliative Care Clinics   The [facility] Palliative Care Team is committed to treating your pain and other symptoms. This handout is for all patients treated with opioid medications in our clinics.     What are opioid medicines?   Opioid medications are used to ease some types of pain and shortness of breath. They are sometimes called narcotics. They include: Morphine (MS Contin, Roxanol), Oxycodone (OxyContin, OxyFast, Percocet), Hydrocodone (Vicodin, Norco), Hydromorphone (Dilaudid), Fentanyl (Duragesic), Methadone (Dolophine).   Are opioid medicines safe to take?   Opioid medicines are safe when you follow the directions from your doctor or medical provider.  Opioids can cause serious side effects and become unsafe if you do not follow your instructions.   To make sure you are taking opioid medicines safely, we may ask you to bring in your pill bottles or to allow us to test your urine. Our goal is to keep you safe and to improve your ability to function & be active. If we don t think  opioids are safely doing that, we will work with you to taper you off of them.   Do not drive unless your medical provider tells you it is safe.   Do not take opioids with alcohol or anxiety/sleep medicines unless your doctor tells you it is ok to do so.   Are opioids addictive?   Addiction means you crave a drug and have trouble limiting the amount you use.   Addiction is more likely to happen if you take opioids to relieve stress or to feel altered. If you or your loved one feels this way when taking opioid medicines, let your medical provider know. We may refer you for additional assessment or services if this is a concern.   Your body will get used to the opioid medicines if you take them for more than two weeks in a row. This means if you stop them suddenly, you may have withdrawal. If this occurs, you will feel uncomfortable (nausea, diarrhea, increased pain). This does not mean you are addicted. Never stop taking your pain medicines all at once unless your medical provider tells you to. If you think you need less medicine, your medical provider will help you to safely lower your dose.   What is the safest way to store opioids?   Keep your medicines in a safe place away from children, teens, pets, and visitors. Be careful about who knows that you have these medicines.   How do I get rid of my old opioids?   Opioids should be brought to your [facility] drop-off site, or you can purchase a disposal kit from your local pharmacy. If neither of these options is available, the Food and Drug Administration recommends that you flush unused opioids down the toilet.   Do not share or sell your pain medicines. This is illegal and very dangerous. We cannot prescribe opioid pain medicines to you if do this.   How do I get refills?   Opioid medicines need signed paper prescriptions. This means it may take longer to refill than other medicines. Our clinic cannot prescribe them on weekends or at night.     Give us one week s  notice when requesting a refill. This gives us the time we need to get it signed and back to you. It may be possible to mail prescriptions to you.

## 2019-04-25 NOTE — PROGRESS NOTES
"Palliative Care Outpatient Clinic Progress Note    Patient Name:  Javon Bianchi  Primary Provider:  LIANNE Wellington    Chief Complaint/Identifying Information:  Metastatic Renal Cell Carcinoma on Cabometyx  History of L3 pathologic fracture found at diagnosis       [facility] Opioid Prescribing Safety Plan     Opioid Safety Education: Reviewed by Nan Gonzalez  on 11/8/2018  Opioid Risk Assessment: Performed by Nan Gonzalez  on 11/8/2018 .  ORT score of 0.   Mood Disorder Assessment: Performed by Carly Mills on 04/25/19.  No concerns.    reviewed with every prescription, last reviewed by Carly Mills on 04/25/19     Additional recommendations based on patient's prognosis and indication for opioids include the following:     Expected prognosis: shorter  Risk: Low or Medium (ORT 0-7)  No further recommendations.     Interim History:  Javon Bianchi 68 year old male returns to be seen by palliative care today, accompanied by his wife Suzie.  Javon Bianchi was last seen by me six weeks ago, at which time Omega's pain was adequately controlled. Omega notes that he has been up and down over that time.  Had diffuse pains in his joints and this improved steroids and Lyrica. Mild dull pain for the past few days.  He does feel like he's \"floating\" from the pain medications. Having dull pain in the back, not sharp, mild. Having some tingling and spasms in the left leg, no significant change from prior.     He has been using medical cannabis, feels this has been helpful. No clear side effects.     Using MS Contin 60mg BID, hasn't taken any MSIR in 6 days.      Bowels are \"pretty loose\" with 2 senna in the AM, miralax 1 capful, prune juice.  Having 1 large loose stool, then done for the day.     Has been walking more.     Coping:  Hopeful hearing the results of his recent scans.     Social History:  Pertinent changes to social history/social situation since last " visit: Went to watch a baseball game last night.   Social History     Tobacco Use     Smoking status: Never Smoker     Smokeless tobacco: Never Used   Substance Use Topics     Alcohol use: None     Drug use: None              Physical Exam:   Vitals were reviewed  /69   Pulse 66   Temp 97.4  F (36.3  C) (Oral)   Resp 16   Ht 1.829 m (6')   Wt 104.8 kg (231 lb)   SpO2 96%   BMI 31.33 kg/m    General: Alert, comfortable appearing male in no acute distress.   Eyes: Pupils equal, sclera clear.   ENT: MMM.   Resp: Unlabored on room air.   Abd: Soft, non-distended, non-tender to palpation, active bowel sounds.   Ext/Back: No TTP of the back.   Neuro: No facial asymmetry.  Spontaneous movements grossly non-focal.  Sensation to light touch is grossly dulled throughout bilateral lower extremities.  Gait assisted by cane.   Psych: Alert, appropriately interactive, full affect, clear sensorium.      Impression & Recommendations & Counseling:  Javon is a 68 year old man with likely metastatic renal cell carcinoma whose painful epidural tumor burden has responded to Ipilimumab/Nivolumab.  He would now benefit from medication de-escalation.     -Hold Celebrex while on steroids.  -Decrease MS Contin to 30mg in AM, 60 in PM.  After one week if tolerating well, decrease further to 30mg BID.  Okay to continue MSIR 15mg q3h PRN pain.  -Decrease miralax to 1/2 capful daily, may need to stop altogether with opioids decreasing.   -Continue other meds as written.     POLST completed today with patient's request for DNR/DNI code status.     RTC 1 month for a follow up.     Additional information reviewed today:   No Known Allergies  Current Outpatient Medications   Medication Sig Dispense Refill     acetaminophen (TYLENOL) 500 MG tablet Take 1,000 mg by mouth 2 times daily       calcium carbonate 500 mg, elemental, (OSCAL 500) 1250 (500 Ca) MG TABS tablet Take 1 tablet by mouth 2 times daily       celecoxib (CELEBREX) 200 MG  capsule Take 1 capsule (200 mg) by mouth 2 times daily as needed for moderate pain 60 capsule 2     Dextromethorphan-guaiFENesin (MUCINEX DM)  MG 12 hr tablet Take 1 tablet by mouth every 12 hours       diclofenac (VOLTAREN) 1 % topical gel Apply 4 g topically 4 times daily To legs, shoulder, or back. 100 g 1     DULoxetine (CYMBALTA) 60 MG capsule Take 1 capsule (60 mg) by mouth daily 90 capsule 3     furosemide (LASIX) 20 MG tablet Take 1 tablet (20 mg) by mouth daily 14 tablet 0     lidocaine (LIDODERM) 5 % patch Place 1-3 patches onto the skin every 24 hours Apply to painful areas for 12 hours on, 12 hours off. 30 patch 3     medical cannabis (Patient's own supply.  Not a prescription) 1 Dose See Admin Instructions (This is NOT a prescription, and does not certify that the patient has a qualifying medical condition for medical cannabis.  The purpose of this order is  to document that the patient reports taking medical cannabis.)       melatonin 3 MG tablet Take 3 mg by mouth       morphine (MS CONTIN) 60 MG 12 hr tablet Take 1 tablet (60 mg) by mouth every 12 hours 60 tablet 0     morphine (MSIR) 15 MG IR tablet Take 1 tablet (15 mg) by mouth every 3 hours as needed for severe pain 120 tablet 0     polyethylene glycol (MIRALAX/GLYCOLAX) Packet Take 1 packet by mouth       predniSONE (DELTASONE) 10 MG tablet Prednisone 30 mg daily for 7 days, then 20 mg daily for 7 days, then 10 mg daily for 10 days, then 5 mg daily. 200 tablet 0     pregabalin (LYRICA) 50 MG capsule Take 1 capsule (50 mg) by mouth 2 times daily 60 capsule 1     Pseudoephedrine-APAP  MG TABS        senna-docusate (SENOKOT-S;PERICOLACE) 8.6-50 MG per tablet        Vitamin D, Cholecalciferol, 1000 units TABS Take 1,000 Units by mouth 2 times daily       Past Medical History:   Diagnosis Date     Bone metastasis (H) 09/28/2018     Renal cell carcinoma, right (H) 09/28/2018     No past surgical history on file.    Key Data  "Reviewed:  LABS:   Lab Results   Component Value Date    WBC 10.9 04/23/2019    HGB 10.2 (L) 04/23/2019    HCT 34.0 (L) 04/23/2019     04/23/2019     04/23/2019    POTASSIUM 5.0 04/23/2019    CHLORIDE 106 04/23/2019    CO2 27 04/23/2019    BUN 12 04/23/2019    CR 0.65 (L) 04/23/2019    GLC 97 04/23/2019    SED 87 (H) 03/29/2019    AST 31 04/23/2019    ALT 34 04/23/2019    ALKPHOS 127 04/23/2019    BILITOTAL 0.4 04/23/2019    INR 1.3 (A) 08/31/2018     IMAGING:   MRI Lumbar spine 4/23/19  \"Impression:   1. Decreased epidural metastatic disease from L1 through L4 compared  to 3/12/2019 resulting in now mild spinal canal stenosis. Persistent  moderate to severe bilateral neural foraminal narrowing at L3-4.  2. Diffuse osseous metastatic disease from L1 to sacrum and iliac  bones. Slightly increased size of enhancing metastatic lesion at L1  compared to the prior examination.  3. Stable height loss associated with pathologic L3 vertebral  compression fracture.\"    CT C/A/P same day  \"IMPRESSION:  1. Extensive metastatic involvement off the bones, stable from the  prior study. Unchanged pathologic fracture of the vertebral body L3.  Pelvic bone involvement was previously better characterized on MRI  3/12/2019.  2. Multiple bilateral pulmonary nodules, unchanged. No new or  enlarging pulmonary nodules.  3. Left external iliac chain lymphadenopathy, increased in size from  the prior study concerning for metastatic involvement.\"    [place]  - Use of controlled substances consistent with history.     Thank you for continuing to involve me in the care of this patient.     Carly Mills MD / Palliative Medicine / Pager [phone] / After-Hours Answering Service [phone] / Main Palliative Clinic - [facility] [phone] / [facility] Inpatient Team Consult Pager [phone] (answered 8am-430pm M-F)    Time spent: 52 minutes, >50% spent in counseling/coordination of care.     "

## 2019-04-25 NOTE — LETTER
"    4/25/2019         RE: Javon Bianchi  [address]  [facility] [address]        Dear Colleague,    Thank you for referring your patient, Javon Bianchi, to the [facility]. Please see a copy of my visit note below.    Palliative Care Outpatient Clinic Progress Note    Patient Name:  Javon Bianchi  Primary Provider:  LIANNE Wellington    Chief Complaint/Identifying Information:  Metastatic Renal Cell Carcinoma on Cabometyx  History of L3 pathologic fracture found at diagnosis       [facility] Opioid Prescribing Safety Plan     Opioid Safety Education: Reviewed by Nan Gonzalez  on 11/8/2018  Opioid Risk Assessment: Performed by Nan Gonzalez  on 11/8/2018 .  ORT score of 0.   Mood Disorder Assessment: Performed by Carly Mills on 04/25/19.  No concerns.    reviewed with every prescription, last reviewed by Carly Mills on  04/25/19     Additional recommendations based on patient's prognosis and indication for opioids include the following:     Expected prognosis: shorter  Risk: Low or Medium (ORT 0-7)  No further recommendations.     Interim History:  Javon Bianchi 68 year old male returns to be seen by palliative care today, accompanied by his wife Suzie.  Javon Bianchi was last seen by me six weeks ago, at which time Omega's pain was adequately controlled. Omega notes that he has been up and down over that time.  Had diffuse pains in his joints and this improved steroids and Lyrica. Mild dull pain for the past few days.  He does feel like he's \"floating\" from the pain medications. Having dull pain in the back, not sharp, mild. Having some tingling and spasms in the left leg, no significant change from prior.     He has been using medical cannabis, feels this has been helpful. No clear side effects.     Using MS Contin 60mg BID, hasn't taken any MSIR in 6 days.      Bowels are \"pretty loose\" with 2 senna in the AM, miralax 1 " capful, prune juice.  Having 1 large loose stool, then done for the day.     Has been walking more.     Coping:  Hopeful hearing the results of his recent scans.     Social History:  Pertinent changes to social history/social situation since last visit: Went to watch a baseball game last night.   Social History     Tobacco Use     Smoking status: Never Smoker     Smokeless tobacco: Never Used   Substance Use Topics     Alcohol use: None     Drug use: None              Physical Exam:   Vitals were reviewed  /69   Pulse 66   Temp 97.4  F (36.3  C) (Oral)   Resp 16   Ht 1.829 m (6')   Wt 104.8 kg (231 lb)   SpO2 96%   BMI 31.33 kg/m     General: Alert, comfortable appearing male in no acute distress.   Eyes: Pupils equal, sclera clear.   ENT: MMM.   Resp: Unlabored on room air.   Abd: Soft, non-distended, non-tender to palpation, active bowel sounds.   Ext/Back: No TTP of the back.   Neuro: No facial asymmetry.  Spontaneous movements grossly non-focal.  Sensation to light touch is grossly dulled throughout bilateral lower extremities.  Gait assisted by cane.   Psych: Alert, appropriately interactive, full affect, clear sensorium.      Impression & Recommendations & Counseling:  Javon is a 68 year old man with likely metastatic renal cell carcinoma  whose painful epidural tumor burden has responded to Ipilimumab/Nivolumab.  He would now benefit from medication de-escalation.     -Hold Celebrex while on steroids.  -Decrease MS Contin to 30mg in AM, 60 in PM.  After one week if tolerating well, decrease further to 30mg BID.  Okay to continue MSIR 15mg q3h PRN pain.  -Decrease miralax to 1/2 capful daily, may need to stop altogether with opioids decreasing.   -Continue other meds as written.     POLST completed today with patient's request for DNR/DNI code status.     RTC 1 month for a follow up.     Additional information reviewed today:   No Known Allergies  Current Outpatient Medications   Medication Sig  Dispense Refill     acetaminophen (TYLENOL) 500 MG tablet Take 1,000 mg by mouth 2 times daily       calcium carbonate 500 mg, elemental, (OSCAL 500) 1250 (500 Ca) MG TABS tablet Take 1 tablet by mouth 2 times daily       celecoxib (CELEBREX) 200 MG capsule Take 1 capsule (200 mg) by mouth 2 times daily as needed for moderate pain 60 capsule 2     Dextromethorphan-guaiFENesin (MUCINEX DM)  MG 12 hr tablet Take 1 tablet by mouth every 12 hours       diclofenac (VOLTAREN) 1 % topical gel Apply 4 g topically 4 times daily To legs, shoulder, or back. 100 g 1     DULoxetine (CYMBALTA) 60 MG capsule Take 1 capsule (60 mg) by mouth daily 90 capsule 3     furosemide (LASIX) 20 MG tablet Take 1 tablet (20 mg) by mouth daily 14 tablet 0     lidocaine (LIDODERM) 5 % patch Place 1-3 patches onto the skin every 24 hours Apply to painful areas for 12 hours on, 12 hours off. 30 patch 3     medical cannabis (Patient's own supply.  Not a prescription) 1 Dose See Admin Instructions (This is NOT a prescription, and does not certify that the patient has a qualifying medical condition for medical cannabis.  The purpose of this order is  to document that the patient reports taking medical cannabis.)       melatonin 3 MG tablet Take 3 mg by mouth       morphine (MS CONTIN) 60 MG 12 hr tablet Take 1 tablet (60 mg) by mouth every 12 hours 60 tablet 0     morphine (MSIR) 15 MG IR tablet Take 1 tablet (15 mg) by mouth every 3 hours as needed for severe pain 120 tablet 0     polyethylene glycol (MIRALAX/GLYCOLAX) Packet Take 1 packet by mouth       predniSONE (DELTASONE) 10 MG tablet Prednisone 30 mg daily for 7 days, then 20 mg daily for 7 days, then 10 mg daily for 10 days, then 5 mg daily. 200 tablet 0     pregabalin (LYRICA) 50 MG capsule Take 1 capsule (50 mg) by mouth 2 times daily 60 capsule 1     Pseudoephedrine-APAP  MG TABS        senna-docusate (SENOKOT-S;PERICOLACE) 8.6-50 MG per tablet        Vitamin D,  "Cholecalciferol, 1000 units TABS Take 1,000 Units by mouth 2 times daily       Past Medical History:   Diagnosis Date     Bone metastasis (H) 09/28/2018     Renal cell carcinoma, right (H) 09/28/2018     No past surgical history on file.    Key Data Reviewed:  LABS:   Lab Results   Component Value Date    WBC 10.9 04/23/2019    HGB 10.2 (L) 04/23/2019    HCT 34.0 (L) 04/23/2019     04/23/2019     04/23/2019    POTASSIUM 5.0 04/23/2019    CHLORIDE 106 04/23/2019    CO2 27 04/23/2019    BUN 12 04/23/2019    CR 0.65 (L) 04/23/2019    GLC 97 04/23/2019    SED 87 (H) 03/29/2019    AST 31 04/23/2019    ALT 34 04/23/2019    ALKPHOS 127 04/23/2019    BILITOTAL 0.4 04/23/2019    INR 1.3 (A) 08/31/2018     IMAGING:   MRI Lumbar spine 4/23/19  \"Impression:   1. Decreased epidural metastatic disease from L1 through L4 compared  to 3/12/2019 resulting in now mild spinal canal stenosis. Persistent  moderate to severe bilateral neural foraminal narrowing at L3-4.  2. Diffuse osseous metastatic disease from L1 to sacrum and iliac  bones. Slightly increased size of enhancing metastatic lesion at L1  compared to the prior examination.  3. Stable height loss associated with pathologic L3 vertebral  compression fracture.\"    CT C/A/P same day  \"IMPRESSION:  1. Extensive metastatic involvement off the bones, stable from the  prior study. Unchanged pathologic fracture of the vertebral body L3.  Pelvic bone involvement was previously better characterized on MRI  3/12/2019.  2. Multiple bilateral pulmonary nodules, unchanged. No new or  enlarging pulmonary nodules.  3. Left external iliac chain lymphadenopathy, increased in size from  the prior study concerning for metastatic involvement.\"    [place]  - Use of controlled substances consistent with history.     Thank you for continuing to involve me in the care of this patient.     Carly Mills MD / Palliative Medicine / Pager [phone] / After-Hours Answering Service " [phone] / [facility] - [facility] the [facility] [phone] / [facility] Inpatient Team Consult Pager [phone] (answered 8am-430pm M-F)    Time spent: 52 minutes, >50% spent in counseling/coordination of care.       Oncology Rooming Note    April 25, 2019 11:07 AM   Javon Bianchi is a 68 year old male who presents for:    Chief Complaint   Patient presents with     Palliative     Initial Vitals: /69   Pulse 66   Temp 97.4  F (36.3  C) (Oral)   Resp 16   Ht 1.829 m (6')   Wt 104.8 kg (231 lb)   SpO2 96%   BMI 31.33 kg/m    Estimated body mass index is 31.33 kg/m  as calculated from the following:    Height as of this encounter: 1.829 m (6').    Weight as of this encounter: 104.8 kg (231 lb). Body surface area is 2.31 meters squared.  Moderate Pain (4) Comment: Data Unavailable   No LMP for male patient.  Allergies reviewed: Yes  Medications reviewed: Yes    Medications: Medication refills not needed today.  Pharmacy name entered into [employer]: [facility] - [place], [place] - [address] [place] [address]    Clinical concerns: questions about medications      Ruth Thorpe CMA              Again, thank you for allowing me to participate in the care of your patient.        Sincerely,        Carly Mills MD

## 2019-04-26 ENCOUNTER — TELEPHONE (OUTPATIENT)
Dept: CARE COORDINATION | Facility: CLINIC | Age: 69
End: 2019-04-26

## 2019-04-26 ENCOUNTER — PATIENT OUTREACH (OUTPATIENT)
Dept: ONCOLOGY | Facility: CLINIC | Age: 69
End: 2019-04-26

## 2019-04-26 NOTE — PROGRESS NOTES
TC from pt stating that it was discussed during his most recent appt with Dr. De Luna that he would be continuing on Lasix, but that an Rx was not at the pharmacy when they went to pick it up. It appears that there was no new Rx entered for pt and that Dr. De Luna's notes don't reflect a plan to continue this medication. Told pt a message will be sent to Dr. De Luna asking for clarification and to order lasix if this is what she had intended to do and have it sent to [facility] in [place]. \    Pt also requested a reservation for [facility] for the night of 05/20/2019. Message sent to Clair Husain in social work requesting she arrange for this.     Pt also needed to clarify schedule dates as he wrote down during his appt that he was going to see Radha on 05/20, but his AVS says his appt is on 05/21. Verified all of his appts: labs, OV, infusion, and palliative care are all on 05/21. Pt stated understanding.

## 2019-04-26 NOTE — TELEPHONE ENCOUNTER
Per Patient's request,  completed and faxed [facility] request for lodging dates 5/20/19 - 5/21/2019. [facility] will contact Patient for confirmation of reservation.  will continue to provide support as needed.    Clair Husain [facility]  Outpatient Specialty Clinics  Direct Phone: [phone]  Pager:  [phone]

## 2019-04-29 RX ORDER — FUROSEMIDE 20 MG
20 TABLET ORAL DAILY
Qty: 14 TABLET | Refills: 0 | Status: SHIPPED | OUTPATIENT
Start: 2019-04-29 | End: 2019-06-17

## 2019-04-30 ENCOUNTER — DOCUMENTATION ONLY (OUTPATIENT)
Dept: OTHER | Facility: CLINIC | Age: 69
End: 2019-04-30

## 2019-05-11 ENCOUNTER — NURSE TRIAGE (OUTPATIENT)
Dept: NURSING | Facility: CLINIC | Age: 69
End: 2019-05-11

## 2019-05-11 NOTE — TELEPHONE ENCOUNTER
Spouse calling; patient present.  States was advised to go to ED yesterday and went to ED in [place].  Patient was given hydration and discharged with possible virus.  Tympanic temperature is 100.6.  Asking if anything else needs to be done today.  FNA paged on call provider, Dr. Mace, via page  at 11:16AM to contact spouse, Patrizia, at [phone] and instructed spouse to call back if no response in 20 minutes.

## 2019-05-12 ENCOUNTER — APPOINTMENT (OUTPATIENT)
Dept: GENERAL RADIOLOGY | Facility: CLINIC | Age: 69
End: 2019-05-12
Attending: EMERGENCY MEDICINE
Payer: MEDICARE

## 2019-05-12 ENCOUNTER — NURSE TRIAGE (OUTPATIENT)
Dept: NURSING | Facility: CLINIC | Age: 69
End: 2019-05-12

## 2019-05-12 ENCOUNTER — HOSPITAL ENCOUNTER (OUTPATIENT)
Facility: CLINIC | Age: 69
Setting detail: OBSERVATION
Discharge: HOME | End: 2019-05-13
Attending: EMERGENCY MEDICINE | Admitting: INTERNAL MEDICINE
Payer: MEDICARE

## 2019-05-12 DIAGNOSIS — R50.9 FEVER, UNSPECIFIED FEVER CAUSE: ICD-10-CM

## 2019-05-12 DIAGNOSIS — R65.10 SIRS (SYSTEMIC INFLAMMATORY RESPONSE SYNDROME) (H): Primary | ICD-10-CM

## 2019-05-12 DIAGNOSIS — C64.9 RENAL CELL CARCINOMA, UNSPECIFIED LATERALITY (H): ICD-10-CM

## 2019-05-12 DIAGNOSIS — R50.9 FEVER AND CHILLS: ICD-10-CM

## 2019-05-12 LAB
ALBUMIN UR-MCNC: NEGATIVE MG/DL
ANION GAP SERPL CALCULATED.3IONS-SCNC: 9 MMOL/L (ref 3–14)
ANISOCYTOSIS BLD QL SMEAR: SLIGHT
APPEARANCE UR: CLEAR
BASOPHILS # BLD AUTO: 0 10E9/L (ref 0–0.2)
BASOPHILS NFR BLD AUTO: 0 %
BILIRUB UR QL STRIP: NEGATIVE
BUN SERPL-MCNC: 11 MG/DL (ref 7–30)
CALCIUM SERPL-MCNC: 8.6 MG/DL (ref 8.5–10.1)
CHLORIDE SERPL-SCNC: 104 MMOL/L (ref 94–109)
CO2 SERPL-SCNC: 24 MMOL/L (ref 20–32)
COLOR UR AUTO: ABNORMAL
CREAT SERPL-MCNC: 0.58 MG/DL (ref 0.66–1.25)
CREAT SERPL-MCNC: 0.63 MG/DL (ref 0.66–1.25)
DIFFERENTIAL METHOD BLD: ABNORMAL
EOSINOPHIL # BLD AUTO: 0.1 10E9/L (ref 0–0.7)
EOSINOPHIL NFR BLD AUTO: 0.9 %
ERYTHROCYTE [DISTWIDTH] IN BLOOD BY AUTOMATED COUNT: 17.3 % (ref 10–15)
GFR SERPL CREATININE-BSD FRML MDRD: >90 ML/MIN/{1.73_M2}
GFR SERPL CREATININE-BSD FRML MDRD: >90 ML/MIN/{1.73_M2}
GLUCOSE SERPL-MCNC: 91 MG/DL (ref 70–99)
GLUCOSE UR STRIP-MCNC: NEGATIVE MG/DL
HCT VFR BLD AUTO: 32.1 % (ref 40–53)
HGB BLD-MCNC: 9.4 G/DL (ref 13.3–17.7)
HGB UR QL STRIP: NEGATIVE
KETONES UR STRIP-MCNC: NEGATIVE MG/DL
LACTATE BLD-SCNC: 0.9 MMOL/L (ref 0.7–2)
LACTATE BLD-SCNC: 1.6 MMOL/L (ref 0.7–2)
LACTATE BLD-SCNC: 2.1 MMOL/L (ref 0.7–2)
LEUKOCYTE ESTERASE UR QL STRIP: NEGATIVE
LYMPHOCYTES # BLD AUTO: 0.1 10E9/L (ref 0.8–5.3)
LYMPHOCYTES NFR BLD AUTO: 0.9 %
MCH RBC QN AUTO: 29.2 PG (ref 26.5–33)
MCHC RBC AUTO-ENTMCNC: 29.3 G/DL (ref 31.5–36.5)
MCV RBC AUTO: 100 FL (ref 78–100)
MONOCYTES # BLD AUTO: 3.4 10E9/L (ref 0–1.3)
MONOCYTES NFR BLD AUTO: 32.2 %
NEUTROPHILS # BLD AUTO: 7.1 10E9/L (ref 1.6–8.3)
NEUTROPHILS NFR BLD AUTO: 66 %
NITRATE UR QL: NEGATIVE
PH UR STRIP: 6 PH (ref 5–7)
PLATELET # BLD AUTO: 252 10E9/L (ref 150–450)
PLATELET # BLD AUTO: 257 10E9/L (ref 150–450)
PLATELET # BLD EST: ABNORMAL 10*3/UL
POLYCHROMASIA BLD QL SMEAR: SLIGHT
POTASSIUM SERPL-SCNC: 3.7 MMOL/L (ref 3.4–5.3)
RBC # BLD AUTO: 3.22 10E12/L (ref 4.4–5.9)
RBC #/AREA URNS AUTO: <1 /HPF (ref 0–2)
SODIUM SERPL-SCNC: 138 MMOL/L (ref 133–144)
SOURCE: ABNORMAL
SP GR UR STRIP: 1 (ref 1–1.03)
UROBILINOGEN UR STRIP-MCNC: NORMAL MG/DL (ref 0–2)
WBC # BLD AUTO: 10.7 10E9/L (ref 4–11)
WBC #/AREA URNS AUTO: 0 /HPF (ref 0–5)

## 2019-05-12 PROCEDURE — 81001 URINALYSIS AUTO W/SCOPE: CPT | Performed by: EMERGENCY MEDICINE

## 2019-05-12 PROCEDURE — 25000128 H RX IP 250 OP 636: Performed by: INTERNAL MEDICINE

## 2019-05-12 PROCEDURE — 25800030 ZZH RX IP 258 OP 636: Performed by: EMERGENCY MEDICINE

## 2019-05-12 PROCEDURE — 25000125 ZZHC RX 250: Performed by: INTERNAL MEDICINE

## 2019-05-12 PROCEDURE — 36415 COLL VENOUS BLD VENIPUNCTURE: CPT | Performed by: EMERGENCY MEDICINE

## 2019-05-12 PROCEDURE — 87040 BLOOD CULTURE FOR BACTERIA: CPT | Performed by: EMERGENCY MEDICINE

## 2019-05-12 PROCEDURE — 80048 BASIC METABOLIC PNL TOTAL CA: CPT | Performed by: EMERGENCY MEDICINE

## 2019-05-12 PROCEDURE — 85025 COMPLETE CBC W/AUTO DIFF WBC: CPT | Performed by: EMERGENCY MEDICINE

## 2019-05-12 PROCEDURE — 85049 AUTOMATED PLATELET COUNT: CPT | Performed by: INTERNAL MEDICINE

## 2019-05-12 PROCEDURE — 82565 ASSAY OF CREATININE: CPT | Performed by: INTERNAL MEDICINE

## 2019-05-12 PROCEDURE — 71046 X-RAY EXAM CHEST 2 VIEWS: CPT

## 2019-05-12 PROCEDURE — 87633 RESP VIRUS 12-25 TARGETS: CPT | Performed by: INTERNAL MEDICINE

## 2019-05-12 PROCEDURE — 96360 HYDRATION IV INFUSION INIT: CPT | Performed by: EMERGENCY MEDICINE

## 2019-05-12 PROCEDURE — 99285 EMERGENCY DEPT VISIT HI MDM: CPT | Mod: 25 | Performed by: EMERGENCY MEDICINE

## 2019-05-12 PROCEDURE — 25000128 H RX IP 250 OP 636: Performed by: EMERGENCY MEDICINE

## 2019-05-12 PROCEDURE — 25000132 ZZH RX MED GY IP 250 OP 250 PS 637: Mod: GY | Performed by: INTERNAL MEDICINE

## 2019-05-12 PROCEDURE — 87040 BLOOD CULTURE FOR BACTERIA: CPT | Performed by: INTERNAL MEDICINE

## 2019-05-12 PROCEDURE — 12000001 ZZH R&B MED SURG/OB UMMC

## 2019-05-12 PROCEDURE — 87086 URINE CULTURE/COLONY COUNT: CPT | Performed by: EMERGENCY MEDICINE

## 2019-05-12 PROCEDURE — 99285 EMERGENCY DEPT VISIT HI MDM: CPT | Mod: Z6 | Performed by: EMERGENCY MEDICINE

## 2019-05-12 PROCEDURE — 36415 COLL VENOUS BLD VENIPUNCTURE: CPT | Performed by: INTERNAL MEDICINE

## 2019-05-12 PROCEDURE — A9270 NON-COVERED ITEM OR SERVICE: HCPCS | Mod: GY | Performed by: INTERNAL MEDICINE

## 2019-05-12 PROCEDURE — 83605 ASSAY OF LACTIC ACID: CPT | Performed by: EMERGENCY MEDICINE

## 2019-05-12 PROCEDURE — 83605 ASSAY OF LACTIC ACID: CPT | Mod: 91 | Performed by: INTERNAL MEDICINE

## 2019-05-12 PROCEDURE — 25800030 ZZH RX IP 258 OP 636: Performed by: INTERNAL MEDICINE

## 2019-05-12 RX ORDER — MULTIVIT-MIN/IRON/FOLIC ACID/K 18-600-40
1000 CAPSULE ORAL 2 TIMES DAILY
Status: DISCONTINUED | OUTPATIENT
Start: 2019-05-12 | End: 2019-05-13 | Stop reason: HOSPADM

## 2019-05-12 RX ORDER — SODIUM CHLORIDE 9 MG/ML
1000 INJECTION, SOLUTION INTRAVENOUS CONTINUOUS
Status: DISCONTINUED | OUTPATIENT
Start: 2019-05-12 | End: 2019-05-13

## 2019-05-12 RX ORDER — AMOXICILLIN 250 MG
2 CAPSULE ORAL DAILY
Status: DISCONTINUED | OUTPATIENT
Start: 2019-05-13 | End: 2019-05-13 | Stop reason: HOSPADM

## 2019-05-12 RX ORDER — GUAIFENESIN AND DEXTROMETHORPHAN HYDROBROMIDE 600; 30 MG/1; MG/1
1 TABLET, EXTENDED RELEASE ORAL EVERY 12 HOURS
Status: DISCONTINUED | OUTPATIENT
Start: 2019-05-12 | End: 2019-05-13 | Stop reason: HOSPADM

## 2019-05-12 RX ORDER — MORPHINE SULFATE 30 MG/1
30 TABLET, FILM COATED, EXTENDED RELEASE ORAL EVERY 12 HOURS
Status: ON HOLD | COMMUNITY
End: 2019-05-13

## 2019-05-12 RX ORDER — MORPHINE SULFATE 15 MG/1
15 TABLET ORAL
Status: DISCONTINUED | OUTPATIENT
Start: 2019-05-12 | End: 2019-05-13 | Stop reason: HOSPADM

## 2019-05-12 RX ORDER — POTASSIUM CL/LIDO/0.9 % NACL 10MEQ/0.1L
10 INTRAVENOUS SOLUTION, PIGGYBACK (ML) INTRAVENOUS
Status: DISCONTINUED | OUTPATIENT
Start: 2019-05-12 | End: 2019-05-13 | Stop reason: HOSPADM

## 2019-05-12 RX ORDER — NALOXONE HYDROCHLORIDE 0.4 MG/ML
.1-.4 INJECTION, SOLUTION INTRAMUSCULAR; INTRAVENOUS; SUBCUTANEOUS
Status: DISCONTINUED | OUTPATIENT
Start: 2019-05-12 | End: 2019-05-13 | Stop reason: HOSPADM

## 2019-05-12 RX ORDER — POTASSIUM CHLORIDE 29.8 MG/ML
20 INJECTION INTRAVENOUS
Status: DISCONTINUED | OUTPATIENT
Start: 2019-05-12 | End: 2019-05-13 | Stop reason: HOSPADM

## 2019-05-12 RX ORDER — POLYETHYLENE GLYCOL 3350 17 G/17G
8.5 POWDER, FOR SOLUTION ORAL DAILY PRN
Status: DISCONTINUED | OUTPATIENT
Start: 2019-05-12 | End: 2019-05-13 | Stop reason: HOSPADM

## 2019-05-12 RX ORDER — POTASSIUM CHLORIDE 750 MG/1
20-40 TABLET, EXTENDED RELEASE ORAL
Status: DISCONTINUED | OUTPATIENT
Start: 2019-05-12 | End: 2019-05-13 | Stop reason: HOSPADM

## 2019-05-12 RX ORDER — POTASSIUM CHLORIDE 7.45 MG/ML
10 INJECTION INTRAVENOUS
Status: DISCONTINUED | OUTPATIENT
Start: 2019-05-12 | End: 2019-05-13 | Stop reason: HOSPADM

## 2019-05-12 RX ORDER — FUROSEMIDE 20 MG
20 TABLET ORAL DAILY
Status: DISCONTINUED | OUTPATIENT
Start: 2019-05-13 | End: 2019-05-13 | Stop reason: HOSPADM

## 2019-05-12 RX ORDER — DULOXETIN HYDROCHLORIDE 60 MG/1
60 CAPSULE, DELAYED RELEASE ORAL DAILY
Status: DISCONTINUED | OUTPATIENT
Start: 2019-05-13 | End: 2019-05-13 | Stop reason: HOSPADM

## 2019-05-12 RX ORDER — LANOLIN ALCOHOL/MO/W.PET/CERES
3 CREAM (GRAM) TOPICAL EVERY EVENING
Status: DISCONTINUED | OUTPATIENT
Start: 2019-05-12 | End: 2019-05-13 | Stop reason: HOSPADM

## 2019-05-12 RX ORDER — PREDNISONE 5 MG/1
10 TABLET ORAL DAILY
Status: DISCONTINUED | OUTPATIENT
Start: 2019-05-13 | End: 2019-05-13 | Stop reason: HOSPADM

## 2019-05-12 RX ORDER — PREGABALIN 50 MG/1
50 CAPSULE ORAL 2 TIMES DAILY
Status: DISCONTINUED | OUTPATIENT
Start: 2019-05-12 | End: 2019-05-13 | Stop reason: HOSPADM

## 2019-05-12 RX ORDER — MAGNESIUM SULFATE HEPTAHYDRATE 40 MG/ML
4 INJECTION, SOLUTION INTRAVENOUS EVERY 4 HOURS PRN
Status: DISCONTINUED | OUTPATIENT
Start: 2019-05-12 | End: 2019-05-13 | Stop reason: HOSPADM

## 2019-05-12 RX ORDER — MORPHINE SULFATE 15 MG/1
30 TABLET, FILM COATED, EXTENDED RELEASE ORAL EVERY 12 HOURS SCHEDULED
Status: DISCONTINUED | OUTPATIENT
Start: 2019-05-12 | End: 2019-05-13

## 2019-05-12 RX ORDER — IBUPROFEN 200 MG
1 CAPSULE ORAL 2 TIMES DAILY
Status: DISCONTINUED | OUTPATIENT
Start: 2019-05-12 | End: 2019-05-13 | Stop reason: HOSPADM

## 2019-05-12 RX ORDER — POTASSIUM CHLORIDE 1.5 G/1.58G
20-40 POWDER, FOR SOLUTION ORAL
Status: DISCONTINUED | OUTPATIENT
Start: 2019-05-12 | End: 2019-05-13 | Stop reason: HOSPADM

## 2019-05-12 RX ORDER — ACETAMINOPHEN 500 MG
1000 TABLET ORAL 2 TIMES DAILY
Status: DISCONTINUED | OUTPATIENT
Start: 2019-05-12 | End: 2019-05-13 | Stop reason: HOSPADM

## 2019-05-12 RX ORDER — LIDOCAINE 4 G/G
1-3 PATCH TOPICAL EVERY 24 HOURS
Status: DISCONTINUED | OUTPATIENT
Start: 2019-05-12 | End: 2019-05-13 | Stop reason: HOSPADM

## 2019-05-12 RX ADMIN — VITAMIN D, TAB 1000IU (100/BT) 1000 UNITS: 25 TAB at 21:00

## 2019-05-12 RX ADMIN — CEFEPIME 2 G: 2 INJECTION, POWDER, FOR SOLUTION INTRAVENOUS at 18:20

## 2019-05-12 RX ADMIN — CALCIUM 500 MG: 500 TABLET ORAL at 21:01

## 2019-05-12 RX ADMIN — LIDOCAINE 2 PATCH: 560 PATCH PERCUTANEOUS; TOPICAL; TRANSDERMAL at 21:01

## 2019-05-12 RX ADMIN — ENOXAPARIN SODIUM 40 MG: 40 INJECTION SUBCUTANEOUS at 15:50

## 2019-05-12 RX ADMIN — GUAIFENESIN AND DEXTROMETHORPHAN HYDROBROMIDE 1 TABLET: 600; 30 TABLET, EXTENDED RELEASE ORAL at 21:00

## 2019-05-12 RX ADMIN — SODIUM CHLORIDE 1000 ML: 9 INJECTION, SOLUTION INTRAVENOUS at 15:50

## 2019-05-12 RX ADMIN — PREGABALIN 50 MG: 50 CAPSULE ORAL at 19:38

## 2019-05-12 RX ADMIN — VANCOMYCIN HYDROCHLORIDE 1750 MG: 1 INJECTION, POWDER, LYOPHILIZED, FOR SOLUTION INTRAVENOUS at 15:50

## 2019-05-12 RX ADMIN — ACETAMINOPHEN 1000 MG: 500 TABLET, FILM COATED ORAL at 19:37

## 2019-05-12 RX ADMIN — SODIUM CHLORIDE 1000 ML: 900 INJECTION, SOLUTION INTRAVENOUS at 10:21

## 2019-05-12 RX ADMIN — MORPHINE SULFATE 30 MG: 15 TABLET, EXTENDED RELEASE ORAL at 19:37

## 2019-05-12 ASSESSMENT — ENCOUNTER SYMPTOMS
HEADACHES: 0
COUGH: 0
CONFUSION: 0
SHORTNESS OF BREATH: 0
NECK STIFFNESS: 0
FEVER: 1
COLOR CHANGE: 0
BLOOD IN STOOL: 0
EYE REDNESS: 0
ARTHRALGIAS: 0
DIFFICULTY URINATING: 0
SORE THROAT: 0
ABDOMINAL PAIN: 1

## 2019-05-12 ASSESSMENT — ACTIVITIES OF DAILY LIVING (ADL)
ADLS_ACUITY_SCORE: 10
ADLS_ACUITY_SCORE: 10

## 2019-05-12 ASSESSMENT — MIFFLIN-ST. JEOR
SCORE: 1841.27
SCORE: 1829.48

## 2019-05-12 NOTE — ED NOTES
[facility], [place]   ED Nurse to Floor Handoff     Javon Bianchi is a 68 year old male who speaks English and lives with a spouse,  in a home  They arrived in the ED by car from home    ED Chief Complaint: Fever    ED Dx;   Final diagnoses:   Fever and chills         Needed?: No    Allergies: No Known Allergies.  Past Medical Hx:   Past Medical History:   Diagnosis Date     Bone metastasis (H) 09/28/2018     Renal cell carcinoma, right (H) 09/28/2018      Baseline Mental status: WDL  Current Mental Status changes: at basesline    Infection present or suspected this encounter: yes other unsure cultures pending  Sepsis suspected: No  Isolation type: No active isolations     Activity level - Baseline/Home:  Independent  Activity Level - Current:   Independent    Bariatric equipment needed?: No    In the ED these meds were given:   Medications   0.9% sodium chloride BOLUS (0 mLs Intravenous Stopped 5/12/19 1135)     Followed by   sodium chloride 0.9% infusion (has no administration in time range)       Drips running?  No    Home pump  No    Current LDAs  Peripheral IV 05/12/19 Right Upper forearm (Active)   Site Assessment WDL 5/12/2019  8:55 AM   Number of days: 0       Labs results:   Labs Ordered and Resulted from Time of ED Arrival Up to the Time of Departure from the ED   CBC WITH PLATELETS DIFFERENTIAL - Abnormal; Notable for the following components:       Result Value    RBC Count 3.22 (*)     Hemoglobin 9.4 (*)     Hematocrit 32.1 (*)     MCHC 29.3 (*)     RDW 17.3 (*)     Absolute Lymphocytes 0.1 (*)     Absolute Monocytes 3.4 (*)     All other components within normal limits   BASIC METABOLIC PANEL - Abnormal; Notable for the following components:    Creatinine 0.63 (*)     All other components within normal limits   ROUTINE UA WITH MICROSCOPIC - Abnormal; Notable for the following components:    Specific Gravity Urine 1.002 (*)     All other components within normal  limits   LACTIC ACID WHOLE BLOOD - Abnormal; Notable for the following components:    Lactic Acid 2.1 (*)     All other components within normal limits   BLOOD CULTURE   URINE CULTURE AEROBIC BACTERIAL   BLOOD CULTURE       Imaging Studies: No results found for this or any previous visit (from the past 24 hour(s)).    Recent vital signs:   /63   Pulse 62   Temp 98.1  F (36.7  C) (Oral)   Resp 16   Ht 1.829 m (6')   Wt 104.3 kg (230 lb)   SpO2 96%   BMI 31.19 kg/m      [place] Coma Scale Score: 15 (05/12/19 0927)       Cardiac Rhythm: Normal Sinus  Pt needs tele? No  Skin/wound Issues: None    Code Status: Full Code    Pain control: pt had none    Nausea control: pt had none    Abnormal labs/tests/findings requiring intervention: none    Family present during ED course? Yes   Family Comments/Social Situation comments: Family at bedside    Tasks needing completion: None    Madai Jeffries, RN  ascom -- [phone] [facility]  [phone] [facility]

## 2019-05-12 NOTE — PHARMACY-VANCOMYCIN DOSING SERVICE
Pharmacy Vancomycin Initial Note  Date of Service May 12, 2019  Patient's  1950  68 year old, male    Indication: Sepsis    Current estimated CrCl = Estimated Creatinine Clearance: 135.5 mL/min (A) (based on SCr of 0.63 mg/dL (L)).    Creatinine for last 3 days  2019:  8:35 AM Creatinine 0.63 mg/dL    Recent Vancomycin Level(s) for last 3 days  No results found for requested labs within last 72 hours.      Vancomycin IV Administrations (past 72 hours)      No vancomycin orders with administrations in past 72 hours.                Nephrotoxins and other renal medications (From now, onward)    Start     Dose/Rate Route Frequency Ordered Stop    19 1515  vancomycin (VANCOCIN) 1,750 mg in sodium chloride 0.9 % 500 mL intermittent infusion      1,750 mg  over 2 Hours Intravenous EVERY 12 HOURS 19 1506      19 1306  furosemide (LASIX) tablet 20 mg      20 mg Oral DAILY 19 1305            Contrast Orders - past 72 hours (72h ago, onward)    None                Plan:  1.  Start vancomycin  1750 mg(17mg/kg) IV q12h.   2.  Goal Trough Level: 15-20 mg/L   3.  Pharmacy will check trough levels as appropriate in 1-3 Days.    4. Serum creatinine levels will be ordered daily for the first week of therapy and at least twice weekly for subsequent weeks.    5. [place] method utilized to dose vancomycin therapy: Method 2    Bridger Valdez, PharmD  PGY1 Resident

## 2019-05-12 NOTE — TELEPHONE ENCOUNTER
Wife is calling and states patient has a fever of 103.6 tympanic. Last dose of tylenol given over 4 hours ago. Caller states patient was seen at a [place] ED 1 day ago and was told he can have motrin for his fever. Caller wants to know if she can increase motrin dose from 40mg to 80mg. Triager advised caller to give luke warm sponge bath and take client into [place] ED for evaluation.   Reason for Disposition    Patient sounds very sick or weak to the triager  (Exception: mild weakness and hasn't taken fever medicine)    Additional Information    Negative: Shock suspected (e.g., cold/pale/clammy skin, too weak to stand, low BP, rapid pulse)    Negative: Difficult to awaken or acting confused (e.g., disoriented, slurred speech)    Negative: Bluish (or gray) lips or face now    Negative: New onset rash with many purple (or blood-colored) spots or dots    Negative: Sounds like a life-threatening emergency to the triager    Negative: Other symptom is present, see that guideline  (e.g., symptoms of cough, runny nose, sore throat, earache, abdominal pain, diarrhea, vomiting)    Negative: Fever > 104 F (40 C)    Negative: [1] Neutropenia known or suspected (e.g., recent cancer chemotherapy) AND [2] fever > 100.4 F (38.0 C)    Negative: [1] Neutropenia known or suspected (e.g., recent cancer chemotherapy) AND [2] signs or symptoms of suspected infection are present    Negative: [1] Headache AND [2] stiff neck (can't touch chin to chest)    Negative: Difficulty breathing    Negative: [1] Drinking very little AND [2] dehydration suspected (e.g., no urine > 12 hours, very dry mouth, very lightheaded)    Protocols used: CANCER - FEVER-A-

## 2019-05-12 NOTE — PHARMACY-ADMISSION MEDICATION HISTORY
Admission medication history interview status for the 5/12/2019 admission is complete. See Epic admission navigator for allergy information, pharmacy, prior to admission medications and immunization status.     Medication history interview sources:  Patient    Changes made to PTA medication list (reason)  Added: None  Deleted: none  Changed: melatonin (added frequency to directions), Morphine 30mg CR (30mg in morning & 60mg in evening --> 30mg BID), Pseudoephedrine/APAP (added directions)    Additional medication history information (including reliability of information, actions taken by pharmacist):  -Patient was a good historian of his medications  -Patient states that he takes tylenol scheduled twice daily  -Patient states that he currently uses 2 lidocaine patches at a time (order says 1 to 3 patches). He last used them last night and took them off this morning  -Patient also said that he does use medical cannabis. He stated that he uses two products a liquid that he uses in the morning and a capsule product in the evening. He said that he was interested in continuing to use these products while here but I did tell them that he has to bring them in in the original containers if he does have someone bring them in.   -States that he occasionally uses melatonin but says he uses it about every 3 or 4 days.   -States he is taking both Morphine 15mg IR and 30mg CR tablets. He states that he has reduced taking the CR tablets to 30mg twice a day. The IR tablets are still written to use every 3 hours as needed but states that he usually only takes them about 2 times a day to get him to his next dose of the long acting morphine dose.  -Also states that he does only take about 1/2 packet of miralax when he needs it. Also said that he takes senna/docusate scheduled daily.  -Is still on a prednisone taper. He states he is on about day 5 of 10 of the 10mg daily step. He also stated the ultimate goal is to get to 5mg daily and  as of right now the 5mg dosing does not have an end date.   -Does occasionally use a pseudoephedrine product but says he uses it very rarely and his last time he used it was in February or March.      Prior to Admission medications    Medication Sig Last Dose Taking? Auth Provider   acetaminophen (TYLENOL) 500 MG tablet Take 1,000 mg by mouth 2 times daily 5/12/2019 at am Yes Reported, Patient   calcium carbonate 500 mg, elemental, (OSCAL 500) 1250 (500 Ca) MG TABS tablet Take 1 tablet by mouth 2 times daily 5/12/2019 at am Yes Reported, Patient   Dextromethorphan-guaiFENesin (MUCINEX DM)  MG 12 hr tablet Take 1 tablet by mouth every 12 hours 5/12/2019 at am Yes Reported, Patient   DULoxetine (CYMBALTA) 60 MG capsule Take 1 capsule (60 mg) by mouth daily 5/12/2019 at am Yes Carly Mills MD   furosemide (LASIX) 20 MG tablet Take 1 tablet (20 mg) by mouth daily 5/12/2019 at am Yes Malathi De Luna MD   lidocaine (LIDODERM) 5 % patch Place 1-3 patches onto the skin every 24 hours Apply to painful areas for 12 hours on, 12 hours off. 5/11/2019 at 2000 Yes Lisa Baez PA-C   medical cannabis (Patient's own supply.  Not a prescription) 1 Dose See Admin Instructions (This is NOT a prescription, and does not certify that the patient has a qualifying medical condition for medical cannabis.  The purpose of this order is  to document that the patient reports taking medical cannabis.) 5/12/2019 at am Yes Reported, Patient   melatonin 3 MG tablet Take 3 mg by mouth nightly as needed  Past Week at 3 days ago Yes Reported, Patient   morphine (MS CONTIN) 30 MG CR tablet Take 30 mg by mouth every 12 hours 5/12/2019 at 0830 Yes Unknown, Entered By History   morphine (MSIR) 15 MG IR tablet Take 1 tablet (15 mg) by mouth every 3 hours as needed for severe pain 5/12/2019 at 0300 Yes Carly Mills MD   polyethylene glycol (MIRALAX/GLYCOLAX) Packet Take 0.5 packets by mouth daily as needed PRN at  Thursday PM Yes Reported, Patient   predniSONE (DELTASONE) 10 MG tablet Prednisone 30 mg daily for 7 days, then 20 mg daily for 7 days, then 10 mg daily for 10 days, then 5 mg daily. 5/12/2019 at 10mg @0830 Yes Malathi De Luna MD   pregabalin (LYRICA) 50 MG capsule Take 1 capsule (50 mg) by mouth 2 times daily 5/12/2019 at am Yes Lisa Baez PA-C   Pseudoephedrine-APAP  MG TABS Take 1 tablet by mouth as needed  PRN at few months Yes Reported, Patient   senna-docusate (SENOKOT-S;PERICOLACE) 8.6-50 MG per tablet Take 2 tablets by mouth daily 5/12/2019 at am Yes Reported, Patient   Vitamin D, Cholecalciferol, 1000 units TABS Take 1,000 Units by mouth 2 times daily 5/12/2019 at am Yes Reported, Patient         Medication history completed by: Reddy Salazar, Pharmacy Intern

## 2019-05-12 NOTE — ED TRIAGE NOTES
Patient here with fevers, spiked to 104.1 last night. Took motrin at 0400. Denies sx, just fever/chills.

## 2019-05-12 NOTE — LETTER
Transition Communication Hand-off for Care Transitions to Next Level of Care Provider    Name: Javon Bianchi  : 1950  MRN #: [id number]  Primary Care Provider: LIANNE Wellington     Primary Clinic: [facility] [address] [place] [id number]     Reason for Hospitalization:  Fever and chills [R50.9]  Admit Date/Time: 2019  8:31 AM  Discharge Date: 19  Payor Source: Payor: MEDICARE / Plan: MEDICARE / Product Type: Medicare /       Javon Bianchi is a 68 year old male who a pmh significant for metastatic renal cell carcinoma on treatment with immunotherapy, last dose greater than 4 weeks ago, no presenting with high fevers and a mildly elevated lactic acid.       Discharge Plan: Home with clinic follow-up as recommended.       Follow-up plan:    Future Appointments   Date Time Provider Department Center   2019  9:15 AM  LAB [facility]   2019  9:40 AM Abigali Barrios PA [facility]   2019  7:45 AM  [facility] LAB DRAW [facility]   2019  8:40 AM Ruthie St PA-C [facility]   2019  9:30 AM  ONCOLOGY INFUSION [facility]   2019  3:30 PM Carly Mills MD [facility]       Gayatri Zuniga RN, BSN, PHN  Care Coordinator       AVS/Discharge Summary is the source of truth; this is a helpful guide for improved communication of patient story

## 2019-05-12 NOTE — ED PROVIDER NOTES
History     Chief Complaint   Patient presents with     Fever     The history is provided by the patient, the spouse, a relative and medical records.     Javon Bianchi is a 68 year old male with a medical history significant for renal cell carcinoma, bone metastasis, lumbar spinal stenosis, and chronic lower back pain who presents to the Emergency Department for evaluation of a fever.     Per chart review, patient was seen at an outside ED for a fever yesterday. Exam and lab work were unremarkable. No significant leukocytosis, no neutropenia. Electrolyte and renal function within normal limits. Influenza was negative. UA was not consistent with infectious process. Chest Xray was unchanged and unremarkable. Suspected to be some sort of viral illness.     Here, patient reports that he had a fever 2 days ago, was seen and told that it may be a viral illness. Yesterday, his fever improved; however, this morning he woke up red and sweating with a fever. He took 400 mg Motrin yesterday and 400 mg Motrin today with improved fever, noting that he is currently feeling good. He denies cough, cold, sore throat, or headache. He denies blood in his stool. He denies rashes. He notes of baseline abdominal pain. He notes of light bothering his eyes, but at baseline. He reports a history of renal cell cancer, on prednisone, no chemo, denies worsening pain. He notes that he has been tapering off of prednisone moving down to 10 mg a day 4 days ago. He notes taking morphine for pain management, but minimally. He denies being on antibiotics. He reports that his last Opdivo treatment was on 3/11/19, causing full blown arthritis, next Opdivo treatment scheduled for 5/21/19.     Past Medical History:   Diagnosis Date     Bone metastasis (H) 09/28/2018     Renal cell carcinoma, right (H) 09/28/2018       History reviewed. No pertinent surgical history.    No family history on file.    Social History     Tobacco Use     Smoking status:  Never Smoker     Smokeless tobacco: Never Used   Substance Use Topics     Alcohol use: Not on file       Current Facility-Administered Medications   Medication     acetaminophen (TYLENOL) tablet 1,000 mg     calcium carbonate 500 mg (elemental) (OSCAL 500) tablet 500 mg     ceFEPIme (MAXIPIME) 2 g vial to attach to  ml bag for ADULTS or 50 ml bag for PEDS     dextromethorphan-guaiFENesin (MUCINEX DM)  MG per 12 hr tablet 1 tablet     [START ON 5/13/2019] DULoxetine (CYMBALTA) DR capsule 60 mg     enoxaparin (LOVENOX) injection 40 mg     [START ON 5/13/2019] furosemide (LASIX) tablet 20 mg     Lidocaine (LIDOCARE) 4 % Patch 1-3 patch     lidocaine patch in PLACE     [START ON 5/13/2019] lidocaine patch REMOVAL     lidocaine patch REMOVAL     magnesium sulfate 4 g in 100 mL sterile water (premade)     Medication Instruction     melatonin tablet 3 mg     morphine (MS CONTIN) 12 hr tablet 30 mg     morphine (MSIR) IR tablet 15 mg     naloxone (NARCAN) injection 0.1-0.4 mg     polyethylene glycol (MIRALAX/GLYCOLAX) Packet 8.5 g     potassium chloride (KLOR-CON) Packet 20-40 mEq     potassium chloride 10 mEq in 100 mL intermittent infusion with 10 mg lidocaine     potassium chloride 10 mEq in 100 mL sterile water intermittent infusion (premix)     potassium chloride 20 mEq in 50 mL intermittent infusion     potassium chloride ER (K-DUR/KLOR-CON M) CR tablet 20-40 mEq     potassium phosphate 15 mmol in D5W 250 mL intermittent infusion     potassium phosphate 20 mmol in D5W 250 mL intermittent infusion     potassium phosphate 20 mmol in D5W 500 mL intermittent infusion     potassium phosphate 25 mmol in D5W 500 mL intermittent infusion     [START ON 5/13/2019] predniSONE (DELTASONE) tablet 10 mg     pregabalin (LYRICA) capsule 50 mg     [START ON 5/13/2019] senna-docusate (SENOKOT-S/PERICOLACE) 8.6-50 MG per tablet 2 tablet     sodium chloride 0.9% infusion     vancomycin (VANCOCIN) 1,750 mg in sodium chloride  0.9 % 500 mL intermittent infusion     Vitamin D (Cholecalciferol) TABS 1,000 Units      No Known Allergies    I have reviewed the Medications, Allergies, Past Medical and Surgical History, and Social History in the Epic system.    Review of Systems   Constitutional: Positive for fever.        Negative for a cold   HENT: Negative for congestion and sore throat.    Eyes: Positive for visual disturbance (Light bothering; baseline). Negative for redness.   Respiratory: Negative for cough and shortness of breath.    Cardiovascular: Negative for chest pain.   Gastrointestinal: Positive for abdominal pain (Baseline). Negative for blood in stool.   Genitourinary: Negative for difficulty urinating.   Musculoskeletal: Negative for arthralgias and neck stiffness.   Skin: Negative for color change and rash.   Neurological: Negative for headaches.   Psychiatric/Behavioral: Negative for confusion.   All other systems reviewed and are negative.      Physical Exam   BP: 134/82  Pulse: 105  Temp: 98.1  F (36.7  C)  Resp: 16  Height: 182.9 cm (6')  Weight: 104.3 kg (230 lb)  SpO2: 99 %      Physical Exam   Constitutional: He is oriented to person, place, and time. No distress.   HENT:   Head: Atraumatic.   Mouth/Throat: No oropharyngeal exudate ( Dry mucous membranes).   Eyes: Pupils are equal, round, and reactive to light. No scleral icterus.   Neck: Normal range of motion. Neck supple.   Cardiovascular: Normal heart sounds and intact distal pulses.   Pulmonary/Chest: Breath sounds normal. No stridor. No respiratory distress. He has no wheezes. He has no rales.   Abdominal: Soft. Bowel sounds are normal. He exhibits no distension. There is no tenderness. There is no CVA tenderness.   Musculoskeletal: He exhibits no edema or tenderness.   Neurological: He is alert and oriented to person, place, and time.   Skin: Skin is warm. No rash noted. He is not diaphoretic.       ED Course   9:15 AM  The patient was seen and examined by Cameron  MD Hermila in Room ED07.        Procedures             Critical Care time:  none     The Lactic acid level is elevated due to Dehydration versus early infection, at this time there is no sign of severe sepsis or septic shock.       Labs Ordered and Resulted from Time of ED Arrival Up to the Time of Departure from the ED   CBC WITH PLATELETS DIFFERENTIAL - Abnormal; Notable for the following components:       Result Value    RBC Count 3.22 (*)     Hemoglobin 9.4 (*)     Hematocrit 32.1 (*)     MCHC 29.3 (*)     RDW 17.3 (*)     Absolute Lymphocytes 0.1 (*)     Absolute Monocytes 3.4 (*)     All other components within normal limits   BASIC METABOLIC PANEL - Abnormal; Notable for the following components:    Creatinine 0.63 (*)     All other components within normal limits   ROUTINE UA WITH MICROSCOPIC - Abnormal; Notable for the following components:    Specific Gravity Urine 1.002 (*)     All other components within normal limits   LACTIC ACID WHOLE BLOOD - Abnormal; Notable for the following components:    Lactic Acid 2.1 (*)     All other components within normal limits   URINE CULTURE AEROBIC BACTERIAL            Assessments & Plan (with Medical Decision Making)   The patient had a high fever today without clear source on exam or history.  He was on Opdivo until last month and is still tapering on prednisone.  He is not neutropenic here.  The patient was 2 and half hours away and when his temperature was high today before he got Tylenol and ibuprofen he had some confusion and shaking chills.  I am concerned that he may have intermittent bacteremia versus fever from his underlying cancer.  He will be admitted to the oncology service after blood cultures were sent.  He was given normal saline and is feeling better.  He has a normal urinalysis and chest x-ray with no other focal signs of infection so at this time they would like to hold on antibiotics pending further observation.    I have reviewed the nursing  notes.    I have reviewed the findings, diagnosis, plan and need for follow up with the patient.       Medication List      There are no discharge medications for this visit.         Final diagnoses:   Fever and chills     I, Juana Silva, am serving as a trained medical scribe to document services personally performed by Cameron Cleaning MD, based on the provider's statements to me.      ICameron MD, was physically present and have reviewed and verified the accuracy of this note documented by Juana Silva.     5/12/2019   [facility], [place], EMERGENCY DEPARTMENT     Cameron Cleaning MD  05/12/19 1985

## 2019-05-12 NOTE — H&P
[facility], [place]  History and Physical - Hematology-Oncology Solid Tumor  5/12/2019         Date of Admission:  5/12/2019    Assessment & Plan   Javon Bianchi is a 68 year old male who a pmh significant for metastatic renal cell carcinoma on treatment with immunotherapy, last dose greater than 4 weeks ago, no presenting with high fevers and a mildly elevated lactic acid. Clinical concern for SIRS/early Sepsis    # SIRS/Early Sepsis in an immunocompromised host  # Hyperthermia  Sepsis is the highest clinical concern given the elevated lactate. The source of the fever (possible infection) is not clear. Some sources may be masked by the current steroid use (albeit the current dose is not that high). I agree patient warrants admission to continue sepsis work and identify a source of infection   - pan-culture including blood   - Will send respiratory Viral Panel   - Will start airborne and droplet precautions.    -  given risk factors for health care associate infection will initiate antibiotic coverage directed at resistant gram neg's and resistant gram pos' with Cefepime. and vanco per pharmacy respectively   - hold Tamiflu for now given paucity of respiratory symptoms   - Will f/u results of above workup and change antibiotics accordingly      # Heme-Oncology  # Metastatic Renal Cell Carcinoma to spine with high epidural tumor burden and associated pain   - continue PTA analgesic regimen    - MSContin 30 mg bid    - MSIR 15 mg q3h prn pain   - will hold anti-neoplastic therapies for now however will continue his prednisone taper    #GI  # Constipation   - continue PTA Miralax prn  Diet: Regular  DVT Prophylaxis: Enoxaparin (Lovenox) SQ  Dash Catheter: not present  Code Status: DNR/DNI per review of clinic notes    Disposition Plan   Expected discharge: Tomorrow, recommended to prior living arrangement once adequate pain management/ tolerating PO medications.  Entered: Marshal Foster,  "MD 05/12/2019, 1:41 PM     The patient's care was discussed with the Attending Physician, Dr. Paul Ramirez and the Heme-Onc Fellow, Patient and Patient's Family.    Marshal Foster MD, [facility], [place]  Pager: [phone]    ______________________________________________________________________    Chief Complaint   \"I have had a high fever for the past couple of days    History is obtained from the patient who is an excellent historian and the electronic medical record      History of Present Illness   Patient denied headaches, photophobia, PND, sinus fullness, sore throat, swollen glands, chest pain/tightnesss, wheezing, cough, abdominal distress, N/V and diarrhea. He does report constipation which he attributes to his pain medication. He denies sick contact and admits he did not have his flu shot this year as he quickly went into treatment for his RCC. He states no new foods and has not knowingly been around dead animals. He states he has not had high fevers before.     Per Clinic Notes  Javon Bianchi is a 68 year old male with a history significant for hyperlipidemia who had been in his usual health until he developed back pain in May of 2018. At that time, the pain was a stabbing like in the middle of lower back with burning like pain wrapping around this left hip to knee area. No trauma. He had MRI on 5/23/18 showed mild degenerative change with bulging disks L2-S1. A small benign hemangioma was noted in L2, no other marrow signal abnormality. Since then, the pain failed to improve despite steroid injections. In addition, he lost 25 lbs unintentionally since June along with chills a few times a day. Over time, the pain got worse to the point he could not ambulate after short distance and developed muscle weakness in lower extremities over time requiring a walker. Finally, he went to ER ([facility]) on 8/23/18, CT A/P was unremarkable. " He was referred to neurology with obtaining MRI L spine. MRI on 8/30/18 revealed a pathological fracture at L3 vertebral body with height loss at 40% and diffuse involvement of a neoplastic process. IR guided biopsy on 8/31/18 showed poorly differentiated carcinoma. IHC was suggestive of possible renal cell carcinoma per pathology report (Renal cell immunochemistry is positive and along with the negative staining for CK7 and CK20 suggests the possibility of a renal carcinoma, however most renal cell carcinomas also express PAX8 which is negative in this case). Of note, CT chest/abdomen/pelvis at OSH was negative for primary lesion. The patient was evaluated by neurosurgery who recommended no surgical intervention. He was discharged with TLSO brace and walker. He had palliative XRT locally from 9/11 for 10 fractions (Done in [place]). He was started on cabozantinib 60 mg initially, requiring dose reduction to 20 mg for issues with hand foot syndrome and poor tolerability. Switched to nivolumab due to progression in bone disease on 2/6/19. Had to hold Cycle #3 due to myalgia, which improved with prednisone.    Patient states he had a high fever to 104 while near his home in [facility]. He went to the ER nearby. He states he was treated with Motrin and discharged home. He can not recall the details of his ER visit. He states he was doing okay until he woke this morning with another high fever associated with chills and rigors. He took more Motrin. He called his Oncologist who referred him to the ED at the [facility].  In the ED, he was found to be hemodynamically stable without tachycardia, tachypnea or fevers. His O2 satuarations were in the upper 90's on room air. His laboratory workup for an serum lactic acid of greater than two. Given that he is an immunocompromised host with high fevers a decision was made to admit the patient to the hospital for further workup and intensive therapy including IV  antibiotics.      Review of Systems    ROS: 10 point ROS neg other than the symptoms noted above in the HPI.    Past Medical History    I have reviewed this patient's medical history and updated it with pertinent information if needed.   Past Medical History:   Diagnosis Date     Bone metastasis (H) 09/28/2018     Renal cell carcinoma, right (H) 09/28/2018       Past Surgical History   I have reviewed this patient's surgical history and updated it with pertinent information if needed.  History reviewed. No pertinent surgical history.    Social History   I have reviewed this patient's social history and updated it with pertinent information if needed.  Social History     Tobacco Use     Smoking status: Never Smoker     Smokeless tobacco: Never Used   Substance Use Topics     Alcohol use: None     Drug use: None       Family History   I have reviewed this patient's family history and updated it with pertinent information if needed.   No family history on file.    Prior to Admission Medications   Prior to Admission Medications   Prescriptions Last Dose Informant Patient Reported? Taking?   DULoxetine (CYMBALTA) 60 MG capsule   No No   Sig: Take 1 capsule (60 mg) by mouth daily   Dextromethorphan-guaiFENesin (MUCINEX DM)  MG 12 hr tablet   Yes No   Sig: Take 1 tablet by mouth every 12 hours   Pseudoephedrine-APAP  MG TABS   Yes No   Vitamin D, Cholecalciferol, 1000 units TABS   Yes No   Sig: Take 1,000 Units by mouth 2 times daily   acetaminophen (TYLENOL) 500 MG tablet   Yes No   Sig: Take 1,000 mg by mouth 2 times daily   calcium carbonate 500 mg, elemental, (OSCAL 500) 1250 (500 Ca) MG TABS tablet   Yes No   Sig: Take 1 tablet by mouth 2 times daily   furosemide (LASIX) 20 MG tablet   No No   Sig: Take 1 tablet (20 mg) by mouth daily   lidocaine (LIDODERM) 5 % patch   No No   Sig: Place 1-3 patches onto the skin every 24 hours Apply to painful areas for 12 hours on, 12 hours off.   medical cannabis  (Patient's own supply.  Not a prescription)   Yes No   Si Dose See Admin Instructions (This is NOT a prescription, and does not certify that the patient has a qualifying medical condition for medical cannabis.  The purpose of this order is  to document that the patient reports taking medical cannabis.)   melatonin 3 MG tablet   Yes No   Sig: Take 3 mg by mouth   morphine (MS CONTIN) 30 MG CR tablet   No No   Sig: Take 30mg in the morning, and 60mg at bedtime.  After one week, okay to decrease further to 30mg twice daily.   morphine (MSIR) 15 MG IR tablet   No No   Sig: Take 1 tablet (15 mg) by mouth every 3 hours as needed for severe pain   polyethylene glycol (MIRALAX/GLYCOLAX) Packet   Yes No   Sig: Take 0.5 packets by mouth daily as needed   predniSONE (DELTASONE) 10 MG tablet   No No   Sig: Prednisone 30 mg daily for 7 days, then 20 mg daily for 7 days, then 10 mg daily for 10 days, then 5 mg daily.   pregabalin (LYRICA) 50 MG capsule   No No   Sig: Take 1 capsule (50 mg) by mouth 2 times daily   senna-docusate (SENOKOT-S;PERICOLACE) 8.6-50 MG per tablet   Yes No   Sig: Take 2 tablets by mouth daily      Facility-Administered Medications: None     Allergies   No Known Allergies    Physical Exam   Vital Signs: Temp: 98.1  F (36.7  C) Temp src: Oral BP: 112/82 Pulse: 60   Resp: 16 SpO2: 99 % O2 Device: None (Room air)    Weight: 230 lbs 0 oz    GEN: Awake and alert, in no acute distress, sitting upright on [facility] [place] and cooperative  HEENT: Pupils equal and reactive to light, conjunctiva are pink conjunctivae, sclera anicteric,  Oral mucosa moist without lesions.  NECK: supple no JVD/LAD  PULM: Good air flow bilaterally, symmetric in expansion, CTAB no rhonchi or wheezes. Breathing non-labored.   CV: Normal S1 and S2. RRR.  No murmur, gallop, or rub.  Peripheral pulses intact.   ABD: Soft, nontender, nondistended. Bowel sound normoactive, no guarding, no rebound.   MSK: .  No apparent muscle wasting. No  clubbing orcyanosis, (+) edema  NEURO: Alert and oriented to person, place, and time, moving all fours, gait not assessed  SKIN: Warm and dry. No visible rashes or lesions   PSYCH: Mood stable, judgment and insight appropriate.    Data   Data reviewed today: I reviewed all medications, new labs and imaging results over the last 24 hours.    Recent Labs   Lab 05/12/19  0835   WBC 10.7   HGB 9.4*            POTASSIUM 3.7   CHLORIDE 104   CO2 24   BUN 11   CR 0.63*   ANIONGAP 9   AUREA 8.6   GLC 91     Lactic Acid 5/12/2019 2.1 (*)     CXR 2V 5/12/2019 I have personally reviewed the examination. Still awaiting preliminary interpretation by radiologist.  No acute air space disease    Patient was seen and examined by me. I personally reviewed the electronic medical record including labs, flowsheets, imaging reports and films, vitals, and medications. I discussed the case in detail with the Hematology/Oncology Attending and Fellow.    Clinical update was provided to the patient and his wife. I answered all of the their questions and provided support to them.     Marshal Foster MD, [facility]  Hematology/Oncology [facility]  Pager: [phone]  5/12/2019    No results found for this or any previous visit (from the past 24 hour(s)).

## 2019-05-12 NOTE — PLAN OF CARE
Nursing Focus: Admission  D: Arrived at 1500 from ER via wheelchair. Patient accompanied by daughter. Admitted for fevers.       I: Admission process began.  Patient oriented to room, enviroment, call light.  Md. notified of patients arrival on unit.  IVF started. Received vanco and cefepime. RVP collected. Placed in droplet/contact iso.     A: Vital signs stable, afebrile.  Patient stable at this time.      P: Implement plan of care when available. Continue to monitor patient. Nursing interventions as appropriate. Notify md with changes in pt status.

## 2019-05-13 VITALS
TEMPERATURE: 95.6 F | SYSTOLIC BLOOD PRESSURE: 133 MMHG | HEIGHT: 72 IN | WEIGHT: 230.4 LBS | BODY MASS INDEX: 31.21 KG/M2 | OXYGEN SATURATION: 96 % | RESPIRATION RATE: 18 BRPM | HEART RATE: 92 BPM | DIASTOLIC BLOOD PRESSURE: 59 MMHG

## 2019-05-13 PROBLEM — R50.9 FEVER: Status: ACTIVE | Noted: 2019-05-13

## 2019-05-13 LAB
ALBUMIN SERPL-MCNC: 2.5 G/DL (ref 3.4–5)
ALP SERPL-CCNC: 97 U/L (ref 40–150)
ALT SERPL W P-5'-P-CCNC: 18 U/L (ref 0–70)
ANION GAP SERPL CALCULATED.3IONS-SCNC: 6 MMOL/L (ref 3–14)
ANISOCYTOSIS BLD QL SMEAR: SLIGHT
AST SERPL W P-5'-P-CCNC: 40 U/L (ref 0–45)
BACTERIA SPEC CULT: NO GROWTH
BASOPHILS # BLD AUTO: 0 10E9/L (ref 0–0.2)
BASOPHILS NFR BLD AUTO: 0 %
BILIRUB SERPL-MCNC: 0.4 MG/DL (ref 0.2–1.3)
BUN SERPL-MCNC: 11 MG/DL (ref 7–30)
CALCIUM SERPL-MCNC: 8.4 MG/DL (ref 8.5–10.1)
CHLORIDE SERPL-SCNC: 107 MMOL/L (ref 94–109)
CO2 SERPL-SCNC: 26 MMOL/L (ref 20–32)
CREAT SERPL-MCNC: 0.61 MG/DL (ref 0.66–1.25)
DIFFERENTIAL METHOD BLD: ABNORMAL
EOSINOPHIL # BLD AUTO: 0.1 10E9/L (ref 0–0.7)
EOSINOPHIL NFR BLD AUTO: 0.9 %
ERYTHROCYTE [DISTWIDTH] IN BLOOD BY AUTOMATED COUNT: 17.3 % (ref 10–15)
FLUAV H1 2009 PAND RNA SPEC QL NAA+PROBE: NEGATIVE
FLUAV H1 RNA SPEC QL NAA+PROBE: NEGATIVE
FLUAV H3 RNA SPEC QL NAA+PROBE: NEGATIVE
FLUAV RNA SPEC QL NAA+PROBE: NEGATIVE
FLUBV RNA SPEC QL NAA+PROBE: NEGATIVE
GFR SERPL CREATININE-BSD FRML MDRD: >90 ML/MIN/{1.73_M2}
GLUCOSE SERPL-MCNC: 82 MG/DL (ref 70–99)
HADV DNA SPEC QL NAA+PROBE: NEGATIVE
HADV DNA SPEC QL NAA+PROBE: NEGATIVE
HCT VFR BLD AUTO: 27.5 % (ref 40–53)
HGB BLD-MCNC: 7.8 G/DL (ref 13.3–17.7)
HMPV RNA SPEC QL NAA+PROBE: NEGATIVE
HPIV1 RNA SPEC QL NAA+PROBE: NEGATIVE
HPIV2 RNA SPEC QL NAA+PROBE: NEGATIVE
HPIV3 RNA SPEC QL NAA+PROBE: NEGATIVE
LACTATE BLD-SCNC: 0.8 MMOL/L (ref 0.7–2)
LYMPHOCYTES # BLD AUTO: 0.3 10E9/L (ref 0.8–5.3)
LYMPHOCYTES NFR BLD AUTO: 4.3 %
Lab: NORMAL
MACROCYTES BLD QL SMEAR: PRESENT
MCH RBC QN AUTO: 28.9 PG (ref 26.5–33)
MCHC RBC AUTO-ENTMCNC: 28.4 G/DL (ref 31.5–36.5)
MCV RBC AUTO: 102 FL (ref 78–100)
METAMYELOCYTES # BLD: 0.1 10E9/L
METAMYELOCYTES NFR BLD MANUAL: 1.7 %
MICROBIOLOGIST REVIEW: NORMAL
MONOCYTES # BLD AUTO: 2 10E9/L (ref 0–1.3)
MONOCYTES NFR BLD AUTO: 24.3 %
NEUTROPHILS # BLD AUTO: 5.6 10E9/L (ref 1.6–8.3)
NEUTROPHILS NFR BLD AUTO: 68.8 %
PLATELET # BLD AUTO: 222 10E9/L (ref 150–450)
PLATELET # BLD EST: ABNORMAL 10*3/UL
POTASSIUM SERPL-SCNC: 3.8 MMOL/L (ref 3.4–5.3)
PROT SERPL-MCNC: 6 G/DL (ref 6.8–8.8)
RBC # BLD AUTO: 2.7 10E12/L (ref 4.4–5.9)
RHINOVIRUS RNA SPEC QL NAA+PROBE: NEGATIVE
RSV RNA SPEC QL NAA+PROBE: NEGATIVE
RSV RNA SPEC QL NAA+PROBE: NEGATIVE
SODIUM SERPL-SCNC: 139 MMOL/L (ref 133–144)
SPECIMEN SOURCE: NORMAL
SPECIMEN SOURCE: NORMAL
WBC # BLD AUTO: 8.1 10E9/L (ref 4–11)

## 2019-05-13 PROCEDURE — 25000131 ZZH RX MED GY IP 250 OP 636 PS 637: Performed by: INTERNAL MEDICINE

## 2019-05-13 PROCEDURE — 80053 COMPREHEN METABOLIC PANEL: CPT | Performed by: INTERNAL MEDICINE

## 2019-05-13 PROCEDURE — 25000132 ZZH RX MED GY IP 250 OP 250 PS 637: Mod: GY | Performed by: PHYSICIAN ASSISTANT

## 2019-05-13 PROCEDURE — A9270 NON-COVERED ITEM OR SERVICE: HCPCS | Mod: GY | Performed by: PHYSICIAN ASSISTANT

## 2019-05-13 PROCEDURE — 25000128 H RX IP 250 OP 636: Performed by: INTERNAL MEDICINE

## 2019-05-13 PROCEDURE — 99238 HOSP IP/OBS DSCHRG MGMT 30/<: CPT | Performed by: INTERNAL MEDICINE

## 2019-05-13 PROCEDURE — G0378 HOSPITAL OBSERVATION PER HR: HCPCS

## 2019-05-13 PROCEDURE — A9270 NON-COVERED ITEM OR SERVICE: HCPCS | Performed by: INTERNAL MEDICINE

## 2019-05-13 PROCEDURE — 36415 COLL VENOUS BLD VENIPUNCTURE: CPT | Performed by: INTERNAL MEDICINE

## 2019-05-13 PROCEDURE — 85025 COMPLETE CBC W/AUTO DIFF WBC: CPT | Performed by: INTERNAL MEDICINE

## 2019-05-13 PROCEDURE — 25000132 ZZH RX MED GY IP 250 OP 250 PS 637: Performed by: INTERNAL MEDICINE

## 2019-05-13 PROCEDURE — 83605 ASSAY OF LACTIC ACID: CPT | Performed by: INTERNAL MEDICINE

## 2019-05-13 PROCEDURE — 25800030 ZZH RX IP 258 OP 636: Performed by: INTERNAL MEDICINE

## 2019-05-13 PROCEDURE — 25000125 ZZHC RX 250: Performed by: INTERNAL MEDICINE

## 2019-05-13 PROCEDURE — 25800030 ZZH RX IP 258 OP 636: Performed by: EMERGENCY MEDICINE

## 2019-05-13 RX ORDER — LEVOFLOXACIN 750 MG/1
750 TABLET, FILM COATED ORAL DAILY
Qty: 6 TABLET | Refills: 0 | Status: SHIPPED | OUTPATIENT
Start: 2019-05-14 | End: 2019-06-26

## 2019-05-13 RX ORDER — PANTOPRAZOLE SODIUM 40 MG/1
40 TABLET, DELAYED RELEASE ORAL
Status: DISCONTINUED | OUTPATIENT
Start: 2019-05-13 | End: 2019-05-13 | Stop reason: HOSPADM

## 2019-05-13 RX ORDER — LEVOFLOXACIN 750 MG/1
750 TABLET, FILM COATED ORAL DAILY
Status: DISCONTINUED | OUTPATIENT
Start: 2019-05-13 | End: 2019-05-13 | Stop reason: HOSPADM

## 2019-05-13 RX ORDER — MORPHINE SULFATE 60 MG/1
60 TABLET, FILM COATED, EXTENDED RELEASE ORAL EVERY 12 HOURS
Refills: 0 | COMMUNITY
Start: 2019-05-13 | End: 2019-05-21

## 2019-05-13 RX ORDER — MORPHINE SULFATE 30 MG/1
30 TABLET, FILM COATED, EXTENDED RELEASE ORAL ONCE
Status: COMPLETED | OUTPATIENT
Start: 2019-05-13 | End: 2019-05-13

## 2019-05-13 RX ORDER — PANTOPRAZOLE SODIUM 40 MG/1
40 TABLET, DELAYED RELEASE ORAL
Qty: 30 TABLET | Refills: 0 | Status: SHIPPED | OUTPATIENT
Start: 2019-05-14 | End: 2019-06-05

## 2019-05-13 RX ORDER — MORPHINE SULFATE 30 MG/1
60 TABLET, FILM COATED, EXTENDED RELEASE ORAL EVERY 12 HOURS SCHEDULED
Status: DISCONTINUED | OUTPATIENT
Start: 2019-05-13 | End: 2019-05-13 | Stop reason: HOSPADM

## 2019-05-13 RX ADMIN — MORPHINE SULFATE 30 MG: 15 TABLET, EXTENDED RELEASE ORAL at 08:40

## 2019-05-13 RX ADMIN — ACETAMINOPHEN 1000 MG: 500 TABLET, FILM COATED ORAL at 08:40

## 2019-05-13 RX ADMIN — PREDNISONE 10 MG: 5 TABLET ORAL at 08:39

## 2019-05-13 RX ADMIN — GUAIFENESIN AND DEXTROMETHORPHAN HYDROBROMIDE 1 TABLET: 600; 30 TABLET, EXTENDED RELEASE ORAL at 08:40

## 2019-05-13 RX ADMIN — LEVOFLOXACIN 750 MG: 750 TABLET, FILM COATED ORAL at 12:57

## 2019-05-13 RX ADMIN — PREGABALIN 50 MG: 50 CAPSULE ORAL at 08:40

## 2019-05-13 RX ADMIN — Medication 15 MG: at 01:18

## 2019-05-13 RX ADMIN — Medication 15 MG: at 12:57

## 2019-05-13 RX ADMIN — VANCOMYCIN HYDROCHLORIDE 1750 MG: 1 INJECTION, POWDER, LYOPHILIZED, FOR SOLUTION INTRAVENOUS at 02:37

## 2019-05-13 RX ADMIN — MORPHINE SULFATE 30 MG: 30 TABLET, EXTENDED RELEASE ORAL at 12:57

## 2019-05-13 RX ADMIN — CALCIUM 500 MG: 500 TABLET ORAL at 08:40

## 2019-05-13 RX ADMIN — SENNOSIDES AND DOCUSATE SODIUM 2 TABLET: 8.6; 5 TABLET ORAL at 08:40

## 2019-05-13 RX ADMIN — FUROSEMIDE 20 MG: 20 TABLET ORAL at 08:40

## 2019-05-13 RX ADMIN — VITAMIN D, TAB 1000IU (100/BT) 1000 UNITS: 25 TAB at 08:40

## 2019-05-13 RX ADMIN — Medication 3 MG: at 01:19

## 2019-05-13 RX ADMIN — SODIUM CHLORIDE 1000 ML: 9 INJECTION, SOLUTION INTRAVENOUS at 05:00

## 2019-05-13 RX ADMIN — Medication 15 MG: at 08:49

## 2019-05-13 RX ADMIN — PANTOPRAZOLE SODIUM 40 MG: 40 TABLET, DELAYED RELEASE ORAL at 08:40

## 2019-05-13 RX ADMIN — DULOXETINE HYDROCHLORIDE 60 MG: 60 CAPSULE, DELAYED RELEASE ORAL at 08:40

## 2019-05-13 RX ADMIN — CEFEPIME 2 G: 2 INJECTION, POWDER, FOR SOLUTION INTRAVENOUS at 01:18

## 2019-05-13 ASSESSMENT — ACTIVITIES OF DAILY LIVING (ADL)
ADLS_ACUITY_SCORE: 10
ADLS_ACUITY_SCORE: 12
ADLS_ACUITY_SCORE: 10
ADLS_ACUITY_SCORE: 10

## 2019-05-13 ASSESSMENT — MIFFLIN-ST. JEOR: SCORE: 1843.09

## 2019-05-13 NOTE — DISCHARGE SUMMARY
[facility], [place]    Discharge Summary  Hematology / Oncology    Date of Admission:  5/12/2019  Date of Discharge:  5/13/2019  Discharging Provider: Mitra Qiu  Date of Service (when I saw the patient): 05/13/19    Discharge Diagnoses   Fever and chills  SIRS (systemic inflammatory response syndrome)  Metastatic renal cell carcinoma, unspecified laterality    History of Present Illness   Javon Bianchi is an 68 year old male with a history of metastatic renal cell carcinoma currently on Nivolumab who was admitted with fevers and chills of unknown etiology.    Please see H&P for further detail of history.    Hospital Course   Javon Bianchi was admitted on 5/12/2019.  The following problems were addressed during his hospitalization:    #Fever and chills  Noted to have Tmax 103 on 5/10/19 and was evaluated at [place] ED. Infectious work-up negative (influenza, CXR, blood cultures, UA) and patient was discharged home. Had repeat fever up to 104.0 on 5/12/19 and was advised to come to the [facility] ED. Subsequently admitted for further work-up. Infectious work-up negative to date (CXR, UA, blood cultures) with RVP pending. LA elevated to 2.1 on admission and down trended to 0.8 with IVF. Received Cefepime and Vancomycin from 5/12-5/13 with transition to PO Levofloxacin. Not neutropenic and remained afebrile/HD stable during hospitalization. Will discharge with close follow-up and on-call physician aware of patient.  - Continue Levofloxacin 750 mg daily x 7 days total (5/13-5/19/19)  - Hospital f/u arranged for 5/14/19 (will stay at [facility] on 5/13)  - F/u on blood cultures and RVP    #Metastatic renal cell carcinoma  Follows with Dr. De Luna and transitioning care to Dr. Anne. Diagnosed via L spine MRI and biopsy 8/31/18 with poorly differentiated carcinoma, pathology suggesting renal cell origin. CT CAP negative for primary tumor. Underwent XRT 9/11/18 x 10 fractions. Started  Cabozantinib 9/28/18 with need for dose reductions due to hand and foot syndrome. Due to disease progression, switched to Nivolumab on 2/11/19. Received cycle 2 on 3/12/19. Cycle 3 was due on 4/8/19, but was delayed due to myalgias and patient started on a 4 week Prednisone taper.   - Continue Prednisone 10 mg daily x 1 week starting 5/13 then 5 mg daily x 1 week  - Prescribed Protonix for stress ulcer ppx  - Has follow-up with [facility] on 5/21 with possible cycle 3 Nivolumab    #Cancer related pain  Had been on MS Contin 60 mg BID, which was reduced to 30 mg BID after pain improved from Prednisone. Reports that with reduction in Prednisone dosing that pain worsened last week and he increased his MS Contin back to 60 mg BID.  - Continue MS Contin 60 mg BID  - Palliative care follow-up on 5/21    #Anemia  Suspect secondary to chemo/XRT. Baseline has trended 8-11. Is 7.8 on discharge, possibly dilutional component.   - Check CBC on 5/14    Dispo: Discharged to [facility] on 5/13.  Follow-up: [facility] visit/labs on 5/14 and 5/21    Above plan discussed with staff physician, Dr. James Qiu PA-C  Hematology/Oncology  Pager: [phone]    Significant Results and Procedures   Results for orders placed or performed during the hospital encounter of 05/12/19   XR Chest 2 Views    Narrative    Exam: XR CHEST 2 VW, 5/12/2019 9:55 AM    Indication: fever    Comparison: CT 4/23/2019, 2/5/2019    Findings:   PA and lateral views of the chest. Cardiomediastinal silhouette is  normal in size. Pulmonary vasculature is within normal limits. No  pneumothorax or pleural effusion. Nodular opacity in the right lung  base. No acute bony abnormalities. The upper abdomen is unremarkable.      Impression    Impression:   Nodular opacity in the right lung base, favored to represent anterior  right sixth rib fracture, possibly pathologic, which was new on CT  4/23/2019. No definite airspace opacity.    I have personally reviewed the  examination and initial interpretation  and I agree with the findings.    AMELIA HENRIQUEZ MD       Pending Results   These results will be followed up in clinic on 5/14  Unresulted Labs Ordered in the Past 30 Days of this Admission     Date and Time Order Name Status Description    5/12/2019 1451 Respiratory Virus Panel by PCR In process     5/12/2019 1451 Blood culture Preliminary     5/12/2019 1451 Blood culture Preliminary     5/12/2019 0931 Blood culture Preliminary     5/12/2019 0857 Blood Culture ONE site Preliminary           Code Status   DNR / DNI    Primary Care Physician   S Conrado [place]    Physical Exam   Temp: 97.4  F (36.3  C) Temp src: Oral BP: 108/61 Pulse: 92 Heart Rate: 60 Resp: 18 SpO2: 96 % O2 Device: None (Room air)    Vitals:    05/12/19 0837 05/12/19 1446 05/13/19 0900   Weight: 104.3 kg (230 lb) 103.1 kg (227 lb 6.4 oz) 104.5 kg (230 lb 6.4 oz)     Vital Signs with Ranges  Temp:  [95.7  F (35.4  C)-98.5  F (36.9  C)] 97.4  F (36.3  C)  Pulse:  [74-92] 92  Heart Rate:  [60-77] 60  Resp:  [16-18] 18  BP: (108-146)/(61-79) 108/61  SpO2:  [96 %-98 %] 96 %  I/O last 3 completed shifts:  In: 2120 [P.O.:120; I.V.:2000]  Out: 525 [Urine:525]    Time Spent on this Encounter   I, Mitra Qiu, personally saw the patient today and spent greater than 30 minutes discharging this patient.    Discharge Disposition   Discharged to home  Condition at discharge: Stable    Consultations This Hospital Stay   NUTRITION SERVICES ADULT IP CONSULT  PHARMACY TO DOSE VANCO  MEDICATION HISTORY IP PHARMACY CONSULT    Discharge Orders      CBC with platelets differential    Last Lab Result: Hemoglobin (g/dL)       Date                     Value                 05/13/2019               7.8 (L)          ----------     Comprehensive metabolic panel     Reason for your hospital stay    You were admitted with fevers. No overt source was found, so you are being discharged with a broad spectrum antibiotic,  Levofloxacin. Your labs/vitals are stable and you are safe to discharge home.     Activity    Your activity upon discharge: Regular activity as tolerated.     When to contact your care team    Call the [facility] 24-hour triage line at [phone] or [phone] for temp >100.4, uncontrolled nausea/vomiting/diarrhea/constipation, unrelieved pain, bleeding not relieved with pressure, dizziness, chest pain, shortness of breath, loss of consciousness, and any new or concerning symptoms.     Of note, the on call physician on 5/13 knows about your discharge and will plan to admit you to the hospital if you are feeling unwell and having persistent fevers.    Call [phone] and ask for the care coordinator that works with your oncologist if you have questions about scans, appointments, hospital follow-up, or other concerns.     Discharge Instructions    - Please take Levofloxacin 750 mg (1 tab) daily from 5/14 through 5/19.  - Pantoprazole was prescribed for stomach ulcer prevention while taking steroids.     Adult [facility] Follow-up and recommended labs and tests    - I scheduled you to get labs and see a PA in clinic tomorrow (5/14) at 9:15am  - You also have a previously scheduled appointment for 5/21 for possible Nivolumab and with Palliative Care.     Appointments on [place] and/or [facility] (with [facility] or [facility] provider or service). Call [phone] if you haven't heard regarding these appointments within 7 days of discharge.    Please see detailed appointments below:     Diet    Follow this diet upon discharge: Regular diet as tolerated.     Discharge Medications   Current Discharge Medication List      START taking these medications    Details   levofloxacin (LEVAQUIN) 750 MG tablet Take 1 tablet (750 mg) by mouth daily for 6 days . Start taking 5/14 and take through 5/19.  Qty: 6 tablet, Refills: 0    Associated Diagnoses: SIRS (systemic inflammatory response syndrome) (H); Fever,  unspecified fever cause      pantoprazole (PROTONIX) 40 MG EC tablet Take 1 tablet (40 mg) by mouth every morning (before breakfast)  Qty: 30 tablet, Refills: 0    Associated Diagnoses: Renal cell carcinoma, unspecified laterality (H)         CONTINUE these medications which have CHANGED    Details   morphine (MS CONTIN) 60 MG 12 hr tablet Take 1 tablet (60 mg) by mouth every 12 hours  Refills: 0         CONTINUE these medications which have NOT CHANGED    Details   acetaminophen (TYLENOL) 500 MG tablet Take 1,000 mg by mouth 2 times daily      calcium carbonate 500 mg, elemental, (OSCAL 500) 1250 (500 Ca) MG TABS tablet Take 1 tablet by mouth 2 times daily      Dextromethorphan-guaiFENesin (MUCINEX DM)  MG 12 hr tablet Take 1 tablet by mouth every 12 hours      DULoxetine (CYMBALTA) 60 MG capsule Take 1 capsule (60 mg) by mouth daily  Qty: 90 capsule, Refills: 3    Associated Diagnoses: Anxiety      furosemide (LASIX) 20 MG tablet Take 1 tablet (20 mg) by mouth daily  Qty: 14 tablet, Refills: 0    Associated Diagnoses: Renal cell carcinoma, unspecified laterality (H)      lidocaine (LIDODERM) 5 % patch Place 1-3 patches onto the skin every 24 hours Apply to painful areas for 12 hours on, 12 hours off.  Qty: 30 patch, Refills: 3    Associated Diagnoses: Renal cell carcinoma, left (H); Pain in both upper extremities; Bone metastasis (H)      medical cannabis (Patient's own supply.  Not a prescription) 1 Dose See Admin Instructions (This is NOT a prescription, and does not certify that the patient has a qualifying medical condition for medical cannabis.  The purpose of this order is  to document that the patient reports taking medical cannabis.)      melatonin 3 MG tablet Take 3 mg by mouth nightly as needed       morphine (MSIR) 15 MG IR tablet Take 1 tablet (15 mg) by mouth every 3 hours as needed for severe pain  Qty: 120 tablet, Refills: 0    Associated Diagnoses: Cancer associated pain      polyethylene  glycol (MIRALAX/GLYCOLAX) Packet Take 0.5 packets by mouth daily as needed      predniSONE (DELTASONE) 10 MG tablet Prednisone 30 mg daily for 7 days, then 20 mg daily for 7 days, then 10 mg daily for 10 days, then 5 mg daily.  Qty: 200 tablet, Refills: 0    Comments: Please include the quantity of tablets and (strength) per dose in sig.  Associated Diagnoses: Renal cell carcinoma, unspecified laterality (H)      pregabalin (LYRICA) 50 MG capsule Take 1 capsule (50 mg) by mouth 2 times daily  Qty: 60 capsule, Refills: 1    Associated Diagnoses: Renal cell carcinoma, left (H); Neuropathic pain      Pseudoephedrine-APAP  MG TABS Take 1 tablet by mouth as needed       senna-docusate (SENOKOT-S;PERICOLACE) 8.6-50 MG per tablet Take 2 tablets by mouth daily      Vitamin D, Cholecalciferol, 1000 units TABS Take 1,000 Units by mouth 2 times daily           Allergies   No Known Allergies  Data   ROUTINE IP LABS (Last four results)  BMP  Recent Labs   Lab 05/13/19 0524 05/12/19  1619 05/12/19  0835     --  138   POTASSIUM 3.8  --  3.7   CHLORIDE 107  --  104   AUREA 8.4*  --  8.6   CO2 26  --  24   BUN 11  --  11   CR 0.61* 0.58* 0.63*   GLC 82  --  91     CBC  Recent Labs   Lab 05/13/19 0524 05/12/19  1619 05/12/19  0835   WBC 8.1  --  10.7   RBC 2.70*  --  3.22*   HGB 7.8*  --  9.4*   HCT 27.5*  --  32.1*   *  --  100   MCH 28.9  --  29.2   MCHC 28.4*  --  29.3*   RDW 17.3*  --  17.3*    252 257     INRNo lab results found in last 7 days.

## 2019-05-13 NOTE — PROGRESS NOTES
Social Work Services Progress Note    Hospital Day: 2  Date of Initial Social Work Evaluation:  N/A  Collaborated with:  Patient, pt's spouse, Jennifer West ([phone])    Data:  Pt is a 69 yo male admitted on 5.12.19 and resides in [place]. Pt/souse needing lodging for this evening following anticipated discharge.    Intervention:  SW met with pt at bedside and verified pt's demographics.  Pt reported that he has been to [facility] in the past, and familiar with this location, and process.  SW completed referral and faxed for lodging this evening only.  SW spoke with staff at  and confirmed that there is space available tonight.  Pt/spouse denied any further questions/needs at this time.    Assessment:  Pt engaged and appears aligned with plan to d/c to .    Plan:    Anticipated Disposition:  Discharge to [facility] prior to returning home    Barriers to d/c plan:  None    Follow Up:  SW will remain available as needed.    Carly Guerrier [facility]  7D Hematology/Oncology Social Worker  Phone: [phone]  Pager: [phone]

## 2019-05-13 NOTE — PROGRESS NOTES
Discharge to [facility]:    D: Orders for discharge and outpatient medications written. Pt and wife verbalized readiness to discharge to [facility] this evening.  I: PIV discontinued per protocol. Home medications and return to clinic schedule reviewed with patient and wife. Discharge instructions and parameters for calling Health Care Provider reviewed. Patient ambulated off of the unit at 1630 accompanied by his wife.   A: Patient/wife verbalized understanding of all discharge medications and discharge paperwork and was ready for discharge.   P: Patient instructed to  medications at the discharge pharmacy prior to leaving the hospital. Pt to follow up as scheduled/documented in discharge paperwork.

## 2019-05-13 NOTE — PLAN OF CARE
Alert and oriented X 4. AVSS. Denies difficulty breathing. C/o pain in lower back, bilateral hips and bilateral legs. Morphine given with decrease in pain. Voiding adequate amounts. Slept in between cares.

## 2019-05-13 NOTE — UTILIZATION REVIEW
"[facility]    Admission Status; Secondary Review Determination     Admission Date: 5/12/2019  8:31 AM      Under the authority of the Utilization Management Committee, the utilization review process indicated a secondary review on the above patient.  The review outcome is based on review of the medical records, discussions with staff, and applying clinical experience noted on the date of the review.          (x) Observation Status Appropriate - This patient does not meet hospital inpatient criteria and is placed in observation status. If this patient's primary payer is Medicare and was admitted as an inpatient, Condition Code 44 should be used and patient status changed to \"observation\".     RATIONALE FOR DETERMINATION   68-year-old male with a history of metastatic renal cell carcinoma was admitted yesterday with fever to 103.  Had mild lactic acidosis on arrival.  He was started on IV fluids and broad-spectrum antibodies with cefepime and vancomycin.  Now transition to oral antibiotics and planning for discharge home today.  He is not neutropenic and has remained afebrile and stable since hospital admission.  This patient does not meet inpatient medical necessity for inpatient hospitalization and will not require a 2-midnight hospitalization and therefore observation status is appropriate.    The severity of illness, intensity of service provided, expected LOS and risk for adverse outcome make the care appropriate for further observation; however, doesn't meet criteria for hospital inpatient admission.        The information on this document is developed by the utilization review team in order for the business office to ensure compliance.  This only denotes the appropriateness of proper admission status and does not reflect the quality of care rendered.         The definitions of Inpatient Status and Observation Status used in making the determination above are those provided in the CMS Coverage Manual, Chapter 1 and " Chapter 6, section 70.4.      Sincerely,     Sonido Esparza DO MPH   Physician Advisor  Utilization Review  [facility]

## 2019-05-13 NOTE — PLAN OF CARE
Observation Goals:    1. Afebrile: Pt has been afebrile throughout the shift.  2. HD Stable: Labs WDL  3. Eating and Drinking: Pt has eaten breakfast and lunch this shift.  4. Ambulating at baseline: Pt has ambulated (at baseline) in the halls x 2 this shift with his wife.  5. Pain controlled with oral pain medications: Pain has been comfortably manageable with po pain medications.

## 2019-05-13 NOTE — PLAN OF CARE
/65 (BP Location: Left arm)   Pulse 81   Temp 98.2  F (36.8  C) (Axillary)   Resp 16   Ht 1.829 m (6')   Wt 103.1 kg (227 lb 6.4 oz)   SpO2 98%   BMI 30.84 kg/m      Patient reports back pain and R leg pain, controlled by scheduled Lidocaine patches and MS Contin.Florecita nausea, SOB. Droplet and contact precautions continued, RVP results pending. Afebrile this shift, AVSS. Pt reports shaking and chills intermittently.    Will continue to monitor/assess. Resting in room between cares.

## 2019-05-14 ENCOUNTER — ONCOLOGY VISIT (OUTPATIENT)
Dept: ONCOLOGY | Facility: CLINIC | Age: 69
End: 2019-05-14
Attending: PHYSICIAN ASSISTANT
Payer: MEDICARE

## 2019-05-14 VITALS
BODY MASS INDEX: 31.02 KG/M2 | OXYGEN SATURATION: 100 % | WEIGHT: 229 LBS | SYSTOLIC BLOOD PRESSURE: 118 MMHG | HEART RATE: 71 BPM | RESPIRATION RATE: 18 BRPM | DIASTOLIC BLOOD PRESSURE: 71 MMHG | TEMPERATURE: 98.1 F | HEIGHT: 72 IN

## 2019-05-14 DIAGNOSIS — M79.601 PAIN IN BOTH UPPER EXTREMITIES: ICD-10-CM

## 2019-05-14 DIAGNOSIS — C64.9 RENAL CELL CARCINOMA, UNSPECIFIED LATERALITY (H): ICD-10-CM

## 2019-05-14 DIAGNOSIS — R53.81 OTHER MALAISE: ICD-10-CM

## 2019-05-14 DIAGNOSIS — C79.51 BONE METASTASIS: ICD-10-CM

## 2019-05-14 DIAGNOSIS — M79.602 PAIN IN BOTH UPPER EXTREMITIES: ICD-10-CM

## 2019-05-14 DIAGNOSIS — C64.2 RENAL CELL CARCINOMA, LEFT (H): Primary | ICD-10-CM

## 2019-05-14 LAB
ALBUMIN SERPL-MCNC: 2.8 G/DL (ref 3.4–5)
ALP SERPL-CCNC: 103 U/L (ref 40–150)
ALT SERPL W P-5'-P-CCNC: 19 U/L (ref 0–70)
ANION GAP SERPL CALCULATED.3IONS-SCNC: 8 MMOL/L (ref 3–14)
AST SERPL W P-5'-P-CCNC: 31 U/L (ref 0–45)
BASOPHILS # BLD AUTO: 0 10E9/L (ref 0–0.2)
BASOPHILS NFR BLD AUTO: 0.1 %
BILIRUB SERPL-MCNC: 0.6 MG/DL (ref 0.2–1.3)
BUN SERPL-MCNC: 9 MG/DL (ref 7–30)
CALCIUM SERPL-MCNC: 8.3 MG/DL (ref 8.5–10.1)
CHLORIDE SERPL-SCNC: 106 MMOL/L (ref 94–109)
CO2 SERPL-SCNC: 25 MMOL/L (ref 20–32)
CREAT SERPL-MCNC: 0.64 MG/DL (ref 0.66–1.25)
CRP SERPL-MCNC: 124 MG/L (ref 0–8)
DIFFERENTIAL METHOD BLD: ABNORMAL
EOSINOPHIL # BLD AUTO: 0.1 10E9/L (ref 0–0.7)
EOSINOPHIL NFR BLD AUTO: 0.8 %
ERYTHROCYTE [DISTWIDTH] IN BLOOD BY AUTOMATED COUNT: 17 % (ref 10–15)
GFR SERPL CREATININE-BSD FRML MDRD: >90 ML/MIN/{1.73_M2}
GLUCOSE SERPL-MCNC: 86 MG/DL (ref 70–99)
HCT VFR BLD AUTO: 27.7 % (ref 40–53)
HGB BLD-MCNC: 8.1 G/DL (ref 13.3–17.7)
IMM GRANULOCYTES # BLD: 0.2 10E9/L (ref 0–0.4)
IMM GRANULOCYTES NFR BLD: 3.1 %
LYMPHOCYTES # BLD AUTO: 0.2 10E9/L (ref 0.8–5.3)
LYMPHOCYTES NFR BLD AUTO: 2.8 %
MCH RBC QN AUTO: 29.5 PG (ref 26.5–33)
MCHC RBC AUTO-ENTMCNC: 29.2 G/DL (ref 31.5–36.5)
MCV RBC AUTO: 101 FL (ref 78–100)
MONOCYTES # BLD AUTO: 2.2 10E9/L (ref 0–1.3)
MONOCYTES NFR BLD AUTO: 29.6 %
NEUTROPHILS # BLD AUTO: 4.7 10E9/L (ref 1.6–8.3)
NEUTROPHILS NFR BLD AUTO: 63.6 %
NRBC # BLD AUTO: 0 10*3/UL
NRBC BLD AUTO-RTO: 0 /100
PLATELET # BLD AUTO: 256 10E9/L (ref 150–450)
PLATELET # BLD EST: ABNORMAL 10*3/UL
POTASSIUM SERPL-SCNC: 3.7 MMOL/L (ref 3.4–5.3)
PROT SERPL-MCNC: 6.3 G/DL (ref 6.8–8.8)
RBC # BLD AUTO: 2.75 10E12/L (ref 4.4–5.9)
SODIUM SERPL-SCNC: 139 MMOL/L (ref 133–144)
TSH SERPL DL<=0.005 MIU/L-ACNC: 2.1 MU/L (ref 0.4–4)
WBC # BLD AUTO: 7.4 10E9/L (ref 4–11)

## 2019-05-14 PROCEDURE — 25000128 H RX IP 250 OP 636: Mod: ZF | Performed by: PHYSICIAN ASSISTANT

## 2019-05-14 PROCEDURE — 36415 COLL VENOUS BLD VENIPUNCTURE: CPT | Performed by: INTERNAL MEDICINE

## 2019-05-14 PROCEDURE — 96401 CHEMO ANTI-NEOPL SQ/IM: CPT

## 2019-05-14 PROCEDURE — 85025 COMPLETE CBC W/AUTO DIFF WBC: CPT | Performed by: INTERNAL MEDICINE

## 2019-05-14 PROCEDURE — G0463 HOSPITAL OUTPT CLINIC VISIT: HCPCS | Mod: ZF

## 2019-05-14 PROCEDURE — 96372 THER/PROPH/DIAG INJ SC/IM: CPT | Mod: ZF

## 2019-05-14 PROCEDURE — 86140 C-REACTIVE PROTEIN: CPT | Performed by: INTERNAL MEDICINE

## 2019-05-14 PROCEDURE — 80053 COMPREHEN METABOLIC PANEL: CPT | Performed by: INTERNAL MEDICINE

## 2019-05-14 PROCEDURE — 84443 ASSAY THYROID STIM HORMONE: CPT | Performed by: INTERNAL MEDICINE

## 2019-05-14 PROCEDURE — 99214 OFFICE O/P EST MOD 30 MIN: CPT | Mod: ZP | Performed by: PHYSICIAN ASSISTANT

## 2019-05-14 RX ORDER — LIDOCAINE 50 MG/G
1-3 PATCH TOPICAL EVERY 24 HOURS
Qty: 30 PATCH | Refills: 3 | Status: SHIPPED | OUTPATIENT
Start: 2019-05-14 | End: 2019-10-01

## 2019-05-14 RX ADMIN — DENOSUMAB 120 MG: 120 INJECTION SUBCUTANEOUS at 10:58

## 2019-05-14 ASSESSMENT — PAIN SCALES - GENERAL: PAINLEVEL: NO PAIN (0)

## 2019-05-14 ASSESSMENT — MIFFLIN-ST. JEOR: SCORE: 1836.87

## 2019-05-14 NOTE — NURSING NOTE
1 Xgeva injection given in L  arm subcutaneously. Patient tolerated injection.    Tracey Padilla MA

## 2019-05-14 NOTE — NURSING NOTE
"Oncology Rooming Note    May 14, 2019 10:01 AM   Javon Bianchi is a 68 year old male who presents for:    Chief Complaint   Patient presents with     Oncology Clinic Visit     Return visit related to Renal Cancer     Initial Vitals: /71 (BP Location: Left arm, Patient Position: Sitting, Cuff Size: Adult Large)   Pulse 71   Temp 98.1  F (36.7  C) (Tympanic)   Resp 18   Ht 1.829 m (6' 0.01\")   Wt 103.9 kg (229 lb)   SpO2 100%   BMI 31.05 kg/m   Estimated body mass index is 31.05 kg/m  as calculated from the following:    Height as of this encounter: 1.829 m (6' 0.01\").    Weight as of this encounter: 103.9 kg (229 lb). Body surface area is 2.3 meters squared.  No Pain (0) Comment: Data Unavailable   No LMP for male patient.  Allergies reviewed: Yes  Medications reviewed: Yes    Medications: MEDICATION REFILLS NEEDED TODAY. Provider was notified.  Pharmacy name entered into [facility]: [facility] - [place], [place] - [address] [place] [address]    Clinical concerns: Refills needed today. Provider was notified.      Diana Smalls LPN            "

## 2019-05-14 NOTE — PROGRESS NOTES
Oncology/Hematology Visit Note  May 14, 2019    Reason for Visit: hospital discharge follow up    TREATMENT HISTORY:  Cabozantinib: Started 60 mg daily on September 28, 2018.  Dec 10, 2018: dose reduced cabozantinib to 40 mg a day.  Jan 17, 2019: dose reduced cabozantinib to 29 mg a day.  Feb 11, 2019: C1D1 Nivolumab   Mar 12, 2019: C2D1 Nivolumab    History of Present Illness: Javon Bianchi is a 68 year old male with a history significant for hyperlipidemia who had been in his usual health until he developed back pain in May this year. At that time, the pain was a stabbing like in the middle of lower back with burning like pain wrapping around this left hip to knee area. No trauma. He had MRI on 5/23/18 showed mild degenerative change with bulging disks L2-S1. A small benign hemangioma was noted in L2, no other marrow signal abnormality. Since then, the pain failed to improve despite steroid injections. In addition, he actually lost 25 lbs weight unintentionally since June along with chills few time a day. Overtime, the pain got worse to the point he could not ambulate after short distance and developed muscle weakness in lower extremities over time requiring a walker. Finally, he went to ER ([facility] in [place]) on 8/23/18, CT A/P was unremarkable. He was referred to neurology with obtaining MRI L spine. MRI on 8/30/18 revealed a pathological fracture at L3 vertebral body with height loss at 40% and diffuse involvement of a neoplastic process. IR guided biopsy on 8/31/18 showed poorly differentiated carcinoma. Its IHC was suggestive of possible renal cell carcinoma per pathology report (Renal cell immunochemistry is positive and along with the negative staining for CK7 and CK20 suggests the possibility of a renal carcinoma, however most renal cell carcinomas also express PAX8 which is negative in this case). Of note, CT chest/abdomen/pelvis at OSH was negative for primary lesion. The patient was evaluated  by neurosurgery who recommended no surgical intervention. He was discharged with TLSO brace and walker. He had palliative XRT locally from 9/11 for 10 fractions (Done in [place]).      He was started on cabozantinib 60 mg initially, requiring dose reduction to 20 mg for issues with hand foot syndrome and poor tolerability. Switched to nivolumab due to progression in bone disease on 2/6/19. Had to hold Cycle #3 due to myalgia, which improved with prednisone. He was hospitalized 5/12-5/13 for fever. Infectious workup negative to date.       Interval History:  Omega is here with his wife and son. He was discharged from hospital yesterday and has been staying at [facility]. Last night he felt chills and then felt hot, but he did not check his temp. Temp 98.1 F at check in and he has had no chills today. Denies any new symptoms. He was discharged on levaquin and got one dose yesterday prior to leaving the hospital and will take today's dose this afternoon. Had nausea during hospital stay but now ok. Appetite good on prednisone. No heartburn but was started on protonix at discharge. Has constipation and taking Senna and miralax prn. No urinary concerns. He was given lasix prior to discharge and has some home lasix 20 mg as needed. He had some edema of lower extremities from IVFs while hospitalized and has compression stockings, though is not wearing them today. He has no cough or dyspnea, but does have some chronic sinus drainage. RVP was negative. Denies any skin rashes.     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 Took one dose of MSIR last night.  Feeling more diffuse arthralgias as prednisone dose is reduced. He is currently on 10 mg prednisone daily but will drop down to 5 in a couple days. He would like to continue taper although arthralgias have been a little worse with each drop in dose as he would like to resume nivolumab next week. Remaining 10 pt ROS negative.    Current Outpatient  Medications   Medication Sig Dispense Refill     acetaminophen (TYLENOL) 500 MG tablet Take 1,000 mg by mouth 2 times daily       calcium carbonate 500 mg, elemental, (OSCAL 500) 1250 (500 Ca) MG TABS tablet Take 1 tablet by mouth 2 times daily       Dextromethorphan-guaiFENesin (MUCINEX DM)  MG 12 hr tablet Take 1 tablet by mouth every 12 hours       DULoxetine (CYMBALTA) 60 MG capsule Take 1 capsule (60 mg) by mouth daily 90 capsule 3     furosemide (LASIX) 20 MG tablet Take 1 tablet (20 mg) by mouth daily 14 tablet 0     levofloxacin (LEVAQUIN) 750 MG tablet Take 1 tablet (750 mg) by mouth daily for 6 days . Start taking 5/14 and take through 5/19. 6 tablet 0     lidocaine (LIDODERM) 5 % patch Place 1-3 patches onto the skin every 24 hours Apply to painful areas for 12 hours on, 12 hours off. 30 patch 3     medical cannabis (Patient's own supply.  Not a prescription) 1 Dose See Admin Instructions (This is NOT a prescription, and does not certify that the patient has a qualifying medical condition for medical cannabis.  The purpose of this order is  to document that the patient reports taking medical cannabis.)       melatonin 3 MG tablet Take 3 mg by mouth nightly as needed        morphine (MS CONTIN) 60 MG 12 hr tablet Take 1 tablet (60 mg) by mouth every 12 hours  0     morphine (MSIR) 15 MG IR tablet Take 1 tablet (15 mg) by mouth every 3 hours as needed for severe pain 120 tablet 0     pantoprazole (PROTONIX) 40 MG EC tablet Take 1 tablet (40 mg) by mouth every morning (before breakfast) 30 tablet 0     polyethylene glycol (MIRALAX/GLYCOLAX) Packet Take 0.5 packets by mouth daily as needed       predniSONE (DELTASONE) 10 MG tablet Prednisone 30 mg daily for 7 days, then 20 mg daily for 7 days, then 10 mg daily for 10 days, then 5 mg daily. 200 tablet 0     pregabalin (LYRICA) 50 MG capsule Take 1 capsule (50 mg) by mouth 2 times daily 60 capsule 1     senna-docusate (SENOKOT-S;PERICOLACE) 8.6-50 MG per  "tablet Take 2 tablets by mouth daily       Vitamin D, Cholecalciferol, 1000 units TABS Take 1,000 Units by mouth 2 times daily       Pseudoephedrine-APAP  MG TABS Take 1 tablet by mouth as needed          Physical Examination:  General: The patient is a pleasant male in no acute distress.  /71 (BP Location: Left arm, Patient Position: Sitting, Cuff Size: Adult Large)   Pulse 71   Temp 98.1  F (36.7  C) (Tympanic)   Resp 18   Ht 1.829 m (6' 0.01\")   Wt 103.9 kg (229 lb)   SpO2 100%   BMI 31.05 kg/m    Wt Readings from Last 10 Encounters:   05/14/19 103.9 kg (229 lb)   05/13/19 104.5 kg (230 lb 6.4 oz)   04/25/19 104.8 kg (231 lb)   04/24/19 105.1 kg (231 lb 12.8 oz)   04/08/19 104.8 kg (231 lb)   03/29/19 104.1 kg (229 lb 6.4 oz)   03/19/19 105.1 kg (231 lb 9.6 oz)   03/14/19 102.1 kg (225 lb)   03/14/19 102.1 kg (225 lb)   03/12/19 102.4 kg (225 lb 11.2 oz)     HEENT: EOMI, PERRL. Sclerae are anicteric. Oral mucosa is pink and moist with no lesions or thrush.   Lymph: Neck is supple with no lymphadenopathy in the cervical or supraclavicular areas.   Heart: Regular rate and rhythm.   Lungs: Clear to auscultation bilaterally.   Abdomen: Bowel sounds present, soft, nontender with no palpable hepatosplenomegaly or masses.   Extremities: 1+ BLE edema   Neuro: Cranial nerves II through XII are grossly intact.  Skin: No rashes, petechiae, or bruising noted on exposed skin.    Laboratory Data:  Results for orders placed or performed in visit on 05/14/19 (from the past 24 hour(s))   TSH with free T4 reflex   Result Value Ref Range    TSH 2.10 0.40 - 4.00 mU/L   Comprehensive metabolic panel   Result Value Ref Range    Sodium 139 133 - 144 mmol/L    Potassium 3.7 3.4 - 5.3 mmol/L    Chloride 106 94 - 109 mmol/L    Carbon Dioxide 25 20 - 32 mmol/L    Anion Gap 8 3 - 14 mmol/L    Glucose 86 70 - 99 mg/dL    Urea Nitrogen 9 7 - 30 mg/dL    Creatinine 0.64 (L) 0.66 - 1.25 mg/dL    GFR Estimate >90 >60 " mL/min/[1.73_m2]    GFR Estimate If Black >90 >60 mL/min/[1.73_m2]    Calcium 8.3 (L) 8.5 - 10.1 mg/dL    Bilirubin Total 0.6 0.2 - 1.3 mg/dL    Albumin 2.8 (L) 3.4 - 5.0 g/dL    Protein Total 6.3 (L) 6.8 - 8.8 g/dL    Alkaline Phosphatase 103 40 - 150 U/L    ALT 19 0 - 70 U/L    AST 31 0 - 45 U/L   CBC with platelets differential   Result Value Ref Range    WBC 7.4 4.0 - 11.0 10e9/L    RBC Count 2.75 (L) 4.4 - 5.9 10e12/L    Hemoglobin 8.1 (L) 13.3 - 17.7 g/dL    Hematocrit 27.7 (L) 40.0 - 53.0 %     (H) 78 - 100 fl    MCH 29.5 26.5 - 33.0 pg    MCHC 29.2 (L) 31.5 - 36.5 g/dL    RDW 17.0 (H) 10.0 - 15.0 %    Platelet Count 256 150 - 450 10e9/L    Diff Method Automated Method     % Neutrophils 63.6 %    % Lymphocytes 2.8 %    % Monocytes 29.6 %    % Eosinophils 0.8 %    % Basophils 0.1 %    % Immature Granulocytes 3.1 %    Nucleated RBCs 0 0 /100    Absolute Neutrophil 4.7 1.6 - 8.3 10e9/L    Absolute Lymphocytes 0.2 (L) 0.8 - 5.3 10e9/L    Absolute Monocytes 2.2 (H) 0.0 - 1.3 10e9/L    Absolute Eosinophils 0.1 0.0 - 0.7 10e9/L    Absolute Basophils 0.0 0.0 - 0.2 10e9/L    Abs Immature Granulocytes 0.2 0 - 0.4 10e9/L    Absolute Nucleated RBC 0.0     Platelet Estimate Confirming automated cell count    CRP inflammation   Result Value Ref Range    CRP Inflammation 124.0 (H) 0.0 - 8.0 mg/L         Assessment and Plan:  #Fever and chills  Noted to have Tmax 103 on 5/10/19 and was evaluated at [place] ED. Infectious work-up negative (influenza, CXR, blood cultures, UA) and patient was discharged home. Had repeat fever up to 104.0 on 5/12/19 and was advised to come to the [facility] ED. Subsequently admitted for further work-up. Infectious work-up negative to date (CXR, UA, blood cultures) with RVP neg. LA elevated to 2.1 on admission and down trended to 0.8 with IVF. Received Cefepime and Vancomycin from 5/12-5/13 with transition to PO Levofloxacin. Not neutropenic and remained afebrile/HD stable during  hospitalization.   --Discharge on Levofloxacin 750 mg daily x 7 days total (5/13-5/19/19)  --BCx NGTD     #Metastatic renal cell carcinoma  Follows with Dr. De Luna and transitioning care to Dr. Anne. Diagnosed via L spine MRI and biopsy 8/31/18 with poorly differentiated carcinoma, pathology suggesting renal cell origin. CT CAP negative for primary tumor. Underwent XRT 9/11/18 x 10 fractions. Started Cabozantinib 9/28/18 with need for dose reductions due to hand and foot syndrome. Due to disease progression, switched to Nivolumab on 2/11/19. Received cycle 2 on 3/12/19. Cycle 3 was due on 4/8/19, but was delayed due to myalgias and patient started on a 4 week Prednisone taper.   - Continue Prednisone 10 mg daily x 1 week starting 5/13 then 5 mg daily x 1 week. CRP increased today and arthralgias worse since dropping down to 10 mg, but patient would like to continue taper. Will check CRP next visit.  - Prescribed Protonix for stress ulcer ppx at discharge  - Has follow-up with JEFFREY on 5/21 with possible cycle 3 Nivolumab    #Bone mets: has received 1 dose of Xgeva on 2/26. On calcium and vitamin D. Will give today     #Cancer related pain  Continue MS Contin 60 mg BID. MSIR for breakthrough prn. Also on scheduled tylenol  - Palliative care follow-up on 5/21     #Anemia  Suspect secondary to chemo/XRT. Baseline has trended 8-11. Is 7.8 on discharge, possibly dilutional component.   - Hgb improved to 8.1 today.     Abigail Barrios PA-C  [facility]  [address] [place], [place] [address] [phone]

## 2019-05-14 NOTE — LETTER
5/14/2019      RE: Javon Bianchi  [address]  [facility] [facility] [place] [facility] [address]       Oncology/Hematology Visit Note  May 14, 2019    Reason for Visit: hospital discharge follow up    TREATMENT HISTORY:  Cabozantinib: Started 60 mg daily on September 28, 2018.  Dec 10, 2018: dose reduced cabozantinib to 40 mg a day.  Jan 17, 2019: dose reduced cabozantinib to 29 mg a day.  Feb 11, 2019: C1D1 Nivolumab   Mar 12, 2019: C2D1 Nivolumab    History of Present Illness: Javon Bianchi is a 68 year old male with a history significant for hyperlipidemia who had been in his usual health until he developed back pain in May this year. At that time, the pain was a stabbing like in the middle of lower back with burning like pain wrapping around this left hip to knee area. No trauma. He had MRI on 5/23/18 showed mild degenerative change with bulging disks L2-S1. A small benign hemangioma was noted in L2, no other marrow signal abnormality. Since then, the pain failed to improve despite steroid injections. In addition, he actually lost 25 lbs weight unintentionally since June along with chills few time a day. Overtime, the pain got worse to the point he could not ambulate after short distance and developed muscle weakness in lower extremities over time requiring a walker. Finally, he went to ER ([facility] in [place]) on 8/23/18, CT A/P was unremarkable. He was referred to neurology with obtaining MRI L spine. MRI on 8/30/18 revealed a pathological fracture at L3 vertebral body with height loss at 40% and diffuse involvement of a neoplastic process. IR guided biopsy on 8/31/18 showed poorly differentiated carcinoma. Its IHC was suggestive of possible renal cell carcinoma per pathology report (Renal cell immunochemistry is positive and along with the negative staining for CK7 and CK20 suggests the possibility of a renal carcinoma, however most renal cell carcinomas also express PAX8 which is negative in this case). Of note, CT  chest/abdomen/pelvis at OSH was negative for primary lesion. The patient was evaluated by neurosurgery who recommended no surgical intervention. He was discharged with TLSO brace and walker. He had palliative XRT locally from 9/11 for 10 fractions (Done in [place]).      He was started on cabozantinib 60 mg initially, requiring dose reduction to 20 mg for issues with hand foot syndrome and poor tolerability. Switched to nivolumab due to progression in bone disease on 2/6/19. Had to hold Cycle #3 due to myalgia, which improved with prednisone. He was hospitalized 5/12-5/13 for fever. Infectious workup negative to date.       Interval History:  Omega is here with his wife and son. He was discharged from hospital yesterday [facility] Last night he felt chills and then felt hot, but he did not check his temp. Temp 98.1 F at check in and he has had no chills today. Denies any new symptoms. He was discharged on levaquin and got one dose yesterday prior to leaving the hospital and will take today's dose this afternoon. Had nausea during hospital stay but now ok. Appetite good on prednisone. No heartburn but was started on protonix at discharge. Has constipation and taking Senna and miralax prn. No urinary concerns. He was given lasix prior to discharge and has some home lasix 20 mg as needed. He had some edema of lower extremities from IVFs while hospitalized and has compression stockings, though is not wearing them today. He has no cough or dyspnea, but does have some chronic sinus drainage. RVP was negative. Denies any skin rashes.     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 Took one dose of MSIR last night.  Feeling more diffuse arthralgias as prednisone dose is reduced. He is currently on 10 mg prednisone daily but will drop down to 5 in a couple days. He would like to continue taper although arthralgias have been a little worse with each drop in dose as he would  like to resume nivolumab next week. Remaining 10 pt ROS negative.    Current Outpatient Medications   Medication Sig Dispense Refill     acetaminophen (TYLENOL) 500 MG tablet Take 1,000 mg by mouth 2 times daily       calcium carbonate 500 mg, elemental, (OSCAL 500) 1250 (500 Ca) MG TABS tablet Take 1 tablet by mouth 2 times daily       Dextromethorphan-guaiFENesin (MUCINEX DM)  MG 12 hr tablet Take 1 tablet by mouth every 12 hours       DULoxetine (CYMBALTA) 60 MG capsule Take 1 capsule (60 mg) by mouth daily 90 capsule 3     furosemide (LASIX) 20 MG tablet Take 1 tablet (20 mg) by mouth daily 14 tablet 0     levofloxacin (LEVAQUIN) 750 MG tablet Take 1 tablet (750 mg) by mouth daily for 6 days . Start taking 5/14 and take through 5/19. 6 tablet 0     lidocaine (LIDODERM) 5 % patch Place 1-3 patches onto the skin every 24 hours Apply to painful areas for 12 hours on, 12 hours off. 30 patch 3     medical cannabis (Patient's own supply.  Not a prescription) 1 Dose See Admin Instructions (This is NOT a prescription, and does not certify that the patient has a qualifying medical condition for medical cannabis.  The purpose of this order is  to document that the patient reports taking medical cannabis.)       melatonin 3 MG tablet Take 3 mg by mouth nightly as needed        morphine (MS CONTIN) 60 MG 12 hr tablet Take 1 tablet (60 mg) by mouth every 12 hours  0     morphine (MSIR) 15 MG IR tablet Take 1 tablet (15 mg) by mouth every 3 hours as needed for severe pain 120 tablet 0     pantoprazole (PROTONIX) 40 MG EC tablet Take 1 tablet (40 mg) by mouth every morning (before breakfast) 30 tablet 0     polyethylene glycol (MIRALAX/GLYCOLAX) Packet Take 0.5 packets by mouth daily as needed       predniSONE (DELTASONE) 10 MG tablet Prednisone 30 mg daily for 7 days, then 20 mg daily for 7 days, then 10 mg daily for 10 days, then 5 mg daily. 200 tablet 0     pregabalin (LYRICA) 50 MG capsule Take 1 capsule (50 mg) by  "mouth 2 times daily 60 capsule 1     senna-docusate (SENOKOT-S;PERICOLACE) 8.6-50 MG per tablet Take 2 tablets by mouth daily       Vitamin D, Cholecalciferol, 1000 units TABS Take 1,000 Units by mouth 2 times daily       Pseudoephedrine-APAP  MG TABS Take 1 tablet by mouth as needed          Physical Examination:  General: The patient is a pleasant male in no acute distress.  /71 (BP Location: Left arm, Patient Position: Sitting, Cuff Size: Adult Large)   Pulse 71   Temp 98.1  F (36.7  C) (Tympanic)   Resp 18   Ht 1.829 m (6' 0.01\")   Wt 103.9 kg (229 lb)   SpO2 100%   BMI 31.05 kg/m     Wt Readings from Last 10 Encounters:   05/14/19 103.9 kg (229 lb)   05/13/19 104.5 kg (230 lb 6.4 oz)   04/25/19 104.8 kg (231 lb)   04/24/19 105.1 kg (231 lb 12.8 oz)   04/08/19 104.8 kg (231 lb)   03/29/19 104.1 kg (229 lb 6.4 oz)   03/19/19 105.1 kg (231 lb 9.6 oz)   03/14/19 102.1 kg (225 lb)   03/14/19 102.1 kg (225 lb)   03/12/19 102.4 kg (225 lb 11.2 oz)     HEENT: EOMI, PERRL. Sclerae are anicteric. Oral mucosa is pink and moist with no lesions or thrush.   Lymph: Neck is supple with no lymphadenopathy in the cervical or supraclavicular areas.   Heart: Regular rate and rhythm.   Lungs: Clear to auscultation bilaterally.   Abdomen: Bowel sounds present, soft, nontender with no palpable hepatosplenomegaly or masses.   Extremities: 1+ BLE edema   Neuro: Cranial nerves II through XII are grossly intact.  Skin: No rashes, petechiae, or bruising noted on exposed skin.    Laboratory Data:  Results for orders placed or performed in visit on 05/14/19 (from the past 24 hour(s))   TSH with free T4 reflex   Result Value Ref Range    TSH 2.10 0.40 - 4.00 mU/L   Comprehensive metabolic panel   Result Value Ref Range    Sodium 139 133 - 144 mmol/L    Potassium 3.7 3.4 - 5.3 mmol/L    Chloride 106 94 - 109 mmol/L    Carbon Dioxide 25 20 - 32 mmol/L    Anion Gap 8 3 - 14 mmol/L    Glucose 86 70 - 99 mg/dL    Urea Nitrogen " 9 7 - 30 mg/dL    Creatinine 0.64 (L) 0.66 - 1.25 mg/dL    GFR Estimate >90 >60 mL/min/[1.73_m2]    GFR Estimate If Black >90 >60 mL/min/[1.73_m2]    Calcium 8.3 (L) 8.5 - 10.1 mg/dL    Bilirubin Total 0.6 0.2 - 1.3 mg/dL    Albumin 2.8 (L) 3.4 - 5.0 g/dL    Protein Total 6.3 (L) 6.8 - 8.8 g/dL    Alkaline Phosphatase 103 40 - 150 U/L    ALT 19 0 - 70 U/L    AST 31 0 - 45 U/L   CBC with platelets differential   Result Value Ref Range    WBC 7.4 4.0 - 11.0 10e9/L    RBC Count 2.75 (L) 4.4 - 5.9 10e12/L    Hemoglobin 8.1 (L) 13.3 - 17.7 g/dL    Hematocrit 27.7 (L) 40.0 - 53.0 %     (H) 78 - 100 fl    MCH 29.5 26.5 - 33.0 pg    MCHC 29.2 (L) 31.5 - 36.5 g/dL    RDW 17.0 (H) 10.0 - 15.0 %    Platelet Count 256 150 - 450 10e9/L    Diff Method Automated Method     % Neutrophils 63.6 %    % Lymphocytes 2.8 %    % Monocytes 29.6 %    % Eosinophils 0.8 %    % Basophils 0.1 %    % Immature Granulocytes 3.1 %    Nucleated RBCs 0 0 /100    Absolute Neutrophil 4.7 1.6 - 8.3 10e9/L    Absolute Lymphocytes 0.2 (L) 0.8 - 5.3 10e9/L    Absolute Monocytes 2.2 (H) 0.0 - 1.3 10e9/L    Absolute Eosinophils 0.1 0.0 - 0.7 10e9/L    Absolute Basophils 0.0 0.0 - 0.2 10e9/L    Abs Immature Granulocytes 0.2 0 - 0.4 10e9/L    Absolute Nucleated RBC 0.0     Platelet Estimate Confirming automated cell count    CRP inflammation   Result Value Ref Range    CRP Inflammation 124.0 (H) 0.0 - 8.0 mg/L         Assessment and Plan:  #Fever and chills  Noted to have Tmax 103 on 5/10/19 and was evaluated at [place] ED. Infectious work-up negative (influenza, CXR, blood cultures, UA) and patient was discharged home. Had repeat fever up to 104.0 on 5/12/19 and was advised to come to the [facility] ED. Subsequently admitted for further work-up. Infectious work-up negative to date (CXR, UA, blood cultures) with RVP neg. LA elevated to 2.1 on admission and down trended to 0.8 with IVF. Received Cefepime and Vancomycin from 5/12-5/13 with transition to PO  Levofloxacin. Not neutropenic and remained afebrile/HD stable during hospitalization.   --Discharge on Levofloxacin 750 mg daily x 7 days total (5/13-5/19/19)  --BCx NGTD     #Metastatic renal cell carcinoma  Follows with Dr. De Luna and transitioning care to Dr. Anne. Diagnosed via L spine MRI and biopsy 8/31/18 with poorly differentiated carcinoma, pathology suggesting renal cell origin. CT CAP negative for primary tumor. Underwent XRT 9/11/18 x 10 fractions. Started Cabozantinib 9/28/18 with need for dose reductions due to hand and foot syndrome. Due to disease progression, switched to Nivolumab on 2/11/19. Received cycle 2 on 3/12/19. Cycle 3 was due on 4/8/19, but was delayed due to myalgias and patient started on a 4 week Prednisone taper.   - Continue Prednisone 10 mg daily x 1 week starting 5/13 then 5 mg daily x 1 week. CRP increased today and arthralgias worse since dropping down to 10 mg, but patient would like to continue taper. Will check CRP next visit.  - Prescribed Protonix for stress ulcer ppx at discharge  - Has follow-up with JEFFREY on 5/21 with possible cycle 3 Nivolumab    #Bone mets: has received 1 dose of Xgeva on 2/26. On calcium and vitamin D. Will give today     #Cancer related pain  Continue MS Contin 60 mg BID. MSIR for breakthrough prn. Also on scheduled tylenol  - Palliative care follow-up on 5/21     #Anemia  Suspect secondary to chemo/XRT. Baseline has trended 8-11. Is 7.8 on discharge, possibly dilutional component.   - Hgb improved to 8.1 today.     Abigail Barrios PA-C  [facility]  [address] [place] [address] [phone]

## 2019-05-18 LAB
BACTERIA SPEC CULT: NO GROWTH
Lab: NORMAL
SPECIMEN SOURCE: NORMAL

## 2019-05-21 ENCOUNTER — ONCOLOGY VISIT (OUTPATIENT)
Dept: ONCOLOGY | Facility: CLINIC | Age: 69
End: 2019-05-21
Attending: INTERNAL MEDICINE
Payer: MEDICARE

## 2019-05-21 ENCOUNTER — APPOINTMENT (OUTPATIENT)
Dept: LAB | Facility: CLINIC | Age: 69
End: 2019-05-21
Attending: INTERNAL MEDICINE
Payer: MEDICARE

## 2019-05-21 ENCOUNTER — PATIENT OUTREACH (OUTPATIENT)
Dept: CARE COORDINATION | Facility: CLINIC | Age: 69
End: 2019-05-21

## 2019-05-21 VITALS
DIASTOLIC BLOOD PRESSURE: 70 MMHG | BODY MASS INDEX: 30.69 KG/M2 | OXYGEN SATURATION: 97 % | WEIGHT: 226.6 LBS | RESPIRATION RATE: 16 BRPM | HEART RATE: 61 BPM | SYSTOLIC BLOOD PRESSURE: 119 MMHG | TEMPERATURE: 97.6 F | HEIGHT: 72 IN

## 2019-05-21 VITALS
HEART RATE: 76 BPM | DIASTOLIC BLOOD PRESSURE: 70 MMHG | OXYGEN SATURATION: 96 % | TEMPERATURE: 98.4 F | BODY MASS INDEX: 30.4 KG/M2 | WEIGHT: 224.2 LBS | SYSTOLIC BLOOD PRESSURE: 122 MMHG

## 2019-05-21 DIAGNOSIS — K59.03 CONSTIPATION DUE TO OPIOID THERAPY: ICD-10-CM

## 2019-05-21 DIAGNOSIS — C64.2 RENAL CELL CARCINOMA, LEFT (H): Primary | ICD-10-CM

## 2019-05-21 DIAGNOSIS — C79.51 BONE METASTASIS: ICD-10-CM

## 2019-05-21 DIAGNOSIS — M79.2 NEUROPATHIC PAIN: ICD-10-CM

## 2019-05-21 DIAGNOSIS — G89.3 CANCER ASSOCIATED PAIN: ICD-10-CM

## 2019-05-21 DIAGNOSIS — C64.9 RENAL CELL CARCINOMA, UNSPECIFIED LATERALITY (H): ICD-10-CM

## 2019-05-21 DIAGNOSIS — Z51.5 ENCOUNTER FOR PALLIATIVE CARE: ICD-10-CM

## 2019-05-21 DIAGNOSIS — C64.9 RENAL CELL CARCINOMA, UNSPECIFIED LATERALITY (H): Primary | ICD-10-CM

## 2019-05-21 DIAGNOSIS — T40.2X5A CONSTIPATION DUE TO OPIOID THERAPY: ICD-10-CM

## 2019-05-21 LAB
ALBUMIN SERPL-MCNC: 3.2 G/DL (ref 3.4–5)
ALP SERPL-CCNC: 100 U/L (ref 40–150)
ALT SERPL W P-5'-P-CCNC: 15 U/L (ref 0–70)
ANION GAP SERPL CALCULATED.3IONS-SCNC: 5 MMOL/L (ref 3–14)
AST SERPL W P-5'-P-CCNC: 29 U/L (ref 0–45)
BASOPHILS # BLD AUTO: 0 10E9/L (ref 0–0.2)
BASOPHILS NFR BLD AUTO: 0.3 %
BILIRUB SERPL-MCNC: 0.7 MG/DL (ref 0.2–1.3)
BUN SERPL-MCNC: 12 MG/DL (ref 7–30)
CALCIUM SERPL-MCNC: 9 MG/DL (ref 8.5–10.1)
CHLORIDE SERPL-SCNC: 107 MMOL/L (ref 94–109)
CO2 SERPL-SCNC: 27 MMOL/L (ref 20–32)
CREAT SERPL-MCNC: 0.7 MG/DL (ref 0.66–1.25)
DIFFERENTIAL METHOD BLD: ABNORMAL
EOSINOPHIL # BLD AUTO: 0 10E9/L (ref 0–0.7)
EOSINOPHIL NFR BLD AUTO: 0.4 %
ERYTHROCYTE [DISTWIDTH] IN BLOOD BY AUTOMATED COUNT: 17.3 % (ref 10–15)
GFR SERPL CREATININE-BSD FRML MDRD: >90 ML/MIN/{1.73_M2}
GLUCOSE SERPL-MCNC: 94 MG/DL (ref 70–99)
HCT VFR BLD AUTO: 31.1 % (ref 40–53)
HGB BLD-MCNC: 9.3 G/DL (ref 13.3–17.7)
IMM GRANULOCYTES # BLD: 0.3 10E9/L (ref 0–0.4)
IMM GRANULOCYTES NFR BLD: 3.3 %
LYMPHOCYTES # BLD AUTO: 0.2 10E9/L (ref 0.8–5.3)
LYMPHOCYTES NFR BLD AUTO: 2.1 %
MCH RBC QN AUTO: 29.6 PG (ref 26.5–33)
MCHC RBC AUTO-ENTMCNC: 29.9 G/DL (ref 31.5–36.5)
MCV RBC AUTO: 99 FL (ref 78–100)
MONOCYTES # BLD AUTO: 1.7 10E9/L (ref 0–1.3)
MONOCYTES NFR BLD AUTO: 22.8 %
NEUTROPHILS # BLD AUTO: 5.4 10E9/L (ref 1.6–8.3)
NEUTROPHILS NFR BLD AUTO: 71.1 %
NRBC # BLD AUTO: 0 10*3/UL
NRBC BLD AUTO-RTO: 0 /100
PLATELET # BLD AUTO: 254 10E9/L (ref 150–450)
PLATELET # BLD EST: ABNORMAL 10*3/UL
POTASSIUM SERPL-SCNC: 4.2 MMOL/L (ref 3.4–5.3)
PROT SERPL-MCNC: 6.7 G/DL (ref 6.8–8.8)
RBC # BLD AUTO: 3.14 10E12/L (ref 4.4–5.9)
RBC MORPH BLD: ABNORMAL
SODIUM SERPL-SCNC: 139 MMOL/L (ref 133–144)
TSH SERPL DL<=0.005 MIU/L-ACNC: 1.83 MU/L (ref 0.4–4)
WBC # BLD AUTO: 7.6 10E9/L (ref 4–11)

## 2019-05-21 PROCEDURE — 25000128 H RX IP 250 OP 636: Mod: ZF | Performed by: PHYSICIAN ASSISTANT

## 2019-05-21 PROCEDURE — 85025 COMPLETE CBC W/AUTO DIFF WBC: CPT | Performed by: INTERNAL MEDICINE

## 2019-05-21 PROCEDURE — 80053 COMPREHEN METABOLIC PANEL: CPT | Performed by: INTERNAL MEDICINE

## 2019-05-21 PROCEDURE — 99214 OFFICE O/P EST MOD 30 MIN: CPT | Performed by: INTERNAL MEDICINE

## 2019-05-21 PROCEDURE — 25800030 ZZH RX IP 258 OP 636: Mod: ZF | Performed by: PHYSICIAN ASSISTANT

## 2019-05-21 PROCEDURE — 96413 CHEMO IV INFUSION 1 HR: CPT

## 2019-05-21 PROCEDURE — 84443 ASSAY THYROID STIM HORMONE: CPT | Performed by: INTERNAL MEDICINE

## 2019-05-21 PROCEDURE — 99215 OFFICE O/P EST HI 40 MIN: CPT | Mod: ZP | Performed by: PHYSICIAN ASSISTANT

## 2019-05-21 PROCEDURE — G0463 HOSPITAL OUTPT CLINIC VISIT: HCPCS | Mod: ZF

## 2019-05-21 RX ORDER — ALBUTEROL SULFATE 0.83 MG/ML
2.5 SOLUTION RESPIRATORY (INHALATION)
Status: CANCELLED | OUTPATIENT
Start: 2019-05-21

## 2019-05-21 RX ORDER — LORAZEPAM 2 MG/ML
0.5 INJECTION INTRAMUSCULAR EVERY 4 HOURS PRN
Status: CANCELLED
Start: 2019-05-21

## 2019-05-21 RX ORDER — DIAZEPAM 5 MG
TABLET ORAL
Qty: 10 TABLET | Refills: 0 | Status: SHIPPED | OUTPATIENT
Start: 2019-05-21 | End: 2020-01-01

## 2019-05-21 RX ORDER — PREGABALIN 50 MG/1
50 CAPSULE ORAL 2 TIMES DAILY
Qty: 60 CAPSULE | Refills: 1 | Status: SHIPPED | OUTPATIENT
Start: 2019-05-21 | End: 2019-07-01

## 2019-05-21 RX ORDER — ALBUTEROL SULFATE 90 UG/1
1-2 AEROSOL, METERED RESPIRATORY (INHALATION)
Status: CANCELLED
Start: 2019-05-21

## 2019-05-21 RX ORDER — METHYLPREDNISOLONE SODIUM SUCCINATE 125 MG/2ML
125 INJECTION, POWDER, LYOPHILIZED, FOR SOLUTION INTRAMUSCULAR; INTRAVENOUS
Status: CANCELLED
Start: 2019-05-21

## 2019-05-21 RX ORDER — MEPERIDINE HYDROCHLORIDE 25 MG/ML
25 INJECTION INTRAMUSCULAR; INTRAVENOUS; SUBCUTANEOUS EVERY 30 MIN PRN
Status: CANCELLED | OUTPATIENT
Start: 2019-05-21

## 2019-05-21 RX ORDER — EPINEPHRINE 0.3 MG/.3ML
0.3 INJECTION SUBCUTANEOUS EVERY 5 MIN PRN
Status: CANCELLED | OUTPATIENT
Start: 2019-05-21

## 2019-05-21 RX ORDER — MORPHINE SULFATE 15 MG/1
15 TABLET, FILM COATED, EXTENDED RELEASE ORAL EVERY 8 HOURS
Qty: 90 TABLET | Refills: 0 | Status: SHIPPED | OUTPATIENT
Start: 2019-05-21 | End: 2019-07-03

## 2019-05-21 RX ORDER — DIPHENHYDRAMINE HYDROCHLORIDE 50 MG/ML
50 INJECTION INTRAMUSCULAR; INTRAVENOUS
Status: CANCELLED
Start: 2019-05-21

## 2019-05-21 RX ORDER — EPINEPHRINE 1 MG/ML
0.3 INJECTION, SOLUTION INTRAMUSCULAR; SUBCUTANEOUS EVERY 5 MIN PRN
Status: CANCELLED | OUTPATIENT
Start: 2019-05-21

## 2019-05-21 RX ORDER — SODIUM CHLORIDE 9 MG/ML
1000 INJECTION, SOLUTION INTRAVENOUS CONTINUOUS PRN
Status: CANCELLED
Start: 2019-05-21

## 2019-05-21 RX ORDER — MORPHINE SULFATE 30 MG/1
30 TABLET, FILM COATED, EXTENDED RELEASE ORAL EVERY 8 HOURS
Qty: 90 TABLET | Refills: 0 | Status: SHIPPED | OUTPATIENT
Start: 2019-05-21 | End: 2019-07-03

## 2019-05-21 RX ADMIN — SODIUM CHLORIDE 480 MG: 9 INJECTION, SOLUTION INTRAVENOUS at 10:16

## 2019-05-21 ASSESSMENT — PAIN SCALES - GENERAL
PAINLEVEL: SEVERE PAIN (6)
PAINLEVEL: SEVERE PAIN (7)

## 2019-05-21 ASSESSMENT — MIFFLIN-ST. JEOR: SCORE: 1827.44

## 2019-05-21 NOTE — PROGRESS NOTES
"Oncology Rooming Note    May 21, 2019 3:22 PM   Javon Bianchi is a 68 year old male who presents for:    Chief Complaint   Patient presents with     Oncology Clinic Visit     Renal cell carcinoma, unspecified laterality (H)     Initial Vitals: /70 (BP Location: Left arm, Patient Position: Sitting, Cuff Size: Adult Regular)   Pulse 61   Temp 97.6  F (36.4  C) (Oral)   Resp 16   Ht 1.831 m (6' 0.1\")   Wt 102.8 kg (226 lb 9.6 oz)   SpO2 97%   BMI 30.65 kg/m   Estimated body mass index is 30.65 kg/m  as calculated from the following:    Height as of this encounter: 1.831 m (6' 0.1\").    Weight as of this encounter: 102.8 kg (226 lb 9.6 oz). Body surface area is 2.29 meters squared.  Severe Pain (7) Comment: Data Unavailable   No LMP for male patient.  Allergies reviewed: Yes  Medications reviewed: Yes    Medications: Medication refills not needed today.  Pharmacy name entered into [facility]: [facility] - [address]    Clinical concerns: no        Alma Marks MA            "

## 2019-05-21 NOTE — PROGRESS NOTES
Per request,  completed and faxed [employer] referral for lodging dates 6/18 - 6/19. [employer] will contact Patient directly for confirmation of reservation.  will continue to provide support as needed.      JIL Hatfield, MSW   - [facility]  Phone: [phone]  Pager: [phone]  5/21/2019

## 2019-05-21 NOTE — PROGRESS NOTES
Infusion Nursing Note:  Javon Bianchi presents today for Cycle 3 Day 1 Nivolumab.    Patient seen by provider today: Yes: MARCO Hu   present during visit today: Not Applicable.    Note: Omega comes to infusion today with his wife after visit with JEFFREY. C/o 6/10 low back/left hip pain that is somewhat managed with Lidoderm patches, MS Contin/MSIR and Tylenol. JEFFREY aware. No other acute concerns at this time.    Intravenous Access:  Peripheral IV placed.    Treatment Conditions:  Lab Results   Component Value Date     05/21/2019                   Lab Results   Component Value Date    POTASSIUM 4.2 05/21/2019           No results found for: MAG         Lab Results   Component Value Date    CR 0.70 05/21/2019                   Lab Results   Component Value Date    AUREA 9.0 05/21/2019                Lab Results   Component Value Date    BILITOTAL 0.7 05/21/2019           Lab Results   Component Value Date    ALBUMIN 3.2 05/21/2019                    Lab Results   Component Value Date    ALT 15 05/21/2019           Lab Results   Component Value Date    AST 29 05/21/2019       Results reviewed, labs MET treatment parameters, ok to proceed with treatment.      Post Infusion Assessment:  Patient tolerated infusion without incident.  Blood return noted pre and post infusion.  Site patent and intact, free from redness, edema or discomfort.  No evidence of extravasations.  Access discontinued per protocol.     Nivolumab stop time 1046 5/21/19      Discharge Plan:   Prescription refills given for Lyrica. Patient's wife picked up medication from pharmacy.  Discharge instructions reviewed with: Patient and wife.  Patient and/or family verbalized understanding of discharge instructions and all questions answered.  Copy of AVS reviewed with patient and/or family.  Patient will return 6/24 for next appointment. Has CT/MRI on 6/18. F/u provider appt yet to be scheduled for the 6/19 (orders in Radha's check-out  note).  Patient discharged in stable condition accompanied by: wife.  Departure Mode: Jose Francisco/fEra Ruiz RN

## 2019-05-21 NOTE — PROGRESS NOTES
Oncology/Hematology Visit Note  May 21, 2019    Reason for Visit: hospital discharge follow up    TREATMENT HISTORY:  Cabozantinib: Started 60 mg daily on September 28, 2018.  Dec 10, 2018: dose reduced cabozantinib to 40 mg a day.  Jan 17, 2019: dose reduced cabozantinib to 29 mg a day.  Feb 11, 2019: C1D1 Nivolumab   Mar 12, 2019: C2D1 Nivolumab    History of Present Illness: Javon Bianchi is a 68 year old male with a history significant for hyperlipidemia who had been in his usual health until he developed back pain in May this year. At that time, the pain was a stabbing like in the middle of lower back with burning like pain wrapping around this left hip to knee area. No trauma. He had MRI on 5/23/18 showed mild degenerative change with bulging disks L2-S1. A small benign hemangioma was noted in L2, no other marrow signal abnormality. Since then, the pain failed to improve despite steroid injections. In addition, he actually lost 25 lbs weight unintentionally since June along with chills few time a day. Overtime, the pain got worse to the point he could not ambulate after short distance and developed muscle weakness in lower extremities over time requiring a walker. Finally, he went to ER ([facility] in [place]) on 8/23/18, CT A/P was unremarkable. He was referred to neurology with obtaining MRI L spine. MRI on 8/30/18 revealed a pathological fracture at L3 vertebral body with height loss at 40% and diffuse involvement of a neoplastic process. IR guided biopsy on 8/31/18 showed poorly differentiated carcinoma. Its IHC was suggestive of possible renal cell carcinoma per pathology report (Renal cell immunochemistry is positive and along with the negative staining for CK7 and CK20 suggests the possibility of a renal carcinoma, however most renal cell carcinomas also express PAX8 which is negative in this case). Of note, CT chest/abdomen/pelvis at OSH was negative for primary lesion. The patient was evaluated  by neurosurgery who recommended no surgical intervention. He was discharged with TLSO brace and walker. He had palliative XRT locally from 9/11 for 10 fractions (Done in [place]).      He was started on cabozantinib 60 mg initially, requiring dose reduction to 20 mg for issues with hand foot syndrome and poor tolerability. Switched to nivolumab due to progression in bone disease on 2/6/19. Had to hold Cycle #3 due to myalgia, which improved with prednisone. He was hospitalized 5/12-5/13 for fever. Infectious workup negative to date.       Interval History:  Omega is here with his wife and son. He has been having some low grade 99 fevers in the afternoon with some mild chills which usually resolve a few hours later.  No new infectious symptoms. He had sompleted the Levaquin given to him from the inpatient team.  He has some mild nausea intermittently but eating rather well.  No heartburn but was started on protonix at discharge for GI protection while on steroids. Has constipation and taking Senna and miralax prn. No urinary concerns. He was given lasix prior to discharge but really hasn't needed it.  He has no cough or dyspnea, but does have some chronic sinus drainage. RVP was negative. Denies any skin rashes.     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 He has stayed on 10 mg of prednisone and the plan was to reduce this to 5 mg later this week.  He is still having arthralgias in his shoulders, wrists, hips, knees, just not in his hands.  He has not been taking celebrex and only one dose of Tylenol in the AM.  Remaining 10 pt ROS negative.    Current Outpatient Medications   Medication Sig Dispense Refill     acetaminophen (TYLENOL) 500 MG tablet Take 1,000 mg by mouth 2 times daily       calcium carbonate 500 mg, elemental, (OSCAL 500) 1250 (500 Ca) MG TABS tablet Take 1 tablet by mouth 2 times daily       Dextromethorphan-guaiFENesin (MUCINEX DM)  MG 12 hr tablet Take 1 tablet  by mouth every 12 hours       diazepam (VALIUM) 5 MG tablet Take 1 pill 30 min before MRI and a second pill 2 minutes prior. 10 tablet 0     DULoxetine (CYMBALTA) 60 MG capsule Take 1 capsule (60 mg) by mouth daily 90 capsule 3     furosemide (LASIX) 20 MG tablet Take 1 tablet (20 mg) by mouth daily 14 tablet 0     lidocaine (LIDODERM) 5 % patch Place 1-3 patches onto the skin every 24 hours Apply to painful areas for 12 hours on, 12 hours off. 30 patch 3     medical cannabis (Patient's own supply.  Not a prescription) 1 Dose See Admin Instructions (This is NOT a prescription, and does not certify that the patient has a qualifying medical condition for medical cannabis.  The purpose of this order is  to document that the patient reports taking medical cannabis.)       melatonin 3 MG tablet Take 3 mg by mouth nightly as needed        morphine (MS CONTIN) 60 MG 12 hr tablet Take 1 tablet (60 mg) by mouth every 12 hours  0     morphine (MSIR) 15 MG IR tablet Take 1 tablet (15 mg) by mouth every 3 hours as needed for severe pain 120 tablet 0     pantoprazole (PROTONIX) 40 MG EC tablet Take 1 tablet (40 mg) by mouth every morning (before breakfast) 30 tablet 0     polyethylene glycol (MIRALAX/GLYCOLAX) Packet Take 0.5 packets by mouth daily as needed       predniSONE (DELTASONE) 10 MG tablet Prednisone 30 mg daily for 7 days, then 20 mg daily for 7 days, then 10 mg daily for 10 days, then 5 mg daily. 200 tablet 0     pregabalin (LYRICA) 50 MG capsule Take 1 capsule (50 mg) by mouth 2 times daily 60 capsule 1     Pseudoephedrine-APAP  MG TABS Take 1 tablet by mouth as needed        senna-docusate (SENOKOT-S;PERICOLACE) 8.6-50 MG per tablet Take 2 tablets by mouth daily       Vitamin D, Cholecalciferol, 1000 units TABS Take 1,000 Units by mouth 2 times daily         Physical Examination:  General: The patient is a pleasant male in no acute distress.  /70 (BP Location: Right arm, Patient Position: Sitting, Cuff  Size: Adult Large)   Pulse 76   Temp 98.4  F (36.9  C) (Oral)   Wt 101.7 kg (224 lb 3.2 oz)   SpO2 96%   BMI 30.40 kg/m    Wt Readings from Last 10 Encounters:   05/21/19 101.7 kg (224 lb 3.2 oz)   05/14/19 103.9 kg (229 lb)   05/13/19 104.5 kg (230 lb 6.4 oz)   04/25/19 104.8 kg (231 lb)   04/24/19 105.1 kg (231 lb 12.8 oz)   04/08/19 104.8 kg (231 lb)   03/29/19 104.1 kg (229 lb 6.4 oz)   03/19/19 105.1 kg (231 lb 9.6 oz)   03/14/19 102.1 kg (225 lb)   03/14/19 102.1 kg (225 lb)     HEENT: EOMI, PERRL. Sclerae are anicteric. Oral mucosa is pink and moist with no lesions or thrush.   Lymph: Neck is supple with no lymphadenopathy in the cervical or supraclavicular areas.   Heart: Regular rate and rhythm.   Lungs: Clear to auscultation bilaterally.   Abdomen: Bowel sounds present, soft, nontender with no palpable hepatosplenomegaly or masses.   Extremities: 1+ BLE edema   Neuro: Cranial nerves II through XII are grossly intact.  Skin: No rashes, petechiae, or bruising noted on exposed skin.    Laboratory Data:     5/13/2019 05:24 5/14/2019 08:51 5/21/2019 08:10   Sodium 139 139 139   Potassium 3.8 3.7 4.2   Chloride 107 106 107   Carbon Dioxide 26 25 27   Urea Nitrogen 11 9 12   Creatinine 0.61 (L) 0.64 (L) 0.70   GFR Estimate >90 >90 >90   GFR Estimate If Black >90 >90 >90   Calcium 8.4 (L) 8.3 (L) 9.0   Anion Gap 6 8 5   Albumin 2.5 (L) 2.8 (L) 3.2 (L)   Protein Total 6.0 (L) 6.3 (L) 6.7 (L)   Bilirubin Total 0.4 0.6 0.7   Alkaline Phosphatase 97 103 100   ALT 18 19 15   AST 40 31 29   CRP Inflammation  124.0 (H)    Lactic Acid 0.8     TSH  2.10 1.83   Glucose 82 86 94   WBC 8.1 7.4 7.6   Hemoglobin 7.8 (L) 8.1 (L) 9.3 (L)   Hematocrit 27.5 (L) 27.7 (L) 31.1 (L)   Platelet Count 222 256 254   RBC Count 2.70 (L) 2.75 (L) 3.14 (L)    (H) 101 (H) 99     Assessment and Plan:  #Metastatic renal cell carcinoma  Follows with Dr. De Luna and transitioning care to Dr. Anne. Diagnosed via L spine MRI and biopsy  8/31/18 with poorly differentiated carcinoma, pathology suggesting renal cell origin. CT CAP negative for primary tumor. Underwent XRT 9/11/18 x 10 fractions. Started Cabozantinib 9/28/18 with need for dose reductions due to hand and foot syndrome. Due to disease progression, switched to Nivolumab on 2/11/19. Received cycle 2 on 3/12/19. Cycle 3 was due on 4/8/19, but was delayed due to myalgias and patient started on a 4 week Prednisone taper.  The pain was better on the higher doses of steroids and still remains while he is at 10 mg daily.  We discussed that the pain will only get better if we stop the immunotherapy and try different therapy.  Given his aggressive disease we discussed giving one more cycle of immunotherapy and then repeated scans and re-assessing his arthralgias.  Will set up CT CAP and MRI lumbar spine and pelvis in one month and to see Dr Anne.  If he is not responding, then we will switch gears.  If he is responding and still having arthralgias, we will likely need to get rheumatology on board.  He was not excited about getting another specialty with more medications on board, but we will see where his pain trends this month.     #arthralgias, immunotherapy related   - Continue 10 mg prednisone  - Continue Protonix for stress ulcer ppx at discharge  - Will add back in Celebrex BID, watch for GERD symptoms  - Add in Tylenol 1000 mg 2-3 x day    #Fever and chills  Recent admission for f/s/c without a source.  Currently getting ~3 days a week a 99 degree fever around 4pm, likely tumor fevers or related to immunotherapy.  Discussed adding an afternoon Tylenol.      #Bone mets: has received 1 dose of Xgeva on 2/26 and second dose 5/14/19. On calcium and vitamin D.     #Cancer related pain  Continue MS Contin 60 mg BID. MSIR for breakthrough prn. Scheduled Tylenol and celebrex as above.   - continues with [facility] care         Radha St PA-C  [facility]  [address]  [place] [id number] [phone]

## 2019-05-21 NOTE — PROGRESS NOTES
"Palliative Care Outpatient Clinic Progress Note    Patient Name:  Javon Bianchi  Primary Provider:  LIANNE Wellington    Chief Complaint/Identifying Information:  Metastatic Renal Cell Carcinoma on Cabometyx  History of L3 pathologic fracture found at diagnosis       [facility] Outpatient Palliative Care Opioid Prescribing Safety Plan     Opioid Safety Education: Reviewed by Nan Gonzalez  on 11/8/2018  Opioid Risk Assessment: Performed by Nan Gonzalez  on 11/8/2018 .  ORT score of 0.   Mood Disorder Assessment: Performed by Carly Mills on 04/25/19.  No concerns.    reviewed with every prescription, last reviewed by Carly Mills on 05/21/19     Additional recommendations based on patient's prognosis and indication for opioids include the following:     Expected prognosis: shorter  Risk: Low or Medium (ORT 0-7)  No further recommendations.     Interim History:  Javon Bianchi 68 year old male returns to be seen by palliative care today, accompanied by his wife Suzie.  Javon Bianchi was last seen by me one month ago, at which time Omega was ready for an opioid de-escalation.  His MS Contin was tapered.  Since he was last seen by me, Omega was admitted to [facility] with fever of unknown source.     Omega notes that he did try to taper the MS Contin, but he developed diffuse arthralgias, worse in the lower joints, but is present diffusely. He starts getting worse pain at 4PM, gets \"shakes\" at this time, then it goes away 2 hours after he takes his MS Contin.  He also uses MSIR total of 30mg per day.  Noticing more pain in his left femur (previously had radiation). Is more active than he used to be, has been out in the garage (working on projects).     Adjuvants: taking ES Tylenol 1000mg BID. Also using lidocaine patches.     Taking senna 2 in the morning, miralax every 1-2 days. Having to strain with his stools but having them daily.     Coping:  Reports that he is in good spirits.     Social " "History:  Pertinent changes to social history/social situation since last visit: Planning to go to [place] next month to see his daughter.   Social History     Tobacco Use     Smoking status: Never Smoker     Smokeless tobacco: Never Used   Substance Use Topics     Alcohol use: None     Drug use: None              Physical Exam:   Vitals were reviewed  /70 (BP Location: Left arm, Patient Position: Sitting, Cuff Size: Adult Regular)   Pulse 61   Temp 97.6  F (36.4  C) (Oral)   Resp 16   Ht 1.831 m (6' 0.1\")   Wt 102.8 kg (226 lb 9.6 oz)   SpO2 97%   BMI 30.65 kg/m    General: Alert, comfortable appearing male in no acute distress.   Eyes: Pupils equal, sclera clear.   ENT: MMM.   Resp: Unlabored on room air.   Abd: Soft, non-distended, non-tender to palpation, active bowel sounds.   Ext: No TTP of the thighs/hips.   Neuro: No facial asymmetry.  Spontaneous movements grossly non-focal.  Gait assisted by walker today.   Psych: Alert, appropriately interactive, full affect, clear sensorium.       Impression & Recommendations & Counseling:  Omega is a 68 year old man with metastatic renal cell carcinoma whose painful epidural tumor burden has responded to Nivolumab.  He did not tolerate MS Contin de-escalation due to joint pains, end of dose failure in the in the afternoon.  Will transition to TID dosing on his MS Contin.  We discussed that I don't think his diffuse joint pains are due to his cancer, and agree with Omega's reports of his Oncology team plans for Rheumatology evaluation if his upcoming scans are unrevealing.      For now, I will order:   Stop MS Contin 60mg BID  Start MS Contin 45mg q8h  Increase tylenol to 1000mg every 8 hours  Increase senna to 2 tabs BID    RTC 6 weeks for a follow up.     Additional information reviewed today:   No Known Allergies  Current Outpatient Medications   Medication Sig Dispense Refill     acetaminophen (TYLENOL) 500 MG tablet Take 1,000 mg by mouth 2 times daily       " calcium carbonate 500 mg, elemental, (OSCAL 500) 1250 (500 Ca) MG TABS tablet Take 1 tablet by mouth 2 times daily       Dextromethorphan-guaiFENesin (MUCINEX DM)  MG 12 hr tablet Take 1 tablet by mouth every 12 hours       diazepam (VALIUM) 5 MG tablet Take 1 pill 30 min before MRI and a second pill 2 minutes prior. 10 tablet 0     DULoxetine (CYMBALTA) 60 MG capsule Take 1 capsule (60 mg) by mouth daily 90 capsule 3     furosemide (LASIX) 20 MG tablet Take 1 tablet (20 mg) by mouth daily 14 tablet 0     lidocaine (LIDODERM) 5 % patch Place 1-3 patches onto the skin every 24 hours Apply to painful areas for 12 hours on, 12 hours off. 30 patch 3     medical cannabis (Patient's own supply.  Not a prescription) 1 Dose See Admin Instructions (This is NOT a prescription, and does not certify that the patient has a qualifying medical condition for medical cannabis.  The purpose of this order is  to document that the patient reports taking medical cannabis.)       melatonin 3 MG tablet Take 3 mg by mouth nightly as needed        morphine (MS CONTIN) 60 MG 12 hr tablet Take 1 tablet (60 mg) by mouth every 12 hours  0     morphine (MSIR) 15 MG IR tablet Take 1 tablet (15 mg) by mouth every 3 hours as needed for severe pain 120 tablet 0     pantoprazole (PROTONIX) 40 MG EC tablet Take 1 tablet (40 mg) by mouth every morning (before breakfast) 30 tablet 0     polyethylene glycol (MIRALAX/GLYCOLAX) Packet Take 0.5 packets by mouth daily as needed       predniSONE (DELTASONE) 10 MG tablet Prednisone 30 mg daily for 7 days, then 20 mg daily for 7 days, then 10 mg daily for 10 days, then 5 mg daily. 200 tablet 0     pregabalin (LYRICA) 50 MG capsule Take 1 capsule (50 mg) by mouth 2 times daily 60 capsule 1     Pseudoephedrine-APAP  MG TABS Take 1 tablet by mouth as needed        senna-docusate (SENOKOT-S;PERICOLACE) 8.6-50 MG per tablet Take 2 tablets by mouth daily       Vitamin D, Cholecalciferol, 1000 units TABS  Take 1,000 Units by mouth 2 times daily       Past Medical History:   Diagnosis Date     Bone metastasis (H) 09/28/2018     Renal cell carcinoma, right (H) 09/28/2018     No past surgical history on file.    Key Data Reviewed:  LABS:   Lab Results   Component Value Date    WBC 7.6 05/21/2019    HGB 9.3 (L) 05/21/2019    HCT 31.1 (L) 05/21/2019     05/21/2019     05/21/2019    POTASSIUM 4.2 05/21/2019    CHLORIDE 107 05/21/2019    CO2 27 05/21/2019    BUN 12 05/21/2019    CR 0.70 05/21/2019    GLC 94 05/21/2019    SED 87 (H) 03/29/2019    AST 29 05/21/2019    ALT 15 05/21/2019    ALKPHOS 100 05/21/2019    BILITOTAL 0.7 05/21/2019    INR 1.3 (A) 08/31/2018     IMAGING:   No interval imaging.     [place]  - Use of controlled substances consistent with history.     Thank you for continuing to involve me in the care of this patient.     Carly Mills MD / Palliative Medicine / Pager [phone] / After-Hours Answering Service [phone] / Main Palliative Clinic - [facility] [phone] / [facility] Inpatient Team Consult Pager [phone] (answered 8am-430pm M-F)    Time spent: 28 minutes, >50% spent in counseling/coordination of care.

## 2019-05-21 NOTE — PATIENT INSTRUCTIONS
Thank you for coming into the Palliative Care Clinic today.     Stop MS Contin 60mg tabs.  We will break this up into smaller, more frequent doses.   Start MS Contin 45mg q8h  Increase tylenol to 1000mg every 8 hours  Increase senna to 2 tabs BID    Return to clinic 6 weeks for a follow up.     You can reach the Palliative Care Team during business hours at the following number: [phone] (Palliative Clinic Nurse Line).     To reach the Palliative Care Provider on-call after-hours or on holidays and weekends, call: [phone].  Please note that we are not able to provide pain medication refills on evenings or weekends.     ==================================================================    [facility] Pall[facility]   The [facility] Palliative Care Team is committed to treating your pain and other symptoms. This handout is for all patients treated with opioid medications in our clinics.     What are opioid medicines?   Opioid medications are used to ease some types of pain and shortness of breath. They are sometimes called narcotics. They include: Morphine (MS Contin, Roxanol), Oxycodone (OxyContin, OxyFast, Percocet), Hydrocodone (Vicodin, Norco), Hydromorphone (Dilaudid), Fentanyl (Duragesic), Methadone (Dolophine).   Are opioid medicines safe to take?   Opioid medicines are safe when you follow the directions from your doctor or medical provider.  Opioids can cause serious side effects and become unsafe if you do not follow your instructions.   To make sure you are taking opioid medicines safely, we may ask you to bring in your pill bottles or to allow us to test your urine. Our goal is to keep you safe and to improve your ability to function & be active. If we don t think opioids are safely doing that, we will work with you to taper you off of them.   Do not drive unless your medical provider tells you it is safe.   Do not take opioids with alcohol or  anxiety/sleep medicines unless your doctor tells you it is ok to do so.   Are opioids addictive?   Addiction means you crave a drug and have trouble limiting the amount you use.   Addiction is more likely to happen if you take opioids to relieve stress or to feel altered. If you or your loved one feels this way when taking opioid medicines, let your medical provider know. We may refer you for additional assessment or services if this is a concern.   Your body will get used to the opioid medicines if you take them for more than two weeks in a row. This means if you stop them suddenly, you may have withdrawal. If this occurs, you will feel uncomfortable (nausea, diarrhea, increased pain). This does not mean you are addicted. Never stop taking your pain medicines all at once unless your medical provider tells you to. If you think you need less medicine, your medical provider will help you to safely lower your dose.   What is the safest way to store opioids?   Keep your medicines in a safe place away from children, teens, pets, and visitors. Be careful about who knows that you have these medicines.   How do I get rid of my old opioids?   Opioids should be brought to your [facility] drop-off site, or you can purchase a disposal kit from your local pharmacy. If neither of these options is available, the Food and Drug Administration recommends that you flush unused opioids down the toilet.   Do not share or sell your pain medicines. This is illegal and very dangerous. We cannot prescribe opioid pain medicines to you if do this.   How do I get refills?   Opioid medicines need signed paper prescriptions. This means it may take longer to refill than other medicines. Our clinic cannot prescribe them on weekends or at night.     Give us one week s notice when requesting a refill. This gives us the time we need to get it signed and back to you. It may be possible to mail prescriptions to you.

## 2019-05-21 NOTE — PATIENT INSTRUCTIONS
Contact Numbers    [facility] Main Line: [phone]  [facility] Triage and After Hours Nurse Line:  [phone]    Please call the [facility] nurse triage or the after hours nurse line if you experience a temperature greater than or equal to 100.5, shaking chills, have uncontrolled nausea, vomiting and/or diarrhea, dizziness, lightheadedness, shortness of breath, chest pain, bleeding, unexplained bruising, or if you have any other new/concerning symptoms, questions or concerns.     If you are having any concerning symptoms or wish to speak to a provider before your next infusion visit, please call your care coordinator or triage to notify them so we can adequately serve you.     If you need a refill on a narcotic prescription or other medication, please call triage before your infusion appointment.       May 2019      Ari Monday Tuesday Wednesday Thursday Friday Saturday                  1     2     3     4       5     6     7     8     9     10     11       12    Admission   8:31 AM   Paul Ramirez, DO   [address] [facility]   (Discharge: 5/13/2019)    XR CHEST 2 VIEWS   9:40 AM   (15 min.)   UUXR3   [facility] [place],  Radiology 13     14    LAB WITH  CLINIC   9:15 AM   (15 min.)    LAB   [facility] Lab    [facility] RETURN   9:25 AM   (50 min.)   Abigail Barrios PA   [facility] 15     16     17     18       19     20     21    [facility]   7:45 AM   (15 min.)   [facility]   [facility] Lab Draw    [facility] RETURN   8:25 AM   (50 min.)   [address] PA[facility]   [facility] ONC INFUSION 60   9:30 AM   (60 min.)    ONCOLOGY INFUSION   [facility]    RETURN   3:30 PM   (30 min.)   Carly Mills MD   [facility] 22     23     24     25       26     27     28     29     30     31 June 2019 Sunday Monday Tuesday Wednesday Thursday Friday Saturday 1        2     3     4     5     6     7     8       9     10     11     12     13     14     15       16     17     18    LAB   2:00 PM   (15 min.)    LAB   [facility] Lab    CT CHEST/ABDOMEN/PELVIS W   2:40 PM   (20 min.)   CT2   [facility] CT    MR LUMBAR SPINE WWO   3:15 PM   (45 min.)   [address] MRI    MR PELVIS BONE WWO   4:00 PM   (45 min.)   [address] MRI 19     20     21     22       23     24    [facility] DRAW   2:00 PM   (15 min.)    [facility] DRAW   [facility] Lab Draw    [facility] ONC INFUSION 60   2:30 PM   (60 min.)    ONCOLOGY INFUSION   [facility] 25     26     27     28     29       30                                                   Lab Results:  Recent Results (from the past 12 hour(s))   Comprehensive metabolic panel    Collection Time: 05/21/19  8:10 AM   Result Value Ref Range    Sodium 139 133 - 144 mmol/L    Potassium 4.2 3.4 - 5.3 mmol/L    Chloride 107 94 - 109 mmol/L    Carbon Dioxide 27 20 - 32 mmol/L    Anion Gap 5 3 - 14 mmol/L    Glucose 94 70 - 99 mg/dL    Urea Nitrogen 12 7 - 30 mg/dL    Creatinine 0.70 0.66 - 1.25 mg/dL    GFR Estimate >90 >60 mL/min/[1.73_m2]    GFR Estimate If Black >90 >60 mL/min/[1.73_m2]    Calcium 9.0 8.5 - 10.1 mg/dL    Bilirubin Total 0.7 0.2 - 1.3 mg/dL    Albumin 3.2 (L) 3.4 - 5.0 g/dL    Protein Total 6.7 (L) 6.8 - 8.8 g/dL    Alkaline Phosphatase 100 40 - 150 U/L    ALT 15 0 - 70 U/L    AST 29 0 - 45 U/L   TSH with free T4 reflex    Collection Time: 05/21/19  8:10 AM   Result Value Ref Range    TSH 1.83 0.40 - 4.00 mU/L   *CBC with platelets differential    Collection Time: 05/21/19  8:10 AM   Result Value Ref Range    WBC 7.6 4.0 - 11.0 10e9/L    RBC Count 3.14 (L) 4.4 - 5.9 10e12/L    Hemoglobin 9.3 (L) 13.3 - 17.7 g/dL    Hematocrit 31.1 (L) 40.0 - 53.0 %    MCV 99 78 - 100 fl    MCH 29.6 26.5 - 33.0 pg    MCHC 29.9 (L) 31.5 - 36.5 g/dL    RDW 17.3 (H) 10.0 - 15.0 %     Platelet Count 254 150 - 450 10e9/L    Diff Method Automated Method     % Neutrophils 71.1 %    % Lymphocytes 2.1 %    % Monocytes 22.8 %    % Eosinophils 0.4 %    % Basophils 0.3 %    % Immature Granulocytes 3.3 %    Nucleated RBCs 0 0 /100    Absolute Neutrophil 5.4 1.6 - 8.3 10e9/L    Absolute Lymphocytes 0.2 (L) 0.8 - 5.3 10e9/L    Absolute Monocytes 1.7 (H) 0.0 - 1.3 10e9/L    Absolute Eosinophils 0.0 0.0 - 0.7 10e9/L    Absolute Basophils 0.0 0.0 - 0.2 10e9/L    Abs Immature Granulocytes 0.3 0 - 0.4 10e9/L    Absolute Nucleated RBC 0.0     RBC Morphology Consistent with reported results     Platelet Estimate Confirming automated cell count

## 2019-05-21 NOTE — LETTER
"    5/21/2019         RE: Javon Bianchi  [address]  [place] [address]        Dear Colleague,    Thank you for referring your patient, Javon Bianchi, to the [facility]. Please see a copy of my visit note below.    Palliative Care Outpatient Clinic Progress Note    Patient Name:  Javon Bianchi  Primary Provider:  LIANNE Wellington    Chief Complaint/Identifying Information:  Metastatic Renal Cell Carcinoma on Cabometyx  History of L3 pathologic fracture found at diagnosis       [facility] Outpatient Palliative Care Opioid Prescribing Safety Plan     Opioid Safety Education: Reviewed by Nan Gonzalez  on 11/8/2018  Opioid Risk Assessment: Performed by Nan Gonzalez  on 11/8/2018 .  ORT score of 0.   Mood Disorder Assessment: Performed by Carly Mills on 04/25/19.  No concerns.    reviewed with every prescription, last reviewed by Carly Mills on  05/21/19     Additional recommendations based on patient's prognosis and indication for opioids include the following:     Expected prognosis: shorter  Risk: Low or Medium (ORT 0-7)  No further recommendations.     Interim History:  Javon Bianchi 68 year old male returns to be seen by palliative care today, accompanied by his wife Suzie.  Javon Bianchi was last seen by me one month ago, at which time Omega was ready for an opioid de-escalation.  His MS Contin was tapered.  Since he was last seen by me, Omega was admitted to [facility] with fever of unknown source.     Omega notes that he did try to taper the MS Contin, but he developed diffuse arthralgias, worse in the lower joints, but is present diffusely. He starts getting worse pain at 4PM, gets \"shakes\" at this time, then it goes away 2 hours after he takes his MS Contin.  He also uses MSIR total of 30mg per day.  Noticing more pain in his left femur (previously had radiation). Is more active than he used to be, has been out in the garage (working on projects). " "    Adjuvants: taking ES Tylenol 1000mg BID. Also using lidocaine patches.     Taking senna 2 in the morning, miralax every 1-2 days. Having to strain with his stools but having them daily.     Coping:  Reports that he is in good spirits.     Social History:  Pertinent changes to social history/social situation since last visit: Planning to go to [place] next month to see his daughter.   Social History     Tobacco Use     Smoking status: Never Smoker     Smokeless tobacco: Never Used   Substance Use Topics     Alcohol use: None     Drug use: None              Physical Exam:   Vitals were reviewed  /70 (BP Location: Left arm, Patient Position: Sitting, Cuff Size: Adult Regular)   Pulse 61   Temp 97.6  F (36.4  C) (Oral)   Resp 16   Ht 1.831 m (6' 0.1\")   Wt 102.8 kg (226 lb 9.6 oz)   SpO2 97%   BMI 30.65 kg/m     General: Alert, comfortable appearing male in no acute distress.   Eyes: Pupils equal, sclera clear.   ENT: MMM.   Resp: Unlabored on room air.   Abd: Soft, non-distended, non-tender to palpation, active bowel sounds.   Ext: No TTP of the thighs/hips.   Neuro: No facial asymmetry.  Spontaneous movements grossly non-focal.  Gait assisted by walker today.   Psych: Alert, appropriately interactive, full affect, clear sensorium.       Impression & Recommendations & Counseling:  Omega is a 68 year old man with metastatic renal cell carcinoma whose painful epidural tumor burden has responded to Nivolumab.  He did not tolerate MS Contin de-escalation due to joint pains, end of dose failure in the in the afternoon.  Will transition to TID dosing on his MS Contin.  We discussed that I don't think his diffuse joint pains are due to his cancer, and agree with Omega's reports of his Oncology team plans for Rheumatology evaluation if his upcoming scans are unrevealing.      For now, I will order:   Stop MS Contin 60mg BID  Start MS Contin 45mg q8h  Increase tylenol to 1000mg every 8 hours  Increase senna to 2 tabs " BID    RTC 6 weeks for a follow up.     Additional information reviewed today:   No Known Allergies  Current Outpatient Medications   Medication Sig Dispense Refill     acetaminophen (TYLENOL) 500 MG tablet Take 1,000 mg by mouth 2 times daily       calcium carbonate 500 mg, elemental, (OSCAL 500) 1250 (500 Ca) MG TABS tablet Take 1 tablet by mouth 2 times daily       Dextromethorphan-guaiFENesin (MUCINEX DM)  MG 12 hr tablet Take 1 tablet by mouth every 12 hours       diazepam (VALIUM) 5 MG tablet Take 1 pill 30 min before MRI and a second pill 2 minutes prior. 10 tablet 0     DULoxetine (CYMBALTA) 60 MG capsule Take 1 capsule (60 mg) by mouth daily 90 capsule 3     furosemide (LASIX) 20 MG tablet Take 1 tablet (20 mg) by mouth daily 14 tablet 0     lidocaine (LIDODERM) 5 % patch Place 1-3 patches onto the skin every 24 hours Apply to painful areas for 12 hours on, 12 hours off. 30 patch 3     medical cannabis (Patient's own supply.  Not a prescription) 1 Dose See Admin Instructions (This is NOT a prescription, and does not certify that the patient has a qualifying medical condition for medical cannabis.  The purpose of this order is  to document that the patient reports taking medical cannabis.)       melatonin 3 MG tablet Take 3 mg by mouth nightly as needed        morphine (MS CONTIN) 60 MG 12 hr tablet Take 1 tablet (60 mg) by mouth every 12 hours  0     morphine (MSIR) 15 MG IR tablet Take 1 tablet (15 mg) by mouth every 3 hours as needed for severe pain 120 tablet 0     pantoprazole (PROTONIX) 40 MG EC tablet Take 1 tablet (40 mg) by mouth every morning (before breakfast) 30 tablet 0     polyethylene glycol (MIRALAX/GLYCOLAX) Packet Take 0.5 packets by mouth daily as needed       predniSONE (DELTASONE) 10 MG tablet Prednisone 30 mg daily for 7 days, then 20 mg daily for 7 days, then 10 mg daily for 10 days, then 5 mg daily. 200 tablet 0     pregabalin (LYRICA) 50 MG capsule Take 1 capsule (50 mg) by  "mouth 2 times daily 60 capsule 1     Pseudoephedrine-APAP  MG TABS Take 1 tablet by mouth as needed        senna-docusate (SENOKOT-S;PERICOLACE) 8.6-50 MG per tablet Take 2 tablets by mouth daily       Vitamin D, Cholecalciferol, 1000 units TABS Take 1,000 Units by mouth 2 times daily       Past Medical History:   Diagnosis Date     Bone metastasis (H) 09/28/2018     Renal cell carcinoma, right (H) 09/28/2018     No past surgical history on file.    Key Data Reviewed:  LABS:   Lab Results   Component Value Date    WBC 7.6 05/21/2019    HGB 9.3 (L) 05/21/2019    HCT 31.1 (L) 05/21/2019     05/21/2019     05/21/2019    POTASSIUM 4.2 05/21/2019    CHLORIDE 107 05/21/2019    CO2 27 05/21/2019    BUN 12 05/21/2019    CR 0.70 05/21/2019    GLC 94 05/21/2019    SED 87 (H) 03/29/2019    AST 29 05/21/2019    ALT 15 05/21/2019    ALKPHOS 100 05/21/2019    BILITOTAL 0.7 05/21/2019    INR 1.3 (A) 08/31/2018     IMAGING:   No interval imaging.     [place]  - Use of controlled substances consistent with history.     Thank you for continuing to involve me in the care of this patient.     Carly Mills MD / Palliative Medicine / Pager [phone] / After-Hours Answering Service [phone] / Main Palliative Clinic - [facility] [phone] / [facility] Inpatient Team Consult Pager [phone] (answered 8am-430pm M-F)    Time spent: 28 minutes, >50% spent in counseling/coordination of care.       Oncology Rooming Note    May 21, 2019 3:22 PM   Javon Bianchi is a 68 year old male who presents for:    Chief Complaint   Patient presents with     Oncology Clinic Visit     Renal cell carcinoma, unspecified laterality (H)     Initial Vitals: /70 (BP Location: Left arm, Patient Position: Sitting, Cuff Size: Adult Regular)   Pulse 61   Temp 97.6  F (36.4  C) (Oral)   Resp 16   Ht 1.831 m (6' 0.1\")   Wt 102.8 kg (226 lb 9.6 oz)   SpO2 97%   BMI 30.65 kg/m    Estimated body mass index is 30.65 " "kg/m  as calculated from the following:    Height as of this encounter: 1.831 m (6' 0.1\").    Weight as of this encounter: 102.8 kg (226 lb 9.6 oz). Body surface area is 2.29 meters squared.  Severe Pain (7) Comment: Data Unavailable   No LMP for male patient.  Allergies reviewed: Yes  Medications reviewed: Yes    Medications: Medication refills not needed today.  Pharmacy name entered into [facility]: [facility] - [address]    Clinical concerns: no        Alam Marks MA              Again, thank you for allowing me to participate in the care of your patient.        Sincerely,        Carly Mills MD    "

## 2019-05-21 NOTE — NURSING NOTE
"Oncology Rooming Note    May 21, 2019 8:51 AM   Javon Bianchi is a 68 year old male who presents for:    Chief Complaint   Patient presents with     Blood Draw      blood draw and vitals     Oncology Clinic Visit     Renal Cell , labs, Tx     Initial Vitals: /70 (BP Location: Right arm, Patient Position: Sitting, Cuff Size: Adult Large)   Pulse 76   Temp 98.4  F (36.9  C) (Oral)   Wt 101.7 kg (224 lb 3.2 oz)   SpO2 96%   BMI 30.40 kg/m   Estimated body mass index is 30.4 kg/m  as calculated from the following:    Height as of 5/14/19: 1.829 m (6' 0.01\").    Weight as of this encounter: 101.7 kg (224 lb 3.2 oz). Body surface area is 2.27 meters squared.  Severe Pain (6) Comment: Data Unavailable   No LMP for male patient.  Allergies reviewed: Yes  Medications reviewed: Yes    Medications: Medication refills not needed today.  Pharmacy name entered into [facility]: [facility] - [address]    Clinical concerns: Ache joints all over besides , Pelvis , hips, legs  Radha was notified.      Tracey Padilla MA              "

## 2019-05-22 ENCOUNTER — DOCUMENTATION ONLY (OUTPATIENT)
Dept: ONCOLOGY | Facility: CLINIC | Age: 69
End: 2019-05-22

## 2019-05-22 NOTE — PROGRESS NOTES
Prior auth for MS Contin 15 and 30mgs required - sent via Cover My Meds      MS Contin 30MG er tablets (Key: [id number])  MS Contin 15MG er tablets (Key: [id number])

## 2019-05-24 ENCOUNTER — CARE COORDINATION (OUTPATIENT)
Dept: ONCOLOGY | Facility: CLINIC | Age: 69
End: 2019-05-24

## 2019-05-24 ENCOUNTER — PATIENT OUTREACH (OUTPATIENT)
Dept: CARE COORDINATION | Facility: CLINIC | Age: 69
End: 2019-05-24

## 2019-05-24 ENCOUNTER — TELEPHONE (OUTPATIENT)
Dept: ONCOLOGY | Facility: CLINIC | Age: 69
End: 2019-05-24

## 2019-05-24 NOTE — PROGRESS NOTES
Patient called to report that he is experiencing improved pain control with q8 hour dosing of MS Contin 45mgs

## 2019-05-24 NOTE — TELEPHONE ENCOUNTER
[facility] Triage Note:    Omega called with 3 questions:  1) Do I need to have lab draw on both 6/18 and 6/26 ? I always need lab on day of infusion.  Answer: I will route to care team to advise re: this, changes will show up in MyChart.  2) Can I change my [facility] request?   Answer: Yes, I will transfer you to [facility] to have you speak directly to  re: this.  3) I talked with Radha about whether or not I can restart Celebrex and she said sure, but it's not in any of my medication list yet. Can I take it?  Answer: Yes, Radha's note says: - Will add back in Celebrex BID, watch for GERD symptoms    Pt voiced understanding of above instructions and information and denied further questions and was transferred to .

## 2019-05-24 NOTE — TELEPHONE ENCOUNTER
TC to pt to review lab appointments in question on his schedule on 06/18 and 06/26. It appears they are both lab appts are for the upcoming visit with Dr. Anne. Pt requested to keep the lab appt on 06/26 immediately before the OV with Dr. Anne.

## 2019-05-24 NOTE — PROGRESS NOTES
Per Patient's request,  completed and faxed [employer] request for lodging dates 5/26-5/27. [facility] [employer] will contact Patient for confirmation of reservation.  will continue to provide support as needed.    Soo Yeon Han, [facility] Center[facility]  Pager:  [phone]

## 2019-05-29 ENCOUNTER — TELEPHONE (OUTPATIENT)
Dept: ONCOLOGY | Facility: CLINIC | Age: 69
End: 2019-05-29

## 2019-05-29 NOTE — TELEPHONE ENCOUNTER
Thanks, Charli - please ask him if he is using his morphine PRN doses.  Could you confirm when he is seeing Oncology next?  I don't think that arthralgias should be expected from his cancer treatments but would have to defer that to them. Please make sure that his Oncology team is also looped into this symptom.     Carly

## 2019-05-29 NOTE — TELEPHONE ENCOUNTER
Call to patient in response to his reports of increased joint pain .    Omega states that pain has been so dibiltiating he finds it difficult get out of bed and uses a walker or pain to mobilize.    States that previously reported pain to back buttocks and left leg has improved since changin MS Contin to 45mgs q8. He does identify that he has increased pain towards the end of the 8 hour dose.      Omega his reported arthralgia to all joints has worsened recently and reports this happened last month.    I will discuss Javon's pain with Dr Mills

## 2019-05-29 NOTE — TELEPHONE ENCOUNTER
"Pt called in C/O pain and aching in joints. States that this pain does fluctuate with his chemo cycles, and he can vary from needing to use a wheelchair to walking independently. Currently using a walker. Is taking all Rx's except for the MSIR because he \"is concerned to have that much morphine in me\". Also mentioned taking Flexeril and OTC meds like tylenol and mucinex (unrelated). Pt says pain rating varies great from \"bearable\" to \"unable to get out of bed\". Pt states he isn't concerned about going to an ED or clinic visit per se, but would like to know if there are other medication options or if a referral to palliative or rheumatology would be appropriate. I let him know that I would inform the care coordinator and palliative team and that someone would get back to him.  "

## 2019-05-30 ENCOUNTER — PATIENT OUTREACH (OUTPATIENT)
Dept: CARE COORDINATION | Facility: CLINIC | Age: 69
End: 2019-05-30

## 2019-05-30 DIAGNOSIS — M25.50 ARTHRALGIA, UNSPECIFIED JOINT: Primary | ICD-10-CM

## 2019-05-30 NOTE — PROGRESS NOTES
Per Patient's request,  completed and faxed [facility] referral for lodging dates 6/26 - 6/27. [employer] [facility] will contact Patient directly for confirmation of reservation.  will continue to provide support as needed.      JIL Hatfield, MSW   - [facility]  Phone: [phone]  Pager: [phone]  5/30/2019

## 2019-05-31 ENCOUNTER — TELEPHONE (OUTPATIENT)
Dept: ONCOLOGY | Facility: CLINIC | Age: 69
End: 2019-05-31

## 2019-05-31 NOTE — TELEPHONE ENCOUNTER
TC to pt to find out how his pain is today. Pt stated it is the same as yesterday. He also stated that he spoke with Charli about changing his short-acting morphine to take from 1-3 MSIR each time he takes his long-acting TID. He stated he's not having any problems with constipation and has been taking two senna two times per day and is having a soft, formed BM approximately every day. He also stated that he has an appt with rheumatology 07/18/2019 in [place]. Told pt that we will work on getting him in sooner than that. Patient stated understanding of all and agreed to call clinic with future questions or concerns.     Writer left voicemail for RNERASTO Yung in the [facility] [place] [place] asking if they have any ability to work pt in sooner. Await response.     ----- Message from Ruthie St PA-C sent at 5/30/2019  3:29 PM CDT -----  Vivienne do you mind calling Omega today or tomorrow?  See my note from today.  Just want to get a good read on where his pain is at and how much worse it is, etc.     ----- Message -----  From: Charli Amin RN  Sent: 5/30/2019   2:39 PM  To: Ruthie St PA-C    Thanks Ruthie I will pass this info onto Carly Mills who sees him for palliative.  Please get Marissa to call in case he has additional  questions    ----- Message -----  From: Ruthie St PA-C  Sent: 5/30/2019   2:23 PM  To: PAUL Kilgore thanks for reaching out.     Its complicated, I believe that these may be immune related arthralgias, high doses of prednisone seemed to help.  However I felt it was not clear if could have progression either.  He is due for imaging in a few weeks, so we could move them up.  We could also try giving him another burst of prednisone.  I did place a rheumatology referral as if this is worsening arthralgias due to immunotherapy the next step is to involve them.  Should I have Vivienne our RN call to discuss this with him or do you feel comfortable  discussing with him?  He and I did discuss this last visit that if he felt the arthralgias worsened with this 3rd cycle then it may be from the immunotherapy.      Thanks kay  ----- Message -----  From: Charli Salinas RN  Sent: 5/30/2019   9:14 AM  To: CLAUDIA Lyon Dr wanted me to get in contact re Omega    He called yesterday complaining of increased pain to all  joints - states that generally his pain is better controlled with Q8 Morphine ( Back, buttock and led pain)     Carly wonders is this chemo related or could he have a rheumatologic disease.       CHARLI SALINAS RN   Nurse Clinician  [facility]   Phone [phone]

## 2019-06-03 ENCOUNTER — TELEPHONE (OUTPATIENT)
Dept: RHEUMATOLOGY | Facility: CLINIC | Age: 69
End: 2019-06-03

## 2019-06-03 NOTE — TELEPHONE ENCOUNTER
Discussed with Dr. Mccabe, he will offer this pt noon on 6/6, as the pt cannot make an appt on 6/22.      Called and offered pt the appt, he will be here on 6/6 at noon to see Dr. Mccabe.    Rosie Yung RN  Rheumatology Clinic

## 2019-06-05 ENCOUNTER — TELEPHONE (OUTPATIENT)
Dept: ONCOLOGY | Facility: CLINIC | Age: 69
End: 2019-06-05

## 2019-06-05 DIAGNOSIS — C64.9 RENAL CELL CARCINOMA, UNSPECIFIED LATERALITY (H): ICD-10-CM

## 2019-06-05 NOTE — TELEPHONE ENCOUNTER
Patient calls to ask whether he should continue with Protonix which was prescribed on discharge from hospital 05/13 - he denies the presence of GI symptoms     I will forward this question to his oncology team - if this drug is to be continued he will require a refill

## 2019-06-06 ENCOUNTER — OFFICE VISIT (OUTPATIENT)
Dept: RHEUMATOLOGY | Facility: CLINIC | Age: 69
End: 2019-06-06
Attending: PHYSICIAN ASSISTANT
Payer: MEDICARE

## 2019-06-06 VITALS
HEIGHT: 72 IN | HEART RATE: 59 BPM | SYSTOLIC BLOOD PRESSURE: 127 MMHG | OXYGEN SATURATION: 96 % | BODY MASS INDEX: 31.4 KG/M2 | WEIGHT: 231.8 LBS | TEMPERATURE: 97.8 F | DIASTOLIC BLOOD PRESSURE: 70 MMHG

## 2019-06-06 DIAGNOSIS — C79.51 BONE METASTASIS: ICD-10-CM

## 2019-06-06 DIAGNOSIS — M25.50 ARTHRALGIA, UNSPECIFIED JOINT: ICD-10-CM

## 2019-06-06 DIAGNOSIS — C64.2 RENAL CELL CARCINOMA, LEFT (H): ICD-10-CM

## 2019-06-06 DIAGNOSIS — C64.9 RENAL CELL CARCINOMA, UNSPECIFIED LATERALITY (H): ICD-10-CM

## 2019-06-06 DIAGNOSIS — C64.9 RENAL CELL CARCINOMA, UNSPECIFIED LATERALITY (H): Primary | ICD-10-CM

## 2019-06-06 DIAGNOSIS — Z79.899 ENCOUNTER FOR LONG-TERM (CURRENT) USE OF MEDICATIONS: ICD-10-CM

## 2019-06-06 LAB
ALBUMIN SERPL-MCNC: 3.1 G/DL (ref 3.4–5)
ALP SERPL-CCNC: 104 U/L (ref 40–150)
ALT SERPL W P-5'-P-CCNC: 15 U/L (ref 0–70)
ANION GAP SERPL CALCULATED.3IONS-SCNC: 3 MMOL/L (ref 3–14)
AST SERPL W P-5'-P-CCNC: 27 U/L (ref 0–45)
BASOPHILS # BLD AUTO: 0 10E9/L (ref 0–0.2)
BASOPHILS NFR BLD AUTO: 0.2 %
BILIRUB SERPL-MCNC: 0.4 MG/DL (ref 0.2–1.3)
BUN SERPL-MCNC: 12 MG/DL (ref 7–30)
CALCIUM SERPL-MCNC: 8.1 MG/DL (ref 8.5–10.1)
CHLORIDE SERPL-SCNC: 107 MMOL/L (ref 94–109)
CO2 SERPL-SCNC: 28 MMOL/L (ref 20–32)
CREAT SERPL-MCNC: 0.59 MG/DL (ref 0.66–1.25)
CREAT UR-MCNC: 56 MG/DL
CRP SERPL-MCNC: 36.9 MG/L (ref 0–8)
DIFFERENTIAL METHOD BLD: ABNORMAL
EOSINOPHIL # BLD AUTO: 0 10E9/L (ref 0–0.7)
EOSINOPHIL NFR BLD AUTO: 0.2 %
ERYTHROCYTE [DISTWIDTH] IN BLOOD BY AUTOMATED COUNT: 17.8 % (ref 10–15)
GFR SERPL CREATININE-BSD FRML MDRD: >90 ML/MIN/{1.73_M2}
GLUCOSE SERPL-MCNC: 89 MG/DL (ref 70–99)
HCT VFR BLD AUTO: 28.7 % (ref 40–53)
HGB BLD-MCNC: 8.5 G/DL (ref 13.3–17.7)
IMM GRANULOCYTES # BLD: 0.1 10E9/L (ref 0–0.4)
IMM GRANULOCYTES NFR BLD: 1.1 %
LYMPHOCYTES # BLD AUTO: 0.3 10E9/L (ref 0.8–5.3)
LYMPHOCYTES NFR BLD AUTO: 4.5 %
MCH RBC QN AUTO: 29.4 PG (ref 26.5–33)
MCHC RBC AUTO-ENTMCNC: 29.6 G/DL (ref 31.5–36.5)
MCV RBC AUTO: 99 FL (ref 78–100)
MONOCYTES # BLD AUTO: 1.1 10E9/L (ref 0–1.3)
MONOCYTES NFR BLD AUTO: 19.3 %
NEUTROPHILS # BLD AUTO: 4.1 10E9/L (ref 1.6–8.3)
NEUTROPHILS NFR BLD AUTO: 74.7 %
NRBC # BLD AUTO: 0 10*3/UL
NRBC BLD AUTO-RTO: 0 /100
PLATELET # BLD AUTO: 207 10E9/L (ref 150–450)
POTASSIUM SERPL-SCNC: 4.8 MMOL/L (ref 3.4–5.3)
PROT SERPL-MCNC: 6.5 G/DL (ref 6.8–8.8)
PROT UR-MCNC: 0.14 G/L
PROT/CREAT 24H UR: 0.24 G/G CR (ref 0–0.2)
RBC # BLD AUTO: 2.89 10E12/L (ref 4.4–5.9)
SODIUM SERPL-SCNC: 138 MMOL/L (ref 133–144)
WBC # BLD AUTO: 5.5 10E9/L (ref 4–11)

## 2019-06-06 PROCEDURE — 86431 RHEUMATOID FACTOR QUANT: CPT | Performed by: INTERNAL MEDICINE

## 2019-06-06 PROCEDURE — G0463 HOSPITAL OUTPT CLINIC VISIT: HCPCS | Mod: ZF

## 2019-06-06 PROCEDURE — 86140 C-REACTIVE PROTEIN: CPT | Performed by: INTERNAL MEDICINE

## 2019-06-06 PROCEDURE — 86200 CCP ANTIBODY: CPT | Performed by: INTERNAL MEDICINE

## 2019-06-06 PROCEDURE — 86038 ANTINUCLEAR ANTIBODIES: CPT | Performed by: INTERNAL MEDICINE

## 2019-06-06 PROCEDURE — 86160 COMPLEMENT ANTIGEN: CPT | Performed by: INTERNAL MEDICINE

## 2019-06-06 PROCEDURE — 36415 COLL VENOUS BLD VENIPUNCTURE: CPT | Performed by: INTERNAL MEDICINE

## 2019-06-06 RX ORDER — PANTOPRAZOLE SODIUM 40 MG/1
40 TABLET, DELAYED RELEASE ORAL
Qty: 30 TABLET | Refills: 2 | Status: SHIPPED | OUTPATIENT
Start: 2019-06-06 | End: 2019-06-26

## 2019-06-06 ASSESSMENT — MIFFLIN-ST. JEOR: SCORE: 1849.44

## 2019-06-06 NOTE — LETTER
6/6/2019      RE: Javon Bianchi  [address]  [facility] [address]       Rheumatology Visit     Javon Bianchi MRN# [id number]   YOB: 1950 Age: 68 year old     Date of Visit: June 6, 2019  Primary care provider: LIANNE Wellington          Assessment and Plan:   69 yo male with metastatic malignancy, presumed renal cell origin, diagnosed in 2018.  He was on Cabozantinib for a time but tolerated poorly, and switched to Nivolumab in February 2019. Prior to third cycle he had myalgias responding to Prednisone.  Since May he has had significant arthralgias that responded to high dose Prednisone, but returned with taper and continue at current dose of 10 mg daily.    He has no history suggesting an autoimmune disease, and his Rheumatologic ROS is negative otherwise, making the onset either idiopathic or malignancy related of an autoimmune disease unlikely.    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     RECOMMENDATIONS:  Discussed in detail with the patient and his wife.    1. I will obtain limited serologies today including RF, CCP, RIYA, complements and repeat CRP  2. Write to patient and Oncology team with results and recommendations.  3. He sees Dr. Anne on June 18 and I can communicate with him as to management of the IRAE if Nivolumab is continued  4. Followup based on above.    Fran Mccabe MD FACP MACR  Professor  Rheumatic and Autoimmune Diseases          History of Present Illness:   Javon Bianchi is a 68 year old male who presents for evaluation of persistent arthralgias while on Prednisone, in setting of treatment with a Checkpoint Inhibitor.  Referred from Medical On[facility]. Epic reviewed.  His wife accompanies him.    ONCOLOGIC HISTORY CARRIED FORWARD:      68 year old male with a history significant for hyperlipidemia who had been in his usual health until he developed back pain in May  this year. At that time, the pain was a stabbing like in the middle of lower back with burning like pain wrapping around this left hip to knee area. No trauma. He had MRI on 5/23/18 showed mild degenerative change with bulging disks L2-S1. A small benign hemangioma was noted in L2, no other marrow signal abnormality. Since then, the pain failed to improve despite steroid injections. In addition, he actually lost 25 lbs weight unintentionally since June along with chills few time a day. Overtime, the pain got worse to the point he could not ambulate after short distance and developed muscle weakness in lower extremities over time requiring a walker. Finally, he went to ER ([facility] in [place]) on 8/23/18, CT A/P was unremarkable. He was referred to neurology with obtaining MRI L spine. MRI on 8/30/18 revealed a pathological fracture at L3 vertebral body with height loss at 40% and diffuse involvement of a neoplastic process. IR guided biopsy on 8/31/18 showed poorly differentiated carcinoma. Its IHC was suggestive of possible renal cell carcinoma per pathology report (Renal cell immunochemistry is positive and along with the negative staining for CK7 and CK20 suggests the possibility of a renal carcinoma, however most renal cell carcinomas also express PAX8 which is negative in this case). Of note, CT chest/abdomen/pelvis at OSH was negative for primary lesion. The patient was evaluated by neurosurgery who recommended no surgical intervention. He was discharged with TLSO brace and walker. He had palliative XRT locally from 9/11 for 10 fractions (Done in [place]).      He was started on cabozantinib 60 mg initially, requiring dose reduction to 20 mg for issues with hand foot syndrome and poor tolerability. Switched to nivolumab due to progression in bone disease on 2/6/19. Had to hold Cycle #3 due to myalgia, which improved with prednisone. He was hospitalized 5/12-5/13 for fever. Infectious workup negative to  date.    CONSULT VISIT 6/6/19:    He dates his joint pains back to April, but more marked immediately prior to his May 12 EW visit and admission.  He has not noted joint swelling even when the pain level was 8-9.  Pain has been most prominent in the large joints, particularly hips and shoulders, but also affecting knees, ankles and wrists.  Fingers and toes minimally affected.      He had complete resolution of joint pain on 80 mg Prednisone.  With each incremental taper by 10 mg he had gradual return of more discomfort.    He now is on 10 mg Prednisone daily.  His most prominent pain now is in R shoulder> L, and less so hips.  Minimal pain elsewhere.  He sleeps poorly due to pain, and gets up at 4-5 AM. He takes his pills about 6 AM.  He is very stiff on awakening and uses a cane. By 8 AM he feels much better and goes without cane indoors but uses outside. He tries to be active in shop, yard and this helps.  He is fatigued in daytime and if takes PM nap awakens very stiff and sore.      His last dose of Nivolumab was on 5/21.  He will be seeing Dr. Anne soon.    He has a past history of mild joint pain with activity, almost completely helped by OTC Aleve or Tylenol. No prior diagnosis of inflammatory arthritis.  No FH of arthritis or autoimmune disease.    He is on a regimen for his cancer and other pain with Morphine, Medical Cannabis, Lyrica and Cymbalta.    His rheumatologic ROS is otherwise entirely negative. No rashes, mouth sores, pleurisy, photosensitivity, Raynaud's.         Review of Systems:   Review Of Systems  Skin: easy bruising  Eyes: negative  Ears/Nose/Throat: negative  Respiratory: No shortness of breath, dyspnea on exertion, cough, or hemoptysis  Cardiovascular: negative  Gastrointestinal: negative  Genitourinary: negative  Musculoskeletal: see HPI  Neurologic: radiculopathy due to spine metastasis improved with pain regimen.  Psychiatric: negative  Hematologic/Lymphatic/Immunologic: see HPI and  Oncology notes  Endocrine: negative          Past Medical History:     Past Medical History:   Diagnosis Date     Bone metastasis (H) 09/28/2018     Renal cell carcinoma, right (H) 09/28/2018       Patient Active Problem List    Diagnosis Date Noted     Fever 05/13/2019     Priority: Medium     SIRS (systemic inflammatory response syndrome) (H) 05/12/2019     Priority: Medium     Renal cell carcinoma, unspecified laterality (H) 02/06/2019     Priority: Medium     Bone metastasis (H) 09/28/2018     Priority: Medium             Past Surgical History:   No past surgical history on file.          Social History:     Social History     Tobacco Use     Smoking status: Never Smoker     Smokeless tobacco: Never Used   Substance Use Topics     Alcohol use: Not on file      . Retired       Family History:   Positive for cancer  Negative for arthritis or autoimmune disease         Allergies:   No Known Allergies          Medications:     Current Outpatient Medications   Medication Sig Dispense Refill     acetaminophen (TYLENOL) 500 MG tablet Take 1,000 mg by mouth 2 times daily       calcium carbonate 500 mg, elemental, (OSCAL 500) 1250 (500 Ca) MG TABS tablet Take 1 tablet by mouth 2 times daily       Dextromethorphan-guaiFENesin (MUCINEX DM)  MG 12 hr tablet Take 1 tablet by mouth every 12 hours       diazepam (VALIUM) 5 MG tablet Take 1 pill 30 min before MRI and a second pill 2 minutes prior. 10 tablet 0     DULoxetine (CYMBALTA) 60 MG capsule Take 1 capsule (60 mg) by mouth daily 90 capsule 3     furosemide (LASIX) 20 MG tablet Take 1 tablet (20 mg) by mouth daily 14 tablet 0     lidocaine (LIDODERM) 5 % patch Place 1-3 patches onto the skin every 24 hours Apply to painful areas for 12 hours on, 12 hours off. 30 patch 3     medical cannabis (Patient's own supply.  Not a prescription) 1 Dose See Admin Instructions (This is NOT a prescription, and does not certify that the patient has a qualifying medical  condition for medical cannabis.  The purpose of this order is  to document that the patient reports taking medical cannabis.)       melatonin 3 MG tablet Take 3 mg by mouth nightly as needed        morphine (MS CONTIN) 15 MG CR tablet Take 1 tablet (15 mg) by mouth every 8 hours Combine with 30mg tab for total of 45mg every 8 hours. 90 tablet 0     morphine (MS CONTIN) 30 MG CR tablet Take 1 tablet (30 mg) by mouth every 8 hours Combine with 15mg tab for total of 45mg every 8 hours. 90 tablet 0     morphine (MSIR) 15 MG IR tablet Take 1 tablet (15 mg) by mouth every 3 hours as needed for severe pain 120 tablet 0     pantoprazole (PROTONIX) 40 MG EC tablet Take 1 tablet (40 mg) by mouth every morning (before breakfast) 30 tablet 2     polyethylene glycol (MIRALAX/GLYCOLAX) Packet Take 0.5 packets by mouth daily as needed       predniSONE (DELTASONE) 10 MG tablet Prednisone 30 mg daily for 7 days, then 20 mg daily for 7 days, then 10 mg daily for 10 days, then 5 mg daily. 200 tablet 0     pregabalin (LYRICA) 50 MG capsule Take 1 capsule (50 mg) by mouth 2 times daily 60 capsule 1     Pseudoephedrine-APAP  MG TABS Take 1 tablet by mouth as needed        senna-docusate (SENOKOT-S;PERICOLACE) 8.6-50 MG per tablet Take 2 tablets by mouth 2 times daily       Vitamin D, Cholecalciferol, 1000 units TABS Take 1,000 Units by mouth 2 times daily              Physical Exam:   /70 (BP Location: Right arm, Patient Position: Sitting, Cuff Size: Adult Regular)   Pulse 59   Temp 97.8  F (36.6  C) (Oral)   Ht 1.829 m (6')   Wt 105.1 kg (231 lb 12.8 oz)   SpO2 96%   BMI 31.44 kg/m     Constitutional: WD-WN-WG cooperative  Eyes: nl EOM, conjunctiva, sclera  ENT: nl external ears, nose, throat  No mucous membrane lesions, normal saliva pool  Neck: no mass or thyroid enlargement  GI: no ABD mass or tenderness, no HSM  : not tested  Lymph: no cervical, supraclavicular, inguinal or epitrochlear nodes  MS: All TMJ, neck,  shoulder, elbow, wrist, MCP/PIP/DIP, spine, hip, knee, ankle, and foot MTP/IP joints were examined and  found without definite active synovitis. He has very mild pain with finger curl but no swelling or specific joint tenderness. His wrists have restricted volar and dorsiflexion without swelling.  Shoulders with mildly restricted abduction to 80 degrees, ER to 75 degrees but IR to 60 degrees.  No dactylitis,  tenosynovitis, enthesopathy   Skin: no nail pitting, alopecia, rash, nodules or lesions  Neuro: nl cranial nerves, strength  Psych: nl orientation, affect.         Data:     Lab Results   Component Value Date    WBC 7.6 05/21/2019    WBC 7.4 05/14/2019    WBC 8.1 05/13/2019    HGB 9.3 (L) 05/21/2019    HGB 8.1 (L) 05/14/2019    HGB 7.8 (L) 05/13/2019    HCT 31.1 (L) 05/21/2019    HCT 27.7 (L) 05/14/2019    HCT 27.5 (L) 05/13/2019    MCV 99 05/21/2019     (H) 05/14/2019     (H) 05/13/2019     05/21/2019     05/14/2019     05/13/2019     Lab Results   Component Value Date    BUN 12 05/21/2019    BUN 9 05/14/2019    BUN 11 05/13/2019     No components found for: SEDRATE  Lab Results   Component Value Date    TSH 1.83 05/21/2019    TSH 2.10 05/14/2019    TSH 2.43 03/12/2019     Lab Results   Component Value Date    AST 29 05/21/2019    AST 31 05/14/2019    AST 40 05/13/2019    ALT 15 05/21/2019    ALT 19 05/14/2019    ALT 18 05/13/2019    ALKPHOS 100 05/21/2019    ALKPHOS 103 05/14/2019    ALKPHOS 97 05/13/2019     Reviewed Rheumatology lab flowsheet    Fran Mccabe

## 2019-06-06 NOTE — NURSING NOTE
Chief Complaint   Patient presents with     Consult     Arthralgia     /70 (BP Location: Right arm, Patient Position: Sitting, Cuff Size: Adult Regular)   Pulse 59   Temp 97.8  F (36.6  C) (Oral)   Ht 1.829 m (6')   Wt 105.1 kg (231 lb 12.8 oz)   SpO2 96%   BMI 31.44 kg/m    Candi Martin, CMA

## 2019-06-06 NOTE — PROGRESS NOTES
Rheumatology Visit     Javon Bianchi MRN# [id number]   YOB: 1950 Age: 68 year old     Date of Visit: June 6, 2019  Primary care provider: LIANNE Wellington          Assessment and Plan:   69 yo male with metastatic malignancy, presumed renal cell origin, diagnosed in 2018.  He was on Cabozantinib for a time but tolerated poorly, and switched to Nivolumab in February 2019. Prior to third cycle he had myalgias responding to Prednisone.  Since May he has had significant arthralgias that responded to high dose Prednisone, but returned with taper and continue at current dose of 10 mg daily.    He has no history suggesting an autoimmune disease, and his Rheumatologic ROS is negative otherwise, making the onset either idiopathic or malignancy related of an autoimmune disease unlikely.    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     RECOMMENDATIONS:  Discussed in detail with the patient and his wife.    1. I will obtain limited serologies today including RF, CCP, RIYA, complements and repeat CRP  2. Write to patient and Oncology team with results and recommendations.  3. He sees Dr. Anne on June 18 and I can communicate with him as to management of the IRAE if Nivolumab is continued  4. Followup based on above.    Fran Mccabe MD FACP MACR  Professor  Rheumatic and Autoimmune Diseases          History of Present Illness:   Javon Bianchi is a 68 year old male who presents for evaluation of persistent arthralgias while on Prednisone, in setting of treatment with a Checkpoint Inhibitor.  Referred from Medical On[facility]. Epic reviewed.  His wife accompanies him.    ONCOLOGIC HISTORY CARRIED FORWARD:      68 year old male with a history significant for hyperlipidemia who had been in his usual health until he developed back pain in May this year. At that time, the pain was a stabbing like in the middle of lower back with  burning like pain wrapping around this left hip to knee area. No trauma. He had MRI on 5/23/18 showed mild degenerative change with bulging disks L2-S1. A small benign hemangioma was noted in L2, no other marrow signal abnormality. Since then, the pain failed to improve despite steroid injections. In addition, he actually lost 25 lbs weight unintentionally since June along with chills few time a day. Overtime, the pain got worse to the point he could not ambulate after short distance and developed muscle weakness in lower extremities over time requiring a walker. Finally, he went to ER ([facility] in [place]) on 8/23/18, CT A/P was unremarkable. He was referred to neurology with obtaining MRI L spine. MRI on 8/30/18 revealed a pathological fracture at L3 vertebral body with height loss at 40% and diffuse involvement of a neoplastic process. IR guided biopsy on 8/31/18 showed poorly differentiated carcinoma. Its IHC was suggestive of possible renal cell carcinoma per pathology report (Renal cell immunochemistry is positive and along with the negative staining for CK7 and CK20 suggests the possibility of a renal carcinoma, however most renal cell carcinomas also express PAX8 which is negative in this case). Of note, CT chest/abdomen/pelvis at OSH was negative for primary lesion. The patient was evaluated by neurosurgery who recommended no surgical intervention. He was discharged with TLSO brace and walker. He had palliative XRT locally from 9/11 for 10 fractions (Done in [place]).      He was started on cabozantinib 60 mg initially, requiring dose reduction to 20 mg for issues with hand foot syndrome and poor tolerability. Switched to nivolumab due to progression in bone disease on 2/6/19. Had to hold Cycle #3 due to myalgia, which improved with prednisone. He was hospitalized 5/12-5/13 for fever. Infectious workup negative to date.    CONSULT VISIT 6/6/19:    He dates his joint pains back to April, but more  marked immediately prior to his May 12 EW visit and admission.  He has not noted joint swelling even when the pain level was 8-9.  Pain has been most prominent in the large joints, particularly hips and shoulders, but also affecting knees, ankles and wrists.  Fingers and toes minimally affected.      He had complete resolution of joint pain on 80 mg Prednisone.  With each incremental taper by 10 mg he had gradual return of more discomfort.    He now is on 10 mg Prednisone daily.  His most prominent pain now is in R shoulder> L, and less so hips.  Minimal pain elsewhere.  He sleeps poorly due to pain, and gets up at 4-5 AM. He takes his pills about 6 AM.  He is very stiff on awakening and uses a cane. By 8 AM he feels much better and goes without cane indoors but uses outside. He tries to be active in shop, yard and this helps.  He is fatigued in daytime and if takes PM nap awakens very stiff and sore.      His last dose of Nivolumab was on 5/21.  He will be seeing Dr. Anne soon.    He has a past history of mild joint pain with activity, almost completely helped by OTC Aleve or Tylenol. No prior diagnosis of inflammatory arthritis.  No FH of arthritis or autoimmune disease.    He is on a regimen for his cancer and other pain with Morphine, Medical Cannabis, Lyrica and Cymbalta.    His rheumatologic ROS is otherwise entirely negative. No rashes, mouth sores, pleurisy, photosensitivity, Raynaud's.         Review of Systems:   Review Of Systems  Skin: easy bruising  Eyes: negative  Ears/Nose/Throat: negative  Respiratory: No shortness of breath, dyspnea on exertion, cough, or hemoptysis  Cardiovascular: negative  Gastrointestinal: negative  Genitourinary: negative  Musculoskeletal: see HPI  Neurologic: radiculopathy due to spine metastasis improved with pain regimen.  Psychiatric: negative  Hematologic/Lymphatic/Immunologic: see HPI and Oncology notes  Endocrine: negative          Past Medical History:     Past Medical  History:   Diagnosis Date     Bone metastasis (H) 09/28/2018     Renal cell carcinoma, right (H) 09/28/2018       Patient Active Problem List    Diagnosis Date Noted     Fever 05/13/2019     Priority: Medium     SIRS (systemic inflammatory response syndrome) (H) 05/12/2019     Priority: Medium     Renal cell carcinoma, unspecified laterality (H) 02/06/2019     Priority: Medium     Bone metastasis (H) 09/28/2018     Priority: Medium             Past Surgical History:   No past surgical history on file.          Social History:     Social History     Tobacco Use     Smoking status: Never Smoker     Smokeless tobacco: Never Used   Substance Use Topics     Alcohol use: Not on file      . Retired       Family History:   Positive for cancer  Negative for arthritis or autoimmune disease         Allergies:   No Known Allergies          Medications:     Current Outpatient Medications   Medication Sig Dispense Refill     acetaminophen (TYLENOL) 500 MG tablet Take 1,000 mg by mouth 2 times daily       calcium carbonate 500 mg, elemental, (OSCAL 500) 1250 (500 Ca) MG TABS tablet Take 1 tablet by mouth 2 times daily       Dextromethorphan-guaiFENesin (MUCINEX DM)  MG 12 hr tablet Take 1 tablet by mouth every 12 hours       diazepam (VALIUM) 5 MG tablet Take 1 pill 30 min before MRI and a second pill 2 minutes prior. 10 tablet 0     DULoxetine (CYMBALTA) 60 MG capsule Take 1 capsule (60 mg) by mouth daily 90 capsule 3     furosemide (LASIX) 20 MG tablet Take 1 tablet (20 mg) by mouth daily 14 tablet 0     lidocaine (LIDODERM) 5 % patch Place 1-3 patches onto the skin every 24 hours Apply to painful areas for 12 hours on, 12 hours off. 30 patch 3     medical cannabis (Patient's own supply.  Not a prescription) 1 Dose See Admin Instructions (This is NOT a prescription, and does not certify that the patient has a qualifying medical condition for medical cannabis.  The purpose of this order is  to document that the  patient reports taking medical cannabis.)       melatonin 3 MG tablet Take 3 mg by mouth nightly as needed        morphine (MS CONTIN) 15 MG CR tablet Take 1 tablet (15 mg) by mouth every 8 hours Combine with 30mg tab for total of 45mg every 8 hours. 90 tablet 0     morphine (MS CONTIN) 30 MG CR tablet Take 1 tablet (30 mg) by mouth every 8 hours Combine with 15mg tab for total of 45mg every 8 hours. 90 tablet 0     morphine (MSIR) 15 MG IR tablet Take 1 tablet (15 mg) by mouth every 3 hours as needed for severe pain 120 tablet 0     pantoprazole (PROTONIX) 40 MG EC tablet Take 1 tablet (40 mg) by mouth every morning (before breakfast) 30 tablet 2     polyethylene glycol (MIRALAX/GLYCOLAX) Packet Take 0.5 packets by mouth daily as needed       predniSONE (DELTASONE) 10 MG tablet Prednisone 30 mg daily for 7 days, then 20 mg daily for 7 days, then 10 mg daily for 10 days, then 5 mg daily. 200 tablet 0     pregabalin (LYRICA) 50 MG capsule Take 1 capsule (50 mg) by mouth 2 times daily 60 capsule 1     Pseudoephedrine-APAP  MG TABS Take 1 tablet by mouth as needed        senna-docusate (SENOKOT-S;PERICOLACE) 8.6-50 MG per tablet Take 2 tablets by mouth 2 times daily       Vitamin D, Cholecalciferol, 1000 units TABS Take 1,000 Units by mouth 2 times daily              Physical Exam:   /70 (BP Location: Right arm, Patient Position: Sitting, Cuff Size: Adult Regular)   Pulse 59   Temp 97.8  F (36.6  C) (Oral)   Ht 1.829 m (6')   Wt 105.1 kg (231 lb 12.8 oz)   SpO2 96%   BMI 31.44 kg/m    Constitutional: WD-WN-WG cooperative  Eyes: nl EOM, conjunctiva, sclera  ENT: nl external ears, nose, throat  No mucous membrane lesions, normal saliva pool  Neck: no mass or thyroid enlargement  GI: no ABD mass or tenderness, no HSM  : not tested  Lymph: no cervical, supraclavicular, inguinal or epitrochlear nodes  MS: All TMJ, neck, shoulder, elbow, wrist, MCP/PIP/DIP, spine, hip, knee, ankle, and foot MTP/IP joints  were examined and  found without definite active synovitis. He has very mild pain with finger curl but no swelling or specific joint tenderness. His wrists have restricted volar and dorsiflexion without swelling.  Shoulders with mildly restricted abduction to 80 degrees, ER to 75 degrees but IR to 60 degrees.  No dactylitis,  tenosynovitis, enthesopathy   Skin: no nail pitting, alopecia, rash, nodules or lesions  Neuro: nl cranial nerves, strength  Psych: nl orientation, affect.         Data:     Lab Results   Component Value Date    WBC 7.6 05/21/2019    WBC 7.4 05/14/2019    WBC 8.1 05/13/2019    HGB 9.3 (L) 05/21/2019    HGB 8.1 (L) 05/14/2019    HGB 7.8 (L) 05/13/2019    HCT 31.1 (L) 05/21/2019    HCT 27.7 (L) 05/14/2019    HCT 27.5 (L) 05/13/2019    MCV 99 05/21/2019     (H) 05/14/2019     (H) 05/13/2019     05/21/2019     05/14/2019     05/13/2019     Lab Results   Component Value Date    BUN 12 05/21/2019    BUN 9 05/14/2019    BUN 11 05/13/2019     No components found for: SEDRATE  Lab Results   Component Value Date    TSH 1.83 05/21/2019    TSH 2.10 05/14/2019    TSH 2.43 03/12/2019     Lab Results   Component Value Date    AST 29 05/21/2019    AST 31 05/14/2019    AST 40 05/13/2019    ALT 15 05/21/2019    ALT 19 05/14/2019    ALT 18 05/13/2019    ALKPHOS 100 05/21/2019    ALKPHOS 103 05/14/2019    ALKPHOS 97 05/13/2019     Reviewed Rheumatology lab flowsheet    Fran Mccabe

## 2019-06-07 LAB
ANA SER QL IF: NEGATIVE
C3 SERPL-MCNC: 111 MG/DL (ref 76–169)
C4 SERPL-MCNC: 25 MG/DL (ref 15–50)
CCP AB SER IA-ACNC: 1 U/ML
RHEUMATOID FACT SER NEPH-ACNC: <20 IU/ML (ref 0–20)

## 2019-06-10 ENCOUNTER — NURSE TRIAGE (OUTPATIENT)
Dept: ONCOLOGY | Facility: CLINIC | Age: 69
End: 2019-06-10

## 2019-06-10 NOTE — PROGRESS NOTES
[facility] Triage Note  Pt's wife Patrizia called with the following concerns:  SYMPTOMS: fever and pain, seen in [facility] ED yesterday    Questions answered by pt during call:  FEVER/CHILLS? Temp 101.8 yesterday, now 100.9  ABD PAIN? NO  N/V/D? NO  COUGH: YES, very coarse, sputum is grey/green and opaque  PAIN: YES, ratiting shoulder pain an 8 on 1-10 pain scale, arms 7-8, overall pain is a 7  OTHER: ED evaluation included labs with UA and BC, received IVF and IV levofloxacin, given oral levofloxacin to continue and advised to call oncology today  Lives 2.5 hrs away from clinic.    ACTIONS TAKEN by pt before calling: MSContin scheduled dose (45 mg) + MSIR 15 mg, cannabis, OTC Mucinex.    Triage RN Actions: paged MARCO Moore at 0826: awaiting callback.    1000 Provider recommendations:  VORB:  MARCO Moore / Liza Mcgrath RN:   Resume Celebrex 200 mg bid if not done yet   Continue oral abx as prescribed  I will contact rheumatology re: plan for pain based on labs    OUTGOING CALL:  Call to pt's wife: reviewed Radha's recommendations, indicating that she reviewed the records from ED visit in [facility]. He is feeling better over the past 2 hours after eating breakfast, sitting up at the computer. Never stopped the Celebrex and is still taking the 200 mg bid, will take MSIR more frequently for pain. I advised push fluids as well and call back if sx worsen instead of improve.Pt's wife voiced understanding of above instructions and information and denied further questions.

## 2019-06-11 NOTE — TELEPHONE ENCOUNTER
"TC to pt for condition update per Radha. Pt states he is much improved since his visit to the ED and \"still has some stiffness\" in his joints, but is not concerned with it at this time. Reviewed the plan to add pred 20 mg daily if he needs it until his scan is completed and the decision is made whether or not he will continue on this therapy. Pt declined at this time, but agreed to call his his condition worsens as he will consider starting a steroid if indicated.   "

## 2019-06-17 ENCOUNTER — TELEPHONE (OUTPATIENT)
Dept: ONCOLOGY | Facility: CLINIC | Age: 69
End: 2019-06-17

## 2019-06-17 DIAGNOSIS — G89.3 CANCER ASSOCIATED PAIN: ICD-10-CM

## 2019-06-17 DIAGNOSIS — C64.9 RENAL CELL CARCINOMA, UNSPECIFIED LATERALITY (H): ICD-10-CM

## 2019-06-17 RX ORDER — PREDNISONE 10 MG/1
10 TABLET ORAL DAILY
Qty: 30 TABLET | Refills: 3 | Status: SHIPPED | OUTPATIENT
Start: 2019-06-17 | End: 2019-08-07

## 2019-06-17 RX ORDER — FUROSEMIDE 20 MG
20 TABLET ORAL DAILY PRN
Qty: 14 TABLET | Refills: 0 | Status: SHIPPED | OUTPATIENT
Start: 2019-06-17 | End: 2019-09-13

## 2019-06-17 NOTE — TELEPHONE ENCOUNTER
JERRY pt is going out of state to visit daughter, if medical treatment is needed he will go to [facility] located at [address] [place] [address] from 6/20-6/24.    Pt called in to triage requesting refills of prednisone 10 mg and furosemide 20 mg. Stated he is taking prednisone 10 mg daily, tried to decrease to 5 mg and was having a lot of pain so talked with care team and has remained on 10 mg. Will be going out of town on 6/20 and run out while he is away. Would like [place] [facility] [place] when he comes for his CT.    Also stated usually only takes furosemide 20 mg as needed but last 7 days has taken daily for increased LLE swelling. Symptoms are better but not entirely gone. He has an ACE wrap up to the thigh and is elevating daily. Denied any sob or additional pain/pressure with swelling. Feels well hydrated, denied any excessive weight loss or lightheadedness. Requesting refill to continue to take daily. Routing to Radha LARA for consideration.

## 2019-06-17 NOTE — TELEPHONE ENCOUNTER
Javon called to ask for a refill of Celebrex and to ask whether he should continue using this in light of his appointment with Rheumatology 06/06 - I will forward on this request and question to Dr Mills

## 2019-06-18 ENCOUNTER — ANCILLARY PROCEDURE (OUTPATIENT)
Dept: CT IMAGING | Facility: CLINIC | Age: 69
End: 2019-06-18
Attending: PHYSICIAN ASSISTANT
Payer: MEDICARE

## 2019-06-18 ENCOUNTER — ANCILLARY PROCEDURE (OUTPATIENT)
Dept: MRI IMAGING | Facility: CLINIC | Age: 69
End: 2019-06-18
Attending: PHYSICIAN ASSISTANT
Payer: MEDICARE

## 2019-06-18 DIAGNOSIS — C79.51 BONE METASTASIS: ICD-10-CM

## 2019-06-18 DIAGNOSIS — C64.2 RENAL CELL CARCINOMA, LEFT (H): ICD-10-CM

## 2019-06-18 RX ORDER — IOPAMIDOL 755 MG/ML
135 INJECTION, SOLUTION INTRAVASCULAR ONCE
Status: COMPLETED | OUTPATIENT
Start: 2019-06-18 | End: 2019-06-18

## 2019-06-18 RX ORDER — GADOBUTROL 604.72 MG/ML
10 INJECTION INTRAVENOUS ONCE
Status: COMPLETED | OUTPATIENT
Start: 2019-06-18 | End: 2019-06-18

## 2019-06-18 RX ORDER — CELECOXIB 200 MG/1
200 CAPSULE ORAL 2 TIMES DAILY PRN
Qty: 60 CAPSULE | Refills: 2 | Status: SHIPPED | OUTPATIENT
Start: 2019-06-18 | End: 2019-07-03

## 2019-06-18 RX ADMIN — IOPAMIDOL 135 ML: 755 INJECTION, SOLUTION INTRAVASCULAR at 14:00

## 2019-06-18 RX ADMIN — GADOBUTROL 10 ML: 604.72 INJECTION INTRAVENOUS at 15:10

## 2019-06-18 NOTE — DISCHARGE INSTRUCTIONS

## 2019-06-18 NOTE — DISCHARGE INSTRUCTIONS
MRI Contrast Discharge Instructions    The IV contrast you received today will pass out of your body in your  urine. This will happen in the next 24 hours. You will not feel this process.  Your urine will not change color.    Drink at least 4 extra glasses of water or juice today (unless your doctor  has restricted your fluids). This reduces the stress on your kidneys.  You may take your regular medicines.    If you are on dialysis: It is best to have dialysis today.    If you have a reaction: Most reactions happen right away. If you have  any new symptoms after leaving the hospital (such as hives or swelling),  call your hospital at the correct number below. Or call your family doctor.  If you have breathing distress or wheezing, call 911.    Special instructions: ***    I have read and understand the above information.    Signature:______________________________________ Date:___________    Staff:__________________________________________ Date:___________     Time:__________    [place] [facility] Departments:    ___[place]: [phone]  ___[facility]: [phone]  ___[place]: [phone] ___[facility]: [phone]  ___[facility]: [phone]  ___[facility]: [phone]  ___[phone]  ___[facility]: [phone]  ___[phone]

## 2019-06-18 NOTE — DISCHARGE INSTRUCTIONS
MRI Contrast Discharge Instructions    The IV contrast you received today will pass out of your body in your  urine. This will happen in the next 24 hours. You will not feel this process.  Your urine will not change color.    Drink at least 4 extra glasses of water or juice today (unless your doctor  has restricted your fluids). This reduces the stress on your kidneys.  You may take your regular medicines.    If you are on dialysis: It is best to have dialysis today.    If you have a reaction: Most reactions happen right away. If you have  any new symptoms after leaving the hospital (such as hives or swelling),  call your hospital at the correct number below. Or call your family doctor.  If you have breathing distress or wheezing, call 911.    Special instructions: ***    I have read and understand the above information.    Signature:______________________________________ Date:___________    Staff:__________________________________________ Date:___________     Time:__________    [place] [facility] Departments:    ___[place]: [phone]  ___[facility]: [phone]  ___[place]: [phone] ___[facility]: [phone]  ___[facility]: [phone]  ___[facility]: [phone]  ___[phone]  ___[facility]: [phone]  ___[phone]

## 2019-06-26 ENCOUNTER — ONCOLOGY VISIT (OUTPATIENT)
Dept: ONCOLOGY | Facility: CLINIC | Age: 69
End: 2019-06-26
Attending: INTERNAL MEDICINE
Payer: MEDICARE

## 2019-06-26 ENCOUNTER — APPOINTMENT (OUTPATIENT)
Dept: LAB | Facility: CLINIC | Age: 69
End: 2019-06-26
Attending: INTERNAL MEDICINE
Payer: MEDICARE

## 2019-06-26 VITALS
TEMPERATURE: 97.6 F | HEIGHT: 72 IN | BODY MASS INDEX: 30.57 KG/M2 | WEIGHT: 225.7 LBS | OXYGEN SATURATION: 97 % | HEART RATE: 78 BPM | DIASTOLIC BLOOD PRESSURE: 66 MMHG | SYSTOLIC BLOOD PRESSURE: 128 MMHG | RESPIRATION RATE: 16 BRPM

## 2019-06-26 DIAGNOSIS — C64.2 RENAL CELL CARCINOMA, LEFT (H): ICD-10-CM

## 2019-06-26 DIAGNOSIS — Z79.899 ENCOUNTER FOR LONG-TERM (CURRENT) USE OF MEDICATIONS: ICD-10-CM

## 2019-06-26 DIAGNOSIS — C64.9 RENAL CELL CARCINOMA, UNSPECIFIED LATERALITY (H): ICD-10-CM

## 2019-06-26 PROBLEM — T63.441A ALLERGIC REACTION TO BEE STING: Status: ACTIVE | Noted: 2017-07-18

## 2019-06-26 PROBLEM — M54.42 CHRONIC BILATERAL LOW BACK PAIN WITH LEFT-SIDED SCIATICA: Status: ACTIVE | Noted: 2018-07-25

## 2019-06-26 PROBLEM — Q21.12 PATENT FORAMEN OVALE WITH RIGHT TO LEFT SHUNT: Status: ACTIVE | Noted: 2017-11-24

## 2019-06-26 PROBLEM — M84.58XA PATHOLOGICAL FRACTURE OF VERTEBRA DUE TO NEOPLASTIC DISEASE, INITIAL ENCOUNTER: Status: ACTIVE | Noted: 2018-08-30

## 2019-06-26 PROBLEM — M48.061 SPINAL STENOSIS OF LUMBAR REGION WITHOUT NEUROGENIC CLAUDICATION: Status: ACTIVE | Noted: 2018-08-30

## 2019-06-26 PROBLEM — D64.9 NORMOCHROMIC NORMOCYTIC ANEMIA: Status: ACTIVE | Noted: 2018-08-31

## 2019-06-26 PROBLEM — G89.29 CHRONIC BILATERAL LOW BACK PAIN WITH LEFT-SIDED SCIATICA: Status: ACTIVE | Noted: 2018-07-25

## 2019-06-26 LAB
ALBUMIN SERPL-MCNC: 3 G/DL (ref 3.4–5)
ALP SERPL-CCNC: 146 U/L (ref 40–150)
ALT SERPL W P-5'-P-CCNC: 17 U/L (ref 0–70)
ANION GAP SERPL CALCULATED.3IONS-SCNC: 5 MMOL/L (ref 3–14)
ANISOCYTOSIS BLD QL SMEAR: SLIGHT
AST SERPL W P-5'-P-CCNC: 95 U/L (ref 0–45)
BASOPHILS # BLD AUTO: 0 10E9/L (ref 0–0.2)
BASOPHILS NFR BLD AUTO: 0 %
BILIRUB SERPL-MCNC: 0.5 MG/DL (ref 0.2–1.3)
BUN SERPL-MCNC: 14 MG/DL (ref 7–30)
CALCIUM SERPL-MCNC: 8.2 MG/DL (ref 8.5–10.1)
CHLORIDE SERPL-SCNC: 103 MMOL/L (ref 94–109)
CO2 SERPL-SCNC: 25 MMOL/L (ref 20–32)
CREAT SERPL-MCNC: 0.65 MG/DL (ref 0.66–1.25)
CREAT UR-MCNC: 93 MG/DL
DIFFERENTIAL METHOD BLD: ABNORMAL
EOSINOPHIL # BLD AUTO: 0 10E9/L (ref 0–0.7)
EOSINOPHIL NFR BLD AUTO: 0 %
ERYTHROCYTE [DISTWIDTH] IN BLOOD BY AUTOMATED COUNT: 17.3 % (ref 10–15)
GFR SERPL CREATININE-BSD FRML MDRD: >90 ML/MIN/{1.73_M2}
GLUCOSE SERPL-MCNC: 119 MG/DL (ref 70–99)
HCT VFR BLD AUTO: 28.6 % (ref 40–53)
HGB BLD-MCNC: 8.5 G/DL (ref 13.3–17.7)
LYMPHOCYTES # BLD AUTO: 0.3 10E9/L (ref 0.8–5.3)
LYMPHOCYTES NFR BLD AUTO: 2.6 %
MCH RBC QN AUTO: 28.9 PG (ref 26.5–33)
MCHC RBC AUTO-ENTMCNC: 29.7 G/DL (ref 31.5–36.5)
MCV RBC AUTO: 97 FL (ref 78–100)
MONOCYTES # BLD AUTO: 2.7 10E9/L (ref 0–1.3)
MONOCYTES NFR BLD AUTO: 27 %
NEUTROPHILS # BLD AUTO: 7 10E9/L (ref 1.6–8.3)
NEUTROPHILS NFR BLD AUTO: 70.4 %
PLATELET # BLD AUTO: 234 10E9/L (ref 150–450)
PLATELET # BLD EST: ABNORMAL 10*3/UL
POLYCHROMASIA BLD QL SMEAR: SLIGHT
POTASSIUM SERPL-SCNC: 4.5 MMOL/L (ref 3.4–5.3)
PROT SERPL-MCNC: 6.6 G/DL (ref 6.8–8.8)
PROT UR-MCNC: 0.23 G/L
PROT/CREAT 24H UR: 0.24 G/G CR (ref 0–0.2)
RBC # BLD AUTO: 2.94 10E12/L (ref 4.4–5.9)
SODIUM SERPL-SCNC: 134 MMOL/L (ref 133–144)
WBC # BLD AUTO: 10 10E9/L (ref 4–11)

## 2019-06-26 PROCEDURE — 99215 OFFICE O/P EST HI 40 MIN: CPT | Mod: GC | Performed by: INTERNAL MEDICINE

## 2019-06-26 PROCEDURE — G0463 HOSPITAL OUTPT CLINIC VISIT: HCPCS | Mod: ZF

## 2019-06-26 PROCEDURE — 85025 COMPLETE CBC W/AUTO DIFF WBC: CPT | Performed by: INTERNAL MEDICINE

## 2019-06-26 PROCEDURE — 80053 COMPREHEN METABOLIC PANEL: CPT | Performed by: INTERNAL MEDICINE

## 2019-06-26 PROCEDURE — 84156 ASSAY OF PROTEIN URINE: CPT | Performed by: INTERNAL MEDICINE

## 2019-06-26 PROCEDURE — 36592 COLLECT BLOOD FROM PICC: CPT

## 2019-06-26 RX ORDER — PREDNISONE 10 MG/1
TABLET ORAL
Qty: 57 TABLET | Refills: 0 | Status: SHIPPED | OUTPATIENT
Start: 2019-06-26 | End: 2019-08-07

## 2019-06-26 RX ORDER — PANTOPRAZOLE SODIUM 40 MG/1
40 TABLET, DELAYED RELEASE ORAL
Qty: 30 TABLET | Refills: 2 | Status: SHIPPED | OUTPATIENT
Start: 2019-06-26 | End: 2019-08-07 | Stop reason: ALTCHOICE

## 2019-06-26 ASSESSMENT — PAIN SCALES - GENERAL: PAINLEVEL: MODERATE PAIN (4)

## 2019-06-26 ASSESSMENT — MIFFLIN-ST. JEOR: SCORE: 1821.77

## 2019-06-26 NOTE — LETTER
6/26/2019       RE: Javon Bianchi  [address]  [facility] [address]     Dear Colleague,    Thank you for referring your patient, Javon Bianchi, to the [facility]. Please see a copy of my visit note below.    [facility]  HEMATOLOGY AND ONCOLOGY    FOLLOW-UP VISIT NOTE    PATIENT NAME: Cirilo Bianchi MRN #[id number]  DATE OF VISIT: Jun 26, 2019 YOB: 1950        CANCER TYPE: Metastatic Carcinoma with bone metastases, likely renal origin (no primary renal mass)  STAGE: IV    TREATMENT:  Cabozantinib: Started 60 mg daily on September 28, 2018.  Dec 10, 2018: dose reduced cabozantinib to 40 mg a day.  Jan 17, 2019: dose reduced cabozantinib to 29 mg a day.  Feb 11, 2019: C1D1 Nivolumab   3/12/19: c2 Nivolumab  5/21/19: C3 Nivolumab.     TREATMENT SUMMARY:  Javon Bianchi is a 68 year old male with a history significant for hyperlipidemia who had been in his usual health until he developed back pain in May 2018. At that time, the pain was a stabbing like in the middle of lower back with burning like pain wrapping around this left hip to knee area. No trauma. He had MRI on 5/23/18 showed mild degenerative change with bulging disks L2-S1. A small benign hemangioma was noted in L2, no other marrow signal abnormality. Since then, the pain failed to improve despite steroid injections. In addition, he actually lost 25 lbs weight unintentionally since June along with chills few time a day. Overtime, the pain got worse to the point he could not ambulate after short distance and developed muscle weakness in lower extremities over time requiring a walker. Finally, he went to ER ([facility] in [place]) on 8/23/18, CT A/P was unremarkable. He was referred to neurology with obtaining MRI L spine. MRI on 8/30/18 revealed a pathological fracture at L3 vertebral body with height loss at 40% and diffuse involvement of a neoplastic process. IR guided biopsy on 8/31/18 showed  poorly differentiated carcinoma. Its IHC was suggestive of possible renal cell carcinoma per pathology report (Renal cell immunochemistry is positive and along with the negative staining for CK7 and CK20 suggests the possibility of a renal carcinoma, however most renal cell carcinomas also express PAX8 which is negative in this case). Of note, CT chest/abdomen/pelvis at OSH was negative for primary lesion. The patient was evaluated by neurosurgery who recommended no surgical intervention. He was discharged with TLSO brace and walker. He had palliative XRT locally from 9/11 for 10 fractions (Done in [place]).      He was started on cabozantinib 60 mg initially, requiring dose reduction to 20 mg for issues with hand foot syndrome and poor tolerability.  Switched to Nivolumab due to progression in bone disease on 2/6/19.     CURRENT INTERVENTIONS: Nivolumab C3 on 5/21/19.      SUBJECTIVE   Mr. Bianchi presents today for follow-up prior to planned cycle 4 of nivolumab with restaging CT chest abdomen pelvis and MRI lumbar spine and pelvis.    His main symptoms include myalgias and arthralgias.  Prior to third cycle he had myalgias responding to Prednisone.  Since May he has had significant arthralgias that responded to high dose Prednisone, but returned with taper and continue at current dose of 10 mg daily. He dates his joint pains back to April, but more marked immediately prior to his May 12 ED visit and admission.  He has not noted joint swelling even when the pain level was 8-9.  Pain has been most prominent in the large joints, particularly hips and shoulders, but also affecting knees, ankles and wrists.  Fingers and toes minimally affected. He had complete resolution of joint pain on 80 mg Prednisone.  With each incremental taper by 10 mg he had gradual return of more discomfort.He now is on 10 mg Prednisone daily.  His most prominent pain now is in R shoulder> L, and less so hips.  Minimal pain elsewhere.  He  was  evaluated by rheumatology who felt his symptoms are very likely immunotherapy related.    He has ongoing pain in his lower back, left hip and thigh region which has not significantly changed in the past several months.  He has ongoing numbness in his left lower extremity as well as swelling in his left lower extremity which is same in the past several months.  He is able to ambulate with a cane.  He has not had any falls.  His appetite is decent.  He uses JUVENTINO stockings for his left lower extremity below knee as well as Ace wraps of the knee.  He uses Lasix occasionally for flank edema.  He is on MS Contin 45 mg 3 times a day for pain control as well as, as needed morphine intermittent release 15 mg every 3 hours as needed.  His pain is under decent control no activity.     He was also admitted on 5/12/2019 with high fever and SIRS no clear evidence of infection that resolved with increased dose of steroids.    He denies any chest pain, shortness of breath, fever, chills, abdominal pain, nausea, vomiting, diarrhea. he has persistent cough with scant phlegm production.  He has no headache, blurred vision, no new focal neurological deficits other than as reported above.      PAST MEDICAL HISTORY     Past Medical History:   Diagnosis Date     Bone metastasis (H) 09/28/2018     Renal cell carcinoma, right (H) 09/28/2018         CURRENT OUTPATIENT MEDICATIONS     Current Outpatient Medications   Medication     acetaminophen (TYLENOL) 500 MG tablet     calcium carbonate 500 mg, elemental, (OSCAL 500) 1250 (500 Ca) MG TABS tablet     celecoxib (CELEBREX) 200 MG capsule     Dextromethorphan-guaiFENesin (MUCINEX DM)  MG 12 hr tablet     diazepam (VALIUM) 5 MG tablet     DULoxetine (CYMBALTA) 60 MG capsule     furosemide (LASIX) 20 MG tablet     lidocaine (LIDODERM) 5 % patch     medical cannabis (Patient's own supply.  Not a prescription)     melatonin 3 MG tablet     morphine (MS CONTIN) 15 MG CR tablet     morphine  (MS CONTIN) 30 MG CR tablet     morphine (MSIR) 15 MG IR tablet     pantoprazole (PROTONIX) 40 MG EC tablet     polyethylene glycol (MIRALAX/GLYCOLAX) Packet     predniSONE (DELTASONE) 10 MG tablet     predniSONE (DELTASONE) 10 MG tablet     pregabalin (LYRICA) 50 MG capsule     senna-docusate (SENOKOT-S;PERICOLACE) 8.6-50 MG per tablet     Vitamin D, Cholecalciferol, 1000 units TABS     No current facility-administered medications for this visit.         ALLERGIES    No Known Allergies     REVIEW OF SYSTEMS   As above in the HPI, o/w complete 12-point ROS was negative.     PHYSICAL EXAM   /66 (BP Location: Left arm, Patient Position: Sitting, Cuff Size: Adult Regular)   Pulse 78   Temp 97.6  F (36.4  C) (Oral)   Resp 16   Ht 1.829 m (6')   Wt 102.4 kg (225 lb 11.2 oz)   SpO2 97%   BMI 30.61 kg/m     GEN: NAD  HEENT: PERRL, EOMI, no icterus, injection or pallor. Oropharynx is clear.  LYMPHATICs: no cervical or supraclavicular lymphadenopathy; no other abn lymphadenopathy  PULMONARY: clear with good air entry bilaterally  CARDIOVASCULAR: regular, no murmurs, rubs, or gallops  GASTROINTESTINAL: soft, non-tender, non-distended, normal bowel sounds, no hepatosplenomegaly by percussion or palpation  MUSCULOSKELTAL: warm, well perfused, LLE diffuse swelling.  NEURO: awake, alert and oriented to time place and person, cranial nerves intact - II - XII, LLE 4/5 strength to hip flexion. Sensory loss whole of left leg.   SKIN: no rashes     LABORATORY AND IMAGING STUDIES     Lab Results   Component Value Date    WBC 10.0 06/26/2019     Lab Results   Component Value Date    RBC 2.94 06/26/2019     Lab Results   Component Value Date    HGB 8.5 06/26/2019     Lab Results   Component Value Date    HCT 28.6 06/26/2019     No components found for: MCT  Lab Results   Component Value Date    MCV 97 06/26/2019     Lab Results   Component Value Date    MCH 28.9 06/26/2019     Lab Results   Component Value Date    MCHC 29.7  06/26/2019     Lab Results   Component Value Date    RDW 17.3 06/26/2019     Lab Results   Component Value Date     06/26/2019     Last Comprehensive Metabolic Panel:  Sodium   Date Value Ref Range Status   06/26/2019 134 133 - 144 mmol/L Final     Potassium   Date Value Ref Range Status   06/26/2019 4.5 3.4 - 5.3 mmol/L Final     Chloride   Date Value Ref Range Status   06/26/2019 103 94 - 109 mmol/L Final     Carbon Dioxide   Date Value Ref Range Status   06/26/2019 25 20 - 32 mmol/L Final     Anion Gap   Date Value Ref Range Status   06/26/2019 5 3 - 14 mmol/L Final     Glucose   Date Value Ref Range Status   06/26/2019 119 (H) 70 - 99 mg/dL Final     Urea Nitrogen   Date Value Ref Range Status   06/26/2019 14 7 - 30 mg/dL Final     Creatinine   Date Value Ref Range Status   06/26/2019 0.66 0.66 - 1.25 mg/dL Final   06/26/2019 0.65 (L) 0.66 - 1.25 mg/dL Final     GFR Estimate   Date Value Ref Range Status   06/26/2019 >90 >60 mL/min/[1.73_m2] Final     Comment:     Non  GFR Calc  Starting 12/18/2018, serum creatinine based estimated GFR (eGFR) will be   calculated using the Chronic Kidney Disease Epidemiology Collaboration   (CKD-EPI) equation.     06/26/2019 >90 >60 mL/min/[1.73_m2] Final     Comment:     Non  GFR Calc  Starting 12/18/2018, serum creatinine based estimated GFR (eGFR) will be   calculated using the Chronic Kidney Disease Epidemiology Collaboration   (CKD-EPI) equation.       Calcium   Date Value Ref Range Status   06/26/2019 8.2 (L) 8.5 - 10.1 mg/dL Final     Bilirubin Total   Date Value Ref Range Status   06/26/2019 0.5 0.2 - 1.3 mg/dL Final     Alkaline Phosphatase   Date Value Ref Range Status   06/26/2019 146 40 - 150 U/L Final     ALT   Date Value Ref Range Status   06/26/2019 17 0 - 70 U/L Final     AST   Date Value Ref Range Status   06/26/2019 95 (H) 0 - 45 U/L Final     CT CAP 6/18:Findings:  Chest:  Central tracheobronchial tree is patent. No focal  consolidation.  Bibasilar atelectasis. Subcentimeter solid pulmonary nodules noted.  Solid pulmonary nodule in the inferior right middle lobe measuring 11  mm (series 10 image 126), unchanged since comparison 9/25/2018. Solid  pulmonary nodule in the posterior right upper lobe measuring 5 mm  (series 10 image 42), unchanged from comparison 9/25/2018. Solid  pulmonary nodule adjacent to a right bronchus near the hilum measures  2 mm (series 10 image 66), previously 9 mm on 9/25/2018 and unchanged  from most recent comparison. Biapical scarring. No pleural effusions.  No pneumothorax.     Heart size is within normal limits. Severe coronary artery  calcification. Normal configuration of the great thoracic vessels,  patent great thoracic vessels. Mild atherosclerotic calcification of  the aorta and major branches. Ascending aorta measures up to 3.7 cm.  Prominent mediastinal lymph nodes, not enlarged by size criteria,  representative periaortic lymph node measures 9 mm (series 7 image  117). Thyroid homogeneously enhances. No axillary adenopathy.     Abdomen:  Stable appearance of vascular shunt in the right lobe of liver,  segment 8/5. No suspicious liver lesions. Patent hepatic vasculature.  No biliary tree dilatation. Postsurgical changes of cholecystectomy.  No pancreatic ductal dilatation, and no pancreatic masses. Spleen  measures up to 13.5 cm, mildly enlarged. No focal splenic lesions.  Mildly hypertrophic left adrenal gland, right adrenal gland appears  within normal limits. Subcentimeter, too small to characterize  hypoattenuating cortical foci in the kidneys, similar to comparison,  statistically cysts. No hydronephrosis, soft tissue masses, or  nephrolithiasis.     There is no abnormally dilated loops of bowel or thickened loops of  bowel. Diverticulosis without diverticulitis. Appendix is visualized  and is within normal limits. No free air or free fluid in the abdomen  or pelvis. No inflammatory changes in  the mesentery. Fat-containing  nodular focus posterior to the bladder adjacent to the rectosigmoid  colon measuring 1.5 x 1.8 cm (series 7 image 545). There are no  enlarged lymph nodes in the abdomen or pelvis.     Bladder is distended but otherwise appears within normal limits.  Prominent fat in the left inguinal canal. Pelvic phleboliths noted.      Aorta is not dilated. Mild calcification of the aorta and its major  branches. Left retroaortic renal vein.     Bones and soft tissues:   Mild anasarca. Degenerative changes of the spine and SI joints with  partial fusion of the SI joints. Punctate dense and patchy sclerotic  lesions in the proximal femurs, innominate bones, sacrum. Sclerosis in  the innominate bones is increased since comparison. Grossly stable  sclerosis of numerous vertebral body lesions with increased sclerosis  of the metastatic lesion to T5. Stable, chronic compression deformity  of L3. Anterior right chronic rib fracture, unchanged.                                                                      Impression: In this patient with history of metastatic renal cell  carcinoma:  1. Stable configuration of the metastatic sclerotic lesions noted  throughout the axial and appendicular skeleton with slight increased  sclerosis associated with the metastatic lesions in the innominate  bones, T4 vertebral body, potentially representing response to  therapy.  2. No significant change in size of pulmonary nodules overall from  prior.    MRI Pelvis:   Findings:     Redemonstration extensive osseous lesions with T2 intermediate, T1  hypointense signal with associated enhancement involving the  visualized bones including visualized portions of spine especially  posterior element, sacrum, bilateral innominate bones, bilateral  proximal femurs. Since comparison MRI from March 2019, there are  multiple areas of interval increased lesions. For instance, in the  anterior walls of bilateral acetabulum, more  extensive involvement of  the left proximal femur at the level of femoral neck such as in lesser  trochanter and larger lesion in the right femoral neck.     There are soft tissue components for instance in the posterior left  iliac bone, also demonstrating increased in size. Likely small soft  tissue extension at left iliac crest in the area of left anterior  superior iliac spine.  Posterior paraspinous muscle edema and  enhancement, similar to prior, soft tissue lesion cannot be excluded  especially given extent of posterior element involvement in this area.   Periostitis along iliac bones bilaterally.     Bilateral, relatively symmetric moderate to severe fatty  infiltration/atrophy of the gluteus minimus muscles. Internal  derangement of joints not well assessed owing to chosen field of view.  A physiologic amount of joint fluid present in bilateral hips.  Hamstring tendons, iliopsoas tendons, proximal rectus femoris, and  sartorius tendons are intact bilaterally. Visualized courses of the  bilateral sciatic nerves are unremarkable.     Presacral edema mildly increased from comparison. Redemonstration of  edema bilaterally along the deep aspect of the iliacus muscles,  greater on the left.     Unchanged 1.2 cm left external iliac chain lymph node.     Edema involving the bilateral adductor muscles, may be related to  muscle strain.     Dependent edema posterior subcutaneous tissue. There is mild  enhancement superficially along the gluteus jaye bilaterally.                                                                      Impression: Since previous MR from 3/2019, overall increased osseous  metastasis with small areas of soft tissue components. Given extent of  involvement in femoral necks, these may be at risk of pathologic  fracture, especially on left.    MRI lumbar spine:Findings: Regarding numbering convention, there are 5 lumbar-type  vertebrae assumed for the purposes of this dictation.  The tip of  the  conus medullaris is at  L1.  Regarding alignment, the lumbar vertebral  column appears normally aligned.  There is multilevel disc height  narrowing and disc desiccation, most pronounced at L2-3 and L3-4 with  mild disc height loss.  Regarding bone marrow signal intensity, there  is heterogeneous T1 and T2 signal throughout the lumbar spine with  postcontrast enhancement scattered throughout the anterior and  posterior elements of the lumbar vertebra and sacrum. There is a a  left eccentric pathologic compression deformity of the L3 vertebral  body with approximately 25-35% loss of height anteriorly, unchanged.  Is mild retropulsion of fracture fragments into the spinal canal.     On a level by level basis:     L1-2: No spinal canal or neural foraminal stenosis. Facet arthropathy  bilaterally. No spinal canal or neural foraminal stenosis     L2-3: Circumferential disc bulge. Facet arthropathy bilaterally.  Retropulsed fracture fragments. Ligamentum flavum hypertrophy. Mild  spinal canal stenosis. Left and mild to moderate right neural  foraminal stenosis..     L3-4: Circumferential disc bulge. Expansile left pedicle and lesion.  Facet arthropathy bilaterally. Ligamentum flavum hypertrophy. Mild  spinal canal stenosis. Moderate to severe left neural foraminal  stenosis. Moderate right neural foraminal stenosis.     L4-5: Circumferential disc bulge. Facet arthropathy bilaterally.  Ligamentum flavum hypertrophy. Mild spinal canal stenosis.. Mild  neural foraminal stenosis bilaterally.     L5-S1: Posterior disc bulge. Facet arthropathy bilaterally. Ligamentum  flavum hypertrophy. Mild neural foraminal stenosis bilaterally. Spinal  canal is patent.     Contrast-enhanced images demonstrate complete enhancement of the L1  vertebral body and posterior elements has progressed since the 420  3/2/2018. There is mild epidural enhancement/extension, which measures  up to 6 mm in thickness enlarged since 4/23/2018. There is  scattered  enhancement extending from L1 through the sacrum and iliac bones.  Enhancement within the paraspinous soft tissues of the lower spine  suspicious for extension of metastatic disease, slightly progressed.                                                                      Impression:   1. Increased epidural metastasis at the level of L5 since 4/23/2018.  Similar epidural enhancement from L2 through 5, which contributes to  mild spinal canal stenosis.  2. Progression of osseous metastatic disease within the L1and the L3  vertebra. Diffuse osseous metastatic disease from L1 through the  sacrum.  3. Stable vertebral body height loss at L3 secondary to pathologic  vertebral body fracture. Findings contribute to moderate to severe  left and moderate right neural foraminal stenosis at the level of  L3-4.      ASSESSMENT AND PLAN     68-year-old male with Stage IV renal cell carcinoma on second line nivolumab.   Left femoral neck metastatic disease with increased risk for pathological fracture.  L3 vertebral compression fracture.  Severe arthralgia secondary to checkpoint inhibitor therapy, on prednisone taper.  History of fever and SIRS, better with steroids.    We reviewed restaging images of CT chest abdomen pelvis, MRI lumbar spine, MRI pelvis today.  CT chest shows decreased size of tiny pulmonary nodules as well as sclerotic changes in his bony metastasis suggestive of response to therapy.  However his MRI of lumbar spine and pelvis shows increased size of epidural metastatic deposits suggestive of progressive disease.  It is unclear at this point how to interpret these contradictory findings.  A bone scan would be more useful to further evaluate his bony metastatic disease.  Patient has ongoing severe arthralgia secondary to immunotherapy.  He had very good control with high-dose of prednisone after cycle 2 as well as after cycle 3.  He has recurrent symptoms due to rapid taper.  Due to the side effects  as well as concern for possible progression, we will hold off continuing with nivolumab at this point.     - For symptom control with his arthralgias we will plan to increase prednisone.  Will use 40 mg daily for 1 week, followed by 20 mg daily for 1 week, followed by 10 mg daily for 1 week, followed by 5 mg daily for 1 week, followed by 5 mg on alternate days for 1 week.    -We will refer patient to Dr. Brower from [facility] to evaluate and treat L3 compression fracture.    -Will refer patient to radiation oncology Dr. Rdz for possible prophylactic radiation of left femoral neck.    -Will obtain bone scan for further evaluation of his metastatic disease to bone.  If the findings clearly show progressive disease, will consider changing therapy to lenvatinib plus everolimus.    -Return to clinic in 3 weeks with Radha [facility].      Care plan discussed with Dr. Dayana Mason  [facility] Onc Fellow  [phone]    Again, thank you for allowing me to participate in the care of your patient.      Sincerely,    Hua Anne MD

## 2019-06-26 NOTE — NURSING NOTE
Chief Complaint   Patient presents with     Blood Draw     Labs drawn via PIV by MA. VS taken. Pt. checked in for appt.      Joceline Bustamante MA

## 2019-06-26 NOTE — PROGRESS NOTES
[facility]  HEMATOLOGY AND ONCOLOGY    FOLLOW-UP VISIT NOTE    PATIENT NAME: Cirilo Bianchi MRN #[id number]  DATE OF VISIT: Jun 26, 2019 YOB: 1950        CANCER TYPE: Metastatic Carcinoma with bone metastases, likely renal origin (no primary renal mass)  STAGE: IV    TREATMENT:  Cabozantinib: Started 60 mg daily on September 28, 2018.  Dec 10, 2018: dose reduced cabozantinib to 40 mg a day.  Jan 17, 2019: dose reduced cabozantinib to 29 mg a day.  Feb 11, 2019: C1D1 Nivolumab   3/12/19: c2 Nivolumab  5/21/19: C3 Nivolumab.     TREATMENT SUMMARY:  Javon Bianchi is a 68 year old male with a history significant for hyperlipidemia who had been in his usual health until he developed back pain in May 2018. At that time, the pain was a stabbing like in the middle of lower back with burning like pain wrapping around this left hip to knee area. No trauma. He had MRI on 5/23/18 showed mild degenerative change with bulging disks L2-S1. A small benign hemangioma was noted in L2, no other marrow signal abnormality. Since then, the pain failed to improve despite steroid injections. In addition, he actually lost 25 lbs weight unintentionally since June along with chills few time a day. Overtime, the pain got worse to the point he could not ambulate after short distance and developed muscle weakness in lower extremities over time requiring a walker. Finally, he went to ER ([facility] in [place]) on 8/23/18, CT A/P was unremarkable. He was referred to neurology with obtaining MRI L spine. MRI on 8/30/18 revealed a pathological fracture at L3 vertebral body with height loss at 40% and diffuse involvement of a neoplastic process. IR guided biopsy on 8/31/18 showed poorly differentiated carcinoma. Its IHC was suggestive of possible renal cell carcinoma per pathology report (Renal cell immunochemistry is positive and along with the negative staining for CK7 and CK20 suggests the possibility of a renal  carcinoma, however most renal cell carcinomas also express PAX8 which is negative in this case). Of note, CT chest/abdomen/pelvis at OSH was negative for primary lesion. The patient was evaluated by neurosurgery who recommended no surgical intervention. He was discharged with TLSO brace and walker. He had palliative XRT locally from 9/11 for 10 fractions (Done in [place]).      He was started on cabozantinib 60 mg initially, requiring dose reduction to 20 mg for issues with hand foot syndrome and poor tolerability.  Switched to Nivolumab due to progression in bone disease on 2/6/19.     CURRENT INTERVENTIONS: Nivolumab C3 on 5/21/19.      SUBJECTIVE   Mr. Bianchi presents today for follow-up prior to planned cycle 4 of nivolumab with restaging CT chest abdomen pelvis and MRI lumbar spine and pelvis.    His main symptoms include myalgias and arthralgias.  Prior to third cycle he had myalgias responding to Prednisone.  Since May he has had significant arthralgias that responded to high dose Prednisone, but returned with taper and continue at current dose of 10 mg daily. He dates his joint pains back to April, but more marked immediately prior to his May 12 ED visit and admission.  He has not noted joint swelling even when the pain level was 8-9.  Pain has been most prominent in the large joints, particularly hips and shoulders, but also affecting knees, ankles and wrists.  Fingers and toes minimally affected. He had complete resolution of joint pain on 80 mg Prednisone.  With each incremental taper by 10 mg he had gradual return of more discomfort.He now is on 10 mg Prednisone daily.  His most prominent pain now is in R shoulder> L, and less so hips.  Minimal pain elsewhere.  He was evaluated by rheumatology who felt his symptoms are very likely immunotherapy related.    He has ongoing pain in his lower back, left hip and thigh region which has not significantly changed in the past several months.  He has ongoing  numbness in his left lower extremity as well as swelling in his left lower extremity which is same in the past several months.  He is able to ambulate with a cane.  He has not had any falls.  His appetite is decent.  He uses JUVENTINO stockings for his left lower extremity below knee as well as Ace wraps of the knee.  He uses Lasix occasionally for flank edema.  He is on MS Contin 45 mg 3 times a day for pain control as well as, as needed morphine intermittent release 15 mg every 3 hours as needed.  His pain is under decent control no activity.     He was also admitted on 5/12/2019 with high fever and SIRS no clear evidence of infection that resolved with increased dose of steroids.    He denies any chest pain, shortness of breath, fever, chills, abdominal pain, nausea, vomiting, diarrhea. he has persistent cough with scant phlegm production.  He has no headache, blurred vision, no new focal neurological deficits other than as reported above.      PAST MEDICAL HISTORY     Past Medical History:   Diagnosis Date     Bone metastasis (H) 09/28/2018     Renal cell carcinoma, right (H) 09/28/2018         CURRENT OUTPATIENT MEDICATIONS     Current Outpatient Medications   Medication     acetaminophen (TYLENOL) 500 MG tablet     calcium carbonate 500 mg, elemental, (OSCAL 500) 1250 (500 Ca) MG TABS tablet     celecoxib (CELEBREX) 200 MG capsule     Dextromethorphan-guaiFENesin (MUCINEX DM)  MG 12 hr tablet     diazepam (VALIUM) 5 MG tablet     DULoxetine (CYMBALTA) 60 MG capsule     furosemide (LASIX) 20 MG tablet     lidocaine (LIDODERM) 5 % patch     medical cannabis (Patient's own supply.  Not a prescription)     melatonin 3 MG tablet     morphine (MS CONTIN) 15 MG CR tablet     morphine (MS CONTIN) 30 MG CR tablet     morphine (MSIR) 15 MG IR tablet     pantoprazole (PROTONIX) 40 MG EC tablet     polyethylene glycol (MIRALAX/GLYCOLAX) Packet     predniSONE (DELTASONE) 10 MG tablet     predniSONE (DELTASONE) 10 MG tablet      pregabalin (LYRICA) 50 MG capsule     senna-docusate (SENOKOT-S;PERICOLACE) 8.6-50 MG per tablet     Vitamin D, Cholecalciferol, 1000 units TABS     No current facility-administered medications for this visit.         ALLERGIES    No Known Allergies     REVIEW OF SYSTEMS   As above in the HPI, o/w complete 12-point ROS was negative.     PHYSICAL EXAM   /66 (BP Location: Left arm, Patient Position: Sitting, Cuff Size: Adult Regular)   Pulse 78   Temp 97.6  F (36.4  C) (Oral)   Resp 16   Ht 1.829 m (6')   Wt 102.4 kg (225 lb 11.2 oz)   SpO2 97%   BMI 30.61 kg/m    GEN: NAD  HEENT: PERRL, EOMI, no icterus, injection or pallor. Oropharynx is clear.  LYMPHATICs: no cervical or supraclavicular lymphadenopathy; no other abn lymphadenopathy  PULMONARY: clear with good air entry bilaterally  CARDIOVASCULAR: regular, no murmurs, rubs, or gallops  GASTROINTESTINAL: soft, non-tender, non-distended, normal bowel sounds, no hepatosplenomegaly by percussion or palpation  MUSCULOSKELTAL: warm, well perfused, LLE diffuse swelling.  NEURO: awake, alert and oriented to time place and person, cranial nerves intact - II - XII, LLE 4/5 strength to hip flexion. Sensory loss whole of left leg.   SKIN: no rashes     LABORATORY AND IMAGING STUDIES     Lab Results   Component Value Date    WBC 10.0 06/26/2019     Lab Results   Component Value Date    RBC 2.94 06/26/2019     Lab Results   Component Value Date    HGB 8.5 06/26/2019     Lab Results   Component Value Date    HCT 28.6 06/26/2019     No components found for: MCT  Lab Results   Component Value Date    MCV 97 06/26/2019     Lab Results   Component Value Date    MCH 28.9 06/26/2019     Lab Results   Component Value Date    MCHC 29.7 06/26/2019     Lab Results   Component Value Date    RDW 17.3 06/26/2019     Lab Results   Component Value Date     06/26/2019     Last Comprehensive Metabolic Panel:  Sodium   Date Value Ref Range Status   06/26/2019 134 133 - 144  mmol/L Final     Potassium   Date Value Ref Range Status   06/26/2019 4.5 3.4 - 5.3 mmol/L Final     Chloride   Date Value Ref Range Status   06/26/2019 103 94 - 109 mmol/L Final     Carbon Dioxide   Date Value Ref Range Status   06/26/2019 25 20 - 32 mmol/L Final     Anion Gap   Date Value Ref Range Status   06/26/2019 5 3 - 14 mmol/L Final     Glucose   Date Value Ref Range Status   06/26/2019 119 (H) 70 - 99 mg/dL Final     Urea Nitrogen   Date Value Ref Range Status   06/26/2019 14 7 - 30 mg/dL Final     Creatinine   Date Value Ref Range Status   06/26/2019 0.66 0.66 - 1.25 mg/dL Final   06/26/2019 0.65 (L) 0.66 - 1.25 mg/dL Final     GFR Estimate   Date Value Ref Range Status   06/26/2019 >90 >60 mL/min/[1.73_m2] Final     Comment:     Non  GFR Calc  Starting 12/18/2018, serum creatinine based estimated GFR (eGFR) will be   calculated using the Chronic Kidney Disease Epidemiology Collaboration   (CKD-EPI) equation.     06/26/2019 >90 >60 mL/min/[1.73_m2] Final     Comment:     Non  GFR Calc  Starting 12/18/2018, serum creatinine based estimated GFR (eGFR) will be   calculated using the Chronic Kidney Disease Epidemiology Collaboration   (CKD-EPI) equation.       Calcium   Date Value Ref Range Status   06/26/2019 8.2 (L) 8.5 - 10.1 mg/dL Final     Bilirubin Total   Date Value Ref Range Status   06/26/2019 0.5 0.2 - 1.3 mg/dL Final     Alkaline Phosphatase   Date Value Ref Range Status   06/26/2019 146 40 - 150 U/L Final     ALT   Date Value Ref Range Status   06/26/2019 17 0 - 70 U/L Final     AST   Date Value Ref Range Status   06/26/2019 95 (H) 0 - 45 U/L Final     CT CAP 6/18:Findings:  Chest:  Central tracheobronchial tree is patent. No focal consolidation.  Bibasilar atelectasis. Subcentimeter solid pulmonary nodules noted.  Solid pulmonary nodule in the inferior right middle lobe measuring 11  mm (series 10 image 126), unchanged since comparison 9/25/2018. Solid  pulmonary  nodule in the posterior right upper lobe measuring 5 mm  (series 10 image 42), unchanged from comparison 9/25/2018. Solid  pulmonary nodule adjacent to a right bronchus near the hilum measures  2 mm (series 10 image 66), previously 9 mm on 9/25/2018 and unchanged  from most recent comparison. Biapical scarring. No pleural effusions.  No pneumothorax.     Heart size is within normal limits. Severe coronary artery  calcification. Normal configuration of the great thoracic vessels,  patent great thoracic vessels. Mild atherosclerotic calcification of  the aorta and major branches. Ascending aorta measures up to 3.7 cm.  Prominent mediastinal lymph nodes, not enlarged by size criteria,  representative periaortic lymph node measures 9 mm (series 7 image  117). Thyroid homogeneously enhances. No axillary adenopathy.     Abdomen:  Stable appearance of vascular shunt in the right lobe of liver,  segment 8/5. No suspicious liver lesions. Patent hepatic vasculature.  No biliary tree dilatation. Postsurgical changes of cholecystectomy.  No pancreatic ductal dilatation, and no pancreatic masses. Spleen  measures up to 13.5 cm, mildly enlarged. No focal splenic lesions.  Mildly hypertrophic left adrenal gland, right adrenal gland appears  within normal limits. Subcentimeter, too small to characterize  hypoattenuating cortical foci in the kidneys, similar to comparison,  statistically cysts. No hydronephrosis, soft tissue masses, or  nephrolithiasis.     There is no abnormally dilated loops of bowel or thickened loops of  bowel. Diverticulosis without diverticulitis. Appendix is visualized  and is within normal limits. No free air or free fluid in the abdomen  or pelvis. No inflammatory changes in the mesentery. Fat-containing  nodular focus posterior to the bladder adjacent to the rectosigmoid  colon measuring 1.5 x 1.8 cm (series 7 image 545). There are no  enlarged lymph nodes in the abdomen or pelvis.     Bladder is distended  but otherwise appears within normal limits.  Prominent fat in the left inguinal canal. Pelvic phleboliths noted.      Aorta is not dilated. Mild calcification of the aorta and its major  branches. Left retroaortic renal vein.     Bones and soft tissues:   Mild anasarca. Degenerative changes of the spine and SI joints with  partial fusion of the SI joints. Punctate dense and patchy sclerotic  lesions in the proximal femurs, innominate bones, sacrum. Sclerosis in  the innominate bones is increased since comparison. Grossly stable  sclerosis of numerous vertebral body lesions with increased sclerosis  of the metastatic lesion to T5. Stable, chronic compression deformity  of L3. Anterior right chronic rib fracture, unchanged.                                                                      Impression: In this patient with history of metastatic renal cell  carcinoma:  1. Stable configuration of the metastatic sclerotic lesions noted  throughout the axial and appendicular skeleton with slight increased  sclerosis associated with the metastatic lesions in the innominate  bones, T4 vertebral body, potentially representing response to  therapy.  2. No significant change in size of pulmonary nodules overall from  prior.    MRI Pelvis:   Findings:     Redemonstration extensive osseous lesions with T2 intermediate, T1  hypointense signal with associated enhancement involving the  visualized bones including visualized portions of spine especially  posterior element, sacrum, bilateral innominate bones, bilateral  proximal femurs. Since comparison MRI from March 2019, there are  multiple areas of interval increased lesions. For instance, in the  anterior walls of bilateral acetabulum, more extensive involvement of  the left proximal femur at the level of femoral neck such as in lesser  trochanter and larger lesion in the right femoral neck.     There are soft tissue components for instance in the posterior left  iliac bone,  also demonstrating increased in size. Likely small soft  tissue extension at left iliac crest in the area of left anterior  superior iliac spine.  Posterior paraspinous muscle edema and  enhancement, similar to prior, soft tissue lesion cannot be excluded  especially given extent of posterior element involvement in this area.   Periostitis along iliac bones bilaterally.     Bilateral, relatively symmetric moderate to severe fatty  infiltration/atrophy of the gluteus minimus muscles. Internal  derangement of joints not well assessed owing to chosen field of view.  A physiologic amount of joint fluid present in bilateral hips.  Hamstring tendons, iliopsoas tendons, proximal rectus femoris, and  sartorius tendons are intact bilaterally. Visualized courses of the  bilateral sciatic nerves are unremarkable.     Presacral edema mildly increased from comparison. Redemonstration of  edema bilaterally along the deep aspect of the iliacus muscles,  greater on the left.     Unchanged 1.2 cm left external iliac chain lymph node.     Edema involving the bilateral adductor muscles, may be related to  muscle strain.     Dependent edema posterior subcutaneous tissue. There is mild  enhancement superficially along the gluteus jaye bilaterally.                                                                      Impression: Since previous MR from 3/2019, overall increased osseous  metastasis with small areas of soft tissue components. Given extent of  involvement in femoral necks, these may be at risk of pathologic  fracture, especially on left.    MRI lumbar spine:Findings: Regarding numbering convention, there are 5 lumbar-type  vertebrae assumed for the purposes of this dictation.  The tip of the  conus medullaris is at  L1.  Regarding alignment, the lumbar vertebral  column appears normally aligned.  There is multilevel disc height  narrowing and disc desiccation, most pronounced at L2-3 and L3-4 with  mild disc height loss.   Regarding bone marrow signal intensity, there  is heterogeneous T1 and T2 signal throughout the lumbar spine with  postcontrast enhancement scattered throughout the anterior and  posterior elements of the lumbar vertebra and sacrum. There is a a  left eccentric pathologic compression deformity of the L3 vertebral  body with approximately 25-35% loss of height anteriorly, unchanged.  Is mild retropulsion of fracture fragments into the spinal canal.     On a level by level basis:     L1-2: No spinal canal or neural foraminal stenosis. Facet arthropathy  bilaterally. No spinal canal or neural foraminal stenosis     L2-3: Circumferential disc bulge. Facet arthropathy bilaterally.  Retropulsed fracture fragments. Ligamentum flavum hypertrophy. Mild  spinal canal stenosis. Left and mild to moderate right neural  foraminal stenosis..     L3-4: Circumferential disc bulge. Expansile left pedicle and lesion.  Facet arthropathy bilaterally. Ligamentum flavum hypertrophy. Mild  spinal canal stenosis. Moderate to severe left neural foraminal  stenosis. Moderate right neural foraminal stenosis.     L4-5: Circumferential disc bulge. Facet arthropathy bilaterally.  Ligamentum flavum hypertrophy. Mild spinal canal stenosis.. Mild  neural foraminal stenosis bilaterally.     L5-S1: Posterior disc bulge. Facet arthropathy bilaterally. Ligamentum  flavum hypertrophy. Mild neural foraminal stenosis bilaterally. Spinal  canal is patent.     Contrast-enhanced images demonstrate complete enhancement of the L1  vertebral body and posterior elements has progressed since the 420  3/2/2018. There is mild epidural enhancement/extension, which measures  up to 6 mm in thickness enlarged since 4/23/2018. There is scattered  enhancement extending from L1 through the sacrum and iliac bones.  Enhancement within the paraspinous soft tissues of the lower spine  suspicious for extension of metastatic disease, slightly progressed.                                                                       Impression:   1. Increased epidural metastasis at the level of L5 since 4/23/2018.  Similar epidural enhancement from L2 through 5, which contributes to  mild spinal canal stenosis.  2. Progression of osseous metastatic disease within the L1and the L3  vertebra. Diffuse osseous metastatic disease from L1 through the  sacrum.  3. Stable vertebral body height loss at L3 secondary to pathologic  vertebral body fracture. Findings contribute to moderate to severe  left and moderate right neural foraminal stenosis at the level of  L3-4.      ASSESSMENT AND PLAN     68-year-old male with Stage IV renal cell carcinoma on second line nivolumab.   Left femoral neck metastatic disease with increased risk for pathological fracture.  L3 vertebral compression fracture.  Severe arthralgia secondary to checkpoint inhibitor therapy, on prednisone taper.  History of fever and SIRS, better with steroids.    We reviewed restaging images of CT chest abdomen pelvis, MRI lumbar spine, MRI pelvis today.  CT chest shows decreased size of tiny pulmonary nodules as well as sclerotic changes in his bony metastasis suggestive of response to therapy.  However his MRI of lumbar spine and pelvis shows increased size of epidural metastatic deposits suggestive of progressive disease.  It is unclear at this point how to interpret these contradictory findings.  A bone scan would be more useful to further evaluate his bony metastatic disease.  Patient has ongoing severe arthralgia secondary to immunotherapy.  He had very good control with high-dose of prednisone after cycle 2 as well as after cycle 3.  He has recurrent symptoms due to rapid taper.  Due to the side effects as well as concern for possible progression, we will hold off continuing with nivolumab at this point.     - For symptom control with his arthralgias we will plan to increase prednisone.  Will use 40 mg daily for 1 week, followed by 20 mg  daily for 1 week, followed by 10 mg daily for 1 week, followed by 5 mg daily for 1 week, followed by 5 mg on alternate days for 1 week.    -We will refer patient to Dr. Brower from [facility] to evaluate and treat L3 compression fracture.    -Will refer patient to radiation oncology Dr. Rdz for possible prophylactic radiation of left femoral neck.    -Will obtain bone scan for further evaluation of his metastatic disease to bone.  If the findings clearly show progressive disease, will consider changing therapy to lenvatinib plus everolimus.    -Return to clinic in 3 weeks with Radha St.      Care plan discussed with Dr. Dayana Mason  [facility] Onc Fellow  [phone]

## 2019-06-26 NOTE — NURSING NOTE
Oncology Rooming Note    June 26, 2019 3:28 PM   Javon Bianchi is a 68 year old male who presents for:    Chief Complaint   Patient presents with     Blood Draw     Labs drawn via PIV by MA. VS taken. Pt. checked in for appt.      Oncology Clinic Visit     Return Renal Cell Carcinoma     Initial Vitals: /66 (BP Location: Left arm, Patient Position: Sitting, Cuff Size: Adult Regular)   Pulse 78   Temp 97.6  F (36.4  C) (Oral)   Resp 16   Ht 1.829 m (6')   Wt 102.4 kg (225 lb 11.2 oz)   SpO2 97%   BMI 30.61 kg/m   Estimated body mass index is 30.61 kg/m  as calculated from the following:    Height as of this encounter: 1.829 m (6').    Weight as of this encounter: 102.4 kg (225 lb 11.2 oz). Body surface area is 2.28 meters squared.  Moderate Pain (4) Comment: joint pain   No LMP for male patient.  Allergies reviewed: Yes  Medications reviewed: Yes    Medications: MEDICATION REFILLS NEEDED TODAY. Provider was notified.  Pharmacy name entered into [facility]: [facility] - [place], [place] - [address] [place] [address]    Clinical concerns: Refill on Protonix and prednisone (?); Coughing up phlegm; Finished antibiotic and still has mucus; Where is the cough coming from? Insomnia;       Leonila Navarrete, [facility]

## 2019-06-27 ENCOUNTER — TELEPHONE (OUTPATIENT)
Dept: ORTHOPEDICS | Facility: CLINIC | Age: 69
End: 2019-06-27

## 2019-06-27 DIAGNOSIS — C64.2 RENAL CELL CARCINOMA, LEFT (H): ICD-10-CM

## 2019-06-27 DIAGNOSIS — C79.51 BONE METASTASIS: Primary | ICD-10-CM

## 2019-06-28 ENCOUNTER — PATIENT OUTREACH (OUTPATIENT)
Dept: CARE COORDINATION | Facility: CLINIC | Age: 69
End: 2019-06-28

## 2019-06-28 NOTE — PROGRESS NOTES
Per patient's request, completed and faxed [facility] (Ph. [phone], F. [phone]) request for lodging dates 7/8/2019 - 7/9/2019. [facility] will contact patient for confirmation of reservation. SW will continue to provide support as needed.      JIL Hatfield, MSW   - [facility]  Phone: [phone]  Pager: [phone]  6/28/2019

## 2019-07-01 ENCOUNTER — TELEPHONE (OUTPATIENT)
Dept: PALLIATIVE CARE | Facility: CLINIC | Age: 69
End: 2019-07-01

## 2019-07-01 DIAGNOSIS — C64.2 RENAL CELL CARCINOMA, LEFT (H): ICD-10-CM

## 2019-07-01 DIAGNOSIS — M79.2 NEUROPATHIC PAIN: ICD-10-CM

## 2019-07-01 RX ORDER — PREGABALIN 50 MG/1
50 CAPSULE ORAL 2 TIMES DAILY
Qty: 60 CAPSULE | Refills: 1
Start: 2019-07-01 | End: 2019-09-13

## 2019-07-01 NOTE — TELEPHONE ENCOUNTER
Received message from triage that patient is requesting refill of morphine ER. Attempted to call patient to clarify how much he has left and if he needs both strengths, but unable to reach him. Left VM asking for jay jay back when he is able with information. Left my direct phone #.     Last refill: 5/22/2019  Last office visit: 5/21/2019  Scheduled for follow up 7/3/2019     Will route request to MD for review.     Reviewed [place]  Report.

## 2019-07-02 NOTE — TELEPHONE ENCOUNTER
Received call back - patient is needed MS Contin (both strengths) and MSIR. Has enough to make it to tomorrow for apt with Dr. Mills and will discuss it with her then. Will send this note so MD is aware of patient's needs.

## 2019-07-03 ENCOUNTER — ONCOLOGY VISIT (OUTPATIENT)
Dept: ONCOLOGY | Facility: CLINIC | Age: 69
End: 2019-07-03
Attending: INTERNAL MEDICINE
Payer: MEDICARE

## 2019-07-03 VITALS
WEIGHT: 222.4 LBS | OXYGEN SATURATION: 97 % | SYSTOLIC BLOOD PRESSURE: 128 MMHG | HEIGHT: 72 IN | HEART RATE: 64 BPM | DIASTOLIC BLOOD PRESSURE: 73 MMHG | TEMPERATURE: 98.5 F | BODY MASS INDEX: 30.12 KG/M2

## 2019-07-03 DIAGNOSIS — Z51.5 ENCOUNTER FOR PALLIATIVE CARE: ICD-10-CM

## 2019-07-03 DIAGNOSIS — R09.82 POST-NASAL DRIP: ICD-10-CM

## 2019-07-03 DIAGNOSIS — C64.9 RENAL CELL CARCINOMA, UNSPECIFIED LATERALITY (H): Primary | ICD-10-CM

## 2019-07-03 DIAGNOSIS — G89.3 CANCER ASSOCIATED PAIN: ICD-10-CM

## 2019-07-03 PROCEDURE — 99215 OFFICE O/P EST HI 40 MIN: CPT | Performed by: INTERNAL MEDICINE

## 2019-07-03 PROCEDURE — G0463 HOSPITAL OUTPT CLINIC VISIT: HCPCS

## 2019-07-03 RX ORDER — FLUTICASONE PROPIONATE 50 MCG
1 SPRAY, SUSPENSION (ML) NASAL DAILY
Qty: 16 G | Refills: 0 | Status: SHIPPED | OUTPATIENT
Start: 2019-07-03 | End: 2019-07-29

## 2019-07-03 RX ORDER — MORPHINE SULFATE 15 MG/1
15 TABLET, FILM COATED, EXTENDED RELEASE ORAL EVERY 8 HOURS
Qty: 90 TABLET | Refills: 0 | Status: SHIPPED | OUTPATIENT
Start: 2019-07-03 | End: 2019-07-03

## 2019-07-03 RX ORDER — MORPHINE SULFATE 15 MG/1
15 TABLET ORAL
Qty: 120 TABLET | Refills: 0 | Status: SHIPPED | OUTPATIENT
Start: 2019-07-03 | End: 2019-07-30

## 2019-07-03 RX ORDER — MORPHINE SULFATE 30 MG/1
30 TABLET, FILM COATED, EXTENDED RELEASE ORAL EVERY 8 HOURS
Qty: 90 TABLET | Refills: 0 | Status: SHIPPED | OUTPATIENT
Start: 2019-07-03 | End: 2019-07-03

## 2019-07-03 RX ORDER — MORPHINE SULFATE 15 MG/1
15 TABLET, FILM COATED, EXTENDED RELEASE ORAL EVERY 8 HOURS
Qty: 90 TABLET | Refills: 0 | Status: SHIPPED | OUTPATIENT
Start: 2019-07-03 | End: 2019-07-19

## 2019-07-03 RX ORDER — MORPHINE SULFATE 15 MG/1
15 TABLET ORAL
Qty: 120 TABLET | Refills: 0 | Status: SHIPPED | OUTPATIENT
Start: 2019-07-03 | End: 2019-07-03

## 2019-07-03 RX ORDER — MORPHINE SULFATE 30 MG/1
30 TABLET, FILM COATED, EXTENDED RELEASE ORAL EVERY 8 HOURS
Qty: 90 TABLET | Refills: 0 | Status: SHIPPED | OUTPATIENT
Start: 2019-07-03 | End: 2019-07-19

## 2019-07-03 ASSESSMENT — PAIN SCALES - GENERAL: PAINLEVEL: MILD PAIN (3)

## 2019-07-03 ASSESSMENT — MIFFLIN-ST. JEOR: SCORE: 1806.8

## 2019-07-03 NOTE — PROGRESS NOTES
Oncology Rooming Note    July 3, 2019 11:15 AM   Javon Bianchi is a 68 year old male who presents for:    Chief Complaint   Patient presents with     Palliative     Initial Vitals: /73   Pulse 64   Temp 98.5  F (36.9  C) (Oral)   Ht 1.829 m (6')   Wt 100.9 kg (222 lb 6.4 oz)   SpO2 97%   BMI 30.16 kg/m   Estimated body mass index is 30.16 kg/m  as calculated from the following:    Height as of this encounter: 1.829 m (6').    Weight as of this encounter: 100.9 kg (222 lb 6.4 oz). Body surface area is 2.26 meters squared.  Mild Pain (3) Comment: over all body pain   No LMP for male patient.  Allergies reviewed: Yes  Medications reviewed: Yes    Medications: Medication refills not needed today.  Pharmacy name entered into [facility]: [facility] - [address]    Clinical concerns:  doctor was notified.      Yasmine Staley, CMA

## 2019-07-03 NOTE — PATIENT INSTRUCTIONS
Thank you for coming into the Palliative Care Clinic today.     1. Hold Celebrex while taking Prednisone.   2. Start Flonase, 1 spray per nostril daily. Continue for one month.  If cough/nasal drainage persists, see your primary care physician.     Return to clinic 6 weeks for a follow up.     You can reach the Palliative Care Team during business hours at the following number: [phone] (Palliative Clinic Nurse Line).     To reach the Palliative Care Provider on-call after-hours or on holidays and weekends, call: [phone].  Please note that we are not able to provide pain medication refills on evenings or weekends.     ==================================================================    [facility] Palliative Care Clinics   The [facility] Palliative Care Team is committed to treating your pain and other symptoms. This handout is for all patients treated with opioid medications in our clinics.     What are opioid medicines?   Opioid medications are used to ease some types of pain and shortness of breath. They are sometimes called narcotics. They include: Morphine (MS Contin, Roxanol), Oxycodone (OxyContin, OxyFast, Percocet), Hydrocodone (Vicodin, Norco), Hydromorphone (Dilaudid), Fentanyl (Duragesic), Methadone (Dolophine).   Are opioid medicines safe to take?   Opioid medicines are safe when you follow the directions from your doctor or medical provider.  Opioids can cause serious side effects and become unsafe if you do not follow your instructions.   To make sure you are taking opioid medicines safely, we may ask you to bring in your pill bottles or to allow us to test your urine. Our goal is to keep you safe and to improve your ability to function & be active. If we don t think opioids are safely doing that, we will work with you to taper you off of them.   Do not drive unless your medical provider tells you it is safe.   Do not take opioids with alcohol or  anxiety/sleep medicines unless your doctor tells you it is ok to do so.   Are opioids addictive?   Addiction means you crave a drug and have trouble limiting the amount you use.   Addiction is more likely to happen if you take opioids to relieve stress or to feel altered. If you or your loved one feels this way when taking opioid medicines, let your medical provider know. We may refer you for additional assessment or services if this is a concern.   Your body will get used to the opioid medicines if you take them for more than two weeks in a row. This means if you stop them suddenly, you may have withdrawal. If this occurs, you will feel uncomfortable (nausea, diarrhea, increased pain). This does not mean you are addicted. Never stop taking your pain medicines all at once unless your medical provider tells you to. If you think you need less medicine, your medical provider will help you to safely lower your dose.   What is the safest way to store opioids?   Keep your medicines in a safe place away from children, teens, pets, and visitors. Be careful about who knows that you have these medicines.   How do I get rid of my old opioids?   Opioids should be brought to your [facility] drop-off site, or you can purchase a disposal kit from your local pharmacy. If neither of these options is available, the Food and Drug Administration recommends that you flush unused opioids down the toilet.   Do not share or sell your pain medicines. This is illegal and very dangerous. We cannot prescribe opioid pain medicines to you if do this.   How do I get refills?   Opioid medicines need signed paper prescriptions. This means it may take longer to refill than other medicines. Our clinic cannot prescribe them on weekends or at night.     Give us one week s notice when requesting a refill. This gives us the time we need to get it signed and back to you. It may be possible to mail prescriptions to you.

## 2019-07-03 NOTE — PROGRESS NOTES
"Palliative Care Outpatient Clinic Progress Note    Patient Name:  aJvon Bianchi  Primary Provider:  LIANNE Wellington    Chief Complaint/Identifying Information:  Metastatic Renal Cell Carcinoma on immunotherapy  History of L3 pathologic fracture found at diagnosis   Known epidural tumor burden    [facility] Outpatient Palliative Care Opioid Prescribing Safety Plan     Opioid Safety Education: Reviewed by Nan Gonzalez  on 11/8/2018  Opioid Risk Assessment: Performed by Nan Gonzalez  on 11/8/2018 .  ORT score of 0.   Mood Disorder Assessment: Performed by Carly Mills on 04/25/19.  No concerns.    reviewed with every prescription, last reviewed by Carly Mills on 07/03/19     Additional recommendations based on patient's prognosis and indication for opioids include the following:     Expected prognosis: shorter  Risk: Low or Medium (ORT 0-7)  No further recommendations.     Interim History:  Javon Bianchi 68 year old male returns to be seen by palliative care today, accompanied by his wife Suzie .  Javon Bianchi was last seen by me six weeks ago, at which time his MS Contin was transitioned to TID dosing.   Since he was last seen by me, he notes that he has had a chronic cough for the past 3 weeks, typically only overnight.  This can be anything from green to \"liquid.\"  Chronic nasal drip. No facial pain, no fevers.     He was seen in the ED locally one month ago for fever, cough, was treated with 1 week of Levaquin, cough improved then returned. He thinks he is allergic to pollen. No maintenance treatment for allergies.     He has been taking prednisone for his joint pains, all rated 3/10 or less.  The last few weeks have been \"good pain days.\" Was able to fly to [place] to be with his family, golf.     Pain in the back has been \"fine\" generally, left hip gives him the most issues particularly if he over-exerts himself.  Using lidocaine patches for two days, otherwise not often.  " Taking MS Contin 45mg TID, MSIR 15mg occasionally. Taking Celebrex BID, Tylenol 2-3x per day. Left lower extremity continues to have tingling/numbness, unchanged from prior. Walking without cane around the house, using the cane at home. No incontinence (bowel or bladder).     Starting to get dull intermittent ache in the epigastric region, no reflux/belching/burning. Is on Protonix.     Bowels are regular.     Coping:  Reports that he is in good spirits. No concern over depression or anxiety.     Social History:  Pertinent changes to social history/social situation since last visit: See above.    Social History     Tobacco Use     Smoking status: Never Smoker     Smokeless tobacco: Never Used   Substance Use Topics     Alcohol use: None     Drug use: None              Physical Exam:   Vitals were reviewed  /73   Pulse 64   Temp 98.5  F (36.9  C) (Oral)   Ht 1.829 m (6')   Wt 100.9 kg (222 lb 6.4 oz)   SpO2 97%   BMI 30.16 kg/m    General: Alert, comfortable appearing male in no acute distress.   Eyes: Pupils equal, sclera clear.   ENT: MMM. Posterior oropharynx is clear, nasal drainage is present.   Resp: Unlabored on room air. CTAB.   Abd: Soft, non-distended, mildly TTP in the epigastrium without rebound or guarding, active bowel sounds.   Ext: No TTP of the hips/knees.   Neuro: No facial asymmetry.  Spontaneous movements grossly non-focal.  Decreased sensation to light touch along the LLE.  LLE hip flexor 4/5, right is 5/5. Gait assisted by cane today.  Psych: Alert, appropriately interactive, full affect, clear sensorium.       Impression & Recommendations & Counseling:  Omega is a 68 year old man with metastatic renal cell carcinoma, currently on a treatment break while treating arthralgias due to his immunotherapy with prednisone, whose cancer associated pain appears to be adequately managed on current opioid doses with minimal side effects.  Will continue and monitor for need to escalate MSIR given  degree of tolerance.  He will be meeting with Radiation Oncology to discuss prophylactic hip radiation.  If his hip pain suddenly worsens, he should be sent emergently for imaging to evaluate for pathologic fracture.     He has developed some symptoms concerning for the start of gastritis.  Given that he is on both prednisone and Celebrex I will have him hold Celebrex for now, continue PPI.      His cough is consistent with post nasal drip and has a normal lung exam/O2 sats today without fevers.  Will ask him to start Flonase daily for the next month (will be on prednisone taper for 5 weeks, presumably off immunotherapy during that time), and f/u with primary care physician if additional help with post-nasal drip is needed or certainly if cough worsens or facial pain develops.     Disease Understanding: Scans reviewed at length. Reviewed Dr. Anne's recent note with next steps at Omega's request.     RTC 6 weeks for a follow up.     Addendum:  Prescriptions were sent to [facility], could not be filled.  Attempted     Additional information reviewed today:   No Known Allergies  Current Outpatient Medications   Medication Sig Dispense Refill     acetaminophen (TYLENOL) 500 MG tablet Take 1,000 mg by mouth 3 times daily        calcium carbonate 500 mg, elemental, (OSCAL 500) 1250 (500 Ca) MG TABS tablet Take 1 tablet by mouth 2 times daily       celecoxib (CELEBREX) 200 MG capsule Take 1 capsule (200 mg) by mouth 2 times daily as needed for moderate pain 60 capsule 2     Dextromethorphan-guaiFENesin (MUCINEX DM)  MG 12 hr tablet Take 1 tablet by mouth every 12 hours       diazepam (VALIUM) 5 MG tablet Take 1 pill 30 min before MRI and a second pill 2 minutes prior. 10 tablet 0     DULoxetine (CYMBALTA) 60 MG capsule Take 1 capsule (60 mg) by mouth daily 90 capsule 3     furosemide (LASIX) 20 MG tablet Take 1 tablet (20 mg) by mouth daily as needed 14 tablet 0     lidocaine (LIDODERM) 5 % patch Place 1-3  patches onto the skin every 24 hours Apply to painful areas for 12 hours on, 12 hours off. 30 patch 3     medical cannabis (Patient's own supply.  Not a prescription) 1 Dose See Admin Instructions (This is NOT a prescription, and does not certify that the patient has a qualifying medical condition for medical cannabis.  The purpose of this order is  to document that the patient reports taking medical cannabis.)       melatonin 3 MG tablet Take 3 mg by mouth nightly as needed        morphine (MSIR) 15 MG IR tablet Take 1 tablet (15 mg) by mouth every 3 hours as needed for severe pain 120 tablet 0     pantoprazole (PROTONIX) 40 MG EC tablet Take 1 tablet (40 mg) by mouth every morning (before breakfast) 30 tablet 2     polyethylene glycol (MIRALAX/GLYCOLAX) Packet Take 0.5 packets by mouth daily as needed       predniSONE (DELTASONE) 10 MG tablet Take 4 tablets (40 mg) by mouth daily for 7 days, THEN 2 tablets (20 mg) daily for 7 days, THEN 1 tablet (10 mg) daily for 7 days, THEN 0.5 tablets (5 mg) daily for 7 days. 57 tablet 0     pregabalin (LYRICA) 50 MG capsule Take 1 capsule (50 mg) by mouth 2 times daily 60 capsule 1     senna-docusate (SENOKOT-S;PERICOLACE) 8.6-50 MG per tablet Take 2 tablets by mouth 2 times daily       Vitamin D, Cholecalciferol, 1000 units TABS Take 2,000 Units by mouth daily        predniSONE (DELTASONE) 10 MG tablet Take 1 tablet (10 mg) by mouth daily (Patient not taking: Reported on 7/3/2019) 30 tablet 3     Past Medical History:   Diagnosis Date     Bone metastasis (H) 09/28/2018     Renal cell carcinoma, right (H) 09/28/2018     No past surgical history on file.    Key Data Reviewed:  LABS:   Lab Results   Component Value Date    WBC 10.0 06/26/2019    HGB 8.5 (L) 06/26/2019    HCT 28.6 (L) 06/26/2019     06/26/2019     06/26/2019    POTASSIUM 4.5 06/26/2019    CHLORIDE 103 06/26/2019    CO2 25 06/26/2019    BUN 14 06/26/2019    CR 0.66 06/26/2019    CR 0.65 (L) 06/26/2019  "    (H) 06/26/2019    SED 87 (H) 03/29/2019    AST 95 (H) 06/26/2019    ALT 17 06/26/2019    ALKPHOS 146 06/26/2019    BILITOTAL 0.5 06/26/2019    INR 1.3 (A) 08/31/2018     IMAGING:   MRI Lumbar Spine this month  \"Impression:   1. Increased epidural metastasis at the level of L5 since 4/23/2018.  Similar epidural enhancement from L2 through 5, which contributes to  mild spinal canal stenosis.  2. Progression of osseous metastatic disease within the L1and the L3  vertebra. Diffuse osseous metastatic disease from L1 through the  sacrum.  3. Stable vertebral body height loss at L3 secondary to pathologic  vertebral body fracture. Findings contribute to moderate to severe  left and moderate right neural foraminal stenosis at the level of  L3-4.\"    MRI Pelvis same date,  \"Impression: Since previous MR from 3/2019, overall increased osseous  metastasis with small areas of soft tissue components. Given extent of  involvement in femoral necks, these may be at risk of pathologic  fracture, especially on left.\"    CT C/A/P same date showed generally stable disease.       [place]  - Use of controlled substances consistent with history.     Thank you for continuing to involve me in the care of this patient.     Carly Mills MD / Palliative Medicine / Pager [phone] / After-Hours Answering Service [phone] / Main Palliative Clinic - [facility] [phone] / [facility] Inpatient Team Consult Pager [phone] (answered 8am-430pm M-F)    Time spent: 40 minutes, >50% spent in counseling/coordination of care.     "

## 2019-07-08 ENCOUNTER — TELEPHONE (OUTPATIENT)
Dept: ONCOLOGY | Facility: CLINIC | Age: 69
End: 2019-07-08

## 2019-07-08 NOTE — TELEPHONE ENCOUNTER
Encounter created in error.    Alannah Velazquez  Clinical Research Coordinator-RN  Clinical Trials Office/[facility] [place] [place]  Desk Phone: [phone]   Pager: [phone]

## 2019-07-08 NOTE — PROGRESS NOTES
Department of Radiation Oncology                   [place] [address]  [address] [place]  [address]  Office:  [phone]  Fax:  [phone]   Radiation Oncology Clinic  [address] [place] [address]  Phone:  [phone]  Fax:  [phone]     RE: Javon Bianchi : 1950   MRN: [id number] LEON: 2019     OUTPATIENT VISIT NOTE       PROBLEM: Metastatic renal cell carcinoma with left hip metastasis at risk for pathologic fracture      was seen for initial consultation in the Dept of Therapeutic Radiology on 2019 at the request of Dr. Mason     HISTORY OF PRESENT ILLNESS:   Mr. Bianchi is a 68-year-old man with a diagnosis of metastatic carcinoma of likely renal origin.  He was in his usual state of health until May, 2018 at which time he developed back pain.  Initial work-up for this pain including an MRI on 2018 showed only mild degenerative changes with bulging disks at L2-S1.  He was treated symptomatically with steroid injections but did not notice any benefit.  His pain continued to progress and he had an unintentional 25 pound weight loss over the next few months.  His back pain progressed to the point that he sought care at an outside ED at which time a CT abdomen pelvis on 2018 was unremarkable.  He subsequently had an MRI of the lumbar spine on 2018 which revealed a pathologic fracture at L3 with 40% loss of height due to diffuse involvement by a neoplastic process.  An IR guided biopsy on 2018 showed poorly differentiated carcinoma ([id number]).  IHC was most consistent with a renal cell carcinoma.  However, subsequent imaging of the abdomen with CT and MRI were negative for a primary renal lesion. He then received palliative RT to the lumbar spine (L2-5) from 18-18 consisting of 3000 cGy in 10 fractions.     The patient was then started on systemic therapy with cabozantinib beginning 2018.  He did not tolerate this  agent well and required multiple dose reductions due to hand-foot syndrome.  He was eventually switched to nivolumab beginning to 2/11/19 due to progression of bone disease.  He has received 3 cycles of nivolumab most recently on 5/21/2019. He also received additional palliative RT in March to the left femur and sacrum for palliation of pain, completed 4/1/19, consisting of 3000 cGy in 10 fractions. Prior to cycle 4, he underwent restaging with CT CAP and MRI of the lumbar spine and pelvis on 6/18/19.  CT scan showed overall stable osseous metastatic disease.  However, MRI of the pelvis showed extensive osseous lesions with associated enhancement involving the visualized portions of spine especially posterior elements, sacrum, bilateral innominate bones, bilateral proximal femurs. Since comparison MRI from March 2019, there are multiple areas of interval increased lesions. For instance, in the anterior walls of bilateral acetabulum, more extensive involvement of the left proximal femur at the level of femoral neck such as in lesser trochanter and larger lesion in the right femoral neck.  Due to arthralgias related to his immunotherapy as well as concern for progression on MRI, his fourth cycle of nivolumab was held.  He is referred to our department for possible prophylactic radiation of the left femoral neck given the widespread metastatic disease in this area.    On exam today, the Mr. Bianchi reports ongoing pain in his lower back and left hip which has not significantly changed in the past several months.  He previously had more widespread arthralgias related to his immunotherapy as well as some pain in the right hip which have resolved with steroids.  He continues to take morphine 45 mg 3 times daily and 1000 mg Tylenol 3 times daily for his remaining musculoskeletal pain.  He states that he currently has 4-5/10 pain in the left hip which increases to 7-8/10 without pain medication.  He also reports some  long-standing left lower extremity numbness which is recently unchanged.  He denies leg weakness.  No bowel or bladder incontinence or saddle anesthesia.  He denies any recent fevers, chills, nausea, vomiting.  Complete review of systems was otherwise unremarkable. Mirel's score for the left hip is 7.      PAST MEDICAL HISTORY:   Past Medical History:   Diagnosis Date     Bone metastasis (H) 09/28/2018     Renal cell carcinoma, right (H) 09/28/2018     No past surgical history on file.     CHEMOTHERAPY HISTORY: See HPI     PAST RADIATION THERAPY HISTORY: See HPI     IMPLANTED DEVICES: None    AUTOIMMUNE/CONNECTIVE TISSUE DISORDERS: None    MEDICATIONS:   Current Outpatient Medications   Medication Sig Dispense Refill     acetaminophen (TYLENOL) 500 MG tablet Take 1,000 mg by mouth 3 times daily        calcium carbonate 500 mg, elemental, (OSCAL 500) 1250 (500 Ca) MG TABS tablet Take 1 tablet by mouth 2 times daily       Dextromethorphan-guaiFENesin (MUCINEX DM)  MG 12 hr tablet Take 1 tablet by mouth every 12 hours       diazepam (VALIUM) 5 MG tablet Take 1 pill 30 min before MRI and a second pill 2 minutes prior. 10 tablet 0     DULoxetine (CYMBALTA) 60 MG capsule Take 1 capsule (60 mg) by mouth daily 90 capsule 3     fluticasone (FLONASE) 50 MCG/ACT nasal spray Spray 1 spray into both nostrils daily 16 g 0     furosemide (LASIX) 20 MG tablet Take 1 tablet (20 mg) by mouth daily as needed 14 tablet 0     lidocaine (LIDODERM) 5 % patch Place 1-3 patches onto the skin every 24 hours Apply to painful areas for 12 hours on, 12 hours off. 30 patch 3     medical cannabis (Patient's own supply.  Not a prescription) 1 Dose See Admin Instructions (This is NOT a prescription, and does not certify that the patient has a qualifying medical condition for medical cannabis.  The purpose of this order is  to document that the patient reports taking medical cannabis.)       melatonin 3 MG tablet Take 3 mg by mouth nightly as  needed        morphine (MS CONTIN) 15 MG CR tablet Take 1 tablet (15 mg) by mouth every 8 hours Combine with 30mg tab for total of 45mg every 8 hours. 90 tablet 0     morphine (MS CONTIN) 30 MG CR tablet Take 1 tablet (30 mg) by mouth every 8 hours Combine with 15mg tab for total of 45mg every 8 hours. 90 tablet 0     morphine (MSIR) 15 MG IR tablet Take 1 tablet (15 mg) by mouth every 3 hours as needed for severe pain 120 tablet 0     pantoprazole (PROTONIX) 40 MG EC tablet Take 1 tablet (40 mg) by mouth every morning (before breakfast) 30 tablet 2     polyethylene glycol (MIRALAX/GLYCOLAX) Packet Take 0.5 packets by mouth daily as needed       predniSONE (DELTASONE) 10 MG tablet Take 4 tablets (40 mg) by mouth daily for 7 days, THEN 2 tablets (20 mg) daily for 7 days, THEN 1 tablet (10 mg) daily for 7 days, THEN 0.5 tablets (5 mg) daily for 7 days. 57 tablet 0     predniSONE (DELTASONE) 10 MG tablet Take 1 tablet (10 mg) by mouth daily (Patient not taking: Reported on 7/3/2019) 30 tablet 3     pregabalin (LYRICA) 50 MG capsule Take 1 capsule (50 mg) by mouth 2 times daily 60 capsule 1     senna-docusate (SENOKOT-S;PERICOLACE) 8.6-50 MG per tablet Take 2 tablets by mouth 2 times daily       Vitamin D, Cholecalciferol, 1000 units TABS Take 2,000 Units by mouth daily          ALLERGIES:  No Known Allergies.    SOCIAL HISTORY: Lives in [place] with wife. Never smoker.    FAMILY HISTORY:   Father - prostate cancer    REVIEW OF SYMPTOMS:  A full 14-point review of systems was performed.     PHYSICAL EXAMINATION:    /69   Pulse 67   Wt 100.7 kg (222 lb)   BMI 30.11 kg/m    General: Alert, oriented, NAD  Skin: No rashes or lesions appreciated  HEENT: NCAT, EOMI  Neck: Supple, full ROM  Breasts: Deferred  Heart: WWP  Lungs: Breathing comfortably on RA  Abd: Nondistended  /Rectal: Deferred  Ext: Atraumatic, grossly intact strength  Neuro: CNs grossly intact, normal gait with aid of cane    ECOG  PS: 1    ASSESSMENT: 68 year old gentleman with metastatic presumed renal cell carcinoma with MRI evidence of progressive bony disease and extensive involvement of the left femoral neck concerning for impending pathologic fracture.    RECOMMENDATIONS:   We had a detailed discussion with Mr. Bianchi regarding the role of radiotherapy in the overall management of his metastatic renal cell carcinoma.  He currently has evidence of progressive bony disease, specifically he has worsening disease involving the left femoral neck.  We discussed that radiation is often used to treat this area with the purposes of pathologic fracture prophylaxis.  Given the location, as well as the size of this lesion, we agree that he is at risk for for pathologic fracture with further progression in this area. However, his disease in this area does not appear to be disrupting the cortex and is sclerotic in nature, therefore, we do not feel that orthopedic evaluation is necessary prior to radiotherapy treatment.  We therefore feel that it is reasonable to offer radiotherapy to this site.  However, treatment to this area is complicated by his adjacent prior treatment to the left femoral shaft which was performed in [place]. Given that the radiation center in [place] is more convenient for the patient and has performed his previous radiotherapy treatments, we recommend that he return there for consideration of palliative radiotherapy to the left femoral neck.  The patient expresses that this would be his preference as well and already has a consult arranged for 7/17.  We also briefly discussed that he also appears have a smaller lesion in the right femoral neck for which we would also recommend palliative treatment for fracture prophylaxis.  We will defer the decision on whether to address the right hip at the same time to his radiation oncologist in [place].    Thank you for allowing us to participate in this patient's care.  Please feel  free to call with any questions or concerns.    The patient was seen and discussed with staff, Dr. Rdz.     Neil Awan MD  Resident, PGY-5  Department of Radiation Oncology  [facility]  [phone]    I saw the patient with the resident.  I agree with the resident's note and plan of care.      DEAN Rdz M.D.  Department of Radiation Oncology  [facility]

## 2019-07-09 ENCOUNTER — HOSPITAL ENCOUNTER (OUTPATIENT)
Dept: NUCLEAR MEDICINE | Facility: CLINIC | Age: 69
Setting detail: NUCLEAR MEDICINE
End: 2019-07-09
Attending: INTERNAL MEDICINE
Payer: MEDICARE

## 2019-07-09 ENCOUNTER — HOSPITAL ENCOUNTER (OUTPATIENT)
Dept: NUCLEAR MEDICINE | Facility: CLINIC | Age: 69
Setting detail: NUCLEAR MEDICINE
Discharge: HOME | End: 2019-07-09
Attending: INTERNAL MEDICINE | Admitting: INTERNAL MEDICINE
Payer: MEDICARE

## 2019-07-09 ENCOUNTER — OFFICE VISIT (OUTPATIENT)
Dept: RADIATION ONCOLOGY | Facility: CLINIC | Age: 69
End: 2019-07-09
Attending: INTERNAL MEDICINE
Payer: MEDICARE

## 2019-07-09 VITALS
WEIGHT: 222 LBS | HEART RATE: 67 BPM | SYSTOLIC BLOOD PRESSURE: 133 MMHG | BODY MASS INDEX: 30.11 KG/M2 | DIASTOLIC BLOOD PRESSURE: 69 MMHG

## 2019-07-09 DIAGNOSIS — C79.51 BONE METASTASIS: ICD-10-CM

## 2019-07-09 DIAGNOSIS — C79.51 BONE METASTASIS: Primary | ICD-10-CM

## 2019-07-09 DIAGNOSIS — C64.2 RENAL CELL CARCINOMA, LEFT (H): ICD-10-CM

## 2019-07-09 PROCEDURE — G0463 HOSPITAL OUTPT CLINIC VISIT: HCPCS | Mod: 25 | Performed by: RADIOLOGY

## 2019-07-09 PROCEDURE — A9503 TC99M MEDRONATE: HCPCS | Performed by: INTERNAL MEDICINE

## 2019-07-09 PROCEDURE — 78399 UNLISTED MUSCSKEL PX DX NUC: CPT

## 2019-07-09 PROCEDURE — 78306 BONE IMAGING WHOLE BODY: CPT

## 2019-07-09 PROCEDURE — 34300033 ZZH RX 343: Performed by: INTERNAL MEDICINE

## 2019-07-09 RX ORDER — TC 99M MEDRONATE 20 MG/10ML
20-30 INJECTION, POWDER, LYOPHILIZED, FOR SOLUTION INTRAVENOUS ONCE
Status: COMPLETED | OUTPATIENT
Start: 2019-07-09 | End: 2019-07-09

## 2019-07-09 RX ADMIN — TC 99M MEDRONATE 26.5 MCI.: 20 INJECTION, POWDER, LYOPHILIZED, FOR SOLUTION INTRAVENOUS at 08:23

## 2019-07-09 ASSESSMENT — ENCOUNTER SYMPTOMS
COUGH: 0
ABDOMINAL PAIN: 0
BLURRED VISION: 0
DEPRESSION: 0
NECK PAIN: 0
HEADACHES: 0
FREQUENCY: 0
VOMITING: 0
WEIGHT LOSS: 0
CONSTIPATION: 0
SHORTNESS OF BREATH: 0
HEARTBURN: 0
NERVOUS/ANXIOUS: 0
DIZZINESS: 0
CHILLS: 0
FALLS: 0
DIARRHEA: 0
WHEEZING: 0
SORE THROAT: 1
NAUSEA: 0
FEVER: 0
BACK PAIN: 1

## 2019-07-09 NOTE — PROGRESS NOTES
HPI  INITIAL PATIENT ASSESSMENT    Diagnosis: met renal cell carcinoma    Prior radiation therapy:   Site Treated: L 3, L femur  Facility: [facility]  Dates:   Dose:     Prior chemotherapy: None  Optivo last in may    Prior hormonal therapy:No    Pain Eval:  Current history of pain associated with this visit:   Intensity: 4/10  Current: sharp and constant  Location: Low back and L hip  Treatment: MSContin    Psychosocial  Living arrangements: wife  Fall Risk: ambulates with assistive device   referral needs: Not needed    Advanced Directive: Yes - Location: chart  Implantable Cardiac Device? No    Onset of menarche:   LMP: No LMP for male patient.  Onset of menopause:   Abnormal vaginal bleeding/discharge:   Are you pregnant?   Reproductive note: grown children    Nurse face-to-face time: Level 4:  15 min face to face time    Review of Systems   Constitutional: Positive for malaise/fatigue. Negative for chills, fever and weight loss.   HENT: Positive for congestion and sore throat. Negative for tinnitus.    Eyes: Negative for blurred vision.   Respiratory: Negative for cough, shortness of breath and wheezing.    Cardiovascular: Positive for leg swelling. Negative for chest pain.   Gastrointestinal: Negative for abdominal pain, constipation, diarrhea, heartburn, nausea and vomiting.   Genitourinary: Negative for frequency and urgency.   Musculoskeletal: Positive for back pain. Negative for falls and neck pain.   Skin: Negative for itching and rash.   Neurological: Negative for dizziness and headaches.   Endo/Heme/Allergies: Positive for environmental allergies.   Psychiatric/Behavioral: Negative for depression. The patient is not nervous/anxious.

## 2019-07-09 NOTE — LETTER
2019       RE: Javon Bianchi  [address]  [facility] [address]     Dear Colleague,    Thank you for referring your patient, Javon Bianchi, to the RADIATION ONCOLOGY CLINIC. Please see a copy of my visit note below.    Department of Radiation Oncology                   [facility] [address]  [address] [place]  [address]  Office:  [phone]  Fax:  [phone]   Radiation Oncology Clinic  [address] [place] [address]  Phone:  [phone]  Fax:  [phone]     RE: Javon Bianchi : 1950   MRN: [id number] LEON: 2019     OUTPATIENT VISIT NOTE       PROBLEM: Metastatic renal cell carcinoma with left hip metastasis at risk for pathologic fracture      was seen for initial consultation in the Dept of Therapeutic Radiology on 2019 at the request of Dr. Mason     HISTORY OF PRESENT ILLNESS:   Mr. Bianchi is a 68-year-old man with a diagnosis of metastatic carcinoma of likely renal origin.  He was in his usual state of health until May, 2018 at which time he developed back pain.  Initial work-up for this pain including an MRI on 2018 showed only mild degenerative changes with bulging disks at L2-S1.  He was treated symptomatically with steroid injections but did not notice any benefit.  His pain continued to progress and he had an unintentional 25 pound weight loss over the next few months.  His back pain progressed to the point that he sought care at an outside ED at which time a CT abdomen pelvis on 2018 was unremarkable.  He subsequently had an MRI of the lumbar spine on 2018 which revealed a pathologic fracture at L3 with 40% loss of height due to diffuse involvement by a neoplastic process.  An IR guided biopsy on 2018 showed poorly differentiated carcinoma ([id number]).  IHC was most consistent with a renal cell carcinoma.  However, subsequent imaging of the abdomen with CT and MRI were negative for a primary renal lesion.  He then received palliative RT to the lumbar spine (L2-5) from 9/11/18-9/24/18 consisting of 3000 cGy in 10 fractions.     The patient was then started on systemic therapy with cabozantinib beginning 9/28/2018.  He did not tolerate this agent well and required multiple dose reductions due to hand-foot syndrome.  He was eventually switched to nivolumab beginning to 2/11/19 due to progression of bone disease.  He has received 3 cycles of nivolumab most recently on 5/21/2019. He also received additional palliative RT in March to the left femur and sacrum for palliation of pain, completed 4/1/19, consisting of 3000 cGy in 10 fractions. Prior to cycle 4, he underwent restaging with CT CAP and MRI of the lumbar spine and pelvis on 6/18/19.  CT scan showed overall stable osseous metastatic disease.  However, MRI of the pelvis showed extensive osseous lesions with associated enhancement involving the visualized portions of spine especially posterior elements, sacrum, bilateral innominate bones, bilateral proximal femurs. Since comparison MRI from March 2019, there are multiple areas of interval increased lesions. For instance, in the anterior walls of bilateral acetabulum, more extensive involvement of the left proximal femur at the level of femoral neck such as in lesser trochanter and larger lesion in the right femoral neck.  Due to arthralgias related to his immunotherapy as well as concern for progression on MRI, his fourth cycle of nivolumab was held.  He is referred to our department for possible prophylactic radiation of the left femoral neck given the widespread metastatic disease in this area.    On exam today, the Mr. Bianchi reports ongoing pain in his lower back and left hip which has not significantly changed in the past several months.  He previously had more widespread arthralgias related to his immunotherapy as well as some pain in the right hip which have resolved with steroids.  He continues to take  morphine 45 mg 3 times daily and 1000 mg Tylenol 3 times daily for his remaining musculoskeletal pain.  He states that he currently has 4-5/10 pain in the left hip which increases to 7-8/10 without pain medication.  He also reports some long-standing left lower extremity numbness which is recently unchanged.  He denies leg weakness.  No bowel or bladder incontinence or saddle anesthesia.  He denies any recent fevers, chills, nausea, vomiting.  Complete review of systems was otherwise unremarkable. Mirel's score for the left hip is 7.      PAST MEDICAL HISTORY:   Past Medical History:   Diagnosis Date     Bone metastasis (H) 09/28/2018     Renal cell carcinoma, right (H) 09/28/2018     No past surgical history on file.     CHEMOTHERAPY HISTORY: See HPI     PAST RADIATION THERAPY HISTORY: See HPI     IMPLANTED DEVICES: None    AUTOIMMUNE/CONNECTIVE TISSUE DISORDERS: None    MEDICATIONS:   Current Outpatient Medications   Medication Sig Dispense Refill     acetaminophen (TYLENOL) 500 MG tablet Take 1,000 mg by mouth 3 times daily        calcium carbonate 500 mg, elemental, (OSCAL 500) 1250 (500 Ca) MG TABS tablet Take 1 tablet by mouth 2 times daily       Dextromethorphan-guaiFENesin (MUCINEX DM)  MG 12 hr tablet Take 1 tablet by mouth every 12 hours       diazepam (VALIUM) 5 MG tablet Take 1 pill 30 min before MRI and a second pill 2 minutes prior. 10 tablet 0     DULoxetine (CYMBALTA) 60 MG capsule Take 1 capsule (60 mg) by mouth daily 90 capsule 3     fluticasone (FLONASE) 50 MCG/ACT nasal spray Spray 1 spray into both nostrils daily 16 g 0     furosemide (LASIX) 20 MG tablet Take 1 tablet (20 mg) by mouth daily as needed 14 tablet 0     lidocaine (LIDODERM) 5 % patch Place 1-3 patches onto the skin every 24 hours Apply to painful areas for 12 hours on, 12 hours off. 30 patch 3     medical cannabis (Patient's own supply.  Not a prescription) 1 Dose See Admin Instructions (This is NOT a prescription, and does  not certify that the patient has a qualifying medical condition for medical cannabis.  The purpose of this order is  to document that the patient reports taking medical cannabis.)       melatonin 3 MG tablet Take 3 mg by mouth nightly as needed        morphine (MS CONTIN) 15 MG CR tablet Take 1 tablet (15 mg) by mouth every 8 hours Combine with 30mg tab for total of 45mg every 8 hours. 90 tablet 0     morphine (MS CONTIN) 30 MG CR tablet Take 1 tablet (30 mg) by mouth every 8 hours Combine with 15mg tab for total of 45mg every 8 hours. 90 tablet 0     morphine (MSIR) 15 MG IR tablet Take 1 tablet (15 mg) by mouth every 3 hours as needed for severe pain 120 tablet 0     pantoprazole (PROTONIX) 40 MG EC tablet Take 1 tablet (40 mg) by mouth every morning (before breakfast) 30 tablet 2     polyethylene glycol (MIRALAX/GLYCOLAX) Packet Take 0.5 packets by mouth daily as needed       predniSONE (DELTASONE) 10 MG tablet Take 4 tablets (40 mg) by mouth daily for 7 days, THEN 2 tablets (20 mg) daily for 7 days, THEN 1 tablet (10 mg) daily for 7 days, THEN 0.5 tablets (5 mg) daily for 7 days. 57 tablet 0     predniSONE (DELTASONE) 10 MG tablet Take 1 tablet (10 mg) by mouth daily (Patient not taking: Reported on 7/3/2019) 30 tablet 3     pregabalin (LYRICA) 50 MG capsule Take 1 capsule (50 mg) by mouth 2 times daily 60 capsule 1     senna-docusate (SENOKOT-S;PERICOLACE) 8.6-50 MG per tablet Take 2 tablets by mouth 2 times daily       Vitamin D, Cholecalciferol, 1000 units TABS Take 2,000 Units by mouth daily          ALLERGIES:  No Known Allergies.    SOCIAL HISTORY: Lives in [place] with wife. Never smoker.    FAMILY HISTORY:   Father - prostate cancer    REVIEW OF SYMPTOMS:  A full 14-point review of systems was performed.     PHYSICAL EXAMINATION:    /69   Pulse 67   Wt 100.7 kg (222 lb)   BMI 30.11 kg/m     General: Alert, oriented, NAD  Skin: No rashes or lesions appreciated  HEENT: NCAT,  EOMI  Neck: Supple, full ROM  Breasts: Deferred  Heart: WWP  Lungs: Breathing comfortably on RA  Abd: Nondistended  /Rectal: Deferred  Ext: Atraumatic, grossly intact strength  Neuro: CNs grossly intact, normal gait with aid of cane    ECOG PS: 1    ASSESSMENT: 68 year old gentleman with metastatic presumed renal cell carcinoma with MRI evidence of progressive bony disease and extensive involvement of the left femoral neck concerning for impending pathologic fracture.    RECOMMENDATIONS:   We had a detailed discussion with Mr. Bianchi regarding the role of radiotherapy in the overall management of his metastatic renal cell carcinoma.  He currently has evidence of progressive bony disease, specifically he has worsening disease involving the left femoral neck.  We discussed that radiation is often used to treat this area with the purposes of pathologic fracture prophylaxis.  Given the location, as well as the size of this lesion, we agree that he is at risk for for pathologic fracture with further progression in this area. However, his disease in this area does not appear to be disrupting the cortex and is sclerotic in nature, therefore, we do not feel that orthopedic evaluation is necessary prior to radiotherapy treatment.  We therefore feel that it is reasonable to offer radiotherapy to this site.  However, treatment to this area is complicated by his adjacent prior treatment to the left femoral shaft which was performed in [place]. Given that the radiation center in [place] is more convenient for the patient and has performed his previous radiotherapy treatments, we recommend that he return there for consideration of palliative radiotherapy to the left femoral neck.  The patient expresses that this would be his preference as well and already has a consult arranged for 7/17.  We also briefly discussed that he also appears have a smaller lesion in the right femoral neck for which we would also recommend palliative  treatment for fracture prophylaxis.  We will defer the decision on whether to address the right hip at the same time to his radiation oncologist in [place].    Thank you for allowing us to participate in this patient's care.  Please feel free to call with any questions or concerns.    The patient was seen and discussed with staff, Dr. Rdz.     Neil Awan MD  Resident, PGY-5  Department of Radiation Oncology  [facility]  [phone]    I saw the patient with the resident.  I agree with the resident's note and plan of care.      DEAN Rdz M.D.  Department of Radiation Oncology  [facility]                 HPI  INITIAL PATIENT ASSESSMENT    Diagnosis: met renal cell carcinoma    Prior radiation therapy:   Site Treated: L 3, L femur  Facility: [facility]  Dates:   Dose:     Prior chemotherapy: None  Optivo last in may    Prior hormonal therapy:No    Pain Eval:  Current history of pain associated with this visit:   Intensity: 4/10  Current: sharp and constant  Location: Low back and L hip  Treatment: MSContin    Psychosocial  Living arrangements: wife  Fall Risk: ambulates with assistive device   referral needs: Not needed    Advanced Directive: Yes - Location: chart  Implantable Cardiac Device? No    Onset of menarche:   LMP: No LMP for male patient.  Onset of menopause:   Abnormal vaginal bleeding/discharge:   Are you pregnant?   Reproductive note: grown children    Nurse face-to-face time: Level 4:  15 min face to face time    Review of Systems   Constitutional: Positive for malaise/fatigue. Negative for chills, fever and weight loss.   HENT: Positive for congestion and sore throat. Negative for tinnitus.    Eyes: Negative for blurred vision.   Respiratory: Negative for cough, shortness of breath and wheezing.    Cardiovascular: Positive for leg swelling. Negative for chest pain.   Gastrointestinal: Negative for abdominal pain, constipation,  diarrhea, heartburn, nausea and vomiting.   Genitourinary: Negative for frequency and urgency.   Musculoskeletal: Positive for back pain. Negative for falls and neck pain.   Skin: Negative for itching and rash.   Neurological: Negative for dizziness and headaches.   Endo/Heme/Allergies: Positive for environmental allergies.   Psychiatric/Behavioral: Negative for depression. The patient is not nervous/anxious.                  Again, thank you for allowing me to participate in the care of your patient.      Sincerely,    Shamika Rdz MD

## 2019-07-17 ENCOUNTER — TRANSFERRED RECORDS (OUTPATIENT)
Dept: HEALTH INFORMATION MANAGEMENT | Facility: CLINIC | Age: 69
End: 2019-07-17

## 2019-07-19 ENCOUNTER — ONCOLOGY VISIT (OUTPATIENT)
Dept: ONCOLOGY | Facility: CLINIC | Age: 69
End: 2019-07-19
Attending: PHYSICIAN ASSISTANT
Payer: MEDICARE

## 2019-07-19 VITALS
RESPIRATION RATE: 16 BRPM | WEIGHT: 219.6 LBS | TEMPERATURE: 98.1 F | OXYGEN SATURATION: 96 % | HEART RATE: 73 BPM | BODY MASS INDEX: 29.74 KG/M2 | HEIGHT: 72 IN | SYSTOLIC BLOOD PRESSURE: 122 MMHG | DIASTOLIC BLOOD PRESSURE: 75 MMHG

## 2019-07-19 DIAGNOSIS — G89.3 CANCER ASSOCIATED PAIN: ICD-10-CM

## 2019-07-19 DIAGNOSIS — C79.51 BONE METASTASIS: Primary | ICD-10-CM

## 2019-07-19 DIAGNOSIS — C64.9 RENAL CELL CARCINOMA, UNSPECIFIED LATERALITY (H): ICD-10-CM

## 2019-07-19 LAB
ALBUMIN SERPL-MCNC: 3.2 G/DL (ref 3.4–5)
ALP SERPL-CCNC: 137 U/L (ref 40–150)
ALT SERPL W P-5'-P-CCNC: 16 U/L (ref 0–70)
ANION GAP SERPL CALCULATED.3IONS-SCNC: 4 MMOL/L (ref 3–14)
AST SERPL W P-5'-P-CCNC: 42 U/L (ref 0–45)
BASOPHILS # BLD AUTO: 0 10E9/L (ref 0–0.2)
BASOPHILS NFR BLD AUTO: 0.1 %
BILIRUB SERPL-MCNC: 0.5 MG/DL (ref 0.2–1.3)
BUN SERPL-MCNC: 10 MG/DL (ref 7–30)
CALCIUM SERPL-MCNC: 7.6 MG/DL (ref 8.5–10.1)
CHLORIDE SERPL-SCNC: 102 MMOL/L (ref 94–109)
CO2 SERPL-SCNC: 27 MMOL/L (ref 20–32)
CREAT SERPL-MCNC: 0.6 MG/DL (ref 0.66–1.25)
DIFFERENTIAL METHOD BLD: ABNORMAL
EOSINOPHIL # BLD AUTO: 0 10E9/L (ref 0–0.7)
EOSINOPHIL NFR BLD AUTO: 0.2 %
ERYTHROCYTE [DISTWIDTH] IN BLOOD BY AUTOMATED COUNT: 17.8 % (ref 10–15)
GFR SERPL CREATININE-BSD FRML MDRD: >90 ML/MIN/{1.73_M2}
GLUCOSE SERPL-MCNC: 104 MG/DL (ref 70–99)
HCT VFR BLD AUTO: 32 % (ref 40–53)
HGB BLD-MCNC: 9.5 G/DL (ref 13.3–17.7)
IMM GRANULOCYTES # BLD: 0.1 10E9/L (ref 0–0.4)
IMM GRANULOCYTES NFR BLD: 0.8 %
LYMPHOCYTES # BLD AUTO: 0.3 10E9/L (ref 0.8–5.3)
LYMPHOCYTES NFR BLD AUTO: 3.8 %
MAGNESIUM SERPL-MCNC: 2.1 MG/DL (ref 1.6–2.3)
MCH RBC QN AUTO: 29.2 PG (ref 26.5–33)
MCHC RBC AUTO-ENTMCNC: 29.7 G/DL (ref 31.5–36.5)
MCV RBC AUTO: 99 FL (ref 78–100)
MONOCYTES # BLD AUTO: 2.5 10E9/L (ref 0–1.3)
MONOCYTES NFR BLD AUTO: 29.3 %
NEUTROPHILS # BLD AUTO: 5.6 10E9/L (ref 1.6–8.3)
NEUTROPHILS NFR BLD AUTO: 65.8 %
NRBC # BLD AUTO: 0 10*3/UL
NRBC BLD AUTO-RTO: 0 /100
PLATELET # BLD AUTO: 221 10E9/L (ref 150–450)
PLATELET # BLD EST: ABNORMAL 10*3/UL
POTASSIUM SERPL-SCNC: 4.4 MMOL/L (ref 3.4–5.3)
PROT SERPL-MCNC: 6.6 G/DL (ref 6.8–8.8)
RBC # BLD AUTO: 3.25 10E12/L (ref 4.4–5.9)
RBC MORPH BLD: ABNORMAL
SODIUM SERPL-SCNC: 133 MMOL/L (ref 133–144)
TSH SERPL DL<=0.005 MIU/L-ACNC: 0.78 MU/L (ref 0.4–4)
WBC # BLD AUTO: 8.4 10E9/L (ref 4–11)

## 2019-07-19 PROCEDURE — 96372 THER/PROPH/DIAG INJ SC/IM: CPT

## 2019-07-19 PROCEDURE — G0463 HOSPITAL OUTPT CLINIC VISIT: HCPCS | Mod: ZF

## 2019-07-19 PROCEDURE — 80053 COMPREHEN METABOLIC PANEL: CPT | Performed by: INTERNAL MEDICINE

## 2019-07-19 PROCEDURE — 85025 COMPLETE CBC W/AUTO DIFF WBC: CPT | Performed by: INTERNAL MEDICINE

## 2019-07-19 PROCEDURE — 83735 ASSAY OF MAGNESIUM: CPT | Performed by: INTERNAL MEDICINE

## 2019-07-19 PROCEDURE — 84443 ASSAY THYROID STIM HORMONE: CPT | Performed by: INTERNAL MEDICINE

## 2019-07-19 PROCEDURE — 99215 OFFICE O/P EST HI 40 MIN: CPT | Mod: ZP | Performed by: PHYSICIAN ASSISTANT

## 2019-07-19 PROCEDURE — 25000128 H RX IP 250 OP 636: Mod: ZF | Performed by: PHYSICIAN ASSISTANT

## 2019-07-19 RX ORDER — MORPHINE SULFATE 30 MG/1
30 TABLET, FILM COATED, EXTENDED RELEASE ORAL EVERY 8 HOURS
Qty: 90 TABLET | Refills: 0 | Status: SHIPPED | OUTPATIENT
Start: 2019-07-19 | End: 2019-07-30

## 2019-07-19 RX ORDER — MORPHINE SULFATE 15 MG/1
15 TABLET, FILM COATED, EXTENDED RELEASE ORAL EVERY 8 HOURS
Qty: 90 TABLET | Refills: 0 | Status: SHIPPED | OUTPATIENT
Start: 2019-07-19 | End: 2019-08-13

## 2019-07-19 RX ADMIN — DENOSUMAB 120 MG: 120 INJECTION SUBCUTANEOUS at 14:52

## 2019-07-19 ASSESSMENT — MIFFLIN-ST. JEOR: SCORE: 1794.1

## 2019-07-19 ASSESSMENT — PAIN SCALES - GENERAL: PAINLEVEL: SEVERE PAIN (7)

## 2019-07-19 NOTE — NURSING NOTE
Oncology Rooming Note    July 19, 2019 1:02 PM   Javon Bianchi is a 68 year old male who presents for:    Chief Complaint   Patient presents with     Oncology Clinic Visit     Return: Renal CA     Initial Vitals: /75 (BP Location: Right arm, Patient Position: Sitting, Cuff Size: Adult Regular)   Pulse 73   Temp 98.1  F (36.7  C) (Oral)   Resp 16   Ht 1.829 m (6')   Wt 99.6 kg (219 lb 9.6 oz)   SpO2 96%   BMI 29.78 kg/m   Estimated body mass index is 29.78 kg/m  as calculated from the following:    Height as of this encounter: 1.829 m (6').    Weight as of this encounter: 99.6 kg (219 lb 9.6 oz). Body surface area is 2.25 meters squared.  Severe Pain (7) Comment: Data Unavailable   No LMP for male patient.  Allergies reviewed: Yes  Medications reviewed: Yes    Medications: MEDICATION REFILLS NEEDED TODAY. Provider was notified.  Pharmacy name entered into [facility]:    [facility] - [place], [place] - [address] [facility] - [place], [place] - [address] [facility]    Clinical concerns: Pt requesting refill on the Morphine.  Ruthie RUDD was notified.      Disha Laurent CMA

## 2019-07-22 ENCOUNTER — TELEPHONE (OUTPATIENT)
Dept: ONCOLOGY | Facility: CLINIC | Age: 69
End: 2019-07-22

## 2019-07-22 NOTE — TELEPHONE ENCOUNTER
PA Initiation    Medication: Afinitor- PENDING   Insurance Company: [employer] - Phone [phone] Fax [phone]  Pharmacy Filling the Rx: [facility] - [place], [place] - [facility] [address]  Filling Pharmacy Phone:    Filling Pharmacy Fax:    Start Date: 7/22/2019

## 2019-07-22 NOTE — PROGRESS NOTES
[facility]  HEMATOLOGY AND ONCOLOGY    FOLLOW-UP VISIT NOTE    PATIENT NAME: Cirilo Bianchi MRN #[id number]  DATE OF VISIT: Jun 26, 2019 YOB: 1950        CANCER TYPE: Metastatic Carcinoma with bone metastases, likely renal origin (no primary renal mass)  STAGE: IV    TREATMENT:  Cabozantinib: Started 60 mg daily on September 28, 2018.  Dec 10, 2018: dose reduced cabozantinib to 40 mg a day.  Jan 17, 2019: dose reduced cabozantinib to 29 mg a day.  Feb 11, 2019: C1D1 Nivolumab   3/12/19: c2 Nivolumab  5/21/19: C3 Nivolumab.     TREATMENT SUMMARY:  Javon Bianchi is a 68 year old male with a history significant for hyperlipidemia who had been in his usual health until he developed back pain in May 2018. At that time, the pain was a stabbing like in the middle of lower back with burning like pain wrapping around this left hip to knee area. No trauma. He had MRI on 5/23/18 showed mild degenerative change with bulging disks L2-S1. A small benign hemangioma was noted in L2, no other marrow signal abnormality. Since then, the pain failed to improve despite steroid injections. In addition, he actually lost 25 lbs weight unintentionally since June along with chills few time a day. Overtime, the pain got worse to the point he could not ambulate after short distance and developed muscle weakness in lower extremities over time requiring a walker. Finally, he went to ER ( in [place]) on 8/23/18, CT A/P was unremarkable. He was referred to neurology with obtaining MRI L spine. MRI on 8/30/18 revealed a pathological fracture at L3 vertebral body with height loss at 40% and diffuse involvement of a neoplastic process. IR guided biopsy on 8/31/18 showed poorly differentiated carcinoma. Its IHC was suggestive of possible renal cell carcinoma per pathology report (Renal cell immunochemistry is positive and along with the negative staining for CK7 and CK20 suggests the possibility of a renal  carcinoma, however most renal cell carcinomas also express PAX8 which is negative in this case). Of note, CT chest/abdomen/pelvis at OSH was negative for primary lesion. The patient was evaluated by neurosurgery who recommended no surgical intervention. He was discharged with TLSO brace and walker. He had palliative XRT locally from 9/11 for 10 fractions (Done in [place]).      He was started on cabozantinib 60 mg initially, requiring dose reduction to 20 mg for issues with hand foot syndrome and poor tolerability.  Switched to Nivolumab due to progression in bone disease on 2/6/19.     CURRENT INTERVENTIONS: Nivolumab C3 on 5/21/19 has been on a treatment break since.       SUBJECTIVE   Mr. Bianchi presents today for follow-up prior to planned cycle 4 of nivolumab with restaging CT chest abdomen pelvis and MRI lumbar spine and pelvis.    His main symptoms include myalgias and arthralgias.  Prior to third cycle he had myalgias responding to Prednisone.  Since May he has had significant arthralgias that responded to high dose Prednisone, but returned with taper and continue at current dose of 10 mg daily. He dates his joint pains back to April, but more marked immediately prior to his May 12 ED visit and admission.  He has not noted joint swelling even when the pain level was 8-9.  Pain has been most prominent in the large joints, particularly hips and shoulders, but also affecting knees, ankles and wrists.  Fingers and toes minimally affected. He had complete resolution of joint pain on 80 mg Prednisone.  With each incremental taper by 10 mg he had gradual return of more discomfort.He now is on 10 mg Prednisone daily.  His most prominent pain now is in R shoulder> L, and less so hips.  Minimal pain elsewhere.  He was evaluated by rheumatology who felt his symptoms are very likely immunotherapy related.    Omega has been able to taper his prednisone and now is down to 5 mg daily and doing well. His right low back has  been been hurting and he is going to start XRT locally to his lumbar spine and hips again, less radiation this round through.   He is able to ambulate with a cane.  He has not had any falls.  His appetite is decent.  He uses JUVENTINO stockings for his left lower extremity below knee as well as Ace wraps of the knee.  He is on MS Contin 45 mg 3 times a day for pain control as well as, as needed morphine intermittent release 15 mg every 3 hours as needed.  His pain levels are about 6-8/10 and he admits he really isn't taking the fast acting morphine despite his family encouraging him.     He denies any chest pain, shortness of breath, fever, chills, abdominal pain, nausea, vomiting, diarrhea. he has persistent cough with scant phlegm production.  He has no headache, blurred vision, no new focal neurological deficits other than as reported above.      CURRENT OUTPATIENT MEDICATIONS     Current Outpatient Medications   Medication     acetaminophen (TYLENOL) 500 MG tablet     calcium carbonate 500 mg, elemental, (OSCAL 500) 1250 (500 Ca) MG TABS tablet     DULoxetine (CYMBALTA) 60 MG capsule     fluticasone (FLONASE) 50 MCG/ACT nasal spray     furosemide (LASIX) 20 MG tablet     lidocaine (LIDODERM) 5 % patch     medical cannabis (Patient's own supply.  Not a prescription)     melatonin 3 MG tablet     morphine (MS CONTIN) 15 MG CR tablet     morphine (MS CONTIN) 30 MG CR tablet     morphine (MSIR) 15 MG IR tablet     polyethylene glycol (MIRALAX/GLYCOLAX) Packet     predniSONE (DELTASONE) 10 MG tablet     pregabalin (LYRICA) 50 MG capsule     senna-docusate (SENOKOT-S;PERICOLACE) 8.6-50 MG per tablet     Vitamin D, Cholecalciferol, 1000 units TABS     Dextromethorphan-guaiFENesin (MUCINEX DM)  MG 12 hr tablet     diazepam (VALIUM) 5 MG tablet     pantoprazole (PROTONIX) 40 MG EC tablet     predniSONE (DELTASONE) 10 MG tablet     No current facility-administered medications for this visit.             PHYSICAL EXAM   BP  122/75 (BP Location: Right arm, Patient Position: Sitting, Cuff Size: Adult Regular)   Pulse 73   Temp 98.1  F (36.7  C) (Oral)   Resp 16   Ht 1.829 m (6')   Wt 99.6 kg (219 lb 9.6 oz)   SpO2 96%   BMI 29.78 kg/m    GEN: NAD  HEENT: PERRL, EOMI, no icterus, injection or pallor. Oropharynx is clear.  LYMPHATICs: no cervical or supraclavicular lymphadenopathy; no other abn lymphadenopathy  PULMONARY: clear with good air entry bilaterally  CARDIOVASCULAR: regular, no murmurs, rubs, or gallops  GASTROINTESTINAL: soft, non-tender, non-distended, normal bowel sounds, no hepatosplenomegaly by percussion or palpation  MUSCULOSKELTAL: warm, well perfused, LLE diffuse swelling.  NEURO: awake, alert and oriented to time place and person, cranial nerves intact - II - XII, LLE 4/5 strength to hip flexion.   SKIN: no rashes     LABORATORY AND IMAGING STUDIES      7/19/2019 14:06   Sodium 133   Potassium 4.4   Chloride 102   Carbon Dioxide 27   Urea Nitrogen 10   Creatinine 0.60 (L)   GFR Estimate >90   GFR Estimate If Black >90   Calcium 7.6 (L)   Anion Gap 4   Magnesium 2.1   Albumin 3.2 (L)   Protein Total 6.6 (L)   Bilirubin Total 0.5   Alkaline Phosphatase 137   ALT 16   AST 42   TSH 0.78   Glucose 104 (H)      6/26/2019 15:08 7/19/2019 14:06   WBC 10.0 8.4   Hemoglobin 8.5 (L) 9.5 (L)   Hematocrit 28.6 (L) 32.0 (L)   Platelet Count 234 221   RBC Count 2.94 (L) 3.25 (L)   MCV 97 99     BONE SCAN IMPRESSION:   1. New focal radiotracer uptake in the L1 vertebral body, consistent  with osseous metastatic disease.  2. Stable diffuse osseous metastatic disease in the pelvis, femoral  heads, and left femur  3. Radiotracer in the anterior right sixth rib correlates to healing  fracture.      ASSESSMENT AND PLAN     68-year-old male with Stage IV renal cell carcinoma on second line nivolumab.   Left femoral neck metastatic disease with increased risk for pathological fracture.  L3 vertebral compression fracture.  Severe  arthralgia secondary to checkpoint inhibitor therapy, on prednisone taper.  History of fever and SIRS, better with steroids.    We reviewed restaging actual images of CT chest abdomen pelvis, MRI lumbar spine, MRI pelvis today.  CT chest shows decreased size of tiny pulmonary nodules as well as sclerotic changes in his bony metastasis suggestive of response to therapy.  However his MRI of lumbar spine and pelvis shows increased size of epidural metastatic deposits suggestive of progressive disease. A bone scan was ordered but appears stable and contradicts the MRI.  We discussed you cannot misread worsening disease and likely the MRI is a better indicator of disease.  His arthralgias are improved with holding the immunotherapy and he is able to taper off the prednisone, however I think its too challenging to restart the immunotherapy, given the MRI shows progressive disease as well.      We discussed starting a TKI or TKI/mTor combination.  I will discuss with Dr Anne and work with oral pharmacy on lenvitra + everolimus or Votrient.     - continue prednisone taper    -Dr. Brower from [facility] to evaluate and treat L3 compression fracture    -starts radiation next week for L spine and bilateral hips    -xgeva today, labs stable    Radha St PA-C

## 2019-07-22 NOTE — TELEPHONE ENCOUNTER
Prior Authorization Approval    Authorization Effective Date: 4/23/2019  Authorization Expiration Date: 7/21/2022  Medication: Afinitor- Approved  Approved Dose/Quantity: 5mg  Reference #: [id number]   Insurance Company: [employer] - Phone [phone] Fax [phone]  Expected CoPay: $819.80     CoPay Card Available: No    Foundation Assistance Needed: Pt had PAN that is out of money can apply for more money.  TAF, PAF and Cancercare also open   Which Pharmacy is filling the prescription (Not needed for infusion/clinic administered): [facility] - [place], [place] - [address]  Pharmacy Notified: No  Patient Notified: No

## 2019-07-22 NOTE — TELEPHONE ENCOUNTER
PA Initiation    Medication: Lenvima- Pending  Insurance Company: [employer] RX - Phone [phone] Fax [phone]  Pharmacy Filling the Rx: [facility] - [place], [place] - [facility] [address]  Filling Pharmacy Phone:    Filling Pharmacy Fax:    Start Date: 7/22/2019

## 2019-07-22 NOTE — TELEPHONE ENCOUNTER
Prior Authorization Approval    Authorization Effective Date: 4/23/2019  Authorization Expiration Date: 7/21/2022  Medication: Lenvima- Approved  Approved Dose/Quantity: 18mg  Reference #: [id number]   Insurance Company: [employer] RX - Phone [phone] Fax [phone]  Expected CoPay: $950.24     CoPay Card Available: No    Foundation Assistance Needed: Pt had PAN that is out of money can apply for more money.  TAF, PAF and Cancercare also open  Which Pharmacy is filling the prescription (Not needed for infusion/clinic administered): [facility] - [place], [place] - [address]  Pharmacy Notified: No  Patient Notified: No

## 2019-07-23 ENCOUNTER — TELEPHONE (OUTPATIENT)
Dept: ONCOLOGY | Facility: CLINIC | Age: 69
End: 2019-07-23

## 2019-07-23 DIAGNOSIS — C64.9 RENAL CELL CARCINOMA, UNSPECIFIED LATERALITY (H): Primary | ICD-10-CM

## 2019-07-23 RX ORDER — EVEROLIMUS 5 MG/1
5 TABLET ORAL DAILY
Qty: 30 TABLET | Refills: 0 | Status: SHIPPED | OUTPATIENT
Start: 2019-07-23 | End: 2019-01-01 | Stop reason: DRUGHIGH

## 2019-07-23 RX ORDER — PROCHLORPERAZINE MALEATE 5 MG
5 TABLET ORAL EVERY 6 HOURS PRN
Qty: 30 TABLET | Refills: 3 | Status: SHIPPED | OUTPATIENT
Start: 2019-07-23 | End: 2019-08-07 | Stop reason: ALTCHOICE

## 2019-07-23 RX ORDER — ONDANSETRON 8 MG/1
8 TABLET, FILM COATED ORAL EVERY 8 HOURS PRN
Qty: 28 TABLET | Refills: 5 | Status: SHIPPED | OUTPATIENT
Start: 2019-07-23 | End: 2019-07-29

## 2019-07-23 RX ORDER — EVEROLIMUS 5 MG/1
5 TABLET ORAL DAILY
Qty: 28 TABLET | Refills: 0 | Status: SHIPPED | OUTPATIENT
Start: 2019-07-23 | End: 2019-07-23

## 2019-07-23 NOTE — ORAL ONC MGMT
Oral Chemotherapy Monitoring Program    Primary Oncologist: Dr. Anne  Primary Oncology Clinic: [facility]  Cancer Diagnosis: renal cell carcinoma    Drug: Lenvima 18mg daily and Afinitor 5mg daily  Start Date: TBD - based on completion of radiation  Dose is appropriate for patients: Renal Function (CrCl = 140ml/min) and Hepatic Function (LFTs within normal limits)  Expected duration of therapy: Until disease progression or unacceptable toxicity    Drug Interaction Assessment: There were no significant drug interactions identified upon review of medication list with chemotherapy agents.     Drugs checked 7/22/2019: Acetaminophen -Calcium Carbonate -Dextromethorphan -GuaiFENesin -DiazePAM -DULoxetine -Fluticasone (Nasal) -Furosemide -Lidoderm -Cannabis -Melatonin -MS Contin -Pantoprazole -Polyethylene Glycol 3350 -PredniSONE -Pregabalin -Senna -Docusate -Cholecalciferol -Lenvatinib -Everolimus    Lab Monitoring Plan  CMP - [id number] x 4 then [id number]  CBC/TSH/Magnesium - [id number]  Labs drawn outside of [place]: Orders faxed to [facility] (phone: [phone]; fax: [phone])    Subjective/Objective:  Javon Bianchi is a 68 year old male contacted by phone for an initial visit for oral chemotherapy education. The plan is for Omega to start on lenvima/afinitor for his kidney cancer once he completes palliative radiation.     ORAL CHEMOTHERAPY 11/30/2018 12/3/2018 12/4/2018 12/9/2018 12/26/2018 1/7/2019 7/23/2019   Drug Name Cabometyx (cabozantinib) Cabometyx (cabozantinib) Cabometyx (cabozantinib) Cabometyx (cabozantinib) Cabometyx (cabozantinib) Cabometyx (cabozantinib) Lenvatinib/Everolimus   Current Dosage 60mg 60mg 60mg 60mg 40mg 40mg 18mg/5mg   Current Schedule Daily Daily Daily Daily Daily Daily Daily   Cycle Details Drug on Hold Continuous Drug on Hold Drug on Hold Drug on Hold Continuous Continuous   Start Date of Last Cycle - 12/1/2018 - 12/10/2018 12/12/2018 12/26/2018 -    Planned next cycle start date - - - - - 1/19/2019 -   Doses missed in last 2 weeks - 0 - - - - -   Adherence Assessment - Adherent - - Non-adherent Non-adherent -   Reason for Non-adherence - - - - Drug on hold;Wanted to avoid side-effects Drug on hold;Wanted to avoid side-effects -   Adherence Intervention Recommended - - - - - None -   Adverse Effects - Palmar-plantar erythrodysethesia syndrome;Nausea - - - Palmar-plantar erythrodysethesia syndrome -   Nausea - Grade 2 - - - - -   Pharmacist Intervention(nausea) - Yes - - - - -   Intervention(s) - Referral to oncology provider - - - - -   Palmar-plantar Erythrodysethesia syndrome[hand-foot syndrome] - Grade 2 - - - Grade 1 -   Pharmacist Intervention(Palmar-plantar) - Yes - - - No -   Intervention(s) - Referral to oncology provider - - - - -   Oral Mucositis - - - - - - -   Pharmacist Intervention(mucositis) - - - - - - -   Home BPs - - - - all BPs<140/90 all BPs<140/90 -   Any new drug interactions? - - - - - No -   Is the dose as ordered appropriate for the patient? - - - - - Yes -   Is the patient currently in pain? - - - - - - -   Has the patient been assessed within the past 6 months for depression? - - - - - - -   Has the patient missed any days of school, work, or other routine activity? - - - - - - -       Last PHQ-2 Score on record:   PHQ-2 ( 1999 [employer]) 6/6/2019 11/15/2018   Q1: Little interest or pleasure in doing things 0 2   Q2: Feeling down, depressed or hopeless 0 3   PHQ-2 Score 0 5   Q1: Little interest or pleasure in doing things - More than half the days   Q2: Feeling down, depressed or hopeless - Nearly every day   PHQ-2 Score - 5       Vitals:  BP:   BP Readings from Last 1 Encounters:   07/19/19 122/75     Wt Readings from Last 1 Encounters:   07/19/19 99.6 kg (219 lb 9.6 oz)     Estimated body surface area is 2.25 meters squared as calculated from the following:    Height as of 7/19/19: 1.829 m (6').    Weight as of 7/19/19: 99.6 kg (219  lb 9.6 oz).      Labs:  _  Result Component Current Result Ref Range   Sodium 133 (7/19/2019) 133 - 144 mmol/L     _  Result Component Current Result Ref Range   Potassium 4.4 (7/19/2019) 3.4 - 5.3 mmol/L     _  Result Component Current Result Ref Range   Calcium 7.6 (L) (7/19/2019) 8.5 - 10.1 mg/dL     _  Result Component Current Result Ref Range   Magnesium 2.1 (7/19/2019) 1.6 - 2.3 mg/dL     No results found for Phos within last 30 days.     _  Result Component Current Result Ref Range   Albumin 3.2 (L) (7/19/2019) 3.4 - 5.0 g/dL          _  Result Component Current Result Ref Range   Urea Nitrogen 10 (7/19/2019) 7 - 30 mg/dL     _  Result Component Current Result Ref Range   Creatinine 0.60 (L) (7/19/2019) 0.66 - 1.25 mg/dL            _  Result Component Current Result Ref Range   AST 42 (7/19/2019) 0 - 45 U/L     _  Result Component Current Result Ref Range   ALT 16 (7/19/2019) 0 - 70 U/L     _  Result Component Current Result Ref Range   Bilirubin Total 0.5 (7/19/2019) 0.2 - 1.3 mg/dL       _  Result Component Current Result Ref Range   WBC 8.4 (7/19/2019) 4.0 - 11.0 10e9/L     _  Result Component Current Result Ref Range   Hemoglobin 9.5 (L) (7/19/2019) 13.3 - 17.7 g/dL     _  Result Component Current Result Ref Range   Platelet Count 221 (7/19/2019) 150 - 450 10e9/L     _  Result Component Current Result Ref Range   Absolute Neutrophil 5.6 (7/19/2019) 1.6 - 8.3 10e9/L       Assessment:  Patient is appropriate to start therapy.    Plan:  Basic chemotherapy teaching was reviewed with the patient including indication, start date of therapy, dose, administration, adverse effects, missed doses, food and drug interactions, monitoring, side effect management, office contact information, and safe handling. Written materials were provided and all questions answered.    Follow-Up:  Will review chart next week to determine finalized radiation plan then will plan 1 week follow up call after the start of therapy     Dimitri  Yasmin, PharmD, MS  Hematology/Oncology Clinical Pharmacist  [facility]  [facility]  [phone]

## 2019-07-29 ENCOUNTER — CARE COORDINATION (OUTPATIENT)
Dept: ONCOLOGY | Facility: CLINIC | Age: 69
End: 2019-07-29

## 2019-07-29 DIAGNOSIS — G89.3 CANCER ASSOCIATED PAIN: ICD-10-CM

## 2019-07-29 DIAGNOSIS — R09.82 POST-NASAL DRIP: ICD-10-CM

## 2019-07-29 DIAGNOSIS — C64.9 RENAL CELL CARCINOMA, UNSPECIFIED LATERALITY (H): ICD-10-CM

## 2019-07-29 RX ORDER — ONDANSETRON 8 MG/1
8 TABLET, FILM COATED ORAL EVERY 8 HOURS PRN
Qty: 28 TABLET | Refills: 5 | Status: SHIPPED | OUTPATIENT
Start: 2019-07-29 | End: 2019-09-13

## 2019-07-29 NOTE — TELEPHONE ENCOUNTER
Patients wife called to report that Omega required a refill ofMs Contin 15mgs  and  MS Contin 30mgs.  Omega takes 15mgs of MS IR q8 and does not  want to take additional amounts as he feels sedated.  Suzie states that his pain does not appear adequately controlled and he does not sleep well, she states his poor sleep is compounded by a persistent cough that he is seeing his PCP about today.  I will discuss Omega's symptoms with Dr Mills     Requested medications MS Contin 15mgs   Pill count 10  PRN use per 24hrs 0  Days till out  4  What pain is the medication treating: bACK     How is the medication being taken: 45mgs q8     Is pain being adequately controlled on the current regimen: Yes     Experiencing any side effects from medication: No  Last refill: 07/03  Last office visit: 07/03  Scheduled for follow up 08/13     Patient would like script  eprescribed to [facility]  Report reviewed.

## 2019-07-29 NOTE — PROGRESS NOTES
Patients wife called to report that Omega had a temperature of 99.4F - he was seen by his PCP who has prescribed a course of Amoxacillin. He has been complaining of a cough for a couple of weeks.

## 2019-07-30 RX ORDER — FLUTICASONE PROPIONATE 50 MCG
1 SPRAY, SUSPENSION (ML) NASAL DAILY
Qty: 16 G | Refills: 2 | Status: SHIPPED | OUTPATIENT
Start: 2019-07-30 | End: 2019-08-13

## 2019-07-30 RX ORDER — MORPHINE SULFATE 15 MG/1
TABLET, FILM COATED, EXTENDED RELEASE ORAL
Qty: 90 TABLET | Refills: 0 | Status: SHIPPED | OUTPATIENT
Start: 2019-07-30 | End: 2019-08-13

## 2019-07-30 RX ORDER — HYDROMORPHONE HYDROCHLORIDE 2 MG/1
2-4 TABLET ORAL
Qty: 60 TABLET | Refills: 0 | Status: SHIPPED | OUTPATIENT
Start: 2019-07-30 | End: 2019-01-01

## 2019-07-30 RX ORDER — MORPHINE SULFATE 30 MG/1
30 TABLET, FILM COATED, EXTENDED RELEASE ORAL EVERY 8 HOURS
Qty: 90 TABLET | Refills: 0 | Status: SHIPPED | OUTPATIENT
Start: 2019-07-30 | End: 2019-08-29

## 2019-07-30 NOTE — TELEPHONE ENCOUNTER
Is his back pain the same as it's been?  Any associated warning symptoms?     It sounds like he is due for a rotation -    Carly

## 2019-08-01 DIAGNOSIS — C64.9 RENAL CELL CARCINOMA, UNSPECIFIED LATERALITY (H): Primary | ICD-10-CM

## 2019-08-07 ENCOUNTER — APPOINTMENT (OUTPATIENT)
Dept: LAB | Facility: CLINIC | Age: 69
End: 2019-08-07
Attending: INTERNAL MEDICINE
Payer: MEDICARE

## 2019-08-07 ENCOUNTER — ONCOLOGY VISIT (OUTPATIENT)
Dept: ONCOLOGY | Facility: CLINIC | Age: 69
End: 2019-08-07
Attending: INTERNAL MEDICINE
Payer: MEDICARE

## 2019-08-07 VITALS
OXYGEN SATURATION: 94 % | SYSTOLIC BLOOD PRESSURE: 114 MMHG | TEMPERATURE: 99.6 F | BODY MASS INDEX: 28.83 KG/M2 | RESPIRATION RATE: 16 BRPM | WEIGHT: 212.6 LBS | HEART RATE: 65 BPM | DIASTOLIC BLOOD PRESSURE: 64 MMHG

## 2019-08-07 DIAGNOSIS — C64.9 RENAL CELL CARCINOMA, UNSPECIFIED LATERALITY (H): ICD-10-CM

## 2019-08-07 DIAGNOSIS — J32.0 CHRONIC MAXILLARY SINUSITIS: ICD-10-CM

## 2019-08-07 DIAGNOSIS — R10.13 ABDOMINAL PAIN, EPIGASTRIC: ICD-10-CM

## 2019-08-07 DIAGNOSIS — C79.51 BONE METASTASIS: Primary | ICD-10-CM

## 2019-08-07 LAB
ALBUMIN SERPL-MCNC: 2.8 G/DL (ref 3.4–5)
ALBUMIN UR-MCNC: NEGATIVE MG/DL
ALP SERPL-CCNC: 115 U/L (ref 40–150)
ALT SERPL W P-5'-P-CCNC: 16 U/L (ref 0–70)
AMYLASE SERPL-CCNC: 27 U/L (ref 30–110)
ANION GAP SERPL CALCULATED.3IONS-SCNC: 4 MMOL/L (ref 3–14)
ANISOCYTOSIS BLD QL SMEAR: SLIGHT
AST SERPL W P-5'-P-CCNC: 23 U/L (ref 0–45)
BASOPHILS # BLD AUTO: 0 10E9/L (ref 0–0.2)
BASOPHILS NFR BLD AUTO: 0 %
BILIRUB SERPL-MCNC: 0.4 MG/DL (ref 0.2–1.3)
BUN SERPL-MCNC: 7 MG/DL (ref 7–30)
CALCIUM SERPL-MCNC: 8 MG/DL (ref 8.5–10.1)
CHLORIDE SERPL-SCNC: 106 MMOL/L (ref 94–109)
CO2 SERPL-SCNC: 28 MMOL/L (ref 20–32)
CREAT SERPL-MCNC: 0.64 MG/DL (ref 0.66–1.25)
DIFFERENTIAL METHOD BLD: ABNORMAL
EOSINOPHIL # BLD AUTO: 0 10E9/L (ref 0–0.7)
EOSINOPHIL NFR BLD AUTO: 0 %
ERYTHROCYTE [DISTWIDTH] IN BLOOD BY AUTOMATED COUNT: 17.7 % (ref 10–15)
GFR SERPL CREATININE-BSD FRML MDRD: >90 ML/MIN/{1.73_M2}
GLUCOSE SERPL-MCNC: 104 MG/DL (ref 70–99)
HCT VFR BLD AUTO: 32.5 % (ref 40–53)
HGB BLD-MCNC: 9.7 G/DL (ref 13.3–17.7)
LIPASE SERPL-CCNC: 85 U/L (ref 73–393)
LYMPHOCYTES # BLD AUTO: 0.2 10E9/L (ref 0.8–5.3)
LYMPHOCYTES NFR BLD AUTO: 2.6 %
MAGNESIUM SERPL-MCNC: 1.9 MG/DL (ref 1.6–2.3)
MCH RBC QN AUTO: 29.1 PG (ref 26.5–33)
MCHC RBC AUTO-ENTMCNC: 29.8 G/DL (ref 31.5–36.5)
MCV RBC AUTO: 98 FL (ref 78–100)
MONOCYTES # BLD AUTO: 2.5 10E9/L (ref 0–1.3)
MONOCYTES NFR BLD AUTO: 32.2 %
NEUTROPHILS # BLD AUTO: 5.2 10E9/L (ref 1.6–8.3)
NEUTROPHILS NFR BLD AUTO: 65.2 %
NRBC # BLD AUTO: 0.1 10*3/UL
NRBC BLD AUTO-RTO: 1 /100
PLATELET # BLD AUTO: 252 10E9/L (ref 150–450)
PLATELET # BLD EST: ABNORMAL 10*3/UL
POLYCHROMASIA BLD QL SMEAR: SLIGHT
POTASSIUM SERPL-SCNC: 3.5 MMOL/L (ref 3.4–5.3)
PROT SERPL-MCNC: 6.2 G/DL (ref 6.8–8.8)
RBC # BLD AUTO: 3.33 10E12/L (ref 4.4–5.9)
SODIUM SERPL-SCNC: 138 MMOL/L (ref 133–144)
TSH SERPL DL<=0.005 MIU/L-ACNC: 1.53 MU/L (ref 0.4–4)
WBC # BLD AUTO: 7.9 10E9/L (ref 4–11)

## 2019-08-07 PROCEDURE — 99214 OFFICE O/P EST MOD 30 MIN: CPT | Mod: ZP | Performed by: INTERNAL MEDICINE

## 2019-08-07 PROCEDURE — 81003 URINALYSIS AUTO W/O SCOPE: CPT | Performed by: INTERNAL MEDICINE

## 2019-08-07 PROCEDURE — 83690 ASSAY OF LIPASE: CPT | Performed by: INTERNAL MEDICINE

## 2019-08-07 PROCEDURE — 83735 ASSAY OF MAGNESIUM: CPT | Performed by: INTERNAL MEDICINE

## 2019-08-07 PROCEDURE — 80053 COMPREHEN METABOLIC PANEL: CPT | Performed by: INTERNAL MEDICINE

## 2019-08-07 PROCEDURE — G0463 HOSPITAL OUTPT CLINIC VISIT: HCPCS | Mod: ZF

## 2019-08-07 PROCEDURE — 36415 COLL VENOUS BLD VENIPUNCTURE: CPT

## 2019-08-07 PROCEDURE — 82150 ASSAY OF AMYLASE: CPT | Performed by: INTERNAL MEDICINE

## 2019-08-07 PROCEDURE — 85025 COMPLETE CBC W/AUTO DIFF WBC: CPT | Performed by: INTERNAL MEDICINE

## 2019-08-07 PROCEDURE — 84443 ASSAY THYROID STIM HORMONE: CPT | Performed by: INTERNAL MEDICINE

## 2019-08-07 RX ORDER — AMOXICILLIN 875 MG
TABLET ORAL
COMMUNITY
Start: 2019-07-29 | End: 2019-08-07

## 2019-08-07 RX ORDER — METOCLOPRAMIDE 5 MG/1
5 TABLET ORAL
Qty: 150 TABLET | Refills: 0 | Status: SHIPPED | OUTPATIENT
Start: 2019-08-07 | End: 2019-10-01

## 2019-08-07 RX ORDER — FLUTICASONE PROPIONATE 50 MCG
1 SPRAY, SUSPENSION (ML) NASAL DAILY
Qty: 16 G | Refills: 11 | Status: SHIPPED | OUTPATIENT
Start: 2019-08-07 | End: 2019-08-13

## 2019-08-07 RX ORDER — OMEPRAZOLE 40 MG/1
40 CAPSULE, DELAYED RELEASE ORAL DAILY
Qty: 90 CAPSULE | Refills: 3 | Status: SHIPPED | OUTPATIENT
Start: 2019-08-07 | End: 2019-01-01

## 2019-08-07 RX ORDER — LORATADINE 10 MG/1
10 TABLET ORAL
COMMUNITY

## 2019-08-07 ASSESSMENT — PAIN SCALES - GENERAL: PAINLEVEL: SEVERE PAIN (7)

## 2019-08-07 NOTE — NURSING NOTE
Chief Complaint   Patient presents with     Blood Draw     labs drawn with vpt by rn.  vs taken     Labs drawn with vpt by rn (urine collected and sent as well).  Pt tolerated well.  VS taken.  Pt checked in for next appt.    Tigist Bender RN

## 2019-08-07 NOTE — NURSING NOTE
Oncology Rooming Note    August 7, 2019 4:21 PM   Javon Bianchi is a 69 year old male who presents for:    Chief Complaint   Patient presents with     Blood Draw     labs drawn with vpt by rn.  vs taken     Oncology Clinic Visit     Return Renal Ca     Initial Vitals: /64 (BP Location: Right arm, Patient Position: Standing, Cuff Size: Adult Regular)   Pulse 65   Temp 99.6  F (37.6  C) (Oral)   Resp 16   Wt 96.4 kg (212 lb 9.6 oz)   SpO2 94%   BMI 28.83 kg/m   Estimated body mass index is 28.83 kg/m  as calculated from the following:    Height as of 7/19/19: 1.829 m (6').    Weight as of this encounter: 96.4 kg (212 lb 9.6 oz). Body surface area is 2.21 meters squared.  Severe Pain (7) Comment: Data Unavailable   No LMP for male patient.  Allergies reviewed: Yes  Medications reviewed: Yes    Medications: Medication refills not needed today.  Pharmacy name entered into [facility]:    [facility] - [place], [place] - [address] [facility] - [place], [place] - [address] [facility]    Clinical concerns: Possible refills on Lyrica and Protonix; no appetite; stomach pain; all over joint pain; hot flashes and chills; vomiting; coughing;       Leonila Navarrete, [facility]

## 2019-08-07 NOTE — PROGRESS NOTES
Last Visit Date: 3/14/19  Previous Impression:  1.  Left L3 radiculopathy secondary to metastatic spinal disease secondary to renal cancer.    2.  Pathologic compression fracture L3.    3.  Metastatic lesion left proximal femur without impending pathologic fracture.  Previous Plan:  Was able to review faxed notes from pain management center.  Procedure is as follows:  6/4/2018: Left L3-4 transforaminal epidural steroid injection by Dr. Kt Paris.  6/22/2018: Left sacroiliac joint injection by Dr. Paris.  7/16/2018: Aborted left L3-4 transforaminal epidural steroid injection (Dr. Paris).  Aborted because patient could not tolerate the discomfort associated with the needle injection.  Thus medication was not injected.  7/23/2018: Left L2-3 and L3-4 transforaminal epidural steroid injection by Dr. Paris.  8/27/2018: Left L2-3 and L3-4 transforaminal epidural steroid injection by Dr. Paris.  Because above procedures already included L L3-4 TFESI (4x, including aborted procedure), and with patient's report to me that none of them ever really helped, will at this point recommend L3-4 decompression and fusion (posterolateral fusion, left complete facetectomy).  Surg request placed.      S>  69/m, here for f/u.    Accompanied by wife and daughter.  Says back is about the same as last visit.  Sore and stiff.  LBP mainly along beltilne, L>R; also with some upper/mid lumbar pain.  Still undergoing radiation to hips / prox femur c/o Dr. Rdz.    Takes morphine SR 45 mg TID; morphine IR 15 mg BID (until last Monday).  Last Monday, morpine IR was discontinued because of lethargy; switched to Dilaudid 2mg tabs, ~ 1-3 tabs/day.    Recent MRI 06/2013 showed new L1 metastatic lesion.  Dr. Rdz recommended radiation treatment; not yet started.        Oswestry (SPIKE) Questionnaire    OSWESTRY DISABILITY INDEX 3/14/2019   Count 10   Sum 23   Oswestry Score (%) 46        O>   Alert, oriented x 3, cooperative.  Not in CP distress.  There were no  vitals taken for this visit.  Ambulates independently.   Grossly neurologically intact both LE's.  Back: No previous surgical scars.  Mild hyperpigmenation; no induration; mobile viable well perfused skin.      Imaging:   Abdominal CT today, compared to Feb 2019, shows same L3 compression deformity.  Essentially the same, may have further collapse a millimeter or so.  NO sign of instability.  No sign of other new spinal fractures.    Lumbar MRI 06/2019 shows NEW L1 vertebral body signal change, likely metastatic.  No vertebral body collapse/fracture.  Very mild epidural disease; NOT causing any significant stenosis.  (+) Left-sided stenosis L3-4, secondary to L3 metastatic lesion.    A>  1.  New metastatic lesion without fracture L1, 2' to metastatic renal CA.  2.  Left L3 radiculopathy secondary to metastatic renal cancer.    3.  Pathologic compression fracture L3.    4.  Metastatic lesion left proximal femur without impending pathologic fracture.    P>    Had good long disucssion with patinet and family.    Symptoms may still be due to nerve compression left side L3-4 level.  The new L1 lesion, likely also metastatic, may also be contributing.    Discussed his condition; discussed options including cement augmentation, tumor ablation; decompression for L3-4.  Discussed rationale, risks, benefits, alternatives.  Even with kyphoplasty and RFA at L1, likely will still benefit from radiation.  Discussed that surgery and radiation may be synergistic; the pain relief from kyphoplasty-RFA may allow him to lay still better for radiation.  Furthermore, the cement could act as a reliable landmark, to improve targetting with radiation.    Patient elects to proceed.  - Surg request:  Kyphoplasty-RFA L1; MIS left hemilaminectomy L3-4.  - PAC referral.  Main PCP is his oncologist Dr. Anne.    Questions answered.  TT > 25 min, > 50% CC.  Will try to get surgery scheduled within next 2-3 weeks.      Vickey Brower,  MD    Orthopaedic Spine Surgery  Dept Orthopaedic Surgery, [facility]  [phone] office, [phone] pager  [email or web address]

## 2019-08-07 NOTE — LETTER
8/7/2019       RE: Javon Bianchi  [address]  [place] [address]     Dear Colleague,    Thank you for referring your patient, Javon Bianchi, to the [facility]. Please see a copy of my visit note below.    Date of Visit: 08/07/2019       Chief complaint:   Metastatic Carcinoma with bone metastases, likely renal origin (no primary renal mass)  Cabozantinib: Started 60 mg daily on September 28, 2018.  Dec 10, 2018: dose reduced cabozantinib to 40 mg a day.  Jan 17, 2019: dose reduced cabozantinib to 29 mg a day.  Feb 11, 2019: C1D1 Nivolumab   3/12/19: c2 Nivolumab  4/8/19: held Nivolumab due to myalgias, started prednisone          History of Present Illness:   Javon Bianchi is a 69 year old male with a history significant for hyperlipidemia who had been in his usual health until he developed back pain in May of 2018. At that time, the pain was a stabbing like in the middle of lower back with burning like pain wrapping around this left hip to knee area. No trauma. He had MRI on 5/23/18 showed mild degenerative change with bulging disks L2-S1. A small benign hemangioma was noted in L2, no other marrow signal abnormality. Since then, the pain failed to improve despite steroid injections. In addition, he lost 25 lbs unintentionally since June along with chills a few times a day. Over time, the pain got worse to the point he could not ambulate after short distance and developed muscle weakness in lower extremities over time requiring a walker. Finally, he went to ER ([facility] in [place]) on 8/23/18, CT A/P was unremarkable. He was referred to neurology with obtaining MRI L spine. MRI on 8/30/18 revealed a pathological fracture at L3 vertebral body with height loss at 40% and diffuse involvement of a neoplastic process. IR guided biopsy on 8/31/18 showed poorly differentiated carcinoma. IHC was suggestive of possible renal cell carcinoma per pathology report (Renal cell  immunochemistry is positive and along with the negative staining for CK7 and CK20 suggests the possibility of a renal carcinoma, however most renal cell carcinomas also express PAX8 which is negative in this case). Of note, CT chest/abdomen/pelvis at OSH was negative for primary lesion. The patient was evaluated by neurosurgery who recommended no surgical intervention. He was discharged with TLSO brace and walker. He had palliative XRT locally from 9/11 for 10 fractions (Done in [place]).      He was started on cabozantinib 60 mg initially, requiring dose reduction to 20 mg for issues with hand foot syndrome and poor tolerability. Switched to nivolumab due to progression in bone disease on 2/6/19. Had to hold Cycle #3 due to myalgia, which improved with prednisone.     Interval history:   He presents to clinic today for scheduled follow up visit.     He had severe myalgias with nivolumab and therapy had to be held. He had been on steroids which have been tapered off. He has recovered from side effects. He comes in for a scheduled follow up visit.      He has not been doing well. He has severe back pain. He has difficulty swallowing. He feels the food is stuck in his throat. He can only take in liquids. He has lost about 14 lbs over the last few months.     He has pain in his upper abdomen. He has difficulty in localizing the pain.     He is following with palliative care for his medications. No fevers or sweats.  No cough, SOB or chest pain. No rashes or concerning lesions.      Wt Readings from Last 10 Encounters:   08/15/19 96.1 kg (211 lb 14.4 oz)   08/13/19 96.6 kg (213 lb)   08/08/19 96.2 kg (212 lb)   08/07/19 96.4 kg (212 lb 9.6 oz)   07/19/19 99.6 kg (219 lb 9.6 oz)   07/09/19 100.7 kg (222 lb)   07/03/19 100.9 kg (222 lb 6.4 oz)   06/26/19 102.4 kg (225 lb 11.2 oz)   06/06/19 105.1 kg (231 lb 12.8 oz)   05/21/19 102.8 kg (226 lb 9.6 oz)         PAST MEDICAL SURGICAL HISTORY: Reviewed.  SOCIAL HISTORY:  Reviewed.   MEDICATIONS: Reviewed.           Home Medications:      Current Outpatient Medications:      acetaminophen (TYLENOL) 500 MG tablet, Take 1,000 mg by mouth 3 times daily , Disp: , Rfl:      amoxicillin (AMOXIL) 875 MG tablet, , Disp: , Rfl:      calcium carbonate 500 mg, elemental, (OSCAL 500) 1250 (500 Ca) MG TABS tablet, Take 1 tablet by mouth 2 times daily, Disp: , Rfl:      Dextromethorphan-guaiFENesin (MUCINEX DM)  MG 12 hr tablet, Take 1 tablet by mouth every 12 hours, Disp: , Rfl:      diazepam (VALIUM) 5 MG tablet, Take 1 pill 30 min before MRI and a second pill 2 minutes prior., Disp: 10 tablet, Rfl: 0     DULoxetine (CYMBALTA) 60 MG capsule, Take 1 capsule (60 mg) by mouth daily, Disp: 90 capsule, Rfl: 3     fluticasone (FLONASE) 50 MCG/ACT nasal spray, Spray 1 spray into both nostrils daily, Disp: 16 g, Rfl: 2     furosemide (LASIX) 20 MG tablet, Take 1 tablet (20 mg) by mouth daily as needed, Disp: 14 tablet, Rfl: 0     HYDROmorphone (DILAUDID) 2 MG tablet, Take 1-2 tablets (2-4 mg) by mouth every 3 hours as needed for pain, Disp: 60 tablet, Rfl: 0     lidocaine (LIDODERM) 5 % patch, Place 1-3 patches onto the skin every 24 hours Apply to painful areas for 12 hours on, 12 hours off., Disp: 30 patch, Rfl: 3     loratadine (CLARITIN) 10 MG tablet, Take 10 mg by mouth daily, Disp: , Rfl:      medical cannabis (Patient's own supply.  Not a prescription), 1 Dose See Admin Instructions (This is NOT a prescription, and does not certify that the patient has a qualifying medical condition for medical cannabis.  The purpose of this order is  to document that the patient reports taking medical cannabis.), Disp: , Rfl:      melatonin 3 MG tablet, Take 3 mg by mouth nightly as needed , Disp: , Rfl:      morphine (MS CONTIN) 15 MG CR tablet, TAKE 1 TABLET (15 MG) BY MOUTH EVERY 8 HOURS. COMBINE 30 MG TABLET FOR TOTAL OF 45 MG EVERY 8 HOURS., Disp: 90 tablet, Rfl: 0     morphine (MS CONTIN) 15 MG CR  tablet, Take 1 tablet (15 mg) by mouth every 8 hours Combine with 30mg tab for total of 45mg every 8 hours., Disp: 90 tablet, Rfl: 0     morphine (MS CONTIN) 30 MG CR tablet, Take 1 tablet (30 mg) by mouth every 8 hours Combine with 15mg tab for total of 45mg every 8 hours., Disp: 90 tablet, Rfl: 0     pantoprazole (PROTONIX) 40 MG EC tablet, Take 1 tablet (40 mg) by mouth every morning (before breakfast), Disp: 30 tablet, Rfl: 2     polyethylene glycol (MIRALAX/GLYCOLAX) Packet, Take 0.5 packets by mouth daily as needed, Disp: , Rfl:      pregabalin (LYRICA) 50 MG capsule, Take 1 capsule (50 mg) by mouth 2 times daily, Disp: 60 capsule, Rfl: 1     prochlorperazine (COMPAZINE) 5 MG tablet, Take 1 tablet (5 mg) by mouth every 6 hours as needed (Nausea/Vomiting), Disp: 30 tablet, Rfl: 3     senna-docusate (SENOKOT-S;PERICOLACE) 8.6-50 MG per tablet, Take 2 tablets by mouth 2 times daily, Disp: , Rfl:      Vitamin D, Cholecalciferol, 1000 units TABS, Take 2,000 Units by mouth daily , Disp: , Rfl:      everolimus (AFINITOR) 5 MG tablet CHEMO, Take 1 tablet (5 mg) by mouth daily (Patient not taking: Reported on 8/7/2019), Disp: 30 tablet, Rfl: 0     Lenvatinib 18 MG, 10 mg + 2 x 4 mg capsules, (LENVIMA) 18 mg combo capsule CHEMOTHERAPY, Take 18 mg dose (10 mg plus two 4 mg capsules) by mouth daily. (Patient not taking: Reported on 8/7/2019), Disp: 90 capsule, Rfl: 0     ondansetron (ZOFRAN) 8 MG tablet, Take 1 tablet (8 mg) by mouth every 8 hours as needed (nausea and vomiting) Take prior to each lenvatinib dose. (Patient not taking: Reported on 8/7/2019), Disp: 28 tablet, Rfl: 5    PHYSICAL EXAM:   Temp: 99.6  F (37.6  C) Temp src: Oral BP: 114/64 Pulse: 65   Resp: 16 SpO2: 94 %      ECOG performance status of 1.   GENERAL: Seated in chair in NAD.  HEENT: No icterus, no pallor. Moist mucous membranes. Oropharynx is clear.   NECK: Supple, normal ROM  LUNGS: Bilateral clear to ausculation  CARDIOVASCULAR: Regular rate and  rhythm, no murmurs, gallops or rubs.   ABDOMEN: Soft, nontender and nondistended.   EXTREMITIES: No cyanosis, no clubbing, 2+ pitting edema to the knees bilaterally.   NEUROLOGIC: No focal deficits.  LABS/IMAGING:  Recent Labs   Lab Test 08/07/19  1522 07/19/19  1406 06/26/19  1508 06/26/19  1507 06/06/19  1327 05/21/19  0810    133 134  --  138 139   POTASSIUM 3.5 4.4 4.5  --  4.8 4.2   CHLORIDE 106 102 103  --  107 107   CO2 28 27 25  --  28 27   ANIONGAP 4 4 5  --  3 5   BUN 7 10 14  --  12 12   CR 0.64* 0.60* 0.65*  0.66  --  0.59* 0.70   * 104* 119*  --  89 94   AUREA 8.0* 7.6* 8.2* 8.3* 8.1* 9.0     Recent Labs   Lab Test 08/07/19  1522 07/19/19  1406   MAG 1.9 2.1     Recent Labs   Lab Test 08/07/19  1522 07/19/19  1406 06/26/19  1508 06/06/19  1327 05/21/19  0810   WBC 7.9 8.4 10.0 5.5 7.6   HGB 9.7* 9.5* 8.5* 8.5* 9.3*    221 234 207 254   MCV 98 99 97 99 99   NEUTROPHIL 65.2 65.8 70.4 74.7 71.1     Recent Labs   Lab Test 08/07/19  1522 07/19/19  1406 06/26/19  1508   BILITOTAL 0.4 0.5 0.5   ALKPHOS 115 137 146   ALT 16 16 17   AST 23 42 95*   ALBUMIN 2.8* 3.2* 3.0*  3.0*     TSH   Date Value Ref Range Status   08/07/2019 1.53 0.40 - 4.00 mU/L Final   07/19/2019 0.78 0.40 - 4.00 mU/L Final   05/21/2019 1.83 0.40 - 4.00 mU/L Final     No results for input(s): CEA in the last 86063 hours.  Results for orders placed or performed during the hospital encounter of 07/09/19   NM Bone Scan Whole Body    Narrative    EXAMINATION: NM BONE SCAN WHOLE BODY  Whole-body bone scan, 7/9/2019 1:05 PM     HISTORY: Bone metastasis (H); Renal cell carcinoma, left (H)     ADDITIONAL INFORMATION: none    COMPARISON: Bone scan 2/12/2019. CT 6/18/2019.    TECHNIQUE: The patient received 26.5 mCi of Tc-99m MDP intravenously.  Whole body bone images were obtained at 3 hours. SPECT-CT images from  the diaphragm through the pelvis were obtained.    FINDINGS: New focal tracer uptake in the right L1 pedicle,  inferior  articulating facet and spinous process, and to a lesser extent the  left L1 pedicle and inferior articular facet since bone scan dated  2/12/19. Stable diffuse radiotracer uptake of the iliac bones, sacrum,  bilateral femoral heads, and left femur. Stable compression deformity  of L3. New focal radiotracer uptake anterior right sixth rib which  correlates to healing fracture seen on SPECT images and CT 6/18/2019.    Degenerative type uptake in the right shoulder.      Impression    IMPRESSION:   1. New focal radiotracer uptake in the L1 vertebral body, consistent  with osseous metastatic disease.  2. Stable diffuse osseous metastatic disease in the pelvis, femoral  heads, and left femur  3. Radiotracer in the anterior right sixth rib correlates to healing  fracture.    I have personally reviewed the examination and initial interpretation  and I agree with the findings.    MICHAEL SANTANA MD              Assessment /Plan   69 year old male with metastatic infiltrative lesion in L3 extending to L2 and L4, likely primary malignancy being renal cell carcinoma. However he does not have any evidence of a primary renal mass on imaging. Based on the available pathology data, we offered treatment for renal cell carcinoma with cabozantinib (CABOSUN data demonstrating cabozantinib superiority over sunitinib in the initial setting). PAX8 stain was negative, and the clear cell features were not seen (it was a poorly differentiated carcinoma). He had a favorable response to cabozantinib on initial scans. However, tolerability remained an issue and he then progressed. We started him on Nivolumab, as there is encouraging data with IO in non-clear cell carcinoma. He developed myalgias and we had to stop therapy and start him on prednisone.     He has dysphagia, upper abdominal pain and back pain.     We will have to work up his dysphagia. I will get a CT neck and a swallow study. He has consultation in ENT scheduled to  further evaluate this.     I will add amylase and lipase to labs from today.     Kyphophlasty and hemilaminectomy was being planned for his back pain. He is interested in the procedure. I would help him schedule the visit.     I would not consider nivolumab for him given the severity of side effects. We could consider lenvatinib with everolimus.     Over 25 min of direct face to face time spent with patient with more than 50% time spent in counseling and coordinating care.      Again, thank you for allowing me to participate in the care of your patient.      Sincerely,    Hua Anne MD

## 2019-08-07 NOTE — PROGRESS NOTES
Date of Visit: 08/07/2019       Chief complaint:   Metastatic Carcinoma with bone metastases, likely renal origin (no primary renal mass)  Cabozantinib: Started 60 mg daily on September 28, 2018.  Dec 10, 2018: dose reduced cabozantinib to 40 mg a day.  Jan 17, 2019: dose reduced cabozantinib to 29 mg a day.  Feb 11, 2019: C1D1 Nivolumab   3/12/19: c2 Nivolumab  4/8/19: held Nivolumab due to myalgias, started prednisone          History of Present Illness:   Javon Bianchi is a 69 year old male with a history significant for hyperlipidemia who had been in his usual health until he developed back pain in May of 2018. At that time, the pain was a stabbing like in the middle of lower back with burning like pain wrapping around this left hip to knee area. No trauma. He had MRI on 5/23/18 showed mild degenerative change with bulging disks L2-S1. A small benign hemangioma was noted in L2, no other marrow signal abnormality. Since then, the pain failed to improve despite steroid injections. In addition, he lost 25 lbs unintentionally since June along with chills a few times a day. Over time, the pain got worse to the point he could not ambulate after short distance and developed muscle weakness in lower extremities over time requiring a walker. Finally, he went to ER ([facility] in [place]) on 8/23/18, CT A/P was unremarkable. He was referred to neurology with obtaining MRI L spine. MRI on 8/30/18 revealed a pathological fracture at L3 vertebral body with height loss at 40% and diffuse involvement of a neoplastic process. IR guided biopsy on 8/31/18 showed poorly differentiated carcinoma. IHC was suggestive of possible renal cell carcinoma per pathology report (Renal cell immunochemistry is positive and along with the negative staining for CK7 and CK20 suggests the possibility of a renal carcinoma, however most renal cell carcinomas also express PAX8 which is negative in this case). Of note, CT chest/abdomen/pelvis at  [facility] was negative for primary lesion. The patient was evaluated by neurosurgery who recommended no surgical intervention. He was discharged with TLSO brace and walker. He had palliative XRT locally from 9/11 for 10 fractions (Done in [place]).      He was started on cabozantinib 60 mg initially, requiring dose reduction to 20 mg for issues with hand foot syndrome and poor tolerability. Switched to nivolumab due to progression in bone disease on 2/6/19. Had to hold Cycle #3 due to myalgia, which improved with prednisone.     Interval history:   He presents to clinic today for scheduled follow up visit.     He had severe myalgias with nivolumab and therapy had to be held. He had been on steroids which have been tapered off. He has recovered from side effects. He comes in for a scheduled follow up visit.      He has not been doing well. He has severe back pain. He has difficulty swallowing. He feels the food is stuck in his throat. He can only take in liquids. He has lost about 14 lbs over the last few months.     He has pain in his upper abdomen. He has difficulty in localizing the pain.     He is following with palliative care for his medications. No fevers or sweats.  No cough, SOB or chest pain. No rashes or concerning lesions.      Wt Readings from Last 10 Encounters:   08/15/19 96.1 kg (211 lb 14.4 oz)   08/13/19 96.6 kg (213 lb)   08/08/19 96.2 kg (212 lb)   08/07/19 96.4 kg (212 lb 9.6 oz)   07/19/19 99.6 kg (219 lb 9.6 oz)   07/09/19 100.7 kg (222 lb)   07/03/19 100.9 kg (222 lb 6.4 oz)   06/26/19 102.4 kg (225 lb 11.2 oz)   06/06/19 105.1 kg (231 lb 12.8 oz)   05/21/19 102.8 kg (226 lb 9.6 oz)         PAST MEDICAL SURGICAL HISTORY: Reviewed.  SOCIAL HISTORY: Reviewed.   MEDICATIONS: Reviewed.           Home Medications:      Current Outpatient Medications:      acetaminophen (TYLENOL) 500 MG tablet, Take 1,000 mg by mouth 3 times daily , Disp: , Rfl:      amoxicillin (AMOXIL) 875 MG tablet, , Disp: , Rfl:       calcium carbonate 500 mg, elemental, (OSCAL 500) 1250 (500 Ca) MG TABS tablet, Take 1 tablet by mouth 2 times daily, Disp: , Rfl:      Dextromethorphan-guaiFENesin (MUCINEX DM)  MG 12 hr tablet, Take 1 tablet by mouth every 12 hours, Disp: , Rfl:      diazepam (VALIUM) 5 MG tablet, Take 1 pill 30 min before MRI and a second pill 2 minutes prior., Disp: 10 tablet, Rfl: 0     DULoxetine (CYMBALTA) 60 MG capsule, Take 1 capsule (60 mg) by mouth daily, Disp: 90 capsule, Rfl: 3     fluticasone (FLONASE) 50 MCG/ACT nasal spray, Spray 1 spray into both nostrils daily, Disp: 16 g, Rfl: 2     furosemide (LASIX) 20 MG tablet, Take 1 tablet (20 mg) by mouth daily as needed, Disp: 14 tablet, Rfl: 0     HYDROmorphone (DILAUDID) 2 MG tablet, Take 1-2 tablets (2-4 mg) by mouth every 3 hours as needed for pain, Disp: 60 tablet, Rfl: 0     lidocaine (LIDODERM) 5 % patch, Place 1-3 patches onto the skin every 24 hours Apply to painful areas for 12 hours on, 12 hours off., Disp: 30 patch, Rfl: 3     loratadine (CLARITIN) 10 MG tablet, Take 10 mg by mouth daily, Disp: , Rfl:      medical cannabis (Patient's own supply.  Not a prescription), 1 Dose See Admin Instructions (This is NOT a prescription, and does not certify that the patient has a qualifying medical condition for medical cannabis.  The purpose of this order is  to document that the patient reports taking medical cannabis.), Disp: , Rfl:      melatonin 3 MG tablet, Take 3 mg by mouth nightly as needed , Disp: , Rfl:      morphine (MS CONTIN) 15 MG CR tablet, TAKE 1 TABLET (15 MG) BY MOUTH EVERY 8 HOURS. COMBINE 30 MG TABLET FOR TOTAL OF 45 MG EVERY 8 HOURS., Disp: 90 tablet, Rfl: 0     morphine (MS CONTIN) 15 MG CR tablet, Take 1 tablet (15 mg) by mouth every 8 hours Combine with 30mg tab for total of 45mg every 8 hours., Disp: 90 tablet, Rfl: 0     morphine (MS CONTIN) 30 MG CR tablet, Take 1 tablet (30 mg) by mouth every 8 hours Combine with 15mg tab for total of  45mg every 8 hours., Disp: 90 tablet, Rfl: 0     pantoprazole (PROTONIX) 40 MG EC tablet, Take 1 tablet (40 mg) by mouth every morning (before breakfast), Disp: 30 tablet, Rfl: 2     polyethylene glycol (MIRALAX/GLYCOLAX) Packet, Take 0.5 packets by mouth daily as needed, Disp: , Rfl:      pregabalin (LYRICA) 50 MG capsule, Take 1 capsule (50 mg) by mouth 2 times daily, Disp: 60 capsule, Rfl: 1     prochlorperazine (COMPAZINE) 5 MG tablet, Take 1 tablet (5 mg) by mouth every 6 hours as needed (Nausea/Vomiting), Disp: 30 tablet, Rfl: 3     senna-docusate (SENOKOT-S;PERICOLACE) 8.6-50 MG per tablet, Take 2 tablets by mouth 2 times daily, Disp: , Rfl:      Vitamin D, Cholecalciferol, 1000 units TABS, Take 2,000 Units by mouth daily , Disp: , Rfl:      everolimus (AFINITOR) 5 MG tablet CHEMO, Take 1 tablet (5 mg) by mouth daily (Patient not taking: Reported on 8/7/2019), Disp: 30 tablet, Rfl: 0     Lenvatinib 18 MG, 10 mg + 2 x 4 mg capsules, (LENVIMA) 18 mg combo capsule CHEMOTHERAPY, Take 18 mg dose (10 mg plus two 4 mg capsules) by mouth daily. (Patient not taking: Reported on 8/7/2019), Disp: 90 capsule, Rfl: 0     ondansetron (ZOFRAN) 8 MG tablet, Take 1 tablet (8 mg) by mouth every 8 hours as needed (nausea and vomiting) Take prior to each lenvatinib dose. (Patient not taking: Reported on 8/7/2019), Disp: 28 tablet, Rfl: 5    PHYSICAL EXAM:   Temp: 99.6  F (37.6  C) Temp src: Oral BP: 114/64 Pulse: 65   Resp: 16 SpO2: 94 %      ECOG performance status of 1.   GENERAL: Seated in chair in NAD.  HEENT: No icterus, no pallor. Moist mucous membranes. Oropharynx is clear.   NECK: Supple, normal ROM  LUNGS: Bilateral clear to ausculation  CARDIOVASCULAR: Regular rate and rhythm, no murmurs, gallops or rubs.   ABDOMEN: Soft, nontender and nondistended.   EXTREMITIES: No cyanosis, no clubbing, 2+ pitting edema to the knees bilaterally.   NEUROLOGIC: No focal deficits.  LABS/IMAGING:  Recent Labs   Lab Test 08/07/19  1522  07/19/19  1406 06/26/19  1508 06/26/19  1507 06/06/19  1327 05/21/19  0810    133 134  --  138 139   POTASSIUM 3.5 4.4 4.5  --  4.8 4.2   CHLORIDE 106 102 103  --  107 107   CO2 28 27 25  --  28 27   ANIONGAP 4 4 5  --  3 5   BUN 7 10 14  --  12 12   CR 0.64* 0.60* 0.65*  0.66  --  0.59* 0.70   * 104* 119*  --  89 94   AUREA 8.0* 7.6* 8.2* 8.3* 8.1* 9.0     Recent Labs   Lab Test 08/07/19  1522 07/19/19  1406   MAG 1.9 2.1     Recent Labs   Lab Test 08/07/19  1522 07/19/19  1406 06/26/19  1508 06/06/19  1327 05/21/19  0810   WBC 7.9 8.4 10.0 5.5 7.6   HGB 9.7* 9.5* 8.5* 8.5* 9.3*    221 234 207 254   MCV 98 99 97 99 99   NEUTROPHIL 65.2 65.8 70.4 74.7 71.1     Recent Labs   Lab Test 08/07/19  1522 07/19/19  1406 06/26/19  1508   BILITOTAL 0.4 0.5 0.5   ALKPHOS 115 137 146   ALT 16 16 17   AST 23 42 95*   ALBUMIN 2.8* 3.2* 3.0*  3.0*     TSH   Date Value Ref Range Status   08/07/2019 1.53 0.40 - 4.00 mU/L Final   07/19/2019 0.78 0.40 - 4.00 mU/L Final   05/21/2019 1.83 0.40 - 4.00 mU/L Final     No results for input(s): CEA in the last [id number] hours.  Results for orders placed or performed during the hospital encounter of 07/09/19   NM Bone Scan Whole Body    Narrative    EXAMINATION: NM BONE SCAN WHOLE BODY  Whole-body bone scan, 7/9/2019 1:05 PM     HISTORY: Bone metastasis (H); Renal cell carcinoma, left (H)     ADDITIONAL INFORMATION: none    COMPARISON: Bone scan 2/12/2019. CT 6/18/2019.    TECHNIQUE: The patient received 26.5 mCi of Tc-99m MDP intravenously.  Whole body bone images were obtained at 3 hours. SPECT-CT images from  the diaphragm through the pelvis were obtained.    FINDINGS: New focal tracer uptake in the right L1 pedicle, inferior  articulating facet and spinous process, and to a lesser extent the  left L1 pedicle and inferior articular facet since bone scan dated  2/12/19. Stable diffuse radiotracer uptake of the iliac bones, sacrum,  bilateral femoral heads, and left femur.  Stable compression deformity  of L3. New focal radiotracer uptake anterior right sixth rib which  correlates to healing fracture seen on SPECT images and CT 6/18/2019.    Degenerative type uptake in the right shoulder.      Impression    IMPRESSION:   1. New focal radiotracer uptake in the L1 vertebral body, consistent  with osseous metastatic disease.  2. Stable diffuse osseous metastatic disease in the pelvis, femoral  heads, and left femur  3. Radiotracer in the anterior right sixth rib correlates to healing  fracture.    I have personally reviewed the examination and initial interpretation  and I agree with the findings.    MICHAEL SANTANA MD              Assessment /Plan   69 year old male with metastatic infiltrative lesion in L3 extending to L2 and L4, likely primary malignancy being renal cell carcinoma. However he does not have any evidence of a primary renal mass on imaging. Based on the available pathology data, we offered treatment for renal cell carcinoma with cabozantinib (CABOSUN data demonstrating cabozantinib superiority over sunitinib in the initial setting). PAX8 stain was negative, and the clear cell features were not seen (it was a poorly differentiated carcinoma). He had a favorable response to cabozantinib on initial scans. However, tolerability remained an issue and he then progressed. We started him on Nivolumab, as there is encouraging data with IO in non-clear cell carcinoma. He developed myalgias and we had to stop therapy and start him on prednisone.     He has dysphagia, upper abdominal pain and back pain.     We will have to work up his dysphagia. I will get a CT neck and a swallow study. He has consultation in ENT scheduled to further evaluate this.     I will add amylase and lipase to labs from today.     Kyphophlasty and hemilaminectomy was being planned for his back pain. He is interested in the procedure. I would help him schedule the visit.     I would not consider nivolumab for him  given the severity of side effects. We could consider lenvatinib with everolimus.     Over 25 min of direct face to face time spent with patient with more than 50% time spent in counseling and coordinating care.

## 2019-08-08 ENCOUNTER — ANCILLARY PROCEDURE (OUTPATIENT)
Dept: CT IMAGING | Facility: CLINIC | Age: 69
End: 2019-08-08
Attending: INTERNAL MEDICINE
Payer: MEDICARE

## 2019-08-08 ENCOUNTER — OFFICE VISIT (OUTPATIENT)
Dept: ORTHOPEDICS | Facility: CLINIC | Age: 69
End: 2019-08-08
Payer: MEDICARE

## 2019-08-08 VITALS — BODY MASS INDEX: 28.71 KG/M2 | WEIGHT: 212 LBS | HEIGHT: 72 IN

## 2019-08-08 DIAGNOSIS — C79.51 METASTATIC CANCER TO SPINE (H): Primary | ICD-10-CM

## 2019-08-08 DIAGNOSIS — C79.51 BONE METASTASIS: ICD-10-CM

## 2019-08-08 DIAGNOSIS — C64.9 RENAL CELL CARCINOMA, UNSPECIFIED LATERALITY (H): ICD-10-CM

## 2019-08-08 DIAGNOSIS — M48.062 LUMBAR STENOSIS WITH NEUROGENIC CLAUDICATION: ICD-10-CM

## 2019-08-08 DIAGNOSIS — M54.16 LUMBAR RADICULOPATHY: ICD-10-CM

## 2019-08-08 RX ORDER — IOPAMIDOL 755 MG/ML
130 INJECTION, SOLUTION INTRAVASCULAR ONCE
Status: COMPLETED | OUTPATIENT
Start: 2019-08-08 | End: 2019-08-08

## 2019-08-08 RX ADMIN — IOPAMIDOL 130 ML: 755 INJECTION, SOLUTION INTRAVASCULAR at 07:42

## 2019-08-08 ASSESSMENT — ENCOUNTER SYMPTOMS
EYE PAIN: 0
STIFFNESS: 1
DECREASED CONCENTRATION: 1
SLEEP DISTURBANCES DUE TO BREATHING: 0
WHEEZING: 0
VOMITING: 1
INCREASED ENERGY: 1
MYALGIAS: 1
CHILLS: 1
DIARRHEA: 1
HOARSE VOICE: 1
SORE THROAT: 0
SPUTUM PRODUCTION: 1
SYNCOPE: 0
HEARTBURN: 0
TROUBLE SWALLOWING: 1
NIGHT SWEATS: 0
BLOOD IN STOOL: 0
DYSPNEA ON EXERTION: 1
MUSCLE CRAMPS: 0
SINUS CONGESTION: 1
ALTERED TEMPERATURE REGULATION: 1
POLYDIPSIA: 0
COUGH: 1
LEG PAIN: 0
DECREASED APPETITE: 1
TASTE DISTURBANCE: 1
EYE WATERING: 0
WEIGHT LOSS: 1
MUSCLE WEAKNESS: 1
HYPOTENSION: 0
NECK MASS: 0
PANIC: 0
WEIGHT GAIN: 0
SHORTNESS OF BREATH: 0
SINUS PAIN: 1
ORTHOPNEA: 0
DEPRESSION: 0
INSOMNIA: 1
BACK PAIN: 1
ARTHRALGIAS: 1
NAUSEA: 1
SWOLLEN GLANDS: 0
ABDOMINAL PAIN: 1
LIGHT-HEADEDNESS: 0
COUGH DISTURBING SLEEP: 1
BLOATING: 0
BRUISES/BLEEDS EASILY: 0
EXERCISE INTOLERANCE: 1
HALLUCINATIONS: 1
PALPITATIONS: 0
POSTURAL DYSPNEA: 1
SMELL DISTURBANCE: 0
NECK PAIN: 0
CONSTIPATION: 0
NERVOUS/ANXIOUS: 0
HYPERTENSION: 0
FATIGUE: 1
JOINT SWELLING: 0
SNORES LOUDLY: 0
HEMOPTYSIS: 0
DOUBLE VISION: 0
FEVER: 1

## 2019-08-08 ASSESSMENT — PATIENT HEALTH QUESTIONNAIRE - PHQ9
SUM OF ALL RESPONSES TO PHQ QUESTIONS 1-9: 20
SUM OF ALL RESPONSES TO PHQ QUESTIONS 1-9: 20

## 2019-08-08 ASSESSMENT — MIFFLIN-ST. JEOR: SCORE: 1754.63

## 2019-08-08 NOTE — NURSING NOTE
Teaching Flowsheet   Relevant Diagnosis: Kyphoplasty-RFA L1; MIS left hemilaminectomy L3-4  Teaching Topic: Pre op teaching     Person(s) involved in teaching:   Patient, Wife and Daughter     Motivation Level:  Asks Questions: Yes  Eager to Learn: Yes  Cooperative: Yes  Receptive (willing/able to accept information): Yes  Any cultural factors/Jain beliefs that may influence understanding or compliance? No       Patient demonstrates understanding of the following:  Reason for the appointment, diagnosis and treatment plan: Yes  Knowledge of proper use of medications and conditions for which they are ordered (with special attention to potential side effects or drug interactions): Yes  Which situations necessitate calling provider and whom to contact: Yes       Teaching Concerns Addressed: RN discusssed pre op teaching with patient and family including, location and pre op shower. Amisha will call to schedule PAC visit and surgery dates once patient is authorized to have surgery. RN gave antibacterial soap. Patient's wife Suzie and laquitaCarol will be patient's champion.  Patient and family have no further questions at this point.       Proper use and care of meds (medical equip, care aids, etc.): Yes  Nutritional needs and diet plan: Yes  Pain management techniques: Yes  Wound Care: Yes  How and/when to access community resources: Yes     Instructional Materials Used/Given: Pre op packet and antibacterial soap     Time spent with patient: 15 minutes.

## 2019-08-08 NOTE — DISCHARGE INSTRUCTIONS

## 2019-08-08 NOTE — NURSING NOTE
Reason For Visit:   Chief Complaint   Patient presents with     RECHECK     F/U fracture of vertebra. Discuss surgery     Primary MD: LIANNE Wellington        ?  No  Occupation retired salesman   Currently working? No.  Work status?  Retired.  Date of injury: None     Date of surgery: None     Smoker: No         Ht 1.829 m (6')   Wt 96.2 kg (212 lb)   BMI 28.75 kg/m      Pain Assessment  Patient Currently in Pain: Yes  0-10 Pain Scale: 6  Primary Pain Location: Back  Pain Descriptors: Discomfort, Dull    Oswestry (SPIKE) Questionnaire    OSWESTRY DISABILITY INDEX 8/8/2019   Count 10   Sum 24   Oswestry Score (%) 48          Visual Analog Pain Scale  Back Pain Scale 0-10: 6  Right leg pain: 0  Left leg pain: 0    Promis 10 Assessment    PROMIS 10 8/8/2019   In general, would you say your health is: Fair   In general, would you say your quality of life is: Fair   In general, how would you rate your physical health? Fair   In general, how would you rate your mental health, including your mood and your ability to think? Fair   In general, how would you rate your satisfaction with your social activities and relationships? Fair   In general, please rate how well you carry out your usual social activities and roles Good   To what extent are you able to carry out your everyday physical activities such as walking, climbing stairs, carrying groceries, or moving a chair? A little   How often have you been bothered by emotional problems such as feeling anxious, depressed or irritable? Sometimes   How would you rate your fatigue on average? Mild   How would you rate your pain on average?   0 = No Pain  to  10 = Worst Imaginable Pain 6   In general, would you say your health is: 2   In general, would you say your quality of life is: 2   In general, how would you rate your physical health? 2   In general, how would you rate your mental health, including your mood and your ability to think? 2   In general, how would you  rate your satisfaction with your social activities and relationships? 2   In general, please rate how well you carry out your usual social activities and roles. (This includes activities at home, at work and in your community, and responsibilities as a parent, child, spouse, employee, friend, etc.) 3   To what extent are you able to carry out your everyday physical activities such as walking, climbing stairs, carrying groceries, or moving a chair? 2   In the past 7 days, how often have you been bothered by emotional problems such as feeling anxious, depressed, or irritable? 3   In the past 7 days, how would you rate your fatigue on average? 2   In the past 7 days, how would you rate your pain on average, where 0 means no pain, and 10 means worst imaginable pain? 6   Global Mental Health Score 9   Global Physical Health Score 11   PROMIS TOTAL - SUBSCORES 20   Some recent data might be hidden                Alondra Villa LPN

## 2019-08-08 NOTE — LETTER
8/8/2019       RE: Javon Bianchi  [address]  [place] [address]     Dear Colleague,    Thank you for referring your patient, Javon Bianchi, to [facility]. Please see a copy of my visit note below.    Last Visit Date: 3/14/19  Previous Impression:  1.  Left L3 radiculopathy secondary to metastatic spinal disease secondary to renal cancer.    2.  Pathologic compression fracture L3.    3.  Metastatic lesion left proximal femur without impending pathologic fracture.  Previous Plan:  Was able to review faxed notes from pain management center.  Procedure is as follows:  6/4/2018: Left L3-4 transforaminal epidural steroid injection by Dr. Kt Paris.  6/22/2018: Left sacroiliac joint injection by Dr. Paris.  7/16/2018: Aborted left L3-4 transforaminal epidural steroid injection (Dr. Paris).  Aborted because patient could not tolerate the discomfort associated with the needle injection.  Thus medication was not injected.  7/23/2018: Left L2-3 and L3-4 transforaminal epidural steroid injection by Dr. Paris.  8/27/2018: Left L2-3 and L3-4 transforaminal epidural steroid injection by Dr. Paris.  Because above procedures already included L L3-4 TFESI (4x, including aborted procedure), and with patient's report to me that none of them ever really helped, will at this point recommend L3-4 decompression and fusion (posterolateral fusion, left complete facetectomy).  Surg request placed.    S>  69/m, here for f/u.    Accompanied by wife and daughter.  Says back is about the same as last visit.  Sore and stiff.  LBP mainly along beltilne, L>R; also with some upper/mid lumbar pain.  Still undergoing radiation to hips / prox femur c/o Dr. Rdz.    Takes morphine SR 45 mg TID; morphine IR 15 mg BID (until last Monday).  Last Monday, morpine IR was discontinued because of lethargy; switched to Dilaudid 2mg tabs, ~ 1-3 tabs/day.    Recent MRI 06/2013 showed  new L1 metastatic lesion.  Dr. Rdz recommended radiation treatment; not yet started.    Oswestry (SPIKE) Questionnaire    OSWESTRY DISABILITY INDEX 3/14/2019   Count 10   Sum 23   Oswestry Score (%) 46        O>   Alert, oriented x 3, cooperative.  Not in CP distress.  There were no vitals taken for this visit.  Ambulates independently.   Grossly neurologically intact both LE's.  Back: No previous surgical scars.  Mild hyperpigmenation; no induration; mobile viable well perfused skin.      Imaging:   Abdominal CT today, compared to Feb 2019, shows same L3 compression deformity.  Essentially the same, may have further collapse a millimeter or so.  NO sign of instability.  No sign of other new spinal fractures.    Lumbar MRI 06/2019 shows NEW L1 vertebral body signal change, likely metastatic.  No vertebral body collapse/fracture.  Very mild epidural disease; NOT causing any significant stenosis.  (+) Left-sided stenosis L3-4, secondary to L3 metastatic lesion.    A>  1.   New metastatic lesion without fracture L1, 2' to metastatic renal CA.  2.  Left L3 radiculopathy secondary to metastatic renal cancer.    3.  Pathologic compression fracture L3.    4.  Metastatic lesion left proximal femur without impending pathologic fracture.    P>    Had good long disucssion with patinet and family.    Symptoms may still be due to nerve compression left side L3-4 level.  The new L1 lesion, likely also metastatic, may also be contributing.    Discussed his condition; discussed options including cement augmentation, tumor ablation; decompression for L3-4.  Discussed rationale, risks, benefits, alternatives.  Even with kyphoplasty and RFA at L1, likely will still benefit from radiation.  Discussed that surgery and radiation may be synergistic; the pain relief from kyphoplasty-RFA may allow him to lay still better for radiation.  Furthermore, the cement could act as a reliable landmark, to improve targetting with radiation.    Patient  elects to proceed.  - Surg request:  Kyphoplasty-RFA L1; MIS left hemilaminectomy L3-4.  - PAC referral.  Main PCP is his oncologist Dr. Anne.    Questions answered.  TT > 25 min, > 50% CC.  Will try to get surgery scheduled within next 2-3 weeks.    Vickey Brower MD    Orthopaedic Spine Surgery  Dept Orthopaedic Surgery, [facility]  [phone] office, [phone] pager  [email or web address]

## 2019-08-08 NOTE — DISCHARGE INSTRUCTIONS

## 2019-08-08 NOTE — NURSING NOTE
"[facility]:  PHQ-9 Screening Note    SITUATION/BACKGROUND                                                    Javon Bianchi is a 69 year old male who completed the PHQ-9 assessment for depression and Question 9 on the PHQ-9 was POSITIVE FOR SUICIDAL IDEATION.    Onset of symptoms: improving  Trigger: None  Recent related events: Anxiety depression  Prior history of suicide attempt or self harm: No   Risk Factors: comorbid medical condition of back pain and on anti depressants  History of depression or mental illness: Yes  Medications reviewed: Yes     ASSESSMENT      A. Are any of the following present?      Suicidal thoughts with a plan and means to carry out the plan?    Intent to harm others    Altered mental status: confusion, delusional, psychotic YES  - Patient should be seen in the ED.  If patient is willing to go to the ED, call [facility] at [phone].  If patient is unwilling to go to the ED, call 911.   Clinic staff to fill out the  Transportation Hold  form.    Place order for referral to behavioral health team for  regular  follow-up.    NO - go to B   B. Are any of the following present?      Suicidal thoughts without a plan or means to carry out the plan    New onset of delusional ideas    Past inpatient admission for depression    New onset and recent change or addition of new medication YES  - Patient should receive crises care within 2-4 hours. Offer emergency room care or connect with any of the *crisis resources.     Place referral to behavioral health team for \"regular\" follow-up.    NO - go to C   C. Are any of the following present?      Previous suicide attempts    Depression interfering with ability to work or function    Loss of appetite and eating poorly    Abrupt cessation of drugs (OTC or RX), alcohol or caffeine    Drug or alcohol abuse YES -  [facility] behavior health team. If no response, patient should receive crisis care within 24 " "hours.     Place referral to behavioral health team for \"regular\" follow-up.     NO - go to D   D. Are several of the following present?      Difficulty concentrating    Difficulty sleeping    Reduced interest in sexual activity or impotency    Irregular or absent menstruation    No interest in activity    Change in interpersonal relationships    Increased use/abuse of alcohol or drugs    Pregnant or recent child birth    Recent major life change    History of depression YES -  Follow-up with PCP for appointment and follow home care instructions.    Place referral to behavioral health team for \"regular\" follow-up.    NO - provide home care instructions.        PLAN      Home Care Instructions:   Family support    Report the following to your PCP:   None    Seek emergency care immediately if any of the following occur:   Suicidal thoughts and plan and means to carry out the plan  Injury to self or others  Altered mental status:  Confusion, Delusional, Pyschotic    BEHAVIORAL HEALTH TEAMS      [facility] - Behavioral Health Team    [facility] Pager: [phone]    [place]  - Behavioral Health Team    Pager number: [phone]    Referral to Behavioral Health   [facility] SOWQ [96821}    RESOURCES      - 24/7 Crisis Hotlines: National Suicide Prevention Hotline  540-338-APFV (6788)  - [facility] Pagers:  [place]: pager #: [phone]    [place] RN        Copyright 2016 [facility]      "

## 2019-08-08 NOTE — NURSING NOTE
Depression Response    Patient completed the PHQ-9 assessment for depression and scored >9? Yes  Question 9 on the PHQ-9 was positive for suicidality? No  Is the patient already receiving treatment for depression? No  Patient would like to speak with behavioral health team ([facility] clinics only)? No    I personally notified the following: clinic nurse    Behavioral Health/Social Work Contact Information     [facility]  Franco Terrazas MA, LMFT  Lead Behavioral Health Clinician  Phone: [phone]  [facility] Pager: [phone]    Non-[facility] Clinics  [facility] On-Call   Pager: [phone]

## 2019-08-09 ASSESSMENT — PATIENT HEALTH QUESTIONNAIRE - PHQ9: SUM OF ALL RESPONSES TO PHQ QUESTIONS 1-9: 20

## 2019-08-12 ENCOUNTER — TELEPHONE (OUTPATIENT)
Dept: ORTHOPEDICS | Facility: CLINIC | Age: 69
End: 2019-08-12

## 2019-08-12 NOTE — TELEPHONE ENCOUNTER
Received voicemail from Omega with questions a to when he should schedule his PAC appointment. I returned phone call to patient and discussed that we need to receive prior auth approval before we can pick a surgery date and at that time we will also schedule PAC because it will have to be scheduled within 30 days of surgery.    Patients wife states that Omega has a video swallow study coming up and is wondering if Dr. Fournier or PAC would like this info. Patient is requesting a call back to discuss.     8-12-19:  See phone messages.  I called back pt. & wife & encouraged them to call [facility] Video swallow test from 8-22-19 the day after surgery to ASAP either before 8-15-19 PAC H&P appt. Or before surgery next week 8-21-19.  They agreed.  Repeated preop teaching & answered all questions.  Call back prn. Pt agreed. Vaishnavi Smith RN .

## 2019-08-13 ENCOUNTER — ONCOLOGY VISIT (OUTPATIENT)
Dept: ONCOLOGY | Facility: CLINIC | Age: 69
End: 2019-08-13
Attending: INTERNAL MEDICINE
Payer: MEDICARE

## 2019-08-13 ENCOUNTER — HOSPITAL ENCOUNTER (OUTPATIENT)
Facility: CLINIC | Age: 69
End: 2019-08-13
Attending: ORTHOPAEDIC SURGERY | Admitting: ORTHOPAEDIC SURGERY
Payer: MEDICARE

## 2019-08-13 ENCOUNTER — PRE VISIT (OUTPATIENT)
Dept: SURGERY | Facility: CLINIC | Age: 69
End: 2019-08-13

## 2019-08-13 VITALS
RESPIRATION RATE: 16 BRPM | HEART RATE: 85 BPM | TEMPERATURE: 98.8 F | DIASTOLIC BLOOD PRESSURE: 62 MMHG | BODY MASS INDEX: 28.85 KG/M2 | HEIGHT: 72 IN | WEIGHT: 213 LBS | OXYGEN SATURATION: 94 % | SYSTOLIC BLOOD PRESSURE: 120 MMHG

## 2019-08-13 DIAGNOSIS — G89.3 CANCER ASSOCIATED PAIN: ICD-10-CM

## 2019-08-13 DIAGNOSIS — Z91.89 AT RISK FOR POLYPHARMACY: ICD-10-CM

## 2019-08-13 DIAGNOSIS — R05.3 CHRONIC COUGH: ICD-10-CM

## 2019-08-13 DIAGNOSIS — C64.9 RENAL CELL CARCINOMA, UNSPECIFIED LATERALITY (H): Primary | ICD-10-CM

## 2019-08-13 PROCEDURE — G0463 HOSPITAL OUTPT CLINIC VISIT: HCPCS

## 2019-08-13 PROCEDURE — 99215 OFFICE O/P EST HI 40 MIN: CPT | Performed by: INTERNAL MEDICINE

## 2019-08-13 ASSESSMENT — MIFFLIN-ST. JEOR: SCORE: 1759.16

## 2019-08-13 ASSESSMENT — PAIN SCALES - GENERAL: PAINLEVEL: EXTREME PAIN (8)

## 2019-08-13 NOTE — TELEPHONE ENCOUNTER
Attempted to reach out to patient to discuss that Dr. Brower would like to do surgery on 8/21/19 and to assist with scheduling a PAC appointment sometime this week. Left best call back number of [phone]

## 2019-08-13 NOTE — PROGRESS NOTES
Oncology Rooming Note    August 13, 2019 10:53 AM   Javon Bianchi is a 69 year old male who presents for:    Chief Complaint   Patient presents with     Palliative     Initial Vitals: /62   Pulse 85   Temp 98.8  F (37.1  C) (Oral)   Resp 16   Ht 1.829 m (6')   Wt 96.6 kg (213 lb)   SpO2 94%   BMI 28.89 kg/m   Estimated body mass index is 28.89 kg/m  as calculated from the following:    Height as of this encounter: 1.829 m (6').    Weight as of this encounter: 96.6 kg (213 lb). Body surface area is 2.22 meters squared.  Extreme Pain (8) Comment: Data Unavailable   No LMP for male patient.  Allergies reviewed: Yes  Medications reviewed: Yes    Medications: Medication refills not needed today.  Pharmacy name entered into [facility]:    [facility] - [place], [place] - [address] [facility] - [place], [place] - [address] [address]    Clinical concerns: no      Ruth Thorpe, LAURA

## 2019-08-13 NOTE — PATIENT INSTRUCTIONS
"Thank you for coming into the Palliative Care Clinic today.     1. Stop Flonase.   2. Cut down on the MS Contin to 45mg in the morning, 30 in the afternoon and evening.  If after 1-2 days, pain is unchanged, then go down to 30mg three times daily.   3. Cut down on the Reglan to 2x daily for a few days.  If no change in \"sour stomach,\" okay to stop.   4. Increase senna to 5-6 tabs daily (can split between two doses).     RTC 3 weeks for a follow up.    You can reach the Palliative Care Team during business hours at the following number: [phone] (Palliative Clinic Nurse Line).     To reach the Palliative Care Provider on-call after-hours or on holidays and weekends, call: [phone].  Please note that we are not able to provide pain medication refills on evenings or weekends.     ==================================================================    [facility] Palliative Care Clinics   The [facility] Palliative Care Team is committed to treating your pain and other symptoms. This handout is for all patients treated with opioid medications in our clinics.     What are opioid medicines?   Opioid medications are used to ease some types of pain and shortness of breath. They are sometimes called narcotics. They include: Morphine (MS Contin, Roxanol), Oxycodone (OxyContin, OxyFast, Percocet), Hydrocodone (Vicodin, Norco), Hydromorphone (Dilaudid), Fentanyl (Duragesic), Methadone (Dolophine).   Are opioid medicines safe to take?   Opioid medicines are safe when you follow the directions from your doctor or medical provider.  Opioids can cause serious side effects and become unsafe if you do not follow your instructions.   To make sure you are taking opioid medicines safely, we may ask you to bring in your pill bottles or to allow us to test your urine. Our goal is to keep you safe and to improve your ability to function & be active. If we don t think opioids are safely doing " that, we will work with you to taper you off of them.   Do not drive unless your medical provider tells you it is safe.   Do not take opioids with alcohol or anxiety/sleep medicines unless your doctor tells you it is ok to do so.   Are opioids addictive?   Addiction means you crave a drug and have trouble limiting the amount you use.   Addiction is more likely to happen if you take opioids to relieve stress or to feel altered. If you or your loved one feels this way when taking opioid medicines, let your medical provider know. We may refer you for additional assessment or services if this is a concern.   Your body will get used to the opioid medicines if you take them for more than two weeks in a row. This means if you stop them suddenly, you may have withdrawal. If this occurs, you will feel uncomfortable (nausea, diarrhea, increased pain). This does not mean you are addicted. Never stop taking your pain medicines all at once unless your medical provider tells you to. If you think you need less medicine, your medical provider will help you to safely lower your dose.   What is the safest way to store opioids?   Keep your medicines in a safe place away from children, teens, pets, and visitors. Be careful about who knows that you have these medicines.   How do I get rid of my old opioids?   Opioids should be brought to your [facility] drop-off site, or you can purchase a disposal kit from your local pharmacy. If neither of these options is available, the Food and Drug Administration recommends that you flush unused opioids down the toilet.   Do not share or sell your pain medicines. This is illegal and very dangerous. We cannot prescribe opioid pain medicines to you if do this.   How do I get refills?   Opioid medicines need signed paper prescriptions. This means it may take longer to refill than other medicines. Our clinic cannot prescribe them on weekends or at night.     Give us one week s notice when requesting a  refill. This gives us the time we need to get it signed and back to you. It may be possible to mail prescriptions to you.

## 2019-08-13 NOTE — TELEPHONE ENCOUNTER
FUTURE VISIT INFORMATION      SURGERY INFORMATION:    Date: 8/21/19    Location: [facility]    Surgeon:  Vickey Brower    RECORDS REQUESTED FROM:       Primary Care Provider: Srinath Aparicio    Pertinent Medical History: Patent foramen ovale with right to left shunt    Most recent EKG+ Tracing: 3/14/19    Most recent ECHO: 7/16/17- Alessandra    Most recent Cardiac Stress Test: 7/17/17- Alessandra

## 2019-08-13 NOTE — TELEPHONE ENCOUNTER
Patient is scheduled for surgery with Dr. Brower      Spoke or left message with: Alan with Javon  Date of Surgery: 8/21/19    Location: [facility]    Informed patient they will need an adult  Yes    H&P: Scheduled with PAC 8/15/19    Additional imaging/appointments: N/A    Surgery packet: Given in clinic    Additional comments: Patient will receive arrival time at PAC appointment

## 2019-08-13 NOTE — PROGRESS NOTES
"Palliative Care Outpatient Clinic Progress Note    Patient Name:  Javon Bianchi  Primary Provider:  LIANNE Wellington    Chief Complaint/Identifying Information:  Metastatic Renal Cell Carcinoma on immunotherapy  History of L3 pathologic fracture found at diagnosis   Known epidural tumor burden    [facility] Outpatient Palliative Care Opioid Prescribing Safety Plan     Opioid Safety Education: Reviewed by Nan Gonzalez  on 11/8/2018  Opioid Risk Assessment: Performed by Nan Gonzalez  on 11/8/2018 .  ORT score of 0.   Mood Disorder Assessment: Performed by Carly Mills on 04/25/19.  No concerns.    reviewed with every prescription, last reviewed by Carly Mills on 08/13/19     Additional recommendations based on patient's prognosis and indication for opioids include the following:     Expected prognosis: shorter  Risk: Low or Medium (ORT 0-7)  No further recommendations.     Interim History:  Javon Bianchi 68 year old male returns to be seen by palliative care today, accompanied by his wife Suzie .  Javon Bianchi was last seen by me six weeks ago, at which time no changes were made to his pain medications. Since then, he called into clinic feeling sedated from his opioids.  His MSIR was rotated to hydromorphone.     Since he was last seen by me, he has been off immunotherapy, has been getting XRT to bilateral hips through [place]. He has a plan to have kyphoplasty     Continues to cough frequently, now chronic.  Was given amoxicillin and his cough improved.  Now his cough is increasing again, not productive.  No fever.  Tried Flonase which was not clearly helpful.  Will have a swallow study done to evaluate for aspiration.     Still has a \"sore stomach,\" not feeling any reflux with this. He has been put on ondansetron, Reglan, and omeprazole, and Compazine, and isn't sure which of these he should be taking. He isn't nauseated.     When he sits at the table, he is limited in how he " "eats because he feels it gets stuck in his throat. This can be solids or liquids.     He is frustrated with polypharmacy and confused on which GI related meds he should be taking.     For pain, he has been taking MS Contin 45mg TID, hydromorphone 2mg at a time, averaging one per day. The hydromorphone didn't make him feel \"fuzzy\" initially, but now he is feeling more sedated again. His wife notes that this really increased when he started Reglan last week, which has not appreciably changed his symptom burden. Pain in the hips is much better, and he now notes mostly pain in the right shoulder, has known degenerative changes to that area.     Social History:  Pertinent changes to social history/social situation since last visit: None.   Social History     Tobacco Use     Smoking status: Never Smoker     Smokeless tobacco: Never Used   Substance Use Topics     Alcohol use: None     Drug use: None              Physical Exam:   Vitals were reviewed  /62   Pulse 85   Temp 98.8  F (37.1  C) (Oral)   Resp 16   Ht 1.829 m (6')   Wt 96.6 kg (213 lb)   SpO2 94%   BMI 28.89 kg/m    General: Drowsy, comfortable appearing male in no acute distress.   Eyes: Pupils equal and 2mm, sclera clear.   ENT: MMM.   Resp: Unlabored on room air. CTAB.   Abd: Soft, non-distended, no TTP, active bowel sounds.   Ext: Right shoulder range of motion limited by pain, no TTP.    Neuro: No facial asymmetry.  Spontaneous movements grossly non-focal. Occasionally struggles to focus on the conversation.       Impression & Recommendations & Counseling:  Omega is a 68 year old man with metastatic renal cell carcinoma, currently on a treatment break while pursuing kyphoplasty, who is struggling with polypharmacy.  It is likely that, as the radiation improved his hip pain, he has required less opioid while his dose has remained constant.  He would benefit from de-escalation.      We discussed that his shoulder pain is likely due to arthritis " "(degenerative changes seen on last bone scan), and would not be very opioid responsive.  Will therefore taper opioids, titrating to his cancer associated pains.  Will start with MS Contin de-escalation.  Omega may also need an opioid rotation in the future.  I hesitate to do that today as he will be going for surgery in the coming days.  Oxycodone previously made his confused. MSContin seems to be making him drowsy.  Had fentanyl patches previously which were well tolerated.  I would therefore recommend a rotation to fentanyl next if needed.      Much of today's visit was spent reviewing Omega's medications and clarifying recent changes.  His sedation increased after starting Reglan which has not been clearly helpful for his abdominal complaints, so I recommend that he decreases and then stops this if no changes in his symptoms.     1. Stop Flonase.   2. Cut down on the MS Contin to 45mg in the morning, 30 in the afternoon and evening.  If after 1-2 days, pain is unchanged, then go down to 30mg three times daily.   3. Cut down on the Reglan to 2x daily for a few days.  If no change in \"sour stomach,\" okay to stop.   4. Increase senna to 5-6 tabs daily (can split between two doses).     RTC 3 weeks for a follow up.     Additional information reviewed today:   No Known Allergies  Current Outpatient Medications   Medication Sig Dispense Refill     acetaminophen (TYLENOL) 500 MG tablet Take 1,000 mg by mouth 3 times daily        calcium carbonate 500 mg, elemental, (OSCAL 500) 1250 (500 Ca) MG TABS tablet Take 1 tablet by mouth 2 times daily       Dextromethorphan-guaiFENesin (MUCINEX DM)  MG 12 hr tablet Take 1 tablet by mouth every 12 hours       diazepam (VALIUM) 5 MG tablet Take 1 pill 30 min before MRI and a second pill 2 minutes prior. 10 tablet 0     DULoxetine (CYMBALTA) 60 MG capsule Take 1 capsule (60 mg) by mouth daily 90 capsule 3     fluticasone (FLONASE) 50 MCG/ACT nasal spray Spray 1 spray into both " nostrils daily 16 g 11     fluticasone (FLONASE) 50 MCG/ACT nasal spray Spray 1 spray into both nostrils daily 16 g 2     furosemide (LASIX) 20 MG tablet Take 1 tablet (20 mg) by mouth daily as needed 14 tablet 0     HYDROmorphone (DILAUDID) 2 MG tablet Take 1-2 tablets (2-4 mg) by mouth every 3 hours as needed for pain 60 tablet 0     lidocaine (LIDODERM) 5 % patch Place 1-3 patches onto the skin every 24 hours Apply to painful areas for 12 hours on, 12 hours off. 30 patch 3     loratadine (CLARITIN) 10 MG tablet Take 10 mg by mouth daily       medical cannabis (Patient's own supply.  Not a prescription) 1 Dose See Admin Instructions (This is NOT a prescription, and does not certify that the patient has a qualifying medical condition for medical cannabis.  The purpose of this order is  to document that the patient reports taking medical cannabis.)       melatonin 3 MG tablet Take 3 mg by mouth nightly as needed        metoclopramide (REGLAN) 5 MG tablet Take 1 tablet (5 mg) by mouth 4 times daily (before meals and nightly) 150 tablet 0     morphine (MS CONTIN) 15 MG CR tablet TAKE 1 TABLET (15 MG) BY MOUTH EVERY 8 HOURS. COMBINE 30 MG TABLET FOR TOTAL OF 45 MG EVERY 8 HOURS. 90 tablet 0     morphine (MS CONTIN) 15 MG CR tablet Take 1 tablet (15 mg) by mouth every 8 hours Combine with 30mg tab for total of 45mg every 8 hours. 90 tablet 0     morphine (MS CONTIN) 30 MG CR tablet Take 1 tablet (30 mg) by mouth every 8 hours Combine with 15mg tab for total of 45mg every 8 hours. 90 tablet 0     omeprazole (PRILOSEC) 40 MG DR capsule Take 1 capsule (40 mg) by mouth daily 90 capsule 3     polyethylene glycol (MIRALAX/GLYCOLAX) Packet Take 0.5 packets by mouth daily as needed       pregabalin (LYRICA) 50 MG capsule Take 1 capsule (50 mg) by mouth 2 times daily 60 capsule 1     senna-docusate (SENOKOT-S;PERICOLACE) 8.6-50 MG per tablet Take 2 tablets by mouth 2 times daily       Vitamin D, Cholecalciferol, 1000 units TABS  "Take 2,000 Units by mouth daily        everolimus (AFINITOR) 5 MG tablet CHEMO Take 1 tablet (5 mg) by mouth daily (Patient not taking: Reported on 2019) 30 tablet 0     Lenvatinib 18 MG, 10 mg + 2 x 4 mg capsules, (LENVIMA) 18 mg combo capsule CHEMOTHERAPY Take 18 mg dose (10 mg plus two 4 mg capsules) by mouth daily. (Patient not taking: Reported on 2019) 90 capsule 0     ondansetron (ZOFRAN) 8 MG tablet Take 1 tablet (8 mg) by mouth every 8 hours as needed (nausea and vomiting) Take prior to each lenvatinib dose. (Patient not taking: Reported on 2019) 28 tablet 5     Past Medical History:   Diagnosis Date     Bone metastasis (H) 2018     Renal cell carcinoma, right (H) 2018     No past surgical history on file.    Key Data Reviewed:  LABS:   Lab Results   Component Value Date    WBC 7.9 2019    HGB 9.7 (L) 2019    HCT 32.5 (L) 2019     2019     2019    POTASSIUM 3.5 2019    CHLORIDE 106 2019    CO2 28 2019    BUN 7 2019    CR 0.64 (L) 2019     (H) 2019    SED 87 (H) 2019    AST 23 2019    ALT 16 2019    ALKPHOS 115 2019    BILITOTAL 0.4 2019    INR 1.3 (A) 2018     IMAGING:   CT C/A/P 19  \"IMPRESSION:  In this patient with history of metastatic osseous involvement of the  spine and pelvis with likely primary renal cell carcinoma without  primary renal mass on imagin. Increased bilateral posterior lower lobe opacities and tree-in-bud  nodularity most compatible with infectious/inflammatory etiology, new  since 2019. Aspiration should also be considered given thickening  of the airways debris in the right lower lobar airways. Cannot  completely exclude underlying metastatic disease, recommend follow-up  to resolution.  2. A few small and indeterminate pulmonary nodules are stable compared  to prior. These can be reassessed at follow-up.  3. Essentially " "stable metastatic sclerotic lesions throughout the  axial and appendicular skeleton correlating with nuclear medicine bone  scan 7/9/2019, as described above.  4. Slightly increased size of now borderline enlarged lymph nodes in  the mediastinum and measured above, attention at follow-up would be  beneficial.  5. No primary or renal mass identified.  6. Small intramuscular metastases involving the left radius jaye  muscle belly. Alternatively, this could represent soft tissue  extension of above bony metastases.\"    [place]  - Use of controlled substances consistent with history.     Thank you for continuing to involve me in the care of this patient.     Carly Mills MD / Palliative Medicine / Pager [phone] / After-Hours Answering Service [phone] / Main Palliative Clinic - [facility] [phone] / [facility] Inpatient Team Consult Pager [phone] (answered 8am-430pm M-F)    Time spent: 47 minutes, >50% spent in counseling/coordination of care.    "

## 2019-08-14 ENCOUNTER — TRANSFERRED RECORDS (OUTPATIENT)
Dept: HEALTH INFORMATION MANAGEMENT | Facility: CLINIC | Age: 69
End: 2019-08-14

## 2019-08-14 ENCOUNTER — TELEPHONE (OUTPATIENT)
Dept: ONCOLOGY | Facility: CLINIC | Age: 69
End: 2019-08-14

## 2019-08-14 NOTE — ORAL ONC MGMT
Oral Chemotherapy Monitoring Program    I called patient to see where he was at in his radiation schedule as our communication was that patient was to start Lenvima/Affinitor following radiation.  Patient stated this was his last dose of radiation.  He also informed me that he is scheduled for back surgery next Wednesday (8/21).  Patient did state he would need a few days to recover from radiation prior to him wanting to start the chemotherapy. I told him I would send a message to Dr. Anne/Radha on when they want him to start and we would get back to him.    He thanked me for the call,    Vaishnavi Flores, Pharm.D., [facility]  [phone]  08/14/19

## 2019-08-14 NOTE — ORAL ONC MGMT
Oral Chemotherapy Monitoring Program    I followed back up with Omega as Radha thought it would be best for him to be seen by herself/ the last week of August.  Omega stated that would be fine.  Omega knows not to start taking the medication until after seen. I told him Dr. Dayana Self's CC would follow up with dates for him.    He thanked me for the call,    Vaishnavi Flores, Pharm.D., [facility]  [phone]  08/14/19

## 2019-08-15 ENCOUNTER — TELEPHONE (OUTPATIENT)
Dept: ONCOLOGY | Facility: CLINIC | Age: 69
End: 2019-08-15

## 2019-08-15 ENCOUNTER — PATIENT OUTREACH (OUTPATIENT)
Dept: ONCOLOGY | Facility: CLINIC | Age: 69
End: 2019-08-15

## 2019-08-15 ENCOUNTER — TELEPHONE (OUTPATIENT)
Dept: ORTHOPEDICS | Facility: CLINIC | Age: 69
End: 2019-08-15

## 2019-08-15 ENCOUNTER — ANESTHESIA EVENT (OUTPATIENT)
Dept: SURGERY | Facility: CLINIC | Age: 69
End: 2019-08-15

## 2019-08-15 ENCOUNTER — OFFICE VISIT (OUTPATIENT)
Dept: SURGERY | Facility: CLINIC | Age: 69
End: 2019-08-15
Payer: MEDICARE

## 2019-08-15 VITALS
TEMPERATURE: 99 F | RESPIRATION RATE: 19 BRPM | OXYGEN SATURATION: 93 % | DIASTOLIC BLOOD PRESSURE: 74 MMHG | HEART RATE: 74 BPM | SYSTOLIC BLOOD PRESSURE: 133 MMHG | HEIGHT: 72 IN | BODY MASS INDEX: 28.7 KG/M2 | WEIGHT: 211.9 LBS

## 2019-08-15 DIAGNOSIS — Z01.818 PREOP EXAMINATION: Primary | ICD-10-CM

## 2019-08-15 DIAGNOSIS — Z01.818 PRE-OP EXAMINATION: Primary | ICD-10-CM

## 2019-08-15 ASSESSMENT — ENCOUNTER SYMPTOMS: SEIZURES: 0

## 2019-08-15 ASSESSMENT — LIFESTYLE VARIABLES: TOBACCO_USE: 0

## 2019-08-15 ASSESSMENT — COPD QUESTIONNAIRES: COPD: 0

## 2019-08-15 ASSESSMENT — MIFFLIN-ST. JEOR: SCORE: 1754.17

## 2019-08-15 ASSESSMENT — PAIN SCALES - GENERAL: PAINLEVEL: MODERATE PAIN (5)

## 2019-08-15 NOTE — TELEPHONE ENCOUNTER
Spine surgery scheduled for next week 8-21-19.  Received call from Hattie LARA in PAC who did his H&P that pt. Still has Pneumonia even after treated with Amoxicillin for 10 days by primary MD closer to home.  She reviewed with  Anesthesia & they advised canceling surgery until retreated by primary MD again at home & until resolved.   I called   who agreed & canceled surgery.  Informed surgery scheduler Harini who will cancel & inform our surgery scheduler Amisha.    I called  Pt & wife who understood & they know to call us back when primary MD says Pneumonia is resolved.  T.O.ROLIVE./Vaishnavi Smith RN,.

## 2019-08-15 NOTE — TELEPHONE ENCOUNTER
Wife called and said they needed to cancel all appointment up until 9/3. Will call back and let us know if they will keep appointments for 9/3 and 9/4.   Gracie Mantilla

## 2019-08-15 NOTE — PROGRESS NOTES
Preoperative Assessment Center medication history for August 15, 2019 is complete.    See Epic admission navigator for prior to admission medications.   Operating room staff will still need to confirm medications and last dose information on day of surgery.     Medication history interview sources:   Patient, spouse, medication list    Changes made to PTA medication list (reason)  Added: None  Deleted: None  Changed: MS Contin dose decreased.    Additional medication history information (including reliability of information, actions taken by pharmacist): Patient has not yet started everolimus and lenvatinib.    -- No recent (within 30 days) course of antibiotics  -- No recent (within 30 days) course of steroids  -- No recent (within 30 days) chronic daily medications stopped   -- Patient declines being on any other prescription or over-the-counter medications    Prior to Admission medications    Medication Sig Last Dose Taking? Auth Provider   acetaminophen (TYLENOL) 500 MG tablet Take 1,000 mg by mouth 3 times daily  Taking Yes Reported, Patient   calcium carbonate 500 mg, elemental, (OSCAL 500) 1250 (500 Ca) MG TABS tablet Take 1 tablet by mouth 2 times daily Taking Yes Reported, Patient   Dextromethorphan-guaiFENesin (MUCINEX DM)  MG 12 hr tablet Take 1 tablet by mouth every 12 hours Taking Yes Reported, Patient   DULoxetine (CYMBALTA) 60 MG capsule Take 1 capsule (60 mg) by mouth daily Taking Yes Carly Mills MD   HYDROmorphone (DILAUDID) 2 MG tablet Take 1-2 tablets (2-4 mg) by mouth every 3 hours as needed for pain Taking Yes Carly Mills MD   lidocaine (LIDODERM) 5 % patch Place 1-3 patches onto the skin every 24 hours Apply to painful areas for 12 hours on, 12 hours off. Taking Yes Abigail Barrios PA   loratadine (CLARITIN) 10 MG tablet Take 10 mg by mouth daily Taking Yes Reported, Patient   medical cannabis (Patient's own supply.  Not a prescription) 1 Dose See Admin  Instructions (This is NOT a prescription, and does not certify that the patient has a qualifying medical condition for medical cannabis.  The purpose of this order is  to document that the patient reports taking medical cannabis.) Taking Yes Reported, Patient   melatonin 3 MG tablet Take 3 mg by mouth nightly as needed  Taking Yes Reported, Patient   metoclopramide (REGLAN) 5 MG tablet Take 1 tablet (5 mg) by mouth 4 times daily (before meals and nightly)  Patient taking differently: Take 5 mg by mouth 2 times daily  Taking Yes Hua Anne MD   morphine (MS CONTIN) 30 MG CR tablet Take 1 tablet (30 mg) by mouth every 8 hours Combine with 15mg tab for total of 45mg every 8 hours.  Patient taking differently: Takes 45mg in the AM, 30 mg midday and 30mg at bedtime. Taking Yes Carly Mills MD   omeprazole (PRILOSEC) 40 MG DR capsule Take 1 capsule (40 mg) by mouth daily Taking Yes Hua Anne MD   polyethylene glycol (MIRALAX/GLYCOLAX) Packet Take 0.5 packets by mouth daily as needed Taking Yes Reported, Patient   pregabalin (LYRICA) 50 MG capsule Take 1 capsule (50 mg) by mouth 2 times daily Taking Yes Ruthie St PA-C   senna-docusate (SENOKOT-S;PERICOLACE) 8.6-50 MG per tablet Take 2 tablets by mouth 2 times daily Taking Yes Reported, Patient   Vitamin D, Cholecalciferol, 1000 units TABS Take 2,000 Units by mouth daily  Taking Yes Reported, Patient   diazepam (VALIUM) 5 MG tablet Take 1 pill 30 min before MRI and a second pill 2 minutes prior.   Ruthie St PA-C   everolimus (AFINITOR) 5 MG tablet CHEMO Take 1 tablet (5 mg) by mouth daily  Patient not taking: Reported on 8/7/2019 Not Taking  Hua Anne MD   furosemide (LASIX) 20 MG tablet Take 1 tablet (20 mg) by mouth daily as needed  Patient not taking: Reported on 8/15/2019 Not Taking  Ruthie St PA-C   Lenvatinib 18 MG, 10 mg + 2 x 4 mg capsules, (LENVIMA) 18 mg combo capsule CHEMOTHERAPY Take 18  mg dose (10 mg plus two 4 mg capsules) by mouth daily.  Patient not taking: Reported on 8/7/2019 Not Taking  Hua Anne MD   ondansetron (ZOFRAN) 8 MG tablet Take 1 tablet (8 mg) by mouth every 8 hours as needed (nausea and vomiting) Take prior to each lenvatinib dose.  Patient not taking: Reported on 8/15/2019 Not Taking  Ruthie St PA-C         Medication history completed by: Shruti Smith RPH

## 2019-08-15 NOTE — PROGRESS NOTES
Oncology RN Care Coordination Note:     Patient stopped by clinic stating his back surgery has been cancelled as he has pneumonia.     He will update us when it is rescheduled and when he is off of antibiotics and when he can resume chemotherapy.     Clair López RN BSN   [facility]  Nurse Coordinator

## 2019-08-15 NOTE — PHARMACY - PREOPERATIVE ASSESSMENT CENTER
Pain Medication Clarification Note - PAC Pharmacist  Javon Bianchi was seen and interviewed during time of PAC Clinic appointment on August 15, 2019 in preparation for the planned procedure with Dr Brower on 8/21/19 at [facility] for Kyphoplasty-Radio Frequency Ablation  Lumbar 1, Minimally Invasive Surgery Left Hemilaminectomy Lumbar 3-4.     The current plan is for the procedure to be a same day procedure with patient discharging home after the procedure.  The purpose of this note is to verify the patient's home pain regimen.  If the plan changes and the patient is to be admitted to inpatient status postoperatively and primary team would like assistance with managing postoperative pain please consult the inpatient pain management service for specific pain management recommendations (available at [phone] from 8 AM - 3 PM Mon - Fri and available via phone answering service 24/7 at [phone]).     Based on [place] Prescription Monitoring Profile and patient interview:     - OUTPATIENT MEDICATIONS (related to pain management):  -- Long-acting opioid: MS Contin [id number] (dose was decreased this week).  -- Short-acting opioid: Hydromorphone 2-4mg po q3h prn, uses ~ 2 x 2mg per day  -- Intrathecal pump: none  -- Oral adjuvant(s): Pregabalin 50mg po bid  -- Topicals: none  -- Bowel Regimen: Senna S, polyethylene glycol   -- Other relevant medications: Duloxetine 60mg po daily.    Average daily oral morphine equivalent (OME): 120 mg of OME/day     Verbal consent was given by patient to access pharmacy records and [place] Prescription Monitoring Profile: Yes    If Javon Bianchi is admitted the inpatient pain service will follow up on patient after consult is placed and offer further recommendations for pain management.  If immediate assistance is needed please contact the Inpatient Pain Management Service: Call [phone] after hours, weekends and holidays or page [phone] from 8 AM - 3 PM Mon -  Fri.    Shruti Smith, PharmD, MS  August 15, 2019  2:34 PM

## 2019-08-15 NOTE — H&P
Pre-Operative H & P     CC:  Preoperative exam to assess for increased cardiopulmonary risk while undergoing surgery and anesthesia.    Date of Encounter: 8/15/2019  Primary Care Physician:  Carly Mills     Reason for visit: pre operative examination, metastatic cancer to bone     HPI  Javon Bianchi is a 69 year old male who presents for pre-operative H & P in preparation for Kyphoplasty-RFA L1; MIS left hemilaminectomy L3-4 with Dr. Brower on 8/21/19 at [facility].     The patient is a 69 year old man who developed back pain in 5/2018. He continued to have pain despite treatment. He was seen by neurology and an MRI of the spine was obtained which showed a pathologic fracture of L3. Biopsy showed poorly differentiated carcinoma. He was found to have most likely renal cell carcinoma. The patient has done chemotherapy and radiation therapy. He continues to have arthralgias and pain. He met with Leonarda on 8/8/19 and they disussed his treatment options. He is now scheduled for the procedure as above.     History is obtained from the patient and chart review    Past Medical History  Past Medical History:   Diagnosis Date     Anemia      Bone metastasis (H) 09/28/2018     Cough      Pulmonary nodule      Renal cell carcinoma, right (H) 09/28/2018       Past Surgical History  Past Surgical History:   Procedure Laterality Date     CHOLECYSTECTOMY       HC REMOVAL HEEL SPUR, CALCANEUS  2004       Hx of Blood transfusions/reactions: none     Hx of abnormal bleeding or anti-platelet use: none    Menstrual history: No LMP for male patient.:     Steroid use in the last year: Prednisone    Personal or FH with difficulty with Anesthesia:  denies    Prior to Admission Medications  Current Outpatient Medications   Medication Sig Dispense Refill     acetaminophen (TYLENOL) 500 MG tablet Take 1,000 mg by mouth 3 times daily        calcium carbonate 500 mg, elemental,  (OSCAL 500) 1250 (500 Ca) MG TABS tablet Take 1 tablet by mouth 2 times daily       Dextromethorphan-guaiFENesin (MUCINEX DM)  MG 12 hr tablet Take 1 tablet by mouth every 12 hours       diazepam (VALIUM) 5 MG tablet Take 1 pill 30 min before MRI and a second pill 2 minutes prior. 10 tablet 0     DULoxetine (CYMBALTA) 60 MG capsule Take 1 capsule (60 mg) by mouth daily 90 capsule 3     everolimus (AFINITOR) 5 MG tablet CHEMO Take 1 tablet (5 mg) by mouth daily (Patient not taking: Reported on 8/7/2019) 30 tablet 0     furosemide (LASIX) 20 MG tablet Take 1 tablet (20 mg) by mouth daily as needed (Patient not taking: Reported on 8/15/2019) 14 tablet 0     HYDROmorphone (DILAUDID) 2 MG tablet Take 1-2 tablets (2-4 mg) by mouth every 3 hours as needed for pain 60 tablet 0     Lenvatinib 18 MG, 10 mg + 2 x 4 mg capsules, (LENVIMA) 18 mg combo capsule CHEMOTHERAPY Take 18 mg dose (10 mg plus two 4 mg capsules) by mouth daily. (Patient not taking: Reported on 8/7/2019) 90 capsule 0     lidocaine (LIDODERM) 5 % patch Place 1-3 patches onto the skin every 24 hours Apply to painful areas for 12 hours on, 12 hours off. 30 patch 3     loratadine (CLARITIN) 10 MG tablet Take 10 mg by mouth daily       medical cannabis (Patient's own supply.  Not a prescription) 1 Dose See Admin Instructions (This is NOT a prescription, and does not certify that the patient has a qualifying medical condition for medical cannabis.  The purpose of this order is  to document that the patient reports taking medical cannabis.)       melatonin 3 MG tablet Take 3 mg by mouth nightly as needed        metoclopramide (REGLAN) 5 MG tablet Take 1 tablet (5 mg) by mouth 4 times daily (before meals and nightly) (Patient taking differently: Take 5 mg by mouth 2 times daily ) 150 tablet 0     morphine (MS CONTIN) 30 MG CR tablet Take 1 tablet (30 mg) by mouth every 8 hours Combine with 15mg tab for total of 45mg every 8 hours. (Patient taking differently:  Takes 45mg in the AM, 30 mg midday and 30mg at bedtime.) 90 tablet 0     omeprazole (PRILOSEC) 40 MG DR capsule Take 1 capsule (40 mg) by mouth daily 90 capsule 3     ondansetron (ZOFRAN) 8 MG tablet Take 1 tablet (8 mg) by mouth every 8 hours as needed (nausea and vomiting) Take prior to each lenvatinib dose. (Patient not taking: Reported on 8/15/2019) 28 tablet 5     polyethylene glycol (MIRALAX/GLYCOLAX) Packet Take 0.5 packets by mouth daily as needed       pregabalin (LYRICA) 50 MG capsule Take 1 capsule (50 mg) by mouth 2 times daily 60 capsule 1     senna-docusate (SENOKOT-S;PERICOLACE) 8.6-50 MG per tablet Take 2 tablets by mouth 2 times daily       Vitamin D, Cholecalciferol, 1000 units TABS Take 2,000 Units by mouth daily          Allergies  No Known Allergies    Social History  Social History     Socioeconomic History     Marital status:      Spouse name: Not on file     Number of children: Not on file     Years of education: Not on file     Highest education level: Not on file   Occupational History     Not on file   Social Needs     Financial resource strain: Not on file     Food insecurity:     Worry: Not on file     Inability: Not on file     Transportation needs:     Medical: Not on file     Non-medical: Not on file   Tobacco Use     Smoking status: Never Smoker     Smokeless tobacco: Never Used   Substance and Sexual Activity     Alcohol use: Not Currently     Drug use: Yes     Types: Marijuana     Comment: medical cannabis      Sexual activity: Not on file   Lifestyle     Physical activity:     Days per week: Not on file     Minutes per session: Not on file     Stress: Not on file   Relationships     Social connections:     Talks on phone: Not on file     Gets together: Not on file     Attends Hoahaoism service: Not on file     Active member of club or organization: Not on file     Attends meetings of clubs or organizations: Not on file     Relationship status: Not on file     Intimate  "partner violence:     Fear of current or ex partner: Not on file     Emotionally abused: Not on file     Physically abused: Not on file     Forced sexual activity: Not on file   Other Topics Concern     Not on file   Social History Narrative     Not on file       Family History  Family History   Problem Relation Age of Onset     Lung Cancer Maternal Grandmother        Review of Systems  ROS/MED HX    ENT/Pulmonary:     (+), recent URI unresolved 8/8/19: . Other pulmonary disease pulmonary nodule, productive cough, wheezing .   (-) tobacco use, asthma and COPD   Neurologic:  - neg neurologic ROS    (-) seizures, CVA and TIA   Cardiovascular:     (+) ----. : . . . :. . Previous cardiac testing Echodate:2017results:Stress Testdate:2017 results:ECG reviewed date:3/14/19 results: date: results:          METS/Exercise Tolerance:  3 - Able to walk 1-2 blocks without stopping   Hematologic:     (+) Anemia, -     (-) history of blood clots and History of Transfusion   Musculoskeletal: Comment: Edema of left leg  (+) arthritis,  other musculoskeletal- compression fracture L3, left leg/hip pain, shoulder pain       GI/Hepatic: Comment: Upset stomach, difficulty swallowing foods - eating soft diet only, decreased appetite        Renal/Genitourinary:  - ROS Renal section negative       Endo:  - neg endo ROS       Psychiatric:     (+) psychiatric history anxiety      Infectious Disease:  - neg infectious disease ROS       Malignancy:   (+) Malignancy History of Other  Other CA Renal cell carcinoma metastatic to bone Active status post Chemo and Radiation         Other:    (+) H/O Chronic Pain,H/O chronic opiod use , no other significant disability          The complete review of systems is negative other than noted in the HPI or here.   Temp: 99  F (37.2  C) Temp src: Oral BP: 133/74 Pulse: 74   Resp: 19 SpO2: 93 %         211 lbs 14.4 oz  6' 0\"   Body mass index is 28.74 kg/m .       Physical Exam  Constitutional: Awake, alert, " cooperative, frail, and appears stated age.  Eyes: Pupils equal, round and reactive to light, extra ocular muscles intact, sclera clear, conjunctiva normal.  HENT: Normocephalic, oral pharynx with moist mucus membranes, good dentition. No goiter appreciated.   Respiratory: Rhonchi in bilateral lower lobes.  Cardiovascular: Regular rate and rhythm, normal S1 and S2, and no murmur noted.  Carotids +2, no bruits. No edema in UE, LE wrapped. Palpable pulses to radial  DP and PT arteries.   GI: Normal bowel sounds, soft, non-distended, non-tender, no masses palpated, no hepatosplenomegaly.  Surgical scars: well healed  Lymph/Hematologic: No cervical lymphadenopathy and no supraclavicular lymphadenopathy.  Genitourinary:  defer  Skin: Warm and dry.  No rashes at anticipated surgical site.   Musculoskeletal: Limited ROM of neck. There is no redness, warmth, or swelling of the joints. Gross motor strength is decreased.    Neurologic: Awake, alert, oriented to name, place and time. Cranial nerves II-XII are grossly intact. Gait is impaired - using cane.   Neuropsychiatric: Calm, cooperative. Normal affect.     Labs: (personally reviewed)  Results for RICHARD MUNOZ (MRN [id number]) as of 8/15/2019 17:13   Ref. Range 8/7/2019 15:22   Sodium Latest Ref Range: 133 - 144 mmol/L 138   Potassium Latest Ref Range: 3.4 - 5.3 mmol/L 3.5   Chloride Latest Ref Range: 94 - 109 mmol/L 106   Carbon Dioxide Latest Ref Range: 20 - 32 mmol/L 28   Urea Nitrogen Latest Ref Range: 7 - 30 mg/dL 7   Creatinine Latest Ref Range: 0.66 - 1.25 mg/dL 0.64 (L)   GFR Estimate Latest Ref Range: >60 mL/min/1.73_m2 >90   GFR Estimate If Black Latest Ref Range: >60 mL/min/1.73_m2 >90   Calcium Latest Ref Range: 8.5 - 10.1 mg/dL 8.0 (L)   Anion Gap Latest Ref Range: 3 - 14 mmol/L 4   Magnesium Latest Ref Range: 1.6 - 2.3 mg/dL 1.9   Albumin Latest Ref Range: 3.4 - 5.0 g/dL 2.8 (L)   Protein Total Latest Ref Range: 6.8 - 8.8 g/dL 6.2 (L)   Bilirubin Total  Latest Ref Range: 0.2 - 1.3 mg/dL 0.4   Alkaline Phosphatase Latest Ref Range: 40 - 150 U/L 115   ALT Latest Ref Range: 0 - 70 U/L 16   AST Latest Ref Range: 0 - 45 U/L 23   Amylase Latest Ref Range: 30 - 110 U/L 27 (L)   Lipase Latest Ref Range: 73 - 393 U/L 85   TSH Latest Ref Range: 0.40 - 4.00 mU/L 1.53   Glucose Latest Ref Range: 70 - 99 mg/dL 104 (H)   WBC Latest Ref Range: 4.0 - 11.0 10e9/L 7.9   Hemoglobin Latest Ref Range: 13.3 - 17.7 g/dL 9.7 (L)   Hematocrit Latest Ref Range: 40.0 - 53.0 % 32.5 (L)   Platelet Count Latest Ref Range: 150 - 450 10e9/L 252   RBC Count Latest Ref Range: 4.4 - 5.9 10e12/L 3.33 (L)   MCV Latest Ref Range: 78 - 100 fl 98   MCH Latest Ref Range: 26.5 - 33.0 pg 29.1   MCHC Latest Ref Range: 31.5 - 36.5 g/dL 29.8 (L)   RDW Latest Ref Range: 10.0 - 15.0 % 17.7 (H)   Diff Method Unknown Manual Differential   % Neutrophils Latest Units: % 65.2   % Lymphocytes Latest Units: % 2.6   % Monocytes Latest Units: % 32.2   % Eosinophils Latest Units: % 0.0   % Basophils Latest Units: % 0.0   Nucleated RBCs Latest Ref Range: 0 /100 1 (H)   Absolute Neutrophil Latest Ref Range: 1.6 - 8.3 10e9/L 5.2   Absolute Lymphocytes Latest Ref Range: 0.8 - 5.3 10e9/L 0.2 (L)   Absolute Monocytes Latest Ref Range: 0.0 - 1.3 10e9/L 2.5 (H)   Absolute Eosinophils Latest Ref Range: 0.0 - 0.7 10e9/L 0.0   Absolute Basophils Latest Ref Range: 0.0 - 0.2 10e9/L 0.0   Absolute Nucleated RBC Unknown 0.1   Anisocytosis Unknown Slight   Polychromasia Unknown Slight   Platelet Estimate Unknown Confirming automated cell count     EKG: 3/14/19  Sinus rhythm    Stress echo 2017    CONCLUSIONS:  1. No stress echocardiographic findings to confirm myocardial ischemia.   2. Normal left ventricular size and systolic function at rest with an estimated ejection fraction of 55%.   3. Mild left ventricular hypertrophy.   4. Mild left atrial enlargement.   5. 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   6. Mild mitral regurgitation.   7. Trivial tricuspid regurgitation.   8. See previous complete transthoracic echocardiogram from 07/16/2017 for additional Doppler detail.   9. Negative stress electrocardiogram for ischemia.   10. Preserved functional aerobic capacity without symptoms of angina.     The patient's records and results personally reviewed by this provider.     Outside records reviewed from: care everywhere     ASSESSMENT and PLAN  Omega is a 69 year old man who is scheduled for kyphoplasty-radio frequency ablation lumbar 1, minimally invasive surgery left hemilaminectomy lumber 3-4 on 8/21/19 by Dr. Brower in treatment of metastatic cancer to the spine.  PAC referral for risk assessment and optimization for anesthesia with comorbid conditions of pulmonary nodules, productive cough, wheezing and bilateral posterior opacities seen on CT scan, anemia, decreased appetite, difficulty swallowing, weight loss, renal cell carcinoma with metastasis to bone, arthritis in shoulders, chronic pain, anxiety:    Pre-operative considerations:  1.  Cardiac:  Functional status- METS 3, the patient can walk 1 block or 1/4 mile with walker or cane.  Intermediate risk surgery with 0.4% (RCRI #) risk of major adverse cardiac event.   ~ The patient has no cardiac diagnosis. His METS are decreased secondary to his bone metastasis. He has had stress test in 2016 and stress echo in 2017 without ischemia. EF was 55% in 2017. He had an EKG in 3/14/19 with sinus rhythm. He denies any cardiac symptoms.     2.  Pulm:  Airway feasible.  AGNIESZKA risk: Low  ~ The patient has known stable pulmonary nodules  ~ He reports 2 months of a productive cough with mucous, wheezing and chills. He reports that he will have 1-3 times a day, 2 hour episodes of coughing up phlegm/mucuos. The worst is before he falls asleep. He was treated by his PCP for 10 days of amoxicillin and stopped on 8/5/19. He reports he felt better during that time  but his coughing is just as bad as prior. He did have a CT chest on 8/8/19 which showed bilateral posterior opacities consistent with aspiration pneumonia. He has bilateral lower lobe rhonchi on exam. In review of the patient's chart with Dr. Richards, he has had a steady decrease in his oxygen saturations as well.     3. Heme:  Anemia - hgb 9.7 on 8/7/19.     4. GI:  Risk of PONV score = 2.  If > 2, anti-emetic intervention recommended.  ~ The patient reports a decrease in appetite with increased difficulty with swallowing. He is only eating a soft diet now. He notes a 50 lbs weight loss. He denies heartburn symptoms and continues on a bowel regimen and omeprazole. He is scheduled for a swallow study tomorrow.     5. : Renal cell carcinoma - treated with radiation and Cabozantinib and Nivolumab. Followed by oncology.     6. Psych: Anxiety - continue Cymbalta.   ~ Chronic pain - the patient takes MS contin, hydromorphone, medical cannabis and Lyrica. Patient seen by PharmD today. Morphine eq= 154-218 mg    7. Musculoskeletal: Left femur metastasis - radiation therapy and has edema secondary to issues with leg - He takes lasix PRN and continues with JUVENTINO stockings and wrappings.   ~ Shoulder pain/arthritis - consideration for careful positioning to minimize discomfort.   ~ Pathologic compression fracture to L3 and spine mets - Patient uses walker or cane. Fall precautions as needed.     VTE risk: 3%    Patient was discussed with Dr Richards.    Have recommended that the patient treat his pneumonia prior to having surgery. He prefers to have this done closer to home by his PCP. The patient's surgical team has been notified and recommendation would be to wait 4 weeks after treatment before anesthesia.     Nell Schrader PA-C  Preoperative Assessment Center  [facility]  Clinic and Surgery Center  Phone: [phone]  Fax: [phone]

## 2019-08-16 ENCOUNTER — PATIENT OUTREACH (OUTPATIENT)
Dept: ONCOLOGY | Facility: CLINIC | Age: 69
End: 2019-08-16

## 2019-08-16 ENCOUNTER — CARE COORDINATION (OUTPATIENT)
Dept: ONCOLOGY | Facility: CLINIC | Age: 69
End: 2019-08-16

## 2019-08-16 NOTE — PROGRESS NOTES
Patient admitted to [facility] with pneumonia following pre op assessment with for Kyphoplasty.    Patient has successfully reduced MS Contin dose to 45mgs 30mgs and 30mgs Q8.    I will call family on Monday to further discuss his care

## 2019-08-19 ENCOUNTER — PATIENT OUTREACH (OUTPATIENT)
Dept: ONCOLOGY | Facility: CLINIC | Age: 69
End: 2019-08-19

## 2019-08-19 NOTE — PROGRESS NOTES
Oncology RN Care Coordination Note:     Writer received a message late Friday afternoon regarding this patient and cancelling his appointments with Radha St PA-C because of his surgery being postponed.  Writer had already met with the patient in the [facility] area on Thursday regarding this.      Patient's wife then left a voicemail for ELIZABETH Courtney regarding this same information this morning and again left this writer a voicemail this afternoon about this same concern.     Writer called her back within 10 minutes of her voicemail but she did not answer.     Writer left a detailed voicemail stating the appointments had already been cancelled per their request when they were in clinic on 8/15/19, they would notify when his surgery is rescheduled and call the clinic coordinators to reschedule those appointments.  Patient has been hospitalized for his pneumonia now (on Friday 8/16) and will be for 7-10 from that date.     In the voicemail writer made it clear patient needs to call and schedule appointments according to his surgery schedule and when he will be done with his antibiotics.      Clair López, RN BSN   [facility]  Nurse Coordinator

## 2019-08-22 ENCOUNTER — PATIENT OUTREACH (OUTPATIENT)
Dept: ONCOLOGY | Facility: CLINIC | Age: 69
End: 2019-08-22

## 2019-08-22 NOTE — PROGRESS NOTES
Oncology RN Care Coordination Note:     Hermilor received another voicemail from patient's wife wondering if patient's appointments had been cancelled or not.      Writer called patient back and explained, his appointments were cancelled on the day they were here in person at the [facility] per his request.  They were going to update writer when he was done with antibiotics and his back surgery to resume treatment.      However, now that patient has been hospitalized for pneumonia he is wondering if he should skip back surgery and resume treatment.   has sent a message to Dr. Anne and Radha St PA-C with patient's question.    Clair López, RN BSN   [facility]  Nurse Coordinator

## 2019-08-26 ENCOUNTER — PATIENT OUTREACH (OUTPATIENT)
Dept: CARE COORDINATION | Facility: CLINIC | Age: 69
End: 2019-08-26

## 2019-08-26 ENCOUNTER — CARE COORDINATION (OUTPATIENT)
Dept: ONCOLOGY | Facility: CLINIC | Age: 69
End: 2019-08-26

## 2019-08-26 ENCOUNTER — PATIENT OUTREACH (OUTPATIENT)
Dept: ONCOLOGY | Facility: CLINIC | Age: 69
End: 2019-08-26

## 2019-08-26 NOTE — PROGRESS NOTES
Patients wife called to report that Omega had just been released from hospital after an extended stay with a diagnosis of pneumonia.    While inpatient his MS Contin was increased to 45mgs Q8 as he was unable to use his Cannabis  Since discharge he has returned to 45mgs in the AM and 30 mgs at Lunchtime and in the evening     Omega will restart Cannabis tonight and has been using Dilaudid prn for breakthrough pain which he was not using inpatient     Patient was using probiotics as an inpatient and wonders whether to continue these upon discharge    I will forward this information to Dr Mills for next steps

## 2019-08-26 NOTE — PROGRESS NOTES
Oncology RN Care Coordination Note:     Patient's wife left writer a 3 minute voicemail stating patient has been admitted to [facility] on 8/15 and discharged on 8/25, he was given IV antibiotics and oral Bactrim and Levofloxacin upon discharge for pneumonia.     Next available appointment with Radha St PA-C or Dr. Anne is on 9/4/19.     Clair López RN BSN   [facility]  Nurse Coordinator

## 2019-08-26 NOTE — PROGRESS NOTES
Per CC's request, completed and faxed [facility] (Ph. [phone], F. [phone]) request for lodging dates 9/3/2019 - 9/4/2019. [facility] will contact patient for confirmation of reservation. SW will continue to provide support as needed.        JIL Hatfield, MSW   - [facility]  Phone: [phone]  Pager: [phone]  8/26/2019

## 2019-08-28 ENCOUNTER — CARE COORDINATION (OUTPATIENT)
Dept: ONCOLOGY | Facility: CLINIC | Age: 69
End: 2019-08-28

## 2019-08-28 NOTE — PROGRESS NOTES
Call made to check on Omega's symptoms    Omega states that he has restarted his Cannabis after discharging from hospital and is taking MS Contin 45mgs in the morning 30 mgs at lunchtime and 30mgs in the evening.    He states that the pain in his joints is improving with the use of the cannabis and he has been able to sleep in his bed rather than a chair since last night.    Dr Mills states that he can use a probiotic if he wishes - he reports receiving this in hospital as he was receiving IV antibiotics.     Omeag generally feels that his symptoms are improving since his discharge from hospital     Patient instructed to call if he has further concerns or questions

## 2019-08-29 DIAGNOSIS — G89.3 CANCER ASSOCIATED PAIN: ICD-10-CM

## 2019-08-29 RX ORDER — MORPHINE SULFATE 30 MG/1
TABLET, FILM COATED, EXTENDED RELEASE ORAL
Qty: 90 TABLET | Refills: 0 | Status: SHIPPED | OUTPATIENT
Start: 2019-08-29 | End: 2019-09-13

## 2019-08-29 NOTE — TELEPHONE ENCOUNTER
Received fax from pharmacy requesting refill of morphine 30mg tabs.     Last refill: 7/30/2019  Last office visit: 8/13/2019  Scheduled for follow up 9/3/2019     Will route request to MD for review.     Reviewed [place]  Report.

## 2019-09-03 ENCOUNTER — APPOINTMENT (OUTPATIENT)
Dept: GENERAL RADIOLOGY | Facility: CLINIC | Age: 69
DRG: 194 | End: 2019-09-03
Attending: EMERGENCY MEDICINE
Payer: MEDICARE

## 2019-09-03 ENCOUNTER — ONCOLOGY VISIT (OUTPATIENT)
Dept: ONCOLOGY | Facility: CLINIC | Age: 69
DRG: 194 | End: 2019-09-03
Attending: INTERNAL MEDICINE
Payer: MEDICARE

## 2019-09-03 ENCOUNTER — HOSPITAL ENCOUNTER (INPATIENT)
Facility: CLINIC | Age: 69
LOS: 4 days | Discharge: HOME | DRG: 194 | End: 2019-09-07
Attending: EMERGENCY MEDICINE | Admitting: INTERNAL MEDICINE
Payer: MEDICARE

## 2019-09-03 ENCOUNTER — APPOINTMENT (OUTPATIENT)
Dept: ULTRASOUND IMAGING | Facility: CLINIC | Age: 69
DRG: 194 | End: 2019-09-03
Attending: EMERGENCY MEDICINE
Payer: MEDICARE

## 2019-09-03 ENCOUNTER — APPOINTMENT (OUTPATIENT)
Dept: CT IMAGING | Facility: CLINIC | Age: 69
DRG: 194 | End: 2019-09-03
Attending: EMERGENCY MEDICINE
Payer: MEDICARE

## 2019-09-03 VITALS
WEIGHT: 206.8 LBS | DIASTOLIC BLOOD PRESSURE: 77 MMHG | OXYGEN SATURATION: 96 % | BODY MASS INDEX: 28.01 KG/M2 | TEMPERATURE: 98.4 F | HEIGHT: 72 IN | SYSTOLIC BLOOD PRESSURE: 116 MMHG | HEART RATE: 109 BPM

## 2019-09-03 DIAGNOSIS — Z51.5 ENCOUNTER FOR PALLIATIVE CARE: ICD-10-CM

## 2019-09-03 DIAGNOSIS — M79.89 SWELLING OF LIMB: ICD-10-CM

## 2019-09-03 DIAGNOSIS — G89.29 CHRONIC BILATERAL LOW BACK PAIN WITH LEFT-SIDED SCIATICA: ICD-10-CM

## 2019-09-03 DIAGNOSIS — J18.9 HEALTHCARE-ASSOCIATED PNEUMONIA: ICD-10-CM

## 2019-09-03 DIAGNOSIS — G89.3 CANCER ASSOCIATED PAIN: ICD-10-CM

## 2019-09-03 DIAGNOSIS — C64.9 RENAL CELL CARCINOMA, UNSPECIFIED LATERALITY (H): Primary | ICD-10-CM

## 2019-09-03 DIAGNOSIS — R05.9 COUGH: ICD-10-CM

## 2019-09-03 DIAGNOSIS — M54.42 CHRONIC BILATERAL LOW BACK PAIN WITH LEFT-SIDED SCIATICA: ICD-10-CM

## 2019-09-03 LAB
ALBUMIN SERPL-MCNC: 2.6 G/DL (ref 3.4–5)
ALP SERPL-CCNC: 94 U/L (ref 40–150)
ALT SERPL W P-5'-P-CCNC: 12 U/L (ref 0–70)
ANION GAP SERPL CALCULATED.3IONS-SCNC: 10 MMOL/L (ref 3–14)
ANISOCYTOSIS BLD QL SMEAR: SLIGHT
AST SERPL W P-5'-P-CCNC: 22 U/L (ref 0–45)
BASOPHILS # BLD AUTO: 0 10E9/L (ref 0–0.2)
BASOPHILS NFR BLD AUTO: 0 %
BILIRUB SERPL-MCNC: 0.3 MG/DL (ref 0.2–1.3)
BUN SERPL-MCNC: 13 MG/DL (ref 7–30)
CALCIUM SERPL-MCNC: 7.1 MG/DL (ref 8.5–10.1)
CHLORIDE SERPL-SCNC: 106 MMOL/L (ref 94–109)
CO2 BLDCOV-SCNC: 22 MMOL/L (ref 21–28)
CO2 SERPL-SCNC: 23 MMOL/L (ref 20–32)
CREAT SERPL-MCNC: 1.17 MG/DL (ref 0.66–1.25)
DIFFERENTIAL METHOD BLD: ABNORMAL
EOSINOPHIL # BLD AUTO: 0 10E9/L (ref 0–0.7)
EOSINOPHIL NFR BLD AUTO: 0 %
ERYTHROCYTE [DISTWIDTH] IN BLOOD BY AUTOMATED COUNT: 17.3 % (ref 10–15)
GFR SERPL CREATININE-BSD FRML MDRD: 62 ML/MIN/{1.73_M2}
GLUCOSE SERPL-MCNC: 112 MG/DL (ref 70–99)
GRAM STN SPEC: ABNORMAL
HCT VFR BLD AUTO: 30.4 % (ref 40–53)
HGB BLD-MCNC: 9.1 G/DL (ref 13.3–17.7)
LACTATE BLD-SCNC: 1.5 MMOL/L (ref 0.7–2.1)
LYMPHOCYTES # BLD AUTO: 0.1 10E9/L (ref 0.8–5.3)
LYMPHOCYTES NFR BLD AUTO: 1.8 %
Lab: ABNORMAL
MCH RBC QN AUTO: 29 PG (ref 26.5–33)
MCHC RBC AUTO-ENTMCNC: 29.9 G/DL (ref 31.5–36.5)
MCV RBC AUTO: 97 FL (ref 78–100)
MONOCYTES # BLD AUTO: 1.6 10E9/L (ref 0–1.3)
MONOCYTES NFR BLD AUTO: 21.2 %
NEUTROPHILS # BLD AUTO: 5.9 10E9/L (ref 1.6–8.3)
NEUTROPHILS NFR BLD AUTO: 77 %
NT-PROBNP SERPL-MCNC: 283 PG/ML (ref 0–900)
PCO2 BLDV: 36 MM HG (ref 40–50)
PH BLDV: 7.38 PH (ref 7.32–7.43)
PLATELET # BLD AUTO: 217 10E9/L (ref 150–450)
PLATELET # BLD EST: ABNORMAL 10*3/UL
PO2 BLDV: 53 MM HG (ref 25–47)
POTASSIUM SERPL-SCNC: 3.5 MMOL/L (ref 3.4–5.3)
PROT SERPL-MCNC: 6.2 G/DL (ref 6.8–8.8)
RBC # BLD AUTO: 3.14 10E12/L (ref 4.4–5.9)
SAO2 % BLDV FROM PO2: 87 %
SODIUM SERPL-SCNC: 139 MMOL/L (ref 133–144)
SPECIMEN SOURCE: ABNORMAL
TROPONIN I SERPL-MCNC: <0.015 UG/L (ref 0–0.04)
WBC # BLD AUTO: 7.6 10E9/L (ref 4–11)

## 2019-09-03 PROCEDURE — 96365 THER/PROPH/DIAG IV INF INIT: CPT | Mod: 59 | Performed by: EMERGENCY MEDICINE

## 2019-09-03 PROCEDURE — 93010 ELECTROCARDIOGRAM REPORT: CPT | Mod: Z6 | Performed by: EMERGENCY MEDICINE

## 2019-09-03 PROCEDURE — G0463 HOSPITAL OUTPT CLINIC VISIT: HCPCS

## 2019-09-03 PROCEDURE — 84484 ASSAY OF TROPONIN QUANT: CPT | Performed by: EMERGENCY MEDICINE

## 2019-09-03 PROCEDURE — 25000128 H RX IP 250 OP 636: Performed by: EMERGENCY MEDICINE

## 2019-09-03 PROCEDURE — 87205 SMEAR GRAM STAIN: CPT | Performed by: EMERGENCY MEDICINE

## 2019-09-03 PROCEDURE — 83605 ASSAY OF LACTIC ACID: CPT

## 2019-09-03 PROCEDURE — 80053 COMPREHEN METABOLIC PANEL: CPT | Performed by: EMERGENCY MEDICINE

## 2019-09-03 PROCEDURE — 12000001 ZZH R&B MED SURG/OB UMMC

## 2019-09-03 PROCEDURE — 83880 ASSAY OF NATRIURETIC PEPTIDE: CPT | Performed by: EMERGENCY MEDICINE

## 2019-09-03 PROCEDURE — 93971 EXTREMITY STUDY: CPT | Mod: LT

## 2019-09-03 PROCEDURE — 99215 OFFICE O/P EST HI 40 MIN: CPT | Performed by: INTERNAL MEDICINE

## 2019-09-03 PROCEDURE — 82803 BLOOD GASES ANY COMBINATION: CPT

## 2019-09-03 PROCEDURE — 71275 CT ANGIOGRAPHY CHEST: CPT

## 2019-09-03 PROCEDURE — 96367 TX/PROPH/DG ADDL SEQ IV INF: CPT | Performed by: EMERGENCY MEDICINE

## 2019-09-03 PROCEDURE — 93005 ELECTROCARDIOGRAM TRACING: CPT | Performed by: EMERGENCY MEDICINE

## 2019-09-03 PROCEDURE — 71046 X-RAY EXAM CHEST 2 VIEWS: CPT

## 2019-09-03 PROCEDURE — 99223 1ST HOSP IP/OBS HIGH 75: CPT | Mod: AI | Performed by: INTERNAL MEDICINE

## 2019-09-03 PROCEDURE — 87040 BLOOD CULTURE FOR BACTERIA: CPT | Performed by: EMERGENCY MEDICINE

## 2019-09-03 PROCEDURE — 85025 COMPLETE CBC W/AUTO DIFF WBC: CPT | Performed by: EMERGENCY MEDICINE

## 2019-09-03 PROCEDURE — 99285 EMERGENCY DEPT VISIT HI MDM: CPT | Mod: 25 | Performed by: EMERGENCY MEDICINE

## 2019-09-03 PROCEDURE — 83735 ASSAY OF MAGNESIUM: CPT | Performed by: EMERGENCY MEDICINE

## 2019-09-03 PROCEDURE — 25800030 ZZH RX IP 258 OP 636: Performed by: EMERGENCY MEDICINE

## 2019-09-03 PROCEDURE — 87070 CULTURE OTHR SPECIMN AEROBIC: CPT | Performed by: EMERGENCY MEDICINE

## 2019-09-03 PROCEDURE — 96366 THER/PROPH/DIAG IV INF ADDON: CPT | Performed by: EMERGENCY MEDICINE

## 2019-09-03 PROCEDURE — 84145 PROCALCITONIN (PCT): CPT | Performed by: EMERGENCY MEDICINE

## 2019-09-03 PROCEDURE — 84100 ASSAY OF PHOSPHORUS: CPT | Performed by: EMERGENCY MEDICINE

## 2019-09-03 RX ORDER — LORAZEPAM 0.5 MG/1
.5-1 TABLET ORAL EVERY 6 HOURS PRN
Status: DISCONTINUED | OUTPATIENT
Start: 2019-09-03 | End: 2019-09-07 | Stop reason: HOSPADM

## 2019-09-03 RX ORDER — CALCIUM CARBONATE 500(1250)
1 TABLET ORAL 2 TIMES DAILY
Status: DISCONTINUED | OUTPATIENT
Start: 2019-09-04 | End: 2019-09-07 | Stop reason: HOSPADM

## 2019-09-03 RX ORDER — DOXYCYCLINE 100 MG/10ML
100 INJECTION, POWDER, LYOPHILIZED, FOR SOLUTION INTRAVENOUS ONCE
Status: DISCONTINUED | OUTPATIENT
Start: 2019-09-03 | End: 2019-09-03

## 2019-09-03 RX ORDER — LIDOCAINE 4 G/G
1-3 PATCH TOPICAL EVERY 24 HOURS
Status: DISCONTINUED | OUTPATIENT
Start: 2019-09-04 | End: 2019-09-07 | Stop reason: HOSPADM

## 2019-09-03 RX ORDER — LIDOCAINE 40 MG/G
CREAM TOPICAL
Status: DISCONTINUED | OUTPATIENT
Start: 2019-09-03 | End: 2019-09-07 | Stop reason: HOSPADM

## 2019-09-03 RX ORDER — PROCHLORPERAZINE MALEATE 5 MG
5 TABLET ORAL EVERY 6 HOURS PRN
Status: DISCONTINUED | OUTPATIENT
Start: 2019-09-03 | End: 2019-09-07 | Stop reason: HOSPADM

## 2019-09-03 RX ORDER — CODEINE PHOSPHATE AND GUAIFENESIN 10; 100 MG/5ML; MG/5ML
10 SOLUTION ORAL EVERY 4 HOURS PRN
Status: DISCONTINUED | OUTPATIENT
Start: 2019-09-03 | End: 2019-09-07 | Stop reason: HOSPADM

## 2019-09-03 RX ORDER — LORAZEPAM 2 MG/ML
.5-1 INJECTION INTRAMUSCULAR EVERY 6 HOURS PRN
Status: DISCONTINUED | OUTPATIENT
Start: 2019-09-03 | End: 2019-09-07 | Stop reason: HOSPADM

## 2019-09-03 RX ORDER — ONDANSETRON 8 MG/1
8 TABLET, FILM COATED ORAL EVERY 8 HOURS PRN
Status: DISCONTINUED | OUTPATIENT
Start: 2019-09-03 | End: 2019-09-07 | Stop reason: HOSPADM

## 2019-09-03 RX ORDER — POLYETHYLENE GLYCOL 3350 17 G/17G
8.5-17 POWDER, FOR SOLUTION ORAL DAILY PRN
Status: DISCONTINUED | OUTPATIENT
Start: 2019-09-03 | End: 2019-09-07 | Stop reason: HOSPADM

## 2019-09-03 RX ORDER — POTASSIUM CHLORIDE 1.5 G/1.58G
20-40 POWDER, FOR SOLUTION ORAL
Status: DISCONTINUED | OUTPATIENT
Start: 2019-09-03 | End: 2019-09-07 | Stop reason: HOSPADM

## 2019-09-03 RX ORDER — PIPERACILLIN SODIUM, TAZOBACTAM SODIUM 3; .375 G/15ML; G/15ML
3.38 INJECTION, POWDER, LYOPHILIZED, FOR SOLUTION INTRAVENOUS EVERY 6 HOURS
Status: DISCONTINUED | OUTPATIENT
Start: 2019-09-04 | End: 2019-09-07 | Stop reason: HOSPADM

## 2019-09-03 RX ORDER — METOCLOPRAMIDE 5 MG/1
5 TABLET ORAL 2 TIMES DAILY
Status: DISCONTINUED | OUTPATIENT
Start: 2019-09-04 | End: 2019-09-07 | Stop reason: HOSPADM

## 2019-09-03 RX ORDER — MORPHINE SULFATE 30 MG/1
30 TABLET, FILM COATED, EXTENDED RELEASE ORAL
Status: DISCONTINUED | OUTPATIENT
Start: 2019-09-04 | End: 2019-09-07 | Stop reason: HOSPADM

## 2019-09-03 RX ORDER — PREGABALIN 25 MG/1
50 CAPSULE ORAL 2 TIMES DAILY
Status: DISCONTINUED | OUTPATIENT
Start: 2019-09-04 | End: 2019-09-07 | Stop reason: HOSPADM

## 2019-09-03 RX ORDER — DULOXETIN HYDROCHLORIDE 60 MG/1
60 CAPSULE, DELAYED RELEASE ORAL DAILY
Status: DISCONTINUED | OUTPATIENT
Start: 2019-09-04 | End: 2019-09-07 | Stop reason: HOSPADM

## 2019-09-03 RX ORDER — LANOLIN ALCOHOL/MO/W.PET/CERES
3 CREAM (GRAM) TOPICAL
Status: DISCONTINUED | OUTPATIENT
Start: 2019-09-03 | End: 2019-09-07 | Stop reason: HOSPADM

## 2019-09-03 RX ORDER — POTASSIUM CHLORIDE 750 MG/1
20-40 TABLET, EXTENDED RELEASE ORAL
Status: DISCONTINUED | OUTPATIENT
Start: 2019-09-03 | End: 2019-09-07 | Stop reason: HOSPADM

## 2019-09-03 RX ORDER — SODIUM CHLORIDE 9 MG/ML
INJECTION, SOLUTION INTRAVENOUS CONTINUOUS
Status: DISCONTINUED | OUTPATIENT
Start: 2019-09-04 | End: 2019-09-06

## 2019-09-03 RX ORDER — PIPERACILLIN SODIUM, TAZOBACTAM SODIUM 4; .5 G/20ML; G/20ML
4.5 INJECTION, POWDER, LYOPHILIZED, FOR SOLUTION INTRAVENOUS ONCE
Status: COMPLETED | OUTPATIENT
Start: 2019-09-03 | End: 2019-09-03

## 2019-09-03 RX ORDER — POTASSIUM CHLORIDE 7.45 MG/ML
10 INJECTION INTRAVENOUS
Status: DISCONTINUED | OUTPATIENT
Start: 2019-09-03 | End: 2019-09-07 | Stop reason: HOSPADM

## 2019-09-03 RX ORDER — LORATADINE 10 MG/1
10 TABLET ORAL DAILY
Status: DISCONTINUED | OUTPATIENT
Start: 2019-09-04 | End: 2019-09-07 | Stop reason: HOSPADM

## 2019-09-03 RX ORDER — ONDANSETRON 2 MG/ML
8 INJECTION INTRAMUSCULAR; INTRAVENOUS EVERY 8 HOURS PRN
Status: DISCONTINUED | OUTPATIENT
Start: 2019-09-03 | End: 2019-09-07 | Stop reason: HOSPADM

## 2019-09-03 RX ORDER — HYDROMORPHONE HYDROCHLORIDE 2 MG/1
2-4 TABLET ORAL
Status: DISCONTINUED | OUTPATIENT
Start: 2019-09-03 | End: 2019-09-07 | Stop reason: HOSPADM

## 2019-09-03 RX ORDER — ALBUTEROL SULFATE 0.83 MG/ML
3 SOLUTION RESPIRATORY (INHALATION)
Status: DISCONTINUED | OUTPATIENT
Start: 2019-09-03 | End: 2019-09-07 | Stop reason: HOSPADM

## 2019-09-03 RX ORDER — IOPAMIDOL 755 MG/ML
68 INJECTION, SOLUTION INTRAVASCULAR ONCE
Status: COMPLETED | OUTPATIENT
Start: 2019-09-03 | End: 2019-09-03

## 2019-09-03 RX ORDER — NALOXONE HYDROCHLORIDE 0.4 MG/ML
.1-.4 INJECTION, SOLUTION INTRAMUSCULAR; INTRAVENOUS; SUBCUTANEOUS
Status: DISCONTINUED | OUTPATIENT
Start: 2019-09-03 | End: 2019-09-07 | Stop reason: HOSPADM

## 2019-09-03 RX ORDER — ONDANSETRON 4 MG/1
8 TABLET, ORALLY DISINTEGRATING ORAL EVERY 8 HOURS PRN
Status: DISCONTINUED | OUTPATIENT
Start: 2019-09-03 | End: 2019-09-07 | Stop reason: HOSPADM

## 2019-09-03 RX ORDER — AMOXICILLIN 250 MG
2 CAPSULE ORAL 2 TIMES DAILY
Status: DISCONTINUED | OUTPATIENT
Start: 2019-09-04 | End: 2019-09-07 | Stop reason: HOSPADM

## 2019-09-03 RX ORDER — MORPHINE SULFATE 30 MG/1
30 TABLET, FILM COATED, EXTENDED RELEASE ORAL AT BEDTIME
Status: DISCONTINUED | OUTPATIENT
Start: 2019-09-04 | End: 2019-09-07 | Stop reason: HOSPADM

## 2019-09-03 RX ORDER — POTASSIUM CHLORIDE 29.8 MG/ML
20 INJECTION INTRAVENOUS
Status: DISCONTINUED | OUTPATIENT
Start: 2019-09-03 | End: 2019-09-07 | Stop reason: HOSPADM

## 2019-09-03 RX ORDER — ACETAMINOPHEN 500 MG
1000 TABLET ORAL 3 TIMES DAILY
Status: DISCONTINUED | OUTPATIENT
Start: 2019-09-04 | End: 2019-09-07 | Stop reason: HOSPADM

## 2019-09-03 RX ORDER — GUAIFENESIN AND DEXTROMETHORPHAN HYDROBROMIDE 600; 30 MG/1; MG/1
1 TABLET, EXTENDED RELEASE ORAL EVERY 12 HOURS
Status: DISCONTINUED | OUTPATIENT
Start: 2019-09-04 | End: 2019-09-07 | Stop reason: HOSPADM

## 2019-09-03 RX ORDER — POTASSIUM CL/LIDO/0.9 % NACL 10MEQ/0.1L
10 INTRAVENOUS SOLUTION, PIGGYBACK (ML) INTRAVENOUS
Status: DISCONTINUED | OUTPATIENT
Start: 2019-09-03 | End: 2019-09-07 | Stop reason: HOSPADM

## 2019-09-03 RX ORDER — MAGNESIUM SULFATE HEPTAHYDRATE 40 MG/ML
4 INJECTION, SOLUTION INTRAVENOUS EVERY 4 HOURS PRN
Status: DISCONTINUED | OUTPATIENT
Start: 2019-09-03 | End: 2019-09-07 | Stop reason: HOSPADM

## 2019-09-03 RX ADMIN — IOPAMIDOL 68 ML: 755 INJECTION, SOLUTION INTRAVENOUS at 19:09

## 2019-09-03 RX ADMIN — SODIUM CHLORIDE 1000 ML: 9 INJECTION, SOLUTION INTRAVENOUS at 19:38

## 2019-09-03 RX ADMIN — PIPERACILLIN SODIUM AND TAZOBACTAM SODIUM 4.5 G: 4; .5 INJECTION, POWDER, LYOPHILIZED, FOR SOLUTION INTRAVENOUS at 19:39

## 2019-09-03 RX ADMIN — VANCOMYCIN HYDROCHLORIDE 2250 MG: 10 INJECTION, POWDER, LYOPHILIZED, FOR SOLUTION INTRAVENOUS at 20:19

## 2019-09-03 ASSESSMENT — ENCOUNTER SYMPTOMS
ABDOMINAL PAIN: 0
HEADACHES: 0
FATIGUE: 1
COLOR CHANGE: 0
CONFUSION: 0
COUGH: 1
NECK STIFFNESS: 0
FEVER: 0
SHORTNESS OF BREATH: 1
ARTHRALGIAS: 0
DIFFICULTY URINATING: 0
EYE REDNESS: 0

## 2019-09-03 ASSESSMENT — MIFFLIN-ST. JEOR
SCORE: 1731.04
SCORE: 1731.04
SCORE: 1717.88

## 2019-09-03 ASSESSMENT — PAIN SCALES - GENERAL: PAINLEVEL: EXTREME PAIN (8)

## 2019-09-03 NOTE — PROGRESS NOTES
Palliative Care Outpatient Clinic Progress Note    Patient Name:  Javon Bianchi  Primary Provider:  LIANNE Wellington    Chief Complaint/Identifying Information:  Metastatic Renal Cell Carcinoma on immunotherapy  History of L3 pathologic fracture found at diagnosis   Known epidural tumor burden    [facility] Outpatient Palliative Care Opioid Prescribing Safety Plan     Opioid Safety Education: Reviewed by Nan Gonzalez  on 11/8/2018  Opioid Risk Assessment: Performed by Nan Gonzalez  on 11/8/2018 .  ORT score of 0.   Mood Disorder Assessment: Performed by Carly Mills on 04/25/19.  No concerns.    reviewed with every prescription, last reviewed by Carly Mills on 09/03/19     Additional recommendations based on patient's prognosis and indication for opioids include the following:     Expected prognosis: shorter  Risk: Low or Medium (ORT 0-7)  No further recommendations.     Interim History:  Javon Bianchi 68 year old male returns to be seen by palliative care today, accompanied by his wife Suzie .  Javon Bianchi was last seen by me three weeks ago, at which time his MS Contin was tapered.  Since he was seen by me, he was admitted to his local hospital for PNA.  He was discharged to home on 8/25/19.     Today, he tells me that all of his joints hurt again like when he was on immunotherapy in the past (isn't on that now).  This started up about a week ago. His cough was much better until this morning. He is more short of breath today and started coughing, productive today. He has been off antibiotics now for a few days. He has been hallucinating in the past 24h. Feeling really weak, needing help to get up today.  MS Contin 45/30/30, has been using hydromorphone TID.      LLE is edematous, chronically swollen from tumor burden. Has been more edematous today.     Continues to struggle with dysphagia, tells me that he had a swallow study while he was inpatient at [place].     Social  "History:  Pertinent changes to social history/social situation since last visit: None.   Social History     Tobacco Use     Smoking status: Never Smoker     Smokeless tobacco: Never Used   Substance Use Topics     Alcohol use: Not Currently     Drug use: Yes     Types: Marijuana     Comment: medical cannabis               Physical Exam:   Vitals were reviewed  /77   Pulse 109   Temp 98.4  F (36.9  C) (Oral)   Ht 1.829 m (6')   Wt 93.8 kg (206 lb 12.8 oz)   SpO2 96%   BMI 28.05 kg/m    General: Alert, fatigued and chronically ill but comfortable appearing male in no acute distress.   Eyes: Pupils equal and 2mm, sclera clear.   ENT: MMM.   Cardiac:  Tachycardic, regular.    Resp: Wet, strong cough noted during today's visit.  Crackles present to the right mid-lung field posteriorly with associated rare rhonchi.    Abd: Soft, non-distended, no TTP, active bowel sounds.   Ext: Bilateral hands with trace edema.  Left lower extremity edema > RLE.     Neuro: No facial asymmetry.  Spontaneous movements grossly non-focal. Alert and with normal appearing sensorium during my visit.       Impression & Recommendations & Counseling:  Omega is a 69 year old man with metastatic renal cell carcinoma who was just admitted for pneumonia which was not treated effectively with PO antibiotics.  He was admitted and received IV antibiotics, transitioned to PO antibiotics which he completed three days ago.  Omega's cough was initially resolved but he has been acutely dyspneic today, with return of his cough productive of green sputum.  He also has asymmetric ankle edema, tachycardia, and tells me that he has been \"hallucinating\" today (although his sensorium is normal during my visit).  I am worried about possible PE, more likely return of his PNA which required inpatient treatment in the past few weeks.      I will refer him to the ED for additional evaluation and have given sign-out to the [facility] ED staff.     Re: His pain " control, I do not know why Omega's joint pain has worsened unless it is somehow associated with his infection, or more likely due to the morphine taper we did last week.  I recommend that he is evaluated today as noted above, and we can sort through best next steps once this has been completed. Will plan to rotate to a fentanyl patch as next step.       Will plan for close phone follow up.     Additional information reviewed today:   No Known Allergies  Current Outpatient Medications   Medication Sig Dispense Refill     acetaminophen (TYLENOL) 500 MG tablet Take 1,000 mg by mouth 3 times daily        calcium carbonate 500 mg, elemental, (OSCAL 500) 1250 (500 Ca) MG TABS tablet Take 1 tablet by mouth 2 times daily       Dextromethorphan-guaiFENesin (MUCINEX DM)  MG 12 hr tablet Take 1 tablet by mouth every 12 hours       diazepam (VALIUM) 5 MG tablet Take 1 pill 30 min before MRI and a second pill 2 minutes prior. 10 tablet 0     DULoxetine (CYMBALTA) 60 MG capsule Take 1 capsule (60 mg) by mouth daily 90 capsule 3     everolimus (AFINITOR) 5 MG tablet CHEMO Take 1 tablet (5 mg) by mouth daily 30 tablet 0     furosemide (LASIX) 20 MG tablet Take 1 tablet (20 mg) by mouth daily as needed 14 tablet 0     HYDROmorphone (DILAUDID) 2 MG tablet Take 1-2 tablets (2-4 mg) by mouth every 3 hours as needed for pain 60 tablet 0     Lenvatinib 18 MG, 10 mg + 2 x 4 mg capsules, (LENVIMA) 18 mg combo capsule CHEMOTHERAPY Take 18 mg dose (10 mg plus two 4 mg capsules) by mouth daily. 90 capsule 0     lidocaine (LIDODERM) 5 % patch Place 1-3 patches onto the skin every 24 hours Apply to painful areas for 12 hours on, 12 hours off. 30 patch 3     loratadine (CLARITIN) 10 MG tablet Take 10 mg by mouth daily       medical cannabis (Patient's own supply.  Not a prescription) 1 Dose See Admin Instructions (This is NOT a prescription, and does not certify that the patient has a qualifying medical condition for medical cannabis.  The  purpose of this order is  to document that the patient reports taking medical cannabis.)       melatonin 3 MG tablet Take 3 mg by mouth nightly as needed        metoclopramide (REGLAN) 5 MG tablet Take 1 tablet (5 mg) by mouth 4 times daily (before meals and nightly) (Patient taking differently: Take 5 mg by mouth 2 times daily ) 150 tablet 0     morphine (MS CONTIN) 30 MG CR tablet Takes 45mg in the AM, 30 mg midday and 30mg at bedtime. 90 tablet 0     omeprazole (PRILOSEC) 40 MG DR capsule Take 1 capsule (40 mg) by mouth daily 90 capsule 3     ondansetron (ZOFRAN) 8 MG tablet Take 1 tablet (8 mg) by mouth every 8 hours as needed (nausea and vomiting) Take prior to each lenvatinib dose. 28 tablet 5     polyethylene glycol (MIRALAX/GLYCOLAX) Packet Take 0.5 packets by mouth daily as needed       pregabalin (LYRICA) 50 MG capsule Take 1 capsule (50 mg) by mouth 2 times daily 60 capsule 1     senna-docusate (SENOKOT-S;PERICOLACE) 8.6-50 MG per tablet Take 2 tablets by mouth 2 times daily       Vitamin D, Cholecalciferol, 1000 units TABS Take 2,000 Units by mouth daily        Past Medical History:   Diagnosis Date     Anemia      Bone metastasis (H) 09/28/2018     Cough      Pulmonary nodule      Renal cell carcinoma, right (H) 09/28/2018     Past Surgical History:   Procedure Laterality Date     CHOLECYSTECTOMY       HC REMOVAL HEEL SPUR, CALCANEUS  2004       Key Data Reviewed:  LABS:   Lab Results   Component Value Date    WBC 7.9 08/07/2019    HGB 9.7 (L) 08/07/2019    HCT 32.5 (L) 08/07/2019     08/07/2019     08/07/2019    POTASSIUM 3.5 08/07/2019    CHLORIDE 106 08/07/2019    CO2 28 08/07/2019    BUN 7 08/07/2019    CR 0.64 (L) 08/07/2019     (H) 08/07/2019    SED 87 (H) 03/29/2019    AST 23 08/07/2019    ALT 16 08/07/2019    ALKPHOS 115 08/07/2019    BILITOTAL 0.4 08/07/2019    INR 1.3 (A) 08/31/2018     IMAGING:   No new imaging.     [place]  - Use of controlled substances consistent with  history.     Thank you for continuing to involve me in the care of this patient.     Carly Mills MD / Palliative Medicine / Pager [phone] / After-Hours Answering Service [phone] / Main Palliative Clinic - [facility] [phone] / [facility] Inpatient Team Consult Pager [phone] (answered 8am-430pm M-F)    Time spent: 31 minutes, >50% spent in counseling/coordination of care.

## 2019-09-03 NOTE — ED PROVIDER NOTES
History     Chief Complaint   Patient presents with     Cough     Joint Pain     HPI  Javon Bianchi is a 69 year old male with a history of metastatic renal cell carcinoma who presents to the emergency department with recurrent productive cough, dyspnea, and fatigue.  The patient was admitted to an outside hospital from 8/15 to 8/25 for pneumonia.  He had been on Augmentin on an outpatient basis prior to his admission to the hospital.  While in the hospital, he was treated with a 7-day course of Zosyn and vancomycin.  He was subsequently transitioned to oral vancomycin and spiked a fever to 100.7 on the first day.  Bactrim was subsequently added and the patient remained afebrile.  He was discharged on a additional 5-day course of Levaquin and Bactrim which he completed on 8/30/2019.  He followed up with palliative care today where he complained of increased fatigue as well as recurrent cough productive of green sputum over the past day.  Patient also complains of some mild dyspnea.  He denies any chest pain.  He has chronic left lower leg swelling related to tumor burden which his palliative care provider thought was worse today.  Patient also complains of recurrent arthralgias.  He complains of pain in his wrists, elbows, and shoulders which he he experienced when he was on immunotherapy and also when he was originally diagnosed with pneumonia.  Patient denies any abdominal pain.  Denies any nausea or vomiting.  He states he is on senna for constipation and having bowel movements.  Patient denies fever.    I have reviewed the Medications, Allergies, Past Medical and Surgical History, and Social History in the Epic system.    Review of Systems   Constitutional: Positive for fatigue. Negative for fever.   HENT: Negative for congestion.    Eyes: Negative for redness.   Respiratory: Positive for cough and shortness of breath.    Cardiovascular: Positive for leg swelling (left). Negative for chest pain.    Gastrointestinal: Negative for abdominal pain.   Genitourinary: Negative for difficulty urinating.   Musculoskeletal: Negative for arthralgias and neck stiffness.   Skin: Negative for color change.   Neurological: Negative for headaches.   Psychiatric/Behavioral: Negative for confusion.   All other systems reviewed and are negative.      Physical Exam   BP: 113/73  Pulse: 85  Heart Rate: 74  Temp: 97.5  F (36.4  C)  Resp: 16  Height: 182.9 cm (6')  Weight: 93.8 kg (206 lb 12.8 oz)  SpO2: 96 %      Physical Exam   Constitutional: He appears well-developed and well-nourished. No distress.   HENT:   Head: Normocephalic and atraumatic.   Mouth/Throat: Oropharynx is clear and moist.   Eyes: Pupils are equal, round, and reactive to light. No scleral icterus.   Neck: Normal range of motion.   Cardiovascular: Normal rate, regular rhythm, normal heart sounds and intact distal pulses.   Pulmonary/Chest: Breath sounds normal. No respiratory distress.   Bibasilar crackles   Abdominal: Soft. Bowel sounds are normal. There is no tenderness.   Musculoskeletal: He exhibits no edema or tenderness.   No joint swelling.  No erythema.  Full active range of motion of the hands, wrists, forearms, elbows, and shoulders.  Some pain at end range of full elbow extension as well as use of his shoulders.   Neurological: He is alert. He has normal strength.   Skin: Skin is warm. No rash noted. He is not diaphoretic.   Psychiatric: He has a normal mood and affect. His behavior is normal.   Nursing note and vitals reviewed.      ED Course        Procedures             EKG Interpretation:      Interpreted by DEBBIE FLETCHER MD, MD  Time reviewed: 1821  Symptoms at time of EKG: dyspnea   Rhythm: normal sinus   Rate: 81  Axis: Normal  Ectopy: none  Conduction: normal  ST Segments/ T Waves: QTc prolongation 480 ms  Q Waves: none  Comparison to prior: Sinus rhythm on 3/14/2019    Clinical Impression: Sinus rhythm with QTC equal to 480 ms    Results for  orders placed or performed during the hospital encounter of 09/03/19   XR Chest 2 Views    Narrative    EXAM: XR CHEST 2 VW  9/3/2019 6:07 PM      HISTORY: cough    COMPARISON: CT 8/8/2019, chest x-ray 5/12/2019    FINDINGS: PA and lateral radiographs of the chest. The trachea is  midline. Cardiac silhouette is within normal limits. No pleural  effusion or pneumothorax. Consolidation in the left lower lobe, best  visualized on lateral radiograph. Upper abdomen is unremarkable.  Flowing anterior osteophytes of the thoracic spine suggesting diffuse  idiopathic skeletal hyperostosis.       Impression    IMPRESSION: Left lower lobe consolidation concerning for infection.  Clearly worse than 5/12/2019 chest x-ray and probably slightly worse  than CT dated 8/8/2019. This should be followed to resolution.    I have personally reviewed the examination and initial interpretation  and I agree with the findings.    FRANCOIS DEVINE MD   US Lower Extremity Venous Duplex Left    Narrative    EXAMINATION: US LOWER EXTREMITY VENOUS DUPLEX LEFT, 9/3/2019 7:01 PM     COMPARISON: None.    HISTORY: Evaluate for DVT, swelling    TECHNIQUE:  Gray-scale evaluation with compression, spectral flow, and  color Doppler assessment of the deep venous system of the left leg  from groin to knee, and then at the ankle.    FINDINGS:  In the left lower extremity, the common femoral, superficial femoral,  popliteal and posterior tibial veins demonstrate normal  compressibility and blood flow.        Impression    IMPRESSION:  1.  No evidence left lower extremity deep venous thrombosis.    I have personally reviewed the examination and initial interpretation  and I agree with the findings.    FRANCOIS DEVINE MD   CT Chest Pulmonary Embolism w Contrast    Narrative    Exam: Chest CT Angiogram with 3-dimensional reconstruction  9/3/2019.    Comparison: 8/8/2019     Clinical History: Shortness of breath. Evaluate for pulmonary  embolism.    Technique:  Volumetric helical acquisition of the chest were obtained  following pulmonary embolism protocol from the lung apices through the  upper abdomen. The pulmonary arteries are well-opacified to evaluate  for pulmonary embolism.    3-dimensional post processed angiographic images were reconstructed,  archived to PACS and used in the interpretation of this study.    Findings:  Mediastinum/hilum: The heart size is normal. The great vessels are  normal in caliber. There is no evidence of a filling defect in the  pulmonary arteries to suggest a pulmonary embolism.     Unchanged prominent mediastinal lymph nodes.    Consolidative densities in the posterior lower lobes with surrounding  tree-in-bud nodules. 6 mm subsolid nodule in the right upper lobe seen  on series 5 image 25. 4 mm nodule seen on series 5 image 39 the right  upper lobe. There are other scattered sub-3 mm nodules in the lung,  for example series 4 image 49 in the left upper lobe and series 4  image 50 in the right upper lobe.    Upper Abdomen: Unremarkable appearance of the upper abdomen.    Bones/Soft tissues: No suspicious bony lesions.      Impression    Impression:   1. No pulmonary embolism.  2. Significantly increased consolidative densities in the posterior  lower lobes with surrounding tree-in-bud nodules. Findings are  concerning for pneumonia. Consider aspiration.  3. Unchanged pulmonary nodules since 9/25/2018. Recommend one-year  follow-up to establish stability.    I have personally reviewed the examination and initial interpretation  and I agree with the findings.    FRANCOIS DEVINE MD   CBC with platelets differential   Result Value Ref Range    WBC 7.6 4.0 - 11.0 10e9/L    RBC Count 3.14 (L) 4.4 - 5.9 10e12/L    Hemoglobin 9.1 (L) 13.3 - 17.7 g/dL    Hematocrit 30.4 (L) 40.0 - 53.0 %    MCV 97 78 - 100 fl    MCH 29.0 26.5 - 33.0 pg    MCHC 29.9 (L) 31.5 - 36.5 g/dL    RDW 17.3 (H) 10.0 - 15.0 %    Platelet Count 217 150 - 450 10e9/L    Diff  Method Manual Differential     % Neutrophils 77.0 %    % Lymphocytes 1.8 %    % Monocytes 21.2 %    % Eosinophils 0.0 %    % Basophils 0.0 %    Absolute Neutrophil 5.9 1.6 - 8.3 10e9/L    Absolute Lymphocytes 0.1 (L) 0.8 - 5.3 10e9/L    Absolute Monocytes 1.6 (H) 0.0 - 1.3 10e9/L    Absolute Eosinophils 0.0 0.0 - 0.7 10e9/L    Absolute Basophils 0.0 0.0 - 0.2 10e9/L    Anisocytosis Slight     Platelet Estimate Confirming automated cell count    Comprehensive metabolic panel   Result Value Ref Range    Sodium 139 133 - 144 mmol/L    Potassium 3.5 3.4 - 5.3 mmol/L    Chloride 106 94 - 109 mmol/L    Carbon Dioxide 23 20 - 32 mmol/L    Anion Gap 10 3 - 14 mmol/L    Glucose 112 (H) 70 - 99 mg/dL    Urea Nitrogen 13 7 - 30 mg/dL    Creatinine 1.17 0.66 - 1.25 mg/dL    GFR Estimate 62 >60 mL/min/[1.73_m2]    GFR Estimate If Black 72 >60 mL/min/[1.73_m2]    Calcium 7.1 (L) 8.5 - 10.1 mg/dL    Bilirubin Total 0.3 0.2 - 1.3 mg/dL    Albumin 2.6 (L) 3.4 - 5.0 g/dL    Protein Total 6.2 (L) 6.8 - 8.8 g/dL    Alkaline Phosphatase 94 40 - 150 U/L    ALT 12 0 - 70 U/L    AST 22 0 - 45 U/L   Troponin I   Result Value Ref Range    Troponin I ES <0.015 0.000 - 0.045 ug/L   Nt probnp inpatient (BNP)   Result Value Ref Range    N-Terminal Pro BNP Inpatient 283 0 - 900 pg/mL   EKG 12-lead, tracing only   Result Value Ref Range    Interpretation ECG Click View Image link to view waveform and result    ISTAT gases lactate amy POCT   Result Value Ref Range    Ph Venous 7.38 7.32 - 7.43 pH    PCO2 Venous 36 (L) 40 - 50 mm Hg    PO2 Venous 53 (H) 25 - 47 mm Hg    Bicarbonate Venous 22 21 - 28 mmol/L    O2 Sat Venous 87 %    Lactic Acid 1.5 0.7 - 2.1 mmol/L   Gram stain   Result Value Ref Range    Specimen Description Sputum     Special Requests Screen     Gram Stain <10 Squamous epithelial cells/low power field     Gram Stain <25 PMNs/low power field     Gram Stain Rare  Gram positive cocci   (A)    Blood culture   Result Value Ref Range     Specimen Description Blood Left Arm     Special Requests Received in aerobic bottle only     Culture Micro PENDING    Blood culture   Result Value Ref Range    Specimen Description Blood Left Arm     Special Requests Received in aerobic bottle only     Culture Micro PENDING       Medications   vancomycin 1500 mg in 0.9% NaCl 250 ml intermittent infusion 1,500 mg (has no administration in time range)   piperacillin-tazobactam (ZOSYN) 3.375 g vial to attach to  mL bag (has no administration in time range)   acetaminophen (TYLENOL) tablet 1,000 mg (has no administration in time range)   calcium carbonate 500 mg (elemental) (OSCAL;OYSTER SHELL CALCIUM) tablet 500 mg ( Oral Canceled Entry 9/3/19 2350)   dextromethorphan-guaiFENesin (MUCINEX DM)  MG per 12 hr tablet 1 tablet (has no administration in time range)   DULoxetine (CYMBALTA) DR capsule 60 mg (has no administration in time range)   HYDROmorphone (DILAUDID) tablet 2-4 mg (has no administration in time range)   Lidocaine (LIDOCARE) 4 % Patch 1-3 patch (has no administration in time range)   loratadine (CLARITIN) tablet 10 mg (has no administration in time range)   melatonin tablet 3 mg (has no administration in time range)   metoclopramide (REGLAN) tablet 5 mg ( Oral Canceled Entry 9/3/19 2352)   morphine (MS CONTIN) 12 hr tablet 45 mg (has no administration in time range)   morphine (MS CONTIN) 12 hr tablet 30 mg (has no administration in time range)   morphine (MS CONTIN) 12 hr tablet 30 mg (has no administration in time range)   omeprazole (priLOSEC) CR capsule 40 mg (has no administration in time range)   polyethylene glycol (MIRALAX/GLYCOLAX) Packet 8.5-17 g (has no administration in time range)   pregabalin (LYRICA) capsule 50 mg ( Oral Canceled Entry 9/3/19 7302)   senna-docusate (SENOKOT-S/PERICOLACE) 8.6-50 MG per tablet 2 tablet ( Oral Canceled Entry 9/3/19 2350)   vitamin D3 (CHOLECALCIFEROL) 1000 units (25 mcg) tablet 2,000 Units (has no  administration in time range)   Medication Instruction (has no administration in time range)   lidocaine 1 % 0.1-1 mL (has no administration in time range)   lidocaine (LMX4) cream (has no administration in time range)   sodium chloride (PF) 0.9% PF flush 3 mL (has no administration in time range)   sodium chloride (PF) 0.9% PF flush 3 mL (has no administration in time range)   guaiFENesin-codeine (ROBITUSSIN AC) 100-10 MG/5ML solution 10 mL (has no administration in time range)   albuterol (PROVENTIL) neb solution 2.5 mg (has no administration in time range)   enoxaparin (LOVENOX) injection 40 mg (has no administration in time range)   sodium chloride 0.9% infusion (has no administration in time range)   prochlorperazine (COMPAZINE) tablet 5 mg (has no administration in time range)     Or   prochlorperazine (COMPAZINE) injection 5 mg (has no administration in time range)   ondansetron (ZOFRAN) injection 8 mg (has no administration in time range)     Or   ondansetron (ZOFRAN-ODT) ODT tab 8 mg (has no administration in time range)     Or   ondansetron (ZOFRAN) tablet 8 mg (has no administration in time range)   LORazepam (ATIVAN) tablet 0.5-1 mg (has no administration in time range)     Or   LORazepam (ATIVAN) injection 0.5-1 mg (has no administration in time range)   potassium chloride ER (K-DUR/KLOR-CON M) CR tablet 20-40 mEq (has no administration in time range)   potassium chloride (KLOR-CON) Packet 20-40 mEq (has no administration in time range)   potassium chloride 10 mEq in 100 mL sterile water intermittent infusion (premix) (has no administration in time range)   potassium chloride 10 mEq in 100 mL intermittent infusion with 10 mg lidocaine (has no administration in time range)   potassium chloride 20 mEq in 50 mL intermittent infusion (has no administration in time range)   magnesium sulfate 4 g in 100 mL sterile water (premade) (has no administration in time range)   potassium phosphate 15 mmol in D5W 250  mL intermittent infusion (has no administration in time range)   potassium phosphate 20 mmol in D5W 500 mL intermittent infusion (has no administration in time range)   potassium phosphate 20 mmol in D5W 250 mL intermittent infusion (has no administration in time range)   potassium phosphate 25 mmol in D5W 500 mL intermittent infusion (has no administration in time range)   azithromycin (ZITHROMAX) 500 mg in sodium chloride 0.9 % 250 mL intermittent infusion (has no administration in time range)   naloxone (NARCAN) injection 0.1-0.4 mg (has no administration in time range)   lidocaine patch REMOVAL (has no administration in time range)   lidocaine patch in PLACE ( Transdermal Canceled Entry 9/3/19 2351)   0.9% sodium chloride BOLUS (1,000 mLs Intravenous New Bag 9/3/19 1938)   piperacillin-tazobactam (ZOSYN) 4.5 g vial to attach to  mL bag (0 g Intravenous Stopped 9/3/19 2045)   vancomycin (VANCOCIN) 2,250 mg in sodium chloride 0.9 % 500 mL intermittent infusion (2,250 mg Intravenous New Bag 9/3/19 2019)   iopamidol (ISOVUE-370) solution 68 mL (68 mLs Intravenous Given 9/3/19 1909)   sodium chloride (PF) 0.9% PF flush 97 mL (97 mLs Intravenous Given 9/3/19 1910)      Zosyn IV   Vancomycin IV  Doxycycline IV        Critical Care time:    Accepted for admission by Oncology.         Assessments & Plan (with Medical Decision Making)   69 year old male with history of metastatic renal cell carcinoma to the emergency department with recurrent cough productive of green sputum, fatigue, generalized weakness, and arthralgias.  The patient was treated with IV antibiotics including Zosyn and vancomycin for a week as outlined above on an inpatient basis.  He completed oral Levaquin and Bactrim 3 to 4 days ago.  He has recurrent symptoms for the past day.  Patient does not have a leukocytosis or an elevated lactate level; however, his chest CT reveals increased infiltrates in the bases concerning for infection.  The  patient is previously failed outpatient treatment with oral antibiotics.  As such, he will be given IV antibiotics including Zosyn, vancomycin, and doxycycline.  The patient will be admitted to the oncology service for further evaluation and management.    I have reviewed the nursing notes.    I have reviewed the findings, diagnosis, plan and need for follow up with the patient.    Current Discharge Medication List          Final diagnoses:   Healthcare-associated pneumonia       9/3/2019   [facility], [place], EMERGENCY DEPARTMENT     Charli Hinds MD  09/03/19 [id number]

## 2019-09-03 NOTE — ED NOTES
Bed: IN02  Expected date:   Expected time:   Means of arrival:   Comments:  Javon Bianchi, metastatic RCC in Palliative clinic, concern for recurrent pneumonia vs PE     Tim Light MD  09/03/19 [id number]

## 2019-09-03 NOTE — ED TRIAGE NOTES
Pt. Presents to ED from palliative care clinic for suspected recurrent pneumonia. MD thought she heard rales. Pt. reports being treated recently for pneumonia inpatient and outpatient and that it was cleared but symptoms returned within the past couple days. Pt. Reports lethargy, joint pain, productive cough.

## 2019-09-03 NOTE — PHARMACY-VANCOMYCIN DOSING SERVICE
Pharmacy Vancomycin Initial Note  Date of Service September 3, 2019  Patient's  1950  69 year old, male    Indication: Healthcare-Associated Pneumonia    Current estimated CrCl = Estimated Creatinine Clearance: 68.9 mL/min (based on SCr of 1.17 mg/dL).    Creatinine for last 3 days  9/3/2019:  5:40 PM Creatinine 1.17 mg/dL    Recent Vancomycin Level(s) for last 3 days  No results found for requested labs within last 72 hours.      Vancomycin IV Administrations (past 72 hours)      No vancomycin orders with administrations in past 72 hours.                Nephrotoxins and other renal medications (From now, onward)    Start     Dose/Rate Route Frequency Ordered Stop    19  vancomycin 1500 mg in 0.9% NaCl 250 ml intermittent infusion 1,500 mg      1,500 mg  over 90 Minutes Intravenous EVERY 24 HOURS 19  vancomycin (VANCOCIN) 2,250 mg in sodium chloride 0.9 % 500 mL intermittent infusion      2,250 mg  over 2 Hours Intravenous ONCE 19 [id number] 183  piperacillin-tazobactam (ZOSYN) 4.5 g vial to attach to  mL bag      4.5 g  over 30 Minutes Intravenous ONCE 19 183            Contrast Orders - past 72 hours (72h ago, onward)    None                Plan:  1.  Start vancomycin  2250 mg IV x1 (24 mg/kg), followed by 1500 mg IV q24h (Based on recent admission in [facility] per Care Everywhere).   2.  Goal Trough Level: 15-20 mg/L   3.  Pharmacy will check trough levels as appropriate in 1-3 Days.    4. Serum creatinine levels will be ordered daily for the first week of therapy and at least twice weekly for subsequent weeks.    5. [place] method utilized to dose vancomycin therapy: Method 2    Shola Marie, Pharm.D.

## 2019-09-04 ENCOUNTER — APPOINTMENT (OUTPATIENT)
Dept: SPEECH THERAPY | Facility: CLINIC | Age: 69
DRG: 194 | End: 2019-09-04
Attending: INTERNAL MEDICINE
Payer: MEDICARE

## 2019-09-04 LAB
ALBUMIN UR-MCNC: 10 MG/DL
ANION GAP SERPL CALCULATED.3IONS-SCNC: 9 MMOL/L (ref 3–14)
ANISOCYTOSIS BLD QL SMEAR: SLIGHT
APPEARANCE UR: CLEAR
BASOPHILS # BLD AUTO: 0.1 10E9/L (ref 0–0.2)
BASOPHILS NFR BLD AUTO: 1 %
BILIRUB UR QL STRIP: NEGATIVE
BUN SERPL-MCNC: 10 MG/DL (ref 7–30)
CALCIUM SERPL-MCNC: 6.9 MG/DL (ref 8.5–10.1)
CHLORIDE SERPL-SCNC: 108 MMOL/L (ref 94–109)
CO2 SERPL-SCNC: 24 MMOL/L (ref 20–32)
COLOR UR AUTO: ABNORMAL
CREAT SERPL-MCNC: 1.08 MG/DL (ref 0.66–1.25)
CREAT UR-MCNC: 34 MG/DL
DIFFERENTIAL METHOD BLD: ABNORMAL
EOSINOPHIL # BLD AUTO: 0.2 10E9/L (ref 0–0.7)
EOSINOPHIL NFR BLD AUTO: 3 %
ERYTHROCYTE [DISTWIDTH] IN BLOOD BY AUTOMATED COUNT: 17.4 % (ref 10–15)
GFR SERPL CREATININE-BSD FRML MDRD: 69 ML/MIN/{1.73_M2}
GLUCOSE SERPL-MCNC: 86 MG/DL (ref 70–99)
GLUCOSE UR STRIP-MCNC: NEGATIVE MG/DL
GRAM STN SPEC: NORMAL
GRAM STN SPEC: NORMAL
HCT VFR BLD AUTO: 30.2 % (ref 40–53)
HGB BLD-MCNC: 8.8 G/DL (ref 13.3–17.7)
HGB UR QL STRIP: NEGATIVE
INR PPP: 1.25 (ref 0.86–1.14)
INTERPRETATION ECG - MUSE: NORMAL
KETONES UR STRIP-MCNC: NEGATIVE MG/DL
KOH PREP SPEC: NORMAL
L PNEUMO1 AG UR QL IA: NORMAL
LDH SERPL L TO P-CCNC: 353 U/L (ref 85–227)
LEUKOCYTE ESTERASE UR QL STRIP: NEGATIVE
LYMPHOCYTES # BLD AUTO: 0.3 10E9/L (ref 0.8–5.3)
LYMPHOCYTES NFR BLD AUTO: 4 %
MAGNESIUM SERPL-MCNC: 1.6 MG/DL (ref 1.6–2.3)
MAGNESIUM SERPL-MCNC: 1.6 MG/DL (ref 1.6–2.3)
MCH RBC QN AUTO: 28.9 PG (ref 26.5–33)
MCHC RBC AUTO-ENTMCNC: 29.1 G/DL (ref 31.5–36.5)
MCV RBC AUTO: 99 FL (ref 78–100)
METAMYELOCYTES # BLD: 0.1 10E9/L
METAMYELOCYTES NFR BLD MANUAL: 1 %
MISCELLANEOUS TEST: NORMAL
MONOCYTES # BLD AUTO: 1.8 10E9/L (ref 0–1.3)
MONOCYTES NFR BLD AUTO: 27 %
MUCOUS THREADS #/AREA URNS LPF: PRESENT /LPF
MYELOCYTES # BLD: 0.3 10E9/L
MYELOCYTES NFR BLD MANUAL: 4 %
NEUTROPHILS # BLD AUTO: 4.1 10E9/L (ref 1.6–8.3)
NEUTROPHILS NFR BLD AUTO: 60 %
NITRATE UR QL: NEGATIVE
PH UR STRIP: 5.5 PH (ref 5–7)
PHOSPHATE SERPL-MCNC: 3 MG/DL (ref 2.5–4.5)
PHOSPHATE SERPL-MCNC: 3.7 MG/DL (ref 2.5–4.5)
PLATELET # BLD AUTO: 210 10E9/L (ref 150–450)
PLATELET # BLD EST: ABNORMAL 10*3/UL
POTASSIUM SERPL-SCNC: 3.6 MMOL/L (ref 3.4–5.3)
PROCALCITONIN SERPL-MCNC: 0.08 NG/ML
RBC # BLD AUTO: 3.04 10E12/L (ref 4.4–5.9)
RBC #/AREA URNS AUTO: <1 /HPF (ref 0–2)
S PNEUM AG SPEC QL: NORMAL
SODIUM SERPL-SCNC: 141 MMOL/L (ref 133–144)
SOURCE: ABNORMAL
SP GR UR STRIP: 1.01 (ref 1–1.03)
SPECIMEN SOURCE: NORMAL
UROBILINOGEN UR STRIP-MCNC: NORMAL MG/DL (ref 0–2)
UUN UR-MCNC: 197 MG/DL
UUN/CREAT 24H UR: 6 G/G CR
WBC # BLD AUTO: 6.8 10E9/L (ref 4–11)
WBC #/AREA URNS AUTO: 1 /HPF (ref 0–5)

## 2019-09-04 PROCEDURE — 87102 FUNGUS ISOLATION CULTURE: CPT | Performed by: INTERNAL MEDICINE

## 2019-09-04 PROCEDURE — 87798 DETECT AGENT NOS DNA AMP: CPT | Performed by: INTERNAL MEDICINE

## 2019-09-04 PROCEDURE — 87633 RESP VIRUS 12-25 TARGETS: CPT | Performed by: INTERNAL MEDICINE

## 2019-09-04 PROCEDURE — 87116 MYCOBACTERIA CULTURE: CPT | Performed by: INTERNAL MEDICINE

## 2019-09-04 PROCEDURE — G0500 MOD SEDAT ENDO SERVICE >5YRS: HCPCS | Performed by: INTERNAL MEDICINE

## 2019-09-04 PROCEDURE — 87210 SMEAR WET MOUNT SALINE/INK: CPT | Performed by: INTERNAL MEDICINE

## 2019-09-04 PROCEDURE — 25000125 ZZHC RX 250: Performed by: INTERNAL MEDICINE

## 2019-09-04 PROCEDURE — 81001 URINALYSIS AUTO W/SCOPE: CPT | Performed by: PHYSICIAN ASSISTANT

## 2019-09-04 PROCEDURE — 99232 SBSQ HOSP IP/OBS MODERATE 35: CPT | Performed by: INTERNAL MEDICINE

## 2019-09-04 PROCEDURE — 25000128 H RX IP 250 OP 636: Performed by: INTERNAL MEDICINE

## 2019-09-04 PROCEDURE — 87449 NOS EACH ORGANISM AG IA: CPT | Performed by: PHYSICIAN ASSISTANT

## 2019-09-04 PROCEDURE — 88312 SPECIAL STAINS GROUP 1: CPT | Performed by: INTERNAL MEDICINE

## 2019-09-04 PROCEDURE — 36415 COLL VENOUS BLD VENIPUNCTURE: CPT | Performed by: INTERNAL MEDICINE

## 2019-09-04 PROCEDURE — 12000001 ZZH R&B MED SURG/OB UMMC

## 2019-09-04 PROCEDURE — 25000132 ZZH RX MED GY IP 250 OP 250 PS 637: Mod: GY | Performed by: INTERNAL MEDICINE

## 2019-09-04 PROCEDURE — 36415 COLL VENOUS BLD VENIPUNCTURE: CPT | Performed by: PHYSICIAN ASSISTANT

## 2019-09-04 PROCEDURE — 87206 SMEAR FLUORESCENT/ACID STAI: CPT | Performed by: INTERNAL MEDICINE

## 2019-09-04 PROCEDURE — 87899 AGENT NOS ASSAY W/OPTIC: CPT | Performed by: INTERNAL MEDICINE

## 2019-09-04 PROCEDURE — 83735 ASSAY OF MAGNESIUM: CPT | Performed by: INTERNAL MEDICINE

## 2019-09-04 PROCEDURE — 84999 UNLISTED CHEMISTRY PROCEDURE: CPT | Performed by: INTERNAL MEDICINE

## 2019-09-04 PROCEDURE — 87385 HISTOPLASMA CAPSUL AG IA: CPT | Performed by: PHYSICIAN ASSISTANT

## 2019-09-04 PROCEDURE — 92526 ORAL FUNCTION THERAPY: CPT | Mod: GN

## 2019-09-04 PROCEDURE — 84540 ASSAY OF URINE/UREA-N: CPT | Performed by: INTERNAL MEDICINE

## 2019-09-04 PROCEDURE — 87305 ASPERGILLUS AG IA: CPT | Performed by: PHYSICIAN ASSISTANT

## 2019-09-04 PROCEDURE — 88108 CYTOPATH CONCENTRATE TECH: CPT | Performed by: INTERNAL MEDICINE

## 2019-09-04 PROCEDURE — 87015 SPECIMEN INFECT AGNT CONCNTJ: CPT | Performed by: INTERNAL MEDICINE

## 2019-09-04 PROCEDURE — 25800030 ZZH RX IP 258 OP 636: Performed by: INTERNAL MEDICINE

## 2019-09-04 PROCEDURE — 99152 MOD SED SAME PHYS/QHP 5/>YRS: CPT | Performed by: INTERNAL MEDICINE

## 2019-09-04 PROCEDURE — 84100 ASSAY OF PHOSPHORUS: CPT | Performed by: INTERNAL MEDICINE

## 2019-09-04 PROCEDURE — 87070 CULTURE OTHR SPECIMN AEROBIC: CPT | Performed by: INTERNAL MEDICINE

## 2019-09-04 PROCEDURE — 92610 EVALUATE SWALLOWING FUNCTION: CPT | Mod: GN

## 2019-09-04 PROCEDURE — 87081 CULTURE SCREEN ONLY: CPT | Performed by: INTERNAL MEDICINE

## 2019-09-04 PROCEDURE — 80048 BASIC METABOLIC PNL TOTAL CA: CPT | Performed by: INTERNAL MEDICINE

## 2019-09-04 PROCEDURE — 83615 LACTATE (LD) (LDH) ENZYME: CPT | Performed by: INTERNAL MEDICINE

## 2019-09-04 PROCEDURE — 85025 COMPLETE CBC W/AUTO DIFF WBC: CPT | Performed by: INTERNAL MEDICINE

## 2019-09-04 PROCEDURE — 31624 DX BRONCHOSCOPE/LAVAGE: CPT | Performed by: INTERNAL MEDICINE

## 2019-09-04 PROCEDURE — 40000141 ZZH STATISTIC PERIPHERAL IV START W/O US GUIDANCE

## 2019-09-04 PROCEDURE — 0B9J8ZX DRAINAGE OF LEFT LOWER LUNG LOBE, VIA NATURAL OR ARTIFICIAL OPENING ENDOSCOPIC, DIAGNOSTIC: ICD-10-PCS | Performed by: INTERNAL MEDICINE

## 2019-09-04 PROCEDURE — 85610 PROTHROMBIN TIME: CPT | Performed by: INTERNAL MEDICINE

## 2019-09-04 PROCEDURE — 87205 SMEAR GRAM STAIN: CPT | Performed by: INTERNAL MEDICINE

## 2019-09-04 PROCEDURE — 87305 ASPERGILLUS AG IA: CPT | Performed by: INTERNAL MEDICINE

## 2019-09-04 RX ORDER — FENTANYL CITRATE 50 UG/ML
INJECTION, SOLUTION INTRAMUSCULAR; INTRAVENOUS PRN
Status: DISCONTINUED | OUTPATIENT
Start: 2019-09-04 | End: 2019-09-04 | Stop reason: HOSPADM

## 2019-09-04 RX ORDER — LIDOCAINE HYDROCHLORIDE 40 MG/ML
INJECTION, SOLUTION RETROBULBAR PRN
Status: DISCONTINUED | OUTPATIENT
Start: 2019-09-04 | End: 2019-09-04 | Stop reason: HOSPADM

## 2019-09-04 RX ORDER — LIDOCAINE HYDROCHLORIDE 20 MG/ML
SOLUTION OROPHARYNGEAL PRN
Status: DISCONTINUED | OUTPATIENT
Start: 2019-09-04 | End: 2019-09-04 | Stop reason: HOSPADM

## 2019-09-04 RX ORDER — ALBUTEROL SULFATE 0.83 MG/ML
SOLUTION RESPIRATORY (INHALATION) PRN
Status: DISCONTINUED | OUTPATIENT
Start: 2019-09-04 | End: 2019-09-04 | Stop reason: HOSPADM

## 2019-09-04 RX ORDER — MAGNESIUM HYDROXIDE 1200 MG/15ML
LIQUID ORAL PRN
Status: DISCONTINUED | OUTPATIENT
Start: 2019-09-04 | End: 2019-09-04 | Stop reason: HOSPADM

## 2019-09-04 RX ORDER — SULFAMETHOXAZOLE/TRIMETHOPRIM 800-160 MG
2 TABLET ORAL 2 TIMES DAILY
Status: DISCONTINUED | OUTPATIENT
Start: 2019-09-04 | End: 2019-09-07 | Stop reason: HOSPADM

## 2019-09-04 RX ADMIN — PREGABALIN 50 MG: 25 CAPSULE ORAL at 19:54

## 2019-09-04 RX ADMIN — ACETAMINOPHEN 1000 MG: 500 TABLET, FILM COATED ORAL at 08:10

## 2019-09-04 RX ADMIN — PIPERACILLIN SODIUM AND TAZOBACTAM SODIUM 3.38 G: 3; .375 INJECTION, POWDER, LYOPHILIZED, FOR SOLUTION INTRAVENOUS at 19:55

## 2019-09-04 RX ADMIN — GUAIFENESIN AND DEXTROMETHORPHAN HYDROBROMIDE 1 TABLET: 600; 30 TABLET, EXTENDED RELEASE ORAL at 05:23

## 2019-09-04 RX ADMIN — LIDOCAINE 3 PATCH: 560 PATCH PERCUTANEOUS; TOPICAL; TRANSDERMAL at 08:09

## 2019-09-04 RX ADMIN — MORPHINE SULFATE 45 MG: 30 TABLET, EXTENDED RELEASE ORAL at 08:09

## 2019-09-04 RX ADMIN — AZITHROMYCIN MONOHYDRATE 500 MG: 500 INJECTION, POWDER, LYOPHILIZED, FOR SOLUTION INTRAVENOUS at 00:48

## 2019-09-04 RX ADMIN — VANCOMYCIN HYDROCHLORIDE 1500 MG: 10 INJECTION, POWDER, LYOPHILIZED, FOR SOLUTION INTRAVENOUS at 20:57

## 2019-09-04 RX ADMIN — MELATONIN 2000 UNITS: at 08:10

## 2019-09-04 RX ADMIN — OMEPRAZOLE 40 MG: 20 CAPSULE, DELAYED RELEASE ORAL at 08:10

## 2019-09-04 RX ADMIN — SULFAMETHOXAZOLE AND TRIMETHOPRIM 2 TABLET: 800; 160 TABLET ORAL at 19:54

## 2019-09-04 RX ADMIN — METOCLOPRAMIDE HYDROCHLORIDE 5 MG: 5 TABLET ORAL at 08:10

## 2019-09-04 RX ADMIN — DULOXETINE HYDROCHLORIDE 60 MG: 60 CAPSULE, DELAYED RELEASE ORAL at 08:10

## 2019-09-04 RX ADMIN — ENOXAPARIN SODIUM 40 MG: 40 INJECTION SUBCUTANEOUS at 08:10

## 2019-09-04 RX ADMIN — LORATADINE 10 MG: 10 TABLET ORAL at 08:10

## 2019-09-04 RX ADMIN — PREGABALIN 50 MG: 25 CAPSULE ORAL at 08:09

## 2019-09-04 RX ADMIN — ACETAMINOPHEN 1000 MG: 500 TABLET, FILM COATED ORAL at 19:54

## 2019-09-04 RX ADMIN — PIPERACILLIN SODIUM AND TAZOBACTAM SODIUM 3.38 G: 3; .375 INJECTION, POWDER, LYOPHILIZED, FOR SOLUTION INTRAVENOUS at 15:03

## 2019-09-04 RX ADMIN — MORPHINE SULFATE 30 MG: 30 TABLET, EXTENDED RELEASE ORAL at 20:57

## 2019-09-04 RX ADMIN — SODIUM CHLORIDE: 9 INJECTION, SOLUTION INTRAVENOUS at 00:48

## 2019-09-04 RX ADMIN — CALCIUM 500 MG: 500 TABLET ORAL at 08:10

## 2019-09-04 RX ADMIN — ACETAMINOPHEN 1000 MG: 500 TABLET, FILM COATED ORAL at 15:02

## 2019-09-04 RX ADMIN — CALCIUM 500 MG: 500 TABLET ORAL at 19:54

## 2019-09-04 RX ADMIN — GUAIFENESIN AND DEXTROMETHORPHAN HYDROBROMIDE 1 TABLET: 600; 30 TABLET, EXTENDED RELEASE ORAL at 18:12

## 2019-09-04 RX ADMIN — MORPHINE SULFATE 30 MG: 30 TABLET, EXTENDED RELEASE ORAL at 15:02

## 2019-09-04 RX ADMIN — METOCLOPRAMIDE HYDROCHLORIDE 5 MG: 5 TABLET ORAL at 19:54

## 2019-09-04 RX ADMIN — SODIUM CHLORIDE: 9 INJECTION, SOLUTION INTRAVENOUS at 23:53

## 2019-09-04 RX ADMIN — SODIUM CHLORIDE: 9 INJECTION, SOLUTION INTRAVENOUS at 11:40

## 2019-09-04 RX ADMIN — PIPERACILLIN SODIUM AND TAZOBACTAM SODIUM 3.38 G: 3; .375 INJECTION, POWDER, LYOPHILIZED, FOR SOLUTION INTRAVENOUS at 08:09

## 2019-09-04 RX ADMIN — PIPERACILLIN SODIUM AND TAZOBACTAM SODIUM 3.38 G: 3; .375 INJECTION, POWDER, LYOPHILIZED, FOR SOLUTION INTRAVENOUS at 02:34

## 2019-09-04 ASSESSMENT — ACTIVITIES OF DAILY LIVING (ADL)
FALL_HISTORY_WITHIN_LAST_SIX_MONTHS: NO
COGNITION: 0 - NO COGNITION ISSUES REPORTED
DRESS: 0-->INDEPENDENT
BATHING: 0-->INDEPENDENT
RETIRED_COMMUNICATION: 0-->UNDERSTANDS/COMMUNICATES WITHOUT DIFFICULTY
ADLS_ACUITY_SCORE: 11
SWALLOWING: 0-->SWALLOWS FOODS/LIQUIDS WITHOUT DIFFICULTY
RETIRED_EATING: 0-->INDEPENDENT
ADLS_ACUITY_SCORE: 14
ADLS_ACUITY_SCORE: 11
TRANSFERRING: 0-->INDEPENDENT
ADLS_ACUITY_SCORE: 11
ADLS_ACUITY_SCORE: 11
TOILETING: 0-->INDEPENDENT
ADLS_ACUITY_SCORE: 11
AMBULATION: 0-->INDEPENDENT

## 2019-09-04 ASSESSMENT — PAIN DESCRIPTION - DESCRIPTORS
DESCRIPTORS: ACHING;DISCOMFORT
DESCRIPTORS: ACHING

## 2019-09-04 NOTE — ED NOTES
[facility], [place]   ED Nurse to Floor Handoff     Javon Bianchi is a 69 year old male who speaks English and lives with a spouse,  in a home  They arrived in the ED by car from [facility]    ED Chief Complaint: Cough and Joint Pain    ED Dx;   Final diagnoses:   Healthcare-associated pneumonia         Needed?: No    Allergies: No Known Allergies.  Past Medical Hx:   Past Medical History:   Diagnosis Date     Anemia      Bone metastasis (H) 09/28/2018     Cough      Pulmonary nodule      Renal cell carcinoma, right (H) 09/28/2018      Baseline Mental status: WDL  Current Mental Status changes: at basesline    Infection present or suspected this encounter: yes respiratory  Sepsis suspected: No  Isolation type: No active isolations     Activity level - Baseline/Home:  Independent  Activity Level - Current:   Independent    Bariatric equipment needed?: No    In the ED these meds were given:   Medications   vancomycin 1500 mg in 0.9% NaCl 250 ml intermittent infusion 1,500 mg (has no administration in time range)   vancomycin (VANCOCIN) 2,250 mg in sodium chloride 0.9 % 500 mL intermittent infusion (2,250 mg Intravenous New Bag 9/3/19 2019)   doxycycline (VIBRAMYCIN) 100 mg vial to attach to  mL bag (has no administration in time range)   0.9% sodium chloride BOLUS (1,000 mLs Intravenous New Bag 9/3/19 1938)   piperacillin-tazobactam (ZOSYN) 4.5 g vial to attach to  mL bag (0 g Intravenous Stopped 9/3/19 2045)   iopamidol (ISOVUE-370) solution 68 mL (68 mLs Intravenous Given 9/3/19 1909)   sodium chloride (PF) 0.9% PF flush 97 mL (97 mLs Intravenous Given 9/3/19 1910)       Drips running?  Yes vanco    Home pump  No    Current LDAs  Peripheral IV 09/03/19 Left Lower forearm (Active)   Site Assessment WDL 9/3/2019  5:20 PM   Line Status Saline locked 9/3/2019  5:20 PM   Phlebitis Scale 0-->no symptoms 9/3/2019  5:20 PM   Infiltration Scale 0 9/3/2019  5:20 PM   Number  of days: 0       Labs results:   Labs Ordered and Resulted from Time of ED Arrival Up to the Time of Departure from the ED   CBC WITH PLATELETS DIFFERENTIAL - Abnormal; Notable for the following components:       Result Value    RBC Count 3.14 (*)     Hemoglobin 9.1 (*)     Hematocrit 30.4 (*)     MCHC 29.9 (*)     RDW 17.3 (*)     Absolute Lymphocytes 0.1 (*)     Absolute Monocytes 1.6 (*)     All other components within normal limits   COMPREHENSIVE METABOLIC PANEL - Abnormal; Notable for the following components:    Glucose 112 (*)     Calcium 7.1 (*)     Albumin 2.6 (*)     Protein Total 6.2 (*)     All other components within normal limits   ISTAT  GASES LACTATE ALTAGRACIA POCT - Abnormal; Notable for the following components:    PCO2 Venous 36 (*)     PO2 Venous 53 (*)     All other components within normal limits   TROPONIN I   NT PROBNP INPATIENT   PERIPHERAL IV CATHETER   ISTAT CG4 GASES LACTATE ALTAGRACIA NURSING POCT   NURSING DRAW AND HOLD   SPUTUM CULTURE AEROBIC BACTERIAL   GRAM STAIN   BLOOD CULTURE   BLOOD CULTURE       Imaging Studies:   Recent Results (from the past 24 hour(s))   XR Chest 2 Views    Narrative    EXAM: XR CHEST 2 VW  9/3/2019 6:07 PM      HISTORY: cough    COMPARISON: CT 8/8/2019, chest x-ray 5/12/2019    FINDINGS: PA and lateral radiographs of the chest. The trachea is  midline. Cardiac silhouette is within normal limits. No pleural  effusion or pneumothorax. Consolidation in the left lower lobe, best  visualized on lateral radiograph. Upper abdomen is unremarkable.  Flowing anterior osteophytes of the thoracic spine suggesting diffuse  idiopathic skeletal hyperostosis.       Impression    IMPRESSION: Left lower lobe consolidation concerning for infection.  Clearly worse than 5/12/2019 chest x-ray and probably slightly worse  than CT dated 8/8/2019. This should be followed to resolution.    I have personally reviewed the examination and initial interpretation  and I agree with the  findings.    FRANCOIS DEVINE MD   US Lower Extremity Venous Duplex Left    Narrative    EXAMINATION: US LOWER EXTREMITY VENOUS DUPLEX LEFT, 9/3/2019 7:01 PM     COMPARISON: None.    HISTORY: Evaluate for DVT, swelling    TECHNIQUE:  Gray-scale evaluation with compression, spectral flow, and  color Doppler assessment of the deep venous system of the left leg  from groin to knee, and then at the ankle.    FINDINGS:  In the left lower extremity, the common femoral, superficial femoral,  popliteal and posterior tibial veins demonstrate normal  compressibility and blood flow.        Impression    IMPRESSION:  1.  No evidence left lower extremity deep venous thrombosis.   CT Chest Pulmonary Embolism w Contrast    Narrative    Exam: Chest CT Angiogram with 3-dimensional reconstruction  9/3/2019.    Comparison: 8/8/2019     Clinical History: Shortness of breath. Evaluate for pulmonary  embolism.    Technique: Volumetric helical acquisition of the chest were obtained  following pulmonary embolism protocol from the lung apices through the  upper abdomen. The pulmonary arteries are well-opacified to evaluate  for pulmonary embolism.    3-dimensional post processed angiographic images were reconstructed,  archived to PACS and used in the interpretation of this study.    Findings:  Mediastinum/hilum: The heart size is normal. The great vessels are  normal in caliber. There is no evidence of a filling defect in the  pulmonary arteries to suggest a pulmonary embolism.     Unchanged prominent mediastinal lymph nodes.    Consolidative densities in the posterior lower lobes with surrounding  tree-in-bud nodules.    Upper Abdomen: Unremarkable appearance of the upper abdomen.    Bones/Soft tissues: No suspicious bony lesions.      Impression    Impression:   1. No pulmonary embolism.  2. Consolidative densities in the posterior lower lobes with  surrounding tree-in-bud nodules. Findings are concerning for  aspiration with superimposed  infection.         Recent vital signs:   /71   Pulse 81   Temp 97.5  F (36.4  C) (Oral)   Resp 18   Ht 1.829 m (6')   Wt 93.8 kg (206 lb 12.8 oz)   SpO2 96%   BMI 28.05 kg/m      Enmanuel Coma Scale Score: 15 (09/03/19 1707)       Cardiac Rhythm: Normal Sinus  Pt needs tele? No  Skin/wound Issues: None    Code Status: Full Code    Pain control: good    Nausea control: good    Abnormal labs/tests/findings requiring intervention: chest xray showing likely worsening PNA    Family present during ED course? Yes   Family Comments/Social Situation comments: from [place] staying at [place]    Tasks needing completion: None    Tamiko Diggs, RN  [phone] [facility]

## 2019-09-04 NOTE — PROGRESS NOTES
"   09/04/19 0951   General Information   Onset Date 09/03/19   Start of Care Date 09/04/19   Referring Physician Fred Aguilera MD   Patient Profile Review/OT: Additional Occupational Profile Info See Profile for full history and prior level of function   Patient/Family Goals Statement None stated   Swallowing Evaluation Bedside swallow evaluation   Behaviorial Observations WFL (within functional limits)   Mode of current nutrition Oral diet   Type of oral diet Regular;Thin liquid   Respiratory Status Room air   Comments SLP: Pt is a 69 year old man with a metastatic carcinoma with likely renal origin, who presents with recurrent pneumonia despite several rounds of antibiotics. Pt had a recent VFSS at OSH on 8/19 which was normal, no aspiration with any consistency. Esophagram was also normal. Pt continues to endorse globus sensation or sensation of solids \"going down slowly\" but no coughing/choking when eating/drinking. Clinical swallow eval completed per MD order.   Clinical Swallow Evaluation   Oral Musculature generally intact   Structural Abnormalities none present   Dentition present and adequate   Mucosal Quality good   Mandibular Strength and Mobility intact   Oral Labial Strength and Mobility WFL   Lingual Strength and Mobility WFL   Buccal Strength and Mobility intact   Laryngeal Function Cough;Throat clear;Swallow;Voicing initiated   Oral Musculature Comments WFL   Swallow Eval   Feeding Assistance no assistance needed   Clinical Swallow Eval: Thin Liquid Texture Trial   Mode of Presentation, Thin Liquids cup;straw;self-fed   Volume of Liquid or Food Presented 3oz water   Oral Phase of Swallow WFL   Pharyngeal Phase of Swallow intact   Diagnostic Statement WFL, no overt s/sx of aspiration   Clinical Swallow Eval: Puree Solid Texture Trial   Mode of Presentation, Puree spoon;self-fed   Volume of Puree Presented 2oz pudding   Oral Phase, Puree WFL   Pharyngeal Phase, Puree intact   Diagnostic Statement " "WFL, no overt s/sx of aspiration   Clinical Swallow Eval: Solid Food Texture Trial   Mode of Presentation, Solid self-fed   Volume of Solid Food Presented 1/2 cracker   Oral Phase, Solid WFL   Pharyngeal Phase, Solid intact   Diagnostic Statement WFL, no overt s/sx of aspiration   Esophageal Phase of Swallow   Patient reports or presents with symptoms of esophageal dysphagia Yes   Esophageal comments Pt endorses globus sensations and solids \"going down slowly.\" Despite this, pt had a normal videoswallow and esophagram last month.   Swallow Eval: Clinical Impressions   Skilled Criteria for Therapy Intervention Current level of function same as previous level of function   Functional Assessment Scale (FAS) 7   Treatment Diagnosis Functional oropharyngeal swallow mechanism   Diet texture recommendations Regular diet;Thin liquids   Recommended Feeding/Eating Techniques maintain upright posture during/after eating for 30 mins;alternate between small bites and sips of food/liquid;small sips/bites  (upright for all PO, slow rate)   Risks and Benefits of Treatment have been explained. Yes   Patient, family and/or staff in agreement with Plan of Care Yes   Clinical Impression Comments SLP: Clinical swallow eval completed per MD order. Pt presents with functional oropharyngeal swallow mechanism. Videoswallow study and esophagram from OSH last month were normal. Oral mech exam unremarkable. Assessed with thin liquids, puree, and higher texture solids. Oral phase WFL. Pharyngeal phase WFL, no overt s/sx of aspiration. Recommend regular diet/thin liquids. Ensure pt is fully alert and upright for all PO, taking small bites/sips at slow rate. Recommend alternating solids and liquids. No additional SLP services indicated.   Total Evaluation Time   Total Evaluation Time (Minutes) 10     "

## 2019-09-04 NOTE — PROVIDER NOTIFICATION
Spoke with Dr. Javier mitchell re: missed dose of doxycycline in ED last evening. Per MD, pt is on other abx and it is ok to not give it.

## 2019-09-04 NOTE — CONSULTS
[facility]  Pulmonary, Allergy, Critical Care and Sleep Medicine    Initial Consultation - 09/04/2019  Javon Bianchi MRN# [id number]   Age: 69 year old YOB: 1950     Date of Admission: 9/3/2019  Reason for Consultation: Evaluation for potential bronchoscopy  Requesting Team: Oncology    Primary care provider: Bonnie, [facility]     Assessment and Recommendations:    Javon Bianchi is a 69 year old male with a history of metastatic carcinoma with likely renal origin who presented on 9/3/2019 with worsening of a productive cough, increasing dyspnea upon exertion, chills, subjective fever, and fatigue. Patient reports that symptoms started around may of this year. Symptoms have persisted despite multiple rounds of antibiotics, including amoxicillin, vancomycin, pip-tazo, levofloxacin, and bactrim. Poorly tolerated Nivolumab and was started on prednisone on 4/8/19. Prednisone was tapered and discontinued on 8/7/19.     Differential at this time includes Nocardia vs fungal infection vs metastatic cancer vs micro aspiration. 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 Fungal infection considered given [place] and has previously worked at a golf course as a . Given that patient has a history of metastatic carcinoma, pulmonary metastases should be considered. Patient had normal swallow study during previous admission, but given lower lobe consolidations and recurrence of symptoms even with multiple rounds of antibiotics, microaspiration cannot be ruled out.    # Recurrent pneumonia:  - Bronchoscopy today. Samples sent for testing and cultures  - Continue Abx. Consider starting bactrim given suspicion for  Nocardia  - Agree with speech/swallow eval    Seen and discussed with PCC fellow Dr. Cedeño and Attending Dr. Tejal Rosenbaum, Medical student    Note reviewed by me. Pt seen, examined and discussed with medical student & attending Dr. Toure. - Kalyan Cedeño, [facility] Fellow [phone].    Chief Complaint and History of Present Illness:    CC:  Subjective fever, chills, worsening cough with mucus production, and increased dyspnea with exertion.    HPI: Javon Bianchi is a 69 year old male with a history of metastatic carcinoma with likely renal origin who presented on 9/3/2019 with worsening of a productive cough, increasing dyspnea upon exertion, chills, subjective fever, and fatigue. Patient reports that cough and mucus production started around May of this year. He presented to his PCP in July d/t fever/chills, worsening cough and mucus production, fatigue, and SOB with exertion. He was prescribed 10 days of amoxicillin at that time. He states that symptoms improved, except continued cough and mucus production. Chest CT performed on 8/8 showed bilateral posterior opacities thought to be consistent with aspiration pneumonia. Omega stated that symptoms recurred resulting in admission on 8/15 for pneumonia. During admission, he was started on vancomycin + pip/tazo for 7 days without resolution of symptoms. He was transitioned to bactrim + levofloxacin with improvement of respiratory symptoms, including improvement of cough and mucus production. Patient finished abx on 8/30. During admission, patient had a barium swallow study which was normal. After cessation of antibiotics patient had recurrence of symptoms, which resulted in admission on 9/3. CXR at time of admission showed worsening of consolidation from chest CT on 8/8. Labs drawn at admission were significant for an LDH of 353. Blood and sputum cultures, as well as legionella Ag pending at this tie.    Review of Systems:  Complete 12 point ROS  negative unless mentioned in HPI    Histories, Prior to Admission Medications, Allergies:    Past Medical History:  Past Medical History:   Diagnosis Date     Anemia      Bone metastasis (H) 09/28/2018     Cough      Pulmonary nodule      Renal cell carcinoma, right (H) 09/28/2018       Past Surgical History:  Past Surgical History:   Procedure Laterality Date     CHOLECYSTECTOMY       HC REMOVAL HEEL SPUR, CALCANEUS  2004       Past Social History:  Social History     Socioeconomic History     Marital status:      Spouse name: Not on file     Number of children: Not on file     Years of education: Not on file     Highest education level: Not on file   Occupational History     Not on file   Social Needs     Financial resource strain: Not on file     Food insecurity:     Worry: Not on file     Inability: Not on file     Transportation needs:     Medical: Not on file     Non-medical: Not on file   Tobacco Use     Smoking status: Never Smoker     Smokeless tobacco: Never Used   Substance and Sexual Activity     Alcohol use: Not Currently     Drug use: Yes     Types: Marijuana     Comment: medical cannabis      Sexual activity: Not on file   Lifestyle     Physical activity:     Days per week: Not on file     Minutes per session: Not on file     Stress: Not on file   Relationships     Social connections:     Talks on phone: Not on file     Gets together: Not on file     Attends Denominational service: Not on file     Active member of club or organization: Not on file     Attends meetings of clubs or organizations: Not on file     Relationship status: Not on file     Intimate partner violence:     Fear of current or ex partner: Not on file     Emotionally abused: Not on file     Physically abused: Not on file     Forced sexual activity: Not on file   Other Topics Concern     Not on file   Social History Narrative     Not on file       ETOH: Previously Social drinker. Last drink was >1-2 years ago  Tobacco: Never  used  Significant inhalational exposures: none  PPD/TB exposure status: none    Family History:  Family History   Problem Relation Age of Onset     Lung Cancer Maternal Grandmother        Medications:    acetaminophen  1,000 mg Oral TID     azithromycin  500 mg Intravenous Q24H     calcium carbonate 500 mg (elemental)  1 tablet Oral BID     dextromethorphan-guaiFENesin  1 tablet Oral Q12H     DULoxetine  60 mg Oral Daily     enoxaparin  40 mg Subcutaneous Q24H     Lidocaine  1-3 patch Transdermal Q24H     lidocaine   Transdermal Q8H     lidocaine   Transdermal Q24h     loratadine  10 mg Oral Daily     metoclopramide  5 mg Oral BID     morphine  30 mg Oral Daily at 2 pm     morphine  30 mg Oral At Bedtime     morphine  45 mg Oral QAM     omeprazole  40 mg Oral Daily     piperacillin-tazobactam  3.375 g Intravenous Q6H     pregabalin  50 mg Oral BID     senna-docusate  2 tablet Oral BID     sodium chloride (PF)  3 mL Intracatheter Q8H     vancomycin (VANCOCIN) IV  1,500 mg Intravenous Q24H     vitamin D3  2,000 Units Oral Daily     albuterol, guaiFENesin-codeine, HYDROmorphone, lidocaine 4%, lidocaine (buffered or not buffered), LORazepam **OR** LORazepam, magnesium sulfate, - MEDICATION INSTRUCTIONS -, melatonin, naloxone, ondansetron **OR** ondansetron **OR** ondansetron, polyethylene glycol, potassium chloride, potassium chloride with lidocaine, potassium chloride, potassium chloride, potassium chloride, potassium phosphate (KPHOS) in D5W IV, potassium phosphate (KPHOS) in D5W IV, potassium phosphate (KPHOS) in D5W IV, potassium phosphate (KPHOS) in D5W IV, prochlorperazine **OR** prochlorperazine, sodium chloride (PF)    Allergies:   No Known Allergies    Physical Exam:    Temp:  [95.9  F (35.5  C)-98.4  F (36.9  C)] 97.9  F (36.6  C)  Pulse:  [] 75  Heart Rate:  [62-86] 80  Resp:  [10-20] 18  BP: (106-133)/(65-82) 106/66  SpO2:  [94 %-99 %] 94 %    Intake/Output Summary (Last 24 hours) at 9/4/2019  1256  Last data filed at 9/4/2019 0921  Gross per 24 hour   Intake 980 ml   Output 1450 ml   Net -470 ml      General: Awake and alert. Engaged in conversation. NAD  HEENT: Pupils equal and reactive. Sclera nonicteric, moist mucous membranes. Oropharynx non-erythematous.   Neck: no palpable lymphadenopathy, no JVD noted  Chest: Decreased breath sounds in lower lobes bilaterally. Lungs otherwise clear without wheezing or crackles.   Cardiac: RRR. Normal S1/S2. No additional heart sounds noted.  Abdomen: Soft, flat, non tender, active BS  Extremities: 1+ pitting edema to midcalf. Unilateral edema of left thigh.  Neuro: A&Ox3, no focal deficits   Skin: no rash noted    Laboratory, imaging, and microbiologic data:    Admission labs reviewed by me, notable for: VBG WNL, . Sputum and blood cultures pending at this time.    CT chest imaging reviewed & interpreted by me, notable for: Worsening consolidation in LLL in comparison to chest CT from 8/8/19.

## 2019-09-04 NOTE — PROCEDURES
BRONCHOSCOPY     PROCEDURE:   Bronchoscopy with bronchoalveolar lavage    INDICATION:  Recurrent/Persistent Pneumonia     PROCEDURE : Kalyan Cedeño    SUPERVISOR: Dr Toure    CONSENT:  Obtained and in the paper chart     UNIVERSAL PROTOCOL: Patient Identification was verified, time out was performed. Imaging data reviewed. Barrier precaution done: Hands washed, mask, gloves, gown, and eye protection all used.     MEDICATIONS: albuterol/lidocaine nebulizer, Fentanyl 100 mcg IV, midazolam 2 mg IV. 4% lidocaine 9 mL to cords x3, 1% lidocaine 12 mL above cords, 1% lidocaine 12 mL to tracheobronchial tree, 1% lidocaine 12 mL in each mainstem bronchus.     PROCEDURE: Patient monitored during entire procedure. Topical lidocaine solution was used to anesthetize the oropharynx. After adequate moderate sedation was obtained, topical lidocaine solution was used to anesthetize the vocal cords and tracheobronchial tree. The oropharynx was unremarkable. The bronchoscope was then advanced into the airway. The mirza was sharp. The bronchoscope was advanced to the left lower lobe bronchus and thick mucopurulent secretions were visualized and suctioned. This airway was consequently cleared and scope was advanced to the left lower posterior basal bronchus. A BAL was performed, with 120 mL of sterile saline infused in two 60 mL aliquots. In return, 35 mL of purulent fluid was collected, and sent for routine studies. The bronchoscope was then taken out of wedge. We did not perform an exam of the remaining segments. The left mainstem, left lower lobe bronchi did appear normal. The scope was then removed. The patient tolerated the procedure well.     SAMPLES:    35 mL of fluid was sent for typical analysis.  Dr Toure was present for the entire procedure.    COMPLICATIONS: None    RECOMMENDATIONS: Please see today's consult note. Awaiting fluid results.    Klayan Cedeño MD  [facility] Fellow, Pgr [phone]  I was present with  the patient, Javon Bianchi, for the entire viewing portion of the endoscopy procedure (including scope insertion and withdrawal) and agree with the interpretation and report as documented by the fellow.

## 2019-09-04 NOTE — PLAN OF CARE
Pt arrived to floor at 2300 on 9/3/19 via litter from ED.  Pt able to ambulate from cart to bed w/o difficulty. Pt oriented to room and routine. Pt IVF begun at 125/hr. IV antibiotics admin as ordered. Pt up to BR gait steady voiding adeq amts. Pt c/o back ache for which he takes MS contin at home. Last dose q 12hr and given at 9 pm. Pt declined any further med for pain, or any heat application to site. Sleeping between cares. Lungs clear, no cough noted. Sats on RA %. Cont to monitor for infection. Maintain pain control.

## 2019-09-04 NOTE — PLAN OF CARE
Pt arrived to  from ED around 2300. Ambulated to bed with SBA and cane. Checked weight and vitals. Reg diet, pt is asking for a snack. No complaints from patient at this time.

## 2019-09-04 NOTE — PLAN OF CARE
Discharge Planner SLP   Patient plan for discharge: Unknown  Current status: Clinical swallow eval completed per MD order. Pt presents with functional oropharyngeal swallow mechanism. Videoswallow study and esophagram from OSH last month were normal. Oral mech exam unremarkable. Assessed with thin liquids, puree, and higher texture solids. Oral phase WFL. Pharyngeal phase WFL, no overt s/sx of aspiration. Recommend regular diet/thin liquids. Ensure pt is fully alert and upright for all PO, taking small bites/sips at slow rate. Recommend alternating solids and liquids. No additional SLP services indicated.  Barriers to return to prior living situation: None from ST perspective  Recommendations for discharge: Defer to MD  Rationale for recommendations: Functional oropharyngeal swallow mechanism       Entered by: Kristina Winslow 09/04/2019 9:56 AM

## 2019-09-04 NOTE — PLAN OF CARE
AVSS. Denies SOB, no cough noted. Pt reports generalized joint pain. Given scheduled tylenol, MS contin and lidocaine patches with relief. Denies nausea, has been NPO for bronchoscopy this afternoon. Passing flatus and last BM was yesterday. Voiding adequate amounts. UA sent to lab. Up ab laverne. Pt is on IV antibiotics. Pt went down to endoscopy for bronch at 1300. Pt arrived back to the unit at 1400. Pt is lethargic post procedure but responds to voice. Cont. pulse ox on. Continue to monitor respiratory status, LOC, pain and s/s of infection.

## 2019-09-04 NOTE — OR NURSING
Pt in endoscopy today for bronchoscopy with bronchial lavage.  Pt tolerated procedure well with conscious sedation of 100mcg fentanyl and 2mg versed.  Oxygen at 5L for duration of procedure.  VSS during and after procedure.  Oxygen decreased to 3L  post procedure at 1410.  NS lavage to Left lower lobe 120cc in with 35cc return.  Pt awake and responsive post procedure, but noted to dose off and start talking about random things.  When asked what he was talking about, he stated he was hallucinating about a news program he had seen last week.  Later noted pt to be moving hand in a circular motion with eyes closed and he stated he was stirring soup.  Pt was completely oriented at the time when questioned, but states he hallucinates at home since taking the MS contin.  Report called to 7C PAUL Carroll and notified of hallucinations.  To 7C with transport on 3L oxygen.

## 2019-09-04 NOTE — H&P
[facility], [place] -- History and Physical -- Hematology / Oncology  Date of Admission:  9/3/2019 -- Date of Service (when I saw the patient): 09/03/19    ASSESSMENT & PLAN  Javon Bianchi is a 69 year old man with a metastatic carcinoma with likely renal origin, who presents with recurrent pneumonia despite several rounds of antibiotics.     Recurrent pneumonia  Per previous notes, he has had several months of productive cough with mucus, starting early summer, associated with wheezing and chills. Treated by PCP with 10 days of amoxicillin, completed on 8/5. Felt better during that time but coughing never improved. CT chest done incidentally as part of staging 8/8 showed bilateral posterior opacities consistent with aspiration pneumonia. Upcoming surgery was canceled and he ws referred back to his PCP in [place] and was admitted 8/15-8/25 for pneumonia. Treated with vancomycin and pip-tazo x 7 days, but did not have resolution of symptoms despite 7days of antibiotics. On the 8th day, he was transitioned to levofloxacin. He developed a fever to 100.7 and so TMP-SMX was added and he did not have further fevers afterward. He had 5 more days of levofloxacin and TMP-SMX after discharge for total 14 days of antibiotics, completed on 8/30. Respiratory cultures only showed normal respiratory jacque. At palliative care appt today, he complained of increased fatigue, recurrent cough productive of green sputum over the last day, and mild dyspnea. CXR shows LLL consolidation, probably worse than seen on CT 8/8/19. CT PE showed no PE, but revealed consolidation in both posterior lower lobes concerning for aspiration with superimposed infection. He is not requiring O2, HDS, lactic acid 1.5, VBG wnl.  - For now will cover HCAP and asp pna broadly with vancomycin, pip-tazo, and azithromycin  - Non neutropenic but may need to consider testing for opportunistic infections  - Repeat sputum culture and  gram stain done in the ED - follow up results  - Check U leg, U strep  - Consult pulmonology in AM, consideration for bronchoscopy  - Consult speech/swallow for eval with concern for aspiration pneumonia, however he had eval including barium swallow at the OSH which were unrevealling. Denies any symptoms of aspiration, choking, but does have feeling that he has slow food transit. At this point will allow regular diet.     MANASA  Baseline Cr around 0.65. Cr 1.17 at admission.  - Check FEUN as he has been on furosemide.  - IV NS @ 125 ml/hr  - Follow lytes and Cr    Cancer related pain  - Continue PTA MS contin  - Continue PTA hydromorphone 2-4mg po q3h PRN  - Contnue PTA duloxetine 60mg daily and pregabalin 50mg BID  - Continue PTA bowel regimen    Metastatic carcinoma with likely renal origin  Metastatic disease in spine  Left femur metastases  L3 vertebral compression fracture  Presented with back pain, lower extremity weakness, and weight loss in mid 2018, eventually was found to have pathological L3 vertebral fracture on 8/2018. Biopsy showed poorly differentiated carcinoma with IHC c/w possible RCC. Other imaging negative for primary renal lesion. S/p palliative XRT. Started cabozantinib 9/28/18, which required dose reductions over the next several months because of HFS and poor tolerability. Switched to nivolumab because of progression in bone disease 2/2019. Cycle 3 held for myalgias, which improved with prednisone. However, he has not been able to restart. Started XRT this summer to lumbar spine. Also has been under evaluation for kyphoplasty vs starting lenvatinib/everolimus. Surgery delayed into mid September because of untreated pneumonia. Was to discuss with Dr. Anne 9/4.     Left leg edema  Secondary to metastatic disease. Takes furosemide PRN and JUVENTINO stockings/wrappings. LE US was negative for DVT in the ED.  - Consider lymphedema eval however does not look swollen at this time.  - Hold furosemide with MANASA  above    Shoulder arthritis/pain  Complains of pain in both shoulders. Taking pain medications as above. Has also taken celecoxib in the past. At this point he wants to wait until AM to discuss possible medication therapy for the shoulders.    Anxiety - continue duloxetine  Anemia - Baseline Hgb now has been around 8-9 range. Will monitor Hgb.    FEN  - IVFs: NS @ 125  - Diet: regular as tolerated   - Lytes repleted as per sliding scale     PPX  - DVT: enoxaparin  - Bowel: senna-colace, miralax PRN  - GI: continue home PPI    Lines/Drains: PIV  Consults: TBD  CODE: Full  DISPO: inpatient > 2 nights for treatment of recurrent pneumonia failing outpatient  treatment    CHIEF COMPLAINT/REASON FOR ADMIT: recurrent pneumonia    HISTORY OF PRESENT ILLNESS  History obtained from chart review and discussion with the patient.     Javon Bianchi is a 69 year old man with a metastatic carcinoma with likely renal origin, who presents with recurrent pneumonia despite several rounds of antibiotics.     Per previous notes, he has had several months of productive cough with mucus, starting early summer, associated with wheezing and chills. Treated by PCP with 10 days of amoxicillin, completed on 8/5. Felt better during that time but coughing never improved. CT chest done incidentally as part of staging 8/8 showed bilateral posterior opacities consistent with aspiration pneumonia. Upcoming surgery was canceled and he ws referred back to his PCP in [place] and was admitted 8/15-8/25 for pneumonia. Treated with vancomycin and pip-tazo x 7 days, but did not have resolution of symptoms despite 7days of antibiotics. On the 8th day, he was transitioned to levofloxacin. He developed a fever to 100.7 and so TMP-SMX was added and he did not have further fevers afterward. He had 5 more days of levofloxacin and TMP-SMX after discharge for total 14 days of antibiotics, completed on 8/30. Respiratory cultures only showed normal respiratory  jacque. At palliative care appt today, he complained of increased fatigue, recurrent cough productive of green sputum over the last day, and mild dyspnea. CXR shows LLL consolidation, probably worse than seen on CT 8/8/19. CT PE showed no PE, but revealed consolidation in both posterior lower lobes concerning for aspiration with superimposed infection. He is not requiring O2, HDS, lactic acid 1.5, VBG wnl.    Currently he is feeling relatively well. No coughing, but he did have coughing earlier and did have some production of sputum. He is complaining about bilateral shoulder pain and the ED stretcher isn't helping. He denies chest pain shortness of breath, wheezing, abdominal pain, constipation, diarrhea, other symptoms. No fever or chills.     PMH  Past Medical History:   Diagnosis Date     Anemia      Bone metastasis (H) 09/28/2018     Cough      Pulmonary nodule      Renal cell carcinoma, right (H) 09/28/2018       PSH  Past Surgical History:   Procedure Laterality Date     CHOLECYSTECTOMY       HC REMOVAL HEEL SPUR, CALCANEUS  2004       Prior to Admission MEDICATIONS  Prior to Admission Medications   Prescriptions Last Dose Informant Patient Reported? Taking?   DULoxetine (CYMBALTA) 60 MG capsule   No No   Sig: Take 1 capsule (60 mg) by mouth daily   Dextromethorphan-guaiFENesin (MUCINEX DM)  MG 12 hr tablet   Yes No   Sig: Take 1 tablet by mouth every 12 hours   HYDROmorphone (DILAUDID) 2 MG tablet   No No   Sig: Take 1-2 tablets (2-4 mg) by mouth every 3 hours as needed for pain   Lenvatinib 18 MG, 10 mg + 2 x 4 mg capsules, (LENVIMA) 18 mg combo capsule CHEMOTHERAPY   No No   Sig: Take 18 mg dose (10 mg plus two 4 mg capsules) by mouth daily.   Vitamin D, Cholecalciferol, 1000 units TABS   Yes No   Sig: Take 2,000 Units by mouth daily    acetaminophen (TYLENOL) 500 MG tablet   Yes No   Sig: Take 1,000 mg by mouth 3 times daily    calcium carbonate 500 mg, elemental, (OSCAL 500) 1250 (500 Ca) MG TABS  tablet   Yes No   Sig: Take 1 tablet by mouth 2 times daily   diazepam (VALIUM) 5 MG tablet   No No   Sig: Take 1 pill 30 min before MRI and a second pill 2 minutes prior.   everolimus (AFINITOR) 5 MG tablet CHEMO   No No   Sig: Take 1 tablet (5 mg) by mouth daily   furosemide (LASIX) 20 MG tablet   No No   Sig: Take 1 tablet (20 mg) by mouth daily as needed   lidocaine (LIDODERM) 5 % patch   No No   Sig: Place 1-3 patches onto the skin every 24 hours Apply to painful areas for 12 hours on, 12 hours off.   loratadine (CLARITIN) 10 MG tablet   Yes No   Sig: Take 10 mg by mouth daily   medical cannabis (Patient's own supply.  Not a prescription)   Yes No   Si Dose See Admin Instructions (This is NOT a prescription, and does not certify that the patient has a qualifying medical condition for medical cannabis.  The purpose of this order is  to document that the patient reports taking medical cannabis.)   melatonin 3 MG tablet   Yes No   Sig: Take 3 mg by mouth nightly as needed    metoclopramide (REGLAN) 5 MG tablet   No No   Sig: Take 1 tablet (5 mg) by mouth 4 times daily (before meals and nightly)   Patient taking differently: Take 5 mg by mouth 2 times daily    morphine (MS CONTIN) 30 MG CR tablet   No No   Sig: Takes 45mg in the AM, 30 mg midday and 30mg at bedtime.   omeprazole (PRILOSEC) 40 MG DR capsule   No No   Sig: Take 1 capsule (40 mg) by mouth daily   ondansetron (ZOFRAN) 8 MG tablet   No No   Sig: Take 1 tablet (8 mg) by mouth every 8 hours as needed (nausea and vomiting) Take prior to each lenvatinib dose.   polyethylene glycol (MIRALAX/GLYCOLAX) Packet   Yes No   Sig: Take 0.5 packets by mouth daily as needed   pregabalin (LYRICA) 50 MG capsule   No No   Sig: Take 1 capsule (50 mg) by mouth 2 times daily   senna-docusate (SENOKOT-S;PERICOLACE) 8.6-50 MG per tablet   Yes No   Sig: Take 2 tablets by mouth 2 times daily      Facility-Administered Medications: None     Allergies   No Known  Allergies    Social History  Social History     Socioeconomic History     Marital status:      Spouse name: Not on file     Number of children: Not on file     Years of education: Not on file     Highest education level: Not on file   Occupational History     Not on file   Social Needs     Financial resource strain: Not on file     Food insecurity:     Worry: Not on file     Inability: Not on file     Transportation needs:     Medical: Not on file     Non-medical: Not on file   Tobacco Use     Smoking status: Never Smoker     Smokeless tobacco: Never Used   Substance and Sexual Activity     Alcohol use: Not Currently     Drug use: Yes     Types: Marijuana     Comment: medical cannabis      Sexual activity: Not on file   Lifestyle     Physical activity:     Days per week: Not on file     Minutes per session: Not on file     Stress: Not on file   Relationships     Social connections:     Talks on phone: Not on file     Gets together: Not on file     Attends [employer] service: Not on file     Active member of club or organization: Not on file     Attends meetings of clubs or organizations: Not on file     Relationship status: Not on file     Intimate partner violence:     Fear of current or ex partner: Not on file     Emotionally abused: Not on file     Physically abused: Not on file     Forced sexual activity: Not on file   Other Topics Concern     Not on file   Social History Narrative     Not on file       Family History  Family History   Problem Relation Age of Onset     Lung Cancer Maternal Grandmother      - Family history reviewed & no other pertinent oncologic/hematologic malignancy noted    ROS  Comprehensive ROS was performed and was negative unless otherwise noted in above HPI.    Physical Exam  Temp:  [97.5  F (36.4  C)-98.4  F (36.9  C)] 97.5  F (36.4  C)  Pulse:  [] 81  Heart Rate:  [74-81] 81  Resp:  [13-18] 18  BP: (107-133)/(71-77) 133/71  SpO2:  [96 %-97 %] 96 %  206 lbs 12.8  oz    Constitutional: Awake, alert, cooperative, in NAD.  Eyes: PERRL, EOMI, sclera clear, conjunctiva normal.  ENT: Normocephalic, without obvious abnormality, sinuses nontender on palpation, oral pharynx with moist mucus membranes  Respiratory: Non-labored breathing, good air exchange, clear to auscultation bilaterally, except for rales at bases bilaterally, L > R. no wheezing.  Cardiovascular: RRR, no murmur noted.  GI: + bowel sounds, soft, non-distended, non-tender, no masses palpated, no hepatosplenomegaly.  Skin: No concerning lesions or rash on exposed areas.  Musculoskeletal: No edema ayush LEs. Left side actually does not look swollen  Neurologic: Awake, alert & oriented x3.  Cranial nerves II-XII are grossly intact.   Psych: appropriate affect    DATA  Results for orders placed or performed during the hospital encounter of 09/03/19 (from the past 24 hour(s))   CBC with platelets differential   Result Value Ref Range    WBC 7.6 4.0 - 11.0 10e9/L    RBC Count 3.14 (L) 4.4 - 5.9 10e12/L    Hemoglobin 9.1 (L) 13.3 - 17.7 g/dL    Hematocrit 30.4 (L) 40.0 - 53.0 %    MCV 97 78 - 100 fl    MCH 29.0 26.5 - 33.0 pg    MCHC 29.9 (L) 31.5 - 36.5 g/dL    RDW 17.3 (H) 10.0 - 15.0 %    Platelet Count 217 150 - 450 10e9/L    Diff Method Manual Differential     % Neutrophils 77.0 %    % Lymphocytes 1.8 %    % Monocytes 21.2 %    % Eosinophils 0.0 %    % Basophils 0.0 %    Absolute Neutrophil 5.9 1.6 - 8.3 10e9/L    Absolute Lymphocytes 0.1 (L) 0.8 - 5.3 10e9/L    Absolute Monocytes 1.6 (H) 0.0 - 1.3 10e9/L    Absolute Eosinophils 0.0 0.0 - 0.7 10e9/L    Absolute Basophils 0.0 0.0 - 0.2 10e9/L    Anisocytosis Slight     Platelet Estimate Confirming automated cell count    Comprehensive metabolic panel   Result Value Ref Range    Sodium 139 133 - 144 mmol/L    Potassium 3.5 3.4 - 5.3 mmol/L    Chloride 106 94 - 109 mmol/L    Carbon Dioxide 23 20 - 32 mmol/L    Anion Gap 10 3 - 14 mmol/L    Glucose 112 (H) 70 - 99 mg/dL     Urea Nitrogen 13 7 - 30 mg/dL    Creatinine 1.17 0.66 - 1.25 mg/dL    GFR Estimate 62 >60 mL/min/[1.73_m2]    GFR Estimate If Black 72 >60 mL/min/[1.73_m2]    Calcium 7.1 (L) 8.5 - 10.1 mg/dL    Bilirubin Total 0.3 0.2 - 1.3 mg/dL    Albumin 2.6 (L) 3.4 - 5.0 g/dL    Protein Total 6.2 (L) 6.8 - 8.8 g/dL    Alkaline Phosphatase 94 40 - 150 U/L    ALT 12 0 - 70 U/L    AST 22 0 - 45 U/L   Troponin I   Result Value Ref Range    Troponin I ES <0.015 0.000 - 0.045 ug/L   Nt probnp inpatient (BNP)   Result Value Ref Range    N-Terminal Pro BNP Inpatient 283 0 - 900 pg/mL   ISTAT gases lactate amy POCT   Result Value Ref Range    Ph Venous 7.38 7.32 - 7.43 pH    PCO2 Venous 36 (L) 40 - 50 mm Hg    PO2 Venous 53 (H) 25 - 47 mm Hg    Bicarbonate Venous 22 21 - 28 mmol/L    O2 Sat Venous 87 %    Lactic Acid 1.5 0.7 - 2.1 mmol/L   XR Chest 2 Views    Narrative    EXAM: XR CHEST 2 VW  9/3/2019 6:07 PM      HISTORY: cough    COMPARISON: CT 8/8/2019, chest x-ray 5/12/2019    FINDINGS: PA and lateral radiographs of the chest. The trachea is  midline. Cardiac silhouette is within normal limits. No pleural  effusion or pneumothorax. Consolidation in the left lower lobe, best  visualized on lateral radiograph. Upper abdomen is unremarkable.  Flowing anterior osteophytes of the thoracic spine suggesting diffuse  idiopathic skeletal hyperostosis.       Impression    IMPRESSION: Left lower lobe consolidation concerning for infection.  Clearly worse than 5/12/2019 chest x-ray and probably slightly worse  than CT dated 8/8/2019. This should be followed to resolution.    I have personally reviewed the examination and initial interpretation  and I agree with the findings.    FRANCOIS DEVINE MD   US Lower Extremity Venous Duplex Left    Narrative    EXAMINATION: US LOWER EXTREMITY VENOUS DUPLEX LEFT, 9/3/2019 7:01 PM     COMPARISON: None.    HISTORY: Evaluate for DVT, swelling    TECHNIQUE:  Gray-scale evaluation with compression, spectral  flow, and  color Doppler assessment of the deep venous system of the left leg  from groin to knee, and then at the ankle.    FINDINGS:  In the left lower extremity, the common femoral, superficial femoral,  popliteal and posterior tibial veins demonstrate normal  compressibility and blood flow.        Impression    IMPRESSION:  1.  No evidence left lower extremity deep venous thrombosis.   CT Chest Pulmonary Embolism w Contrast    Narrative    Exam: Chest CT Angiogram with 3-dimensional reconstruction  9/3/2019.    Comparison: 8/8/2019     Clinical History: Shortness of breath. Evaluate for pulmonary  embolism.    Technique: Volumetric helical acquisition of the chest were obtained  following pulmonary embolism protocol from the lung apices through the  upper abdomen. The pulmonary arteries are well-opacified to evaluate  for pulmonary embolism.    3-dimensional post processed angiographic images were reconstructed,  archived to PACS and used in the interpretation of this study.    Findings:  Mediastinum/hilum: The heart size is normal. The great vessels are  normal in caliber. There is no evidence of a filling defect in the  pulmonary arteries to suggest a pulmonary embolism.     Unchanged prominent mediastinal lymph nodes.    Consolidative densities in the posterior lower lobes with surrounding  tree-in-bud nodules.    Upper Abdomen: Unremarkable appearance of the upper abdomen.    Bones/Soft tissues: No suspicious bony lesions.      Impression    Impression:   1. No pulmonary embolism.  2. Consolidative densities in the posterior lower lobes with  surrounding tree-in-bud nodules. Findings are concerning for  aspiration with superimposed infection.         PCP & Hematologist/Oncologist  [facility]    Fred Aguilera MD  Hematology/Oncology  September 3, 2019

## 2019-09-05 LAB
ANION GAP SERPL CALCULATED.3IONS-SCNC: 8 MMOL/L (ref 3–14)
BACTERIA SPEC CULT: NORMAL
BASOPHILS # BLD AUTO: 0 10E9/L (ref 0–0.2)
BASOPHILS NFR BLD AUTO: 0.3 %
BUN SERPL-MCNC: 9 MG/DL (ref 7–30)
CALCIUM SERPL-MCNC: 6.7 MG/DL (ref 8.5–10.1)
CHLORIDE SERPL-SCNC: 111 MMOL/L (ref 94–109)
CMV DNA SPEC NAA+PROBE-ACNC: NORMAL [IU]/ML
CMV DNA SPEC NAA+PROBE-LOG#: NORMAL {LOG_IU}/ML
CO2 SERPL-SCNC: 23 MMOL/L (ref 20–32)
COPATH REPORT: NORMAL
CREAT SERPL-MCNC: 1.02 MG/DL (ref 0.66–1.25)
DIFFERENTIAL METHOD BLD: ABNORMAL
EOSINOPHIL # BLD AUTO: 0.1 10E9/L (ref 0–0.7)
EOSINOPHIL NFR BLD AUTO: 0.9 %
ERYTHROCYTE [DISTWIDTH] IN BLOOD BY AUTOMATED COUNT: 17.2 % (ref 10–15)
FLUAV H1 2009 PAND RNA SPEC QL NAA+PROBE: NEGATIVE
FLUAV H1 RNA SPEC QL NAA+PROBE: NEGATIVE
FLUAV H3 RNA SPEC QL NAA+PROBE: NEGATIVE
FLUAV RNA SPEC QL NAA+PROBE: NEGATIVE
FLUBV RNA SPEC QL NAA+PROBE: NEGATIVE
GFR SERPL CREATININE-BSD FRML MDRD: 74 ML/MIN/{1.73_M2}
GLUCOSE SERPL-MCNC: 98 MG/DL (ref 70–99)
HADV DNA SPEC QL NAA+PROBE: NEGATIVE
HADV DNA SPEC QL NAA+PROBE: NEGATIVE
HCT VFR BLD AUTO: 29.1 % (ref 40–53)
HGB BLD-MCNC: 8.6 G/DL (ref 13.3–17.7)
HMPV RNA SPEC QL NAA+PROBE: NEGATIVE
HPIV1 RNA SPEC QL NAA+PROBE: NEGATIVE
HPIV2 RNA SPEC QL NAA+PROBE: NEGATIVE
HPIV3 RNA SPEC QL NAA+PROBE: NEGATIVE
IMM GRANULOCYTES # BLD: 0.1 10E9/L (ref 0–0.4)
IMM GRANULOCYTES NFR BLD: 1.1 %
LYMPHOCYTES # BLD AUTO: 0.2 10E9/L (ref 0.8–5.3)
LYMPHOCYTES NFR BLD AUTO: 2.5 %
MAGNESIUM SERPL-MCNC: 1.6 MG/DL (ref 1.6–2.3)
MCH RBC QN AUTO: 29.1 PG (ref 26.5–33)
MCHC RBC AUTO-ENTMCNC: 29.6 G/DL (ref 31.5–36.5)
MCV RBC AUTO: 98 FL (ref 78–100)
MICROBIOLOGIST REVIEW: NORMAL
MONOCYTES # BLD AUTO: 2 10E9/L (ref 0–1.3)
MONOCYTES NFR BLD AUTO: 26.8 %
MRSA DNA SPEC QL NAA+PROBE: NEGATIVE
NEUTROPHILS # BLD AUTO: 5.1 10E9/L (ref 1.6–8.3)
NEUTROPHILS NFR BLD AUTO: 68.4 %
NRBC # BLD AUTO: 0 10*3/UL
NRBC BLD AUTO-RTO: 0 /100
PHOSPHATE SERPL-MCNC: 2.9 MG/DL (ref 2.5–4.5)
PLATELET # BLD AUTO: 200 10E9/L (ref 150–450)
PLATELET # BLD EST: ABNORMAL 10*3/UL
POTASSIUM SERPL-SCNC: 3.4 MMOL/L (ref 3.4–5.3)
RBC # BLD AUTO: 2.96 10E12/L (ref 4.4–5.9)
RHINOVIRUS RNA SPEC QL NAA+PROBE: NEGATIVE
RSV RNA SPEC QL NAA+PROBE: NEGATIVE
RSV RNA SPEC QL NAA+PROBE: NEGATIVE
SODIUM SERPL-SCNC: 141 MMOL/L (ref 133–144)
SPECIMEN SOURCE: NORMAL
WBC # BLD AUTO: 7.5 10E9/L (ref 4–11)

## 2019-09-05 PROCEDURE — 83735 ASSAY OF MAGNESIUM: CPT | Performed by: INTERNAL MEDICINE

## 2019-09-05 PROCEDURE — 25000128 H RX IP 250 OP 636: Performed by: INTERNAL MEDICINE

## 2019-09-05 PROCEDURE — 85025 COMPLETE CBC W/AUTO DIFF WBC: CPT | Performed by: INTERNAL MEDICINE

## 2019-09-05 PROCEDURE — 87640 STAPH A DNA AMP PROBE: CPT | Performed by: INTERNAL MEDICINE

## 2019-09-05 PROCEDURE — 25800030 ZZH RX IP 258 OP 636: Performed by: INTERNAL MEDICINE

## 2019-09-05 PROCEDURE — 87641 MR-STAPH DNA AMP PROBE: CPT | Performed by: INTERNAL MEDICINE

## 2019-09-05 PROCEDURE — 99232 SBSQ HOSP IP/OBS MODERATE 35: CPT | Performed by: INTERNAL MEDICINE

## 2019-09-05 PROCEDURE — 12000001 ZZH R&B MED SURG/OB UMMC

## 2019-09-05 PROCEDURE — 80048 BASIC METABOLIC PNL TOTAL CA: CPT | Performed by: INTERNAL MEDICINE

## 2019-09-05 PROCEDURE — 25000132 ZZH RX MED GY IP 250 OP 250 PS 637: Mod: GY | Performed by: PHYSICIAN ASSISTANT

## 2019-09-05 PROCEDURE — 25000132 ZZH RX MED GY IP 250 OP 250 PS 637: Mod: GY | Performed by: INTERNAL MEDICINE

## 2019-09-05 PROCEDURE — 84100 ASSAY OF PHOSPHORUS: CPT | Performed by: INTERNAL MEDICINE

## 2019-09-05 PROCEDURE — 36415 COLL VENOUS BLD VENIPUNCTURE: CPT | Performed by: INTERNAL MEDICINE

## 2019-09-05 RX ORDER — CELECOXIB 100 MG/1
100 CAPSULE ORAL 2 TIMES DAILY
Status: DISCONTINUED | OUTPATIENT
Start: 2019-09-05 | End: 2019-09-07 | Stop reason: HOSPADM

## 2019-09-05 RX ADMIN — GUAIFENESIN AND DEXTROMETHORPHAN HYDROBROMIDE 1 TABLET: 600; 30 TABLET, EXTENDED RELEASE ORAL at 21:39

## 2019-09-05 RX ADMIN — ENOXAPARIN SODIUM 40 MG: 40 INJECTION SUBCUTANEOUS at 09:40

## 2019-09-05 RX ADMIN — CALCIUM 500 MG: 500 TABLET ORAL at 20:34

## 2019-09-05 RX ADMIN — PREGABALIN 50 MG: 25 CAPSULE ORAL at 20:33

## 2019-09-05 RX ADMIN — MORPHINE SULFATE 30 MG: 30 TABLET, EXTENDED RELEASE ORAL at 13:12

## 2019-09-05 RX ADMIN — ACETAMINOPHEN 1000 MG: 500 TABLET, FILM COATED ORAL at 20:33

## 2019-09-05 RX ADMIN — PIPERACILLIN SODIUM AND TAZOBACTAM SODIUM 3.38 G: 3; .375 INJECTION, POWDER, LYOPHILIZED, FOR SOLUTION INTRAVENOUS at 09:40

## 2019-09-05 RX ADMIN — CALCIUM 500 MG: 500 TABLET ORAL at 09:39

## 2019-09-05 RX ADMIN — LORATADINE 10 MG: 10 TABLET ORAL at 09:40

## 2019-09-05 RX ADMIN — SODIUM CHLORIDE: 9 INJECTION, SOLUTION INTRAVENOUS at 09:34

## 2019-09-05 RX ADMIN — GUAIFENESIN AND DEXTROMETHORPHAN HYDROBROMIDE 1 TABLET: 600; 30 TABLET, EXTENDED RELEASE ORAL at 05:33

## 2019-09-05 RX ADMIN — MORPHINE SULFATE 30 MG: 30 TABLET, EXTENDED RELEASE ORAL at 20:34

## 2019-09-05 RX ADMIN — VANCOMYCIN HYDROCHLORIDE 1500 MG: 10 INJECTION, POWDER, LYOPHILIZED, FOR SOLUTION INTRAVENOUS at 21:40

## 2019-09-05 RX ADMIN — PIPERACILLIN SODIUM AND TAZOBACTAM SODIUM 3.38 G: 3; .375 INJECTION, POWDER, LYOPHILIZED, FOR SOLUTION INTRAVENOUS at 16:15

## 2019-09-05 RX ADMIN — CELECOXIB 100 MG: 100 CAPSULE ORAL at 13:09

## 2019-09-05 RX ADMIN — CELECOXIB 100 MG: 100 CAPSULE ORAL at 20:33

## 2019-09-05 RX ADMIN — AZITHROMYCIN MONOHYDRATE 500 MG: 500 INJECTION, POWDER, LYOPHILIZED, FOR SOLUTION INTRAVENOUS at 01:05

## 2019-09-05 RX ADMIN — ACETAMINOPHEN 1000 MG: 500 TABLET, FILM COATED ORAL at 09:38

## 2019-09-05 RX ADMIN — METOCLOPRAMIDE HYDROCHLORIDE 5 MG: 5 TABLET ORAL at 09:39

## 2019-09-05 RX ADMIN — PREGABALIN 50 MG: 25 CAPSULE ORAL at 09:38

## 2019-09-05 RX ADMIN — PIPERACILLIN SODIUM AND TAZOBACTAM SODIUM 3.38 G: 3; .375 INJECTION, POWDER, LYOPHILIZED, FOR SOLUTION INTRAVENOUS at 02:54

## 2019-09-05 RX ADMIN — MORPHINE SULFATE 45 MG: 30 TABLET, EXTENDED RELEASE ORAL at 09:38

## 2019-09-05 RX ADMIN — MELATONIN 2000 UNITS: at 09:40

## 2019-09-05 RX ADMIN — SULFAMETHOXAZOLE AND TRIMETHOPRIM 2 TABLET: 800; 160 TABLET ORAL at 09:39

## 2019-09-05 RX ADMIN — PIPERACILLIN SODIUM AND TAZOBACTAM SODIUM 3.38 G: 3; .375 INJECTION, POWDER, LYOPHILIZED, FOR SOLUTION INTRAVENOUS at 20:33

## 2019-09-05 RX ADMIN — DULOXETINE HYDROCHLORIDE 60 MG: 60 CAPSULE, DELAYED RELEASE ORAL at 09:40

## 2019-09-05 RX ADMIN — SENNOSIDES AND DOCUSATE SODIUM 2 TABLET: 8.6; 5 TABLET ORAL at 20:33

## 2019-09-05 RX ADMIN — OMEPRAZOLE 40 MG: 20 CAPSULE, DELAYED RELEASE ORAL at 09:39

## 2019-09-05 RX ADMIN — METOCLOPRAMIDE HYDROCHLORIDE 5 MG: 5 TABLET ORAL at 20:33

## 2019-09-05 RX ADMIN — ACETAMINOPHEN 1000 MG: 500 TABLET, FILM COATED ORAL at 13:12

## 2019-09-05 RX ADMIN — SULFAMETHOXAZOLE AND TRIMETHOPRIM 2 TABLET: 800; 160 TABLET ORAL at 20:33

## 2019-09-05 RX ADMIN — LIDOCAINE 2 PATCH: 560 PATCH PERCUTANEOUS; TOPICAL; TRANSDERMAL at 10:59

## 2019-09-05 ASSESSMENT — PAIN DESCRIPTION - DESCRIPTORS
DESCRIPTORS: ACHING;CONSTANT
DESCRIPTORS: ACHING;CONSTANT
DESCRIPTORS: CONSTANT

## 2019-09-05 ASSESSMENT — ACTIVITIES OF DAILY LIVING (ADL)
ADLS_ACUITY_SCORE: 11

## 2019-09-05 NOTE — PLAN OF CARE
Alert and oriented. VSS. On RA. Up ad laverne. Chronic shoulder and back pain managed with MS Contin and Lidocaine patches. No cough noted. IV antibiotics given via PIV. Awaiting results of MRSA swab. LBM overnight. Tolerating regular diet.

## 2019-09-05 NOTE — PLAN OF CARE
POD 0 bronchoscopy w/ bronchial lavage. AVSS on RA. Pt experienced hallucinations during procedure, no longer experiencing hallucinations, and pt is A&O x4. Pt denies pain. Given sched pain medications. UAL in room. Regular diet, denies nausea. Good appetite. Voiding spont, not saving. Pt denies passing gas, +BS. PIV infusing MIVF btwn abx. Cont to monitor pain management. Cont w/ POC.

## 2019-09-05 NOTE — PROGRESS NOTES
[facility]  Pulmonary, Allergy, Critical Care and Sleep Medicine  Follow-up Note - 09/05/2019    Assessment and Recommendations:    Javon Bianchi is a 69 year old male with a history of metastatic carcinoma with likely renal origin who presented on 9/3/2019 with worsening of a productive cough, increasing dyspnea upon exertion, chills, subjective fever, and fatigue. Patient reports that symptoms started around may of this year. Symptoms have persisted despite multiple rounds of antibiotics, including amoxicillin, vancomycin, pip-tazo, levofloxacin, and bactrim. Poorly tolerated Nivolumab and was started on prednisone on 4/8/19. Prednisone was tapered and discontinued on 8/7/19.     Differential at this time includes Nocardia vs fungal infection vs metastatic cancer vs micro aspiration. Given the patient's h/o immunosuppression, CT findings showing consolidations located in posterior lower lobes, and patient's reported improvement of respiratory symptoms with initiation of bactrim, Nocardia is high on the DDx. Fungal infection considered given that patient [place] in [facility] and has previously worked at a golf course as a . Given that patient has a history of metastatic carcinoma, pulmonary metastases should be considered. Patient had normal swallow study during previous admission, but given lower lobe consolidations and recurrence of symptoms even with multiple rounds of antibiotics, microaspiration cannot be ruled out.    # Recurrent pneumonia:  - Bronchoscopy performed yesterday    Resulted Labs    - CMV DNA - Negative    - RVP - Negative  - Cytology - Negative for malignancy, acute inflammation present, no fungal or pneumocystis organisms on gram stain, scant alveolar macrophages, and absent viral cytopathic effect.    - Gram stain - >25PMN/LPF, no organisms seen    - KOH - Negative   Pending labs    - Aspergillus Ag    - AFB stain and culture    - Aerobic  culture    - Nocardia culture     - Legionella culture    - Fungal culture    - Actinomyces culture    - Histoplasma Ag    - 1,3-Beta-D-glucan fungitell  - Will continue to follow pending further lab results.    Seen and discussed with [facility] fellow Dr. Cedeño and Attending Dr. Tejal Rosenbaum, Medical student    Note reviewed by me. Pt seen, examined and discussed with medical student & attending Dr. Toure. - Kalyan Cedeño, [facility] Fellow [phone].     Subjective, Interval history:   Feel improved ability to take a deep breath and diminished cough today. 12pt ROS otherwise negative.     Objective:   Medications:    acetaminophen  1,000 mg Oral TID     azithromycin  500 mg Intravenous Q24H     calcium carbonate 500 mg (elemental)  1 tablet Oral BID     celecoxib  100 mg Oral BID     dextromethorphan-guaiFENesin  1 tablet Oral Q12H     DULoxetine  60 mg Oral Daily     enoxaparin  40 mg Subcutaneous Q24H     Lidocaine  1-3 patch Transdermal Q24H     lidocaine   Transdermal Q8H     lidocaine   Transdermal Q24h     loratadine  10 mg Oral Daily     metoclopramide  5 mg Oral BID     morphine  30 mg Oral Daily at 2 pm     morphine  30 mg Oral At Bedtime     morphine  45 mg Oral QAM     omeprazole  40 mg Oral Daily     piperacillin-tazobactam  3.375 g Intravenous Q6H     pregabalin  50 mg Oral BID     senna-docusate  2 tablet Oral BID     sodium chloride (PF)  3 mL Intracatheter Q8H     sulfamethoxazole-trimethoprim  2 tablet Oral BID     vancomycin (VANCOCIN) IV  1,500 mg Intravenous Q24H     vitamin D3  2,000 Units Oral Daily     albuterol, guaiFENesin-codeine, HYDROmorphone, lidocaine 4%, lidocaine (buffered or not buffered), LORazepam **OR** LORazepam, magnesium sulfate, - MEDICATION INSTRUCTIONS -, melatonin, naloxone, ondansetron **OR** ondansetron **OR** ondansetron, polyethylene glycol, potassium chloride, potassium chloride with lidocaine, potassium chloride, potassium chloride, potassium chloride,  potassium phosphate (KPHOS) in D5W IV, potassium phosphate (KPHOS) in D5W IV, potassium phosphate (KPHOS) in D5W IV, potassium phosphate (KPHOS) in D5W IV, prochlorperazine **OR** prochlorperazine, sodium chloride (PF)    Physical Exam:  Temp:  [95.8  F (35.4  C)-98.4  F (36.9  C)] 97.2  F (36.2  C)  Pulse:  [72] 72  Heart Rate:  [72-79] 72  Resp:  [15-16] 15  BP: (114-127)/(67-82) 122/71  SpO2:  [95 %-98 %] 95 %    Intake/Output Summary (Last 24 hours) at 9/5/2019 1623  Last data filed at 9/5/2019 1309  Gross per 24 hour   Intake 2810 ml   Output 450 ml   Net 2360 ml     General: Awake and alert. Engaged in conversation. NAD  HEENT: Pupils equal and reactive. Sclera nonicteric, moist mucous membranes. Oropharynx non-erythematous.   Neck: no lymphadenopathy, no JVD  Chest: Lungs clear bilaterally without wheezing or crackles.  Cardiac: RRR. Normal S1/S2. No additional heart sounds noted.  Abdomen: Soft, flat, non tender, active BS  Extremities: 1+ pitting edema to midcalf. Unilateral edema of left thigh.  Neuro: A&Ox3, no focal deficits    Skin: no rash noted    Labs reviewed, notable listed above

## 2019-09-05 NOTE — PROGRESS NOTES
[facility]    Hematology / Oncology Progress Note    Date of Service (when I saw the patient): 09/05/2019     Assessment & Plan   Javon Bianchi is a 69 year old man with metastatic carcinoma with likely renal origin, who presents with recurrent pneumonia despite several rounds of antibiotics.     Changes today:  - S/p bronchoscopy 9/4, will await cultures  - Urine histo, fungal serologies pending  - Continue Vanc/Zosyn/Azithro. PO Bactrim added 9/4 to cover possible Nocardia  - Celebrex 100 mg BID for arthralgias     #Recurrent pneumonia  Per previous notes, he has had several months of productive cough with mucus, starting early summer, associated with wheezing and chills. Treated by PCP with 10 days of amoxicillin, completed on 8/5. Felt better during that time but coughing never improved. CT chest done incidentally as part of staging 8/8 showed bilateral posterior opacities consistent with aspiration pneumonia. Upcoming surgery was canceled and he ws referred back to his PCP in [place] and was admitted 8/15-8/25 for pneumonia. Treated with vancomycin and pip-tazo x 7 days, but did not have resolution of symptoms despite 7days of antibiotics. On the 8th day, he was transitioned to levofloxacin. He developed a fever to 100.7 and so TMP-SMX was added and he did not have further fevers afterward. He had 5 more days of levofloxacin and TMP-SMX after discharge for total 14 days of antibiotics, completed on 8/30. Respiratory cultures only showed normal respiratory jacque. At palliative care appt today, he complained of increased fatigue, recurrent cough productive of green sputum over the last day, and mild dyspnea. CXR shows LLL consolidation, probably worse than seen on CT 8/8/19. CT PE showed no PE, but revealed consolidation in both posterior lower lobes concerning for aspiration with superimposed infection. He is not requiring O2, HDS, lactic acid 1.5, VBG wnl.  - Pulmonary  consulted, recs appreciated. S/p bronchoscopy 9/4, will await cultures.  - Continue vancomycin, pip-tazo, and azithromycin (x9/3). Added PO Bactrim BID to cover possible Nocardia (x9/4).  - Fungitell, galactomannan pending  - Gram stain with GPC. Follow sputum culture from 9/3.   - U leg/U strep negative. U histo pending  - SLP ok'd for regular diet     #MANASA  Baseline Cr around 0.65. Cr 1.17 at admission. Improving with IVF.  - IV NS @ 75 ml/hr  - Follow lytes and Cr     #Cancer related pain  - Continue PTA MS contin  - Continue PTA hydromorphone 2-4mg po q3h PRN  - Contnue PTA duloxetine 60mg daily and pregabalin 50mg BID  - Continue PTA bowel regimen     #Metastatic carcinoma with likely renal origin  #Metastatic disease in spine  #Left femur metastases  #L3 vertebral compression fracture  Presented with back pain, lower extremity weakness, and weight loss in mid 2018, eventually was found to have pathological L3 vertebral fracture on 8/2018. Biopsy showed poorly differentiated carcinoma with IHC c/w possible RCC. Other imaging negative for primary renal lesion. S/p palliative XRT. Started cabozantinib 9/28/18, which required dose reductions over the next several months because of HFS and poor tolerability. Switched to nivolumab because of progression in bone disease 2/2019. Received C1 2/11/19, C2 3/12/19, C3 5/21/19. Cycle 3 held for myalgias, which improved with prednisone. Started XRT this summer to lumbar spine. Also has been under evaluation for kyphoplasty vs starting lenvatinib/everolimus (has script, but has not started yet). Surgery delayed into mid September because of untreated pneumonia. Was to discuss with Dr. Anne 9/4.   - Keeping immune mediated toxicities on our differential, but timing is atypical     #Left leg edema  Secondary to metastatic disease. Takes furosemide PRN and JUVENTINO stockings/wrappings. LE US was negative for DVT in the ED.  - Hold furosemide with MANASA above     #Shoulder  arthritis/pain  Complains of pain in both shoulders and wrists. Taking pain medications as above. Has also taken celecoxib in the past with improvement.  - Trial Celebrex 100 mg BID (x9/5). Steroids/NSAIDS have improved pain in the past.     #Anxiety - continue duloxetine  #Anemia - Baseline Hgb now has been around 8-9 range. Will monitor Hgb.     FEN  - IVF NS @ 75 ml/hr  - Regular diet  - Replace lytes per protocol    PPx  - VTE: SCDs    Code status: Full code    Dispo: Anticipate will discharge home in 1-2 days pending culture findings.    Above plan discussed with staff physician, Dr. Jabier Manriquez ([facility]) CLAUDIA Treadwell  Hematology/Oncology  Pager: [phone]    Addendum:  Pt was seen and evaluated by me independently of the JEFFREY.  Reviewed labs, imaging and clinical course.  Personally reviewed CT chest which demonstrated worsening ground glass opacities in the lower lung fields bilaterally.  On exam, pt comfortable.  Heart regular.  Lungs with crackles at bases L > R.      Continue vanco/zosyn/azithro.  Await cultures.   Diana Eugene MD  .  Interval History   Afebrile overnight. Cough has improved, but was productive yesterday after bronchoscopy. Arthralgias are worse today. Got behind on pain medication overnight and this morning. Pulmonary team in room during discussion and discussed plan to wait on cultures. Wife at bedside and supportive.    Physical Exam   Temp: 98.4  F (36.9  C) Temp src: Oral BP: 123/82 Pulse: 72 Heart Rate: 79 Resp: 16 SpO2: 95 % O2 Device: None (Room air) Oxygen Delivery: 3 LPM  Vitals:    09/03/19 1707 09/03/19 2255   Weight: 93.8 kg (206 lb 12.8 oz) 92.5 kg (203 lb 14.4 oz)     Vital Signs with Ranges  Temp:  [95.8  F (35.4  C)-98.4  F (36.9  C)] 98.4  F (36.9  C)  Pulse:  [72] 72  Heart Rate:  [72-96] 79  Resp:  [9-18] 16  BP: (112-137)/(61-82) 123/82  SpO2:  [93 %-100 %] 95 %  I/O last 3 completed shifts:  In: 3560 [P.O.:560; I.V.:3000]  Out: 950  [Urine:950]    Constitutional: Pleasant male seen sitting up in chair. No apparent distress, and appears stated age.  Eyes: Lids and lashes normal, sclera clear, conjunctiva normal.  ENT: Normocephalic, oral cavity without erythema or exudates, gums normal and good dentition.   Respiratory: No increased work of breathing, clear lung sounds bilaterally with no crackles or wheezing.  Cardiovascular: Regular rate and rhythm, normal S1 and S2, and no murmur noted.  GI: No masses or scars. +BS. Soft. No tenderness on palpation.  Skin: Limited bruising, no bleeding, no redness, no warmth, no rashes, no lesions, no jaundice.  Extremities: There is no redness, warmth, or swelling of the joints. 1+ left lower extremity edema. No cyanosis.  Neurologic: Awake, alert, oriented to name, place and time.    Vascular access: PIV    Medications     - MEDICATION INSTRUCTIONS -       sodium chloride 125 mL/hr at 09/05/19 0934       acetaminophen  1,000 mg Oral TID     azithromycin  500 mg Intravenous Q24H     calcium carbonate 500 mg (elemental)  1 tablet Oral BID     celecoxib  100 mg Oral BID     dextromethorphan-guaiFENesin  1 tablet Oral Q12H     DULoxetine  60 mg Oral Daily     enoxaparin  40 mg Subcutaneous Q24H     Lidocaine  1-3 patch Transdermal Q24H     lidocaine   Transdermal Q8H     lidocaine   Transdermal Q24h     loratadine  10 mg Oral Daily     metoclopramide  5 mg Oral BID     morphine  30 mg Oral Daily at 2 pm     morphine  30 mg Oral At Bedtime     morphine  45 mg Oral QAM     omeprazole  40 mg Oral Daily     piperacillin-tazobactam  3.375 g Intravenous Q6H     pregabalin  50 mg Oral BID     senna-docusate  2 tablet Oral BID     sodium chloride (PF)  3 mL Intracatheter Q8H     sulfamethoxazole-trimethoprim  2 tablet Oral BID     vancomycin (VANCOCIN) IV  1,500 mg Intravenous Q24H     vitamin D3  2,000 Units Oral Daily       Data   ROUTINE IP LABS (Last four results)  BMP  Recent Labs   Lab 09/05/19  0774  09/04/19  0820 09/03/19  1740    141 139   POTASSIUM 3.4 3.6 3.5   CHLORIDE 111* 108 106   AUREA 6.7* 6.9* 7.1*   CO2 23 24 23   BUN 9 10 13   CR 1.02 1.08 1.17   GLC 98 86 112*     CBC  Recent Labs   Lab 09/05/19  0737 09/04/19  0820 09/03/19  1740   WBC 7.5 6.8 7.6   RBC 2.96* 3.04* 3.14*   HGB 8.6* 8.8* 9.1*   HCT 29.1* 30.2* 30.4*   MCV 98 99 97   MCH 29.1 28.9 29.0   MCHC 29.6* 29.1* 29.9*   RDW 17.2* 17.4* 17.3*    210 217     INR  Recent Labs   Lab 09/04/19  0820   INR 1.25*

## 2019-09-05 NOTE — PLAN OF CARE
Writer assumed care of patient from [id number].    AVSS on RA. Up ad laverne. A&O x4, denied hallucinations overnight. Aknowledges that pain is adequately managed on scheduled analgesics. Lungs clear and equal bilaterally, no cough. +BS/no BM overnight. Denies N/V. PIV infusing IVMF. Slept throughout most of night.    Ricarda Cintron RN on 9/5/2019 at 5:05 AM

## 2019-09-05 NOTE — PROGRESS NOTES
"CLINICAL NUTRITION SERVICES - ASSESSMENT NOTE     Nutrition Prescription    Malnutrition Status:    Unable to determine due to pt unavailable during visits    Recommendations already ordered by Registered Dietitian (RD):  PRN supplements    Future/Additional Recommendations:  Monitor weight trends and PO intakes vs need for additional nutrition intervention. Encourage patient to consume at least 75% of meals TID or the equivalent with snacks/supplements.  If consuming less than this offer supplements, scheduled snacks, and/or consider ordering calorie counts to assess PO intake adequacy.       REASON FOR ASSESSMENT  Javon Bianchi is a/an 69 year old male assessed by the dietitian for Admission Nutrition Risk Screen for unintentional loss of 10# or more in the past two months and reduced oral intake over the last month    PMH per chart review includes metastatic carcinoma with likely renal origin. Admit with recurrent pneumonia.    NUTRITION HISTORY  Obtained info from chart, pt unable to determine due to pt unavailable during visits.  On admission, pt has reported sensation of food \"going down slowly\" PTA, denied coughing/chicken when eating/drinking; had undergone barium swallow study at OSH, results were unrevealing per H&P on 9/3.    CURRENT NUTRITION ORDERS  Diet: Regular  Intake/Tolerance: good appetite noted yesterday.  SLP evaluated pt on 9/4 and recommended regular diet/thin liquids.    LABS  Labs reviewed    MEDICATIONS  Medications reviewed  - IVF @ 125 mL/hr  - Vitamin D3 (2000 international unit(s)/day)  - Calcium carbonate    ANTHROPOMETRICS  Height: 182.9 cm (6' 0\")  Most Recent Weight: 92.5 kg (203 lb 14.4 oz)    IBW: 80.9 kg   BMI: Overweight BMI 25-29.9  Weight History: 28 lb wt loss in the past 4-5 months (12% wt loss)  Wt Readings from Last 20 Encounters:   09/03/19 92.5 kg (203 lb 14.4 oz)   09/03/19 93.8 kg (206 lb 12.8 oz)   08/15/19 96.1 kg (211 lb 14.4 oz)   08/13/19 96.6 kg (213 lb) "   08/08/19 96.2 kg (212 lb)   08/07/19 96.4 kg (212 lb 9.6 oz)   07/19/19 99.6 kg (219 lb 9.6 oz)   07/09/19 100.7 kg (222 lb)   07/03/19 100.9 kg (222 lb 6.4 oz)   06/26/19 102.4 kg (225 lb 11.2 oz)   06/06/19 105.1 kg (231 lb 12.8 oz)   05/21/19 102.8 kg (226 lb 9.6 oz)   05/21/19 101.7 kg (224 lb 3.2 oz)   05/14/19 103.9 kg (229 lb)   05/13/19 104.5 kg (230 lb 6.4 oz)   04/25/19 104.8 kg (231 lb)   04/24/19 105.1 kg (231 lb 12.8 oz)   04/08/19 104.8 kg (231 lb)   03/29/19 104.1 kg (229 lb 6.4 oz)   03/19/19 105.1 kg (231 lb 9.6 oz)     Dosing Weight: 93 kg (actual)    ASSESSED NUTRITION NEEDS  Estimated Energy Needs: 4077-0939 kcals/day (30 - 35 kcals/kg)  Justification: Increased needs with acute illness and Repletion  Estimated Protein Needs: 112-140 grams protein/day (1.2 - 1.5 grams of pro/kg)  Justification: Hypercatabolism with acute illness and Repletion  Estimated Fluid Needs: (1 mL/kcal)   Justification: Maintenance, or other per provider pending fluid status    PHYSICAL FINDINGS  See malnutrition section below.    MALNUTRITION  % Intake: Unable to assess  % Weight Loss: > 10% in 6 months (severe)  Subcutaneous Fat Loss: Unable to assess  Muscle Loss: Unable to assess  Fluid Accumulation/Edema: trace left LE per provider note today, does not meet criteria  Malnutrition Diagnosis: Unable to determine due to pt unavailable during visits    NUTRITION DIAGNOSIS  Increased nutrient needs (kcal, protein) related to increased needs with acute illness and for repletion as evidenced by Estimated Energy Needs: 0262-5429 kcals/day (30 - 35 kcals/kg) and Estimated Protein Needs: 112-140 grams protein/day (1.2 - 1.5 grams of pro/kg)    INTERVENTIONS  Implementation  Nutrition Education: Unable to complete due to pt unavailable during visits   Medical food supplement therapy: PRN supplements    Goals  Patient to consume % of nutritionally adequate meal trays TID, or the equivalent with supplements/snacks.      Monitoring/Evaluation  Progress toward goals will be monitored and evaluated per protocol.     Nancy Dupree RD, LD  7C RD pager: [phone]

## 2019-09-06 ENCOUNTER — HOME INFUSION (PRE-WILLOW HOME INFUSION) (OUTPATIENT)
Dept: PHARMACY | Facility: CLINIC | Age: 69
End: 2019-09-06

## 2019-09-06 LAB
1,3 BETA GLUCAN SER-MCNC: <31 PG/ML
ACID FAST STN SPEC QL: NORMAL
ACID FAST STN SPEC QL: NORMAL
ANION GAP SERPL CALCULATED.3IONS-SCNC: 7 MMOL/L (ref 3–14)
ASPERGILLUS GALACTOMANNAN ANTIGEN BAL: NEGATIVE
B-D GLUCAN INTERPRETATION (1,3): NEGATIVE
BACTERIA SPEC CULT: NORMAL
BASOPHILS # BLD AUTO: 0 10E9/L (ref 0–0.2)
BASOPHILS NFR BLD AUTO: 0.5 %
BUN SERPL-MCNC: 8 MG/DL (ref 7–30)
CALCIUM SERPL-MCNC: 6.8 MG/DL (ref 8.5–10.1)
CHLORIDE SERPL-SCNC: 110 MMOL/L (ref 94–109)
CO2 SERPL-SCNC: 23 MMOL/L (ref 20–32)
CREAT SERPL-MCNC: 1.09 MG/DL (ref 0.66–1.25)
DIFFERENTIAL METHOD BLD: ABNORMAL
EOSINOPHIL # BLD AUTO: 0.1 10E9/L (ref 0–0.7)
EOSINOPHIL NFR BLD AUTO: 2 %
ERYTHROCYTE [DISTWIDTH] IN BLOOD BY AUTOMATED COUNT: 17.2 % (ref 10–15)
GALACTOMANNAN AG SERPL QL IA: NEGATIVE
GALACTOMANNAN AG SERPL-ACNC: 0.12
GALACTOMANNAN AG SERPL-ACNC: 0.2
GFR SERPL CREATININE-BSD FRML MDRD: 68 ML/MIN/{1.73_M2}
GLUCOSE SERPL-MCNC: 83 MG/DL (ref 70–99)
HCT VFR BLD AUTO: 25.7 % (ref 40–53)
HGB BLD-MCNC: 7.4 G/DL (ref 13.3–17.7)
IMM GRANULOCYTES # BLD: 0.1 10E9/L (ref 0–0.4)
IMM GRANULOCYTES NFR BLD: 1.7 %
LYMPHOCYTES # BLD AUTO: 0.2 10E9/L (ref 0.8–5.3)
LYMPHOCYTES NFR BLD AUTO: 4.5 %
MAGNESIUM SERPL-MCNC: 1.5 MG/DL (ref 1.6–2.3)
MCH RBC QN AUTO: 28.5 PG (ref 26.5–33)
MCHC RBC AUTO-ENTMCNC: 28.8 G/DL (ref 31.5–36.5)
MCV RBC AUTO: 99 FL (ref 78–100)
MONOCYTES # BLD AUTO: 1.2 10E9/L (ref 0–1.3)
MONOCYTES NFR BLD AUTO: 29.8 %
NEUTROPHILS # BLD AUTO: 2.5 10E9/L (ref 1.6–8.3)
NEUTROPHILS NFR BLD AUTO: 61.5 %
NRBC # BLD AUTO: 0 10*3/UL
NRBC BLD AUTO-RTO: 0 /100
PHOSPHATE SERPL-MCNC: 2.9 MG/DL (ref 2.5–4.5)
PLATELET # BLD AUTO: 186 10E9/L (ref 150–450)
POTASSIUM SERPL-SCNC: 3.4 MMOL/L (ref 3.4–5.3)
RBC # BLD AUTO: 2.6 10E12/L (ref 4.4–5.9)
SODIUM SERPL-SCNC: 140 MMOL/L (ref 133–144)
SPECIMEN SOURCE: NORMAL
SPECIMEN SOURCE: NORMAL
WBC # BLD AUTO: 4 10E9/L (ref 4–11)

## 2019-09-06 PROCEDURE — 25800030 ZZH RX IP 258 OP 636: Performed by: PHYSICIAN ASSISTANT

## 2019-09-06 PROCEDURE — 25000132 ZZH RX MED GY IP 250 OP 250 PS 637: Mod: GY | Performed by: INTERNAL MEDICINE

## 2019-09-06 PROCEDURE — 99233 SBSQ HOSP IP/OBS HIGH 50: CPT | Performed by: INTERNAL MEDICINE

## 2019-09-06 PROCEDURE — 85025 COMPLETE CBC W/AUTO DIFF WBC: CPT | Performed by: INTERNAL MEDICINE

## 2019-09-06 PROCEDURE — 84100 ASSAY OF PHOSPHORUS: CPT | Performed by: INTERNAL MEDICINE

## 2019-09-06 PROCEDURE — 83735 ASSAY OF MAGNESIUM: CPT | Performed by: INTERNAL MEDICINE

## 2019-09-06 PROCEDURE — 12000001 ZZH R&B MED SURG/OB UMMC

## 2019-09-06 PROCEDURE — 36415 COLL VENOUS BLD VENIPUNCTURE: CPT | Performed by: INTERNAL MEDICINE

## 2019-09-06 PROCEDURE — 87449 NOS EACH ORGANISM AG IA: CPT | Performed by: INTERNAL MEDICINE

## 2019-09-06 PROCEDURE — 80048 BASIC METABOLIC PNL TOTAL CA: CPT | Performed by: INTERNAL MEDICINE

## 2019-09-06 PROCEDURE — 25000132 ZZH RX MED GY IP 250 OP 250 PS 637: Mod: GY | Performed by: PHYSICIAN ASSISTANT

## 2019-09-06 PROCEDURE — 25800030 ZZH RX IP 258 OP 636: Performed by: INTERNAL MEDICINE

## 2019-09-06 PROCEDURE — 25000128 H RX IP 250 OP 636: Performed by: INTERNAL MEDICINE

## 2019-09-06 RX ADMIN — CELECOXIB 100 MG: 100 CAPSULE ORAL at 07:47

## 2019-09-06 RX ADMIN — SODIUM CHLORIDE: 9 INJECTION, SOLUTION INTRAVENOUS at 00:53

## 2019-09-06 RX ADMIN — LORATADINE 10 MG: 10 TABLET ORAL at 07:46

## 2019-09-06 RX ADMIN — CALCIUM 500 MG: 500 TABLET ORAL at 07:47

## 2019-09-06 RX ADMIN — SULFAMETHOXAZOLE AND TRIMETHOPRIM 2 TABLET: 800; 160 TABLET ORAL at 07:46

## 2019-09-06 RX ADMIN — GUAIFENESIN AND DEXTROMETHORPHAN HYDROBROMIDE 1 TABLET: 600; 30 TABLET, EXTENDED RELEASE ORAL at 21:16

## 2019-09-06 RX ADMIN — AZITHROMYCIN MONOHYDRATE 500 MG: 500 INJECTION, POWDER, LYOPHILIZED, FOR SOLUTION INTRAVENOUS at 00:53

## 2019-09-06 RX ADMIN — PIPERACILLIN SODIUM AND TAZOBACTAM SODIUM 3.38 G: 3; .375 INJECTION, POWDER, LYOPHILIZED, FOR SOLUTION INTRAVENOUS at 07:41

## 2019-09-06 RX ADMIN — PIPERACILLIN SODIUM AND TAZOBACTAM SODIUM 3.38 G: 3; .375 INJECTION, POWDER, LYOPHILIZED, FOR SOLUTION INTRAVENOUS at 02:25

## 2019-09-06 RX ADMIN — METOCLOPRAMIDE HYDROCHLORIDE 5 MG: 5 TABLET ORAL at 07:47

## 2019-09-06 RX ADMIN — ACETAMINOPHEN 1000 MG: 500 TABLET, FILM COATED ORAL at 07:46

## 2019-09-06 RX ADMIN — MAGNESIUM SULFATE HEPTAHYDRATE 4 G: 40 INJECTION, SOLUTION INTRAVENOUS at 13:05

## 2019-09-06 RX ADMIN — METOCLOPRAMIDE HYDROCHLORIDE 5 MG: 5 TABLET ORAL at 19:53

## 2019-09-06 RX ADMIN — ENOXAPARIN SODIUM 40 MG: 40 INJECTION SUBCUTANEOUS at 07:46

## 2019-09-06 RX ADMIN — PREGABALIN 50 MG: 25 CAPSULE ORAL at 07:43

## 2019-09-06 RX ADMIN — DULOXETINE HYDROCHLORIDE 60 MG: 60 CAPSULE, DELAYED RELEASE ORAL at 07:47

## 2019-09-06 RX ADMIN — MORPHINE SULFATE 30 MG: 30 TABLET, EXTENDED RELEASE ORAL at 14:22

## 2019-09-06 RX ADMIN — GUAIFENESIN AND DEXTROMETHORPHAN HYDROBROMIDE 1 TABLET: 600; 30 TABLET, EXTENDED RELEASE ORAL at 10:09

## 2019-09-06 RX ADMIN — PIPERACILLIN SODIUM AND TAZOBACTAM SODIUM 3.38 G: 3; .375 INJECTION, POWDER, LYOPHILIZED, FOR SOLUTION INTRAVENOUS at 15:24

## 2019-09-06 RX ADMIN — PREGABALIN 50 MG: 25 CAPSULE ORAL at 19:53

## 2019-09-06 RX ADMIN — LIDOCAINE 2 PATCH: 560 PATCH PERCUTANEOUS; TOPICAL; TRANSDERMAL at 10:00

## 2019-09-06 RX ADMIN — MORPHINE SULFATE 45 MG: 30 TABLET, EXTENDED RELEASE ORAL at 07:43

## 2019-09-06 RX ADMIN — SENNOSIDES AND DOCUSATE SODIUM 2 TABLET: 8.6; 5 TABLET ORAL at 19:53

## 2019-09-06 RX ADMIN — ACETAMINOPHEN 1000 MG: 500 TABLET, FILM COATED ORAL at 14:22

## 2019-09-06 RX ADMIN — PIPERACILLIN SODIUM AND TAZOBACTAM SODIUM 3.38 G: 3; .375 INJECTION, POWDER, LYOPHILIZED, FOR SOLUTION INTRAVENOUS at 19:52

## 2019-09-06 RX ADMIN — CELECOXIB 100 MG: 100 CAPSULE ORAL at 19:54

## 2019-09-06 RX ADMIN — CALCIUM 500 MG: 500 TABLET ORAL at 19:53

## 2019-09-06 RX ADMIN — MELATONIN 2000 UNITS: at 07:46

## 2019-09-06 RX ADMIN — ACETAMINOPHEN 1000 MG: 500 TABLET, FILM COATED ORAL at 19:53

## 2019-09-06 RX ADMIN — MORPHINE SULFATE 30 MG: 30 TABLET, EXTENDED RELEASE ORAL at 21:16

## 2019-09-06 RX ADMIN — SENNOSIDES AND DOCUSATE SODIUM 2 TABLET: 8.6; 5 TABLET ORAL at 07:47

## 2019-09-06 RX ADMIN — OMEPRAZOLE 40 MG: 20 CAPSULE, DELAYED RELEASE ORAL at 07:46

## 2019-09-06 RX ADMIN — SULFAMETHOXAZOLE AND TRIMETHOPRIM 2 TABLET: 800; 160 TABLET ORAL at 19:53

## 2019-09-06 ASSESSMENT — ACTIVITIES OF DAILY LIVING (ADL)
ADLS_ACUITY_SCORE: 11

## 2019-09-06 ASSESSMENT — MIFFLIN-ST. JEOR: SCORE: 1746.01

## 2019-09-06 ASSESSMENT — PAIN DESCRIPTION - DESCRIPTORS: DESCRIPTORS: CONSTANT

## 2019-09-06 NOTE — PROGRESS NOTES
[facility]  Pulmonary, Allergy, Critical Care and Sleep Medicine  Follow-up Note - 09/06/2019    Assessment and Recommendations:    Javon Bianchi is a 69 year old male with a history of metastatic carcinoma with likely renal origin who presented on 9/3/2019 with worsening of a productive cough, increasing dyspnea upon exertion, chills, subjective fever, and fatigue. Patient reports that symptoms started around may of this year. Symptoms have persisted despite multiple rounds of antibiotics, including amoxicillin, vancomycin, pip-tazo, levofloxacin, and bactrim. Poorly tolerated Nivolumab and was started on prednisone on 4/8/19. Prednisone was tapered and discontinued on 8/7/19.      Differential at this time includes Nocardia vs fungal infection vs metastatic cancer vs micro aspiration. Given the patient's h/o immunosuppression, CT findings showing consolidations located in posterior lower lobes, and patient's reported improvement of respiratory symptoms with initiation of bactrim, Nocardia is high on the DDx. Fungal infection considered given that patient [place] woods in [facility] and has previously worked at a golf course as a . Given that patient has a history of metastatic carcinoma, pulmonary metastases should be considered. Patient had normal swallow study during previous admission, but given lower lobe consolidations and recurrence of symptoms even with multiple rounds of antibiotics, microaspiration cannot be ruled out.     # Recurrent pneumonia:  - Bronchoscopy performed yesterday    Resulted Labs                          - CMV DNA - Negative                          - RVP - Negative  - Cytology - Negative for malignancy, acute inflammation present, no fungal or pneumocystis organisms on gram stain, scant alveolar macrophages, and absent viral cytopathic effect.                          - Gram stain - >25PMN/LPF, no organisms seen    - KOH -  Negative   Pending labs                          - Aspergillus Ag                          - AFB stain and culture                          - Aerobic culture                          - Nocardia culture - No growth at 21 hours               - Nocardia PCR                          - Legionella culture                          - Fungal culture                          - Actinomyces culture                          - Histoplasma Ag                          - 1,3-Beta-D-glucan fungitell  - Appreciate ID recs  - Pulm team signing off care. Please do not hesitate to contact if further questions arise    Seen and discussed with [facility] fellow Dr. Cedeño and Attending Dr. Tejal Rosenbaum, Medical student    Chart independently reviewed by me. Note reviewed & edited by me. Pt seen, examined and discussed with medical student & attending - Kalyan Cedeño, [facility] Fellow [phone].     Subjective, Interval history:   NAEON. Patient reports this morning that he is feeling well and was sitting up in the chair. He did have one episode of nausea overnight when getting up to use the bathroom; episode resolved without medication.     Objective:   Medications:    acetaminophen  1,000 mg Oral TID     azithromycin  500 mg Intravenous Q24H     calcium carbonate 500 mg (elemental)  1 tablet Oral BID     celecoxib  100 mg Oral BID     dextromethorphan-guaiFENesin  1 tablet Oral Q12H     DULoxetine  60 mg Oral Daily     enoxaparin  40 mg Subcutaneous Q24H     Lidocaine  1-3 patch Transdermal Q24H     lidocaine   Transdermal Q8H     lidocaine   Transdermal Q24h     loratadine  10 mg Oral Daily     metoclopramide  5 mg Oral BID     morphine  30 mg Oral Daily at 2 pm     morphine  30 mg Oral At Bedtime     morphine  45 mg Oral QAM     omeprazole  40 mg Oral Daily     piperacillin-tazobactam  3.375 g Intravenous Q6H     pregabalin  50 mg Oral BID     senna-docusate  2 tablet Oral BID     sodium chloride (PF)  3 mL Intracatheter Q8H      sulfamethoxazole-trimethoprim  2 tablet Oral BID     vitamin D3  2,000 Units Oral Daily     albuterol, guaiFENesin-codeine, HYDROmorphone, lidocaine 4%, lidocaine (buffered or not buffered), LORazepam **OR** LORazepam, magnesium sulfate, - MEDICATION INSTRUCTIONS -, melatonin, naloxone, ondansetron **OR** ondansetron **OR** ondansetron, polyethylene glycol, potassium chloride, potassium chloride with lidocaine, potassium chloride, potassium chloride, potassium chloride, potassium phosphate (KPHOS) in D5W IV, potassium phosphate (KPHOS) in D5W IV, potassium phosphate (KPHOS) in D5W IV, potassium phosphate (KPHOS) in D5W IV, prochlorperazine **OR** prochlorperazine, sodium chloride (PF)    Physical Exam:  Temp:  [95.5  F (35.3  C)-98.4  F (36.9  C)] 95.5  F (35.3  C)  Pulse:  [58-71] 71  Heart Rate:  [71-74] 74  Resp:  [14-16] 14  BP: (114-136)/(68-82) 134/79  SpO2:  [95 %-98 %] 98 %    Intake/Output Summary (Last 24 hours) at 9/6/2019 0844  Last data filed at 9/6/2019 0659  Gross per 24 hour   Intake 2958.33 ml   Output --   Net 2958.33 ml     General: Sitting in chair, NAD  HEENT: anicteric, moist mucosa  Neck: no palpable lymphadenopathy, no JVD noted  Chest: Lungs clear bilaterally, good air movement in all lung fields. No wheezing or crackles.  Cardiac: RRR no murmurs  Abdomen: Soft, flat, non tender, active BS  Extremities: 1+ pitting edema in lower extremities to mid shin. Right upper extremity noticeably larger than left upper extremity.  Neuro: A&Ox3, no focal deficits   Skin: no rash noted    Labs and imaging: Significant lab results noted above.

## 2019-09-06 NOTE — PROGRESS NOTES
Care Coordinator - Discharge Planning    Admission Date/Time:  9/3/2019  Attending MD:  Ese Bullock MD     Data  Date of initial CC assessment:  Today after update in rounds that patient is on multiple IV antibiotic medications with cultures pending to assist with final MD team plans of care.  Chart reviewed, discussed with interdisciplinary team.   Patient was admitted for:   1. Renal cell carcinoma, unspecified laterality (H)    2. Healthcare-associated pneumonia    3. Swelling of limb         Assessment     The following home care plans of care have been tentatively arranged pending insurance authorization:    Please fax discharge orders to [facility]     Ph:  [phone]     Fax: [phone]     Skilled home care RN for initial home safety evaluation and to assist with home IV medication per MD orders via picc line.  Picc line cares per home care agency routine.     Skilled home care RN to evaluate medication management, nutrition and hydration evaluation, endurance evaluation, and general status evaluation after discharge from the acute care hospital setting.     Coordination of Care and Referrals: Provided patient/family with options for home care agency of choice who can assist with home care needs if insurance covers potential home IV needs..      Plan  Anticipated Discharge Date:  To be determined.  Anticipated Discharge Plan:  As above and pending MD team final plans of care.  No picc line yet and MD team is anticipating hopefully no home IV needs.  Secondary to the upcoming weekend, a insurance inquiry was made today since insurance companies are closed on the weekend.    Martha Bell, JAYCEE.S.N., R.N., P.H.N.        Pager     Addendum:  [facility] has notified this writer that patient does not have coverage for home IV antibiotics.  MD team was updated.

## 2019-09-06 NOTE — PROGRESS NOTES
Therapy: IV ABX  Insurance: Medicare & Med[employer] supplement   There is no coverage for IV ABX through patient's insurance, he will be self pay for drug and supplies, he may have a -pay per drug dispense through his Medicare part D pharmacy plan. Nurse visits are only covered if the patient is homebound.  He would have coverage through a short term TCU or possibly an infusion center.    [facility] 7C-In reference to admission date 9/3/2019 to check on IV ABX coverage.    Please contact Intake with any questions, [phone] or In Basket pool, [facility] ([phone]).

## 2019-09-06 NOTE — CONSULTS
GENERAL ID SERVICE CONSULTATION     Patient:  Javon Bianchi   Date of birth 1950, Medical record number [id number]  Date of Visit:  09/06/2019  Date of Admission: 9/3/2019  Consult Requester:Ese Bullock MD          Assessment and Recommendations:   ASSESSMENT:    Mr. Omega Bianchi is a 69 year old male, with h/o metastatic carcinoma with bony lesions (most likely renal origin-unclear due to no mass seen on CTs, currently not on any active chemotherapy), recurrent pneumonia, presenting with recurrent pneumonia despite several rounds of antibiotics.     Several months ago (started around this past summer), pt complained of productive cough with mucus production. He was initially treated by his PCP for 10 days on amoxicillin (completed 8/5/2019), though coughing never improved. On 8/8/2019, patient received a CT chest (initial intent for cancer staging), but it also incidentally showed bilateral posterior opacities consistent with aspiration pneumonia. Thus, from 8/15-8/25/2019, patient was admitted to [facility] for treatment of atypical and gram + cocci pneumonia with vancomycin and pip-tazo x7 days, but symptoms did not improve, and so on the 8th day of admission, Levofloxacin was introduced. Then, patient developed fever 100.7, and bactrim was started and fever was resolved. Pt had 5 more days of Levo and bactrim after discharge for a total of 14 days of antibiotics, completed on 8/30/2019.     DISCUSSION:     Patient has a lot of risk factors, like being on morphine, omeprazole, reported dysphagia by patient (though patient had a normal barium swallow study done during the 8/15-8/25 [facility] admission) for aspiration pneumonia. Patient's sxs improved after antibiotics initiation during his 8/15-8/25 admission, and similarly, his sxs (less dyspnea on exertion, less coughing, no sputum production, no fatigue) improved since initiating antibiotics here at [facility]. On exam, CTAB. Thus, will recommend  that pt continue on Zosyn, azithromycin, and bactrim. Discharge Vancomycin, since MRSA negative (9/5). 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 admission, but will need to continue to follow up with all the cultures.    RECOMMENDATION:  1. Discontinue Vancomycin ( MRSA screen - neg)   2. Continue current regimen of bactrim PO BID, azithromycin 500 mg IV/PO q24hr, and zosyn 3.375 g IV q6hr   3. Continue to follow the bronch lavage cultures  4. Aspiration precautions    Thank you for this consult. ID will continue to follow.     Patient was discussed with Dr. Bhargav Mendez.     Alma Silva MD  PGY1  [phone]    Attestation:  This patient has been seen and evaluated by me.  I discussed this patient with resident Dr Silva and agree with the findings and plan in this note. I also personally edited this note to reflect my findings. I have reviewed today's vital signs, medications, labs and imaging.     Emre Perla MD,M.Med.Sc.  Attending, Infectious Diseases  Pager: [phone]     ________________________________________________________________    Consult Question:.  Admission Diagnosis: Healthcare-associated pneumonia [J18.9]         History of Present Illness:     Mr. Omega Bianchi is a 69 year old male, with h/o metastatic carcinoma with bony lesions (most likely renal origin-unclear due to no mass seen on CTs, currently not on any active chemotherapy), recurrent pneumonia, presenting with recurrent pneumonia despite several rounds of antibiotics.     Several months ago (started around this past summer), pt complained of productive cough with mucus production. He was initially treated by his PCP for 10 days on amoxicillin (completed 8/5/2019), though coughing never improved. On 8/8/2019, patient received a CT chest (initial intent for cancer staging), but it also incidentally showed bilateral posterior opacities consistent with  "aspiration pneumonia. Thus, from 8/15-8/25/2019, patient was admitted to [facility] for treatment of atypical and gram + cocci pneumonia with vancomycin and pip-tazo x7 days, but symptoms did not improve, and so on the 8th day of admission, Levofloxacin was introduced. Then, patient developed fever 100.7, and bactrim was started and fever was resolved. Pt had 5 more days of Levo and bactrim after discharge for a total of 14 days of antibiotics, completed on 8/30/2019.     From 8/25-8/30/2019, patient subjective felt better, with improvement in coughing and having less fatigue.  Since 8/30/2019 until 9/3/2019 (admission to [facility]), patient stated that he still was coughing (though mildly better) with green/brown sputum, continued dyspnea on exertion, and \"feeling run down.\" On 9/3/2019, his palliative doctor (planned palliative follow up appointment) told the patient to visit the ED, since his \"lungs sounded like he had pneumonia.\" Upon arrival, his CT chest angio showed \"no PE, but significantly increased consolidative densities (compared to 8/8/2019 CT) in the posterior lower lobes with surrounding tree-in-bud nodules. Findings are concerning for pneumonia. Consider aspiration.\" At the ED, he was afebrile, no leukocytosis, was started on zosyn, vancomycin, and doxycyclin IV.    Since 9/4, he has been on azithromycin, zosyn, vancomycin, and bactrim. Bactrim was started since pulmonology suspects nocardia infection.     Today, he stated that he continues to cough, but has not had any sputum production for 1.5 days. Denied hemoptysis, chest pain, dyspnea on exertion (was able to walk around the hallway today), abdominal pain, bloody stools, hematuria. Has chronic mid leg edema (worse around ankles) at baseline every since after chemotherapy.         Cancer hx: Initially presented with back pain, weight loss in mid 2018. Pt had a L3 vertebral fracture in 8/2018. Biopsy showed poorly differentiated carcinoma. Pt started " on cabozantinib 9/28/18 which required dose reductions over the next several months because of HFS and poor tolerability, then transitioned to nivolumab in 2/2019 due to bony metastases. Cycle 3 has been held for myalgia and pt has not been able to start since then due to ongoing recurrent pneumonias. Did start radiation to lumbar spine this summer. Was in talks about getting kylopasty surgery, but on hold now currently due untreated pneumonias.            Review of Systems:   CONSTITUTIONAL:  No fevers or chills  EYES: negative for icterus  ENT:  negative for sore throat  RESPIRATORY:  Positive for cough   CARDIOVASCULAR:  negative for chest pain, dyspnea  GASTROINTESTINAL:  negative for nausea, vomiting, diarrhea and constipation. Endorsed dysphagia.  GENITOURINARY:  negative for dysuria  HEME:  No easy bruising  INTEGUMENT:  negative for rash and pruritus  NEURO:  Negative for headache         Past Medical History:     Past Medical History:   Diagnosis Date     Anemia      Bone metastasis (H) 09/28/2018     Cough      Pulmonary nodule      Renal cell carcinoma, right (H) 09/28/2018            Past Surgical History:     Past Surgical History:   Procedure Laterality Date     BRONCHOSCOPY (RIGID OR FLEXIBLE), DIAGNOSTIC N/A 9/4/2019    Procedure: Bronchoscopy, With Bronchoalveolar Lavage;  Surgeon: Burton Toure MD;  Location: [facility]     CHOLECYSTECTOMY       HC REMOVAL HEEL SPUR, CALCANEUS  2004            Family History:   Reviewed and non-contributory.   Family History   Problem Relation Age of Onset     Lung Cancer Maternal Grandmother             Social History:     Social History     Tobacco Use     Smoking status: Never Smoker     Smokeless tobacco: Never Used   Substance Use Topics     Alcohol use: Not Currently     History   Sexual Activity     Sexual activity: Not on file            Current Medications:       acetaminophen  1,000 mg Oral TID     calcium carbonate 500 mg (elemental)  1 tablet Oral BID      celecoxib  100 mg Oral BID     dextromethorphan-guaiFENesin  1 tablet Oral Q12H     DULoxetine  60 mg Oral Daily     enoxaparin  40 mg Subcutaneous Q24H     Lidocaine  1-3 patch Transdermal Q24H     lidocaine   Transdermal Q8H     lidocaine   Transdermal Q24h     loratadine  10 mg Oral Daily     metoclopramide  5 mg Oral BID     morphine  30 mg Oral Daily at 2 pm     morphine  30 mg Oral At Bedtime     morphine  45 mg Oral QAM     omeprazole  40 mg Oral Daily     piperacillin-tazobactam  3.375 g Intravenous Q6H     pregabalin  50 mg Oral BID     senna-docusate  2 tablet Oral BID     sodium chloride (PF)  3 mL Intracatheter Q8H     sulfamethoxazole-trimethoprim  2 tablet Oral BID     vitamin D3  2,000 Units Oral Daily            Allergies:   No Known Allergies         Physical Exam:   Vitals were reviewed  Patient Vitals for the past 24 hrs:   BP Temp Temp src Pulse Heart Rate Resp SpO2 Weight   09/06/19 1125 118/61 96.6  F (35.9  C) Axillary 62 -- 16 95 % --   09/06/19 1100 -- -- -- -- -- -- -- 95.3 kg (210 lb 1.6 oz)   09/06/19 0648 134/79 95.5  F (35.3  C) Oral -- 74 14 98 % --   09/06/19 0225 114/68 96  F (35.6  C) Oral -- -- 16 96 % --   09/05/19 2236 136/72 97.4  F (36.3  C) Oral 71 71 16 97 % --   09/05/19 2044 133/69 95.9  F (35.5  C) Oral 58 -- 16 97 % --   09/05/19 1424 122/71 97.2  F (36.2  C) Oral -- 72 15 95 % --       Physical Examination:  GENERAL:  well-developed, well-nourished, in bed in no acute distress.   HEENT:  Head is normocephalic, atraumatic   EYES:  Eyes have anicteric sclerae without conjunctival injection   ENT:  Oropharynx is moist without exudates or ulcers. Tongue is midline  NECK:  Supple. No cervical lymphadenopathy  LUNGS:  Clear to auscultation bilateral.   CARDIOVASCULAR:  Regular rate and rhythm with no murmurs, gallops or rubs. Has multiple chronic healed horizontal keloids across his upper chest.  ABDOMEN:  Normal bowel sounds, soft, nontender.   extremities : trace edema    SKIN:  No acute rashes.    NEUROLOGIC:  Grossly nonfocal. Active x4 extremities         Laboratory Data:     Inflammatory Markers    Recent Labs   Lab Test 06/06/19  1327 05/14/19  0851 04/23/19  1303 04/08/19  1234 03/29/19  1551   SED  --   --   --   --  87*   CRP 36.9* 124.0* 35.1* 71.1* 66.7*       Hematology Studies    Recent Labs   Lab Test 09/06/19  0710 09/05/19  0737 09/04/19  0820 09/03/19  1740 08/07/19  1522 07/19/19  1406   WBC 4.0 7.5 6.8 7.6 7.9 8.4   ANEU 2.5 5.1 4.1 5.9 5.2 5.6   AEOS 0.1 0.1 0.2 0.0 0.0 0.0   HGB 7.4* 8.6* 8.8* 9.1* 9.7* 9.5*   MCV 99 98 99 97 98 99    200 210 217 252 221       Metabolic Studies     Recent Labs   Lab Test 09/06/19  0710 09/05/19  0737 09/04/19  0820 09/03/19  1740 08/07/19  1522    141 141 139 138   POTASSIUM 3.4 3.4 3.6 3.5 3.5   CHLORIDE 110* 111* 108 106 106   CO2 23 23 24 23 28   BUN 8 9 10 13 7   CR 1.09 1.02 1.08 1.17 0.64*   GFRESTIMATED 68 74 69 62 >90       Hepatic Studies    Recent Labs   Lab Test 09/03/19  1740 08/07/19  1522 07/19/19  1406 06/26/19  1508 06/06/19  1327 05/21/19  0810   BILITOTAL 0.3 0.4 0.5 0.5 0.4 0.7   ALKPHOS 94 115 137 146 104 100   ALBUMIN 2.6* 2.8* 3.2* 3.0*  3.0* 3.1* 3.2*   AST 22 23 42 95* 27 29   ALT 12 16 16 17 15 15       Microbiology:  Culture Micro   Date Value Ref Range Status   09/04/2019   Preliminary    Culture received and in progress.  Positive AFB results are called as soon as detected.    Final report to follow in 7 to 8 weeks.     09/04/2019   Preliminary    Assayed at [facility] [address] [place] [address] [phone]   09/04/2019 Culture negative monitoring continues  Preliminary   09/04/2019 Culture negative after 21 hours  Preliminary   09/04/2019 Culture negative monitoring continues  Preliminary   09/04/2019 No growth after 21 hours  Preliminary   09/04/2019 Light growth  Normal respiratory jacque    Final   09/03/2019 Light growth  Normal jacque    Final   09/03/2019 No  growth after 3 days  Preliminary   2019 No growth after 3 days  Preliminary   2019 No growth  Final   2019 No growth  Final   2019 No growth  Final   2019 No growth  Final   2019 No growth  Final       Urine Studies    Recent Labs   Lab Test 19  1220 19  0947   LEUKEST Negative Negative   WBCU 1 0       Vancomycin Levels  No lab results found.    Invalid input(s): VANCO    Hepatitis B Testing No lab results found.  Hepatitis C Testing   No results found for: HCVAB, HQTG, HCGENO, HCPCR, HQTRNA, HEPRNA  Respiratory Virus Testing    No results found for: RS, FLUAG    Imaging    9/3/2019 CT chest angio                                                                   Impression:   1. No pulmonary embolism.  2. Significantly increased consolidative densities in the posterior  lower lobes with surrounding tree-in-bud nodules. Findings are  concerning for pneumonia. Consider aspiration.  3. Unchanged pulmonary nodules since 2018. Recommend one-year  follow-up to establish stability.    2019 CT chest    IMPRESSION:  In this patient with history of metastatic osseous involvement of the  spine and pelvis with likely primary renal cell carcinoma without  primary renal mass on imagin. Increased bilateral posterior lower lobe opacities and tree-in-bud  nodularity most compatible with infectious/inflammatory etiology, new  since 2019. Aspiration should also be considered given thickening  of the airways debris in the right lower lobar airways. Cannot  completely exclude underlying metastatic disease, recommend follow-up  to resolution.  2. A few small and indeterminate pulmonary nodules are stable compared  to prior. These can be reassessed at follow-up.  3. Essentially stable metastatic sclerotic lesions throughout the  axial and appendicular skeleton correlating with nuclear medicine bone  scan 2019, as described above.  4. Slightly increased size of now  borderline enlarged lymph nodes in  the mediastinum and measured above, attention at follow-up would be  beneficial.  5. No primary or renal mass identified.  6. Small intramuscular metastases involving the left radius jaye  muscle belly. Alternatively, this could represent soft tissue  extension of above bony metastases.

## 2019-09-06 NOTE — PLAN OF CARE
Pain is being managed with current regime of MS contin scheduled, Lidoderm patches to shoulders.Patient is up ad laverne with his cane. Magnesium is being replaced this afternoon, continues on IV zosyn, has an ID consult. Patient is taking small amounts of a regular diet, states he has no appetite, started nutritional shakes. Hemoglobin 7.4, MD's aware. Spouse at bedside throughout the day.

## 2019-09-06 NOTE — PROVIDER NOTIFICATION
Paged moonlighter at [phone]. HR 58-60. Pt asymptomatic. Parameters to notify if HR <60.    MD did not return page. Will continue to monitor.

## 2019-09-06 NOTE — PROGRESS NOTES
[facility], [place]    Hematology / Oncology Progress Note    Date of Service (when I saw the patient): 09/06/2019     Assessment & Plan   Javon Bianchi is a 69 year old man with metastatic carcinoma with likely renal origin, who presents with recurrent pneumonia despite several rounds of antibiotics.     Changes today:  - S/p bronchoscopy 9/4, will await cultures (NGTD)  - Continue Zosyn and PO Bactrim.   - Discontinue Vancomycin and Azithromycin  - ID consult  - Check Blastomyces  - Celebrex 100 mg BID for arthralgias     #Recurrent pneumonia  Per previous notes, he has had several months of productive cough with mucus, starting early summer, associated with wheezing and chills. Treated by PCP with 10 days of amoxicillin, completed on 8/5. Felt better during that time but coughing never improved. CT chest done incidentally as part of staging 8/8 showed bilateral posterior opacities consistent with aspiration pneumonia. Upcoming surgery was canceled and he ws referred back to his PCP in [place] and was admitted 8/15-8/25 for pneumonia. Treated with vancomycin and pip-tazo x 7 days, but did not have resolution of symptoms despite 7days of antibiotics. On the 8th day, he was transitioned to levofloxacin. He developed a fever to 100.7 and so TMP-SMX was added and he did not have further fevers afterward. He had 5 more days of levofloxacin and TMP-SMX after discharge for total 14 days of antibiotics, completed on 8/30. Respiratory cultures only showed normal respiratory jacque. At palliative care appt today, he complained of increased fatigue, recurrent cough productive of green sputum over the last day, and mild dyspnea. CXR shows LLL consolidation, probably worse than seen on CT 8/8/19. CT PE showed no PE, but revealed consolidation in both posterior lower lobes concerning for aspiration with superimposed infection. He is not requiring O2, HDS, lactic acid 1.5, VBG wnl.  - Pulmonary  consulted, recs appreciated. S/p bronchoscopy 9/4, will await cultures (NGTD). Nocardia high on pulmonary differential, PCR sent from BAL and is a send out lab.  - Infectious Disease consulted, recs appreciated.   - Received vancomycin and azithromycin (9/3-9/6). Continue Zosyn (x9/5) and PO Bactrim 2 DS tabs BID to cover possible Nocardia (x9/4).  - 1,3 Beta glucan negative. Galactomannan, Blastomyces pending.  - Gram stain with GPC. Follow sputum culture from 9/3.   - U leg/U strep negative. U histo pending.  - SLP ok'd for regular diet     #MANASA  Baseline Cr around 0.65. Cr 1.17 at admission. Improving with IVF.  - Stop mIVF 9/6 with weight gain  - Follow lytes and Cr     #Cancer related pain  - Continue PTA MS contin  - Continue PTA hydromorphone 2-4mg po q3h PRN  - Contnue PTA duloxetine 60mg daily and pregabalin 50mg BID  - Continue PTA bowel regimen     #Metastatic carcinoma with likely renal origin  #Metastatic disease in spine  #Left femur metastases  #L3 vertebral compression fracture  Presented with back pain, lower extremity weakness, and weight loss in mid 2018, eventually was found to have pathological L3 vertebral fracture on 8/2018. Biopsy showed poorly differentiated carcinoma with IHC c/w possible RCC. Other imaging negative for primary renal lesion. S/p palliative XRT. Started cabozantinib 9/28/18, which required dose reductions over the next several months because of HFS and poor tolerability. Switched to nivolumab because of progression in bone disease 2/2019. Received C1 2/11/19, C2 3/12/19, C3 5/21/19. Cycle 3 held for myalgias, which improved with prednisone. Started XRT this summer to lumbar spine. Also has been under evaluation for kyphoplasty vs starting lenvatinib/everolimus (has script, but has not started yet). Surgery delayed into mid September because of untreated pneumonia. Was to discuss with Dr. Anne 9/4.   - Keeping immune mediated toxicities on our differential, but timing is  atypical  - Scheduled f/u with Dr. Anne on 9/25  - Has been receiving XRT in [place], under guidance of Dr. Rdz.      #Left leg edema  Secondary to metastatic disease. Takes furosemide PRN and JUVENTINO stockings/wrappings. LE US was negative for DVT in the ED.  - Hold furosemide with MANASA above     #Shoulder arthritis/pain  Complains of pain in both shoulders and wrists. Taking pain medications as above. Has also taken celecoxib in the past with improvement.  - Continue Celebrex 100 mg BID (x9/5). Steroids/NSAIDS have improved pain in the past.     #Anxiety - continue duloxetine  #Anemia - Baseline Hgb now has been around 8-9 range. Will monitor Hgb.     FEN  - nMIVF  - Regular diet  - Replace lytes per protocol    PPx  - VTE: SCDs    Code status: Full code    Dispo: Anticipate will discharge home in 1-2 days pending culture findings, likely Sunday (9/8).    Above plan discussed with staff physician, Dr. Regalado.    [facility] ([facility]) CLAUDIA Treadwell  Hematology/Oncology  Pager: [phone]    Interval History   Afebrile overnight. Cough has improved, but was productive yesterday after bronchoscopy. Arthralgias are better today. Did have an episode of nausea overnight that has resolved. Minimal appetite. Wife at bedside and supportive.    Physical Exam   Temp: 96.6  F (35.9  C) Temp src: Axillary BP: 118/61 Pulse: 62 Heart Rate: 74 Resp: 16 SpO2: 95 % O2 Device: None (Room air)    Vitals:    09/03/19 1707 09/03/19 2255 09/06/19 1100   Weight: 93.8 kg (206 lb 12.8 oz) 92.5 kg (203 lb 14.4 oz) 95.3 kg (210 lb 1.6 oz)     Vital Signs with Ranges  Temp:  [95.5  F (35.3  C)-97.4  F (36.3  C)] 96.6  F (35.9  C)  Pulse:  [58-71] 62  Heart Rate:  [71-74] 74  Resp:  [14-16] 16  BP: (114-136)/(61-79) 118/61  SpO2:  [95 %-98 %] 95 %  I/O last 3 completed shifts:  In: 2958.33 [P.O.:550; I.V.:5800.33]  Out: -     Constitutional: Pleasant male seen sitting up in chair. No apparent distress, and appears stated age.  Eyes: Lids and lashes  normal, sclera clear, conjunctiva normal.  ENT: Normocephalic, oral cavity without erythema or exudates, gums normal and good dentition.   Respiratory: No increased work of breathing, clear lung sounds bilaterally with no crackles or wheezing.  Cardiovascular: Regular rate and rhythm, normal S1 and S2, and no murmur noted.  GI: No masses or scars. +BS. Soft. No tenderness on palpation.  Skin: Limited bruising, no bleeding, no redness, no warmth, no rashes, no lesions, no jaundice.  Extremities: There is no redness, warmth, or swelling of the joints. 1+ left lower extremity edema. No cyanosis.  Neurologic: Awake, alert, oriented to name, place and time.    Vascular access: PIV    Medications     - MEDICATION INSTRUCTIONS -         acetaminophen  1,000 mg Oral TID     calcium carbonate 500 mg (elemental)  1 tablet Oral BID     celecoxib  100 mg Oral BID     dextromethorphan-guaiFENesin  1 tablet Oral Q12H     DULoxetine  60 mg Oral Daily     enoxaparin  40 mg Subcutaneous Q24H     Lidocaine  1-3 patch Transdermal Q24H     lidocaine   Transdermal Q8H     lidocaine   Transdermal Q24h     loratadine  10 mg Oral Daily     metoclopramide  5 mg Oral BID     morphine  30 mg Oral Daily at 2 pm     morphine  30 mg Oral At Bedtime     morphine  45 mg Oral QAM     omeprazole  40 mg Oral Daily     piperacillin-tazobactam  3.375 g Intravenous Q6H     pregabalin  50 mg Oral BID     senna-docusate  2 tablet Oral BID     sodium chloride (PF)  3 mL Intracatheter Q8H     sulfamethoxazole-trimethoprim  2 tablet Oral BID     vitamin D3  2,000 Units Oral Daily       Data   ROUTINE IP LABS (Last four results)  BMP  Recent Labs   Lab 09/06/19  0710 09/05/19  0737 09/04/19  0820 09/03/19  1740    141 141 139   POTASSIUM 3.4 3.4 3.6 3.5   CHLORIDE 110* 111* 108 106   AUREA 6.8* 6.7* 6.9* 7.1*   CO2 23 23 24 23   BUN 8 9 10 13   CR 1.09 1.02 1.08 1.17   GLC 83 98 86 112*     CBC  Recent Labs   Lab 09/06/19  0710 09/05/19  0737  09/04/19  0820 09/03/19  1740   WBC 4.0 7.5 6.8 7.6   RBC 2.60* 2.96* 3.04* 3.14*   HGB 7.4* 8.6* 8.8* 9.1*   HCT 25.7* 29.1* 30.2* 30.4*   MCV 99 98 99 97   MCH 28.5 29.1 28.9 29.0   MCHC 28.8* 29.6* 29.1* 29.9*   RDW 17.2* 17.2* 17.4* 17.3*    200 210 217     INR  Recent Labs   Lab 09/04/19  0820   INR 1.25*

## 2019-09-06 NOTE — PLAN OF CARE
Pt AVSS. Pt sleeping between cares. Pt denied any c/o's pain. Sched MS contin managing pain. Pt had episode of nausea w/o episode. Pt refused antiemetic but requested sprite and crax. Nausea resolved. Lungs clear, dim in bases. No cough noted. IVF at 75/hr. IV antibiotics infused as ordered. Cont to monitor for infection, maintain pain control.

## 2019-09-06 NOTE — PLAN OF CARE
[id number] VSS on room air. Lung sounds clear bilaterally. Pt denies cough. Back and generalized pain controlled with MS Contin, Lidocaine patches, Celebrex, Lyrica, and Tylenol. Denies nausea, on regular diet. Poor appetite for dinner. PIV infusing IV fluids and intermittent IV antibiotics. Voiding spontaneously, not saving. Up ad laverne in room. Contact precautions discontinued, MRSA negative.

## 2019-09-07 VITALS
HEIGHT: 72 IN | WEIGHT: 210.1 LBS | RESPIRATION RATE: 15 BRPM | OXYGEN SATURATION: 95 % | TEMPERATURE: 97.2 F | BODY MASS INDEX: 28.46 KG/M2 | HEART RATE: 66 BPM | DIASTOLIC BLOOD PRESSURE: 55 MMHG | SYSTOLIC BLOOD PRESSURE: 115 MMHG

## 2019-09-07 LAB
ANION GAP SERPL CALCULATED.3IONS-SCNC: 6 MMOL/L (ref 3–14)
ANISOCYTOSIS BLD QL SMEAR: SLIGHT
BASOPHILS # BLD AUTO: 0 10E9/L (ref 0–0.2)
BASOPHILS NFR BLD AUTO: 0 %
BUN SERPL-MCNC: 9 MG/DL (ref 7–30)
CALCIUM SERPL-MCNC: 7.8 MG/DL (ref 8.5–10.1)
CHLORIDE SERPL-SCNC: 110 MMOL/L (ref 94–109)
CO2 SERPL-SCNC: 23 MMOL/L (ref 20–32)
CREAT SERPL-MCNC: 1.14 MG/DL (ref 0.66–1.25)
DIFFERENTIAL METHOD BLD: ABNORMAL
EOSINOPHIL # BLD AUTO: 0.2 10E9/L (ref 0–0.7)
EOSINOPHIL NFR BLD AUTO: 3.5 %
ERYTHROCYTE [DISTWIDTH] IN BLOOD BY AUTOMATED COUNT: 17.1 % (ref 10–15)
GFR SERPL CREATININE-BSD FRML MDRD: 64 ML/MIN/{1.73_M2}
GLUCOSE SERPL-MCNC: 87 MG/DL (ref 70–99)
HCT VFR BLD AUTO: 28.4 % (ref 40–53)
HGB BLD-MCNC: 8.3 G/DL (ref 13.3–17.7)
LYMPHOCYTES # BLD AUTO: 0.2 10E9/L (ref 0.8–5.3)
LYMPHOCYTES NFR BLD AUTO: 3.5 %
MAGNESIUM SERPL-MCNC: 2 MG/DL (ref 1.6–2.3)
MCH RBC QN AUTO: 28.7 PG (ref 26.5–33)
MCHC RBC AUTO-ENTMCNC: 29.2 G/DL (ref 31.5–36.5)
MCV RBC AUTO: 98 FL (ref 78–100)
MONOCYTES # BLD AUTO: 1.2 10E9/L (ref 0–1.3)
MONOCYTES NFR BLD AUTO: 27.8 %
MYELOCYTES # BLD: 0 10E9/L
MYELOCYTES NFR BLD MANUAL: 0.9 %
NEUTROPHILS # BLD AUTO: 2.8 10E9/L (ref 1.6–8.3)
NEUTROPHILS NFR BLD AUTO: 63.4 %
PHOSPHATE SERPL-MCNC: 3 MG/DL (ref 2.5–4.5)
PLATELET # BLD AUTO: 209 10E9/L (ref 150–450)
PLATELET # BLD EST: ABNORMAL 10*3/UL
POLYCHROMASIA BLD QL SMEAR: ABNORMAL
POTASSIUM SERPL-SCNC: 3.9 MMOL/L (ref 3.4–5.3)
PROMYELOCYTES # BLD MANUAL: 0 10E9/L
PROMYELOCYTES NFR BLD MANUAL: 0.9 %
RBC # BLD AUTO: 2.89 10E12/L (ref 4.4–5.9)
SODIUM SERPL-SCNC: 139 MMOL/L (ref 133–144)
WBC # BLD AUTO: 4.4 10E9/L (ref 4–11)

## 2019-09-07 PROCEDURE — 36415 COLL VENOUS BLD VENIPUNCTURE: CPT | Performed by: INTERNAL MEDICINE

## 2019-09-07 PROCEDURE — 25800030 ZZH RX IP 258 OP 636: Performed by: INTERNAL MEDICINE

## 2019-09-07 PROCEDURE — 85025 COMPLETE CBC W/AUTO DIFF WBC: CPT | Performed by: INTERNAL MEDICINE

## 2019-09-07 PROCEDURE — 84100 ASSAY OF PHOSPHORUS: CPT | Performed by: INTERNAL MEDICINE

## 2019-09-07 PROCEDURE — 25000128 H RX IP 250 OP 636: Performed by: INTERNAL MEDICINE

## 2019-09-07 PROCEDURE — 80048 BASIC METABOLIC PNL TOTAL CA: CPT | Performed by: INTERNAL MEDICINE

## 2019-09-07 PROCEDURE — 83735 ASSAY OF MAGNESIUM: CPT | Performed by: INTERNAL MEDICINE

## 2019-09-07 PROCEDURE — 99239 HOSP IP/OBS DSCHRG MGMT >30: CPT | Mod: GC | Performed by: INTERNAL MEDICINE

## 2019-09-07 PROCEDURE — 25000132 ZZH RX MED GY IP 250 OP 250 PS 637: Mod: GY | Performed by: INTERNAL MEDICINE

## 2019-09-07 PROCEDURE — 25000132 ZZH RX MED GY IP 250 OP 250 PS 637: Mod: GY | Performed by: PHYSICIAN ASSISTANT

## 2019-09-07 RX ORDER — CELECOXIB 100 MG/1
100 CAPSULE ORAL 2 TIMES DAILY PRN
Qty: 30 CAPSULE | Refills: 1 | Status: SHIPPED | OUTPATIENT
Start: 2019-09-07 | End: 2019-10-01

## 2019-09-07 RX ORDER — SULFAMETHOXAZOLE/TRIMETHOPRIM 800-160 MG
2 TABLET ORAL 2 TIMES DAILY
Qty: 42 TABLET | Refills: 1 | Status: SHIPPED | OUTPATIENT
Start: 2019-09-07 | End: 2019-09-25

## 2019-09-07 RX ADMIN — PIPERACILLIN SODIUM AND TAZOBACTAM SODIUM 3.38 G: 3; .375 INJECTION, POWDER, LYOPHILIZED, FOR SOLUTION INTRAVENOUS at 07:54

## 2019-09-07 RX ADMIN — PREGABALIN 50 MG: 25 CAPSULE ORAL at 07:51

## 2019-09-07 RX ADMIN — PIPERACILLIN SODIUM AND TAZOBACTAM SODIUM 3.38 G: 3; .375 INJECTION, POWDER, LYOPHILIZED, FOR SOLUTION INTRAVENOUS at 02:25

## 2019-09-07 RX ADMIN — AZITHROMYCIN MONOHYDRATE 500 MG: 500 INJECTION, POWDER, LYOPHILIZED, FOR SOLUTION INTRAVENOUS at 00:25

## 2019-09-07 RX ADMIN — LORATADINE 10 MG: 10 TABLET ORAL at 07:53

## 2019-09-07 RX ADMIN — OMEPRAZOLE 40 MG: 20 CAPSULE, DELAYED RELEASE ORAL at 07:53

## 2019-09-07 RX ADMIN — CALCIUM 500 MG: 500 TABLET ORAL at 07:53

## 2019-09-07 RX ADMIN — ENOXAPARIN SODIUM 40 MG: 40 INJECTION SUBCUTANEOUS at 07:54

## 2019-09-07 RX ADMIN — METOCLOPRAMIDE HYDROCHLORIDE 5 MG: 5 TABLET ORAL at 07:53

## 2019-09-07 RX ADMIN — MORPHINE SULFATE 45 MG: 30 TABLET, EXTENDED RELEASE ORAL at 07:51

## 2019-09-07 RX ADMIN — SULFAMETHOXAZOLE AND TRIMETHOPRIM 2 TABLET: 800; 160 TABLET ORAL at 07:53

## 2019-09-07 RX ADMIN — DULOXETINE HYDROCHLORIDE 60 MG: 60 CAPSULE, DELAYED RELEASE ORAL at 07:53

## 2019-09-07 RX ADMIN — GUAIFENESIN AND DEXTROMETHORPHAN HYDROBROMIDE 1 TABLET: 600; 30 TABLET, EXTENDED RELEASE ORAL at 08:03

## 2019-09-07 RX ADMIN — MELATONIN 2000 UNITS: at 07:53

## 2019-09-07 RX ADMIN — ACETAMINOPHEN 1000 MG: 500 TABLET, FILM COATED ORAL at 07:53

## 2019-09-07 RX ADMIN — SENNOSIDES AND DOCUSATE SODIUM 2 TABLET: 8.6; 5 TABLET ORAL at 07:54

## 2019-09-07 RX ADMIN — CELECOXIB 100 MG: 100 CAPSULE ORAL at 08:03

## 2019-09-07 ASSESSMENT — ACTIVITIES OF DAILY LIVING (ADL)
ADLS_ACUITY_SCORE: 11

## 2019-09-07 NOTE — PLAN OF CARE
AVSS on room air. Declines need for PRN pain meds overnight, takes scheduled MS Contin and Tylenol. Complains of some intermittent nausea with abx infusions but declines antiemetics. Voiding not saving, passing gas. Last BM 9/5. Up independently in room. PIV saline locked. Last Hgb 7.4, MD aware. Mg recheck scheduled for this AM. Continue POC.

## 2019-09-07 NOTE — PLAN OF CARE
Reviewed and signed discharge papers with patient and family with no questions or concerns. Discharge medications picked up. Patient left unit at 1345. PIV removed

## 2019-09-07 NOTE — DISCHARGE SUMMARY
[facility], [place]    Discharge Summary  Hematology / Oncology Service    Date of Admission:  9/3/2019  Date of Discharge:  9/7/2019  Discharging Provider: Kimberly Wu MD/PhD  Discharging Attending: Cheli Regalado MD  Date of Service: 9/7/2019    Discharge Diagnoses     Recurrent pneumonia    Diffuse polyarticular arthralgias    Mild MANASA    Metastatic carcinoma with likely renal primary    History of Present Illness   Javon Bianchi is an 69 year old man with history of metastatic carcinoma of likely renal origin, not currently on therapy, who presented to the hospital for evaluation of SOB and cough. Admitted for work-up of recurrent pneumonia despite several rounds of antibiotics.    Hospital Course   Javon Bianchi was admitted on 9/3/2019.    - The following problems were addressed during his hospitalization:    Dyspnea and cough, possible recurrent pneumonia versus pneumonitis.  Patient reported a several month history of productive cough with mucus, starting in early summer 2019, associated with wheezing and chills. He had been treated with several courses of antibiotics, including amoxicillin starting in late July 2019. CT chest done for staging on 8/8/19 showed bilateral posterior opacities consistent with aspiration pneumonia. An upcoming surgery was cancelled and he was admitted for pneumonia. He was treated with Zosyn and vancomycin for several days but did not improve. Antibiotics were broadened to include levofloxacin and Bactrim, and he completed a total of 14 days of broad-spectrum antibiotics ending on 8/30/19. Respiratory cultures from this time period showed only normal jacque. At a palliative care appointment on 9/3/19, he reported increasing fatigue, recurrent cough productive of green sputum, and mild dyspnea. CXR showed a LLL consolidation, thought to be worse than that seen on CT from 8/8/19. He was admitted to the hospital for expedited work-up of  possible recurrent pneumonia despite antibiotics. CT PE showed no evidence of PE, but revealed a consolidation in both posterior lower lobes concerning for aspiration with superimposed infection. He was started on broad-spectrum antibiotics including vancomycin, azithromycin and Zosyn. He was never hypoxic and remained hemodynamically stable. The pulmonology service was consulted, and bronchoscopy with BAL was performed on 9/4/19. Purulent fluid was found in the LLL, and samples were sent for a wide variety of cultures. The infectious disease team was also consulted, though none of the cultures resulted with any culprit organisms. Fungal studies were unrevealing. Leading differential was Nocardia or mycobacterium. Aspiration was felt to be unlikely given intact swallow per SLP.    As the patient remained afebrile, hemodynamically stable, and never required supplemental oxygen, we felt it was appropriate to discharge home with oral antibiotics.    Per the ID team, we opted to discharge with a course of Bactrim (to cover possible Nocardia).     Plan to complete a 2 week course with repeat CT chest before discontinuation of Bactrim (this has been ordered, not yet scheduled).    Will need to follow-up the BAL cultures. Nocardia can take 2-3 weeks, and Mycobacterium up to 7-8 weeks.    If repeat CT chest is worse, or not improved - differential should also include pneumonitis related to immune therapy, and consideration of steroids.    Diffuse polyarticular arthralgias.  Patient also reported a several month history of diffuse aches, described both as mylagias and arthralgias. Pain mostly localized to both shoulders and wrists at this time. Patient felt symptoms initially correlated with start of cabozantinib, and symptoms improved with a reduced dose but did not resolve. He had been using chronic opioids with some relief. We opted to trial celebrex to see if this was helpful and opioid-sparing, and the patient found  this was actually very helpful. Also need to keep immune-mediated arthralgias on the differential.    Discharged with Celebrex to use sparingly PRN for arthralgias.    Mild MANASA.  Baseline creatinine around 0.6-0.7. Creatinine was 1.17 on admission, which was largely stable during this admission despite IV fluids. No clear etiology for MANASA.     Metastatic carcinoma of likely renal origin.  Presented with back pain, lower extremity weakness, and weight loss in mid 2018. He was eventually found to have a pathological L3 vertebral fracture on 8/2018. Biopsy showed poorly differentiated carcinoma with IHC consistent with possible renal cell origin. Other imaging was actually negative for a primary renal lesion. He completed palliative XRT and started cabozantinib on 9/28/18, which required dose reductions over the next several months because of HFS and poor tolerability. He was switched to nivolumab because of progression in bone disease 2/2019. Received C1 2/11/19, C2 3/12/19, C3 5/21/19. Cycle 3 of nivolumab was held for myalgias, which improved with prednisone. He then started XRT this summer to the lumbar spine. Also has been under evaluation for kyphoplasty versus starting lenvatinib/everolimus (has script, but has not started yet). Surgery delayed into mid September because of untreated pneumonia. Was to discuss with Dr. Anne on 9/4/19.    Keeping immune mediated toxicities on our differential, though timing is atypical.    Previously scheduled follow-up with Dr. Anne on 9/25/19. Message sent to scheduling to get this moved up if possible to correlate with repeat CT in about 2 weeks.     Has been receiving XRT in [place], under the guidance of Dr. Rdz.     Kimberly Wu MD/PhD  Heme/Onc Fellow    Significant Results and Procedures   CT CHEST WITH ANGIOGRAM, 9/3/19  COMPARISON: 8/8/2019   HISTORY: Shortness of breath. Evaluate for pulmonary embolism.  IMPRESSION:   1. No pulmonary embolism.  2. Significantly  increased consolidative densities in the posterior  lower lobes with surrounding tree-in-bud nodules. Findings are  concerning for pneumonia. Consider aspiration.  3. Unchanged pulmonary nodules since 9/25/2018. Recommend one-year  follow-up to establish stability.    BRONCHOSCOPY, 9/4/19  - The bronchoscope was then advanced into the airway. The mirza was sharp. The bronchoscope was advanced to the left lower lobe bronchus and thick mucopurulent secretions were visualized and suctioned. This airway was consequently cleared and scope was advanced to the left lower posterior basal bronchus. A BAL was performed, with 120 mL of sterile saline infused in two 60 mL aliquots. In return, 35 mL of purulent fluid was collected, and sent for routine studies. The bronchoscope was then taken out of wedge. We did not perform an exam of the remaining segments. The left mainstem, left lower lobe bronchi did appear normal.    Pending Results   These results will be followed up in oncology clinic:  Unresulted Labs Ordered in the Past 30 Days of this Admission     Date and Time Order Name Status Description    9/6/2019 0710 Blastomyces Agn Quant EIA Blood In process     9/4/2019 1418 Nocardia culture Preliminary     9/4/2019 1418 Legionella culture Preliminary     9/4/2019 1418 Fungus Culture, non-blood Preliminary     9/4/2019 1418 AFB Culture Non Blood Preliminary     9/4/2019 1418 Actinomyces rule out Preliminary     9/4/2019 1413 Send outs misc test In process     9/4/2019 0955 Histoplasma Capsulatum Agn Non Blood In process     9/3/2019 1835 Blood culture Preliminary     9/3/2019 1835 Blood culture Preliminary           Code Status   Full Code    Physical Exam   Blood pressure 115/55, pulse 66, temperature 97.2  F (36.2  C), temperature source Oral, resp. rate 15, height 1.829 m (6'), weight 95.3 kg (210 lb 1.6 oz), SpO2 95 %.    General: alert and cooperative, sitting up in bed, no acute distress  HEENT: sclera anicteric,  EOMI, MMM  Neck: supple, normal ROM  CV: RRR, normal S1/S2, no m/r/g  Resp: subtle crackles in the left lower lobe, otherwise clear to auscultation, normal respiratory effort on ambient air  GI: soft, non-tender, non-distended, bowel sounds present and normoactive  MSK: warm and well-perfused, no edema  Skin: no rashes on limited exam, no jaundice  Neuro: AOx3, moves all extremities equally, no focal deficits    Discharge Disposition   Discharged to home  Condition at discharge: Stable    Consultations This Hospital Stay   PHARMACY TO DOSE VANCO  SWALLOW EVAL SPEECH PATH AT BEDSIDE IP CONSULT  PULMONARY GENERAL ADULT IP CONSULT  VASCULAR ACCESS CARE ADULT IP CONSULT  INFECTIOUS DISEASE GENERAL ADULT IP CONSULT    Discharge Orders      Home care nursing referral      Reason for your hospital stay    Recurrent pnemonia     MD face to face encounter    Documentation of Face to Face and Certification for Home Health Services    I certify that patient: Javon Bianchi is under my care and that I, or a nurse practitioner or physician's assistant working with me, had a face-to-face encounter that meets the physician face-to-face encounter requirements with this patient on: December 6, 2019.    This encounter with the patient was in whole, or in part, for the following medical condition, which is the primary reason for home health care: metastatic carcinoma with likely renal origin, who presents with recurrent pneumonia despite several rounds of antibiotics.    .    I certify that, based on my findings, the following services are medically necessary home health services:Skilled home care RN.    My clinical findings support the need for the above services because: Per MD order/plan of care.    Further, I certify that my clinical findings support that this patient is homebound deconditioned from disease    Based on the above findings. I certify that this patient is confined to the home and needs intermittent skilled nursing  care, physical therapy and/or speech therapy.  The patient is under my care, and I have initiated the establishment of the plan of care.  This patient will be followed by a physician who will periodically review the plan of care.  Physician/Provider to provide follow up care: [facility], [facility]    Attending hospital physician (the Medicare certified GISELLE provider): Dr. Ese Bullock MD  Physician Signature: See electronic signature associated with these discharge orders.    Date: 9/6/2019     Reason for your hospital stay    You were hospitalized to work-up possible pneumonia. Bronchoscopy was performed to try to identify any culprit organisms, but these cultures will take some time to finalize. You will discharge with oral antibiotics and a repeat CT scan will be obtained in 2 weeks.     Follow Up and recommended labs and tests    Follow-up with Radha or Dr. Anne within 2 weeks. We will communicate with Dr. Anne's team to get this arranged with the CT scan the day prior.     Activity    Your activity upon discharge: activity as tolerated     Discharge Instructions    1.  your prescriptions at the Discharge Pharmacy before you go home.  2. Start taking the Bactrim this evening. Take until you have follow-up in Dr. Anne's clinic.  3. Follow-up CT scan in 2 weeks.     Full Code     Diet    Follow this diet upon discharge:      Regular Diet Adult     Discharge Medications   Discharge Medication List as of 9/7/2019  1:38 PM      START taking these medications    Details   celecoxib (CELEBREX) 100 MG capsule Take 1 capsule (100 mg) by mouth 2 times daily as needed for moderate pain, Disp-30 capsule, R-1, E-Prescribe      sulfamethoxazole-trimethoprim (BACTRIM DS/SEPTRA DS) 800-160 MG tablet Take 2 tablets by mouth 2 times daily, Disp-42 tablet, R-1, E-Prescribe         CONTINUE these medications which have NOT CHANGED    Details   acetaminophen (TYLENOL) 500 MG tablet Take 1,000 mg by mouth 3 times  daily , Historical      calcium carbonate 500 mg, elemental, (OSCAL 500) 1250 (500 Ca) MG TABS tablet Take 1 tablet by mouth 2 times daily, Historical      Dextromethorphan-guaiFENesin (MUCINEX DM)  MG 12 hr tablet Take 1 tablet by mouth every 12 hours, Historical      diazepam (VALIUM) 5 MG tablet Take 1 pill 30 min before MRI and a second pill 2 minutes prior., Disp-10 tablet, R-0, Local Print      DULoxetine (CYMBALTA) 60 MG capsule Take 1 capsule (60 mg) by mouth daily, Disp-90 capsule, R-3, E-Prescribe      everolimus (AFINITOR) 5 MG tablet CHEMO Take 1 tablet (5 mg) by mouth daily, Disp-30 tablet, R-0, E-Prescribe      furosemide (LASIX) 20 MG tablet Take 1 tablet (20 mg) by mouth daily as needed, Disp-14 tablet, R-0, E-Prescribe      HYDROmorphone (DILAUDID) 2 MG tablet Take 1-2 tablets (2-4 mg) by mouth every 3 hours as needed for pain, Disp-60 tablet, R-0, E-Prescribe      Lenvatinib 18 MG, 10 mg + 2 x 4 mg capsules, (LENVIMA) 18 mg combo capsule CHEMOTHERAPY Take 18 mg dose (10 mg plus two 4 mg capsules) by mouth daily., Disp-90 capsule, R-0, E-Prescribe      lidocaine (LIDODERM) 5 % patch Place 1-3 patches onto the skin every 24 hours Apply to painful areas for 12 hours on, 12 hours off.Disp-30 patch, [id number]      loratadine (CLARITIN) 10 MG tablet Take 10 mg by mouth daily, Historical      medical cannabis (Patient's own supply.  Not a prescription) 1 Dose See Admin Instructions (This is NOT a prescription, and does not certify that the patient has a qualifying medical condition for medical cannabis.  The purpose of this order is  to document that the patient reports taking medical cannabis.), Histo rical      melatonin 3 MG tablet Take 3 mg by mouth nightly as needed , Historical      metoclopramide (REGLAN) 5 MG tablet Take 1 tablet (5 mg) by mouth 4 times daily (before meals and nightly), Disp-150 tablet, R-0, E-Prescribe      morphine (MS CONTIN) 30 MG CR tablet Takes 45mg in the AM, 30  mg midday and 30mg at bedtime., Disp-90 tablet, R-0, E-Prescribe      omeprazole (PRILOSEC) 40 MG DR capsule Take 1 capsule (40 mg) by mouth daily, Disp-90 capsule, R-3, E-Prescribe      ondansetron (ZOFRAN) 8 MG tablet Take 1 tablet (8 mg) by mouth every 8 hours as needed (nausea and vomiting) Take prior to each lenvatinib dose., Disp-28 tablet, R-5, Local Print      polyethylene glycol (MIRALAX/GLYCOLAX) Packet Take 0.5 packets by mouth daily as needed, Historical      pregabalin (LYRICA) 50 MG capsule Take 1 capsule (50 mg) by mouth 2 times daily, Disp-60 capsule, R-1, No Print Out      senna-docusate (SENOKOT-S;PERICOLACE) 8.6-50 MG per tablet Take 2 tablets by mouth 2 times daily, Historical      Vitamin D, Cholecalciferol, 1000 units TABS Take 2,000 Units by mouth daily , Historical           Data   Most Recent 3 CBC's:  Recent Labs   Lab Test 09/07/19  0750 09/06/19  0710 09/05/19  0737   WBC 4.4 4.0 7.5   HGB 8.3* 7.4* 8.6*   MCV 98 99 98    186 200      Most Recent 3 BMP's:  Recent Labs   Lab Test 09/07/19  0750 09/06/19  0710 09/05/19  0737    140 141   POTASSIUM 3.9 3.4 3.4   CHLORIDE 110* 110* 111*   CO2 23 23 23   BUN 9 8 9   CR 1.14 1.09 1.02   ANIONGAP 6 7 8   AUREA 7.8* 6.8* 6.7*   GLC 87 83 98     Most Recent 2 LFT's:  Recent Labs   Lab Test 09/03/19  1740 08/07/19  1522   AST 22 23   ALT 12 16   ALKPHOS 94 115   BILITOTAL 0.3 0.4

## 2019-09-07 NOTE — PLAN OF CARE
Patient ate a good breakfast, scheduled morphine and tylenol managing pain, declined Lidoderm patches today. Continues on antibiotics, up ad laverne with his cane. States he had mild nausea overnight after azithromycin, no nausea reported today. Spouse at bedside. Patient had a BM today, is voiding not saving.

## 2019-09-07 NOTE — PLAN OF CARE
AOx4, up ad laverne with cane. AVSS on ra. Reg diet but reports poor appetite, denies N/V. +flatus, LBM 9/6. Voiding but not saving. Pain managed with scheduled MS Contin and scheduled Tylenol. PIV SL in between abx tx. Days replaced Mg, recheck scheduled for the AM. Hgb 7.4; provider aware and no interventions at this time. IR seen pt this evening and adjusted abx treatment. Cont POC.

## 2019-09-07 NOTE — PROGRESS NOTES
Transition Planning Update    D:  No home IV antibiotics needed at time of discharge.  [facility] was updated.  A/P:  Patient will discharge today and will follow up as designated in discharge orders.    Martha Bell, JAYCEE.S.N., R.N., P.H.N.        Pager

## 2019-09-09 LAB
BACTERIA SPEC CULT: NO GROWTH
BACTERIA SPEC CULT: NO GROWTH
LAB SCANNED RESULT: NORMAL
Lab: NORMAL
Lab: NORMAL
SPECIMEN SOURCE: NORMAL
SPECIMEN SOURCE: NORMAL

## 2019-09-10 ENCOUNTER — PATIENT OUTREACH (OUTPATIENT)
Dept: ONCOLOGY | Facility: CLINIC | Age: 69
End: 2019-09-10

## 2019-09-10 ENCOUNTER — PATIENT OUTREACH (OUTPATIENT)
Dept: CARE COORDINATION | Facility: CLINIC | Age: 69
End: 2019-09-10

## 2019-09-10 NOTE — PROGRESS NOTES
Per patient's care team request, completed and faxed [facility] (Ph. [phone], F. [phone]) request for lodging dates 9/24/2019 - 9/26/2019.  [facility] will contact patient for confirmation of reservation. SW will continue to provide support as needed.          JIL Hatfield, MSW   - [facility]  Phone: [phone]  Pager: [phone]  9/10/2019

## 2019-09-11 ENCOUNTER — PATIENT OUTREACH (OUTPATIENT)
Dept: CARE COORDINATION | Facility: CLINIC | Age: 69
End: 2019-09-11

## 2019-09-11 NOTE — PROGRESS NOTES
Per CC's request, completed and faxed [facility] (Ph. [phone], F. [phone]) request for lodging dates 9/12/2019 - 9/13/2019.  [facility] will contact patient for confirmation of reservation. SW will continue to provide support as needed.        JIL Hatfield, MSW   - [facility]  Phone: [phone]  Pager: [phone]  9/11/2019

## 2019-09-13 ENCOUNTER — ONCOLOGY VISIT (OUTPATIENT)
Dept: ONCOLOGY | Facility: CLINIC | Age: 69
End: 2019-09-13
Attending: PHYSICIAN ASSISTANT
Payer: MEDICARE

## 2019-09-13 ENCOUNTER — APPOINTMENT (OUTPATIENT)
Dept: LAB | Facility: CLINIC | Age: 69
End: 2019-09-13
Attending: INTERNAL MEDICINE
Payer: MEDICARE

## 2019-09-13 VITALS
TEMPERATURE: 98.5 F | HEART RATE: 73 BPM | RESPIRATION RATE: 18 BRPM | DIASTOLIC BLOOD PRESSURE: 64 MMHG | BODY MASS INDEX: 28.58 KG/M2 | OXYGEN SATURATION: 98 % | WEIGHT: 211 LBS | HEIGHT: 72 IN | SYSTOLIC BLOOD PRESSURE: 124 MMHG

## 2019-09-13 DIAGNOSIS — C79.51 BONE METASTASIS: ICD-10-CM

## 2019-09-13 DIAGNOSIS — J18.9 PNEUMONIA OF BOTH UPPER LOBES DUE TO INFECTIOUS ORGANISM: Primary | ICD-10-CM

## 2019-09-13 DIAGNOSIS — C64.2 RENAL CELL CARCINOMA, LEFT (H): ICD-10-CM

## 2019-09-13 DIAGNOSIS — M79.2 NEUROPATHIC PAIN: ICD-10-CM

## 2019-09-13 DIAGNOSIS — G89.3 CANCER ASSOCIATED PAIN: ICD-10-CM

## 2019-09-13 DIAGNOSIS — C64.9 RENAL CELL CARCINOMA, UNSPECIFIED LATERALITY (H): ICD-10-CM

## 2019-09-13 LAB
ALBUMIN SERPL-MCNC: 2.7 G/DL (ref 3.4–5)
ALP SERPL-CCNC: 97 U/L (ref 40–150)
ALT SERPL W P-5'-P-CCNC: 13 U/L (ref 0–70)
ANION GAP SERPL CALCULATED.3IONS-SCNC: 6 MMOL/L (ref 3–14)
ANISOCYTOSIS BLD QL SMEAR: SLIGHT
AST SERPL W P-5'-P-CCNC: 20 U/L (ref 0–45)
BASOPHILS # BLD AUTO: 0 10E9/L (ref 0–0.2)
BASOPHILS NFR BLD AUTO: 0 %
BILIRUB SERPL-MCNC: 0.2 MG/DL (ref 0.2–1.3)
BUN SERPL-MCNC: 10 MG/DL (ref 7–30)
CALCIUM SERPL-MCNC: 8.1 MG/DL (ref 8.5–10.1)
CHLORIDE SERPL-SCNC: 107 MMOL/L (ref 94–109)
CO2 SERPL-SCNC: 22 MMOL/L (ref 20–32)
CREAT SERPL-MCNC: 1.17 MG/DL (ref 0.66–1.25)
DIFFERENTIAL METHOD BLD: ABNORMAL
EOSINOPHIL # BLD AUTO: 0.2 10E9/L (ref 0–0.7)
EOSINOPHIL NFR BLD AUTO: 2.7 %
ERYTHROCYTE [DISTWIDTH] IN BLOOD BY AUTOMATED COUNT: 17.6 % (ref 10–15)
GFR SERPL CREATININE-BSD FRML MDRD: 62 ML/MIN/{1.73_M2}
GLUCOSE SERPL-MCNC: 92 MG/DL (ref 70–99)
HCT VFR BLD AUTO: 28.3 % (ref 40–53)
HGB BLD-MCNC: 8.4 G/DL (ref 13.3–17.7)
LAB SCANNED RESULT: NORMAL
LAB SCANNED RESULT: NORMAL
LYMPHOCYTES # BLD AUTO: 0.2 10E9/L (ref 0.8–5.3)
LYMPHOCYTES NFR BLD AUTO: 2.6 %
MAGNESIUM SERPL-MCNC: 1.8 MG/DL (ref 1.6–2.3)
MCH RBC QN AUTO: 30 PG (ref 26.5–33)
MCHC RBC AUTO-ENTMCNC: 29.7 G/DL (ref 31.5–36.5)
MCV RBC AUTO: 101 FL (ref 78–100)
MONOCYTES # BLD AUTO: 1.7 10E9/L (ref 0–1.3)
MONOCYTES NFR BLD AUTO: 26.3 %
NEUTROPHILS # BLD AUTO: 4.4 10E9/L (ref 1.6–8.3)
NEUTROPHILS NFR BLD AUTO: 68.4 %
PLATELET # BLD AUTO: 259 10E9/L (ref 150–450)
PLATELET # BLD EST: ABNORMAL 10*3/UL
POLYCHROMASIA BLD QL SMEAR: SLIGHT
POTASSIUM SERPL-SCNC: 4.6 MMOL/L (ref 3.4–5.3)
PROT SERPL-MCNC: 6.1 G/DL (ref 6.8–8.8)
RBC # BLD AUTO: 2.8 10E12/L (ref 4.4–5.9)
SODIUM SERPL-SCNC: 135 MMOL/L (ref 133–144)
T4 FREE SERPL-MCNC: 0.78 NG/DL (ref 0.76–1.46)
TSH SERPL DL<=0.005 MIU/L-ACNC: 5.08 MU/L (ref 0.4–4)
WBC # BLD AUTO: 6.5 10E9/L (ref 4–11)

## 2019-09-13 PROCEDURE — 36415 COLL VENOUS BLD VENIPUNCTURE: CPT

## 2019-09-13 PROCEDURE — 80053 COMPREHEN METABOLIC PANEL: CPT | Performed by: INTERNAL MEDICINE

## 2019-09-13 PROCEDURE — 84443 ASSAY THYROID STIM HORMONE: CPT | Performed by: INTERNAL MEDICINE

## 2019-09-13 PROCEDURE — 85025 COMPLETE CBC W/AUTO DIFF WBC: CPT | Performed by: INTERNAL MEDICINE

## 2019-09-13 PROCEDURE — 99214 OFFICE O/P EST MOD 30 MIN: CPT | Mod: ZP | Performed by: PHYSICIAN ASSISTANT

## 2019-09-13 PROCEDURE — 83735 ASSAY OF MAGNESIUM: CPT | Performed by: INTERNAL MEDICINE

## 2019-09-13 PROCEDURE — 84439 ASSAY OF FREE THYROXINE: CPT | Performed by: INTERNAL MEDICINE

## 2019-09-13 PROCEDURE — G0463 HOSPITAL OUTPT CLINIC VISIT: HCPCS | Mod: ZF

## 2019-09-13 RX ORDER — MORPHINE SULFATE 15 MG/1
15 TABLET, FILM COATED, EXTENDED RELEASE ORAL DAILY PRN
COMMUNITY
Start: 2019-07-30 | End: 2019-10-02

## 2019-09-13 RX ORDER — MORPHINE SULFATE 30 MG/1
TABLET, FILM COATED, EXTENDED RELEASE ORAL
Qty: 90 TABLET | Refills: 0 | Status: SHIPPED | OUTPATIENT
Start: 2019-09-13 | End: 2019-09-13

## 2019-09-13 RX ORDER — MORPHINE SULFATE 30 MG/1
TABLET, FILM COATED, EXTENDED RELEASE ORAL
Qty: 90 TABLET | Refills: 0 | Status: SHIPPED | OUTPATIENT
Start: 2019-09-13 | End: 2019-10-01

## 2019-09-13 RX ORDER — PREGABALIN 50 MG/1
50 CAPSULE ORAL 2 TIMES DAILY
Qty: 60 CAPSULE | Refills: 1 | Status: SHIPPED | OUTPATIENT
Start: 2019-09-13 | End: 2019-09-13

## 2019-09-13 RX ORDER — PREGABALIN 50 MG/1
50 CAPSULE ORAL 2 TIMES DAILY
Qty: 60 CAPSULE | Refills: 1 | Status: SHIPPED | OUTPATIENT
Start: 2019-09-13 | End: 2019-10-01

## 2019-09-13 ASSESSMENT — PAIN SCALES - GENERAL: PAINLEVEL: SEVERE PAIN (6)

## 2019-09-13 ASSESSMENT — MIFFLIN-ST. JEOR: SCORE: 1750.09

## 2019-09-13 NOTE — NURSING NOTE
"Oncology Rooming Note    September 13, 2019 9:17 AM   Javon Bianchi is a 69 year old male who presents for:    Chief Complaint   Patient presents with     Blood Draw     Labs drawn via  by RN in lab. VS taken.      Oncology Clinic Visit     RETURN VISIT; RENAL CANCER FOLLOW UP      Initial Vitals: /64 (BP Location: Right arm, Patient Position: Sitting, Cuff Size: Adult Regular)   Pulse 73   Temp 98.5  F (36.9  C) (Oral)   Resp 18   Ht 1.829 m (6')   Wt 95.7 kg (211 lb)   SpO2 98%   BMI 28.62 kg/m   Estimated body mass index is 28.62 kg/m  as calculated from the following:    Height as of this encounter: 1.829 m (6').    Weight as of this encounter: 95.7 kg (211 lb). Body surface area is 2.2 meters squared.  Severe Pain (6) Comment: Data Unavailable   No LMP for male patient.  Allergies reviewed: Yes  Medications reviewed: Yes    Medications: MEDICATION REFILLS NEEDED TODAY. Provider was notified. Patient needs a refill on Lyrica (if you want him to continue this medication). And Morphine 30 mg tablet.     Pharmacy name entered into [employer]:    [facility] - [place], [place] - [address] [facility] [place] - [address] [address]    Clinical concerns: Patient would like to know if there is any follow up after completely Bactrim. Patient complains of joint pain and restarted the Celebrex. Patient states that his \"stomach isn't right\".   Ruthie St was notified.      Amisha Basurto              "

## 2019-09-13 NOTE — NURSING NOTE
Chief Complaint   Patient presents with     Blood Draw     Labs drawn via  by RN in lab. VS taken.      Peyton Law RN

## 2019-09-13 NOTE — PROGRESS NOTES
[facility]  FOLLOW-UP VISIT NOTE  Date of visit: Sep 13, 2019        REASON FOR VISIT: mRCC to the bone (with no primary renal mass), here for routine follow up  HPI:  Javon Bianchi is a 69 year old male with a history significant for hyperlipidemia who had been in his usual health until he developed back pain in May of 2018. At that time, the pain was a stabbing like in the middle of lower back with burning like pain wrapping around this left hip to knee area. No trauma. He had MRI on 5/23/18 showed mild degenerative change with bulging disks L2-S1. A small benign hemangioma was noted in L2, no other marrow signal abnormality. Since then, the pain failed to improve despite steroid injections. In addition, he lost 25 lbs unintentionally since June along with chills a few times a day. Over time, the pain got worse to the point he could not ambulate after short distance and developed muscle weakness in lower extremities over time requiring a walker. Finally, he went to ER ([facility] in [place]) on 8/23/18, CT A/P was unremarkable. He was referred to neurology with obtaining MRI L spine. MRI on 8/30/18 revealed a pathological fracture at L3 vertebral body with height loss at 40% and diffuse involvement of a neoplastic process. IR guided biopsy on 8/31/18 showed poorly differentiated carcinoma. IHC was suggestive of possible renal cell carcinoma per pathology report (Renal cell immunochemistry is positive and along with the negative staining for CK7 and CK20 suggests the possibility of a renal carcinoma, however most renal cell carcinomas also express PAX8 which is negative in this case). Of note, CT chest/abdomen/pelvis at OSH was negative for primary lesion. The patient was evaluated by neurosurgery who recommended no surgical intervention. He was discharged with TLSO brace and walker. He had palliative XRT locally from 9/11 for 10 fractions (Done in [place]). He was  started on cabozantinib 60 mg initially, requiring dose reduction to 20 mg for issues with hand foot syndrome and poor tolerability. Switched to nivolumab due to progression in bone disease on 2/6/19. Had to hold Cycle #3 due to myalgia, which improved with prednisone.    ONCOLOGY HISTORY:    Cabozantinib: Started 60 mg daily on September 28, 2018.  Dec 10, 2018: dose reduced cabozantinib to 40 mg a day.  Jan 17, 2019: dose reduced cabozantinib to 29 mg a day.  Feb 11, 2019: C1D1 Nivolumab   3/12/19: c2 Nivolumab  4/8/19: held Nivolumab due to myalgias, started prednisone    Interval history:   He presents to clinic today for scheduled follow up visit for hospital follow up.  After our last visit Omega was pursuing XRT to the hips, which he finished with improved pain control.  Our plan was to start lenvatinib + everolimus, however he was going to have a spinal procedure from Dr Brower.  During the pre-op, he was noted to have PNA and was admitted to [place] from 8/15-8/15/19, given IV antibiotics, then discharged on Levaquin.  He then was meeting with palliative care and sent to the ED for continued worsening cough, LEVY and FTT.  He was admitted at [facility] 9/3/19-9/7/19 and had a bronch which didn't grow out any microorganisms and he was discharged on Bactrim to home.     Omega has been doing okay this last week at home.  His cough dried up and his breathing is better.  He went to [employer] and sort of fell back in the pew and hit his lumbar spine, unfortunately, this is been hurting worse this last week.  He continues on MS contin 45-30-30 and was not using dilaudid, now is daily 1-2 times with the lumbar pain.  Using 2 senna BID for stools, somewhat gassy stomach.  Taking Celebrex 100 mg BID for joint pains which persist.      No fevers or sweats.  No cough, SOB or chest pain. No rashes or concerning lesions.      Wt Readings from Last 10 Encounters:   09/13/19 95.7 kg (211 lb)   09/06/19 95.3 kg (210 lb 1.6 oz)    09/03/19 93.8 kg (206 lb 12.8 oz)   08/15/19 96.1 kg (211 lb 14.4 oz)   08/13/19 96.6 kg (213 lb)   08/08/19 96.2 kg (212 lb)   08/07/19 96.4 kg (212 lb 9.6 oz)   07/19/19 99.6 kg (219 lb 9.6 oz)   07/09/19 100.7 kg (222 lb)   07/03/19 100.9 kg (222 lb 6.4 oz)     MEDICATIONS     Current Outpatient Medications:      acetaminophen (TYLENOL) 500 MG tablet, Take 1,000 mg by mouth 3 times daily , Disp: , Rfl:      calcium carbonate 500 mg, elemental, (OSCAL 500) 1250 (500 Ca) MG TABS tablet, Take 1 tablet by mouth 2 times daily, Disp: , Rfl:      celecoxib (CELEBREX) 100 MG capsule, Take 1 capsule (100 mg) by mouth 2 times daily as needed for moderate pain, Disp: 30 capsule, Rfl: 1     Dextromethorphan-guaiFENesin (MUCINEX DM)  MG 12 hr tablet, Take 1 tablet by mouth every 12 hours, Disp: , Rfl:      DULoxetine (CYMBALTA) 60 MG capsule, Take 1 capsule (60 mg) by mouth daily, Disp: 90 capsule, Rfl: 3     everolimus (AFINITOR) 5 MG tablet CHEMO, Take 1 tablet (5 mg) by mouth daily, Disp: 30 tablet, Rfl: 0     HYDROmorphone (DILAUDID) 2 MG tablet, Take 1-2 tablets (2-4 mg) by mouth every 3 hours as needed for pain, Disp: 60 tablet, Rfl: 0     Lenvatinib 18 MG, 10 mg + 2 x 4 mg capsules, (LENVIMA) 18 mg combo capsule CHEMOTHERAPY, Take 18 mg dose (10 mg plus two 4 mg capsules) by mouth daily., Disp: 90 capsule, Rfl: 0     lidocaine (LIDODERM) 5 % patch, Place 1-3 patches onto the skin every 24 hours Apply to painful areas for 12 hours on, 12 hours off., Disp: 30 patch, Rfl: 3     loratadine (CLARITIN) 10 MG tablet, Take 10 mg by mouth daily, Disp: , Rfl:      medical cannabis (Patient's own supply.  Not a prescription), 1 Dose See Admin Instructions (This is NOT a prescription, and does not certify that the patient has a qualifying medical condition for medical cannabis.  The purpose of this order is  to document that the patient reports taking medical cannabis.), Disp: , Rfl:      melatonin 3 MG tablet, Take 3 mg  by mouth nightly as needed , Disp: , Rfl:      metoclopramide (REGLAN) 5 MG tablet, Take 1 tablet (5 mg) by mouth 4 times daily (before meals and nightly) (Patient taking differently: Take 5 mg by mouth 2 times daily ), Disp: 150 tablet, Rfl: 0     morphine (MS CONTIN) 15 MG CR tablet, Take 15 mg by mouth daily as needed, Disp: , Rfl:      morphine (MS CONTIN) 30 MG CR tablet, Takes 45mg in the AM, 30 mg midday and 30mg at bedtime., Disp: 90 tablet, Rfl: 0     omeprazole (PRILOSEC) 40 MG DR capsule, Take 1 capsule (40 mg) by mouth daily, Disp: 90 capsule, Rfl: 3     polyethylene glycol (MIRALAX/GLYCOLAX) Packet, Take 0.5 packets by mouth daily as needed, Disp: , Rfl:      pregabalin (LYRICA) 50 MG capsule, Take 1 capsule (50 mg) by mouth 2 times daily, Disp: 60 capsule, Rfl: 1     senna-docusate (SENOKOT-S;PERICOLACE) 8.6-50 MG per tablet, Take 2 tablets by mouth 2 times daily, Disp: , Rfl:      sulfamethoxazole-trimethoprim (BACTRIM DS/SEPTRA DS) 800-160 MG tablet, Take 2 tablets by mouth 2 times daily, Disp: 42 tablet, Rfl: 1     Vitamin D, Cholecalciferol, 1000 units TABS, Take 2,000 Units by mouth daily , Disp: , Rfl:      diazepam (VALIUM) 5 MG tablet, Take 1 pill 30 min before MRI and a second pill 2 minutes prior. (Patient not taking: Reported on 2019), Disp: 10 tablet, Rfl: 0    PHYSICAL EXAM:   Temp: 98.5  F (36.9  C) Temp src: Oral BP: 124/64 Pulse: 73   Resp: 18 SpO2: 98 %      ECOG performance status of 2.   GENERAL: Seated in chair in NAD.  HEENT: No icterus, no pallor. Moist mucous membranes. Oropharynx is clear.   NECK: Supple, normal ROM  LUNGS: Bilateral clear to ausculation  CARDIOVASCULAR: Regular rate and rhythm, no murmurs, gallops or rubs.   ABDOMEN: Soft, nontender and nondistended.   EXTREMITIES: No cyanosis, no clubbing, 2+ pitting edema to the knees bilaterally.   NEUROLOGIC: No focal deficits.  LABS/IMAGIN/6/2019 07:10 2019 07:50 2019 09:02   Glucose 83 87 92   WBC  4.0 4.4 6.5   Hemoglobin 7.4 (L) 8.3 (L) 8.4 (L)   Hematocrit 25.7 (L) 28.4 (L) 28.3 (L)   Platelet Count 186 209 259   RBC Count 2.60 (L) 2.89 (L) 2.80 (L)   MCV 99 98 101 (H)      9/13/2019 09:02   Sodium 135   Potassium 4.6   Chloride 107   Carbon Dioxide 22   Urea Nitrogen 10   Creatinine 1.17   GFR Estimate 62   GFR Estimate If Black 72   Calcium 8.1 (L)   Anion Gap 6   Magnesium 1.8   Albumin 2.7 (L)   Protein Total 6.1 (L)   Bilirubin Total 0.2   Alkaline Phosphatase 97   ALT 13   AST 20   T4 Free 0.78   TSH 5.08 (H)            Assessment /Plan   69 year old male with metastatic infiltrative lesion in L3 extending to L2 and L4, likely primary malignancy being renal cell carcinoma. However he does not have any evidence of a primary renal mass on imaging. Based on the available pathology data, we offered treatment for renal cell carcinoma with cabozantinib (CABOSUN data demonstrating cabozantinib superiority over sunitinib in the initial setting). PAX8 stain was negative, and the clear cell features were not seen (it was a poorly differentiated carcinoma). His scans were stable, however, tolerability remained an issue and he then progressed. He then started on Nivolumab, as there is encouraging data with IO in non-clear cell carcinoma. He developed myalgias and we had to stop therapy and start him on prednisone.  Our plan was to pursue lenvatinib + everolimus, however he needed to complete XRT to his hips and then was admitted in August for PNA, then admitted to [facility] for PNA.     Ana Duff and daughter and I discussed our goals of care.  I told him I was surprised he was actually doing OK given the extent of his disease and the fact that he progressed on immunotherapy and has not had a systemic therapy all summer.  I am not sure what type of response to expect with the combo TKI + mTOR inhibitor.  Our fist goal has been to control Omega' pain- thus I think completing XRT to the L1 next week makes sense.  This  also gives him time to complete the antibiotics for his lungs, work on PT and get stronger.  Then, I think we should get a new baseline images (MR lumbar and pelvis) in addition to CT chest on 9/24 and see Dr Anne on 9/25.  If his PNA shows radiographic improvement (it should given his clinical improvement) and he completes his L1 XRT with no new issues- then we could start the combo oral therapy.  I think putting off the ortho surgery for now might be OK given starting chemo. If he progressed through the chemo, we could consider the surgery then.  They were all in agreement of the plan.     PNA: clinically improved today. Still on Bactrim.    Pain: continue MS Contin 45-30-30.  Needed 1-2 dilaudid daily since hitting back on pew at [employer]. L1 and bilateral muscles are sore today. Otherwise hip pain is stable.  Taking celebrex BID for joint pains.     Creat/lytes: creat/lytes stable. Cannabis helping with appetite. Taking Reglan QID, may decrease to BID    GI: continue senna 2 pills BID, can try some Gas X as well.     Radha St PA-C

## 2019-09-18 LAB
BACTERIA SPEC CULT: NORMAL
BACTERIA SPEC CULT: NORMAL
Lab: NORMAL
SPECIMEN SOURCE: NORMAL
SPECIMEN SOURCE: NORMAL

## 2019-09-24 ENCOUNTER — ANCILLARY PROCEDURE (OUTPATIENT)
Dept: MRI IMAGING | Facility: CLINIC | Age: 69
End: 2019-09-24
Attending: PHYSICIAN ASSISTANT
Payer: MEDICARE

## 2019-09-24 ENCOUNTER — ANCILLARY PROCEDURE (OUTPATIENT)
Dept: CT IMAGING | Facility: CLINIC | Age: 69
End: 2019-09-24
Attending: PHYSICIAN ASSISTANT
Payer: MEDICARE

## 2019-09-24 DIAGNOSIS — C64.9 RENAL CELL CARCINOMA, UNSPECIFIED LATERALITY (H): ICD-10-CM

## 2019-09-24 DIAGNOSIS — R53.81 OTHER MALAISE: ICD-10-CM

## 2019-09-24 DIAGNOSIS — C79.51 BONE METASTASIS: ICD-10-CM

## 2019-09-24 DIAGNOSIS — Z79.899 ENCOUNTER FOR LONG-TERM (CURRENT) USE OF MEDICATIONS: ICD-10-CM

## 2019-09-24 DIAGNOSIS — C64.2 RENAL CELL CARCINOMA, LEFT (H): ICD-10-CM

## 2019-09-24 DIAGNOSIS — J18.9 PNEUMONIA OF BOTH UPPER LOBES DUE TO INFECTIOUS ORGANISM: ICD-10-CM

## 2019-09-24 LAB
ALBUMIN SERPL-MCNC: 2.6 G/DL (ref 3.4–5)
ALP SERPL-CCNC: 90 U/L (ref 40–150)
ALT SERPL W P-5'-P-CCNC: 12 U/L (ref 0–70)
ANION GAP SERPL CALCULATED.3IONS-SCNC: 6 MMOL/L (ref 3–14)
ANISOCYTOSIS BLD QL SMEAR: SLIGHT
AST SERPL W P-5'-P-CCNC: 18 U/L (ref 0–45)
BASOPHILS # BLD AUTO: 0 10E9/L (ref 0–0.2)
BASOPHILS NFR BLD AUTO: 0 %
BILIRUB SERPL-MCNC: 0.2 MG/DL (ref 0.2–1.3)
BUN SERPL-MCNC: 8 MG/DL (ref 7–30)
CALCIUM SERPL-MCNC: 7.4 MG/DL (ref 8.5–10.1)
CHLORIDE SERPL-SCNC: 109 MMOL/L (ref 94–109)
CHOLEST SERPL-MCNC: 152 MG/DL
CO2 SERPL-SCNC: 23 MMOL/L (ref 20–32)
CREAT SERPL-MCNC: 1.04 MG/DL (ref 0.66–1.25)
CREAT UR-MCNC: 71 MG/DL
CRP SERPL-MCNC: 26.5 MG/L (ref 0–8)
DIFFERENTIAL METHOD BLD: ABNORMAL
EOSINOPHIL # BLD AUTO: 0 10E9/L (ref 0–0.7)
EOSINOPHIL NFR BLD AUTO: 0 %
ERYTHROCYTE [DISTWIDTH] IN BLOOD BY AUTOMATED COUNT: 19.7 % (ref 10–15)
GFR SERPL CREATININE-BSD FRML MDRD: 72 ML/MIN/{1.73_M2}
GLUCOSE SERPL-MCNC: 83 MG/DL (ref 70–99)
HCT VFR BLD AUTO: 25.1 % (ref 40–53)
HDLC SERPL-MCNC: 45 MG/DL
HGB BLD-MCNC: 7.4 G/DL (ref 13.3–17.7)
LDLC SERPL CALC-MCNC: 82 MG/DL
LYMPHOCYTES # BLD AUTO: 0.1 10E9/L (ref 0.8–5.3)
LYMPHOCYTES NFR BLD AUTO: 4.5 %
MACROCYTES BLD QL SMEAR: PRESENT
MCH RBC QN AUTO: 31.2 PG (ref 26.5–33)
MCHC RBC AUTO-ENTMCNC: 29.5 G/DL (ref 31.5–36.5)
MCV RBC AUTO: 106 FL (ref 78–100)
MONOCYTES # BLD AUTO: 1 10E9/L (ref 0–1.3)
MONOCYTES NFR BLD AUTO: 31.3 %
NEUTROPHILS # BLD AUTO: 2 10E9/L (ref 1.6–8.3)
NEUTROPHILS NFR BLD AUTO: 64.2 %
NONHDLC SERPL-MCNC: 108 MG/DL
NRBC # BLD AUTO: 0 10*3/UL
NRBC BLD AUTO-RTO: 1 /100
OVALOCYTES BLD QL SMEAR: SLIGHT
PLATELET # BLD AUTO: 170 10E9/L (ref 150–450)
PLATELET # BLD EST: ABNORMAL 10*3/UL
POIKILOCYTOSIS BLD QL SMEAR: SLIGHT
POLYCHROMASIA BLD QL SMEAR: SLIGHT
POTASSIUM SERPL-SCNC: 5.3 MMOL/L (ref 3.4–5.3)
PROT SERPL-MCNC: 5.7 G/DL (ref 6.8–8.8)
PROT UR-MCNC: 0.28 G/L
PROT/CREAT 24H UR: 0.39 G/G CR (ref 0–0.2)
RBC # BLD AUTO: 2.37 10E12/L (ref 4.4–5.9)
SODIUM SERPL-SCNC: 137 MMOL/L (ref 133–144)
T4 FREE SERPL-MCNC: 0.76 NG/DL (ref 0.76–1.46)
TRIGL SERPL-MCNC: 127 MG/DL
TSH SERPL DL<=0.005 MIU/L-ACNC: 5.76 MU/L (ref 0.4–4)
WBC # BLD AUTO: 3.1 10E9/L (ref 4–11)

## 2019-09-24 RX ORDER — GADOBUTROL 604.72 MG/ML
10 INJECTION INTRAVENOUS ONCE
Status: COMPLETED | OUTPATIENT
Start: 2019-09-24 | End: 2019-09-24

## 2019-09-24 RX ADMIN — GADOBUTROL 10 ML: 604.72 INJECTION INTRAVENOUS at 17:54

## 2019-09-24 NOTE — DISCHARGE INSTRUCTIONS
MRI Contrast Discharge Instructions    The IV contrast you received today will pass out of your body in your  urine. This will happen in the next 24 hours. You will not feel this process.  Your urine will not change color.    Drink at least 4 extra glasses of water or juice today (unless your doctor  has restricted your fluids). This reduces the stress on your kidneys.  You may take your regular medicines.    If you are on dialysis: It is best to have dialysis today.    If you have a reaction: Most reactions happen right away. If you have  any new symptoms after leaving the hospital (such as hives or swelling),  call your hospital at the correct number below. Or call your family doctor.  If you have breathing distress or wheezing, call 911.    Special instructions: ***    I have read and understand the above information.    Signature:______________________________________ Date:___________    Staff:__________________________________________ Date:___________     Time:__________    [place] [facility] Departments:    ___[place]: [phone]  ___[facility]: [phone]  ___[place]: [phone] ___[facility]: [phone]  ___[facility]: [phone]  ___[facility]: [phone]  ___[phone]  ___[facility]: [phone]  ___[phone]

## 2019-09-25 ENCOUNTER — PATIENT OUTREACH (OUTPATIENT)
Dept: ONCOLOGY | Facility: CLINIC | Age: 69
End: 2019-09-25

## 2019-09-25 ENCOUNTER — ONCOLOGY VISIT (OUTPATIENT)
Dept: ONCOLOGY | Facility: CLINIC | Age: 69
End: 2019-09-25
Attending: INTERNAL MEDICINE
Payer: MEDICARE

## 2019-09-25 VITALS
BODY MASS INDEX: 29.43 KG/M2 | SYSTOLIC BLOOD PRESSURE: 129 MMHG | WEIGHT: 217 LBS | OXYGEN SATURATION: 97 % | TEMPERATURE: 97.7 F | DIASTOLIC BLOOD PRESSURE: 75 MMHG | RESPIRATION RATE: 16 BRPM | HEART RATE: 68 BPM

## 2019-09-25 DIAGNOSIS — D64.9 SEVERE ANEMIA: ICD-10-CM

## 2019-09-25 DIAGNOSIS — C79.51 BONE METASTASIS: ICD-10-CM

## 2019-09-25 DIAGNOSIS — C64.9 RENAL CELL CARCINOMA, UNSPECIFIED LATERALITY (H): Primary | ICD-10-CM

## 2019-09-25 DIAGNOSIS — J18.9 HEALTHCARE-ASSOCIATED PNEUMONIA: ICD-10-CM

## 2019-09-25 PROBLEM — J15.9 BACTERIAL PNEUMONIA: Status: ACTIVE | Noted: 2019-08-15

## 2019-09-25 PROBLEM — R53.81 PHYSICAL DECONDITIONING: Status: ACTIVE | Noted: 2019-08-16

## 2019-09-25 PROBLEM — E43 SEVERE MALNUTRITION (H): Status: ACTIVE | Noted: 2019-08-16

## 2019-09-25 PROCEDURE — G0463 HOSPITAL OUTPT CLINIC VISIT: HCPCS | Mod: ZF

## 2019-09-25 PROCEDURE — 99215 OFFICE O/P EST HI 40 MIN: CPT | Mod: ZP | Performed by: INTERNAL MEDICINE

## 2019-09-25 RX ORDER — FUROSEMIDE 20 MG
20 TABLET ORAL DAILY PRN
COMMUNITY
Start: 2019-06-17 | End: 2019-01-01

## 2019-09-25 RX ORDER — HEPARIN SODIUM,PORCINE 10 UNIT/ML
5 VIAL (ML) INTRAVENOUS
Status: CANCELLED | OUTPATIENT
Start: 2019-09-25

## 2019-09-25 RX ORDER — HEPARIN SODIUM (PORCINE) LOCK FLUSH IV SOLN 100 UNIT/ML 100 UNIT/ML
5 SOLUTION INTRAVENOUS
Status: CANCELLED | OUTPATIENT
Start: 2019-09-25

## 2019-09-25 RX ORDER — FLUTICASONE PROPIONATE 50 MCG
2 SPRAY, SUSPENSION (ML) NASAL
COMMUNITY
Start: 2019-09-19 | End: 2019-10-02

## 2019-09-25 RX ORDER — SULFAMETHOXAZOLE/TRIMETHOPRIM 800-160 MG
2 TABLET ORAL 2 TIMES DAILY
Qty: 120 TABLET | Refills: 0 | Status: SHIPPED | OUTPATIENT
Start: 2019-09-25 | End: 2019-01-01

## 2019-09-25 ASSESSMENT — PAIN SCALES - GENERAL: PAINLEVEL: MODERATE PAIN (4)

## 2019-09-25 NOTE — DISCHARGE INSTRUCTIONS
MRI Contrast Discharge Instructions    The IV contrast you received today will pass out of your body in your  urine. This will happen in the next 24 hours. You will not feel this process.  Your urine will not change color.    Drink at least 4 extra glasses of water or juice today (unless your doctor  has restricted your fluids). This reduces the stress on your kidneys.  You may take your regular medicines.    If you are on dialysis: It is best to have dialysis today.    If you have a reaction: Most reactions happen right away. If you have  any new symptoms after leaving the hospital (such as hives or swelling),  call your hospital at the correct number below. Or call your family doctor.  If you have breathing distress or wheezing, call 911.    Special instructions: ***    I have read and understand the above information.    Signature:______________________________________ Date:___________    Staff:__________________________________________ Date:___________     Time:__________    [place] [facility] Departments:    ___[place]: [phone]  ___[facility]: [phone]  ___[place]: [phone] ___[facility]: [phone]  ___[facility]: [phone]  ___[facility]: [phone]  ___[phone]  ___[facility]: [phone]  ___[phone]

## 2019-09-25 NOTE — PROGRESS NOTES
Oncology RN Care Coordination Note:     Dr. Anne and Omega went over blood consent in clinic.  Writer labelled all 3 pages and sent to scanning.     Clair López, RN BSN   [facility]  Nurse Coordinator

## 2019-09-25 NOTE — LETTER
9/25/2019       RE: Javon Bianchi  [address]  [place] [address]     Dear Colleague,    Thank you for referring your patient, Javon Bianchi, to the [facility]. Please see a copy of my visit note below.    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 At that time, the pain was a stabbing like in the middle of lower back with burning like pain wrapping around this left hip to knee area. No trauma. He had MRI on 5/23/18 showed mild degenerative change with bulging disks L2-S1. A small benign hemangioma was noted in L2, no other marrow signal abnormality. Since then, the pain failed to improve despite steroid injections. In addition, he lost 25 lbs unintentionally since June along with chills a few times a day. Over time, the pain got worse to the point he could not ambulate after short distance and developed muscle weakness in lower extremities over time requiring a walker. Finally, he went to ER ([facility] in [place]) on 8/23/18, CT A/P was unremarkable. He was referred to neurology with obtaining MRI L spine. MRI on 8/30/18 revealed a pathological fracture at L3 vertebral body with height loss at 40% and diffuse involvement of a neoplastic process. IR guided biopsy on 8/31/18 showed poorly differentiated carcinoma. IHC was suggestive of possible renal cell carcinoma per pathology report (Renal cell immunochemistry is positive and along with the negative staining for CK7 and CK20 suggests the possibility of a renal carcinoma, however most renal cell carcinomas also express PAX8 which is negative in this case). Of note, CT chest/abdomen/pelvis at OSH was negative  for primary lesion. The patient was evaluated by neurosurgery who recommended no surgical intervention. He was discharged with TLSO brace and walker. He had palliative XRT locally from 9/11 for 10 fractions (Done in [place]). He was started on cabozantinib 60 mg initially, requiring dose reduction to 20 mg for issues with hand foot syndrome and poor tolerability. Switched to nivolumab due to progression in bone disease on 2/6/19. Had to hold Cycle #3 due to myalgia, which improved with prednisone.    ONCOLOGY HISTORY:    Cabozantinib: Started 60 mg daily on September 28, 2018.  Dec 10, 2018: dose reduced cabozantinib to 40 mg a day.  Jan 17, 2019: dose reduced cabozantinib to 29 mg a day.  Feb 11, 2019: C1D1 Nivolumab   3/12/19: c2 Nivolumab  4/8/19: held Nivolumab due to myalgias, started prednisone    Interval history:   He presents to clinic today for scheduled follow up visit for hospital follow up.  After our last visit Omega was pursuing XRT to the hips, which he finished with improved pain control.  Our plan was to start lenvatinib + everolimus, however he was going to have a spinal procedure from Dr Brower.  During the pre-op, he was noted to have PNA and was admitted to [place] from 8/15-8/15/19, given IV antibiotics, then discharged on Levaquin.  He then was meeting with palliative care and sent to the ED for continued worsening cough, LEVY and FTT.  He was admitted at [facility] 9/3/19-9/7/19 and had a bronch which didn't grow out any microorganisms and he was discharged on Bactrim to home.     Omega has been doing okay since his discharge home.  His cough has been improving and his breathing is better.  He continues to struggle with back pain. He continues on MS contin 45-30-30 and was not using dilaudid, now is daily 1-2 times with the lumbar pain.  Using 2 senna BID for stools, somewhat gassy stomach.  Taking Celebrex 100 mg BID for joint pains which persist.      No fevers or sweats.  No cough, SOB or  chest pain. No rashes or concerning lesions.      Wt Readings from Last 10 Encounters:   09/25/19 98.4 kg (217 lb)   09/13/19 95.7 kg (211 lb)   09/06/19 95.3 kg (210 lb 1.6 oz)   09/03/19 93.8 kg (206 lb 12.8 oz)   08/15/19 96.1 kg (211 lb 14.4 oz)   08/13/19 96.6 kg (213 lb)   08/08/19 96.2 kg (212 lb)   08/07/19 96.4 kg (212 lb 9.6 oz)   07/19/19 99.6 kg (219 lb 9.6 oz)   07/09/19 100.7 kg (222 lb)     MEDICATIONS     Current Outpatient Medications   Medication Sig     acetaminophen (TYLENOL) 500 MG tablet Take 1,000 mg by mouth 3 times daily      calcium carbonate 500 mg, elemental, (OSCAL 500) 1250 (500 Ca) MG TABS tablet Take 1 tablet by mouth 2 times daily     celecoxib (CELEBREX) 100 MG capsule Take 1 capsule (100 mg) by mouth 2 times daily as needed for moderate pain     Dextromethorphan-guaiFENesin (MUCINEX DM)  MG 12 hr tablet Take 1 tablet by mouth every 12 hours     diazepam (VALIUM) 5 MG tablet Take 1 pill 30 min before MRI and a second pill 2 minutes prior.     DULoxetine (CYMBALTA) 60 MG capsule Take 1 capsule (60 mg) by mouth daily     fluticasone (FLONASE) 50 MCG/ACT nasal spray Spray 2 sprays in nostril     furosemide (LASIX) 20 MG tablet      HYDROmorphone (DILAUDID) 2 MG tablet Take 1-2 tablets (2-4 mg) by mouth every 3 hours as needed for pain     lidocaine (LIDODERM) 5 % patch Place 1-3 patches onto the skin every 24 hours Apply to painful areas for 12 hours on, 12 hours off.     loratadine (CLARITIN) 10 MG tablet Take 10 mg by mouth daily     medical cannabis (Patient's own supply.  Not a prescription) 1 Dose See Admin Instructions (This is NOT a prescription, and does not certify that the patient has a qualifying medical condition for medical cannabis.  The purpose of this order is  to document that the patient reports taking medical cannabis.)     melatonin 3 MG tablet Take 3 mg by mouth nightly as needed      metoclopramide (REGLAN) 5 MG tablet Take 1 tablet (5 mg) by mouth 4 times  daily (before meals and nightly) (Patient taking differently: Take 5 mg by mouth 2 times daily )     morphine (MS CONTIN) 15 MG CR tablet Take 15 mg by mouth daily as needed     morphine (MS CONTIN) 30 MG CR tablet Takes 45mg in the AM, 30 mg midday and 30mg at bedtime.     omeprazole (PRILOSEC) 40 MG DR capsule Take 1 capsule (40 mg) by mouth daily     polyethylene glycol (MIRALAX/GLYCOLAX) Packet Take 0.5 packets by mouth daily as needed     pregabalin (LYRICA) 50 MG capsule Take 1 capsule (50 mg) by mouth 2 times daily     senna-docusate (SENOKOT-S;PERICOLACE) 8.6-50 MG per tablet Take 2 tablets by mouth 2 times daily     sulfamethoxazole-trimethoprim (BACTRIM DS/SEPTRA DS) 800-160 MG tablet Take 2 tablets by mouth 2 times daily     Vitamin D, Cholecalciferol, 1000 units TABS Take 2,000 Units by mouth daily      everolimus (AFINITOR) 5 MG tablet CHEMO Take 1 tablet (5 mg) by mouth daily (Patient not taking: Reported on 9/25/2019)     Lenvatinib 18 MG, 10 mg + 2 x 4 mg capsules, (LENVIMA) 18 mg combo capsule CHEMOTHERAPY Take 18 mg dose (10 mg plus two 4 mg capsules) by mouth daily. (Patient not taking: Reported on 9/25/2019)     No current facility-administered medications for this visit.         PHYSICAL EXAM:   Temp: 97.7  F (36.5  C) Temp src: Oral BP: 129/75 Pulse: 68   Resp: 16 SpO2: 97 %      ECOG performance status of 2.   GENERAL: Seated in chair in NAD.  HEENT: No icterus, no pallor. Moist mucous membranes. Oropharynx is clear.   NECK: Supple, normal ROM  LUNGS: Bilateral clear to ausculation  CARDIOVASCULAR: Regular rate and rhythm, no murmurs, gallops or rubs.   ABDOMEN: Soft, nontender and nondistended.   EXTREMITIES: No cyanosis, no clubbing, 2+ pitting edema to the knees bilaterally.   NEUROLOGIC: No focal deficits.  LABS/IMAGING:    Recent Labs   Lab Test 09/24/19  1606 09/13/19  0902 09/07/19  0750 09/06/19  0710 09/05/19  0737    135 139 140 141   POTASSIUM 5.3 4.6 3.9 3.4 3.4   CHLORIDE  109 107 110* 110* 111*   CO2 23 22 23 23 23   ANIONGAP 6 6 6 7 8   BUN 8 10 9 8 9   CR 1.04 1.17 1.14 1.09 1.02   GLC 83 92 87 83 98   AUREA 7.4* 8.1* 7.8* 6.8* 6.7*     Recent Labs   Lab Test 09/13/19  0902 09/07/19  0750 09/06/19  0710 09/05/19  0737 09/04/19  0820 09/03/19  1740   MAG 1.8 2.0 1.5* 1.6 1.6 1.6   PHOS  --  3.0 2.9 2.9 3.0 3.7     Recent Labs   Lab Test 09/24/19  1606 09/13/19  0902 09/07/19  0750 09/06/19  0710 09/05/19  0737   WBC 3.1* 6.5 4.4 4.0 7.5   HGB 7.4* 8.4* 8.3* 7.4* 8.6*    259 209 186 200   * 101* 98 99 98   NEUTROPHIL 64.2 68.4 63.4 61.5 68.4     Recent Labs   Lab Test 09/24/19  1606 09/13/19  0902 09/04/19  0820 09/03/19  1740   BILITOTAL 0.2 0.2  --  0.3   ALKPHOS 90 97  --  94   ALT 12 13  --  12   AST 18 20  --  22   ALBUMIN 2.6* 2.7*  --  2.6*   LDH  --   --  353*  --      TSH   Date Value Ref Range Status   09/24/2019 5.76 (H) 0.40 - 4.00 mU/L Final   09/13/2019 5.08 (H) 0.40 - 4.00 mU/L Final   08/07/2019 1.53 0.40 - 4.00 mU/L Final     No results for input(s): CEA in the last 10606 hours.  Results for orders placed or performed in visit on 09/24/19   MR Pelvis Bone wo & w Contrast    Narrative    EXAM: MR PELVIC BONES WO & W CONTRAST  9/24/2019 6:54 PM      HISTORY: mRCC to bones, has had complications, new baseline prior to  starting therapy; Renal cell carcinoma, unspecified laterality (H);  Bone metastasis (H)    COMPARISON: MRI 6/18/2019, CT 8/8/2017    TECHNIQUE: Multiplanar multisequence MR imaging of the pelvis before  and after the administration of IV contrast. Contrast: 10.0mL Gadavist    FINDINGS:     Examination degraded by motion.    Redemonstration of extensive metastatic disease with T2 hyperintense,  T1 hypointense, enhancing foci involving the pelvis, sacrum, spine,  and proximal femurs (left greater than right). Since prior MRI  6/18/2019, there is been mild progression as evidenced by progressive  involvement of the right superior pubic ramus and  new involvement of  the left pubic body. Lesion in the right superolateral aspect of S1  appears enlarged compared to prior study. Bilateral proximal femoral  involvement, left greater than right does not appear substantially  changed. L5 superior endplate compression deformity better detailed on  lumbar MRI same-day.    Enlarging soft tissue lesion arising from the posterior left iliac  wing today measuring up to 4.4 x 1.8 cm in the axial plane, previously  3.9 x 1.1 cm. Enlarging soft tissue focus along the posterior margin  of the right iliac wing measuring up to 2 cm on series 10 image 7,  previously 9 mm.    Fatty atrophy of the gluteus minimus bilaterally, not substantially  changed    Internal derangement of the hips not well detailed due to chosen large  field-of-view. Physiologic amount of fluid in the hips. Suspect  bilateral hip joint space loss. Mild muscular edema in the proximal  hip abductors bilaterally.    Mild presacral edema not substantially changed. Mild soft tissue edema  deep to the iliacus musculature bilaterally, not substantially  changed.    8 mm short axis left inguinal fossa lymph node, decreased in size  compared to prior.       Impression    IMPRESSION: Since prior MRI 6/18/2019, there has been mild progression  of osseous metastatic disease and small soft tissue foci posterior to  the medial left ilium and along the posterior margin of the right  iliac wing. Metastatic foci involving the femoral necks, left greater  than right, not substantially changed but patient remains at elevated  risk of pathologic fracture.         FAISAL ROPER MD (Joe)            Assessment /Plan   69 year old male with metastatic infiltrative lesion in L3 extending to L2 and L4, likely primary malignancy being renal cell carcinoma. However he does not have any evidence of a primary renal mass on imaging. Based on the available pathology data, we offered treatment for renal cell carcinoma with  cabozantinib (CABOSUN data demonstrating cabozantinib superiority over sunitinib in the initial setting). PAX8 stain was negative, and the clear cell features were not seen (it was a poorly differentiated carcinoma). His scans were stable, however, tolerability remained an issue and he then progressed. He then started on Nivolumab, as there is encouraging data with IO in non-clear cell carcinoma. He developed myalgias and we had to stop therapy and start him on prednisone.  Our plan was to pursue lenvatinib + everolimus, however he needed to complete XRT to his hips and then was admitted in August for PNA, then admitted to [facility] for PNA.     I had a lengthy discussion with Omega who is accompanied by his wife and his son at this clinic visit.  He is being seen with labs and restaging scans at this clinic visit.  He had a CT scan of the chest.  This showed significant improvement in his disease progress and is suggestive of resolving pneumonia.  He has been on Bactrim since his hospital discharge and discharge summary suggests continuing Bactrim in the setting of a response.  I have refilled a month's prescription for this.  His renal function is stable and there are no concerns with continuing Bactrim for now.      He had MRI of the pelvis and the lumbar spine done.  His lumbar spine MRI is relatively stable.  His pelvic MRI does suggest some disease progression in the bony pelvis.  This change is not significant at this time.  I feel we should continue with treatment break at this time and let him recover as much as he can.  He has been following with Dr. Brower in Orthopedic Surgery for spine stabilizing surgery.  He continues to have severe back pain and would love to decrease his opiate pain requirements.  I think we should use the break period to get his surgery done.  He cannot be on the VEGF-TKIs any time around his surgery.  The 6 to 8-week break would help him get his surgery completed.      I would like to  see him in about 2 months' time with labs and restaging scans prior to the visit.      I reviewed his labs with him.  He has hypocalcemia and have encouraged him to take more of the oral calcium supplements.  He has been taking 2000 International Units of vitamin D.  He has hypoalbuminemia.  I encouraged him to increase his protein intake.  His hemoglobin is low at 7.4 g/dL.  I would recommend packed red cell transfusions.  I have entered orders for his RBC transfusion and have consented him for this.  I have added the consent forms to my nurse to have this scanned and uploaded to our system.  He did have significant symptomatic improvement after the last infusion on 09/06.      We would not be able to administer denosumab due to his hypocalcemia.  Hopefully, his calcium is improved when he comes in for his blood transfusion.  If we have adequate calcium at that time, we would administer denosumab to him.      I will get a CT scan of the chest, abdomen and pelvis and bone scan for followup in addition to MRI of the pelvis.     Over 45 min of direct face to face time spent with patient with more than 50% time spent in counseling and coordinating care.      Again, thank you for allowing me to participate in the care of your patient.      Sincerely,    Hua Anne MD

## 2019-09-25 NOTE — NURSING NOTE
Oncology Rooming Note    September 25, 2019 5:19 PM   Javon Bianchi is a 69 year old male who presents for:    Chief Complaint   Patient presents with     Oncology Clinic Visit     Return; Renal Ca     Initial Vitals: /75   Pulse 68   Temp 97.7  F (36.5  C) (Oral)   Resp 16   Wt 98.4 kg (217 lb)   SpO2 97%   BMI 29.43 kg/m   Estimated body mass index is 29.43 kg/m  as calculated from the following:    Height as of 9/13/19: 1.829 m (6').    Weight as of this encounter: 98.4 kg (217 lb). Body surface area is 2.24 meters squared.  Moderate Pain (4) Comment: Joints and back   No LMP for male patient.  Allergies reviewed: Yes  Medications reviewed: Yes    Medications: Medication refills not needed today.  Pharmacy name entered into [facility]:    [facility] - [place], [place] - [address] [facility] - [place], [place] - [address] [facility] [facility]    Clinical concerns: Scan results and lab results       Leonila Navarrete CMA

## 2019-09-26 ENCOUNTER — TRANSFERRED RECORDS (OUTPATIENT)
Dept: HEALTH INFORMATION MANAGEMENT | Facility: CLINIC | Age: 69
End: 2019-09-26

## 2019-09-26 NOTE — PROGRESS NOTES
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 At that time, the pain was a stabbing like in the middle of lower back with burning like pain wrapping around this left hip to knee area. No trauma. He had MRI on 5/23/18 showed mild degenerative change with bulging disks L2-S1. A small benign hemangioma was noted in L2, no other marrow signal abnormality. Since then, the pain failed to improve despite steroid injections. In addition, he lost 25 lbs unintentionally since June along with chills a few times a day. Over time, the pain got worse to the point he could not ambulate after short distance and developed muscle weakness in lower extremities over time requiring a walker. Finally, he went to ER ([facility] in [place]) on 8/23/18, CT A/P was unremarkable. He was referred to neurology with obtaining MRI L spine. MRI on 8/30/18 revealed a pathological fracture at L3 vertebral body with height loss at 40% and diffuse involvement of a neoplastic process. IR guided biopsy on 8/31/18 showed poorly differentiated carcinoma. IHC was suggestive of possible renal cell carcinoma per pathology report (Renal cell immunochemistry is positive and along with the negative staining for CK7 and CK20 suggests the possibility of a renal carcinoma, however most renal cell carcinomas also express PAX8 which is negative in this case). Of note, CT chest/abdomen/pelvis at OSH was negative for primary lesion. The patient was evaluated by neurosurgery who recommended no surgical intervention. He was discharged with TLSO brace and walker. He had palliative XRT locally from 9/11 for 10 fractions (Done in [place]). He was  started on cabozantinib 60 mg initially, requiring dose reduction to 20 mg for issues with hand foot syndrome and poor tolerability. Switched to nivolumab due to progression in bone disease on 2/6/19. Had to hold Cycle #3 due to myalgia, which improved with prednisone.    ONCOLOGY HISTORY:    Cabozantinib: Started 60 mg daily on September 28, 2018.  Dec 10, 2018: dose reduced cabozantinib to 40 mg a day.  Jan 17, 2019: dose reduced cabozantinib to 29 mg a day.  Feb 11, 2019: C1D1 Nivolumab   3/12/19: c2 Nivolumab  4/8/19: held Nivolumab due to myalgias, started prednisone    Interval history:   He presents to clinic today for scheduled follow up visit for hospital follow up.  After our last visit Omega was pursuing XRT to the hips, which he finished with improved pain control.  Our plan was to start lenvatinib + everolimus, however he was going to have a spinal procedure from Dr Brower.  During the pre-op, he was noted to have PNA and was admitted to [place] from 8/15-8/15/19, given IV antibiotics, then discharged on Levaquin.  He then was meeting with palliative care and sent to the ED for continued worsening cough, LEVY and FTT.  He was admitted at [facility] 9/3/19-9/7/19 and had a bronch which didn't grow out any microorganisms and he was discharged on Bactrim to home.     Omega has been doing okay since his discharge home.  His cough has been improving and his breathing is better.  He continues to struggle with back pain. He continues on MS contin 45-30-30 and was not using dilaudid, now is daily 1-2 times with the lumbar pain.  Using 2 senna BID for stools, somewhat gassy stomach.  Taking Celebrex 100 mg BID for joint pains which persist.      No fevers or sweats.  No cough, SOB or chest pain. No rashes or concerning lesions.      Wt Readings from Last 10 Encounters:   09/25/19 98.4 kg (217 lb)   09/13/19 95.7 kg (211 lb)   09/06/19 95.3 kg (210 lb 1.6 oz)   09/03/19 93.8 kg (206 lb 12.8 oz)   08/15/19 96.1 kg (211  lb 14.4 oz)   08/13/19 96.6 kg (213 lb)   08/08/19 96.2 kg (212 lb)   08/07/19 96.4 kg (212 lb 9.6 oz)   07/19/19 99.6 kg (219 lb 9.6 oz)   07/09/19 100.7 kg (222 lb)     MEDICATIONS     Current Outpatient Medications   Medication Sig     acetaminophen (TYLENOL) 500 MG tablet Take 1,000 mg by mouth 3 times daily      calcium carbonate 500 mg, elemental, (OSCAL 500) 1250 (500 Ca) MG TABS tablet Take 1 tablet by mouth 2 times daily     celecoxib (CELEBREX) 100 MG capsule Take 1 capsule (100 mg) by mouth 2 times daily as needed for moderate pain     Dextromethorphan-guaiFENesin (MUCINEX DM)  MG 12 hr tablet Take 1 tablet by mouth every 12 hours     diazepam (VALIUM) 5 MG tablet Take 1 pill 30 min before MRI and a second pill 2 minutes prior.     DULoxetine (CYMBALTA) 60 MG capsule Take 1 capsule (60 mg) by mouth daily     fluticasone (FLONASE) 50 MCG/ACT nasal spray Spray 2 sprays in nostril     furosemide (LASIX) 20 MG tablet      HYDROmorphone (DILAUDID) 2 MG tablet Take 1-2 tablets (2-4 mg) by mouth every 3 hours as needed for pain     lidocaine (LIDODERM) 5 % patch Place 1-3 patches onto the skin every 24 hours Apply to painful areas for 12 hours on, 12 hours off.     loratadine (CLARITIN) 10 MG tablet Take 10 mg by mouth daily     medical cannabis (Patient's own supply.  Not a prescription) 1 Dose See Admin Instructions (This is NOT a prescription, and does not certify that the patient has a qualifying medical condition for medical cannabis.  The purpose of this order is  to document that the patient reports taking medical cannabis.)     melatonin 3 MG tablet Take 3 mg by mouth nightly as needed      metoclopramide (REGLAN) 5 MG tablet Take 1 tablet (5 mg) by mouth 4 times daily (before meals and nightly) (Patient taking differently: Take 5 mg by mouth 2 times daily )     morphine (MS CONTIN) 15 MG CR tablet Take 15 mg by mouth daily as needed     morphine (MS CONTIN) 30 MG CR tablet Takes 45mg in the AM, 30  mg midday and 30mg at bedtime.     omeprazole (PRILOSEC) 40 MG DR capsule Take 1 capsule (40 mg) by mouth daily     polyethylene glycol (MIRALAX/GLYCOLAX) Packet Take 0.5 packets by mouth daily as needed     pregabalin (LYRICA) 50 MG capsule Take 1 capsule (50 mg) by mouth 2 times daily     senna-docusate (SENOKOT-S;PERICOLACE) 8.6-50 MG per tablet Take 2 tablets by mouth 2 times daily     sulfamethoxazole-trimethoprim (BACTRIM DS/SEPTRA DS) 800-160 MG tablet Take 2 tablets by mouth 2 times daily     Vitamin D, Cholecalciferol, 1000 units TABS Take 2,000 Units by mouth daily      everolimus (AFINITOR) 5 MG tablet CHEMO Take 1 tablet (5 mg) by mouth daily (Patient not taking: Reported on 9/25/2019)     Lenvatinib 18 MG, 10 mg + 2 x 4 mg capsules, (LENVIMA) 18 mg combo capsule CHEMOTHERAPY Take 18 mg dose (10 mg plus two 4 mg capsules) by mouth daily. (Patient not taking: Reported on 9/25/2019)     No current facility-administered medications for this visit.         PHYSICAL EXAM:   Temp: 97.7  F (36.5  C) Temp src: Oral BP: 129/75 Pulse: 68   Resp: 16 SpO2: 97 %      ECOG performance status of 2.   GENERAL: Seated in chair in NAD.  HEENT: No icterus, no pallor. Moist mucous membranes. Oropharynx is clear.   NECK: Supple, normal ROM  LUNGS: Bilateral clear to ausculation  CARDIOVASCULAR: Regular rate and rhythm, no murmurs, gallops or rubs.   ABDOMEN: Soft, nontender and nondistended.   EXTREMITIES: No cyanosis, no clubbing, 2+ pitting edema to the knees bilaterally.   NEUROLOGIC: No focal deficits.  LABS/IMAGING:    Recent Labs   Lab Test 09/24/19  1606 09/13/19  0902 09/07/19  0750 09/06/19  0710 09/05/19  0737    135 139 140 141   POTASSIUM 5.3 4.6 3.9 3.4 3.4   CHLORIDE 109 107 110* 110* 111*   CO2 23 22 23 23 23   ANIONGAP 6 6 6 7 8   BUN 8 10 9 8 9   CR 1.04 1.17 1.14 1.09 1.02   GLC 83 92 87 83 98   AUREA 7.4* 8.1* 7.8* 6.8* 6.7*     Recent Labs   Lab Test 09/13/19  0902 09/07/19  0750 09/06/19  0783  09/05/19  0737 09/04/19  0820 09/03/19  1740   MAG 1.8 2.0 1.5* 1.6 1.6 1.6   PHOS  --  3.0 2.9 2.9 3.0 3.7     Recent Labs   Lab Test 09/24/19  1606 09/13/19  0902 09/07/19  0750 09/06/19  0710 09/05/19  0737   WBC 3.1* 6.5 4.4 4.0 7.5   HGB 7.4* 8.4* 8.3* 7.4* 8.6*    259 209 186 200   * 101* 98 99 98   NEUTROPHIL 64.2 68.4 63.4 61.5 68.4     Recent Labs   Lab Test 09/24/19  1606 09/13/19  0902 09/04/19  0820 09/03/19  1740   BILITOTAL 0.2 0.2  --  0.3   ALKPHOS 90 97  --  94   ALT 12 13  --  12   AST 18 20  --  22   ALBUMIN 2.6* 2.7*  --  2.6*   LDH  --   --  353*  --      TSH   Date Value Ref Range Status   09/24/2019 5.76 (H) 0.40 - 4.00 mU/L Final   09/13/2019 5.08 (H) 0.40 - 4.00 mU/L Final   08/07/2019 1.53 0.40 - 4.00 mU/L Final     No results for input(s): CEA in the last 09599 hours.  Results for orders placed or performed in visit on 09/24/19   MR Pelvis Bone wo & w Contrast    Narrative    EXAM: MR PELVIC BONES WO & W CONTRAST  9/24/2019 6:54 PM      HISTORY: mRCC to bones, has had complications, new baseline prior to  starting therapy; Renal cell carcinoma, unspecified laterality (H);  Bone metastasis (H)    COMPARISON: MRI 6/18/2019, CT 8/8/2017    TECHNIQUE: Multiplanar multisequence MR imaging of the pelvis before  and after the administration of IV contrast. Contrast: 10.0mL Gadavist    FINDINGS:     Examination degraded by motion.    Redemonstration of extensive metastatic disease with T2 hyperintense,  T1 hypointense, enhancing foci involving the pelvis, sacrum, spine,  and proximal femurs (left greater than right). Since prior MRI  6/18/2019, there is been mild progression as evidenced by progressive  involvement of the right superior pubic ramus and new involvement of  the left pubic body. Lesion in the right superolateral aspect of S1  appears enlarged compared to prior study. Bilateral proximal femoral  involvement, left greater than right does not appear substantially  changed.  L5 superior endplate compression deformity better detailed on  lumbar MRI same-day.    Enlarging soft tissue lesion arising from the posterior left iliac  wing today measuring up to 4.4 x 1.8 cm in the axial plane, previously  3.9 x 1.1 cm. Enlarging soft tissue focus along the posterior margin  of the right iliac wing measuring up to 2 cm on series 10 image 7,  previously 9 mm.    Fatty atrophy of the gluteus minimus bilaterally, not substantially  changed    Internal derangement of the hips not well detailed due to chosen large  field-of-view. Physiologic amount of fluid in the hips. Suspect  bilateral hip joint space loss. Mild muscular edema in the proximal  hip abductors bilaterally.    Mild presacral edema not substantially changed. Mild soft tissue edema  deep to the iliacus musculature bilaterally, not substantially  changed.    8 mm short axis left inguinal fossa lymph node, decreased in size  compared to prior.       Impression    IMPRESSION: Since prior MRI 6/18/2019, there has been mild progression  of osseous metastatic disease and small soft tissue foci posterior to  the medial left ilium and along the posterior margin of the right  iliac wing. Metastatic foci involving the femoral necks, left greater  than right, not substantially changed but patient remains at elevated  risk of pathologic fracture.         FAISAL (Víctor ROPER MD              Assessment /Plan   69 year old male with metastatic infiltrative lesion in L3 extending to L2 and L4, likely primary malignancy being renal cell carcinoma. However he does not have any evidence of a primary renal mass on imaging. Based on the available pathology data, we offered treatment for renal cell carcinoma with cabozantinib (CABOSUN data demonstrating cabozantinib superiority over sunitinib in the initial setting). PAX8 stain was negative, and the clear cell features were not seen (it was a poorly differentiated carcinoma). His scans were stable,  however, tolerability remained an issue and he then progressed. He then started on Nivolumab, as there is encouraging data with IO in non-clear cell carcinoma. He developed myalgias and we had to stop therapy and start him on prednisone.  Our plan was to pursue lenvatinib + everolimus, however he needed to complete XRT to his hips and then was admitted in August for PNA, then admitted to [facility] for PNA.       I had a lengthy discussion with Omega who is accompanied by his wife and his son at this clinic visit.  He is being seen with labs and restaging scans at this clinic visit.  He had a CT scan of the chest.  This showed significant improvement in his disease progress and is suggestive of resolving pneumonia.  He has been on Bactrim since his hospital discharge and discharge summary suggests continuing Bactrim in the setting of a response.  I have refilled a month's prescription for this.  His renal function is stable and there are no concerns with continuing Bactrim for now.      He had MRI of the pelvis and the lumbar spine done.  His lumbar spine MRI is relatively stable.  His pelvic MRI does suggest some disease progression in the bony pelvis.  This change is not significant at this time.  I feel we should continue with treatment break at this time and let him recover as much as he can.  He has been following with Dr. Brower in Orthopedic Surgery for spine stabilizing surgery.  He continues to have severe back pain and would love to decrease his opiate pain requirements.  I think we should use the break period to get his surgery done.  He cannot be on the VEGF-TKIs any time around his surgery.  The 6 to 8-week break would help him get his surgery completed.      I would like to see him in about 2 months' time with labs and restaging scans prior to the visit.      I reviewed his labs with him.  He has hypocalcemia and have encouraged him to take more of the oral calcium supplements.  He has been taking 2000  International Units of vitamin D.  He has hypoalbuminemia.  I encouraged him to increase his protein intake.  His hemoglobin is low at 7.4 g/dL.  I would recommend packed red cell transfusions.  I have entered orders for his RBC transfusion and have consented him for this.  I have added the consent forms to my nurse to have this scanned and uploaded to our system.  He did have significant symptomatic improvement after the last infusion on 09/06.      We would not be able to administer denosumab due to his hypocalcemia.  Hopefully, his calcium is improved when he comes in for his blood transfusion.  If we have adequate calcium at that time, we would administer denosumab to him.      I will get a CT scan of the chest, abdomen and pelvis and bone scan for followup in addition to MRI of the pelvis.       Over 45 min of direct face to face time spent with patient with more than 50% time spent in counseling and coordinating care.

## 2019-10-01 ENCOUNTER — ONCOLOGY VISIT (OUTPATIENT)
Dept: ONCOLOGY | Facility: CLINIC | Age: 69
End: 2019-10-01
Attending: INTERNAL MEDICINE
Payer: MEDICARE

## 2019-10-01 VITALS
TEMPERATURE: 98 F | SYSTOLIC BLOOD PRESSURE: 132 MMHG | HEIGHT: 72 IN | BODY MASS INDEX: 28.44 KG/M2 | OXYGEN SATURATION: 97 % | DIASTOLIC BLOOD PRESSURE: 83 MMHG | HEART RATE: 70 BPM | WEIGHT: 210 LBS | RESPIRATION RATE: 14 BRPM

## 2019-10-01 DIAGNOSIS — C64.2 RENAL CELL CARCINOMA, LEFT (H): Primary | ICD-10-CM

## 2019-10-01 DIAGNOSIS — M79.2 NEUROPATHIC PAIN: ICD-10-CM

## 2019-10-01 DIAGNOSIS — C79.51 BONE METASTASIS: ICD-10-CM

## 2019-10-01 DIAGNOSIS — G89.3 CANCER ASSOCIATED PAIN: ICD-10-CM

## 2019-10-01 DIAGNOSIS — G89.29 CHRONIC BILATERAL LOW BACK PAIN WITH LEFT-SIDED SCIATICA: ICD-10-CM

## 2019-10-01 DIAGNOSIS — Z51.5 ENCOUNTER FOR PALLIATIVE CARE: ICD-10-CM

## 2019-10-01 DIAGNOSIS — M54.42 CHRONIC BILATERAL LOW BACK PAIN WITH LEFT-SIDED SCIATICA: ICD-10-CM

## 2019-10-01 PROCEDURE — G0463 HOSPITAL OUTPT CLINIC VISIT: HCPCS

## 2019-10-01 PROCEDURE — 99214 OFFICE O/P EST MOD 30 MIN: CPT | Performed by: INTERNAL MEDICINE

## 2019-10-01 RX ORDER — METOCLOPRAMIDE 5 MG/1
5 TABLET ORAL 4 TIMES DAILY PRN
Qty: 120 TABLET | Refills: 0 | Status: SHIPPED | OUTPATIENT
Start: 2019-10-01 | End: 2019-01-01 | Stop reason: ALTCHOICE

## 2019-10-01 RX ORDER — MORPHINE SULFATE 30 MG/1
TABLET, FILM COATED, EXTENDED RELEASE ORAL
Qty: 90 TABLET | Refills: 0
Start: 2019-10-01 | End: 2019-01-01

## 2019-10-01 RX ORDER — PREGABALIN 50 MG/1
50 CAPSULE ORAL 2 TIMES DAILY
Qty: 60 CAPSULE | Refills: 1 | Status: SHIPPED | OUTPATIENT
Start: 2019-10-01 | End: 2019-01-01

## 2019-10-01 RX ORDER — LIDOCAINE 50 MG/G
1-3 PATCH TOPICAL EVERY 24 HOURS
Qty: 30 PATCH | Refills: 3 | Status: SHIPPED | OUTPATIENT
Start: 2019-10-01 | End: 2020-01-01

## 2019-10-01 RX ORDER — CELECOXIB 100 MG/1
100 CAPSULE ORAL 2 TIMES DAILY PRN
Qty: 60 CAPSULE | Refills: 1 | Status: SHIPPED | OUTPATIENT
Start: 2019-10-01 | End: 2019-01-01

## 2019-10-01 ASSESSMENT — MIFFLIN-ST. JEOR: SCORE: 1745.55

## 2019-10-01 ASSESSMENT — PAIN SCALES - GENERAL: PAINLEVEL: MILD PAIN (3)

## 2019-10-01 NOTE — LETTER
10/1/2019         RE: Javon Bianchi  [address]  [place] [address]        Dear Colleague,    Thank you for referring your patient, Javon Bianchi, to the [facility]. Please see a copy of my visit note below.    Palliative Care Outpatient Clinic Progress Note    Patient Name:  Javon Bianchi  Primary Provider:  LIANNE Wellington    Chief Complaint/Identifying Information:  Metastatic Renal Cell Carcinoma on immunotherapy  History of L3 pathologic fracture found at diagnosis   Known epidural tumor burden    [facility] Outpatient Palliative Care Opioid Prescribing Safety Plan     Opioid Safety Education: Reviewed by Nan Gonzalez  on 11/8/2018  Opioid Risk Assessment: Performed by Nan Gonzalez  on 11/8/2018 .  ORT score of 0.   Mood Disorder Assessment: Performed by Carly Mills on 04/25/19.  No concerns.    reviewed with every prescription, last reviewed by Carly Mills on  10/01/19     Additional recommendations based on patient's prognosis and indication for opioids include the following:     Expected prognosis: shorter  Risk: Low or Medium (ORT 0-7)  No further recommendations.     Interim History:  Javon Bianchi 68 year old male returns to be seen by palliative care today, accompanied by his wife Suzie.  Javon Bianchi was last seen by me one month ago, at which time he had signs/symptoms concerning for return of PNA.  He was admitted to [facility] and stayed for four days.  Since he was last seen by me, Omega reports that he has taken radiation to L1, completed last week.  He has had fatigue since completing this.  Also has developed some nausea without vomiting. Having one bowel movement every 1-2 days. This is soft. Is taking Reglan 5mg BID (we cut this back to BID due to concern for sedation).     His pain has been better lately, back is 5/10, joints 3/10, hip 2/10. Taking MS Contin 30mg TID. He last used the hydromorphone 5 days ago. Wants to  continue to decrease his opioids.      Was off Celebrex 100mg BID, but this worsened his pain. He is now back on this.     Is planning to look into back surgery again.     Social History:  Pertinent changes to social history/social situation since last visit: Bought a condo to have available for when his health changes.   Social History     Tobacco Use     Smoking status: Never Smoker     Smokeless tobacco: Never Used   Substance Use Topics     Alcohol use: Not Currently     Drug use: Yes     Types: Marijuana     Comment: medical cannabis               Physical Exam:   Vitals were reviewed  /83   Pulse 70   Temp 98  F (36.7  C) (Oral)   Resp 14   Ht 1.829 m (6')   Wt 95.3 kg (210 lb)   SpO2 97%   BMI 28.48 kg/m     General: Alert, comfortable appearing male in no acute distress.   Eyes: Pupils equal and 2mm, sclera clear.   ENT: MMM.   Cardiac:  Normal rate, regular.    Resp: Resolution of cough.  Lungs are CTAB, unlabored on room air. Good air entry bilaterally.   Abd: Soft, non-distended, no TTP, active bowel sounds.   Neuro: No facial asymmetry.  Spontaneous movements grossly non-focal. Alert and with normal appearing sensorium.       Impression & Recommendations & Counseling:  Omega is a 69 year old man with metastatic renal cell carcinoma who has been off cancer treatments due to prolonged issues with PNA, now clinically improving on Bactrim.  His pain has improved as his infection has gotten under control.  He is interested in further opioid taper.  Given that his renal function has had a slight decline since his hospitalization (GFR now 70s), will need to monitor for indication to rotate morphine to fentanyl.  This rotation was offered to Omega today and he declines, electing to work on opioid taper next.     Will taper MS Contin to 30/15/30mg.  Continue PRN hydromorphone for breakthrough pain.   Nausea: Okay to increase Reglan back to 5mg every 6 hours PRN nausea. I am not sure why Omega is  experiencing this as he is having regular bowel movements and is off the cancer treatments.  Most likely side effect from Bactrim.      Disease Understanding:  Omega asked for extensive counseling on his scans today, which were reviewed with him.     Multiple refills provided on Omega's comfort focused medications.     Additional information reviewed today:   No Known Allergies  Current Outpatient Medications   Medication Sig Dispense Refill     acetaminophen (TYLENOL) 500 MG tablet Take 1,000 mg by mouth 3 times daily        calcium carbonate 500 mg, elemental, (OSCAL 500) 1250 (500 Ca) MG TABS tablet Take 1 tablet by mouth 2 times daily       celecoxib (CELEBREX) 100 MG capsule Take 1 capsule (100 mg) by mouth 2 times daily as needed for moderate pain 30 capsule 1     Dextromethorphan-guaiFENesin (MUCINEX DM)  MG 12 hr tablet Take 1 tablet by mouth every 12 hours       diazepam (VALIUM) 5 MG tablet Take 1 pill 30 min before MRI and a second pill 2 minutes prior. 10 tablet 0     DULoxetine (CYMBALTA) 60 MG capsule Take 1 capsule (60 mg) by mouth daily 90 capsule 3     fluticasone (FLONASE) 50 MCG/ACT nasal spray Spray 2 sprays in nostril       HYDROmorphone (DILAUDID) 2 MG tablet Take 1-2 tablets (2-4 mg) by mouth every 3 hours as needed for pain 60 tablet 0     lidocaine (LIDODERM) 5 % patch Place 1-3 patches onto the skin every 24 hours Apply to painful areas for 12 hours on, 12 hours off. 30 patch 3     loratadine (CLARITIN) 10 MG tablet Take 10 mg by mouth daily       medical cannabis (Patient's own supply.  Not a prescription) 1 Dose See Admin Instructions (This is NOT a prescription, and does not certify that the patient has a qualifying medical condition for medical cannabis.  The purpose of this order is  to document that the patient reports taking medical cannabis.)       melatonin 3 MG tablet Take 3 mg by mouth nightly as needed        metoclopramide (REGLAN) 5 MG tablet Take 1 tablet (5 mg) by mouth  4 times daily (before meals and nightly) (Patient taking differently: Take 5 mg by mouth 2 times daily ) 150 tablet 0     morphine (MS CONTIN) 15 MG CR tablet Take 15 mg by mouth daily as needed       morphine (MS CONTIN) 30 MG CR tablet Takes 45mg in the AM, 30 mg midday and 30mg at bedtime. 90 tablet 0     omeprazole (PRILOSEC) 40 MG DR capsule Take 1 capsule (40 mg) by mouth daily 90 capsule 3     polyethylene glycol (MIRALAX/GLYCOLAX) Packet Take 0.5 packets by mouth daily as needed       pregabalin (LYRICA) 50 MG capsule Take 1 capsule (50 mg) by mouth 2 times daily 60 capsule 1     senna-docusate (SENOKOT-S;PERICOLACE) 8.6-50 MG per tablet Take 2 tablets by mouth 2 times daily       sulfamethoxazole-trimethoprim (BACTRIM DS/SEPTRA DS) 800-160 MG tablet Take 2 tablets by mouth 2 times daily 120 tablet 0     Vitamin D, Cholecalciferol, 1000 units TABS Take 2,000 Units by mouth daily        everolimus (AFINITOR) 5 MG tablet CHEMO Take 1 tablet (5 mg) by mouth daily (Patient not taking: Reported on 10/1/2019) 30 tablet 0     furosemide (LASIX) 20 MG tablet        Lenvatinib 18 MG, 10 mg + 2 x 4 mg capsules, (LENVIMA) 18 mg combo capsule CHEMOTHERAPY Take 18 mg dose (10 mg plus two 4 mg capsules) by mouth daily. (Patient not taking: Reported on 10/1/2019) 90 capsule 0     Past Medical History:   Diagnosis Date     Anemia      Bone metastasis (H) 09/28/2018     Cough      Pulmonary nodule      Renal cell carcinoma, right (H) 09/28/2018     Past Surgical History:   Procedure Laterality Date     BRONCHOSCOPY (RIGID OR FLEXIBLE), DIAGNOSTIC N/A 9/4/2019    Procedure: Bronchoscopy, With Bronchoalveolar Lavage;  Surgeon: Burton Toure MD;  Location:  GI     CHOLECYSTECTOMY       HC REMOVAL HEEL SPUR, CALCANEUS  2004       Key Data Reviewed:  LABS:   Lab Results   Component Value Date    WBC 3.1 (L) 09/24/2019    HGB 7.4 (L) 09/24/2019    HCT 25.1 (L) 09/24/2019     09/24/2019     09/24/2019     "POTASSIUM 5.3 09/24/2019    CHLORIDE 109 09/24/2019    CO2 23 09/24/2019    BUN 8 09/24/2019    CR 1.04 09/24/2019    GLC 83 09/24/2019    SED 87 (H) 03/29/2019    NTBNPI 283 09/03/2019    TROPI <0.015 09/03/2019    AST 18 09/24/2019    ALT 12 09/24/2019    ALKPHOS 90 09/24/2019    BILITOTAL 0.2 09/24/2019    INR 1.25 (H) 09/04/2019     IMAGING:   MRI Lumbar Spine 9/24/19  \"Impression: Images are mildly degraded secondary to patient motion.     1. Mildly decreased size of epidural enhancement at L1, measuring up  to 4 mm, 6 mm previously. Similar appearance of paravertebral and  epidural from L2 through L5.  2. Unchanged diffuse heterogeneous osseous metastatic disease from L1  through the sacrum, most pronounced at L1 and L3.  3. Stable vertebral body height loss at L3, secondary to pathologic  vertebral body fracture. Findings contribute to moderate to severe  left and moderate right neural foraminal stenosis at this level.\"    MRI Pelvis same day  \"IMPRESSION: Since prior MRI 6/18/2019, there has been mild progression  of osseous metastatic disease and small soft tissue foci posterior to  the medial left ilium and along the posterior margin of the right  iliac wing. Metastatic foci involving the femoral necks, left greater  than right, not substantially changed but patient remains at elevated  risk of pathologic fracture.\"    CT Chest same day: Improving PNA.     [place]  - Use of controlled substances consistent with history.     Thank you for continuing to involve me in the care of this patient.     Carly Mills MD / Palliative Medicine / Pager [phone] / After-Hours Answering Service [phone] / Main Palliative Clinic - [facility] [phone] / [facility] Inpatient Team Consult Pager [phone] (answered 8am-430pm M-F)    Time spent: 36 minutes, >50% spent in counseling/coordination of care.      Oncology Rooming Note    October 1, 2019 3:11 PM   Javon Bianchi is a 69 year old male who " presents for:    Chief Complaint   Patient presents with     Palliative     Initial Vitals: /83   Pulse 70   Temp 98  F (36.7  C) (Oral)   Resp 14   Ht 1.829 m (6')   Wt 95.3 kg (210 lb)   SpO2 97%   BMI 28.48 kg/m    Estimated body mass index is 28.48 kg/m  as calculated from the following:    Height as of this encounter: 1.829 m (6').    Weight as of this encounter: 95.3 kg (210 lb). Body surface area is 2.2 meters squared.  Mild Pain (3) Comment: Data Unavailable   No LMP for male patient.  Allergies reviewed: Yes  Medications reviewed: Yes    Medications: MEDICATION REFILLS NEEDED TODAY. Provider was notified.  Celebrex and Lyrica  Pharmacy name entered into [facility]:    [facility] - [place], [place] - [facility] - [facility] [address] [facility]    Clinical concerns: no       Shari J. Schoenberger, CMA              Again, thank you for allowing me to participate in the care of your patient.        Sincerely,        Carly Mills MD

## 2019-10-01 NOTE — PATIENT INSTRUCTIONS
Thank you for coming into the Palliative Care Clinic today.     Okay to taper MS Contin to 30mg in morning, 15mg in the afternoon, 30mg at bedtime.    Nausea: Okay to increase Reglan back to 5mg every 6 hours as needed for nausea.     Return to clinic 6 weeks for a follow up.     You can reach the Palliative Care Team during business hours at the following number: [phone] (Palliative Clinic Nurse Line).     To reach the Palliative Care Provider on-call after-hours or on holidays and weekends, call: [phone].  Please note that we are not able to provide pain medication refills on evenings or weekends.     ==================================================================    [facility] Palliative Care Clinics   The [facility] Palliative Care Team is committed to treating your pain and other symptoms. This handout is for all patients treated with opioid medications in our clinics.     What are opioid medicines?   Opioid medications are used to ease some types of pain and shortness of breath. They are sometimes called narcotics. They include: Morphine (MS Contin, Roxanol), Oxycodone (OxyContin, OxyFast, Percocet), Hydrocodone (Vicodin, Norco), Hydromorphone (Dilaudid), Fentanyl (Duragesic), Methadone (Dolophine).   Are opioid medicines safe to take?   Opioid medicines are safe when you follow the directions from your doctor or medical provider.  Opioids can cause serious side effects and become unsafe if you do not follow your instructions.   To make sure you are taking opioid medicines safely, we may ask you to bring in your pill bottles or to allow us to test your urine. Our goal is to keep you safe and to improve your ability to function & be active. If we don t think opioids are safely doing that, we will work with you to taper you off of them.   Do not drive unless your medical provider tells you it is safe.   Do not take opioids with alcohol or anxiety/sleep  medicines unless your doctor tells you it is ok to do so.   Are opioids addictive?   Addiction means you crave a drug and have trouble limiting the amount you use.   Addiction is more likely to happen if you take opioids to relieve stress or to feel altered. If you or your loved one feels this way when taking opioid medicines, let your medical provider know. We may refer you for additional assessment or services if this is a concern.   Your body will get used to the opioid medicines if you take them for more than two weeks in a row. This means if you stop them suddenly, you may have withdrawal. If this occurs, you will feel uncomfortable (nausea, diarrhea, increased pain). This does not mean you are addicted. Never stop taking your pain medicines all at once unless your medical provider tells you to. If you think you need less medicine, your medical provider will help you to safely lower your dose.   What is the safest way to store opioids?   Keep your medicines in a safe place away from children, teens, pets, and visitors. Be careful about who knows that you have these medicines.   How do I get rid of my old opioids?   Opioids should be brought to your [facility] drop-off site, or you can purchase a disposal kit from your local pharmacy. If neither of these options is available, the Food and Drug Administration recommends that you flush unused opioids down the toilet.   Do not share or sell your pain medicines. This is illegal and very dangerous. We cannot prescribe opioid pain medicines to you if do this.   How do I get refills?   Opioid medicines need signed paper prescriptions. This means it may take longer to refill than other medicines. Our clinic cannot prescribe them on weekends or at night.     Give us one week s notice when requesting a refill. This gives us the time we need to get it signed and back to you. It may be possible to mail prescriptions to you.

## 2019-10-01 NOTE — PROGRESS NOTES
Palliative Care Outpatient Clinic Progress Note    Patient Name:  Javon Bianchi  Primary Provider:  LIANNE Wellington    Chief Complaint/Identifying Information:  Metastatic Renal Cell Carcinoma on immunotherapy  History of L3 pathologic fracture found at diagnosis   Known epidural tumor burden    [facility] Outpatient Palliative Care Opioid Prescribing Safety Plan     Opioid Safety Education: Reviewed by Nan Gonzalez  on 11/8/2018  Opioid Risk Assessment: Performed by Nan Gonzalez  on 11/8/2018 .  ORT score of 0.   Mood Disorder Assessment: Performed by Carly Mills on 04/25/19.  No concerns.    reviewed with every prescription, last reviewed by Carly Mills on 10/01/19     Additional recommendations based on patient's prognosis and indication for opioids include the following:     Expected prognosis: shorter  Risk: Low or Medium (ORT 0-7)  No further recommendations.     Interim History:  Javon Bianchi 68 year old male returns to be seen by palliative care today, accompanied by his wife Suzie.  Javon Bianchi was last seen by me one month ago, at which time he had signs/symptoms concerning for return of PNA.  He was admitted to [facility] and stayed for four days.  Since he was last seen by me, Omega reports that he has taken radiation to L1, completed last week.  He has had fatigue since completing this.  Also has developed some nausea without vomiting. Having one bowel movement every 1-2 days. This is soft. Is taking Reglan 5mg BID (we cut this back to BID due to concern for sedation).     His pain has been better lately, back is 5/10, joints 3/10, hip 2/10. Taking MS Contin 30mg TID. He last used the hydromorphone 5 days ago. Wants to continue to decrease his opioids.      Was off Celebrex 100mg BID, but this worsened his pain. He is now back on this.     Is planning to look into back surgery again.     Social History:  Pertinent changes to social history/social situation since last  visit: Bought a condo to have available for when his health changes.   Social History     Tobacco Use     Smoking status: Never Smoker     Smokeless tobacco: Never Used   Substance Use Topics     Alcohol use: Not Currently     Drug use: Yes     Types: Marijuana     Comment: medical cannabis               Physical Exam:   Vitals were reviewed  /83   Pulse 70   Temp 98  F (36.7  C) (Oral)   Resp 14   Ht 1.829 m (6')   Wt 95.3 kg (210 lb)   SpO2 97%   BMI 28.48 kg/m    General: Alert, comfortable appearing male in no acute distress.   Eyes: Pupils equal and 2mm, sclera clear.   ENT: MMM.   Cardiac:  Normal rate, regular.    Resp: Resolution of cough.  Lungs are CTAB, unlabored on room air. Good air entry bilaterally.   Abd: Soft, non-distended, no TTP, active bowel sounds.   Neuro: No facial asymmetry.  Spontaneous movements grossly non-focal. Alert and with normal appearing sensorium.       Impression & Recommendations & Counseling:  Omega is a 69 year old man with metastatic renal cell carcinoma who has been off cancer treatments due to prolonged issues with PNA, now clinically improving on Bactrim.  His pain has improved as his infection has gotten under control.  He is interested in further opioid taper.  Given that his renal function has had a slight decline since his hospitalization (GFR now 70s), will need to monitor for indication to rotate morphine to fentanyl.  This rotation was offered to Omega today and he declines, electing to work on opioid taper next.     Will taper MS Contin to 30/15/30mg.  Continue PRN hydromorphone for breakthrough pain.   Nausea: Okay to increase Reglan back to 5mg every 6 hours PRN nausea. I am not sure why Omega is experiencing this as he is having regular bowel movements and is off the cancer treatments.  Most likely side effect from Bactrim.      Disease Understanding:  Omega asked for extensive counseling on his scans today, which were reviewed with him.     Multiple  refills provided on Omega's comfort focused medications.     Additional information reviewed today:   No Known Allergies  Current Outpatient Medications   Medication Sig Dispense Refill     acetaminophen (TYLENOL) 500 MG tablet Take 1,000 mg by mouth 3 times daily        calcium carbonate 500 mg, elemental, (OSCAL 500) 1250 (500 Ca) MG TABS tablet Take 1 tablet by mouth 2 times daily       celecoxib (CELEBREX) 100 MG capsule Take 1 capsule (100 mg) by mouth 2 times daily as needed for moderate pain 30 capsule 1     Dextromethorphan-guaiFENesin (MUCINEX DM)  MG 12 hr tablet Take 1 tablet by mouth every 12 hours       diazepam (VALIUM) 5 MG tablet Take 1 pill 30 min before MRI and a second pill 2 minutes prior. 10 tablet 0     DULoxetine (CYMBALTA) 60 MG capsule Take 1 capsule (60 mg) by mouth daily 90 capsule 3     fluticasone (FLONASE) 50 MCG/ACT nasal spray Spray 2 sprays in nostril       HYDROmorphone (DILAUDID) 2 MG tablet Take 1-2 tablets (2-4 mg) by mouth every 3 hours as needed for pain 60 tablet 0     lidocaine (LIDODERM) 5 % patch Place 1-3 patches onto the skin every 24 hours Apply to painful areas for 12 hours on, 12 hours off. 30 patch 3     loratadine (CLARITIN) 10 MG tablet Take 10 mg by mouth daily       medical cannabis (Patient's own supply.  Not a prescription) 1 Dose See Admin Instructions (This is NOT a prescription, and does not certify that the patient has a qualifying medical condition for medical cannabis.  The purpose of this order is  to document that the patient reports taking medical cannabis.)       melatonin 3 MG tablet Take 3 mg by mouth nightly as needed        metoclopramide (REGLAN) 5 MG tablet Take 1 tablet (5 mg) by mouth 4 times daily (before meals and nightly) (Patient taking differently: Take 5 mg by mouth 2 times daily ) 150 tablet 0     morphine (MS CONTIN) 15 MG CR tablet Take 15 mg by mouth daily as needed       morphine (MS CONTIN) 30 MG CR tablet Takes 45mg in the AM,  30 mg midday and 30mg at bedtime. 90 tablet 0     omeprazole (PRILOSEC) 40 MG DR capsule Take 1 capsule (40 mg) by mouth daily 90 capsule 3     polyethylene glycol (MIRALAX/GLYCOLAX) Packet Take 0.5 packets by mouth daily as needed       pregabalin (LYRICA) 50 MG capsule Take 1 capsule (50 mg) by mouth 2 times daily 60 capsule 1     senna-docusate (SENOKOT-S;PERICOLACE) 8.6-50 MG per tablet Take 2 tablets by mouth 2 times daily       sulfamethoxazole-trimethoprim (BACTRIM DS/SEPTRA DS) 800-160 MG tablet Take 2 tablets by mouth 2 times daily 120 tablet 0     Vitamin D, Cholecalciferol, 1000 units TABS Take 2,000 Units by mouth daily        everolimus (AFINITOR) 5 MG tablet CHEMO Take 1 tablet (5 mg) by mouth daily (Patient not taking: Reported on 10/1/2019) 30 tablet 0     furosemide (LASIX) 20 MG tablet        Lenvatinib 18 MG, 10 mg + 2 x 4 mg capsules, (LENVIMA) 18 mg combo capsule CHEMOTHERAPY Take 18 mg dose (10 mg plus two 4 mg capsules) by mouth daily. (Patient not taking: Reported on 10/1/2019) 90 capsule 0     Past Medical History:   Diagnosis Date     Anemia      Bone metastasis (H) 09/28/2018     Cough      Pulmonary nodule      Renal cell carcinoma, right (H) 09/28/2018     Past Surgical History:   Procedure Laterality Date     BRONCHOSCOPY (RIGID OR FLEXIBLE), DIAGNOSTIC N/A 9/4/2019    Procedure: Bronchoscopy, With Bronchoalveolar Lavage;  Surgeon: Burton Toure MD;  Location:  GI     CHOLECYSTECTOMY       HC REMOVAL HEEL SPUR, CALCANEUS  2004       Key Data Reviewed:  LABS:   Lab Results   Component Value Date    WBC 3.1 (L) 09/24/2019    HGB 7.4 (L) 09/24/2019    HCT 25.1 (L) 09/24/2019     09/24/2019     09/24/2019    POTASSIUM 5.3 09/24/2019    CHLORIDE 109 09/24/2019    CO2 23 09/24/2019    BUN 8 09/24/2019    CR 1.04 09/24/2019    GLC 83 09/24/2019    SED 87 (H) 03/29/2019    NTBNPI 283 09/03/2019    TROPI <0.015 09/03/2019    AST 18 09/24/2019    ALT 12 09/24/2019    ALKPHOS  "90 09/24/2019    BILITOTAL 0.2 09/24/2019    INR 1.25 (H) 09/04/2019     IMAGING:   MRI Lumbar Spine 9/24/19  \"Impression: Images are mildly degraded secondary to patient motion.     1. Mildly decreased size of epidural enhancement at L1, measuring up  to 4 mm, 6 mm previously. Similar appearance of paravertebral and  epidural from L2 through L5.  2. Unchanged diffuse heterogeneous osseous metastatic disease from L1  through the sacrum, most pronounced at L1 and L3.  3. Stable vertebral body height loss at L3, secondary to pathologic  vertebral body fracture. Findings contribute to moderate to severe  left and moderate right neural foraminal stenosis at this level.\"    MRI Pelvis same day  \"IMPRESSION: Since prior MRI 6/18/2019, there has been mild progression  of osseous metastatic disease and small soft tissue foci posterior to  the medial left ilium and along the posterior margin of the right  iliac wing. Metastatic foci involving the femoral necks, left greater  than right, not substantially changed but patient remains at elevated  risk of pathologic fracture.\"    CT Chest same day: Improving PNA.     [place]  - Use of controlled substances consistent with history.     Thank you for continuing to involve me in the care of this patient.     Carly Mills MD / Palliative Medicine / Pager [phone] / After-Hours Answering Service [phone] / Main Palliative Clinic - [facility] [phone] / [facility] Inpatient Team Consult Pager [phone] (answered 8am-430pm M-F)    Time spent: 36 minutes, >50% spent in counseling/coordination of care.    "

## 2019-10-01 NOTE — PROGRESS NOTES
Oncology Rooming Note    October 1, 2019 3:11 PM   Javon Bianchi is a 69 year old male who presents for:    Chief Complaint   Patient presents with     Palliative     Initial Vitals: /83   Pulse 70   Temp 98  F (36.7  C) (Oral)   Resp 14   Ht 1.829 m (6')   Wt 95.3 kg (210 lb)   SpO2 97%   BMI 28.48 kg/m   Estimated body mass index is 28.48 kg/m  as calculated from the following:    Height as of this encounter: 1.829 m (6').    Weight as of this encounter: 95.3 kg (210 lb). Body surface area is 2.2 meters squared.  Mild Pain (3) Comment: Data Unavailable   No LMP for male patient.  Allergies reviewed: Yes  Medications reviewed: Yes    Medications: MEDICATION REFILLS NEEDED TODAY. Provider was notified.  Celebrex and Lyrica  Pharmacy name entered into [facility]:    [facility] - [place], [place] - [address] [facility] - [place], [place] - [address] [address]    Clinical concerns: no       Shari J. Schoenberger, [facility]

## 2019-10-02 ENCOUNTER — DOCUMENTATION ONLY (OUTPATIENT)
Dept: CARE COORDINATION | Facility: CLINIC | Age: 69
End: 2019-10-02

## 2019-10-02 ENCOUNTER — HOSPITAL ENCOUNTER (OUTPATIENT)
Facility: CLINIC | Age: 69
Setting detail: SPECIMEN
Discharge: HOME | End: 2019-10-02
Attending: INTERNAL MEDICINE | Admitting: INTERNAL MEDICINE
Payer: MEDICARE

## 2019-10-02 ENCOUNTER — INFUSION THERAPY VISIT (OUTPATIENT)
Dept: INFUSION THERAPY | Facility: CLINIC | Age: 69
End: 2019-10-02
Attending: INTERNAL MEDICINE
Payer: MEDICARE

## 2019-10-02 VITALS
DIASTOLIC BLOOD PRESSURE: 64 MMHG | RESPIRATION RATE: 16 BRPM | TEMPERATURE: 98.1 F | SYSTOLIC BLOOD PRESSURE: 116 MMHG | HEART RATE: 52 BPM

## 2019-10-02 DIAGNOSIS — C64.9 RENAL CELL CARCINOMA, UNSPECIFIED LATERALITY (H): ICD-10-CM

## 2019-10-02 DIAGNOSIS — C79.51 BONE METASTASIS: ICD-10-CM

## 2019-10-02 DIAGNOSIS — D64.9 SEVERE ANEMIA: Primary | ICD-10-CM

## 2019-10-02 LAB
ABO + RH BLD: NORMAL
ABO + RH BLD: NORMAL
ALBUMIN SERPL-MCNC: 2.8 G/DL (ref 3.4–5)
ALP SERPL-CCNC: 89 U/L (ref 40–150)
ALT SERPL W P-5'-P-CCNC: 9 U/L (ref 0–70)
ANION GAP SERPL CALCULATED.3IONS-SCNC: 5 MMOL/L (ref 3–14)
ANISOCYTOSIS BLD QL SMEAR: ABNORMAL
AST SERPL W P-5'-P-CCNC: 24 U/L (ref 0–45)
BACTERIA SPEC CULT: NORMAL
BASOPHILS # BLD AUTO: 0 10E9/L (ref 0–0.2)
BASOPHILS NFR BLD AUTO: 0 %
BILIRUB SERPL-MCNC: 0.2 MG/DL (ref 0.2–1.3)
BLD GP AB SCN SERPL QL: NORMAL
BLD PROD TYP BPU: NORMAL
BLD PROD TYP BPU: NORMAL
BLD UNIT ID BPU: 0
BLOOD BANK CMNT PATIENT-IMP: NORMAL
BLOOD PRODUCT CODE: NORMAL
BPU ID: NORMAL
BUN SERPL-MCNC: 9 MG/DL (ref 7–30)
CALCIUM SERPL-MCNC: 8.5 MG/DL (ref 8.5–10.1)
CHLORIDE SERPL-SCNC: 109 MMOL/L (ref 94–109)
CO2 SERPL-SCNC: 21 MMOL/L (ref 20–32)
CREAT SERPL-MCNC: 1.03 MG/DL (ref 0.66–1.25)
DIFFERENTIAL METHOD BLD: ABNORMAL
EOSINOPHIL # BLD AUTO: 0.1 10E9/L (ref 0–0.7)
EOSINOPHIL NFR BLD AUTO: 2 %
ERYTHROCYTE [DISTWIDTH] IN BLOOD BY AUTOMATED COUNT: 19.9 % (ref 10–15)
FUNGUS SPEC CULT: NORMAL
GFR SERPL CREATININE-BSD FRML MDRD: 73 ML/MIN/{1.73_M2}
GLUCOSE SERPL-MCNC: 99 MG/DL (ref 70–99)
HCT VFR BLD AUTO: 24.2 % (ref 40–53)
HGB BLD-MCNC: 7.7 G/DL (ref 13.3–17.7)
LYMPHOCYTES # BLD AUTO: 0.1 10E9/L (ref 0.8–5.3)
LYMPHOCYTES NFR BLD AUTO: 2 %
MACROCYTES BLD QL SMEAR: PRESENT
MAGNESIUM SERPL-MCNC: 1.6 MG/DL (ref 1.6–2.3)
MCH RBC QN AUTO: 32.6 PG (ref 26.5–33)
MCHC RBC AUTO-ENTMCNC: 31.8 G/DL (ref 31.5–36.5)
MCV RBC AUTO: 103 FL (ref 78–100)
MONOCYTES # BLD AUTO: 1.5 10E9/L (ref 0–1.3)
MONOCYTES NFR BLD AUTO: 30 %
NEUTROPHILS # BLD AUTO: 3.2 10E9/L (ref 1.6–8.3)
NEUTROPHILS NFR BLD AUTO: 66 %
NRBC # BLD AUTO: 0 10*3/UL
NRBC BLD AUTO-RTO: 1 /100
NUM BPU REQUESTED: 1
OVALOCYTES BLD QL SMEAR: SLIGHT
PLATELET # BLD AUTO: 225 10E9/L (ref 150–450)
PLATELET # BLD EST: ABNORMAL 10*3/UL
POTASSIUM SERPL-SCNC: 4.7 MMOL/L (ref 3.4–5.3)
PROT SERPL-MCNC: 6.1 G/DL (ref 6.8–8.8)
RBC # BLD AUTO: 2.36 10E12/L (ref 4.4–5.9)
SODIUM SERPL-SCNC: 135 MMOL/L (ref 133–144)
SPECIMEN EXP DATE BLD: NORMAL
SPECIMEN SOURCE: NORMAL
SPECIMEN SOURCE: NORMAL
TRANSFUSION STATUS PATIENT QL: NORMAL
TRANSFUSION STATUS PATIENT QL: NORMAL
WBC # BLD AUTO: 4.9 10E9/L (ref 4–11)

## 2019-10-02 PROCEDURE — 96372 THER/PROPH/DIAG INJ SC/IM: CPT

## 2019-10-02 PROCEDURE — 83735 ASSAY OF MAGNESIUM: CPT | Performed by: INTERNAL MEDICINE

## 2019-10-02 PROCEDURE — 36430 TRANSFUSION BLD/BLD COMPNT: CPT

## 2019-10-02 PROCEDURE — 25000128 H RX IP 250 OP 636: Performed by: INTERNAL MEDICINE

## 2019-10-02 PROCEDURE — 36415 COLL VENOUS BLD VENIPUNCTURE: CPT

## 2019-10-02 PROCEDURE — 86900 BLOOD TYPING SEROLOGIC ABO: CPT | Performed by: INTERNAL MEDICINE

## 2019-10-02 PROCEDURE — 86901 BLOOD TYPING SEROLOGIC RH(D): CPT | Performed by: INTERNAL MEDICINE

## 2019-10-02 PROCEDURE — 85025 COMPLETE CBC W/AUTO DIFF WBC: CPT | Performed by: INTERNAL MEDICINE

## 2019-10-02 PROCEDURE — 86850 RBC ANTIBODY SCREEN: CPT | Performed by: INTERNAL MEDICINE

## 2019-10-02 PROCEDURE — P9016 RBC LEUKOCYTES REDUCED: HCPCS

## 2019-10-02 PROCEDURE — 86923 COMPATIBILITY TEST ELECTRIC: CPT | Performed by: INTERNAL MEDICINE

## 2019-10-02 PROCEDURE — 80053 COMPREHEN METABOLIC PANEL: CPT | Performed by: INTERNAL MEDICINE

## 2019-10-02 RX ORDER — HEPARIN SODIUM,PORCINE 10 UNIT/ML
5 VIAL (ML) INTRAVENOUS
Status: CANCELLED | OUTPATIENT
Start: 2019-10-02

## 2019-10-02 RX ORDER — HEPARIN SODIUM (PORCINE) LOCK FLUSH IV SOLN 100 UNIT/ML 100 UNIT/ML
5 SOLUTION INTRAVENOUS
Status: CANCELLED | OUTPATIENT
Start: 2019-10-02

## 2019-10-02 RX ADMIN — DENOSUMAB 120 MG: 120 INJECTION SUBCUTANEOUS at 10:54

## 2019-10-02 NOTE — PROGRESS NOTES
Infusion Nursing Note:  Javon Bianchi presents today for labs/possible tx & xgeva.    Patient seen by provider today: No   present during visit today: Not Applicable.    Note: N/A.    Intravenous Access:  Labs drawn without difficulty.  Peripheral IV placed.    Treatment Conditions:  Lab Results   Component Value Date    HGB 7.7 10/02/2019     Lab Results   Component Value Date    WBC 4.9 10/02/2019      Lab Results   Component Value Date    ANEU 3.2 10/02/2019     Lab Results   Component Value Date     10/02/2019      Lab Results   Component Value Date     10/02/2019                   Lab Results   Component Value Date    POTASSIUM 4.7 10/02/2019           Lab Results   Component Value Date    MAG 1.6 10/02/2019            Lab Results   Component Value Date    CR 1.03 10/02/2019                   Lab Results   Component Value Date    AUREA 8.5 10/02/2019                Lab Results   Component Value Date    BILITOTAL 0.2 10/02/2019           Lab Results   Component Value Date    ALBUMIN 2.8 10/02/2019                    Lab Results   Component Value Date    ALT 9 10/02/2019           Lab Results   Component Value Date    AST 24 10/02/2019       Results reviewed, labs MET treatment parameters, ok to proceed with treatment.  Blood transfusion consent signed 9/25/19.  Patient will receive 1 unit pRBC's today for hgb 7.7.       Post Infusion Assessment:  Patient tolerated infusion without incident.  Patient tolerated injection without incident.  Blood return noted pre and post infusion.  Site patent and intact, free from redness, edema or discomfort.  No evidence of extravasations.  Access discontinued per protocol.       Discharge Plan:   Discharge instructions reviewed with: Patient and Family.  Patient and/or family verbalized understanding of discharge instructions and all questions answered.  AVS to patient via [employer].  Patient will return 11/20/19 at [facility] for next appointment.  Patient  will call  to schedule next xgeva for 10/30.   Patient discharged in stable condition accompanied by: wife.  Departure Mode: Ambulatory.    Calderon Puentes RN, RN

## 2019-10-03 ENCOUNTER — OFFICE VISIT (OUTPATIENT)
Dept: ORTHOPEDICS | Facility: CLINIC | Age: 69
End: 2019-10-03
Payer: MEDICARE

## 2019-10-03 VITALS — BODY MASS INDEX: 28.84 KG/M2 | WEIGHT: 206 LBS | HEIGHT: 71 IN

## 2019-10-03 DIAGNOSIS — C79.51 METASTATIC CANCER TO SPINE (H): Primary | ICD-10-CM

## 2019-10-03 DIAGNOSIS — M54.16 LUMBAR RADICULOPATHY: ICD-10-CM

## 2019-10-03 DIAGNOSIS — M48.062 LUMBAR STENOSIS WITH NEUROGENIC CLAUDICATION: ICD-10-CM

## 2019-10-03 ASSESSMENT — ENCOUNTER SYMPTOMS
SWOLLEN GLANDS: 0
MYALGIAS: 0
NAUSEA: 1
ARTHRALGIAS: 1
MUSCLE CRAMPS: 0
SLEEP DISTURBANCES DUE TO BREATHING: 0
COUGH: 1
RECTAL PAIN: 0
INCREASED ENERGY: 1
WHEEZING: 0
HEMOPTYSIS: 0
DYSPNEA ON EXERTION: 1
SPUTUM PRODUCTION: 1
JAUNDICE: 0
SHORTNESS OF BREATH: 1
ALTERED TEMPERATURE REGULATION: 1
EXERCISE INTOLERANCE: 1
HEARTBURN: 0
BLOOD IN STOOL: 0
TASTE DISTURBANCE: 1
SINUS PAIN: 0
SMELL DISTURBANCE: 0
FEVER: 0
NECK PAIN: 0
ORTHOPNEA: 0
BACK PAIN: 1
CHILLS: 1
PALPITATIONS: 0
POLYPHAGIA: 0
TROUBLE SWALLOWING: 1
CONSTIPATION: 0
HOARSE VOICE: 1
HYPOTENSION: 0
NIGHT SWEATS: 0
JOINT SWELLING: 0
WEIGHT GAIN: 0
BRUISES/BLEEDS EASILY: 1
SNORES LOUDLY: 0
DECREASED APPETITE: 1
WEIGHT LOSS: 1
SORE THROAT: 0
LIGHT-HEADEDNESS: 1
BOWEL INCONTINENCE: 0
NECK MASS: 0
STIFFNESS: 1
HALLUCINATIONS: 0
BLOATING: 0
SYNCOPE: 0
DIARRHEA: 1
SINUS CONGESTION: 0
ABDOMINAL PAIN: 1
VOMITING: 1
HYPERTENSION: 0
LEG PAIN: 0
FATIGUE: 1
MUSCLE WEAKNESS: 1
POLYDIPSIA: 0

## 2019-10-03 ASSESSMENT — MIFFLIN-ST. JEOR: SCORE: 1703.6

## 2019-10-03 NOTE — PROGRESS NOTES
Last Visit Date: 8/8/19  Previous Impression:  1.  New metastatic lesion without fracture L1, 2' to metastatic renal CA.  2.  Left L3 radiculopathy secondary to metastatic renal cancer.    3.  Pathologic compression fracture L3.    4.  Metastatic lesion left proximal femur without impending pathologic fracture.  Previous Plan:  Symptoms may still be due to nerve compression left side L3-4 level.  The new L1 lesion, likely also metastatic, may also be contributing.  Discussed his condition; discussed options including cement augmentation, tumor ablation; decompression for L3-4.  Discussed rationale, risks, benefits, alternatives.  Even with kyphoplasty and RFA at L1, likely will still benefit from radiation.  Discussed that surgery and radiation may be synergistic; the pain relief from kyphoplasty-RFA may allow him to lay still better for radiation.  Furthermore, the cement could act as a reliable landmark, to improve targetting with radiation.  Patient elects to proceed.  - Surg request:  Kyphoplasty-RFA L1; MIS left hemilaminectomy L3-4.  - PAC referral.  Main PCP is his oncologist Dr. Anne.      S>  69/m, scheduled for surgery last 8/21/19, but cancelled after PAC visit because still with pneumonia even after 10-day treatment with PO Amoxicillin.  PAC advised cancelling surgery until pneumonia resolved.  The patient reports that he was briefly hospitalized in both the [facility] and in the [facility] system for his pneumonia.  He is currently taking Bactrim and reports that he is having improvement in his symptoms.  He denies cough and sputum.  He also notes that his back and left leg radicular symptoms have only slightly changed since he was last seen.  He finished radiation therapy on his back and notes that his pain is now less diffuse.  He would like to reschedule his surgery and proceed as quickly as possible.    Oswestry (SPIKE) Questionnaire    OSWESTRY DISABILITY INDEX 10/3/2019   Count 10  "  Sum 19   Oswestry Score (%) 38     O>   Alert, oriented x 3, cooperative.  Not in CP distress.  Ht 1.791 m (5' 10.5\")   Wt 93.4 kg (206 lb)   BMI 29.14 kg/m    Ambulates independently.   Grossly neurologically intact.  Detailed exam not performed today; please see previous note.    Imaging:   No new imaging was obtained at this visit.    A>  1.  New metastatic lesion without fracture L1, 2' to metastatic renal CA.  2.  Left L3 radiculopathy secondary to metastatic renal cancer.    3.  Pathologic compression fracture L3.    4.  Metastatic lesion left proximal femur without impending pathologic fracture.    P>   Had a brief discussion with the patient and his wife Suzie regarding surgery.  All the patient and his wife live in the [place] area, he should return here for PAC evaluation in order to undergo surgery.  We will proceed with the same surgery as previously requested.  He will be a kyphoplasty-RFA space L1 and MIS left hemilaminectomy L3-L4.  Although the surgical schedule is full for the next 2 months, we anticipate this to be a brief surgery and will attempt to schedule the patient as quickly as possible.  The patient and his wife had no new questions or concerns.    - Casae request: Kyphoplasty-RFA L1; MIS left hemilaminectomy L3-L4.  - PAC referral.  Main PCP is his oncologist Dr. Anne.  - Will try to schedule surgery in the coming weeks    The patient was seen and discussed with Dr. Brower.    Krishan Hinojosa MD  Orthopedic Surgery PGY1  [phone]    Attestation:  I (Dr. Vickey Brower - Spine Surgeon) have personally evaluated patient with PGY-1 Ashish, and agree with findings and plan outlined in the note, which I also edited.  I discussed at length with the patient/family, explained the nature of spinal condition, and formulated workup and/or treatment plan together.  All questions were answered to the best of my ability and to patient's apparent satisfaction.    TT > 25 mins, > 50% " CC.    Vickey Brower MD    Orthopaedic Spine Surgery  Dept Orthopaedic Surgery, [facility]  [phone] office, [phone] pager  [email or web address]

## 2019-10-03 NOTE — NURSING NOTE
"Reason For Visit:   Chief Complaint   Patient presents with     RECHECK     DOS 8/21/19  Kyphoplasty-RFA L1; MIS left hemilaminectomy L3-4     Primary MD: LIANNE Wellington        ?  No  Occupation retired salesman   Currently working? No.  Work status?  Retired.  Date of injury: None     Date of surgery: None     Smoker: No       Ht 1.791 m (5' 10.5\")   Wt 93.4 kg (206 lb)   BMI 29.14 kg/m      Pain Assessment  Patient Currently in Pain: Yes  Primary Pain Location: Back  Other Pain Locations: joints about a 3 out of 10  Pain Descriptors: Discomfort    Oswestry (SPIKE) Questionnaire    OSWESTRY DISABILITY INDEX 10/3/2019   Count 10   Sum 19   Oswestry Score (%) 38        Visual Analog Pain Scale  Back Pain Scale 0-10: 8  Right leg pain: 0  Left leg pain: 3    Promis 10 Assessment    PROMIS 10 10/3/2019   In general, would you say your health is: Poor   In general, would you say your quality of life is: Fair   In general, how would you rate your physical health? Poor   In general, how would you rate your mental health, including your mood and your ability to think? Fair   In general, how would you rate your satisfaction with your social activities and relationships? Fair   In general, please rate how well you carry out your usual social activities and roles Fair   To what extent are you able to carry out your everyday physical activities such as walking, climbing stairs, carrying groceries, or moving a chair? A little   How often have you been bothered by emotional problems such as feeling anxious, depressed or irritable? Sometimes   How would you rate your fatigue on average? Mild   How would you rate your pain on average?   0 = No Pain  to  10 = Worst Imaginable Pain 5   In general, would you say your health is: 1   In general, would you say your quality of life is: 2   In general, how would you rate your physical health? 1   In general, how would you rate your mental health, including your mood and your " ability to think? 2   In general, how would you rate your satisfaction with your social activities and relationships? 2   In general, please rate how well you carry out your usual social activities and roles. (This includes activities at home, at work and in your community, and responsibilities as a parent, child, spouse, employee, friend, etc.) 2   To what extent are you able to carry out your everyday physical activities such as walking, climbing stairs, carrying groceries, or moving a chair? 2   In the past 7 days, how often have you been bothered by emotional problems such as feeling anxious, depressed, or irritable? 3   In the past 7 days, how would you rate your fatigue on average? 2   In the past 7 days, how would you rate your pain on average, where 0 means no pain, and 10 means worst imaginable pain? 5   Global Mental Health Score 9   Global Physical Health Score 10   PROMIS TOTAL - SUBSCORES 19   Some recent data might be hidden                Alondra Villa LPN

## 2019-10-03 NOTE — LETTER
10/3/2019       RE: Javon Bianchi  [address]  [facility] [address]     Dear Colleague,    Thank you for referring your patient, Javon Bianchi, to the [facility] at [facility]. Please see a copy of my visit note below.    Last Visit Date: 8/8/19  Previous Impression:  1.  New metastatic lesion without fracture L1, 2' to metastatic renal CA.  2.  Left L3 radiculopathy secondary to metastatic renal cancer.    3.  Pathologic compression fracture L3.    4.  Metastatic lesion left proximal femur without impending pathologic fracture.  Previous Plan:  Symptoms may still be due to nerve compression left side L3-4 level.  The new L1 lesion, likely also metastatic, may also be contributing.  Discussed his condition; discussed options including cement augmentation, tumor ablation; decompression for L3-4.  Discussed rationale, risks, benefits, alternatives.  Even with kyphoplasty and RFA at L1, likely will still benefit from radiation.  Discussed that surgery and radiation may be synergistic; the pain relief from kyphoplasty-RFA may allow him to lay still better for radiation.  Furthermore, the cement could act as a reliable landmark, to improve targetting with radiation.  Patient elects to proceed.  - Surg request:  Kyphoplasty-RFA L1; MIS left hemilaminectomy L3-4.  - PAC referral.  Main PCP is his oncologist Dr. Anne.      S>  69/m, scheduled for surgery last 8/21/19, but cancelled after PAC visit because still with pneumonia even after 10-day treatment with PO Amoxicillin.  PAC advised cancelling surgery until pneumonia resolved.  The patient reports that he was briefly hospitalized in both the [facility] and in the [facility] system for his pneumonia.  He is currently taking Bactrim and reports that he is having improvement in his symptoms.  He denies cough and sputum.  He also notes that his back and left leg radicular symptoms have only  "slightly changed since he was last seen.  He finished radiation therapy on his back and notes that his pain is now less diffuse.  He would like to reschedule his surgery and proceed as quickly as possible.    Oswestry (SPIKE) Questionnaire    OSWESTRY DISABILITY INDEX 10/3/2019   Count 10   Sum 19   Oswestry Score (%) 38     O>   Alert, oriented x 3, cooperative.  Not in CP distress.  Ht 1.791 m (5' 10.5\")   Wt 93.4 kg (206 lb)   BMI 29.14 kg/m     Ambulates independently.   Grossly neurologically intact.  Detailed exam not performed today; please see previous note.    Imaging:   No new imaging was obtained at this visit.    A>  1.  New metastatic lesion without fracture L1, 2' to metastatic renal CA.  2.  Left L3 radiculopathy secondary to metastatic renal cancer.    3.  Pathologic compression fracture L3.    4.  Metastatic lesion left proximal femur without impending pathologic fracture.    P>   Had a brief discussion with the patient and his wife Suzie regarding surgery.  All the patient and his wife live in the [place] area, he should return here for PAC evaluation in order to undergo surgery.  We will proceed with the same surgery as previously requested.  He will be a kyphoplasty-RFA space L1 and MIS left hemilaminectomy L3-L4.  Although the surgical schedule is full for the next 2 months, we anticipate this to be a brief surgery and will attempt to schedule the patient as quickly as possible.  The patient and his wife had no new questions or concerns.    - Casae request: Kyphoplasty-RFA L1; MIS left hemilaminectomy L3-L4.  - PAC referral.  Main PCP is his oncologist Dr. Anne.  - Will try to schedule surgery in the coming weeks    The patient was seen and discussed with Dr. Brower.    Krishan Hinojosa MD  Orthopedic Surgery PGY1  [phone]    Attestation:  I (Dr. Vickey Brower - Spine Surgeon) have personally evaluated patient with PGY-1 Ashish, and agree with findings and plan outlined in the " note, which I also edited.  I discussed at length with the patient/family, explained the nature of spinal condition, and formulated workup and/or treatment plan together.  All questions were answered to the best of my ability and to patient's apparent satisfaction.    TT > 25 mins, > 50% CC.    Vickey Brower MD    Orthopaedic Spine Surgery  Dept Orthopaedic Surgery, [facility]  [phone] office, [phone] pager  [email or web address]

## 2019-10-07 NOTE — TELEPHONE ENCOUNTER
Returned phone call to patient to discuss where we are at in the process of scheduling. Patient was informed that his information was sent to out Prior authorization team and they are working with his insurance company for approval. Patient stated understanding.

## 2019-10-09 NOTE — PROGRESS NOTES
Patient called describing persistent  nausea in spite of taking Reglan q6hrs     Dr Mills orders that patient should stop Reglan and take Compazine 5mgs q6hrs prn.    Omega verbalizes understanding of the instructions

## 2019-10-11 PROBLEM — M48.062 LUMBAR STENOSIS WITH NEUROGENIC CLAUDICATION: Status: ACTIVE | Noted: 2019-01-01

## 2019-10-11 PROBLEM — C79.51 METASTATIC CANCER TO SPINE (H): Status: ACTIVE | Noted: 2019-01-01

## 2019-10-11 PROBLEM — M54.16 LUMBAR RADICULOPATHY: Status: ACTIVE | Noted: 2019-01-01

## 2019-10-11 NOTE — TELEPHONE ENCOUNTER
Patient is scheduled for surgery with Dr. Brower      Spoke or left message with: Spoke with Omega    Date of Surgery: 11/1/19    Location: [place]    Informed patient they will need an adult  Yes    H&P: PAC 10/16/19    Additional imaging/appointments: N/A    Surgery packet: Given in clinic    Additional comments: Patient will receive arrival time at PAC appointment

## 2019-10-11 NOTE — PROGRESS NOTES
"Omega called back and states that nausea has improved since changing from Reglan to Compazine.    States his stomach still feels \"touchy\" rather than \" queasy\" at that he is happy with this improvement    "

## 2019-10-12 NOTE — TELEPHONE ENCOUNTER
FUTURE VISIT INFORMATION      SURGERY INFORMATION:    Date: 11/1/19    Location: [facility]    Surgeon: DR MATTA    Anesthesia Type:GENERAL         RECORDS REQUESTED FROM:       Primary Care Provider: NIKA     Pertinent Medical History:    Most recent EKG with tracings/strips: 9/3/19    Most recent ECHO:    Most recent Cardiac Stress Test:    Most recent Coronary Angiogram:

## 2019-10-14 NOTE — TELEPHONE ENCOUNTER
Javon asks if he can reduce his MS Contin to 15/15/30 Q8   Requested medications Ms Contin      Experiencing any side effects from medication:   Last refill: 07/30  Last office visit: 10/01  Scheduled for follow up None     Patient would like script  E prescribed to Willy MITCHELL  Report reviewed.

## 2019-10-14 NOTE — PROGRESS NOTES
Patient called to assess nausea, Omega states that he continues to have nausea and vomits once per day.    He continues to take Compazine 5mgs q6 prn,he would like to discuss further treatments for this and discuss whether nausea is due to drug interactions.    Omega states that nausea is manageable at this time and he states that he is comfortable waiting till 10/15 for a response when Dr Mills returns.    He would also like Dr Mills to consider whether he could reduce his MS Contin dosing to 15 in the morning 15 at lunchtime and 30mgs at nighttime.    I will pass this information onto Dr Mills for next steps.

## 2019-10-15 NOTE — PROGRESS NOTES
Call made to Javon to instruct him that Dr Mills would like him to stop using the Celebrex to see if this is the cause of his nausea.    VM left asking Javon to call me back if he has any questions

## 2019-10-16 NOTE — PATIENT INSTRUCTIONS
First, please await news on Rx. Next, please return on 10/28 for clearance for surgery. Next, please call if you have any questions. Thank you.   [facility] Medication Refill Request Information:  * Please contact your pharmacy regarding ANY request for medication refills.  ** NP Clinic Prescription Fax = [phone]  * Please allow 3 business days for routine medication refills.  * Please allow 5 business days for controlled substance medication refills.     [facility] Test Result notification information:  *You will be notified with in 7-10 days of your appointment day regarding the results of your test.  If you are on MyChart you will be notified as soon as the provider has reviewed the results and signed off on them.    [facility]: [phone]

## 2019-10-16 NOTE — NURSING NOTE
69 year old  Chief Complaint   Patient presents with     Shingles     Pt is here to discuss shingles on back of L thigh x2 days       Blood pressure 130/68, pulse 63, weight 93 kg (205 lb), SpO2 98 %. Body mass index is 29.41 kg/m .  BP completed using cuff size:      Liza Thomson MA  October 16, 2019 3:34 PM

## 2019-10-16 NOTE — PATIENT INSTRUCTIONS
Preparing for Your Surgery      Name:  Javon Bianchi   MRN:  [id number]   :  1950   Today's Date:  10/16/2019     Arriving for surgery:  Surgery date:  19  Arrival time:  10:30 am  Please come to:     [facility] [address]  [address] [place]  [address]    - parking is available in front of [facility] from 5:15AM to 8:00PM. If you prefer, park your car in the Green Lot.    -Proceed to the 3rd floor, check in at the Adult Surgery Waiting Lounge. [phone]    If an escort is needed stop at the Information Desk in the lobby. Inform the information person that you are here for surgery. An escort to the Adult Surgery Waiting Lounge will be provided.    What can I eat or drink?  -  You may have solid food or milk products until 8 hours prior to your surgery (5:00 am).  -  You may have water, apple juice or 7up/Sprite until 2 hours prior to your surgery (11:00 am).    Which medicines can I take?  Stop Aspirin, vitamins and supplements one week prior to surgery.  Hold Ibuprofen for 24 hours and/or Naproxen for 48 hours prior to surgery.   Hold Celebrex 3 days prior to surgery - last dose on 10/28/19.   Hold cannabis 24 hours prior to surgery.    -  Do NOT take these medications in the morning, the day of surgery:  Furosemide (Lasix)  Polyethylene Glycol (Miralax)  Senna-Docusate (Senokot)    -  Please take these medications the day of surgery:  Acetaminophen (Tylenol)  Hydromorphone (Dilaudid)  Lidocaine (Lidoderm) patch Ondansetron (Zofran)  Duloxetine (Cymbalta)  Loratadine (Claritin)  Morphine (MS Contin)  Omeprazole (Prilosec)  Pregabalin (Lyrica)   Prochlorperazine (Compazine)  Sulfamethoxazole-Trimethoprim (Bactrim)    How do I prepare myself?  -  Take two showers: one the night before surgery; and one the morning of surgery.         Use Scrubcare or Hibiclens to wash from neck down, leave soap on your skin for up to one minute.  Do not get  soap in your eyes or ears.  You may use your own shampoo and conditioner; no other hair products.   -  Do NOT use lotion, powder, deodorant, or antiperspirant the day of your surgery.  -  Do NOT wear any jewelry.  -  Begin using Incentive Spirometer 1 week prior to surgery.  Use 4 times per day, up to 5 breaths each time.  Bring Incentive Spirometer to hospital.  -  Do not bring your own medications to the hospital, except for inhalers.  -  Bring your ID and insurance card.    Questions or Concerns:  -If you are scheduled on the [facility] or [facility] and have questions or concerns regarding the day of surgery, please call Preadmission Nursing at [phone].     -For questions after surgery please call your surgeons office.     AFTER YOUR SURGERY  Breathing exercises   Breathing exercises help you recover faster. Take deep breaths and let the air out slowly. This will:     Help you wake up after surgery.    Help prevent complications like pneumonia.  Preventing complications will help you go home sooner.   We may give you a breathing device (incentive spirometer) to encourage you to breathe deeply.   Nausea and vomiting   You may feel sick to your stomach after surgery; if so, let your nurse know.    Pain control:  After surgery, you may have pain. Our goal is to help you manage your pain. Pain medicine will help you feel comfortable enough to do activities that will help you heal.  These activities may include breathing exercises, walking and physical therapy.   To help your health care team treat your pain we will ask: 1) If you have pain  2) where it is located 3) describe your pain in your words  Methods of pain control include medications given by mouth, vein or by nerve block for some surgeries.  Sequential Compression Device (SCD) or Pneumo Boots:  You may need to wear SCD S on your legs or feet. These are wraps connected to a machine that pumps in air and releases it. The repeated pumping helps prevent blood  clots from forming.

## 2019-10-16 NOTE — H&P
Pre-Operative H & P     CC:  Preoperative exam to assess for increased cardiopulmonary risk while undergoing surgery and anesthesia.    Date of Encounter: 10/16/2019  Primary Care Physician:  Bonnie, [facility]  Reason for Visit: Metastatic cancer to spine (H) [C79.51]  - Primary                                    Lumbar stenosis with neurogenic claudication [M48.062]                                    Lumbar radiculopathy [M54.16]     HPI  Omega Bianchi is a 68 y/o male who presents for pre-operative H&P in preparation for Kyphoplasty-Radiofrequency ablation Lumbar 1; Minimally Invasive Surgery  left hemilaminectomy Lumbar 3-Lumbar 4 with Viceky Brower MD on 11/1/19 at [facility] for treatment of Metastatic cancer to spine w/ Lumbar radiculopathy .     The patient is a 69 year old man who developed back pain in 5/2018. He continued to have pain despite treatment. He was seen by neurology and an MRI of the spine was obtained which showed a pathologic fracture of L3. Biopsy showed poorly differentiated carcinoma. He was found to have most likely renal cell carcinoma. The patient has done chemotherapy and radiation therapy. He continues to have arthralgias and pain. He met with Leonarda on 8/8/19 and they disussed his treatment options. He was scheduled for the above procedure on 8/21/19, but this was cancelled due to recent pneumonia. He has been treated for this and now presents for surgery on 11/1/19.     PMH is also significant for HLD, anxiety, anemia, osteoarthritis, hx SIRS.     History was obtained from patient & chart review. He is accompanied by his wife.     Past Medical History  Past Medical History:   Diagnosis Date     Anemia      Bone metastasis (H) 09/28/2018     Cough      Pulmonary nodule      Renal cell carcinoma, right (H) 09/28/2018       Past Surgical History  Past Surgical History:   Procedure Laterality Date     BRONCHOSCOPY (RIGID  OR FLEXIBLE), DIAGNOSTIC N/A 9/4/2019    Procedure: Bronchoscopy, With Bronchoalveolar Lavage;  Surgeon: Burton Toure MD;  Location: [facility]     CHOLECYSTECTOMY       HC REMOVAL HEEL SPUR, CALCANEUS  2004       Hx of Blood transfusions/reactions: yes, transfused 10/2/19     Hx of abnormal bleeding or anti-platelet use: no    Menstrual history: No LMP for male patient.: N/A    Steroid use in the last year: no    Personal or FH with difficulty with Anesthesia:  no    Prior to Admission Medications  Current Outpatient Medications   Medication Sig Dispense Refill     acetaminophen (TYLENOL) 500 MG tablet Take 1,000 mg by mouth 3 times daily        calcium carbonate 600 mg-vitamin D 400 units (CALTRATE) 600-400 MG-UNIT per tablet Take 1 tablet by mouth 3 times daily       celecoxib (CELEBREX) 100 MG capsule Take 1 capsule (100 mg) by mouth 2 times daily as needed for moderate pain (Patient not taking: Reported on 10/16/2019) 60 capsule 1     Dextromethorphan-guaiFENesin (MUCINEX DM)  MG 12 hr tablet Take 1 tablet by mouth every 12 hours       diazepam (VALIUM) 5 MG tablet Take 1 pill 30 min before MRI and a second pill 2 minutes prior. (Patient not taking: Reported on 10/16/2019) 10 tablet 0     DULoxetine (CYMBALTA) 60 MG capsule Take 1 capsule (60 mg) by mouth daily (Patient taking differently: Take 60 mg by mouth daily (with lunch) ) 90 capsule 3     everolimus (AFINITOR) 5 MG tablet CHEMO Take 1 tablet (5 mg) by mouth daily (Patient not taking: Reported on 10/1/2019) 30 tablet 0     fluticasone (FLONASE) 50 MCG/ACT nasal spray Spray 1 spray into both nostrils daily as needed for rhinitis or allergies       furosemide (LASIX) 20 MG tablet Take 20 mg by mouth daily as needed (fluid retention)        HYDROmorphone (DILAUDID) 2 MG tablet Take 1-2 tablets (2-4 mg) by mouth every 3 hours as needed for pain 60 tablet 0     Lenvatinib 18 MG, 10 mg + 2 x 4 mg capsules, (LENVIMA) 18 mg combo capsule CHEMOTHERAPY  Take 18 mg dose (10 mg plus two 4 mg capsules) by mouth daily. (Patient not taking: Reported on 10/1/2019) 90 capsule 0     lidocaine (LIDODERM) 5 % patch Place 1-3 patches onto the skin every 24 hours Apply to painful areas for 12 hours on, 12 hours off. (Patient taking differently: Place 1-3 patches onto the skin daily as needed Apply to painful areas for 12 hours on, 12 hours off.) 30 patch 3     loratadine (CLARITIN) 10 MG tablet Take 10 mg by mouth daily (with lunch)        medical cannabis (Patient's own supply.  Not a prescription) Cannabis Heater liquid 2 mL orally in AM   Cannabis capsule - 1 capsule daily at bedtime.     (This is NOT a prescription, and does not certify that the patient has a qualifying medical condition for medical cannabis.  The purpose of this order is  to document that the patient reports taking medical cannabis.)       melatonin 5 MG tablet Take 10 mg by mouth At Bedtime       morphine (MS CONTIN) 30 MG CR tablet Takes 30mg in the AM, 15 mg midday and 30mg at bedtime. 90 tablet 0     omeprazole (PRILOSEC) 40 MG DR capsule Take 1 capsule (40 mg) by mouth daily 90 capsule 3     ondansetron (ZOFRAN) 8 MG tablet Take 8 mg by mouth every 8 hours as needed for nausea       polyethylene glycol (MIRALAX/GLYCOLAX) Packet Take 17 g by mouth daily as needed        pregabalin (LYRICA) 50 MG capsule Take 1 capsule (50 mg) by mouth 2 times daily 60 capsule 1     prochlorperazine (COMPAZINE) 5 MG tablet Take 1 tablet (5 mg) by mouth every 6 hours as needed for nausea or vomiting 180 tablet      senna-docusate (SENOKOT-S;PERICOLACE) 8.6-50 MG per tablet Take 2 tablets by mouth 2 times daily       sulfamethoxazole-trimethoprim (BACTRIM DS/SEPTRA DS) 800-160 MG tablet Take 2 tablets by mouth 2 times daily 120 tablet 0     vitamin D3 (CHOLECALCIFEROL) 2000 units (50 mcg) tablet Take 2,000 Units by mouth daily         Allergies  No Known Allergies    Social History  Social History     Socioeconomic  History     Marital status:      Spouse name: Not on file     Number of children: Not on file     Years of education: Not on file     Highest education level: Not on file   Occupational History     Not on file   Social Needs     Financial resource strain: Not on file     Food insecurity:     Worry: Not on file     Inability: Not on file     Transportation needs:     Medical: Not on file     Non-medical: Not on file   Tobacco Use     Smoking status: Never Smoker     Smokeless tobacco: Never Used   Substance and Sexual Activity     Alcohol use: Not Currently     Drug use: Yes     Types: Marijuana     Comment: medical cannabis      Sexual activity: Not on file   Lifestyle     Physical activity:     Days per week: Not on file     Minutes per session: Not on file     Stress: Not on file   Relationships     Social connections:     Talks on phone: Not on file     Gets together: Not on file     Attends Advent service: Not on file     Active member of club or organization: Not on file     Attends meetings of clubs or organizations: Not on file     Relationship status: Not on file     Intimate partner violence:     Fear of current or ex partner: Not on file     Emotionally abused: Not on file     Physically abused: Not on file     Forced sexual activity: Not on file   Other Topics Concern     Not on file   Social History Narrative     Not on file       Family History  Family History   Problem Relation Age of Onset     Lung Cancer Maternal Grandmother        ROS/MED HX  The complete review of systems is negative other than noted in the HPI or here.  Patient denies recent illness, fever and respiratory infection during past month.  Pt reports burning rash on left lower buttock & superior posterior thigh, started 2 days ago.  Pt denies steroid use during past year.    ENT/Pulmonary:     (+), recent URI resolved 8/8/19: . Other pulmonary disease pulmonary nodule, treated for pneumonia in August, on Bactrim prophylaxis .    (-) tobacco use, asthma and COPD   Neurologic:  - neg neurologic ROS    (-) seizures, CVA and TIA   Cardiovascular:     (+) Dyslipidemia, ----. : . . . :. . Previous cardiac testing Echodate:2017results:Stress Testdate:2017 results:ECG reviewed date:3/14/19 results: date: results:          METS/Exercise Tolerance:  3 - Able to walk 1-2 blocks without stopping   Hematologic:     (+) Anemia, - transfused on 10/2/19 for Hgb 7.7    (-) history of blood clots    Musculoskeletal: Comment: Edema of left leg, improved w/ wrapping, takes lasix prn, has been elevating leg more.  (+) arthritis,  other musculoskeletal- compression fracture L3, left leg/hip pain, shoulder pain       GI/Hepatic: Comment: Upset stomach, difficulty swallowing foods - eating soft diet only, decreased appetite. Stopped celebrex 2 days ago, nausea has already improved, eating more solid foods.        Renal/Genitourinary:  - ROS Renal section negative       Endo:  - neg endo ROS       Psychiatric:     (+) psychiatric history anxiety      Infectious Disease:  - neg infectious disease ROS       Malignancy:   (+) Malignancy History of Other  Other CA Renal cell carcinoma metastatic to bone Active status post Chemo and Radiation         Other: Comment: On bactrim prophylaxis for prior pneumonia in 8/2019   (+) H/O Chronic Pain,H/O chronic opiod use , no other significant disability              PHYSICAL EXAM:   Mental Status/Neuro: A/A/O; Age Appropriate   Airway: Facies: Feasible  Mallampati: III  Mouth/Opening: Full  TM distance: > 6 cm  Neck ROM: Limited   Respiratory: Auscultation: CTAB     Resp. Rate: Normal     Resp. Effort: Normal      CV: Rhythm: Regular  Rate: Age appropriate  Heart: Normal Sounds  Edema: None   Comments:      Dental: Normal Dentition              Preop Vitals    BP Readings from Last 3 Encounters:   10/16/19 109/68   10/02/19 116/64   10/01/19 132/83    Pulse Readings from Last 3 Encounters:   10/16/19 55   10/02/19 52  "  10/01/19 70      Resp Readings from Last 3 Encounters:   10/16/19 12   10/02/19 16   10/01/19 14    SpO2 Readings from Last 3 Encounters:   10/16/19 98%   10/01/19 97%   09/25/19 97%      Temp Readings from Last 1 Encounters:   10/16/19 97.8  F (36.6  C) (Oral)    Ht Readings from Last 1 Encounters:   10/16/19 1.778 m (5' 10\")      Wt Readings from Last 1 Encounters:   10/16/19 93 kg (205 lb)    Estimated body mass index is 29.41 kg/m  as calculated from the following:    Height as of this encounter: 1.778 m (5' 10\").    Weight as of this encounter: 93 kg (205 lb).       Temp: 97.8  F (36.6  C) Temp src: Oral BP: 109/68 Pulse: 55   Resp: 12 SpO2: 98 %         205 lbs 0 oz  5' 10\"   Body mass index is 29.41 kg/m .    Physical Exam  Constitutional: Awake, alert, cooperative, no apparent distress, and appears stated age.  Eyes: Pupils equal, round and reactive to light, extra ocular muscles intact, sclera clear, conjunctiva normal.  HENT: Normocephalic, oral pharynx with moist mucus membranes, good dentition. No goiter appreciated. No removable dental hardware.  Respiratory: Clear to auscultation bilaterally, no crackles or wheezing. No SOB when supine.  Cardiovascular: Regular rate and rhythm, normal S1 and S2, and no murmur noted.  Carotids +2, no bruits. No edema. Palpable pulses to radial, DP and PT arteries.   GI: Normal bowel sounds, soft, non-distended, non-tender, no masses palpated, no hepatomegaly.    Lymph/Hematologic: No cervical lymphadenopathy and no supraclavicular lymphadenopathy.  Genitourinary:  deferred  Skin: Warm and dry. +Pallor.  No rashes at anticipated surgical site. L lower buttock and superior aspect of posterior thigh w/ scattered circular lesions (largest ~ 0.5 cm), no blisters, skin intact.  Musculoskeletal: limited extension ROM of neck. There is no redness, warmth, or swelling of the joints. Gross motor strength is normal.    Neurologic: Awake, alert, oriented to name, place and time. " Cranial nerves II-XII are grossly intact. Gait is normal. Ambulates from chair to exam table, seats self, lies supine and sits back up w/o assistance.  Neuropsychiatric: Calm, cooperative. Normal affect. Pleasant. Answers questions appropriately, follows commands w/o difficulty.    PRIOR LABS/DIAGNOSTIC STUDIES:  All labs and imaging personally reviewed     MR LUMBAR SPINE W/O & W CONTRAST 9/24/2019:  Impression: Images are mildly degraded secondary to patient motion.   1. Mildly decreased size of epidural enhancement at L1, measuring up  to 4 mm, 6 mm previously. Similar appearance of paravertebral and  epidural from L2 through L5.  2. Unchanged diffuse heterogeneous osseous metastatic disease from L1  through the sacrum, most pronounced at L1 and L3.  3. Stable vertebral body height loss at L3, secondary to pathologic  vertebral body fracture. Findings contribute to moderate to severe  left and moderate right neural foraminal stenosis at this level.     EKG 9/3/19:  Sinus rhythm  Prolonged QT  Abnormal ECG  Ventricular rate 81 bpm     ECHOCARDIOGRAM 2017:  Interpretation Summary  The left ventricle is normal size.  The left ventricular systolic function is normal.  The ejection fraction was estimated at 65%.  There is mild concentric hypertrophy.  There was mild left atrial enlargement.  The aortic valve is mildly sclerotic.  Mitral valve leaflets appear thickened.  There is trace mitral regurgitation.  Trace or physiologic pulmonic regurgitation.  Trace or physiologic tricuspid regurgitation.  There was mild enlargement of the proximal ascending aorta which measured 40 mm. The aortic arch dimension was normal.  The interatrial septum was aneurysmal.  There is no evidence of an atrial septal defect or patent foramen ovale by agitated saline injection or color flow doppler.     EXERCISE STRESS TEST 2017:  CONCLUSIONS:  1. No stress echocardiographic findings to confirm myocardial ischemia.  2. Normal left  ventricular size and systolic function at rest with an estimated ejection fraction of 55%.  3. Mild left ventricular hypertrophy.  4. Mild left atrial enlargement.  5. Interatrial septum aneurysm with possible PFO and left-to-right shunt (see text).  6. Mild mitral regurgitation.  7. Trivial tricuspid regurgitation.  8. See previous complete transthoracic echocardiogram from 07/16/2017 for additional Doppler detail.  9. Negative stress electrocardiogram for ischemia.  10. Preserved functional aerobic capacity without symptoms of angina.    LABS:  CBC:   Lab Results   Component Value Date    WBC 4.9 10/02/2019    WBC 3.1 (L) 09/24/2019    HGB 7.7 (L) 10/02/2019    HGB 7.4 (L) 09/24/2019    HCT 24.2 (L) 10/02/2019    HCT 25.1 (L) 09/24/2019     10/02/2019     09/24/2019     BMP:   Lab Results   Component Value Date     10/02/2019     09/24/2019    POTASSIUM 4.7 10/02/2019    POTASSIUM 5.3 09/24/2019    CHLORIDE 109 10/02/2019    CHLORIDE 109 09/24/2019    CO2 21 10/02/2019    CO2 23 09/24/2019    BUN 9 10/02/2019    BUN 8 09/24/2019    CR 1.03 10/02/2019    CR 1.04 09/24/2019    GLC 99 10/02/2019    GLC 83 09/24/2019     COAGS:   Lab Results   Component Value Date    INR 1.25 (H) 09/04/2019     POC: No results found for: BGM, HCG, HCGS  OTHER:   Lab Results   Component Value Date    LACT 1.5 09/03/2019    AUREA 8.5 10/02/2019    PHOS 3.0 09/07/2019    MAG 1.6 10/02/2019    ALBUMIN 2.8 (L) 10/02/2019    PROTTOTAL 6.1 (L) 10/02/2019    ALT 9 10/02/2019    AST 24 10/02/2019    ALKPHOS 89 10/02/2019    BILITOTAL 0.2 10/02/2019    LIPASE 85 08/07/2019    AMYLASE 27 (L) 08/07/2019    TSH 5.76 (H) 09/24/2019    T4 0.76 09/24/2019    CRP 26.5 (H) 09/24/2019    SED 87 (H) 03/29/2019        Labs today: none (pt will have CBC drawn w/ PCP next week. Will order T&S to be drawn DOS.)    Outside records reviewed from: Care Everywhere    ASSESSMENT and PLAN  Javon Bianchi is a 69 year old male scheduled to  undergo Kyphoplasty-Radiofrequency ablation Lumbar 1; Minimally Invasive Surgery  left hemilaminectomy Lumbar 3-Lumbar 4 with Vickey Brower MD on 11/1/19 at [facility] for treatment of Metastatic cancer to spine w/ Lumbar radiculopathy .     He has the following specific operative considerations:     Pt has had prior anesthetic. Type: General    No history of anesthetic complications  - Anesthesia considerations:  Refer to PAC assessment in anesthesia records  - Risk of PONV score = 2.  If > 2, anti-emetic intervention recommended.  - On chronic opiates, morphine equivalent = 75mg/day (if > 60mg/24 hr--> needs pain team consult)   ** See pharmacy note regarding periop pain control **     CARDIAC: METS 3 - Able to walk 1-2 blocks without stopping      - RCRI : .  Intermediate risk surgery with 0.4% (RCRI #) risk of major adverse cardiac event.  ~ The patient has no cardiac diagnosis. His METS are decreased secondary to his bone metastasis. He has had stress test in 2016 and stress echo in 2017 without ischemia. EF was 55% in 2017. He had an EKG in 3/14/19 with sinus rhythm. He denies any cardiac symptoms.        PULMONARY:    - AGNIESZKA 2/8 = low risk    - Never smoked    - No asthma    - Treated for pneumonia in August, now on Bactrim prophylaxis     GI: denies GERD. Has struggled w/ decreased appetite & wt loss, nausea. Stopped celebrex 2 days ago and notes significant decrease in nausea. Able to eat slightly more.     ENDO: BMI 29    - No DM     RENAL: Renal cell carcinoma - treated with radiation and Cabozantinib and Nivolumab. Followed by oncology.    HEME/IMMUNE: VTE risk: 3%    - Anemia, received PRBCs on 10/2/19 for Hgb 7.7. Pt will have CBC drawn w/ PCP preoperatively. Will obtain T&S on DOS.    - Immunosuppressive meds stopped in anticipation of surgery     ORTHO: limited neck extension ROM, no TMJ    - Chronic LBP, arthritis in shoulders    - Cancer mets to bone  w/ pathologic fracture of vertebra     SKIN:   - Burning rash on left lower buttock & superior posterior thigh, started 2 days ago (possible shingles?). Dr Richards will consult w/ Dr. Brower later today regarding surgery date if rash determined to be viral. Pt will f/u w/ PCP tomorrow to confirm diagnosis &/or start treatment if warranted.     Patient was discussed with Dr Richards. Will plan for surgery as scheduled w/ final decision pending etiology of above mentioned rash. Patient is otherwise optimized and is acceptable candidate for the proposed procedure. No further diagnostic evaluation is needed.    Arrival time, NPO, shower and medication instructions provided by nursing staff today.  Preparing For Your Surgery handout given.    Tasha Culp PA-C  Preoperative Assessment Center  [facility]  Clinic and Surgery Center  Phone: [phone]  Fax: [phone]

## 2019-10-16 NOTE — PHARMACY - PREOPERATIVE ASSESSMENT CENTER
PREOPERATIVE PAIN CONSULT FOR POSTOPERATIVE PAIN MANAGEMENT  Javon Bianchi was seen and interviewed during PAC Clinic appointment on October 16, 2019 in preparation for the planned procedure with Dr. Brower on 11/1/19 at the [facility] for Kyphoplasty-Radiofrequency ablation Lumbar 1; Minimally Invasive Surgery  left hemilaminectomy Lumbar 3-Lumbar 4.    These recommendations are intended for patients admitted to the hospital after a procedure and are only valid for 30 days from the date of service.  At this time it is unclear if patient will be admitted or a same day procedure (documented differently in periop worksheet and scheduled procedure).  If there are significant changes in opioid dosing between today and day of procedure the below recommendations may have to be adjusted. Based on [place] Prescription Monitoring Profile and patient interview:     - OUTPATIENT MEDICATIONS (related to pain management):  -- Long-acting opioid: MS Contin 30 mg AM, 15 mg mid-day, 30 mg at betdime.   -- Short-acting opioid: hydromorphone 2-4 mg PO q3hr PRN (rarely uses, hasn't used in past 2 weeks per report)  -- Oral adjuvant(s): celecoxib - currently on hold d/t  Nausea, may resume in future.   Duloxetine, acetaminophen, pregabalin  -- Topicals: lidoderm prn  -- Bowel regimen: senna/docusate scheduled and PRN miralax.   -- Other relevant medications: medical cannabis      ASSESSMENT:    Javon Bianchi has a history of R side RCC with mets to spine, spinal stenosis, pathalogic fracture at L3 fvertebrae, OA, and back and joint pain.  He is followed by Dr. Mills with palliative care and is opioid tolerant, on relatively high doses of opioid analgesic at stable doses.  He has been working on tapering his MS Contin.  Toady he reports pain is worst in his back, constant and aching.  States his pain gets worse with more movement and activity and if he stays in the right position and paces himself  he has little to no back pain. He also has multiple joint arthritic pain that can get bad at times.  He denies any sedation or breathing issues with his pain meds. Endorses some constipation but has a good regimen at home to manage this.  He was under the impression his surgery was a same day procedure with discharge home afterwards, however it is scheduled in [facility] as a surgery admission.  We discussed that we would defer to surgery team if discharging same day but that we could put in recommendatons for postop pain management if he is to stay in the hospital.      Outpatient opioid oral morphine equivalent (OME): 75 mg OME/day        RECOMMENDATIONS:   The following pain management recommendations are made based on information from today's visit and should not replace medical decision-making based on patient condition at the time of procedure or postoperatively.      - PREOPERATIVE:  -- Long acting opioid - MS Contin. Take usual dose(s) on the morning of procedure.  -- Before procedure recommend acetaminophen 975 mg PO in pre-op     - INTRAOPERATIVE (Anesthesiologist/CRNA to consider):   -- Avoid remifentanil - to reduce risk of developing hyperalgesia    - POSTOPERATIVE MANAGEMENT:  **If patient is admitted to hospital please consider the following postop recommendations and please place inpatient pain management consult or consult palliative care team who follows patient as an outpatient to assist with acute postoperative pain management.    Opioid analgesic:  + Continue MS Contin 30 mg at 6AM, 15 mg at 14:00 and 30 mg at 22:00.   + Start hydromorphone 2-4 mg PO q3hr PRN moderate-severe pain  + If pain difficult to control could add hydromorphone 0.2-0.4 mg IV every 2 hr PRN breakthrough pain.  Would only do this for the first night after surgery then stop in AM.     Nonopioid analgesics:   + Defer use of NSAID to surgeon.  + Start acetaminophen 975 mg PO every 6 hr scheduled if no concern about masking fevers  +  Continue home dose of pregabalin 50 mg PO BID   + Initiate lidocaine 4% patch - 1-3 patches every 24 hours scheduled (12 hr on 12 hr off). Apply to intact skin only.    + Initiate menthol 5% patch - 1 patch every 8 hours PRN pain.  Apply to intact skin only.     Muscle Relaxant:   + If necessary could initiate methocarbamol 500 mg PO q8hr PRN muscle spasms. Would limit duration as much as able.      Stool softeners/Laxatives:   + When appropriate start senna-docusate 1-2 tabs PO BID and Miralax 17 g daily to prevent opioid induced constipation.     Other:  + Recommend close monitoring of respiratory status x24 hr postoperatively with capnography and continuous SpO2 monitoring.     The inpatient pain service will follow up on patient after consult is placed and offer further recommendations for pain management.  If immediate assistance is needed please contact the pain service at the number below.     Dylan Montiel [facility]  October 16, 2019  2:16 PM    If questions or concerns, please contact the Inpatient Pain Management Service:  Call [phone] after hours, weekends and holidays.   Page [phone] from 8 AM - 3 PM Mon - Fri.

## 2019-10-16 NOTE — TELEPHONE ENCOUNTER
See PAC note of today for H&P.   stated has Shingles.    Reviewed with  who ordered to EVAL. AGAIN before surgery Nov 1 due to infection risk.  I called pt & informed him  Pt aware to call surgery scheduler back when he has been cleared by treating provider that Shingles is gone.  Pt agreed.  T.O.R.B./Vaishnavi Smith RN.

## 2019-10-16 NOTE — PROGRESS NOTES
"       HPI       Javon Bianchi is a 69 year old man who presents for the new concern(s) of a rash on his left buttock and upper thigh posteriorly.   Chief Complaint   Patient presents with     Shingles     Pt is here to discuss shingles on back of L thigh x2 days     Concern: Rash  Description of the problem :has stage 4 renal cell cancer, chronic low back pain due to tumor     Problem, Medication and Allergy Lists were reviewed and updated if needed.    Patient is an established patient of this clinic.         Review of Systems:   Review of Systems     Constitutional:  Negative for fever, chills and fatigue.   Skin:  Positive for rash.               Physical Exam:     Vitals:    10/16/19 1532   BP: 130/68   Pulse: 63   SpO2: 98%   Weight: 93 kg (205 lb)   Height: 1.778 m (5' 10\")     Body mass index is 29.41 kg/m .  Vitals were reviewed and were normal     Physical Exam  Constitutional:       Appearance: Normal appearance.   HENT:      Head: Normocephalic.   Musculoskeletal: Normal range of motion.   Skin:     General: Skin is warm and dry.      Findings: Rash present.             Comments: Clusters of red, raised rash. Rash started as vesicular, is now drying. A few scabbed areas are present.    Neurological:      General: No focal deficit present.      Mental Status: He is alert and oriented to person, place, and time.   Psychiatric:         Mood and Affect: Mood normal.         Behavior: Behavior normal.         Results:     No diagnostics ordered.     Assessment and Plan     1. Herpes zoster with other complication    - valACYclovir (VALTREX) 1000 mg tablet; Take 1 tablet (1,000 mg) by mouth 3 times daily for 7 days  Dispense: 21 tablet; Refill: 0    2. Personal history of renal cell cancer    There are no discontinued medications.  I believe this does represent a shingles rash. I want to treat with oral Valtrex, however I want to clear this with Oncology first. I will call and message Dr. Anne. Patient " instructions: first, please await news on Rx. Next, please return on 10/28 for clearance for surgery. Next, please call if you have any questions. Thank you. Send the Rx to pharmacy on [address]. Options for treatment and follow-up care were reviewed with the patient. Javon Bianchi  engaged in the decision making process and verbalized understanding of the options discussed and agreed with the final plan.  SOBIA Nunez, CNP  Addendum 1703: I talked with Dr. Anne. He does not have any concerns about starting the Valtrex. I called and talked with Omega Acharya' wife. Omega will start the Valtrex tonight. I will see Omega in follow up to clear him for surgery on 10/28. She verbalizes understanding of the plan.   SOBIA Nunez, CNP

## 2019-10-16 NOTE — PROGRESS NOTES
Preoperative Assessment Center medication history for October 16, 2019 is complete.    See Epic admission navigator for prior to admission medications.   Operating room staff will still need to confirm medications and last dose information on day of surgery.     Medication history interview sources:  patient, patient's wife, patient's own med list.     Changes made to PTA medication list (reason)  Added: mucinex, flonase, zofran  Deleted: none   Changed: medical cannabis, calcium vit d, celecoxib, melatonin, miralax, lasix,     Additional medication history information (including reliability of information, actions taken by pharmacist):  -- Not taking everolimus or Lenvatinib as of yet, may start in the future.   -- No recent (within 30 days) course of steroids  -- No recent (within 30 days) chronic daily medications stopped   -- Patient declines being on any other prescription or over-the-counter medications    Prior to Admission medications    Medication Sig Last Dose Taking? Auth Provider   acetaminophen (TYLENOL) 500 MG tablet Take 1,000 mg by mouth 3 times daily  Taking Yes Reported, Patient   calcium carbonate 600 mg-vitamin D 400 units (CALTRATE) 600-400 MG-UNIT per tablet Take 1 tablet by mouth 3 times daily Taking Yes Unknown, Entered By History   Dextromethorphan-guaiFENesin (MUCINEX DM)  MG 12 hr tablet Take 1 tablet by mouth every 12 hours Taking Yes Reported, Patient   DULoxetine (CYMBALTA) 60 MG capsule Take 1 capsule (60 mg) by mouth daily  Patient taking differently: Take 60 mg by mouth daily (with lunch)  Taking Yes Carly Mills MD   fluticasone (FLONASE) 50 MCG/ACT nasal spray Spray 1 spray into both nostrils daily as needed for rhinitis or allergies Taking Yes Unknown, Entered By History   furosemide (LASIX) 20 MG tablet Take 20 mg by mouth daily as needed (fluid retention)  Taking Yes Reported, Patient   HYDROmorphone (DILAUDID) 2 MG tablet Take 1-2 tablets (2-4 mg) by mouth  every 3 hours as needed for pain Taking Yes Carly Mills MD   lidocaine (LIDODERM) 5 % patch Place 1-3 patches onto the skin every 24 hours Apply to painful areas for 12 hours on, 12 hours off.  Patient taking differently: Place 1-3 patches onto the skin daily as needed Apply to painful areas for 12 hours on, 12 hours off. Taking Yes Carly Mills MD   loratadine (CLARITIN) 10 MG tablet Take 10 mg by mouth daily (with lunch)  Taking Yes Reported, Patient   medical cannabis (Patient's own supply.  Not a prescription) Cannabis Heater liquid 2 mL orally in AM   Cannabis capsule - 1 capsule daily at bedtime.     (This is NOT a prescription, and does not certify that the patient has a qualifying medical condition for medical cannabis.  The purpose of this order is  to document that the patient reports taking medical cannabis.) Taking Yes Reported, Patient   melatonin 5 MG tablet Take 10 mg by mouth At Bedtime Taking Yes Unknown, Entered By History   morphine (MS CONTIN) 30 MG CR tablet Takes 30mg in the AM, 15 mg midday and 30mg at bedtime. Taking Yes Carly Mills MD   omeprazole (PRILOSEC) 40 MG DR capsule Take 1 capsule (40 mg) by mouth daily Taking Yes Hua Anne MD   ondansetron (ZOFRAN) 8 MG tablet Take 8 mg by mouth every 8 hours as needed for nausea Taking Yes Unknown, Entered By History   polyethylene glycol (MIRALAX/GLYCOLAX) Packet Take 17 g by mouth daily as needed  Taking Yes Reported, Patient   pregabalin (LYRICA) 50 MG capsule Take 1 capsule (50 mg) by mouth 2 times daily Taking Yes Carly Mills MD   prochlorperazine (COMPAZINE) 5 MG tablet Take 1 tablet (5 mg) by mouth every 6 hours as needed for nausea or vomiting Taking Yes Carly Mills MD   senna-docusate (SENOKOT-S;PERICOLACE) 8.6-50 MG per tablet Take 2 tablets by mouth 2 times daily Taking Yes Reported, Patient   sulfamethoxazole-trimethoprim (BACTRIM DS/SEPTRA DS) 800-160 MG  tablet Take 2 tablets by mouth 2 times daily Taking Yes Hua Anne MD   vitamin D3 (CHOLECALCIFEROL) 2000 units (50 mcg) tablet Take 2,000 Units by mouth daily Taking Yes Unknown, Entered By History   celecoxib (CELEBREX) 100 MG capsule Take 1 capsule (100 mg) by mouth 2 times daily as needed for moderate pain  Patient not taking: Reported on 10/16/2019 Not Taking  Carly Mills MD   diazepam (VALIUM) 5 MG tablet Take 1 pill 30 min before MRI and a second pill 2 minutes prior.  Patient not taking: Reported on 10/16/2019 Not Taking  Ruthie St PA-C   everolimus (AFINITOR) 5 MG tablet CHEMO Take 1 tablet (5 mg) by mouth daily  Patient not taking: Reported on 10/1/2019 Not Taking  Hua Anne MD   Lenvatinib 18 MG, 10 mg + 2 x 4 mg capsules, (LENVIMA) 18 mg combo capsule CHEMOTHERAPY Take 18 mg dose (10 mg plus two 4 mg capsules) by mouth daily.  Patient not taking: Reported on 10/1/2019 Not Taking  Hua Anne MD          Medication history completed by: Dylan Montiel, [facility]

## 2019-10-17 NOTE — TELEPHONE ENCOUNTER
Oncology RN Care Coordination Note:     Patient's wife left the following symptoms on writer's voicemail:     Patient is complaining of itching, burning and pain.  She didn't state where, with what, when or how long.     Writer is assuming this is related to shingles he was diagnosed with yesterday afternoon by upstairs at the nurse practitioners clinic.      Writer will route this call to triage and the provider the patient saw yesterday.     Clair López RN BSN   [facility]  Nurse Coordinator

## 2019-10-28 NOTE — LETTER
10/28/2019      RE: Javon Bianchi  [address]  [place] [address]       [facility]  [address]  [address]  [facility] [address] [phone]  Dept: [phone]    PRE-OP EVALUATION:  10/28/19    Javon Bianchi is 69 year old male presents for pre-operative evaluation assessment as requested by ..  He  requires evaluation and anesthesia risk assessment prior to undergoing surgery/procedure for treatment of chronic low back pain.  Omega presents for pre-operative H&P in preparation for Kyphoplasty-Radiofrequency ablation Lumbar 1; Minimally Invasive Surgery  left hemilaminectomy Lumbar 3-Lumbar 4 with Vickey Brower MD on (date undetermined) at [facility] for treatment of Metastatic cancer to spine w/ Lumbar radiculopathy . Renal cell cancer with mets to bilateral hips and low leg.      History of Present Illness   HPI related to upcoming procedure:     See problem list for active medical problems.  Problems all longstanding and stable, except as noted/documented.  See ROS for pertinent symptoms related to these conditions.      Surgery Information     Primary Care Provider: Clinics, [facility]  Proposed Surgery/Procedure: lumbar kyphoplasty  Date of Surgery/Procedure: TBD  Time of Surgery/Procedure: TBD  Hospital/Surgical Facility: [facility]  Type of Anesthesia Anticipated: General    Preoperative Screening Questions     Patient has a Health Care Directive or Living Will:  YES     1. NO - Do you have a history of heart attack, stroke, stent, bypass or surgery on an artery in the head, neck, heart or legs?  2. NO - Do you ever have any pain or discomfort in your chest?  3. NO - Do you have a history of  Heart Failure?  4. Yes - Are you troubled by shortness of breath when: walking on the level, up a slight hill or at night?  5. NO - Do you currently have a cold, bronchitis or other respiratory  infection?  6. NO - Do you have a cough, shortness of breath or wheezing?  7. NO - Do you sometimes get pains in the calves of your legs when you walk?  8. NO - Do you or anyone in your family have previous history of blood clots?  9. NO - Do you or does anyone in your family have a serious bleeding problem such as prolonged bleeding following surgeries or cuts?  10. Yes - Have you ever had problems with anemia or been told to take iron pills? Due to renal cell ca with bone mets  11. NO - Have you had any abnormal blood loss such as black, tarry or bloody stools, or abnormal vaginal bleeding?  12. Yes - Have you ever had a blood transfusion? Due to anemia from cancer  13. NO - Have you or any of your relatives ever had problems with anesthesia?  14. NO - Do you have sleep apnea, excessive snoring or daytime drowsiness?  15. NO - Do you have any prosthetic heart valves?  16. NO - Do you have prosthetic joints?  17. NO - Is there any chance that you may be pregnant?       Audit C Alcohol Screening Tool  AUDIT   Questions 0 1 2 3 4 Score   1. How often do you have a drink  containing alcohol? Never Monthly or less 2 to 4  times a  month 2 to 3  times a  week 4 or more  times a  week 0   2. How many drinks containing alcohol  do you have on a typical day when you are drinking? 1 or 2 3 or 4 5 or 6 7 to 9 10 or more 0   3. How often do you have more than five  or more drinks on one occasion? Never Less  than  monthly Monthly Weekly Daily or  almost  daily 0   Scoring:   A score of 4 for adult men or 3 for adult women is an indication of hazardous drinking (risk for physical or physiological harm).   A score of 5 or more for adult men or 4 or more for adult women is an indication of an alcohol use disorder.      Medical History     Past Medical History:   Diagnosis Date     Anemia      Bone metastasis (H) 09/28/2018     Cough      Pulmonary nodule      Renal cell carcinoma, right (H) 09/28/2018       Past Surgical  History:   Procedure Laterality Date     BRONCHOSCOPY (RIGID OR FLEXIBLE), DIAGNOSTIC N/A 9/4/2019    Procedure: Bronchoscopy, With Bronchoalveolar Lavage;  Surgeon: Burton Toure MD;  Location: [facility]     CHOLECYSTECTOMY       HC REMOVAL HEEL SPUR, CALCANEUS  2004        Problem (# of Occurrences) Relation (Name,Age of Onset)    Lung Cancer (1) Maternal Grandmother          Social History     Tobacco Use     Smoking status: Never Smoker     Smokeless tobacco: Never Used   Substance Use Topics     Alcohol use: Not Currently     History   Drug Use     Types: Marijuana     Comment: medical cannabis        Current Outpatient Medications   Medication     acetaminophen (TYLENOL) 500 MG tablet     calcium carbonate 600 mg-vitamin D 400 units (CALTRATE) 600-400 MG-UNIT per tablet     Dextromethorphan-guaiFENesin (MUCINEX DM)  MG 12 hr tablet     fluticasone (FLONASE) 50 MCG/ACT nasal spray     furosemide (LASIX) 20 MG tablet     HYDROmorphone (DILAUDID) 2 MG tablet     lidocaine (LIDODERM) 5 % patch     loratadine (CLARITIN) 10 MG tablet     medical cannabis (Patient's own supply.  Not a prescription)     melatonin 5 MG tablet     morphine (MS CONTIN) 15 MG CR tablet     morphine (MS CONTIN) 30 MG CR tablet     omeprazole (PRILOSEC) 40 MG DR capsule     ondansetron (ZOFRAN) 8 MG tablet     polyethylene glycol (MIRALAX/GLYCOLAX) Packet     pregabalin (LYRICA) 50 MG capsule     prochlorperazine (COMPAZINE) 5 MG tablet     senna-docusate (SENOKOT-S;PERICOLACE) 8.6-50 MG per tablet     sulfamethoxazole-trimethoprim (BACTRIM DS/SEPTRA DS) 800-160 MG tablet     vitamin D3 (CHOLECALCIFEROL) 2000 units (50 mcg) tablet     diazepam (VALIUM) 5 MG tablet     DULoxetine (CYMBALTA) 60 MG capsule     everolimus (AFINITOR) 5 MG tablet CHEMO     Lenvatinib 18 MG, 10 mg + 2 x 4 mg capsules, (LENVIMA) 18 mg combo capsule CHEMOTHERAPY     valACYclovir (VALTREX) 1000 mg tablet     No current facility-administered medications for  "this visit.        OTC products: None, except as noted above    No Known Allergies    Latex Allergy: NO      Review Of Systems   Constitutional, neuro, ENT, endocrine, pulmonary, cardiac, gastrointestinal, genitourinary, musculoskeletal, integument and psychiatric systems are negative, except as otherwise noted.    Exam     Patient Vitals for the past 24 hrs:   BP Temp Pulse SpO2 Height Weight   10/28/19 1112 129/73 97.3  F (36.3  C) 66 96 % 1.778 m (5' 10\") 88.6 kg (195 lb 4.8 oz)     Body mass index is 28.02 kg/m .    GENERAL APPEARANCE: healthy, alert and no distress     EYES: EOMI,  PERRL     HENT: ear canals and TM's normal and nose and mouth without ulcers or lesions     NECK: no adenopathy, no asymmetry, masses, or scars and thyroid normal to palpation     RESP: lungs clear to auscultation - no rales, rhonchi or wheezes     CV: regular rates and rhythm, normal S1 S2, no S3 or S4 and no murmur, click or rub     ABDOMEN:  soft, nontender, no HSM or masses and bowel sounds normal     MS: extremities normal- no gross deformities noted, no evidence of inflammation in joints, FROM in all extremities.     SKIN: no suspicious lesions or rashes     NEURO: Normal strength and tone, sensory exam grossly normal, mentation intact and speech normal     PSYCH: mentation appears normal. and affect normal/bright     LYMPHATICS: No cervical adenopathy    Diagnostics   Completed 09/03/2019---EKG: Normal Sinus Rhythm, Prolonged QT     Risk Assessment     Surgical Risk:  The proposed surgical procedure is considered LOW risk.    Revised Cardiac Risk Index  The patient has the following serious cardiovascular risks for perioperative complications such as (MI, PE, VFib and 3  AV Block):  No serious cardiac risks    INTERPRETATION: 0 risks: Class I (very low risk - 0.4% complication rate)    [facility] Activity Status Index  Total Points:  Total METs:    Functional capacity is classified as:  Excellent: >10 METs  Good: 7-10 METs "   Moderate: 4 - 6 METs  Poor: <4 METs    The patient has the following additional risks for perioperative complications:  No identified additional risks    Impression     For above listed surgery and anesthesia:   Patient is at low risk for surgery/procedure and perioperative/procedure complications.    Recommendations   Shingles are all scabbed over, no concerns.   Approved to have procedure. I will forward this note to Surgeon.   --Consult hospital rounder / IM to assist post-op medical management    --Patient is to take hold all scheduled medications on the day of surgery    ICD-10-CM    1. Pre-operative examination Z01.818 CBC with platelets     Comprehensive metabolic panel   2. Chronic midline low back pain without sciatica M54.5     G89.29    3. Herpes zoster without complication B02.9        GENERAL PREOP INSTRUCTIONS:    Patient is at low risk for surgery/procedure and perioperative/procedure complications.    Signed Electronically by: Amy Ace NP    A copy of this evaluation report will be provided to requesting physician.   Please contact our office if there are any further questions or information required about this patient.    [place] Preop Guidelines    ACC/AHA Guidelines    SOBIA Nunez, CNP

## 2019-10-28 NOTE — PATIENT INSTRUCTIONS

## 2019-10-28 NOTE — PROGRESS NOTES
[facility]  [address]  5th Floor  [facility] [address] [phone]  Dept: [phone]    PRE-OP EVALUATION:  10/28/19    Javon Bianchi is 69 year old male presents for pre-operative evaluation assessment as requested by ..  He  requires evaluation and anesthesia risk assessment prior to undergoing surgery/procedure for treatment of chronic low back pain.  Omega presents for pre-operative H&P in preparation for Kyphoplasty-Radiofrequency ablation Lumbar 1; Minimally Invasive Surgery  left hemilaminectomy Lumbar 3-Lumbar 4 with Vickey Brower MD on (date undetermined) at [facility] for treatment of Metastatic cancer to spine w/ Lumbar radiculopathy . Renal cell cancer with mets to bilateral hips and low leg.      History of Present Illness   HPI related to upcoming procedure:     See problem list for active medical problems.  Problems all longstanding and stable, except as noted/documented.  See ROS for pertinent symptoms related to these conditions.      Surgery Information     Primary Care Provider: Clinics, [facility]  Proposed Surgery/Procedure: lumbar kyphoplasty  Date of Surgery/Procedure: TBD  Time of Surgery/Procedure: TBD  Hospital/Surgical Facility: [facility]  Type of Anesthesia Anticipated: General    Preoperative Screening Questions     Patient has a Health Care Directive or Living Will:  YES     1. NO - Do you have a history of heart attack, stroke, stent, bypass or surgery on an artery in the head, neck, heart or legs?  2. NO - Do you ever have any pain or discomfort in your chest?  3. NO - Do you have a history of  Heart Failure?  4. Yes - Are you troubled by shortness of breath when: walking on the level, up a slight hill or at night?  5. NO - Do you currently have a cold, bronchitis or other respiratory infection?  6. NO - Do you have a cough, shortness of breath or wheezing?  7. NO - Do  you sometimes get pains in the calves of your legs when you walk?  8. NO - Do you or anyone in your family have previous history of blood clots?  9. NO - Do you or does anyone in your family have a serious bleeding problem such as prolonged bleeding following surgeries or cuts?  10. Yes - Have you ever had problems with anemia or been told to take iron pills? Due to renal cell ca with bone mets  11. NO - Have you had any abnormal blood loss such as black, tarry or bloody stools, or abnormal vaginal bleeding?  12. Yes - Have you ever had a blood transfusion? Due to anemia from cancer  13. NO - Have you or any of your relatives ever had problems with anesthesia?  14. NO - Do you have sleep apnea, excessive snoring or daytime drowsiness?  15. NO - Do you have any prosthetic heart valves?  16. NO - Do you have prosthetic joints?  17. NO - Is there any chance that you may be pregnant?       Audit C Alcohol Screening Tool  AUDIT   Questions 0 1 2 3 4 Score   1. How often do you have a drink  containing alcohol? Never Monthly or less 2 to 4  times a  month 2 to 3  times a  week 4 or more  times a  week 0   2. How many drinks containing alcohol  do you have on a typical day when you are drinking? 1 or 2 3 or 4 5 or 6 7 to 9 10 or more 0   3. How often do you have more than five  or more drinks on one occasion? Never Less  than  monthly Monthly Weekly Daily or  almost  daily 0   Scoring:   A score of 4 for adult men or 3 for adult women is an indication of hazardous drinking (risk for physical or physiological harm).   A score of 5 or more for adult men or 4 or more for adult women is an indication of an alcohol use disorder.      Medical History     Past Medical History:   Diagnosis Date     Anemia      Bone metastasis (H) 09/28/2018     Cough      Pulmonary nodule      Renal cell carcinoma, right (H) 09/28/2018       Past Surgical History:   Procedure Laterality Date     BRONCHOSCOPY (RIGID OR FLEXIBLE), DIAGNOSTIC N/A  9/4/2019    Procedure: Bronchoscopy, With Bronchoalveolar Lavage;  Surgeon: Burton Toure MD;  Location: [facility]     CHOLECYSTECTOMY       HC REMOVAL HEEL SPUR, CALCANEUS  2004        Problem (# of Occurrences) Relation (Name,Age of Onset)    Lung Cancer (1) Maternal Grandmother          Social History     Tobacco Use     Smoking status: Never Smoker     Smokeless tobacco: Never Used   Substance Use Topics     Alcohol use: Not Currently     History   Drug Use     Types: Marijuana     Comment: medical cannabis        Current Outpatient Medications   Medication     acetaminophen (TYLENOL) 500 MG tablet     calcium carbonate 600 mg-vitamin D 400 units (CALTRATE) 600-400 MG-UNIT per tablet     Dextromethorphan-guaiFENesin (MUCINEX DM)  MG 12 hr tablet     fluticasone (FLONASE) 50 MCG/ACT nasal spray     furosemide (LASIX) 20 MG tablet     HYDROmorphone (DILAUDID) 2 MG tablet     lidocaine (LIDODERM) 5 % patch     loratadine (CLARITIN) 10 MG tablet     medical cannabis (Patient's own supply.  Not a prescription)     melatonin 5 MG tablet     morphine (MS CONTIN) 15 MG CR tablet     morphine (MS CONTIN) 30 MG CR tablet     omeprazole (PRILOSEC) 40 MG DR capsule     ondansetron (ZOFRAN) 8 MG tablet     polyethylene glycol (MIRALAX/GLYCOLAX) Packet     pregabalin (LYRICA) 50 MG capsule     prochlorperazine (COMPAZINE) 5 MG tablet     senna-docusate (SENOKOT-S;PERICOLACE) 8.6-50 MG per tablet     sulfamethoxazole-trimethoprim (BACTRIM DS/SEPTRA DS) 800-160 MG tablet     vitamin D3 (CHOLECALCIFEROL) 2000 units (50 mcg) tablet     diazepam (VALIUM) 5 MG tablet     DULoxetine (CYMBALTA) 60 MG capsule     everolimus (AFINITOR) 5 MG tablet CHEMO     Lenvatinib 18 MG, 10 mg + 2 x 4 mg capsules, (LENVIMA) 18 mg combo capsule CHEMOTHERAPY     valACYclovir (VALTREX) 1000 mg tablet     No current facility-administered medications for this visit.        OTC products: None, except as noted above    No Known Allergies    Latex  "Allergy: NO      Review Of Systems   Constitutional, neuro, ENT, endocrine, pulmonary, cardiac, gastrointestinal, genitourinary, musculoskeletal, integument and psychiatric systems are negative, except as otherwise noted.    Exam     Patient Vitals for the past 24 hrs:   BP Temp Pulse SpO2 Height Weight   10/28/19 1112 129/73 97.3  F (36.3  C) 66 96 % 1.778 m (5' 10\") 88.6 kg (195 lb 4.8 oz)     Body mass index is 28.02 kg/m .    GENERAL APPEARANCE: healthy, alert and no distress     EYES: EOMI,  PERRL     HENT: ear canals and TM's normal and nose and mouth without ulcers or lesions     NECK: no adenopathy, no asymmetry, masses, or scars and thyroid normal to palpation     RESP: lungs clear to auscultation - no rales, rhonchi or wheezes     CV: regular rates and rhythm, normal S1 S2, no S3 or S4 and no murmur, click or rub     ABDOMEN:  soft, nontender, no HSM or masses and bowel sounds normal     MS: extremities normal- no gross deformities noted, no evidence of inflammation in joints, FROM in all extremities.     SKIN: no suspicious lesions or rashes     NEURO: Normal strength and tone, sensory exam grossly normal, mentation intact and speech normal     PSYCH: mentation appears normal. and affect normal/bright     LYMPHATICS: No cervical adenopathy    Diagnostics   Completed 09/03/2019---EKG: Normal Sinus Rhythm, Prolonged QT     Risk Assessment     Surgical Risk:  The proposed surgical procedure is considered LOW risk.    Revised Cardiac Risk Index  The patient has the following serious cardiovascular risks for perioperative complications such as (MI, PE, VFib and 3  AV Block):  No serious cardiac risks    INTERPRETATION: 0 risks: Class I (very low risk - 0.4% complication rate)    [facility] Activity Status Index  Total Points:  Total METs:    Functional capacity is classified as:  Excellent: >10 METs  Good: 7-10 METs   Moderate: 4 - 6 METs  Poor: <4 METs    The patient has the following additional risks for " perioperative complications:  No identified additional risks    Impression     For above listed surgery and anesthesia:   Patient is at low risk for surgery/procedure and perioperative/procedure complications.    Recommendations   Shingles are all scabbed over, no concerns.   Approved to have procedure. I will forward this note to Surgeon.   --Consult hospital rounder / IM to assist post-op medical management    --Patient is to take hold all scheduled medications on the day of surgery    ICD-10-CM    1. Pre-operative examination Z01.818 CBC with platelets     Comprehensive metabolic panel   2. Chronic midline low back pain without sciatica M54.5     G89.29    3. Herpes zoster without complication B02.9        GENERAL PREOP INSTRUCTIONS:    Patient is at low risk for surgery/procedure and perioperative/procedure complications.    Signed Electronically by: Amy Ace NP    A copy of this evaluation report will be provided to requesting physician.   Please contact our office if there are any further questions or information required about this patient.    [facility] Preop Guidelines    ACC/AHA Guidelines  SOBIA Nunez, CNP

## 2019-10-28 NOTE — NURSING NOTE
"69 year old  Chief Complaint   Patient presents with     Shingles     Pt is here to follow up on shingles.        Blood pressure 129/73, pulse 66, temperature 97.3  F (36.3  C), height 1.778 m (5' 10\"), weight 88.6 kg (195 lb 4.8 oz), SpO2 96 %. Body mass index is 28.02 kg/m .  BP completed using cuff size:      Liza Thomson MA  October 28, 2019 11:18 AM  "

## 2019-10-30 NOTE — TELEPHONE ENCOUNTER
ML RN called to Triage to report while talking with pt regarding upcoming surgery on 11/1/19 the pt asked about his lab results from 10/28/19 and whether he needed a blood transfusion. Pt saw Amy Ace NP for pre op exam.   Hgb is 7.7 and meets parameter for PRBC transfusion per Dr Anne.   Per PAUL Skinner in BMT infusion. Ok for labs tmrw 10/31/19 at 10am and transfusion at 1pm. Order for type and screen placed and message sent to scheduling to add. Pt notified of plan. Will need housing at the [facility] for thurs evening. Clair Garcia RNCC to request

## 2019-10-31 NOTE — PROGRESS NOTES
Infusion Nursing Note:  Javon Bianchi presents today for RBCs.    Patient seen by provider today: No   present during visit today: Not Applicable.    Note: Pt given 1u pRBCs (based on labs from 10/28).  Tolerated without incident.    Intravenous Access:  Peripheral IV placed.    Treatment Conditions:  Lab Results   Component Value Date    HGB 8.2 10/31/2019     Lab Results   Component Value Date    WBC 4.3 10/31/2019      Lab Results   Component Value Date    ANEU 2.8 10/31/2019     Lab Results   Component Value Date     10/31/2019      Results reviewed, labs MET treatment parameters, ok to proceed with treatment.      Post Infusion Assessment:  Patient tolerated infusion without incident.  Blood return noted pre and post infusion.  Site patent and intact, free from redness, edema or discomfort.  No evidence of extravasations.  Access discontinued per protocol.       Discharge Plan:   Discharge instructions reviewed with: Patient and Family.  Patient and/or family verbalized understanding of discharge instructions and all questions answered.  Patient discharged in stable condition accompanied by: wife.  Departure Mode: Ambulatory.    Susanne Bolanos RN

## 2019-10-31 NOTE — TELEPHONE ENCOUNTER
Per Patient's request,  completed and faxed [employer] request for lodging dates 10/31-11/5. [facility] will contact Patient for confirmation of reservation.  will continue to provide support as needed.    Soo Yeon Han, Northern [facility] Acad[facility] HospitalSW  Pager:  [phone]

## 2019-11-01 PROBLEM — Z98.890 STATUS POST LUMBAR SPINE OPERATIVE PROCEDURE FOR DECOMPRESSION OF SPINAL CORD: Status: ACTIVE | Noted: 2019-01-01

## 2019-11-01 NOTE — DISCHARGE SUMMARY
"ORTHOPAEDIC SURGERY DISCHARGE SUMMARY     Date of Admission: 11/1/2019  Date of Discharge: 11/2/2019 12:40 PM  Disposition: Home  Staff Physician: Vickey Brower*  Primary Care Provider: [facility], [facility]    DISCHARGE DIAGNOSIS:  Metastatic cancer to spine (H) [C79.51]  Lumbar stenosis with neurogenic claudication [M48.062]  Lumbar radiculopathy [M54.16]    PROCEDURES: Procedure(s):  Kyphoplasty-Radiofrequency ablation Lumbar 1; Minimally Invasive Surgery  left hemilaminectomy Lumbar 3-Lumbar 4 on 11/1/2019    BRIEF HISTORY:  Per Dr. Graff operative report:   \"69/male with metastatic spinal disease secondary to renal cancer, presenting with worsening back pain radiating to left buttock and leg.  Imaging revealed metastatic or pathologic lesion L1, left-sided stenosis L3-4.  Tried nonoperative treatment, continues to have significant disabling symptoms.  I thus offered surgery in form of kyphoplasty with radiofrequency ablation L1, and minimally invasive decompression left L3-4.  Consented after thorough discussion of the rationale, risks, benefits and alternatives.\"    HOSPITAL COURSE:    The patient was admitted following the above listed procedures for pain control and rehabilitation. Javon Bianchi did well post-operatively. Medicine was consulted post operatively to aid in management of medical co-morbidities. The patient received routine nursing cares and at the time of discharge was medically stable. Vital signs were stable throughout admission. The patient is tolerating a regular diet and is voiding spontaneously. All PT/OT goals have been met for safe mobility. Pain is now controlled on oral medications which will be available on discharge. Stool softeners have been used while taking pain medications to help prevent constipation. Javon Bianchi is deemed medically safe to discharge.     Antibiotics:  Ancef given periop and 24 hours postop.   DVT prophylaxis:  Mechanical " initiated after surgery   PT Progress:  Has met PT/OT goals for safe mobility.    Pain Meds:  Weaned off all IV pain meds by discharge.  Inpatient Events: No significant events or complications.     PHYSICAL EXAM:    Temp: 97.9  F (36.6  C) Temp src: Oral BP: 120/72 Pulse: 61 Heart Rate: 63 Resp: 18 SpO2: 97 % O2 Device: None (Room air) Oxygen Delivery: 1 LPM     Exam:  Gen: No acute distress, resting comfortably in bed.  Resp: Non-labored breathing  MSK:     Back:  - Dressings c/d/i  - Drain intact with good suction and minimal output     RLE:  - SILT L1-S2  - Fires quads/ta/ehl/fhl/gsc  - DP pulse 2+, foot wwp     LLE:  - SILT L1-S2  - Fires quads/ta/ehl/fhl/gsc  - DP pulse 2+, foot wwp     Drain output: Minimal    FOLLOWUP:    Follow up with Dr. Brower at 6 weeks postoperatively.    Future Appointments   Date Time Provider Department Center   11/2/2019  9:45 AM Mitra Osborn Pt, PT [facility]   11/2/2019 11:00 AM Gilmer Mcnamara, OT [facility]   11/2/2019  1:30 PM Mitra Osborn Pt, PT [facility]   11/19/2019 10:00 AM [id number] [facility] O   11/19/2019 10:20 AM UUCT1 [facility] [place] O   11/19/2019 11:00 AM UUMR2 [facility] O   11/19/2019  1:00 PM UUNM3 [facility] O   11/20/2019  4:30 PM Hua Anne MD [facility]   12/12/2019 12:45 PM Vickey Brower MD [facility]   1/23/2020 12:30 PM Vickey Brower MD [facility]       Orthopaedic Surgery appointments are at the [facility] ([address] [place] [address]). Call [phone] to schedule a follow-up appointment at this location with your provider.     PLANNED DISCHARGE ORDERS:      Discharge Medication List as of 11/2/2019 11:53 AM      START taking these medications    Details   !! HYDROmorphone (DILAUDID) 2 MG tablet Take 1-2 tablets (2-4 mg) by mouth every 6 hours as needed for pain, Disp-30 tablet, R-0, Local PrintInsurance limits to apply.       !! -  Potential duplicate medications found. Please discuss with provider.      CONTINUE these medications which have NOT CHANGED    Details   acetaminophen (TYLENOL) 500 MG tablet Take 1,000 mg by mouth 3 times daily , Historical      calcium carbonate 600 mg-vitamin D 400 units (CALTRATE) 600-400 MG-UNIT per tablet Take 1 tablet by mouth 3 times daily, Historical      Dextromethorphan-guaiFENesin (MUCINEX DM)  MG 12 hr tablet Take 1 tablet by mouth every 12 hours, Historical      DULoxetine (CYMBALTA) 60 MG capsule Take 1 capsule (60 mg) by mouth daily, Disp-90 capsule, R-3, E-Prescribe      fluticasone (FLONASE) 50 MCG/ACT nasal spray Spray 1 spray into both nostrils daily as needed for rhinitis or allergies, Historical      !! HYDROmorphone (DILAUDID) 2 MG tablet Take 1-2 tablets (2-4 mg) by mouth every 3 hours as needed for pain, Disp-60 tablet, R-0, E-Prescribe      lidocaine (LIDODERM) 5 % patch Place 1-3 patches onto the skin every 24 hours Apply to painful areas for 12 hours on, 12 hours off.Disp-30 patch, [id number]      loratadine (CLARITIN) 10 MG tablet Take 10 mg by mouth daily (with lunch) , Historical      medical cannabis (Patient's own supply.  Not a prescription) Cannabis Heater liquid 2 mL orally in AM   Cannabis capsule - 1 capsule daily at bedtime.     (This is NOT a prescription, and does not certify that the patient has a qualifying medical condition for medical cannabis.  The purpose of this order is  to do cument that the patient reports taking medical cannabis.), Historical      melatonin 5 MG tablet Take 10 mg by mouth At Bedtime, Historical      !! morphine (MS CONTIN) 15 MG CR tablet One tab in the morning and afternoon, two tabs (30mg) at bedtime., Disp-120 tablet, R-0, E-Prescribe      !! morphine (MS CONTIN) 30 MG CR tablet Takes 30mg in the AM, 15 mg midday and 30mg at bedtime., Disp-90 tablet, R-0, No Print Out      omeprazole (PRILOSEC) 40 MG DR capsule Take 1 capsule (40 mg)  by mouth daily, Disp-90 capsule, R-3, E-Prescribe      polyethylene glycol (MIRALAX/GLYCOLAX) Packet Take 17 g by mouth daily as needed , Historical      pregabalin (LYRICA) 50 MG capsule Take 1 capsule (50 mg) by mouth 2 times daily, Disp-60 capsule, R-1, E-Prescribe      prochlorperazine (COMPAZINE) 5 MG tablet Take 1 tablet (5 mg) by mouth every 6 hours as needed for nausea or vomiting, Disp-180 tablet, Historical      senna-docusate (SENOKOT-S;PERICOLACE) 8.6-50 MG per tablet Take 2 tablets by mouth 2 times daily, Historical      vitamin D3 (CHOLECALCIFEROL) 2000 units (50 mcg) tablet Take 2,000 Units by mouth daily, Historical      diazepam (VALIUM) 5 MG tablet Take 1 pill 30 min before MRI and a second pill 2 minutes prior., Disp-10 tablet, R-0, Local Print      everolimus (AFINITOR) 5 MG tablet CHEMO Take 1 tablet (5 mg) by mouth daily, Disp-30 tablet, R-0, E-Prescribe      furosemide (LASIX) 20 MG tablet Take 20 mg by mouth daily as needed (fluid retention) , Historical      Lenvatinib 18 MG, 10 mg + 2 x 4 mg capsules, (LENVIMA) 18 mg combo capsule CHEMOTHERAPY Take 18 mg dose (10 mg plus two 4 mg capsules) by mouth daily., Disp-90 capsule, R-0, E-Prescribe      ondansetron (ZOFRAN) 8 MG tablet Take 8 mg by mouth every 8 hours as needed for nausea, Historical       !! - Potential duplicate medications found. Please discuss with provider.            Discharge Procedure Orders   Reason for your hospital stay   Order Comments: S/p lumbar decompression     Follow Up and recommended labs and tests   Order Comments: F/u with Dr. Brower on 12/12     Orthopaedic Surgery appointments are at the [facility] ([address] [place] [id number]). Call [phone] to schedule a follow-up appointment at this location with your provider. \     Activity   Order Comments: Your activity upon discharge: no bending, twisting, lifting     Order Specific Question Answer Comments   Is discharge  order? Yes      Discharge Instructions   Order Comments: Postoperative Instructions   [place] [id number] [address] [address]      You have a dressing called Aquacel on your incision which can be worn for up to 7 days, and it can be worn in the shower. After the Aquacel has been removed, you do not need a dressing.  It is okay to shower and wash gently with soap and water. Do not soak in the bath. No pools, hot tubs, or lakes for 6 weeks.     For postoperative pain control, I have prescribed a narcotic medication.  This should be taken for the first few weeks following surgery, but as soon as you are able, transition to an over-the-counter type medication such as Tylenol.  You may not take non-steroidal anti-inflammatory medications (eg: Advil, Ibuprofen, Aleve, others) for the first 3 months after surgery as it interferes with bone healing. While taking the narcotic medication, there are several precautions that you must adhere to. These medications have numerous side effects including nausea, constipation, and drowsiness. If you experience nausea, this may be relieved by taking the medication with food or a light meal. To avoid constipation, please use an over-the-counter stool softener or drink lots of water and eat fruits and vegetables. Avoid operating heavy machinery or driving an automobile while on narcotic medications.      You will be seen in the clinic at 6 weeks following surgery. You will not need to attend physical therapy during this time. You can focus on cardiovascular fitness by walking as much as you can tolerate. Avoid bending, lifting, and twisting. Your weight lifting restriction is 10 pounds until your first follow-up appointment.      When you get home, you may resume your normal diet as tolerated. You may not be very hungry but try to eat small healthy meals to help you heal. Remember to drink plenty of water/fluids to help keep you hydrated.    After  surgery, you may have a sensitive scar.  When the dressing has been removed, you may massage the scar to decrease sensitivity and help break down scar tissue. Do this up to 4 times per day.    Please call or return if you experience the following:  Fevers (temperature greater than 100.4 degrees Fahrenheit)  Pain that is getting worse or does not respond to pain medications  Drainage from your wound  Increasing redness about the wound  Any other worrisome symptoms    You may reach the clinic by dialing [phone].  After hours, you may reach the resident on call by dialing [phone].     Diet   Order Comments: Follow this diet upon discharge: Regular     Order Specific Question Answer Comments   Is discharge order? Yes        Ayana Grover MD  Orthopaedic Surgery Resident, PGY-4  Pager: [phone]

## 2019-11-01 NOTE — ANESTHESIA PREPROCEDURE EVALUATION
Anesthesia Pre-Procedure Evaluation    Patient: Javon Bianchi   MRN:     [id number] Gender:   male   Age:    69 year old :      1950        Preoperative Diagnosis: Metastatic cancer to spine (H) [C79.51]  Lumbar stenosis with neurogenic claudication [M48.062]  Lumbar radiculopathy [M54.16]   Procedure(s):  Kyphoplasty-Radiofrequency ablation Lumbar 1; Minimally Invasive Surgery  left hemilaminectomy Lumbar 3-Lumbar 4     Past Medical History:   Diagnosis Date     Anemia      Bone metastasis (H) 2018     Cough      Pulmonary nodule      Renal cell carcinoma, right (H) 2018      Past Surgical History:   Procedure Laterality Date     BRONCHOSCOPY (RIGID OR FLEXIBLE), DIAGNOSTIC N/A 2019    Procedure: Bronchoscopy, With Bronchoalveolar Lavage;  Surgeon: Burton Toure MD;  Location: [facility]     CHOLECYSTECTOMY       HC REMOVAL HEEL SPUR, CALCANEUS            Anesthesia Evaluation     . Pt has had prior anesthetic. Type: General    No history of anesthetic complications          ROS/MED HX    ENT/Pulmonary:     (+), recent URI (recurrent ) resolved . Other pulmonary disease pulmonary nodule, productive cough, wheezing .   (-) tobacco use, asthma and COPD   Neurologic: Comment: Lumbar stenosis w/ radiculopathy      (-) seizures, CVA and TIA   Cardiovascular:     (+) Dyslipidemia, ----. : . . . :. Long QT, . possible PFO and left-to-right shunt:, Previous cardiac testing Echodate:17results:Stress Testdate:17 results:1. No stress echocardiographic findings to confirm myocardial ischemia.   2. Normal left ventricular size and systolic function at rest with an estimated ejection fraction of 55%.   3. Mild left ventricular hypertrophy.   4. Mild left atrial enlargement.   5. Interatrial septum aneurysm with possible PFO and left-to-right shunt (see text).   6. Mild mitral regurgitation.   7. Trivial tricuspid regurgitation.   8. See previous complete transthoracic echocardiogram from  07/16/2017 for additional Doppler detail.   9. Negative stress electrocardiogram for ischemia.   10. Preserved functional aerobic capacity without symptoms of angina. ECG reviewed date:9/3/19 results:Sinus rhythm 81  Prolonged QT   date: results:          METS/Exercise Tolerance:  3 - Able to walk 1-2 blocks without stopping   Hematologic:     (+) Anemia (Hb=8.2), History of Transfusion no previous transfusion reaction -     (-) history of blood clots   Musculoskeletal: Comment: Edema of left leg, improved w/ wrapping, takes lasix prn, has been elevating leg more.  (+) arthritis,  other musculoskeletal- pathologic compression fx L3, left leg/hip pain, shoulder pain       GI/Hepatic: Comment: Upset stomach, difficulty swallowing foods - eating soft diet only, decreased appetite. Stopped celebrex 2 days ago, nausea has already improved, eating more solid foods.        Renal/Genitourinary:  - ROS Renal section negative       Endo:  - neg endo ROS       Psychiatric:     (+) psychiatric history anxiety      Infectious Disease:  - neg infectious disease ROS       Malignancy:   (+) Malignancy History of Other  Other CA Renal cell carcinoma metastatic to bone Active status post Chemo and Radiation         Other: Comment: On bactrim prophylaxis for prior pneumonia in 8/2019  Malnutrition    (+) H/O Chronic Pain,H/O chronic opiod use , no other significant disability                        PHYSICAL EXAM:   Mental Status/Neuro: A/A/O; Age Appropriate   Airway: Facies: Feasible  Mallampati: III  Mouth/Opening: Full  TM distance: > 6 cm  Neck ROM: Limited   Respiratory: Auscultation: CTAB     Resp. Rate: Normal     Resp. Effort: Normal      CV: Rhythm: Regular  Rate: Age appropriate  Heart: Normal Sounds  Edema: None   Comments:      Dental: Normal Dentition                LABS:  CBC:   Lab Results   Component Value Date    WBC 4.3 10/31/2019    WBC 5.8 10/28/2019    HGB 8.2 (L) 10/31/2019    HGB 7.7 (L) 10/28/2019    HCT 26.1  "(L) 10/31/2019    HCT 24.9 (L) 10/28/2019     10/31/2019     10/28/2019     BMP:   Lab Results   Component Value Date     10/28/2019     10/02/2019    POTASSIUM 4.3 10/28/2019    POTASSIUM 4.7 10/02/2019    CHLORIDE 105 10/28/2019    CHLORIDE 109 10/02/2019    CO2 23 10/28/2019    CO2 21 10/02/2019    BUN 12 10/28/2019    BUN 9 10/02/2019    CR 1.09 10/28/2019    CR 1.03 10/02/2019     (H) 10/28/2019    GLC 99 10/02/2019     COAGS:   Lab Results   Component Value Date    INR 1.25 (H) 09/04/2019     POC: No results found for: BGM, HCG, HCGS  OTHER:   Lab Results   Component Value Date    LACT 1.5 09/03/2019    AUREA 8.0 (L) 10/28/2019    PHOS 3.0 09/07/2019    MAG 1.6 10/02/2019    ALBUMIN 2.8 (L) 10/28/2019    PROTTOTAL 6.6 (L) 10/28/2019    ALT 11 10/28/2019    AST 35 10/28/2019    ALKPHOS 82 10/28/2019    BILITOTAL 0.3 10/28/2019    LIPASE 85 08/07/2019    AMYLASE 27 (L) 08/07/2019    TSH 5.76 (H) 09/24/2019    T4 0.76 09/24/2019    CRP 26.5 (H) 09/24/2019    SED 87 (H) 03/29/2019        Preop Vitals    BP Readings from Last 3 Encounters:   11/01/19 111/82   10/31/19 (P) 112/70   10/28/19 129/73    Pulse Readings from Last 3 Encounters:   11/01/19 74   10/31/19 (P) 78   10/28/19 66      Resp Readings from Last 3 Encounters:   11/01/19 20   10/31/19 (P) 16   10/16/19 12    SpO2 Readings from Last 3 Encounters:   11/01/19 97%   10/31/19 (P) 99%   10/28/19 96%      Temp Readings from Last 1 Encounters:   11/01/19 36.9  C (98.4  F) (Oral)    Ht Readings from Last 1 Encounters:   11/01/19 1.778 m (5' 10\")      Wt Readings from Last 1 Encounters:   11/01/19 85.5 kg (188 lb 7.9 oz)    Estimated body mass index is 27.05 kg/m  as calculated from the following:    Height as of this encounter: 1.778 m (5' 10\").    Weight as of this encounter: 85.5 kg (188 lb 7.9 oz).     LDA:        Assessment:   ASA SCORE: 3    H&P: History and physical reviewed and following examination; no interval change. "   Smoking Status:  Non-Smoker/Unknown   NPO Status: NPO Appropriate     Plan:   Anes. Type:  General   Pre-Medication: Acetaminophen; Gabapentin (MScontin 30 mg, duoneb)   Induction:  IV (Standard)   Airway: ETT; Oral   Access/Monitoring: PIV   Maintenance: Balanced     Drips/Meds: Dexmedetomidine; Ketamine (lidocaine drip, 2 g magnesium)     Advanced Monitoring: BIS     Postop Plan:   Postop Pain: Opioids  Postop Sedation/Airway: Not planned  Disposition: Outpatient     PONV Management:   Adult Risk Factors:, Non-Smoker, Postop Opioids   Prevention:, Dexamethasone (Prolonged QT)     CONSENT: Direct conversation   Plan and risks discussed with: Patient; Spouse   Blood Products: Consent Deferred (Minimal Blood Loss)                   Vale Pickering MD

## 2019-11-02 PROBLEM — Z98.890 POST-OPERATIVE STATE: Status: ACTIVE | Noted: 2019-01-01

## 2019-11-02 NOTE — PLAN OF CARE
Discharge Planner PT   Patient plan for discharge: Home with assist from family   Current status: PT orders received for PT evaluation and treatment: post-op thoracic lumbar surgery.   Pt and his spouse declined need for PT evaluation and treatment.  Reviewed post-op precautions and educated on using log rolling technique to get in/out of bed.  Pt reported using this method prior to surgery due to pain.  Pt has been ambulating with and without SEC last night and this AM with SBA.  Pt and his spouse did not have any concerns about his mobility and reported having no stairs to get into the home and everything is on one level.  Per pt spouse she is able to assist with lower body dressing at home.  PT evaluation cancelled and orders completed, does not have any skilled inpatient PT needs.   Barriers to return to prior living situation: No barriers to discharge home  Recommendations for discharge: Home with assist  Rationale for recommendations: No PT needs.        Entered by: Mitra Osborn 11/02/2019 10:13 AM

## 2019-11-02 NOTE — OR NURSING
"Call to surgeon on call Reddy Miranda for WILLIAM Montes De Oca question of whether to order PCA.  Pt awake and alert.  Pt states \"pain a comfortable 4, and pain is manageable\".  Pt tolerates PO.  Pt asking to \"go to room\".  Vital signs stable.  Neuro checks stable.  Meets discharge criteria.  "

## 2019-11-02 NOTE — OR NURSING
Patient came out from OR with hemovac drain.  Tubing and drain itself was disconnected upon arrival to PACU.  Drain tubing cleansed with chlorhexidine and trimmed with sterile scissors.  New hemovac drain attached.

## 2019-11-02 NOTE — PLAN OF CARE
Patient A & O x 4. Neuros and CMS intact, no numbness/tingling per patient. Denies chest pain and SOB. VSS and afebrile, SpO2 97% on room air (see flowsheet), MD aware. LS clear, BS + x 4, patient passing flatus. Patient urinating good amounts of clear yellow urine. Patient states pain is tolerable. Pain meds given as ordered and PRN. Surgical site dressings are C/D/I. Left/Right PIV's removed. Patient on a regular diet and tolerating it well. Patient up ad laverne independently in room. Hgb 7.7 this AM, MD aware. Spine X-rays completed this shift. Patient able to make needs known. Call light within reach. Will continue with POC.    Patient cleared to discharge to home by Ortho MD. No Medicine Consult needed per Dr. Brower. Discharge instructions and meds reviewed with and given to patient and spouse. Patient discharged in safe and stable condition via wheelchair per spouse and daughter with all belongings.

## 2019-11-02 NOTE — PLAN OF CARE
Discharge Planner OT   Patient plan for discharge: Home with assist  Current status: Pt declined need for OT evaluation. Pt reports home is set-up with DME and spouse will be available to assist as needed. No concerns noted with discharging to home. OT eval cancelled and orders completed.  Barriers to return to prior living situation: none anticipated  Recommendations for discharge: Home with assist  Rationale for recommendations: No skilled inpatient OT needs.       Entered by: Gilmer Mcnamara 11/02/2019 11:00 AM     Occupational Therapy Discharge Summary    Reason for therapy discharge:    All goals and outcomes met, no further needs identified.    Progress towards therapy goal(s). See goals on [facility] in [facility] health record for goal details.  Goals met    Therapy recommendation(s):    No further therapy is recommended. Home with assist.

## 2019-11-02 NOTE — BRIEF OP NOTE
[facility], [place]    Brief Operative Note    Pre-operative diagnosis: Metastatic cancer to spine (H) [C79.51]  Lumbar stenosis with neurogenic claudication [M48.062]  Lumbar radiculopathy [M54.16]  Post-operative diagnosis Same as pre-operative diagnosis    Procedure: Procedure(s):  Kyphoplasty-Radiofrequency ablation Lumbar 1; Minimally Invasive Surgery  left hemilaminectomy Lumbar 3-Lumbar 4  Surgeon: Surgeon(s) and Role:     * Vickey Brower MD - Primary     * Ayana Grover MD - Resident - Assisting  Anesthesia: General   Estimated blood loss: 25cc  Drains: Hemovac  Specimens:   ID Type Source Tests Collected by Time Destination   A : L1 Verteberal Body Bone Spine, Lumbar SURGICAL PATHOLOGY EXAM Vickey Brower MD 11/1/2019  4:15 PM      Findings:   None.  Complications: None.  Implants:   Implant Name Type Inv. Item Serial No.  Lot No. LRB No. Used   BONE CEMENT KYPHX HV-R C01A Cement, Bone BONE CEMENT KYPHX HV-R C01A  KYPHON [id number] N/A 1       Ortho Primary  Activity: Up with assist until independent. No excessive bending or twisting. No lifting >10 lbs x 6 weeks. No Jake lift for transfers.   Weight bearing status: WBAT.  Pain management: Transition from IV to PO as tolerated. No NSAIDs   Antibiotics: Ancef x 24 hours  Diet: Begin with clear fluids and progress diet as tolerated.   DVT prophylaxis: SCDs only. No chemical DVT ppx needed.  Labs: Labs PRN  Bracing/Splinting: None.  Dressings: Keep dressing c/d/i x 5 days  Drains: out on Saturday am   Physical Therapy/Occupational Therapy: Eval and treat.  Consults: Hospitalist.  Follow-up: Clinic with Dr. Brower in 6 weeks with repeat x-rays.   Disposition: Pending progress with therapies, pain control on orals, and medical stability. discharge home on Saturday    Orthopaedics surgery staff for this patient is Dr. Brower .

## 2019-11-02 NOTE — OP NOTE
Date of Service: 11/1/19       Surgeon:  Vickey Brower MD   Assistant:  (1) Ayana Grover MD PGY 4; (2) Sima Cleveland MS.      Preoperative Diagnosis:     1.  New metastatic lesion without fracture L1, 2' to metastatic renal CA.  2.  Left L3 radiculopathy secondary to metastatic renal cancer.    3.  Pathologic compression fracture L3.    4.  Metastatic lesion left proximal femur without impending pathologic fracture.      Postoperative Diagnosis:     Same      Procedures:   1.  Balloon kyphoplasty with vertebral body bone biopsy L1, via bilateral percutaneous transpedicular approach.  2.  Radiofrequency ablation L1 vertebral body lesion, using Osteocool system.  3.  MIS left hemilaminectomy-foraminotomy L3-4.  4.  Use of operating microscope.    5.  Computer navigation using O-arm and Stealth.      Anesthesia:  General endotracheal.   Local anesthetic:  0.25% marcaine + epinephrine = 50 mL.  EBL:   25 mL.  Complications:  None apparent.   Specimen: L1 vertebral body bone - submitted to Pathology for permanent section.  Implants / Equipment used:  Medtronic Kyphon kyphoplasty one-step system with 20 mm balloons; Osteocool radiofrequency ablation system.  Balloon pressure:  Right 175 psi, left 175 psi.  Balloon volume:  Right 3.25 mL, 3.25 mL.  Cement volume:  Right 3.0 mL, 3.0 mL; total 6.0 mL.    Indications: 69/male with metastatic spinal disease secondary to renal cancer, presenting with worsening back pain radiating to left buttock and leg.  Imaging revealed metastatic or pathologic lesion L1, left-sided stenosis L3-4.  Tried nonoperative treatment, continues to have significant disabling symptoms.  I thus offered surgery in form of kyphoplasty with radiofrequency ablation L1, and minimally invasive decompression left L3-4.  Consented after thorough discussion of the rationale, risks, benefits and alternatives.        Details:  Seen in preop are, site marked, consent signed.  Wheeled to OR, brief  earlier performed.  General anesthesia administered.  No altamirano.  IV antibiotics given: Cefazolin 2 g IV.  Positioned prone on Trios table with combo pads.  Thoracolumbar region squared off, prepped with Chlorhexidine, alcohol, Chloraprep, and draped in sterile fashion.  Surgical timeout performed.     Right PSIS palpated, stab incision made.  Long perc pin inserted into ilium, attached to larry frame.  O-arm 3D scan obtained.  Used larry probe to identify ideal skin entry points for bilateral pedicle cannulation L1.  Anesthetic injected.  Stab incisions made.  Percutaneously cannulated bilateral pedicles using navigated kyphoplasty needle, up to posterior vertebral body level.  Trocar removed; cannula left in place.  AP-lat images showed needles in good position.  Bone filler used as biopsy needle, passed through cannula, inserted deeper into vertebral body up to the anterior cortex.  We were able to obtain 2 good core bony pieces, submitted to Pathology.      Osteocool sizing drill inserted through the cannulas to the anterior portion of the vertebral body; elected to use 15 mm probes bilaterally.  Inserted RFA probes; RFA machine turned on, programmed to provide energy to heat up the tissues to 70 degrees, for 14 minutes.  After completion of the ablation procedure, machine turned off, probes removed.    Balloons inserted bilaterally.  We started inflating the balloons, taking note of pressure and volume.  Took lateral and AP images demonstrating good balloon fill of the vertebral body.  Cement mixed, balloons removed.  Waited for cement to become doughy, then gradually injected bilaterally, taking regular lateral images.  Noted good cement fill of the vertebral body.  Cannulas removed.  3D scan images showed slight medial canal encroachment of cement on the right side.  Cannot tell if this may be irritating the right L1 nerve root.  At this point, we decided to simply examined the patient upon waking up.  If he  presents with severe groin pain, we would consider return to the OR for direct decompression.      Use navigated probe to draw skin incision for left L3-4 hemilaminectomy.  3 cm skin incision made.  Fascia incised to left of spinous process.  Sequential dilation performed down to the left hemilamina.  6 cm tubular retractor placed, anchored to bed using snake arm.  Muscle cleared of bone.  Lateral image showed retractor at the L3-4 level.  Microscope brought in.  Left L3-4 hemilaminectomy performed using tristan and Kerrisons.  Medial facetectomy performed using 3 mm Kerrison, until I was able to palpate medial aspect of left L4 pedicle.  Ligamentum flavum removed, thus exposing underlying dural sac.  I palpated the left L3-4 foramen, noted narrowing.  Foraminotomy performed using curved Kerrisons.  Adequate foraminal decompression achieved, confirmed using Bourne probe.    Deep medium Hemovac drain applied.  Wounds closed using 0 Vicryl, 2-0 Vicryl and 3-0 Monocryl.  Skin glue and sterile dressings applied.     Postop:  Home today after brief PACU stay.  No lifting more than 10 pounds, no excessive bending or twisting.  RTC 2 weeks with EOS full spine AP lateral standing x-rays.

## 2019-11-02 NOTE — PROGRESS NOTES
Orthopaedic Surgery Progress Note 11/02/2019    E: No acute events overnight.    S: Back pain stable pre and postsurgery. Has gotten out of bed. Denies groin pain. Denies numbness or tingling. Denies cp/sob/n/v. Has not eating. -BM -flatus. Voiding spontaneously. Plan of care reviewed and questions answered. Patient feels ready to go home today.    O:  Temp: 97.9  F (36.6  C) Temp src: Oral BP: 120/72 Pulse: 61 Heart Rate: 63 Resp: 18 SpO2: 97 % O2 Device: None (Room air) Oxygen Delivery: 1 LPM    Exam:  Gen: No acute distress, resting comfortably in bed.  Resp: Non-labored breathing  MSK:    Back:  - Dressings c/d/i  - Drain intact with good suction and minimal output    RLE:  - SILT L1-S2  - Fires quads/ta/ehl/fhl/gsc  - DP pulse 2+, foot wwp    LLE:  - SILT L1-S2  - Fires quads/ta/ehl/fhl/gsc  - DP pulse 2+, foot wwp    Drain output: Minimal    Recent Labs   Lab 11/02/19  0642 11/01/19  1206 10/31/19  1026 10/28/19  1211   WBC  --   --  4.3 5.8   HGB 7.7* 8.6* 8.2* 7.7*   PLT  --   --  263 210       Assessment: Javon Bianchi is a 69 year old male s/p the follow procedures on 11/1/2019 with Dr. Brower. Doing well postoperatively.  1. Kyphoplasty-radiofrequency ablation L1  2. MIS left hemilaminecetomy L3-L4    To do today:  - Completion of antibiotics    Plan:  Ortho Primary  Activity: Up with assist until independent. No excessive bending or twisting. No lifting >10 lbs x 6 weeks. No Jake lift for transfers.   Weight bearing status: WBAT.  Pain management: Transition from IV to PO as tolerated. No NSAIDs.   Antibiotics: Ancef x 1-2 doses postop.  Diet: Begin with clear fluids and progress diet as tolerated.   DVT prophylaxis: SCDs only. No chemical DVT ppx needed.  Labs: Labs PRN.  Bracing/Splinting: None.  Dressings: Keep dressing c/d/i x 5 days.  Drains: Removed 11/2/2019.  Physical Therapy/Occupational Therapy: Eval and treat.  Consults: Hospitalist.  Follow-up: Clinic with Dr. Brower in 6 weeks with  repeat x-rays.   Disposition: Pending progress with therapies, pain control on orals, and medical stability - anticipate discharge home Saturday 11/2/2019.     Future Appointments   Date Time Provider Department Center   11/2/2019  9:45 AM Mitra Osborn Pt, PT [facility] [place]   11/2/2019 11:00 AM Gilmer Mcnamara, OT [facility] [place]   11/2/2019  1:30 PM Mitra Osborn Pt, PT [facility] [place]   11/19/2019 10:00 AM [email or web address]   11/19/2019 10:20 AM [facility]1 [facility]   11/19/2019 11:00 [facility]   11/19/2019  1:00 PM [facility]M3 [facility]   11/20/2019  4:30 PM Hua Anne MD [facility]   12/12/2019 12:45 PM Vickey Brower MD [facility]   1/23/2020 12:30 PM Vickey Brower MD [facility]     Patient discussed with Dr. Brower.    Reddy Miranda MD PhD  Orthopaedic Surgery PGY4  Pager [phone]    Please page me directly with any questions/concerns during regular weekday hours before 5pm. If there is no response, if it is a weekend, or if it is during evening hours then please page the orthopaedic surgery resident on call.

## 2019-11-02 NOTE — PLAN OF CARE
"  VS:    /72   Pulse 74   Temp 98.1  F (36.7  C) (Oral)   Resp (!) 7   Ht 1.778 m (5' 10\")   Wt 85.5 kg (188 lb 7.9 oz)   SpO2 96%   BMI 27.05 kg/m  Stable no abnormalities noted. No reports of SOB, CP, or dizziness. LS clear equal bilaterally, on RA   Output:    Voids spontaneously w/out difficulty. Bladder scan for 0. Last BM before surgery, not passing has yet   Activity:    Standby assist    Skin: Intact w/ the exception of incisions/drains/devices   Pain:    Minimal.    Neuro/CMS:    Denies any numbness or tingling    Dressing(s):    Posterior back: CDI, no drainage noted   Diet:    Regular   Equipment:    Cane  CAPNO   IV's/Drains:    PIV R forearm: saline locked  PIV R hand: saline locked   Plan:    Discharge tomorrow, paperwork fax to discharge pharmacy    Additional Info:                "

## 2019-11-02 NOTE — PLAN OF CARE
VSS, afebrile, A&Ox4, Capno removed, minimal surgical pain- oral dilaudid given once and pt is on MS Contin, dressings to back-CDI, HV in place with no output, voiding in BR and urinal without difficulty good amounts, ambulating independently with and without cane, steady gait, CMS- denies numbness or tingling, strong dorsi and plantar flexion, PP+, repositions self in bed, passing gas, denies nausea, dizziness, SOB, CP, PIV-SL, possible discharge to home today, will continue to monitor and assist.

## 2019-11-02 NOTE — ANESTHESIA POSTPROCEDURE EVALUATION
Anesthesia POST Procedure Evaluation    Patient: Javon Bianchi   MRN:     [id number] Gender:   male   Age:    69 year old :      1950        Preoperative Diagnosis: Metastatic cancer to spine (H) [C79.51]  Lumbar stenosis with neurogenic claudication [M48.062]  Lumbar radiculopathy [M54.16]   Procedure(s):  Kyphoplasty-Radiofrequency ablation Lumbar 1; Minimally Invasive Surgery  left hemilaminectomy Lumbar 3-Lumbar 4   Postop Comments: No value filed.       Anesthesia Type:  Not documented  General    Reportable Event: NO     PAIN: Uncomplicated   Sign Out status: Comfortable, Well controlled pain     PONV: No PONV   Sign Out status:  No Nausea or Vomiting     Neuro/Psych: Uneventful perioperative course   Sign Out Status: Preoperative baseline; Age appropriate mentation     Airway/Resp.: Uneventful perioperative course   Sign Out Status: Non labored breathing, age appropriate RR; Resp. Status within EXPECTED Parameters     CV: Uneventful perioperative course   Sign Out status: Appropriate BP and perfusion indices; Appropriate HR/Rhythm     Disposition:   Sign Out in:  PACU  Disposition:  Phase II; Home  Recovery Course: Uneventful  Follow-Up: Not required           Last Anesthesia Record Vitals:  CRNA VITALS  2019 1839 - 2019 1939      2019             NIBP:  118/75    Pulse:  81          Last PACU Vitals:  Vitals Value Taken Time   /66 2019  9:00 PM   Temp 36.5  C (97.7  F) 2019  8:30 PM   Pulse 62 2019  9:00 PM   Resp 8 2019  9:03 PM   SpO2 100 % 2019  9:03 PM   Temp src     NIBP     Pulse     SpO2     Resp     Temp     Ht Rate     Temp 2     Vitals shown include unvalidated device data.      Electronically Signed By: Princess Montes De Oca MD, 2019, 9:04 PM

## 2019-11-02 NOTE — OR NURSING
PACU to Inpatient Nursing Handoff    Patient Javon Bianchi is a 69 year old male who speaks English.   Procedure Procedure(s):  Kyphoplasty-Radiofrequency ablation Lumbar 1; Minimally Invasive Surgery  left hemilaminectomy Lumbar 3-Lumbar 4   Surgeon(s) Primary: Vickey Brower MD  Resident - Assisting: Ayana Grover MD     No Known Allergies    Isolation  [unfilled]     Past Medical History   has a past medical history of Anemia, Bone metastasis (H) (09/28/2018), Cough, Pulmonary nodule, and Renal cell carcinoma, right (H) (09/28/2018).    Anesthesia General   Dermatome Level     Preop Meds acetaminophen (Tylenol) - time given: 1215  gabapentin (Neurontin) - time given: 1215  MS Contin 30 mgPO  - time given: 1326   Nerve block Not applicable   Intraop Meds dexmedetomidine (Precedex): 43.75 mcg total  fentanyl (Sublimaze): 125 mcg total  ketamine (Ketalar):  mg given  ketorolac (Toradol): last given at 1850  ondansetron (Zofran): last given at 1850   Local Meds No   Antibiotics cefazolin (Ancef) - last given at 1648     Pain Patient Currently in Pain: yes  Comfort: comfortably manageable  Pain Control: fully effective   PACU meds  fentanyl (Sublimaze): 100 mcg (total dose) last given at 1954   hydromorphone (Dilaudid): 0.6 mg (total dose) last given at 1957    PCA / epidural No   Capnography     Telemetry ECG Rhythm: Sinus rhythm   Inpatient Telemetry Monitor Ordered? No        Labs Glucose Lab Results   Component Value Date     10/28/2019       Hgb Lab Results   Component Value Date    HGB 8.6 11/01/2019       INR Lab Results   Component Value Date    INR 1.25 09/04/2019      PACU Imaging Not applicable     Wound/Incision Incision/Surgical Site 11/01/19 Back (Active)   Incision Assessment UTV 11/1/2019  8:00 PM   Maria Isabel-Incision Assessment UTV 11/1/2019  8:00 PM   Closure NORIS 11/1/2019  8:00 PM   Incision Drainage Amount None 11/1/2019  8:00 PM   Dressing Intervention Clean, dry, intact  11/1/2019  8:00 PM   Number of days: 0      CMS        Equipment ice pack   Other LDA       IV Access Peripheral IV 11/01/19 Right Lower forearm (Active)   Site Assessment WDL 11/1/2019  8:00 PM   Line Status Infusing 11/1/2019  8:00 PM   Phlebitis Scale 0-->no symptoms 11/1/2019  8:00 PM   Infiltration Scale 0 11/1/2019  8:00 PM   Extravasation? No 11/1/2019  8:00 PM   Number of days: 0       Peripheral IV 11/01/19 Right Hand (Active)   Site Assessment [facility] 11/1/2019  8:00 PM   Line Status Saline locked 11/1/2019  8:00 PM   Phlebitis Scale 0-->no symptoms 11/1/2019  8:00 PM   Infiltration Scale 0 11/1/2019  8:00 PM   Extravasation? No 11/1/2019  8:00 PM   Number of days: 0      Blood Products Not applicable EBL 25   mL   Intake/Output Date 11/01/19 0700 - 11/02/19 0659   Shift 7670-0801 6628-6662 6339-0708 24 Hour Total   INTAKE   I.V.  983  983   Shift Total(mL/kg)  983(11.5)  983(11.5)   OUTPUT   Blood  25  25   Shift Total(mL/kg)  25(0.29)  25(0.29)   Weight (kg) 85.5 85.5 85.5 85.5      Drains / Dash Closed/Suction Drain 1 Left;Superior Back Accordion 10 [place] (Active)   Site Description UTV 11/1/2019  8:00 PM   Dressing Status Normal: Clean, Dry & Intact 11/1/2019  8:00 PM   Drainage Appearance Bloody/Bright Red 11/1/2019  8:00 PM   Status To bulb suction 11/1/2019  8:00 PM   Number of days: 0      Time of void PreOp Void Prior to Procedure: 1000 (11/01/19 1225)    PostOp      Diapered? No   Bladder Scan Bladder Scan Volume (mL): 272 ml (11/01/19 2100)   PO    tolerating sips     Vitals    B/P: 113/66  T: 97.7  F (36.5  C)    Temp src: Oral  P:  Pulse: 62 (11/01/19 2100)    Heart Rate: 63 (11/01/19 2100)     R: 15  O2:  SpO2: 100 %    O2 Device: Nasal cannula (11/01/19 2030)    Oxygen Delivery: 3 LPM (11/01/19 2030)         Family/support present significant other   Patient belongings     Patient transported on bed   DC meds/scripts (obs/outpt) Not applicable   Inpatient Pain Meds Released? Yes       Special  needs/considerations None   Tasks needing completion None       Dulce Coughlin, RN  [facility] [id number]

## 2019-11-02 NOTE — OR NURSING
No order for PCA.  Pt remains stable with vital signs and oxygen saturations.  Neuro checks stable.  Meets discharge criteria.

## 2019-11-04 NOTE — PROGRESS NOTES
"[facility] Health: Post-Discharge Note  SITUATION                                                      Admission:    Admission Date: 11/01/19   Reason for Admission: Metastatic cancer to spine   Discharge:   Discharge Date: 11/02/19  Discharge Diagnosis: Metastatic cancer to spine   Discharge Service: Orthopedics     BACKGROUND                                                      BRIEF HISTORY:  Per Dr. Graff operative report:   \"69/male with metastatic spinal disease secondary to renal cancer, presenting with worsening back pain radiating to left buttock and leg.  Imaging revealed metastatic or pathologic lesion L1, left-sided stenosis L3-4.  Tried nonoperative treatment, continues to have significant disabling symptoms.  I thus offered surgery in form of kyphoplasty with radiofrequency ablation L1, and minimally invasive decompression left L3-4.  Consented after thorough discussion of the rationale, risks, benefits and alternatives.\"     HOSPITAL COURSE:    The patient was admitted following the above listed procedures for pain control and rehabilitation. Javon Bianchi did well post-operatively. Medicine was consulted post operatively to aid in management of medical co-morbidities. The patient received routine nursing cares and at the time of discharge was medically stable. Vital signs were stable throughout admission. The patient is tolerating a regular diet and is voiding spontaneously. All PT/OT goals have been met for safe mobility. Pain is now controlled on oral medications which will be available on discharge. Stool softeners have been used while taking pain medications to help prevent constipation. Javon Bianchi is deemed medically safe to discharge.     ASSESSMENT      Discharge Assessment  Patient reports symptoms are: Unchanged  Does the patient have all of their medications?: Yes  Does patient know what their new medications are for?: Yes  Does patient have a follow-up appointment " scheduled?: Yes  Does patient have any other questions or concerns?: No    Post-op  Did the patient have surgery or a procedure: Yes(Kyphoplasty-Radiofrequency ablation Lumbar 1; Minimally Invasive Surgery  left hemilaminectomy Lumbar 3-Lumbar 4 on 11/1/2019)  Incision: healing  Drainage: No  Bleeding: none  Fever: No  Chills: No  Redness: No  Warmth: No  Swelling: No  Incision site pain: No  Eating & Drinking: eating and drinking without complaints/concerns  PO Intake: regular diet  Bowel Function: normal  Urinary Status: voiding without complaint/concerns    PLAN                                                      Outpatient Plan:  Follow up with Dr. Brower at 6 weeks postoperatively    Future Appointments   Date Time Provider Department Center   11/19/2019 10:00 AM [email or web address] [facility] O   11/19/2019 10:20 AM [id number] [facility] O   11/19/2019 11:00 AM [email or web address] [facility] O   11/19/2019  1:00 PM [email or web address] [facility] O   11/20/2019  4:30 PM Hua Anne MD [facility]   12/12/2019 12:45 PM Vickey Brower MD [facility]   1/23/2020 12:30 PM Vickey Brower MD [facility]           [facility] [employer] [facility]

## 2019-11-08 NOTE — PROGRESS NOTES
Patient and wife called to discuss patients GI upset    Suzie and Omega report that he has been having loose stools when he uses the bathroom, notes not an increased number of stools. He describes that when he eats he gets a rumbling in his stomach followed by the need to stool.     They tried re timing the timing of the prescribed Cymbalta and then tried stopping it yesterday, without changes in GI symptoms.      Dr Infante states that patient should not stop the Cymbalta abruptly and should restart or wean instead, Could stop taking Mucinex which may be causing GI upset.    Patient may be offered a GI consult if they would like this     These instructions were relayed to patient who verbalizes understanding

## 2019-11-12 NOTE — PROGRESS NOTES
"[facility]    Medicine Progress Note - Hospitalist Service       Date of Admission:  11/11/2019    Assessment & Plan   Javon Bianchi is a 69 year old male admitted on 11/11/2019. He presents with generalized fatigue, poor appetite, and essentially failure to thrive in the setting of metastatic renal cell carcinoma. Registered to observation for further evaluation and treatment. See detailed H&P by Dr. Erickson from 11/11/19 for complete details on presentation.      Failure to thrive in an adult: Patient with reduced oral intake and weight loss (188 lbs on admission, was 217 lbs on 9/25/19) over the past several months in the setting of metastatic renal cell carcinoma.  Suspect multifactorial with progressive decline with untreated malignancy, opiate-induced nausea and slowed bowel motility, possibly depression, recurrent hospitalizations. Pt has been working with Dr. Mills of Palliative Care outpatient for symptom management (last seen 10/11/19). Pt has been taking MS Contin 30/15/15 with afternoon and evening doses often taken on an empty stomach. Slowly tapering, but pt now with worsened back pain. Describes nausea with \"rolling stomach\" and intermittent aches in his LLQ. Typically has BM every 1.5 days, last BM the day PTA, soft. Pt does not utilize antiemetics at home. Per Palliative note, was previously prescribed Reglan 5 mg q6 hrs PRN. Also, recently completed course (~2 weeks ago) of Bactrim for pneumonia which could have contributed some to nausea. Takes medical marijuana prescribed through [employer] in [place]--takes \"Cheli\" 2 mL dose in the AM and \"tangerine\" 1 capsule in the evening (has been taking this since April 2019). Pt also felt Cymbalta could be contributing (been on for three months for radicular sx), but has continued to take this medication (last dose the day prior to admission). CT A/P with moderate stool, no acute pathology.   - Palliative care consulted, appreciate " recommendations tremendously   - Oncology consulted, appreciate assistance. See note by Dr. Esposito.   - Pre-treat MS Contin doses with scheduled Zofran, PRN Zofran, Compazine and Reglan available. Recommend not taking MS Contin on an empty stomach. Pt has been using PRN oral dilaudid in addition to MS Contin more frequently for worsening back pain in the past 48 hours   - Bowel regimen in place, moderate amount of stool noted on CT--Senna BID and Miralax daily, PRN suppository available  - Cymbalta on hold at this time  - Nutrition consult, appreciate assistance   - Smaller, more frequent meals. Boost shakes between meals  - Pt uninterested at repeat barium swallow (normal through [facility] in August) or GI evaluation at this time   - Intake and output, daily weights  - Ambulate with nursing staff  - Continue prior to admission medical marijuana at discharge     Hypophosphatemia  Hypomagnesemia: Phosphorus of 1.9, magnesium 1.6.  Suspect secondary to poor intake. Calcium corrected for hypoalbuminemia at 8.84.   - Electrolyte replacement protocol in place     Severe protein calorie malnutrition: In the context of both acute and chronic illness.   - Nutrition consulted, appreciate recommendations      Metastatic renal cell carcinoma  Chronic back pain   L1 pathologic lesion s/p kyphoplasty-radiofrequency ablation L1 (11/1/19)   Lumbar stenosis with neurogenic claudication and radiculopathy s/p left-hemilaminectomy L3-4 (11/1/19): Follows with Dr. Anne of [place] oncology.  Last dose of Nivolumab 4/2019.  Has follow-up in the coming month.  It appears that his cancer is largely stable with only a few interval increase in bony mets.  He has had prior palliative radiation for bony mets of lower spine, has not received radiation in his chest or cervical/thoracic spine.  No known metastases to GI tract/esophagus.  - Oncology consulted, appreciate assistance   - Continue PTA pain regimen as outlined above      Chronic  macrocytic anemia   Folate deficiency: Chronic in the setting of renal cell carcinoma and treatment.  Receives outpatient transfusions through oncology clinic.  Currently with hemoglobin greater than 8. Pt without dizziness, lightheadedness, chest pain, SOB. Folate 3.4, B12 was 490.   - Defer need for transfusion to Oncology   - Folic acid 1 mg po every day initiated 11/12/19     Recent Labs   Lab 11/12/19  0859 11/11/19  1858   HGB 7.4* 8.1*     Prior hospitalization for pneumonia: Hospitalized 9/3/19-9/7/19. See discharge summary for complete details on hospitalization. Completed course of Bactrim ~2 weeks ago. CT with mild consolidation in the LLL and patchy groundglass opacity at RL base. WBC WNL. Procal 0.19. No ongoing URI sx, chest congestion or cough.     Diet: Advance Diet as Tolerated: Regular Diet Adult  Snacks/Supplements Adult: Boost Shake; Between Meals    DVT Prophylaxis: Ambulate every shift  Dash Catheter: not present  Code Status: DNR/DNI      Disposition Plan   Expected discharge: Today, recommended to prior living arrangement once Palliative Care consult complete .  Entered: Hattie Angeles PA-C 11/12/2019, 10:55 AM     The patient's care was discussed with the Attending Physician, Dr. Aguirre, Bedside Nurse and Patient.    Hattie Angeles PA-C  Hospitalist Service  [facility]  ______________________________________________________________________    Interval History   See extensive H&P by Dr. Erickson. No acute events overnight. Awaiting Palliative Care consultation to assist with symptom management.     Data reviewed today: I reviewed all medications, new labs and imaging results over the last 24 hours. I personally reviewed no images or EKG's today.    Physical Exam   Vital Signs: Temp: 97  F (36.1  C) Temp src: Oral BP: 123/69 Pulse: 77   Resp: 16 SpO2: 97 % O2 Device: None (Room air)    Weight: 188 lbs 12.8 oz    CONSTITUTIONAL: Pt laying in  bed, dressed in hospital garb. Appears comfortable. Cooperative with interview. Accompanied by wife at bedside.   HEENT: Normocephalic, atraumatic. Negative for conjunctival redness or scleral icterus.    CARDIOVASCULAR: RRR, no murmurs, rubs, or extra heart sounds appreciated. Pulses +2/4 and regular in upper and lower extremities, bilaterally.   RESPIRATORY: No increased work of breathing.  CTA, bilat; no wheezes, rales, or rhonchi appreciated.  GASTROINTESTINAL:  Abdomen soft, non-distended. BS auscultated in all four quadrants. Negative for tenderness to palpation.  No masses or organomegaly noted.  MUSCULOSKELETAL: No gross deformities noted. Normal muscle tone.   HEMATOLOGIC/LYMPHATIC/IMMUNOLOGIC: Negative for lower extremity edema, bilaterally.  NEUROLOGIC: Alert and oriented to person, place, and time.  strength intact. No focal neuro deficits  SKIN: Warm, dry, intact. No jaundice noted. Negative for suspicious lesions, rashes, bruising, open sores or abrasions.     Data   Recent Labs   Lab 11/12/19  0859 11/11/19  1858   WBC 5.9 7.6   HGB 7.4* 8.1*   * 105*    287    135   POTASSIUM 3.5 3.5   CHLORIDE 105 102   CO2 26 27   BUN 7 10   CR 0.80 0.87   ANIONGAP 8 6   AUREA 7.4* 8.1*   GLC 91 102*   ALBUMIN 2.2* 2.4*   PROTTOTAL 6.0* 6.4*   BILITOTAL 0.4 0.3   ALKPHOS 83 92   ALT <6 <6   AST 41 53*   LIPASE 41*  --      Recent Results (from the past 24 hour(s))   CT Abdomen Pelvis w Contrast    Narrative    CT ABDOMEN PELVIS WITH CONTRAST   11/11/2019 8:15 PM     HISTORY: Abdominal pain. Poor by mouth intake. History of metastatic  renal cell carcinoma.    TECHNIQUE: 94 mL Isovue-370 IV were administered. After contrast  administration, volumetric helical sections were acquired from the  lung bases to the ischial tuberosities. Coronal images were also  reconstructed. Radiation dose for this scan was reduced using  automated exposure control, adjustment of the mA and/or kV according  to  patient size, or iterative reconstruction technique.    COMPARISON: CT of the chest, abdomen, and pelvis performed 8/8/2019.     FINDINGS: No bowel obstruction. There is a moderate amount of stool in  the ascending and proximal transverse colon. No convincing evidence  for colitis or diverticulitis. No free fluid in the pelvis. Mild  atherosclerotic aortoiliac calcification. Prior cholecystectomy. There  is mild splenomegaly. The spleen measures up to 14 cm in length, not  significantly changed. The liver, spleen, adrenal glands, pancreas,  and kidneys are unremarkable. No hydronephrosis. Retroaortic left  renal vein is an anatomic variant. No enlarged lymph nodes are  identified in the abdomen or pelvis.     Mild consolidation and groundglass infiltrate in the left lower lobe  of the lung could be related to pneumonia. There are also scattered  areas of mild groundglass opacity at the right lung base. Degenerative  changes are noted in the visualized thoracolumbar spine. Multiple  areas of sclerotic metastases  in the visualized bones not  significantly changed. Moderate anterior compression of the L3  vertebral body is unchanged. Interval postoperative changes of L1  vertebroplasty are noted.      Impression    IMPRESSION:   1. There is a moderate amount of stool in the ascending and proximal  transverse colon.    2. Mild splenomegaly.  3. Mild consolidation in the left lower lobe and patchy groundglass  opacity at the right lung base could be infectious or inflammatory in  etiology. Please correlate for clinical evidence of pneumonia.        ELISA MONTOYA MD     Medications       acetaminophen  1,000 mg Oral TID     folic acid  1 mg Oral Daily     melatonin  5 mg Oral At Bedtime     morphine  15 mg Oral BID     morphine  30 mg Oral QAM     multivitamin, therapeutic  1 tablet Oral At Bedtime     omeprazole  40 mg Oral Daily     ondansetron  4 mg Oral TID     polyethylene glycol  17 g Oral Daily      senna-docusate  2 tablet Oral BID     sodium chloride (PF)  3 mL Intracatheter Q8H

## 2019-11-12 NOTE — CONSULTS
Consult Date:  11/12/2019      REASON FOR CONSULTATION:    This consult has been requested by Hattie Angeles PA-C for metastatic renal cell carcinoma.       HISTORY OF PRESENT ILLNESS:     The patient follows up with Dr. Anne in Oncology Clinic.  The patient was diagnosed with metastatic carcinoma with bone metastasis in 08/2018.  MRI in 08/2018 revealed pathological L3 vertebral fracture.  Biopsy revealed poorly differentiated carcinoma.  IHC was suggestive of possible renal cell carcinoma.  On the scan, there was no renal mass.  The patient is being treated for metastatic renal cell carcinoma.  He has received different treatments.  Initially he received radiation. He was on cabozantinib.  He was started on nivolumab in 02/2019.  It was stopped in 04/2019 because of myalgia.        The patient has back pain from metastasis. On 11/01/2019, he had balloon kyphoplasty to L1, radiofrequency ablation of L1 vertebral body lesion, and left hemilaminectomy L3 to L4.      The patient was brought to the emergency room on 11/11/2019 because of worsening condition.  For the last 4-6 weeks, his condition overall has been declining.  He has been getting weaker.  His appetite has been decreased.  He is losing weight.  Because of these complaints, he was brought to the emergency room yesterday and had multiple investigations done.   -- CBC reveals anemia with hemoglobin of 8.1.  Normal WBC and platelets.   -- CMP reveals low protein, albumin and phosphorus.   -- Low folic acid.   -- Normal vitamin B12.   -- CT abdomen and pelvis reveals moderate amount of stool and mild splenomegaly.  There is mild consolidation in the left lower lobe and patchy ground-glass opacity in the right lung base.  No evidence of metastatic disease in intra-abdominal or pelvic organs.  No lymphadenopathy.  There are multiple areas of atherosclerotic metastasis.      Wife was at the bedside.  I asked the patient and wife regarding his overall condition.   The patient says that there is no acute change, but over the last few weeks, his overall condition has been deteriorating.  He is getting weaker.  His appetite has been decreased.  He does not feel like eating.  He is taking medical marijuana, but it has not helped with his appetite.  He does have pain mainly in the lower back.      REVIEW OF SYSTEMS:  No headache.  No dizziness.  No recent upper respiratory infection or pneumonia.  No chest pain or shortness of breath.  Occasional cough.  Some nausea.  No recurrent vomiting.  No bleeding from any site.  All other review of systems negative.      ALLERGIES:  NONE.      MEDICATIONS:  Reviewed.      PAST MEDICAL HISTORY:   1.  Metastatic renal cell carcinoma.   2.  Back pain secondary to bone metastasis.   3.  Pathological vertebral fracture.   4.  Anemia   5.  Dyslipidemia.   6.  Cholecystectomy.      SOCIAL HISTORY:     -No history of smoking.   -Occasional alcohol use.      PHYSICAL EXAMINATION:   GENERAL:  He is alert and oriented x 3.   VITAL SIGNS:  Reviewed.  ECOG PS of 2.   FACE:  No swelling.   EYES:  No icterus.   THROAT:  No ulcer or thrush.   NECK:  Supple. No lymphadenopathy or thyromegaly.   AXILLAE:  No lymphadenopathy.   LUNGS:  Good air entry bilaterally.  No wheezing.   HEART:  Regular.   ABDOMEN:  Soft and nontender, no mass.   EXTREMITIES:  No edema.  No calf swelling or tenderness.   SKIN:  No petechiae.  No rash.      LABORATORY DATA:  Reviewed.      ASSESSMENT:   1.  A 69-year-old gentleman with metastatic renal cell carcinoma with multiple bone metastases.  He is not on any systemic treatment since 04/2019.    2.  Back pain status post radiation and kyphoplasty.   3.  Decreased appetite and weight loss secondary to metastatic renal cell carcinoma.   4.  Macrocytic anemia. Anemia is due to multiple factors including malignancy with bone metastasis, folate deficiency, and radiation.   5.  Fatigue secondary to malignancy and anemia.       RECOMMENDATIONS:   1.  I discussed regarding his symptoms:  His main symptoms are weakness, decreased appetite and weight loss.  This is all secondary to malignancy.  His back pain is secondary to bone metastasis.        I explained to the patient that treating his kidney cancer might help improve some of his symptoms.  He has not been on any treatment for kidney cancer since 04/2019.      The patient has an upcoming appointment with Dr. Anne, his primary oncologist, end of 11/2019.  Dr. Anne plans to start the patient back on systemic treatment.  I am hoping that as his cancer gets better controlled his symptoms will improve.      He has decreased appetite.  He is already on medical marijuana, which we will continue.  I told the patient to try eating frequent small meals.  As of now, he was taking large meal about twice a day and he was not able to eat much.  Hopefully, frequent small meals will be better for him.  I told the patient that he can try to eat anything that he feels like. There is no dietary restrictions.      He has been on medical marijuana.  He is going to call the store and see if he can increase the dose of medical marijuana.      2.  The patient has back pain.  He had radiation and most recently kyphoplasty.  He is on pain medication.  That will be continued.  The patient said that his pain is improving.      3.  Discussed regarding constipation.  The patient says that he has a bowel movement every other day.  He has multiple pain medications.  Advised him to continue taking a stool softener.        4.  The patient has anemia.  This is due to multiple factors including folate deficiency and metastatic disease.  He should be transfused if hemoglobin is below 7.  He has already been started on folic acid. For further workup, we will get iron studies checked on him.      5.  He will benefit from a palliative care consult.  That is already being planned.      6.  The patient and wife had a few  questions, which were all answered.  Oncology will continue to follow him.      Thanks for the consult.      Recommendations discussed with Hattie Angeles PA-C.         JOSÉ LUIS BROWN MD             D: 2019   T: 2019   MT:       Name:     ONEYDA MUNOZ   MRN:      [id number]        Account:       [id number]   :      1950           Consult Date:  2019      Document: [id number]

## 2019-11-12 NOTE — CONSULTS
[facility]  Palliative Care Consultation Note    Patient: Javon Bianchi  Date of Admission:  11/11/2019    Requesting Clinician / Team: Hattie LARA/Hospitalist  Reason for consult: Pain management, Symptom management, Patient and family support    Recommendations:    For chronic malignant back pain, continue MScontin 30mg PO QAM, 15mg PO Q afternoon and 15mg PO Q bedtime with dilaudid 2-4mg PO Q3hrs PRN breakthrough pain. Has not needed dilaudid recently, but encouraged him to use it to help progress his activity tolerance. Continue APAP 1g PO TID and lidoderm patches as well    For constipation, increase Senna 3-4 tabs PO BID, tap water enema x1 today    For fatigue, pain, nausea, and appetite stimulation, will trial Dexamethasone 8mg PO daily x3 days followed by 4mg PO daily x3 days     I will talk with Dr. Mills regarding cymbalta and lyrica, as pt is unsure if these are helping him and wondering if they can/should be stopped     RD consult for nutrition education in setting of appetite suppression, anorexia, metastatic cancer. Encouraged small, frequent meals and chocolate boost supplements throughout the day    Encouraged that Omega be up ambulating 4x per day     Note anemia; follow up Hgb in AM     Pt has follow up with Dr. Anne next week for restaging imaging and discussion of resuming cancer directed therapy. He also has follow up previously scheduled with Dr. Mills, palliative care, on 11/19, which will be maintained      These recommendations have been discussed with Hattie LARA, Dr. Esposito, and bedside RN Marizol.    Soraida RAMSAY, [facility]  Palliative Medicine   Pager [phone]      Thank you for the opportunity to participate in the care of this patient and family. Our team: will continue to follow.     During regular M-F work hours -- if you are not sure who specifically to contact -- please contact us at [phone].    After regular work hours and on  weekends/holidays, you can call our answering service at [phone]. Also, who's on call for us is available in [employer].     Attestation:  Total time on the floor involved in the patient's care: 70 minutes  Total time spent in counseling/care coordination: >50%    Assessments:  Javon Bianchi is a 69 year old male with PMH significant for metastatic RCC with mets to the bone not on active chemotherapy, recent PNA treated with bactrim, recent zoster infection, and recent kyphoplasty-radiofrequency ablation to L1 and left hemilaminectomy L3-4 who presents with generalized weakness and fatigue, poor appetite, and failure to thrive. CT Abd demonstrates moderate stool burden. He is also found to be anemic at 7.4.     Today, the patient was seen for:  Pain and symptom management, pt and family support     Visited Omega and his wife Suzie this afternoon. Omega is resting in bed, primarily with his eyes closed, although actively engaged in the conversation. Omega feels pretty miserable. He has been feeling like this for quite some time. There was some improvement in his energy after his surgery beginning of November, but the weakness and fatigue has gotten worse since surgery. He finished his bactrim course around the time of surgery. He complains vaguely of low energy/fatigue, sleeping 12 hours a day and resting another 8 hours a day (spending most of his time in bed), very suppressed appetite with weight loss. No significant nausea. Having regular BMs every other day or so, soft. With his fatigue, he feels weak. Daily chills around 4PM lead to even more suppressed appetite.     We spent a lot of time better understanding his symptoms. There is no clear reason why he is feeling this way, but I express worry and concern that it could all be related to his cancer. He has not been on any cancer directed treatment for 6+ months, and it is very likely that imaging next week will demonstrate some degree of disease  progression. There is an ultimate plan with Dr. Anne to start cancer directed therapy, which could ultimately help reduce his sx burden, if we can get his cancer under control. Although we do express worry that he could be too far behind to catch up, and the cancer treatment could in fact make him feel worse.     We talk about what we can do in the meantime, before getting restaging imaging and starting treatment. We review management of his constipation with ramped up senna and an enema. We talk about appetite stimulants, including his medicinal marijuana, dexamethasone, remeron. We talked about eating small meals throughout the day, and boost/ensure supplements I recommend revisiting with the dietician. We talk about the need for activity to combat fatigue, and make note of his anemia and potential need for a blood transfusion if Hgb <7. We talk about the importance of maximized pain management to help him be active and functional. Emotional support provided as we acknowledge how difficult this all is when you feel terrible. Pt is overall very well supported by his wife, Suzie, who is present today.     Prognosis, Goals, & Planning:      Functional Status just prior to hospitalization: 3 (Capable of only limited self-care; needs help with ADLs; in bed/chair >50% of waking hours)      Prognosis, Goals, and/or Advance Care Planning were addressed today: Yes      Patient's decision making preferences: with input from medical clinicians and loved ones          Patient has decision-making capacity today for complex decisions: Yes            I have concerns about the patient/family's health literacy today: No           Patient has a completed Health Care Directive: Yes, and on file.      Code status: DNR/DNI    Coping, Meaning, & Spirituality:   Mood, coping, and/or meaning in the context of serious illness were addressed today: Yes  Summary/Comments: Pt understandably exhausted and discouraged. We acknowledge that it  can be difficult news to hear that there is no easy fix to what he is feeling. He agrees that it is likely that he feels this way because of his cancer. We talk about the hope being that his sx burden could improve with treatment of his cancer, although there is worry that cancer treatment will be too hard on his current state of health. He had anxiety around his cancer diagnosis, but now feels too fatigue to have much anxiety.     Social:     Living situation: Independent home with wife Suzie    [place] family / caregivers: Wife Suzie present and supportive    History of Present Illness:  History gathered today from: patient, family/loved ones, medical chart, medical team members    Javon Bianchi is a 69 year old male with PMH significant for metastatic RCC with mets to the bone not on active chemotherapy, recent PNA treated with bactrim, recent zoster infection, and recent kyphoplasty-radiofrequency ablation to L1 and left hemilaminectomy L3-4 who presents with generalized weakness and fatigue, poor appetite, and failure to thrive. CT Abd demonstrates moderate stool burden. He is also found to be anemic at 7.4.     Key Palliative Symptom Data:  # Pain severity the last 12 hours: low  # Dyspnea severity the last 12 hours: low  # Nausea severity the last 12 hours: low  # Anxiety severity the last 12 hours: low    Patient is on opioids: assessed and I made recommendations about bowel care as above.    ROS:  Comprehensive ROS is reviewed and is negative except as here & per HPI: N/A     Past Medical History:  Past Medical History:   Diagnosis Date     Anemia      Bone metastasis (H) 09/28/2018     Cough      Pulmonary nodule      Renal cell carcinoma, right (H) 09/28/2018        Past Surgical History:  Past Surgical History:   Procedure Laterality Date     BRONCHOSCOPY (RIGID OR FLEXIBLE), DIAGNOSTIC N/A 9/4/2019    Procedure: Bronchoscopy, With Bronchoalveolar Lavage;  Surgeon: Burton Toure MD;  Location:  GI      CHOLECYSTECTOMY       HC REMOVAL HEEL SPUR, CALCANEUS  2004     OPTICAL TRACKING SYSTEM BIOPSY BONE RADIO FREQUENCY ABLATION N/A 11/1/2019    Procedure: Kyphoplasty-Radiofrequency ablation Lumbar 1; Minimally Invasive Surgery  left hemilaminectomy Lumbar 3-Lumbar 4;  Surgeon: Vickey Brower MD;  Location: [facility] OR         Family History:  Family History   Problem Relation Age of Onset     Lung Cancer Maternal Grandmother         Allergies:  No Known Allergies     Medications:  I have reviewed this patient's medication profile and medications from this hospitalization.   Noted scheduled meds are:  APAP 1g PO TID  Dexamethasone 8mg PO daily x3 days followed by 4mg PO daily x3 days  Lidoderm patch  MScontin 30mg PO QAM, 15mg afternoon, 15mg bedtime  Zofran 4mg ODT TID  Miralax daily   Lyrica 50mg PO BID  Senna-s 3-4 tabs PO BID    Noted PRN meds are:  DIlaudid 2-4mg PO Q3hrs PRN pain  Dilaudid 0.3mg IV Q2hrs PRN pain   Reglan  Compazine    Physical Exam:  Vital Signs: Temp: 95.8  F (35.4  C) Temp src: Oral BP: 102/56 Pulse: 67   Resp: 15 SpO2: 96 % O2 Device: None (Room air)    Weight: 188 lbs 12.8 oz  CONSTITUTIONAL: Chronically ill man who appears very fatigued seen resting in bed in NAD, A&Ox3 yet lethargic. Calm and cooperative. Wife present   HEENT: NCAT  RESPIRATORY: NL respiratory effort on RA, infrequent nonproductive cough  NEUROLOGIC: Appropriately responsive during interview  PSYCH: Affect flat    Data reviewed:  Recent imaging reviewed, my comments on pertinents:   11/11 CT Abd with moderate amount of stool    Recent lab data reviewed, my comments on pertinents:   Na 139  K 3.5  Creat 0.8  Mg 1.6  Phos 2.8  Albumin 2.2  WBC 5.9  Hgb 7.4

## 2019-11-12 NOTE — PLAN OF CARE
Observation Goals:  - Nausea/Vomiting (diarrhea if present) improved: Not met, nausea intermittent  - Tolerating oral intake to maintain hydration: Met  - Vital signs normal or at patient baseline and orthostatic vitals are normal and patient not lightheaded with standing: Met  - Diagnostic tests and consults completed and resulted if ordered: Not met  - Adequate pain control on oral analgesia: Partially met  - Tolerating oral antibiotics if ordered: N/A  - Safe disposition plan has been identified: Not met  Nurse to notify provider when observation goals have been met and patient is ready for discharge.    Shift note: Pt arrived 2300 last night. Pt is A/o x4, blunt affect, VSS on RA, C/o 6/10 back pain, managed with scheduled MS Contin, C/o constipation, no BM this shift but passing flatus. Phosphorous 1.9 replaced, recheck today. Poor appetite, ate popsickle and tolerated well. Nausea intermittent. Small dressing to back incision site CDI. Discharge to home possible today pending clinical improvement. Continue to monitor.

## 2019-11-12 NOTE — PLAN OF CARE
Observation Goals:  - Nausea/vomiting (diarrhea if present) improved: Met   - Tolerating oral intake to maintain hydration: Met  - Vital signs normal or at patient baseline and orthostatic vitals are normal and patient not lightheaded with standing: Met  - Diagnostic tests and consults completed and resulted if ordered: Not met  - Adequate pain control on oral analgesia: partially met  - Tolerating oral antibiotics if ordered: N/a  - Safe disposition plan has been identified: Not met   - Nurse to notify provider when observation goals have been met and patient is ready for discharge.

## 2019-11-12 NOTE — ED NOTES
"[facility]  ED Nurse Handoff Report    ED Chief complaint: Abdominal Pain      ED Diagnosis:   Final diagnoses:   None       Code Status: to be assessed by admitting provider     Allergies: No Known Allergies    Activity level - Baseline/Home:  Independent  Activity Level - Current:   Independent    Patient's Preferred language: english    Needed?: No    Isolation: No  Infection: Not Applicable  Bariatric?: No    Vital Signs:   Vitals:    11/11/19 1906 11/11/19 2010 11/11/19 2030   BP: 123/76 114/69 103/62   Pulse: 61     Temp: 99.1  F (37.3  C)     TempSrc: Oral     SpO2: 97%  96%       Cardiac Rhythm: ,        Pain level:      Is this patient confused?: No   Does this patient have a guardian?  No         If yes, is there guardianship documents in the Epic \"Code/ACP\" activity?  N/A         Guardian Notified?  N/A  [place] - Suicide Severity Rating Scale Completed?  Yes  If yes, what color did the patient score? White      Patient Report: Initial Complaint: Patient arrived to the ED today with complaints of decreased appetite and abdominal pain. Patient has cancer and reports that for the last few weeks he has had a significant decrease in appetite and weight loss.    Focused Assessment:   Respiratory: WDL  Cardiac: WDL   Neuro: WDL   GI: patient reports decreased appetite, and significant weight loss of up to 20 lbs in the last two weeks. Patient states that he feels like when he eats it gets \"caught\" in his stomach for a while.   : WDL   Musculoskeletal: patient recently had back surgery but is able to ambulate independently.    Tests Performed: CT abdomen, labs   Abnormal Results:   Results for orders placed or performed during the hospital encounter of 11/11/19   CBC with platelets differential     Status: Abnormal   Result Value Ref Range    WBC 7.6 4.0 - 11.0 10e9/L    RBC Count 2.45 (L) 4.4 - 5.9 10e12/L    Hemoglobin 8.1 (L) 13.3 - 17.7 g/dL    Hematocrit 25.6 (L) 40.0 - 53.0 % "     (H) 78 - 100 fl    MCH 33.1 (H) 26.5 - 33.0 pg    MCHC 31.6 31.5 - 36.5 g/dL    RDW 16.0 (H) 10.0 - 15.0 %    Platelet Count 287 150 - 450 10e9/L    Diff Method Manual Differential     % Neutrophils 67.0 %    % Lymphocytes 2.0 %    % Monocytes 30.0 %    % Eosinophils 0.0 %    % Basophils 0.0 %    % Metamyelocytes 1.0 %    Absolute Neutrophil 5.1 1.6 - 8.3 10e9/L    Absolute Lymphocytes 0.2 (L) 0.8 - 5.3 10e9/L    Absolute Monocytes 2.3 (H) 0.0 - 1.3 10e9/L    Absolute Eosinophils 0.0 0.0 - 0.7 10e9/L    Absolute Basophils 0.0 0.0 - 0.2 10e9/L    Absolute Metamyelocytes 0.1 (H) 0 10e9/L    Anisocytosis Slight     Platelet Estimate       Automated count confirmed.  Platelet morphology is normal.   Comprehensive metabolic panel     Status: Abnormal   Result Value Ref Range    Sodium 135 133 - 144 mmol/L    Potassium 3.5 3.4 - 5.3 mmol/L    Chloride 102 94 - 109 mmol/L    Carbon Dioxide 27 20 - 32 mmol/L    Anion Gap 6 3 - 14 mmol/L    Glucose 102 (H) 70 - 99 mg/dL    Urea Nitrogen 10 7 - 30 mg/dL    Creatinine 0.87 0.66 - 1.25 mg/dL    GFR Estimate 87 >60 mL/min/[1.73_m2]    GFR Estimate If Black >90 >60 mL/min/[1.73_m2]    Calcium 8.1 (L) 8.5 - 10.1 mg/dL    Bilirubin Total 0.3 0.2 - 1.3 mg/dL    Albumin 2.4 (L) 3.4 - 5.0 g/dL    Protein Total 6.4 (L) 6.8 - 8.8 g/dL    Alkaline Phosphatase 92 40 - 150 U/L    ALT <6 0 - 70 U/L    AST 53 (H) 0 - 45 U/L   Phosphorus     Status: Abnormal   Result Value Ref Range    Phosphorus 1.9 (L) 2.5 - 4.5 mg/dL   Creatinine POCT     Status: None   Result Value Ref Range    Creatinine 0.8 0.66 - 1.25 mg/dL    GFR Estimate >90 >60 mL/min/[1.73_m2]    GFR Estimate If Black >90 >60 mL/min/[1.73_m2]     Treatments provided: fluids     Family Comments: wife at bedside     OBS brochure/video discussed/provided to patient/family: Yes              Name of person given brochure if not patient: n/a              Relationship to patient: n/a    ED Medications:   Medications   0.9%  sodium chloride BOLUS (0 mLs Intravenous Stopped 11/11/19 2014)   ondansetron (ZOFRAN) injection 4 mg (4 mg Intravenous Given 11/11/19 1905)   iopamidol (ISOVUE-370) solution 94 mL (94 mLs Intravenous Given 11/11/19 2004)   Saline Flush (69 mLs Intravenous Given 11/11/19 2004)       Drips infusing?:  No    For the majority of the shift this patient was Green.   Interventions performed were none .    Severe Sepsis OR Septic Shock Diagnosis Present: No    To be done/followed up on inpatient unit:  control discomfort, continue to monitor     ED NURSE PHONE NUMBER: *[phone]

## 2019-11-12 NOTE — H&P
"[facility]    History and Physical - Hospitalist Service       Date of Admission:  11/11/2019    Assessment & Plan   Javon Bianchi is a 69 year old male admitted on 11/11/2019. He presents with generalized fatigue, poor appetite, and essentially failure to thrive in the setting of metastatic renal cell carcinoma    Failure to thrive in an adult: Patient with reduced oral intake and weight loss over the past several months in the setting of metastatic renal cell carcinoma.  Suspect multifactorial with opiate-induced slowed bowel motility, possibly depression, progressive decline with malignancy, recurrent hospitalizations.  -Continue to hold Cymbalta.  Patient discontinued this medication 2 days ago  -Recommend thin calorie dense foods, essentially \"spoon diet,\" as patient does not have issues with very soft food, appears to describe most of his poor intake around attempting to eat foods that require more chewing such as grilled cheese, crust on toast, steak/protein.  In fact, in describing his intake, patient actually spends much of his time describing how he spends a prolonged amount of time chewing, and this resulted in decreased intake.  -Boost shakes between meals.  -If patient remains hospitalized and transitions to inpatient status, can consider nutrition consultation.  Otherwise, this can be pursued as an outpatient  -With decreased appetite, difficulty sleeping, blunted affect, discussed with patient consideration for depression as a cause for his weight loss and poor appetite.  As above, currently on a trial off of Cymbalta as this could cause GI upset.  Encouraged patient to at least consider depression as a possibility for his ongoing weight loss and poor appetite, and would consider further evaluation and treatment if current interventions fail to improve intake  -Recommend increased bowel regimen.  Continue senna twice daily.  Offered enema in the setting of mild left lower quadrant " discomfort and slowed motility, which patient declines  -Continue to taper narcotics as able; patient has been attempting to wean his narcotics in the outpatient setting, that was actually part of his hope for orthopedic surgery.  -Consider gastroenterology evaluation for repeat barium swallow (normal through [facility] in August) or possible EGD if symptoms persist despite change in diet  -Intake and output, daily weights  -Recommend patient have a small amount of food in his stomach prior to afternoon dosing of MS Contin as patient typically takes 15 mg MS Contin at 3 PM on an empty stomach, feels nauseous and lightheaded around 4 PM.  -Ambulate with nursing staff  -Continue prior to admission medical marijuana at discharge     Hypophosphatemia: Phosphorus of 1.9.  Suspect secondary to poor intake  -Magnesium pending  -Potassium, magnesium, phosphorus replacement protocols in place  -1 L lactated Ringer bolus    Metastatic renal cell carcinoma: Follows with Dr. Anne of [place] oncology.  Has not yet resumed oral biologic/chemotherapy following recent pneumonia diagnosis, zoster, and spine surgery.  Has follow-up in the coming month.  It appears that his cancer is largely stable with only a few interval increase in bony mets.  He has had prior palliative radiation for bony mets of lower spine, has not received radiation in his chest or cervical/thoracic spine.  No known metastases to GI tract/esophagus.  -Continue MS Contin 15 mg every morning and every afternoon, 30 mg every morning  -IV Dilaudid available PRN for breakthrough pain  -Continue outpatient follow-up with oncology    Anemia, macrocytic: Chronic in the setting of renal cell carcinoma and treatment.  Receives outpatient transfusions through oncology clinic.  Currently with hemoglobin greater than 8.   -B12 and folate added on.  These results will need to be followed up with oncology team as they will not be resulted at time of discharge.  -Multivitamin  "suplementation  -Boost supplementation between meals as above       Diet: ADAT; recommend \"spoon diet\" as primarily describes issues with   DVT Prophylaxis: Pneumatic Compression Devices  Dash Catheter: not present  Code Status: DNR/DNI. Confirmed with patient on admission.  Note that this is a change from previously documented CODE STATUS, though consistent with prior POLST    Disposition Plan   Expected discharge: Tomorrow, recommended to prior living arrangement once Tolerating oral intake, hypophosphatemia corrected .  Entered: Khurram Erickson MD 11/11/2019, 9:50 PM     The patient's care was discussed with the Patient, Dr. Oneill in the emergency department.    Khurram Erickson MD  [facility]    ______________________________________________________________________    Chief Complaint   Weight loss, decreased oral intake, feels as though food is slow to go down his esophagus.    History is obtained from patient, chart review, discussion with Dr. Oneill in the emergency department, review of outside records including essentially normal barium swallow study through [facility] in August 2019 for a similar concern of food feeling as though it is slow to go down his esophagus.    History of Present Illness   Javon Bianchi is a 69 year old male who presents to the emergency department for evaluation of subacute weight loss and poor oral intake over the preceding months after discussing his symptoms with nurse line through [facility], where he follows with Dr. Anne of oncology.    Patient lives in [place] with his wife, and for the past 6 months or so, feels as though his appetite has been poor, and he is losing weight.  With persistent symptoms of decreased appetite today, his wife became concerned, they contacted the nurse line at [facility], and patient was offered to be evaluated in the emergency department versus through PCP.  Patient and family opted to be seen " in the emergency department at [facility].  In the emergency department, patient reports ongoing weight loss and generalized fatigue/weakness, was found to have mild hypophosphatemia consistent with history of poor oral intake.    Patient with known metastatic renal cell carcinoma, known bony metastases status post palliative radiation treatments.  Overall, it appears that his cancer has been somewhat controlled with chemotherapy based on serial imaging with only mild change in bony metastases.  Currently off of any chemotherapeutic agents given recent hospitalization for pneumonia, zoster infection, and most recently kyphoplasty/ablation procedure for bone pain.    Patient is on chronic MS Contin as well as occasional oral Dilaudid tablets.  Uses Dilaudid infrequently, and has been attempting to taper down his MS Contin.  Currently uses 30 milligrams every morning, 15 mg at approximately 3 PM, and 15 mg before going to bed of MS Contin.    Patient reports that his intake overall, his total intake is poor, and when he does eat, he believes his serving sizes are less than they have been in the past.  In the morning, patient is typically most successful with his oral intake.  He describes eating approximately 12 ounces of hot chocolate with bread dipped in the hot chocolate.  He has no difficulty swallowing this, no sensation that his food is slow to enter his stomach.  This is actually his primary complaint when he describes his eating.  This complaint has been long-standing as well.  Note August 2019 at [facility] where patient underwent a barium swallow study for a similar concern of food feeling as though it was slow to go down his esophagus.  Patient does not feel that he has a globus sensation or that food is stuck in his esophagus, though does seem to have no sensation of this with liquids or very soft foods, finds he has difficulty with more firm foods.  For instance, patient  describes a sensation of slow esophageal motility after swallowing grilled cheese/toast, describes chewing for many minutes at a time prior to swallowing meats.  Discussed possibility of stricture/achalasia resulting in patient chewing excessively prior to swallowing despite not recognizing the sensation of food sticking in his throat.  Patient does not believe the symptom has changed since normal barium swallow in August, however, and has no desire to repeat barium study or pursue gastroenterology evaluation at this time.    Occasionally, patient will have episodes of emesis.  He describes at one point drinking a complete can of nutritional supplement and having an episode of emesis; since then he has only had 1/2 can at a time approximately once per day.  Is able to drink his entire 12 ounces of hot chocolate in the morning, however.  Describes episode of emesis after eating ice cream recently, and has not eaten ice cream since.  Emesis is not a common theme for patient.  Patient does not have significant abdominal pain.  He states that he has had some left lower quadrant abdominal discomfort which has been ongoing for months.  He reports a bowel movement every 1.5 days, and reports formed stools which are somewhat soft, attributes this to adherence to senna regimen.  Discussed possibility that patient has delayed mobility secondary to his narcotic medications, and that an increased bowel regimen may offer him some benefit.    Overall, patient reports that his wife is concerned with his ongoing weight loss, and he feels that he is more fatigued generally over the past 2 months, though this is also in the context of repeated hospitalizations as above. Weight in ER today is 189#.     The following weight flow sheet is from oncology visit note 9/25/19:   09/25/19 98.4 kg (217 lb)   09/13/19 95.7 kg (211 lb)   09/06/19 95.3 kg (210 lb 1.6 oz)   09/03/19 93.8 kg (206 lb 12.8 oz)   08/15/19 96.1 kg (211 lb 14.4 oz)    08/13/19 96.6 kg (213 lb)   08/08/19 96.2 kg (212 lb)   08/07/19 96.4 kg (212 lb 9.6 oz)   07/19/19 99.6 kg (219 lb 9.6 oz)   07/09/19 100.7 kg (222 lb)       Patient believes that a healthy weight for him is 210 pounds, though states that he felt well at 210 pounds after weight loss and exercise approximately 20 years ago when he was active in cycling.  Discussed muscle loss and subcutaneous fat loss as well as water weight shifts since cancer diagnosis, and patient recognizes that his ideal body weight may be less since.  He has weighed closer to 230 pounds following his cancer diagnosis, though tells me that he had a significant amount of lower extremity edema with his higher weights, and this has since improved.  Still has mild lower extremity edema.  Given ongoing weight shifts with reported significant lower extremity edema which is now much improved, difficult to determine an appropriate baseline weight now, though would be concerned if patient continues to lose weight with minimal lower extremity edema currently.  At September 25 visit when patient weighed 217 pounds, he had 2+ pitting edema noted on exam.    Lengthy discussion regarding potential etiologies and treatments for his weight loss.  This appears to be a chronic progressive issue, and I discussed that it is unlikely that we will have a firm reason for his symptoms established during observation admission.  Patient desires more conservative measures and trials, and is receptive to a diet which involves more thin and calorie dense foods rather than attempting to eat oatmeal, grilled cheese as is a typical meal for him.  As above, declines barium swallow and GI evaluation at this time.    Patient describes eating okay in the morning, though again this is typically bread soaked in hot chocolate, very soft.  Difficulty with his afternoon meal which is typically toasted grilled cheese.  Takes his MS Contin at 3 PM, and at 4 PM he notices that he feels  that he has a grumbling upset stomach sensation and feels slightly lightheaded.  Discussed not taking MS Contin and on empty stomach, consider further down titration of narcotics as able.  Patient does not have similar symptoms after his morning MS Contin or evening MS Contin.  No other significant afternoon medications prior to feeling generally unwell at 4 PM.    In the preceding weeks and days, patient has been down titrating his medications to see if these are affecting his appetite.  For instance, was recently taken off of Bactrim.  2 days ago, stopped his Cymbalta noting that this could potentially cause stomach upset.     As above, I did discuss the possibility of depression with patient.  Patient has some difficulty getting to sleep at night, fatigue, decreased appetite.  Patient does not currently feel depressed, but does have a slightly blunted affect and recognizes that he felt depressed upon his initial diagnosis with renal cell carcinoma.  Patient willing to remain open-minded as to this being a possible etiology for his poor appetite and weight loss.    Review of Systems    The 10 point Review of Systems is negative other than noted in the HPI or here.  No fevers or chills  Generalized fatigue and malaise  Difficulty sleeping at night  Very infrequent nausea and emesis  Some mild left lower quadrant abdominal discomfort  Bowel movement once every 1.5 days  Sensation of slowed motility and swallowing without sensation of globus or food being stuck.    Past Medical History    I have reviewed this patient's medical history and updated it with pertinent information if needed.   Past Medical History:   Diagnosis Date     Anemia      Bone metastasis (H) 09/28/2018     Cough      Pulmonary nodule      Renal cell carcinoma, right (H) 09/28/2018       Past Surgical History   I have reviewed this patient's surgical history and updated it with pertinent information if needed.  Past Surgical History:   Procedure  Laterality Date     BRONCHOSCOPY (RIGID OR FLEXIBLE), DIAGNOSTIC N/A 9/4/2019    Procedure: Bronchoscopy, With Bronchoalveolar Lavage;  Surgeon: Burton Toure MD;  Location: [facility] GI     CHOLECYSTECTOMY       HC REMOVAL HEEL SPUR, CALCANEUS  2004     OPTICAL TRACKING SYSTEM BIOPSY BONE RADIO FREQUENCY ABLATION N/A 11/1/2019    Procedure: Kyphoplasty-Radiofrequency ablation Lumbar 1; Minimally Invasive Surgery  left hemilaminectomy Lumbar 3-Lumbar 4;  Surgeon: Vcikey Brower MD;  Location: [facility] OR       Social History   I have reviewed this patient's social history and updated it with pertinent information if needed.  Social History     Tobacco Use     Smoking status: Never Smoker     Smokeless tobacco: Never Used   Substance Use Topics     Alcohol use: Not Currently     Drug use: Yes     Types: Marijuana     Comment: medical cannabis        Family History   I have reviewed this patient's family history and updated it with pertinent information if needed.   Family History   Problem Relation Age of Onset     Lung Cancer Maternal Grandmother        Prior to Admission Medications   Prior to Admission Medications   Prescriptions Last Dose Informant Patient Reported? Taking?   DULoxetine (CYMBALTA) 60 MG capsule 11/11/2019 at am  No Yes   Sig: Take 1 capsule (60 mg) by mouth daily   Dextromethorphan-guaiFENesin (MUCINEX DM)  MG 12 hr tablet prn  Yes Yes   Sig: Take 1 tablet by mouth 2 times daily as needed    HYDROmorphone (DILAUDID) 2 MG tablet 11/11/2019 at 1500  No Yes   Sig: Take 1-2 tablets (2-4 mg) by mouth every 3 hours as needed for pain   Lenvatinib 18 MG, 10 mg + 2 x 4 mg capsules, (LENVIMA) 18 mg combo capsule CHEMOTHERAPY not taking  No No   Sig: Take 18 mg dose (10 mg plus two 4 mg capsules) by mouth daily.   Patient not taking: Reported on 10/1/2019   acetaminophen (TYLENOL) 500 MG tablet 11/11/2019 at 1400  Yes Yes   Sig: Take 1,000 mg by mouth 3 times daily 0800, 1400, 2200   calcium  carbonate 600 mg-vitamin D 400 units (CALTRATE) 600-400 MG-UNIT per tablet 11/11/2019 at 1400  Yes Yes   Sig: Take 1 tablet by mouth 3 times daily   diazepam (VALIUM) 5 MG tablet   No No   Sig: Take 1 pill 30 min before MRI and a second pill 2 minutes prior.   Patient not taking: Reported on 10/16/2019   everolimus (AFINITOR) 5 MG tablet CHEMO not taking  No No   Sig: Take 1 tablet (5 mg) by mouth daily   Patient not taking: Reported on 10/1/2019   fluticasone (FLONASE) 50 MCG/ACT nasal spray prn  Yes Yes   Sig: Spray 1 spray into both nostrils daily as needed for rhinitis or allergies   lidocaine (LIDODERM) 5 % patch prn  No Yes   Sig: Place 1-3 patches onto the skin every 24 hours Apply to painful areas for 12 hours on, 12 hours off.   loratadine (CLARITIN) 10 MG tablet 11/11/2019 at Unknown time  Yes Yes   Sig: Take 10 mg by mouth daily (with lunch)    medical cannabis (Patient's own supply.  Not a prescription) 11/11/2019 at am  Yes Yes   Sig: Cannabis Heater liquid 2 mL orally in AM   Cannabis capsule - 1 capsule daily at bedtime.     (This is NOT a prescription, and does not certify that the patient has a qualifying medical condition for medical cannabis.  The purpose of this order is  to document that the patient reports taking medical cannabis.)   melatonin 5 MG tablet 11/10/2019 at hs  Yes Yes   Sig: Take 10 mg by mouth At Bedtime   morphine (MS CONTIN) 15 MG CR tablet 11/11/2019 at 1500  Yes Yes   Sig: Take by mouth 3 times daily 30mg qam, 15mg @@ 1500, 15mg at bedtime.   omeprazole (PRILOSEC) 40 MG DR capsule 11/11/2019 at am  No Yes   Sig: Take 1 capsule (40 mg) by mouth daily   ondansetron (ZOFRAN) 8 MG tablet prn  Yes Yes   Sig: Take 8 mg by mouth every 8 hours as needed for nausea   polyethylene glycol (MIRALAX/GLYCOLAX) Packet prn  Yes Yes   Sig: Take 17 g by mouth daily as needed    pregabalin (LYRICA) 50 MG capsule 11/11/2019 at 1200  No Yes   Sig: Take 1 capsule (50 mg) by mouth 2 times daily  "  senna-docusate (SENOKOT-S;PERICOLACE) 8.6-50 MG per tablet 11/11/2019 at am  Yes Yes   Sig: Take 2 tablets by mouth 2 times daily   vitamin D3 (CHOLECALCIFEROL) 2000 units (50 mcg) tablet 11/11/2019 at am  Yes Yes   Sig: Take 2,000 Units by mouth daily      Facility-Administered Medications: None     Allergies   No Known Allergies    Physical Exam   Vital Signs: Temp: 99.1  F (37.3  C) Temp src: Oral BP: 103/62 Pulse: 61     SpO2: 96 % O2 Device: None (Room air)    Weight: 189 pounds in the emergency department    General Appearance: Fatigued, though fairly robust appearing 69-year-old male lying calmly in bed.  Eyes: No scleral icterus or injection  HEENT: Normocephalic  Respiratory: Breath sounds clear bilaterally without wheezes or crackles.  Cardiovascular: Regular rate and rhythm.  Heart rate currently in the 60s.  No appreciable murmur.  GI: Abdomen soft, mild tenderness/discomfort to palpation in the left lower quadrant, remainder of abdomen spared.  No palpable mass.  Lymph/Hematologic: Trace to 1+ pitting bilateral lower extremity edema.  Essentially prominent \"sock line\" edema.  Patient reports that this is a significant improvement from prior  Genitourinary: Not examined  Skin: No rashes.  Surgical scars from kyphosis repair well-healed  Musculoskeletal: Muscular tone intact in all extremities  Neurologic: Alert, conversant, appropriate conversation and history.  Mental status grossly intact.  Psychiatric: Somewhat blunted affect.  Pleasant    Data   Data reviewed today: I reviewed all medications, new labs and imaging results over the last 24 hours. I personally reviewed no images or EKG's today.    Recent Labs   Lab 11/11/19  1858   WBC 7.6   HGB 8.1*   *         POTASSIUM 3.5   CHLORIDE 102   CO2 27   BUN 10   CR 0.87   ANIONGAP 6   AUREA 8.1*   *   ALBUMIN 2.4*   PROTTOTAL 6.4*   BILITOTAL 0.3   ALKPHOS 92   ALT <6   AST 53*     "

## 2019-11-12 NOTE — PLAN OF CARE
AxO x4. VSS. SBA. SL. Mg replaced this afternoon, redraw labs in AM. Constipated. Back pain managed with scheduled Morphine and scheduled Tylenol. Lidocaine patch in place lower L back.  Oncology and palliative saw patient today. Potential to discharge today/tomorrow am.

## 2019-11-12 NOTE — PROGRESS NOTES
GOALS:    - Nausea/vomiting (diarrhea if present) improved. -- Met. Pt denies at this time.    - Tolerating oral intake to maintain hydration -- In progress.    - Vital signs normal or at patient baseline and orthostatic vitals are normal and patient not lightheaded with standing -- In progress. VSS. Does not report lightheadedness at this time.    - Diagnostic tests and consults completed and resulted if ordered -- In progress.    - Adequate pain control on oral analgesia -- In progress. Pt report pain 6/10, scheduled oral morphine given, pain decreased to 5/10.    - Tolerating oral antibiotics if ordered -- N/A    - Safe disposition plan has been identified -- not met.    Nurse to notify provider when observation goals have been met and patient is ready for discharge.

## 2019-11-12 NOTE — ED NOTES
Pt was already on ED bed and was brought to floor via ERT. Pt's belongings were also audited and transported with the pt.

## 2019-11-12 NOTE — ED TRIAGE NOTES
Sent from [facility] for abdominal pain/weight loss/poor intake.

## 2019-11-12 NOTE — ED PROVIDER NOTES
History     Chief Complaint  Abdominal Pain    The history is provided by the patient and the spouse.      Javon Bianchi is a 69 year old male with a history of metastatic cancer to the spine, pneumonia, renal cancer, and SIRS, who presents to the emergency department with his wife for evaluation of abdominal pain. For the last several months, the patient has been experiencing a lack of appetite as well as abdominal discomfort. He is able to keep food down but is unable to get much food down. He tries to eat dinner around 1800, is usually unable to, then develops a discomfort in his abdomen which resolves around 9629-2660. In the last two weeks, he believed to have lost 20 pounds. They first thought that it was the Bactrim he had been on for several months, so they tried to stop that, but he saw no relief. They (pharmacist and palliative doctor) tried shifting several others of his medications, but continue to find no relief.    One week ago, the patient underwent a kyphoplasty-radiofrequency ablation to L1. To note, the patient had been waiting on this surgery since July. When it was scheduled the first time, he was diagnosed with pneumonia and was admitted to the hospital for a little over a week. They thought that the surgery would help with his abdominal discomfort and lack of appetite, but so far it has not.     Patient is also experiencing urinary urgency where he does not have much time to get to the bathroom. They were first seen at the [facility] but were sent to the ED for further evaluation.     Patient denies any changes in bowel or bladder, blood in stool or urine, or any other pain. To note, the patient has not received any chemotherapy since the surgery.     Allergies:  No Known Drug Allergies     Medications:    Valium  Cymbalta  Afinitor  Lasix  Prilosec  Zofran  Lyrica  Compazine  [facility]    Past Medical History:    Metastatic cancer to spine  Lumbar stenosis   Lumbar  radiculopathy  Anemia  Pneumonia  Severe malnutrition  SIRS  Renal cell carcinoma  Bone metastasis     Past Surgical History:    Bronchoscopy, diagnostic  Cholecystectomy  Calcaneus heel spur removal  Kyphoplasty-Radiofrequency ablation Lumbar 1    Family History:    No past pertinent family history.    Social History:  Negative for tobacco use.  Negative for alcohol use.  Positive for medical marijuana.   Marital Status:        Review of Systems   Constitutional: Positive for appetite change and unexpected weight change.   Gastrointestinal: Positive for abdominal pain. Negative for blood in stool, constipation, diarrhea, nausea and vomiting.   Genitourinary: Positive for urgency. Negative for difficulty urinating, dysuria, frequency and hematuria.   All other systems reviewed and are negative.      Physical Exam     Patient Vitals for the past 24 hrs:   BP Temp Temp src Pulse SpO2 Weight   11/11/19 2214 -- -- -- -- -- 85.7 kg (189 lb)   11/11/19 2056 -- -- -- -- 97 % --   11/11/19 2030 103/62 -- -- -- 96 % --   11/11/19 2010 114/69 -- -- -- -- --   11/11/19 1906 123/76 99.1  F (37.3  C) Oral 61 97 % --     Physical Exam  Constitutional: Alert, attentive, frail appearing, GCS 15  HENT:    Nose: Nose normal.    Mouth/Throat: Oropharynx is clear, mucous membranes are moist.  Eyes: EOM are normal, anicteric, conjugate gaze  CV: regular rate and rhythm; no murmurs  Chest: Effort normal and breath sounds clear without wheezing or rales, symmetric bilaterally   GI:  non tender. No distension. No guarding or rebound.    MSK: No LE edema, no tenderness to palpation of BLE.  Back: Surgical incisions well healed.  Neurological: Alert, attentive, moving all extremities equally.   Skin: Skin is warm and dry.    Emergency Department Course   Imaging:  CT Abdomen/Pelvis with IV contrast:   1. There is a moderate amount of stool in the ascending and proximal transverse colon.    2. Mild splenomegaly.  3. Mild consolidation  in the left lower lobe and patchy groundglass opacity at the right lung base could be infectious or inflammatory in etiology. Please correlate for clinical evidence of pneumonia.     As per radiology.    Laboratory:  CBC: WBC: 7.6, HGB: 8.1 (L), PLT: 287  CMP: Glucose 102 (H), Calcium: 8.1 (L), Albumin: 2.4 (L), Protein Total: 6.4 (L), AST: 53 (H), o/w WNL (Creatinine: 0.87)    Phosphorus: 1.9 (L)  Magnesium: 1.8   Creatinine POCT: WNL    Interventions:  1905 NS 1L IV  1905 Zofran 4 mg IV    Emergency Department Course:  Nursing notes and vitals reviewed.  I performed an exam of the patient as documented above.     IV inserted. Medicine administered as documented above. Blood drawn. This was sent to the lab for further testing, results above.    The patient was sent for an abdomen/pelvis CT while in the emergency department, findings above.      I rechecked the patient and discussed the results of his workup thus far.       I consulted with Dr. Erickson of the hospitalist services. They are in agreement to accept the patient for admission.    Findings and plan explained to the Patient and spouse who consents to admission. Discussed the patient with Dr. Erickson, who will admit the patient to an observation bed for further monitoring, evaluation, and treatment.    Impression & Plan    Medical Decision Makin-year-old male with unfortunate past medical history significant for metastatic renal cell carcinoma not currently on therapy who is 1 week out from somewhat palliative laminectomy presenting for poor p.o. intake and reported 20 pound weight loss due to early satiety and poor appetite.  Vital signs within normal notes on arrival.  CT imaging was obtained to assess for possible small bowel obstruction which was negative except for some moderate stool burden.  He does have some opacities in the inferior portions of his lung however he is had 2 episodes of pneumonia this year he does not have any overt  infectious symptoms.  Antibiotics deferred.  Labs are notable for relatively low phosphorus 1.9 and he did not have any significant improvement with IV evaluation of poor p.o. intolerance, weight loss hypophosphatemia.    Diagnosis:    ICD-10-CM    1. Hypophosphatemia E83.39 Procalcitonin     Procalcitonin     Magnesium     Magnesium     CANCELED: Procalcitonin     CANCELED: Magnesium   2. Weight loss R63.4    3. Metastatic renal cell carcinoma, unspecified laterality (H) C64.9        Disposition:  Admitted to Dr. Dre Oneill MD  [employer]  12:08 AM 11/12/19     Scribe Disclosure:  IRahel, am serving as a scribe on 11/11/2019 at 7:21 PM to personally document services performed by Sonido Oneill MD based on my observations and the provider's statements to me.     Rahel Bailon  11/11/2019    EMERGENCY DEPARTMENT       Sonido Oneill MD  11/12/19 0008

## 2019-11-12 NOTE — PHARMACY-ADMISSION MEDICATION HISTORY
Pharmacy Medication History  Admission medication history interview status for the 11/11/2019  admission is complete. See EPIC admission navigator for prior to admission medications     Medication history sources: Patient, Patient's family/friend (wife) and Patient's home med list  Medication history source reliability: Good  Adherence assessment: Good    Significant changes made to the medication list:  Removed compazine, furosemide and changed MS Contin to correct dose.      Additional medication history information:   Pt is registered for medical cannabis but says he does not need while admitted.    Medication reconciliation completed by provider prior to medication history? No    Time spent in this activity: 30 min      Prior to Admission medications    Medication Sig Last Dose Taking? Auth Provider   acetaminophen (TYLENOL) 500 MG tablet Take 1,000 mg by mouth 3 times daily 0800, 1400, 2200 11/11/2019 at 1400 Yes Reported, Patient   calcium carbonate 600 mg-vitamin D 400 units (CALTRATE) 600-400 MG-UNIT per tablet Take 1 tablet by mouth 3 times daily 11/11/2019 at 1400 Yes Unknown, Entered By History   Dextromethorphan-guaiFENesin (MUCINEX DM)  MG 12 hr tablet Take 1 tablet by mouth 2 times daily as needed  prn Yes Reported, Patient   DULoxetine (CYMBALTA) 60 MG capsule Take 1 capsule (60 mg) by mouth daily 11/11/2019 at am Yes Carly Mills MD   fluticasone (FLONASE) 50 MCG/ACT nasal spray Spray 1 spray into both nostrils daily as needed for rhinitis or allergies prn Yes Unknown, Entered By History   HYDROmorphone (DILAUDID) 2 MG tablet Take 1-2 tablets (2-4 mg) by mouth every 3 hours as needed for pain 11/11/2019 at 1500 Yes Carly Mills MD   lidocaine (LIDODERM) 5 % patch Place 1-3 patches onto the skin every 24 hours Apply to painful areas for 12 hours on, 12 hours off. prn Yes Carly Mills MD   loratadine (CLARITIN) 10 MG tablet Take 10 mg by mouth daily (with  lunch)  11/11/2019 at Unknown time Yes Reported, Patient   medical cannabis (Patient's own supply.  Not a prescription) Cannabis Heater liquid 2 mL orally in AM   Cannabis capsule - 1 capsule daily at bedtime.     (This is NOT a prescription, and does not certify that the patient has a qualifying medical condition for medical cannabis.  The purpose of this order is  to document that the patient reports taking medical cannabis.) 11/11/2019 at am Yes Reported, Patient   melatonin 5 MG tablet Take 10 mg by mouth At Bedtime 11/10/2019 at hs Yes Unknown, Entered By History   morphine (MS CONTIN) 15 MG CR tablet Take by mouth 3 times daily 30mg qam, 15mg @@ 1500, 15mg at bedtime. 11/11/2019 at 1500 Yes Unknown, Entered By History   omeprazole (PRILOSEC) 40 MG DR capsule Take 1 capsule (40 mg) by mouth daily 11/11/2019 at am Yes Hua Anne MD   ondansetron (ZOFRAN) 8 MG tablet Take 8 mg by mouth every 8 hours as needed for nausea prn Yes Unknown, Entered By History   polyethylene glycol (MIRALAX/GLYCOLAX) Packet Take 17 g by mouth daily as needed  prn Yes Reported, Patient   pregabalin (LYRICA) 50 MG capsule Take 1 capsule (50 mg) by mouth 2 times daily 11/11/2019 at 1200 Yes Carly Mills MD   senna-docusate (SENOKOT-S;PERICOLACE) 8.6-50 MG per tablet Take 2 tablets by mouth 2 times daily 11/11/2019 at am Yes Reported, Patient   vitamin D3 (CHOLECALCIFEROL) 2000 units (50 mcg) tablet Take 2,000 Units by mouth daily 11/11/2019 at am Yes Unknown, Entered By History   diazepam (VALIUM) 5 MG tablet Take 1 pill 30 min before MRI and a second pill 2 minutes prior.  Patient not taking: Reported on 10/16/2019   Ruthie St PA-C   everolimus (AFINITOR) 5 MG tablet CHEMO Take 1 tablet (5 mg) by mouth daily  Patient not taking: Reported on 10/1/2019 not taking  Hua Anne MD   Lenvatinib 18 MG, 10 mg + 2 x 4 mg capsules, (LENVIMA) 18 mg combo capsule CHEMOTHERAPY Take 18 mg dose (10 mg plus  two 4 mg capsules) by mouth daily.  Patient not taking: Reported on 10/1/2019 not taking  Hua Anne MD

## 2019-11-12 NOTE — PROGRESS NOTES
RECEIVING UNIT ED HANDOFF REVIEW    ED Nurse Handoff Report was reviewed by: eDx Skelton RN on November 11, 2019 at 10:33 PM

## 2019-11-13 NOTE — PROGRESS NOTES
Pt doing okay this am. Pain controlled-scheduled MS Contin given. BM x 3 last night-continuing with scheduled bowel medications.

## 2019-11-13 NOTE — PROGRESS NOTES
GOALS:     - Nausea/vomiting (diarrhea if present) improved. -- Met. Pt denies at this time.    - Tolerating oral intake to maintain hydration -- In progress.    - Vital signs normal or at patient baseline and orthostatic vitals are normal and patient not lightheaded with standing -- In progress. VSS.     - Diagnostic tests and consults completed and resulted if ordered -- Met     - Adequate pain control on oral analgesia -- In progress. PO dilaudid given x1.     - Tolerating oral antibiotics if ordered -- N/A    - Safe disposition plan has been identified -- not met.    Nurse to notify provider when observation goals have been met and patient is ready for discharge.

## 2019-11-13 NOTE — PLAN OF CARE
Observation goals  - Nausea/vomiting (diarrhea if present) improved. -yes  - Tolerating oral intake to maintain hydration-yes   - Vital signs normal or at patient baseline and orthostatic vitals are normal and patient not lightheaded with standing -yes  - Diagnostic tests and consults completed and resulted if ordered -yes  - Adequate pain control on oral analgesia -yes  - Tolerating oral antibiotics if ordered -yes  - Safe disposition plan has been identified -no  PT A&O, VSS, pt up independently however his most comfortable lying on his side.  Pt pain currently controled with MS contin and breakthrough pain of Dilaudid 2mg po. Pt has been green this shift and will continue to monitor.

## 2019-11-13 NOTE — PLAN OF CARE
Observation goals  - Nausea/vomiting (diarrhea if present) improved. -yes  - Tolerating oral intake to maintain hydration-yes   - Vital signs normal or at patient baseline and orthostatic vitals are normal and patient not lightheaded with standing -yes  - Diagnostic tests and consults completed and resulted if ordered -yes  - Adequate pain control on oral analgesia -yes  - Tolerating oral antibiotics if ordered -yes  - Safe disposition plan has been identified -no

## 2019-11-13 NOTE — PLAN OF CARE
VSS. A&O x4. Scheduled morphine and PRN oral Dilaudid for pain. Up with 1 to SBA. Mg replaced this am. Dressing CDI. Lidocaine patch in place, left lower back. Encouraged food and fluids. Enema complete, small loose BM noted. Will continue to monitor.

## 2019-11-13 NOTE — PROGRESS NOTES
[facility]  Palliative Care Daily Progress Note       Recommendations & Counseling       For chronic malignant back pain, continue MScontin 30mg PO QAM, 15mg PO Q afternoon and 15mg PO Q bedtime with dilaudid 2-4mg PO Q3hrs PRN breakthrough pain. Has not needed dilaudid recently, but encouraged him to use it to help progress his activity tolerance. Continue APAP 1g PO TID and lidoderm patches as well    For constipation, did have some clearance last night, but still has more to go. Increase Senna 3-4 tabs PO BID at home (pt only willing to take 2 tabs this morning due to cramping), low threshold for prune juice, miralax, bisacodyl suppository, additional enema once he gets home today (he has a 3 hour drive and doesn't want to have loose stool on the drive)     For fatigue, pain, nausea, and appetite stimulation, will trial Dexamethasone 8mg PO daily x3 days followed by 4mg PO daily x3 days     Discussed with Dr. Mills regarding cymbalta, would like slower taper. Recommend 30mg PO daily x1 week then can stop. Lyrica taper can be further discussed in their visit next week, or consider tapering once Omega is overall feeling better     RD consult for nutrition education in setting of appetite suppression, anorexia, metastatic cancer. Encouraged small, frequent meals and chocolate boost supplements throughout the day    Encouraged that Omega be up ambulating 4x per day, he is working on that      Hgb 7.5 today, no transfusion needed     Pt has follow up with Dr. Anne next week for restaging imaging and discussion of resuming cancer directed therapy. He also has follow up previously scheduled with Dr. Mills, palliative care, on 11/19, which will be maintained      Pt intends to connect with his medical cannabis provider in [place] to see if there can be any adjustments to help with appetite stimulation      Case was reviewed with Bon LARA and bedside RN Tigist.    Soraida RAMSAY,  [facility]  Palliative Medicine   Pager [phone]      Thank you for the opportunity to participate in the care of this patient and family. Our team: will continue to follow.     During regular M-F work hours -- if you are not sure who specifically to contact -- please contact us at [phone].    After regular work hours and on weekends/holidays, you can call our answering service at [phone]. Also, who's on call for us is available in [employer].     Attestation:  Total time on the floor involved in the patient's care: 35 minutes  Total time spent in counseling/care coordination: >50%     Assessments          Javon Bianchi is a 69 year old male with PMH significant for metastatic RCC with mets to the bone not on active chemotherapy, recent PNA treated with bactrim, recent zoster infection, and recent kyphoplasty-radiofrequency ablation to L1 and left hemilaminectomy L3-4 who presents with generalized weakness and fatigue, poor appetite, and failure to thrive. CT Abd demonstrates moderate stool burden. He is also found to be anemic at 7.4 on admission.     Today, the patient was seen for:  Pain and symptom follow up    Visited with Omega and wife Suzie this AM. Omega is seen lying in bed. His eyes are open and he seemed more engaged and much brighter today. He feels actually pretty good. He slept ok. Pain was managed with a couple doses of dilaudid. We validated that it is ok to use the dilaudid to help with better rest and increased activity tolerance. It doesn't need to be viewed as negative, as he was recently working on tapering off these medications. We acknowledge that this can be a fluid continuum of adjusting these medications according to where his disease state is at. He had 3 loose BMs after his enema yesterday. He still feels he has more to go. He is reluctant to be aggressive with his bowel regimen this AM, as he intends in returning home today, which is a 3 hour drive. We talk about having a low  threshold for additional bowel meds at home (bisacodyl suppository, another enema) to further clear himself out. He had a better breakfast this AM than he has in a while. We talked about the importance of pushing the calories the best he can. We also talked about the importance of activity for his cancer related fatigue, and constipation. Omega seems motivated to comply with these recommendations. We again acknowledge the hope of getting into better shape in this next week to get him in a better position to start cancer directed therapy.     Prognosis, Goals, or Advance Care Planning was addressed today with: No.  Mood, coping, and/or meaning in the context of serious illness were addressed today: No.            Interval History:     Chart review/discussion with unit or clinical team members:   Doing ok. Had 3 BMs yesterday. Just took dilaudid for pain.     Per patient or family/caregivers today:  As above, feeling better today. Just ate a bigger breakfast this AM compared to what he has been able to do recently. Plans to work on going to the bathroom more, showering, and going for a walk today before he discharges. Hopes to go back to his home which is 3 hours away.     Key Palliative Symptoms:  # Pain severity the last 12 hours: low    Patient is on opioids: assessed and I made recommendations about bowel care as above.           Review of Systems:     Besides above, an additional N/A system ROS was reviewed and is unremarkable          Medications:     I have reviewed this patient's medication profile and medications during this hospitalization.    Noted meds:    APAP 1g PO TID  Dexamethasone 8mg PO daily x3 days followed by 4mg PO daily x3 days  Lidoderm patch  MScontin 30mg PO QAM, 15mg afternoon, 15mg bedtime  Zofran 4mg ODT TID  Miralax daily   Lyrica 50mg PO BID  Senna-s 3-4 tabs PO BID (only took 2 tabs)  DIlaudid 2-4mg PO Q3hrs PRN pain; 2 doses yesterday   Dilaudid 0.3mg IV Q2hrs PRN pain    [facility]           Physical Exam:   Temp: 98.5  F (36.9  C) Temp src: Oral BP: 113/58 Pulse: 70   Resp: 16 SpO2: 96 % O2 Device: None (Room air)    CONSTITUTIONAL: Chronically ill man seen lying in bed in NAD, A&Ox3. Calm and cooperative. Wife present   HEENT: NCAT  RESPIRATORY: NL respiratory effort on RA  MUSCULOSKELETAL: Moving freely in bed   NEUROLOGIC: Appropriately responsive during interview  PSYCH: Affect bright           Data Reviewed:     Reviewed recent pertinent imaging, comments:   11/11 CT Abd with moderate amount of stool    Reviewed recent labs, comments:   K 3.3  Creat 0.78  Ca 7.6  Hgb 7.5

## 2019-11-13 NOTE — DISCHARGE SUMMARY
"[facility]    Discharge Summary  Hospitalist    Date of Admission:  11/11/2019  Date of Discharge:  11/13/2019  Discharging Provider: Bon Shepherd  Date of Service (when I saw the patient): 11/13/19    Discharge Diagnoses   1. Failure to thrive in an adult  2. Opioid induced constipation  3. Hypophosphatemia, Hypomagnesemia, Hypokalemia  4. Severe protein calorie malnutrition  5. Metastatic renal cell carcinoma  6. Chronic back pain   7. L1 pathologic lesion s/p kyphoplasty-radiofrequency ablation L1 (11/1/19)   8. Lumbar stenosis with neurogenic claudication and radiculopathy s/p left-hemilaminectomy L3-4 (11/1/19)  9. Chronic macrocytic anemia   10. Folate deficiency  11. Prior hospitalization for pneumonia      History of Present Illness   Javon Bianchi is a 69 year old male admitted on 11/11/2019. He presents with generalized fatigue, poor appetite, and essentially failure to thrive in the setting of metastatic renal cell carcinoma. Registered to observation for further evaluation and treatment. See detailed H&P by Dr. Erickson from 11/11/19 for complete details on presentation.     Hospital Course   Failure to thrive in an adult  Opioid induced constipation, nausea  Patient with reduced oral intake and weight loss (188 lbs on admission, was 217 lbs on 9/25/19) over the past several months in the setting of metastatic renal cell carcinoma.  Suspect multifactorial with progressive decline with untreated malignancy, opiate-induced nausea and slowed bowel motility, possibly depression, recurrent hospitalizations. Pt has been working with Dr. Mills of Palliative Care outpatient for symptom management (last seen 10/11/19). Pt has been taking MS Contin 30/15/15 with afternoon and evening doses often taken on an empty stomach. Slowly tapering, but pt now with worsened back pain. Describes nausea with \"rolling stomach\" and intermittent aches in his LLQ. Typically has BM every 1.5 days, last " "BM the day PTA, soft. Pt does not utilize antiemetics at home. Per Palliative note, was previously prescribed Reglan 5 mg q6 hrs PRN. Also, recently completed course (~2 weeks ago) of Bactrim for pneumonia which could have contributed some to nausea. Takes medical marijuana prescribed through [facility] in [place]--takes \"Cheli\" 2 mL dose in the AM and \"tangerine\" 1 capsule in the evening (has been taking this since April 2019). Pt also felt Cymbalta could be contributing (been on for three months for radicular sx), but has continued to take this medication. CT A/P with moderate stool, no acute pathology.   - Pt uninterested at repeat barium swallow (normal through [facility] in August) or GI evaluation at this time   - Palliative care consulted, appreciate recommendations tremendously   - Oncology consulted, appreciate assistance. See note by Dr. Esposito.   Palliative care recommendations    For chronic malignant back pain, continue MScontin 30mg PO QAM, 15mg PO Q afternoon and 15mg PO Q bedtime with dilaudid 2-4mg PO Q3hrs PRN breakthrough pain. Has not needed dilaudid recently, but encouraged him to use it to help progress his activity tolerance. Continue APAP 1g PO TID and lidoderm patches as well    For constipation, increase Senna 3-4 tabs PO BID, tap water enema daily prn.    For fatigue, pain, nausea, and appetite stimulation, will trial Dexamethasone 8mg PO daily x3 days followed by 4mg PO daily x3 days     Palliative will talk with Dr. Mills regarding cymbalta and lyrica, as pt is unsure if these are helping him and wondering if they can/should be stopped.  Will taper off Cymbalta, decreased to 30 mg x 7 days, then discontinue.     RD consult for nutrition education in setting of appetite suppression, anorexia, metastatic cancer. Encouraged small, frequent meals and chocolate boost supplements throughout the day    Encouraged that Omega be up ambulating 4x per day     Note anemia; follow up Hgb stable " this morning    Pt has follow up with Dr. Anne next week for restaging imaging and discussion of resuming cancer directed therapy. He also has follow up previously scheduled with Dr. Mills, palliative care, on 11/19, which will be maintained.    Continue prior to admission medical marijuana at discharge     Hypophosphatemia  Hypomagnesemia  Hypokalemia  Phosphorus of 1.9, magnesium 1.6.  K 3.3.  Suspect secondary to poor intake. Calcium corrected for hypoalbuminemia at 8.84.   - Electrolytes replacemed per protocol  - Repeat electrolytes at oncology follow up     Severe protein calorie malnutrition: In the context of both acute and chronic illness.   - Nutrition consulted, appreciate recommendations      Metastatic renal cell carcinoma  Chronic back pain   L1 pathologic lesion s/p kyphoplasty-radiofrequency ablation L1 (11/1/19)   Lumbar stenosis with neurogenic claudication and radiculopathy s/p left-hemilaminectomy L3-4 (11/1/19): Follows with Dr. Anne of [place] onc[facility].  Last dose of Nivolumab 4/2019.  Has follow-up in the coming month.  It appears that his cancer is largely stable with only a few interval increase in bony mets.  He has had prior palliative radiation for bony mets of lower spine, has not received radiation in his chest or cervical/thoracic spine.  No known metastases to GI tract/esophagus.  - Oncology consulted  - Continue PTA pain regimen as outlined above      Chronic macrocytic anemia   Folate deficiency: Chronic in the setting of renal cell carcinoma and treatment.  Receives outpatient transfusions through oncology clinic.  Currently with hemoglobin greater than 8. Pt without dizziness, lightheadedness, chest pain, SOB. Folate 3.4, B12 was 490.  Iron 22, TIBC 114, GISELE 19, ferritin 1068.  - Defer need for transfusion to Oncology   - Folic acid 1 mg po every day initiated 11/12/19            Recent Labs   Lab 11/13/19  0624 11/12/19  0859 11/11/19  1858   HGB 7.5* 7.4* 8.1*      Prior  hospitalization for pneumonia: Hospitalized 9/3/19-9/7/19. See discharge summary for complete details on hospitalization. Completed course of Bactrim ~2 weeks ago. CT with mild consolidation in the LLL and patchy groundglass opacity at RL base. WBC WNL. Procal 0.19. No ongoing URI sx, chest congestion or cough.       Bon Shepherd PA-C    I personally saw the patient on day of discharge to evaluate him, discuss test results, and review plan of care.  Discussed with wife at bedside.      Significant Results and Procedures   No procedures.  Test results as documented below.    Pending Results   None    Code Status   DNR / DNI       Primary Care Physician   [facility]      Discharge Disposition   Patient is discharged home with the help of his wife in stable condition.    Consultations This Hospital Stay   HEMATOLOGY & ONCOLOGY IP CONSULT  PALLIATIVE CARE ADULT IP CONSULT  NUTRITION SERVICES ADULT IP CONSULT  NUTRITION SERVICES ADULT IP CONSULT      Discharge Orders      Reason for your hospital stay    Failure to thrive, chronic malignant back pain, constipation     Follow-up and recommended labs and tests     Follow up with Dr. Anne next week for restaging imaging and discussion of resuming cancer directed therapy. You also have follow up previously scheduled with Dr. Mills, palliative care, on 11/19, which will be maintained.     Activity    Your activity upon discharge: activity as tolerated     Diet    Follow this diet upon discharge: Regular Diet Adult     Discharge Medications   Current Discharge Medication List      START taking these medications    Details   !! dexamethasone (DECADRON) 4 MG tablet Take 2 tablets (8 mg) by mouth daily for 2 doses  Qty: 4 tablet, Refills: 0    Associated Diagnoses: Metastatic renal cell carcinoma, unspecified laterality (H)      !! dexamethasone (DECADRON) 4 MG tablet Take 1 tablet (4 mg) by mouth daily for 3 doses  Qty: 3 tablet, Refills: 0     Associated Diagnoses: Metastatic renal cell carcinoma, unspecified laterality (H)      folic acid (FOLVITE) 1 MG tablet Take 1 tablet (1 mg) by mouth daily . Future refills by PCP.  Qty: 30 tablet, Refills: 0    Associated Diagnoses: Metastatic renal cell carcinoma, unspecified laterality (H)      multivitamin, therapeutic (THERA-VIT) TABS tablet Take 1 tablet by mouth At Bedtime    Associated Diagnoses: Metastatic renal cell carcinoma, unspecified laterality (H)       !! - Potential duplicate medications found. Please discuss with provider.      CONTINUE these medications which have CHANGED    Details   DULoxetine (CYMBALTA) 30 MG capsule Take 1 capsule (30 mg) by mouth daily for 7 days , then discontinue.  Qty: 7 capsule, Refills: 0    Associated Diagnoses: Metastatic renal cell carcinoma, unspecified laterality (H)      senna-docusate (SENOKOT-S/PERICOLACE) 8.6-50 MG tablet Take 2-4 tablets by mouth 2 times daily         CONTINUE these medications which have NOT CHANGED    Details   acetaminophen (TYLENOL) 500 MG tablet Take 1,000 mg by mouth 3 times daily 0800, 1400, 2200      calcium carbonate 600 mg-vitamin D 400 units (CALTRATE) 600-400 MG-UNIT per tablet Take 1 tablet by mouth 3 times daily      Dextromethorphan-guaiFENesin (MUCINEX DM)  MG 12 hr tablet Take 1 tablet by mouth 2 times daily as needed       fluticasone (FLONASE) 50 MCG/ACT nasal spray Spray 1 spray into both nostrils daily as needed for rhinitis or allergies      HYDROmorphone (DILAUDID) 2 MG tablet Take 1-2 tablets (2-4 mg) by mouth every 3 hours as needed for pain  Qty: 60 tablet, Refills: 0    Associated Diagnoses: Cancer associated pain      lidocaine (LIDODERM) 5 % patch Place 1-3 patches onto the skin every 24 hours Apply to painful areas for 12 hours on, 12 hours off.  Qty: 30 patch, Refills: 3    Associated Diagnoses: Renal cell carcinoma, left (H); Bone metastasis (H)      loratadine (CLARITIN) 10 MG tablet Take 10 mg by  mouth daily (with lunch)       medical cannabis (Patient's own supply.  Not a prescription) Cannabis Heater liquid 2 mL orally in AM   Cannabis capsule - 1 capsule daily at bedtime.     (This is NOT a prescription, and does not certify that the patient has a qualifying medical condition for medical cannabis.  The purpose of this order is  to document that the patient reports taking medical cannabis.)      melatonin 5 MG tablet Take 10 mg by mouth At Bedtime      morphine (MS CONTIN) 15 MG CR tablet Take by mouth 3 times daily 30mg qam, 15mg @@ 1500, 15mg at bedtime.      omeprazole (PRILOSEC) 40 MG DR capsule Take 1 capsule (40 mg) by mouth daily  Qty: 90 capsule, Refills: 3    Associated Diagnoses: Renal cell carcinoma, unspecified laterality (H); Abdominal pain, epigastric      ondansetron (ZOFRAN) 8 MG tablet Take 8 mg by mouth every 8 hours as needed for nausea      polyethylene glycol (MIRALAX/GLYCOLAX) Packet Take 17 g by mouth daily as needed       pregabalin (LYRICA) 50 MG capsule Take 1 capsule (50 mg) by mouth 2 times daily  Qty: 60 capsule, Refills: 1    Associated Diagnoses: Renal cell carcinoma, left (H); Neuropathic pain      vitamin D3 (CHOLECALCIFEROL) 2000 units (50 mcg) tablet Take 2,000 Units by mouth daily      diazepam (VALIUM) 5 MG tablet Take 1 pill 30 min before MRI and a second pill 2 minutes prior.  Qty: 10 tablet, Refills: 0    Associated Diagnoses: Renal cell carcinoma, unspecified laterality (H)      everolimus (AFINITOR) 5 MG tablet CHEMO Take 1 tablet (5 mg) by mouth daily  Qty: 30 tablet, Refills: 0    Associated Diagnoses: Renal cell carcinoma, unspecified laterality (H)      Lenvatinib 18 MG, 10 mg + 2 x 4 mg capsules, (LENVIMA) 18 mg combo capsule CHEMOTHERAPY Take 18 mg dose (10 mg plus two 4 mg capsules) by mouth daily.  Qty: 90 capsule, Refills: 0    Associated Diagnoses: Renal cell carcinoma, unspecified laterality (H)           Allergies   No Known Allergies  Data   Most  Recent 3 CBC's:  Recent Labs   Lab Test 11/13/19  0624 11/12/19  0859 11/11/19  1858  10/31/19  1026   WBC  --  5.9 7.6  --  4.3   HGB 7.5* 7.4* 8.1*   < > 8.2*   MCV  --  104* 105*  --  110*   PLT  --  256 287  --  263    < > = values in this interval not displayed.      Most Recent 3 BMP's:  Recent Labs   Lab Test 11/13/19  0624 11/12/19  0859 11/11/19  1858    139 135   POTASSIUM 3.3* 3.5 3.5   CHLORIDE 105 105 102   CO2 29 26 27   BUN 6* 7 10   CR 0.78 0.80 0.87   ANIONGAP 4 8 6   AUREA 7.6* 7.4* 8.1*   GLC 94 91 102*     Most Recent 2 LFT's:  Recent Labs   Lab Test 11/12/19  0859 11/11/19  1858   AST 41 53*   ALT <6 <6   ALKPHOS 83 92   BILITOTAL 0.4 0.3     Most Recent INR's and Anticoagulation Dosing History:  Anticoagulation Dose History     Recent Dosing and Labs Latest Ref Rng & Units 8/31/2018 3/14/2019 9/4/2019    INR 0.86 - 1.14 1.3(A) - 1.25(H)    INR-ISTAT 0.86 - 1.14 - 1.5(H) -        Most Recent 3 Troponin's:  Recent Labs   Lab Test 09/03/19  1740   TROPI <0.015     Most Recent Cholesterol Panel:  Recent Labs   Lab Test 09/24/19  1606   CHOL 152   LDL 82   HDL 45   TRIG 127     Most Recent 6 Bacteria Isolates From Any Culture (See EPIC Reports for Culture Details):  Recent Labs   Lab Test 09/04/19  1412 09/04/19  1343 09/03/19  2023 09/03/19  1934 09/03/19  1740 05/12/19  1623   CULT Culture negative for acid fast bacilli  Assayed at [facility] [address] [place] [id number] [phone] No growth after 4 weeks  Culture negative after 4 weeks  No Legionella species isolated  No Actinomyces species isolated  Light growth  Normal respiratory jacque   Light growth  Normal jacque   No growth No growth No growth     Most Recent TSH, T4 and A1c Labs:  Recent Labs   Lab Test 09/24/19  1606   TSH 5.76*   T4 0.76     Results for orders placed or performed during the hospital encounter of 11/11/19   CT Abdomen Pelvis w Contrast    Narrative    CT ABDOMEN PELVIS WITH CONTRAST   11/11/2019  8:15 PM     HISTORY: Abdominal pain. Poor by mouth intake. History of metastatic  renal cell carcinoma.    TECHNIQUE: 94 mL Isovue-370 IV were administered. After contrast  administration, volumetric helical sections were acquired from the  lung bases to the ischial tuberosities. Coronal images were also  reconstructed. Radiation dose for this scan was reduced using  automated exposure control, adjustment of the mA and/or kV according  to patient size, or iterative reconstruction technique.    COMPARISON: CT of the chest, abdomen, and pelvis performed 8/8/2019.     FINDINGS: No bowel obstruction. There is a moderate amount of stool in  the ascending and proximal transverse colon. No convincing evidence  for colitis or diverticulitis. No free fluid in the pelvis. Mild  atherosclerotic aortoiliac calcification. Prior cholecystectomy. There  is mild splenomegaly. The spleen measures up to 14 cm in length, not  significantly changed. The liver, spleen, adrenal glands, pancreas,  and kidneys are unremarkable. No hydronephrosis. Retroaortic left  renal vein is an anatomic variant. No enlarged lymph nodes are  identified in the abdomen or pelvis.     Mild consolidation and groundglass infiltrate in the left lower lobe  of the lung could be related to pneumonia. There are also scattered  areas of mild groundglass opacity at the right lung base. Degenerative  changes are noted in the visualized thoracolumbar spine. Multiple  areas of sclerotic metastases  in the visualized bones not  significantly changed. Moderate anterior compression of the L3  vertebral body is unchanged. Interval postoperative changes of L1  vertebroplasty are noted.      Impression    IMPRESSION:   1. There is a moderate amount of stool in the ascending and proximal  transverse colon.    2. Mild splenomegaly.  3. Mild consolidation in the left lower lobe and patchy groundglass  opacity at the right lung base could be infectious or inflammatory  in  etiology. Please correlate for clinical evidence of pneumonia.        ELISA MONTOYA MD

## 2019-11-13 NOTE — PROGRESS NOTES
Pt discharging at this time, had another large bowel movement. Shower this am. Medications filled here and sent with patient. Discharge instructions provided to patient and spouse. No further questions at this time. NA to bring patient to door 2 for discharge.

## 2019-11-13 NOTE — PLAN OF CARE
Observation goals  - Nausea/vomiting (diarrhea if present) improved. -yes  - Tolerating oral intake to maintain hydration-yes   - Vital signs normal or at patient baseline and orthostatic vitals are normal and patient not lightheaded with standing -yes  - Diagnostic tests and consults completed and resulted if ordered -yes  - Adequate pain control on oral analgesia -yes  - Tolerating oral antibiotics if ordered -yes  - Safe disposition plan has been identified -no  PT A&O, VSS, pt up independently stating that after enema he had 3 stools with small amount of stool in each mostly liquid.  Pt pain currently controled with MS contin and breakthrough pain of Dilaudid 2mg po. Pt stating that he is most comfortable lying on a side in bed.  Pt has been green this shift and will continue to monitor.

## 2019-11-14 NOTE — TELEPHONE ENCOUNTER
Per Patient's request,  completed and faxed [employer] request for lodging dates 11/19/2019 - 11/20/2019. [employer] will contact Patient for confirmation of reservation.  will continue to provide support as needed.    [facility]  Outpatient Specialty Clinics  Direct Phone: [phone]  Pager:  [phone]

## 2019-11-19 NOTE — PROGRESS NOTES
"Oncology Rooming Note    November 19, 2019 8:03 AM   Javon Bianchi is a 69 year old male who presents for:    Chief Complaint   Patient presents with     Palliative     Initial Vitals: /68   Pulse 68   Temp 98.2  F (36.8  C) (Oral)   Resp 16   Ht 1.778 m (5' 10\")   Wt 90.9 kg (200 lb 6.4 oz)   SpO2 98%   BMI 28.75 kg/m   Estimated body mass index is 28.75 kg/m  as calculated from the following:    Height as of this encounter: 1.778 m (5' 10\").    Weight as of this encounter: 90.9 kg (200 lb 6.4 oz). Body surface area is 2.12 meters squared.  Severe Pain (6) Comment: Data Unavailable   No LMP for male patient.  Allergies reviewed: Yes  Medications reviewed: Yes    Medications: MEDICATION REFILLS NEEDED TODAY. Provider was notified. Lyrica, Omeprazole, Cymbalta and Zofran possibly  Pharmacy name entered into [facility]:    [facility] - [place], [place] - [address] [facility] [place] - [address] [facility]    Clinical concerns: medication ?s Dr. Mills was notified.      Shari J. Schoenberger, [facility]            "

## 2019-11-19 NOTE — PATIENT INSTRUCTIONS
Thank you for coming into the Palliative Care Clinic today.     1. Okay to stop Cymbalta after the 30mg after the 7 days.   2. Okay to stop the omeprazole.   3. Try cutting back on the ondansetron (Zofran) to 1/2 tab (4mg) as a first step, and even not taking that medication if you aren't nauseated.   4. No changes to your pain medications.     Return to clinic 1 month for a follow up.     You can reach the Palliative Care Team during business hours at the following number: [phone] (Palliative Clinic Nurse Line).     To reach the Palliative Care Provider on-call after-hours or on holidays and weekends, call: [phone].  Please note that we are not able to provide pain medication refills on evenings or weekends.     ==================================================================    [facility] Palliative Care Clinics   The [facility] Palliative Care Team is committed to treating your pain and other symptoms. This handout is for all patients treated with opioid medications in our clinics.     What are opioid medicines?   Opioid medications are used to ease some types of pain and shortness of breath. They are sometimes called narcotics. They include: Morphine (MS Contin, Roxanol), Oxycodone (OxyContin, OxyFast, Percocet), Hydrocodone (Vicodin, Norco), Hydromorphone (Dilaudid), Fentanyl (Duragesic), Methadone (Dolophine).   Are opioid medicines safe to take?   Opioid medicines are safe when you follow the directions from your doctor or medical provider.  Opioids can cause serious side effects and become unsafe if you do not follow your instructions.   To make sure you are taking opioid medicines safely, we may ask you to bring in your pill bottles or to allow us to test your urine. Our goal is to keep you safe and to improve your ability to function & be active. If we don t think opioids are safely doing that, we will work with you to taper you off of them.   Do not  drive unless your medical provider tells you it is safe.   Do not take opioids with alcohol or anxiety/sleep medicines unless your doctor tells you it is ok to do so.   Are opioids addictive?   Addiction means you crave a drug and have trouble limiting the amount you use.   Addiction is more likely to happen if you take opioids to relieve stress or to feel altered. If you or your loved one feels this way when taking opioid medicines, let your medical provider know. We may refer you for additional assessment or services if this is a concern.   Your body will get used to the opioid medicines if you take them for more than two weeks in a row. This means if you stop them suddenly, you may have withdrawal. If this occurs, you will feel uncomfortable (nausea, diarrhea, increased pain). This does not mean you are addicted. Never stop taking your pain medicines all at once unless your medical provider tells you to. If you think you need less medicine, your medical provider will help you to safely lower your dose.   What is the safest way to store opioids?   Keep your medicines in a safe place away from children, teens, pets, and visitors. Be careful about who knows that you have these medicines.   How do I get rid of my old opioids?   Opioids should be brought to your [facility] drop-off site, or you can purchase a disposal kit from your local pharmacy. If neither of these options is available, the Food and Drug Administration recommends that you flush unused opioids down the toilet.   Do not share or sell your pain medicines. This is illegal and very dangerous. We cannot prescribe opioid pain medicines to you if do this.   How do I get refills?   Opioid medicines need signed paper prescriptions. This means it may take longer to refill than other medicines. Our clinic cannot prescribe them on weekends or at night.     Give us one week s notice when requesting a refill. This gives us the time we need to get it signed and back to  you. It may be possible to mail prescriptions to you.

## 2019-11-19 NOTE — LETTER
"    11/19/2019         RE: Javon Bianchi  [address]  [facility] [address]        Dear Colleague,    Thank you for referring your patient, Javon Bianchi, to the [facility]. Please see a copy of my visit note below.    Oncology Rooming Note    November 19, 2019 8:03 AM   Javon Bianchi is a 69 year old male who presents for:    Chief Complaint   Patient presents with     Palliative     Initial Vitals: /68   Pulse 68   Temp 98.2  F (36.8  C) (Oral)   Resp 16   Ht 1.778 m (5' 10\")   Wt 90.9 kg (200 lb 6.4 oz)   SpO2 98%   BMI 28.75 kg/m    Estimated body mass index is 28.75 kg/m  as calculated from the following:    Height as of this encounter: 1.778 m (5' 10\").    Weight as of this encounter: 90.9 kg (200 lb 6.4 oz). Body surface area is 2.12 meters squared.  Severe Pain (6) Comment: Data Unavailable   No LMP for male patient.  Allergies reviewed: Yes  Medications reviewed: Yes    Medications: MEDICATION REFILLS NEEDED TODAY. Provider was notified. Lyrica, Omeprazole, Cymbalta and Zofran possibly  Pharmacy name entered into [facility]:    [facility] - [place], [place] - [address] [facility] - [place] - [address] [facility]    Clinical concerns: medication ?s Dr. Mills was notified.      Shari J. Schoenberger, [facility]              Palliative Care Outpatient Clinic Progress Note    Patient Name:  Javon Bianchi  Primary Provider:  LIANNE Wellington    Chief Complaint/Identifying Information:  Metastatic Renal Cell Carcinoma off chemotherapy for many months due to recurrent PNA, spine surgery.   History of L3 pathologic fracture found at diagnosis, now s/p kyphoplasty-radiofrequency ablation of L1, and left hemilaminectomy of L3/L4.    [facility] Outpatient Palliative Care Opioid Prescribing Safety Plan     Opioid Safety Education: Reviewed by Nan Gonzalez  on 11/8/2018  Opioid Risk Assessment: Performed by Nan Gonzalez  on " 11/8/2018 .  ORT score of 0.   Mood Disorder Assessment: Performed by Carly Mills on 04/25/19.  No concerns.    reviewed with every prescription, last reviewed by Carly Mills on  11/19/19     Additional recommendations based on patient's prognosis and indication for opioids include the following:     Expected prognosis: shorter  Risk: Low or Medium (ORT 0-7)  No further recommendations.     Interim History:  Javon Bianchi 69 year old male returns to be seen by palliative care today, accompanied by his wife Suzie.  Javon Bianchi was last seen by me six weeks ago, at which time his MS Contin was tapered per patient preference.  Since he was last seen by me, Omega has struggled with nausea despite a trial off duloxetine and not being on any cancer directed treatments, also with daily bowel movements.  He has been admitted to the hospital twice since he was last seen by me, two weeks ago for his kyphoplasty-radiofrequency ablation of L1, and left hemilaminectomy of L3/L4.  He was then admitted to [facility] approximately one week ago with FTT, electrolyte abnormalities, and opioid induced constipation. He was followed by the inpatient Palliative Care team during that hospitalization.     Today, Omega reports that he is feeling relatively well, although his overall quality of life continues to be lower than prior to his cancer diagnosis.  He is taking MS Contin 30/15/15, taking with Zofran each time which helps with nausea. Has pain in the low back at the site of his surgery, which is adequately controlled today. Not using PRN hydromorphone.  Cymbalta is down to 30mg daily. Now getting some tingling back in the left leg (was numb before).  Also taking Lyrica 50mg BID.  No longer on Celebrex.     He has been taking the Miralax and prune juice, and also senna 4 tabs BID.  Having a soft bowel movement daily. No longer nauseated.     No coping concerns.      Is worried about next steps for his cancer.  "    Suzie feels that the Holy Spirit continues to watch over them, finds great comfort from her lizy.         Social History:  Pertinent changes to social history/social situation since last visit: None.   Social History     Tobacco Use     Smoking status: Never Smoker     Smokeless tobacco: Never Used   Substance Use Topics     Alcohol use: Not Currently     Drug use: Yes     Types: Marijuana     Comment: medical cannabis               Physical Exam:   Vitals were reviewed  /68   Pulse 68   Temp 98.2  F (36.8  C) (Oral)   Resp 16   Ht 1.778 m (5' 10\")   Wt 90.9 kg (200 lb 6.4 oz)   SpO2 98%   BMI 28.75 kg/m     General: Alert, comfortable appearing male in no acute distress.   Eyes: Pupils equal and 2mm, sclera clear.   ENT: MMM.   Cardiac:  Normal rate, regular.    Resp: Lungs are CTAB, unlabored on room air. Good air entry bilaterally.   Back: Well healing surgical incisions with no surrounding erythema or drainage.   Abd: Soft, non-distended, no TTP, active bowel sounds.   Neuro: No facial asymmetry.  Spontaneous movements grossly non-focal. Alert and with normal appearing sensorium. Gait is unassisted in the room.        Impression & Recommendations & Counseling:  Omega is a 69 year old man with metastatic renal cell carcinoma who has been off cancer treatments due to prolonged issues with PNA, surgery. He had recent admission for failure to thrive and nausea, attributed to opioid induced constipation and associated nausea.     He feels his appetite is improving and his nausea has resolved with improved stool regimen.  He continues to be motivated to simplify his medications, and will stop the Cymbalta as a next step.  I do not recommend stopping the Lyrica at the same time, due to risk for the tingling in his left leg to become bothersome.  Now that he is off the steroid and NSAIDs, he can stop the omeprazole as well.      His nausea has resolved and Omega will taper off the ondansetron.  Should he " be started on a highly emetic chemotherapy following his staging scans today, I recommend that he has a repeat EKG to evaluate his QTc as this was prolonged in the past.     Regarding goals of care, Omega is highly motivated to try another cancer treatment.  He knows that at some point, stopping cancer treatments will be the most helpful step, but would like to try the targeted therapy if it is offered to him.     1. Okay to stop Cymbalta after the 30mg after the 7 days at this dose.   2. Okay to stop the omeprazole.   3. Try cutting back on the ondansetron (Zofran) to 1/2 tab (4mg) as a first step, and even not taking that medication if you aren't nauseated.   4. No changes to your pain medications.     Additional information reviewed today:   No Known Allergies  Current Outpatient Medications   Medication Sig Dispense Refill     acetaminophen (TYLENOL) 500 MG tablet Take 1,000 mg by mouth 3 times daily 0800, 1400, 2200       calcium carbonate 600 mg-vitamin D 400 units (CALTRATE) 600-400 MG-UNIT per tablet Take 1 tablet by mouth 3 times daily       Dextromethorphan-guaiFENesin (MUCINEX DM)  MG 12 hr tablet Take 1 tablet by mouth 2 times daily as needed        diazepam (VALIUM) 5 MG tablet Take 1 pill 30 min before MRI and a second pill 2 minutes prior. 10 tablet 0     DULoxetine (CYMBALTA) 30 MG capsule Take 1 capsule (30 mg) by mouth daily for 7 days , then discontinue. 7 capsule 0     fluticasone (FLONASE) 50 MCG/ACT nasal spray Spray 1 spray into both nostrils daily as needed for rhinitis or allergies       folic acid (FOLVITE) 1 MG tablet Take 1 tablet (1 mg) by mouth daily . Future refills by PCP. 30 tablet 0     HYDROmorphone (DILAUDID) 2 MG tablet Take 1-2 tablets (2-4 mg) by mouth every 3 hours as needed for pain 60 tablet 0     lidocaine (LIDODERM) 5 % patch Place 1-3 patches onto the skin every 24 hours Apply to painful areas for 12 hours on, 12 hours off. 30 patch 3     loratadine (CLARITIN) 10 MG  tablet Take 10 mg by mouth daily (with lunch)        medical cannabis (Patient's own supply.  Not a prescription) Cannabis Heater liquid 2 mL orally in AM   Cannabis capsule - 1 capsule daily at bedtime.     (This is NOT a prescription, and does not certify that the patient has a qualifying medical condition for medical cannabis.  The purpose of this order is  to document that the patient reports taking medical cannabis.)       melatonin 5 MG tablet Take 10 mg by mouth At Bedtime       morphine (MS CONTIN) 15 MG CR tablet Take by mouth 3 times daily 30mg qam, 15mg @@ 1500, 15mg at bedtime.       multivitamin, therapeutic (THERA-VIT) TABS tablet Take 1 tablet by mouth At Bedtime       omeprazole (PRILOSEC) 40 MG DR capsule Take 1 capsule (40 mg) by mouth daily 90 capsule 3     ondansetron (ZOFRAN) 8 MG tablet Take 8 mg by mouth every 8 hours as needed for nausea       polyethylene glycol (MIRALAX/GLYCOLAX) Packet Take 17 g by mouth daily as needed        pregabalin (LYRICA) 50 MG capsule Take 1 capsule (50 mg) by mouth 2 times daily 60 capsule 1     senna-docusate (SENOKOT-S/PERICOLACE) 8.6-50 MG tablet Take 2-4 tablets by mouth 2 times daily       vitamin D3 (CHOLECALCIFEROL) 2000 units (50 mcg) tablet Take 2,000 Units by mouth daily       dexamethasone (DECADRON) 4 MG tablet Take 1 tablet (4 mg) by mouth daily for 3 doses (Patient not taking: Reported on 11/19/2019) 3 tablet 0     everolimus (AFINITOR) 5 MG tablet CHEMO Take 1 tablet (5 mg) by mouth daily (Patient not taking: Reported on 11/19/2019) 30 tablet 0     Lenvatinib 18 MG, 10 mg + 2 x 4 mg capsules, (LENVIMA) 18 mg combo capsule CHEMOTHERAPY Take 18 mg dose (10 mg plus two 4 mg capsules) by mouth daily. (Patient not taking: Reported on 11/19/2019) 90 capsule 0     Past Medical History:   Diagnosis Date     Anemia      Bone metastasis (H) 09/28/2018     Cough      Pulmonary nodule      Renal cell carcinoma, right (H) 09/28/2018     Past Surgical History:    Procedure Laterality Date     BRONCHOSCOPY (RIGID OR FLEXIBLE), DIAGNOSTIC N/A 9/4/2019    Procedure: Bronchoscopy, With Bronchoalveolar Lavage;  Surgeon: Burton Toure MD;  Location: [facility]     CHOLECYSTECTOMY       HC REMOVAL HEEL SPUR, CALCANEUS  2004     OPTICAL TRACKING SYSTEM BIOPSY BONE RADIO FREQUENCY ABLATION N/A 11/1/2019    Procedure: Kyphoplasty-Radiofrequency ablation Lumbar 1; Minimally Invasive Surgery  left hemilaminectomy Lumbar 3-Lumbar 4;  Surgeon: Vickey Brower MD;  Location: [facility] OR       Key Data Reviewed:  LABS:   Lab Results   Component Value Date    WBC 5.9 11/12/2019    HGB 7.5 (L) 11/13/2019    HCT 23.8 (L) 11/12/2019     11/12/2019     11/13/2019    POTASSIUM 3.3 (L) 11/13/2019    CHLORIDE 105 11/13/2019    CO2 29 11/13/2019    BUN 6 (L) 11/13/2019    CR 0.78 11/13/2019    GLC 94 11/13/2019    SED 87 (H) 03/29/2019    NTBNPI 283 09/03/2019    TROPI <0.015 09/03/2019    AST 41 11/12/2019    ALT <6 11/12/2019    ALKPHOS 83 11/12/2019    BILITOTAL 0.4 11/12/2019    INR 1.25 (H) 09/04/2019     IMAGING:   Restaging scans planned for later today.      [place]  - Use of [place] substances consistent with history.     Thank you for continuing to involve me in the care of this patient.     Carly Mills MD / Palliative Medicine / Pager [phone] / After-Hours Answering Service [phone] / Main Palliative Clinic - [facility] [phone] / [facility] Inpatient Team Consult Pager [phone] (answered 8am-430pm M-F)    Time spent: 35 minutes, >50% spent in counseling/coordination of care.       Again, thank you for allowing me to participate in the care of your patient.        Sincerely,        Carly Mills MD

## 2019-11-19 NOTE — PROGRESS NOTES
Palliative Care Outpatient Clinic Progress Note    Patient Name:  Javon Bianchi  Primary Provider:  LIANNE Wellington    Chief Complaint/Identifying Information:  Metastatic Renal Cell Carcinoma off chemotherapy for many months due to recurrent PNA, spine surgery.   History of L3 pathologic fracture found at diagnosis, now s/p kyphoplasty-radiofrequency ablation of L1, and left hemilaminectomy of L3/L4.    [facility] Outpatient Palliative Care Opioid Prescribing Safety Plan     Opioid Safety Education: Reviewed by Nan Gonzalez  on 11/8/2018  Opioid Risk Assessment: Performed by Nan Gonzalez  on 11/8/2018 .  ORT score of 0.   Mood Disorder Assessment: Performed by Carly Mills on 04/25/19.  No concerns.    reviewed with every prescription, last reviewed by Carly Mills on 11/19/19     Additional recommendations based on patient's prognosis and indication for opioids include the following:     Expected prognosis: shorter  Risk: Low or Medium (ORT 0-7)  No further recommendations.     Interim History:  Javon Bianchi 69 year old male returns to be seen by palliative care today, accompanied by his wife Suzie.  Javon Bianchi was last seen by me six weeks ago, at which time his MS Contin was tapered per patient preference.  Since he was last seen by me, Omega has struggled with nausea despite a trial off duloxetine and not being on any cancer directed treatments, also with daily bowel movements.  He has been admitted to the hospital twice since he was last seen by me, two weeks ago for his kyphoplasty-radiofrequency ablation of L1, and left hemilaminectomy of L3/L4.  He was then admitted to [facility] approximately one week ago with FTT, electrolyte abnormalities, and opioid induced constipation. He was followed by the inpatient Palliative Care team during that hospitalization.     Today, Omega reports that he is feeling relatively well, although his overall quality of life continues to be lower  "than prior to his cancer diagnosis.  He is taking MS Contin 30/15/15, taking with Zofran each time which helps with nausea. Has pain in the low back at the site of his surgery, which is adequately controlled today. Not using PRN hydromorphone.  Cymbalta is down to 30mg daily. Now getting some tingling back in the left leg (was numb before).  Also taking Lyrica 50mg BID.  No longer on Celebrex.     He has been taking the Miralax and prune juice, and also senna 4 tabs BID.  Having a soft bowel movement daily. No longer nauseated.     No coping concerns.      Is worried about next steps for his cancer.     Suzie feels that the Holy Spirit continues to watch over them, finds great comfort from her lizy.         Social History:  Pertinent changes to social history/social situation since last visit: None.   Social History     Tobacco Use     Smoking status: Never Smoker     Smokeless tobacco: Never Used   Substance Use Topics     Alcohol use: Not Currently     Drug use: Yes     Types: Marijuana     Comment: medical cannabis               Physical Exam:   Vitals were reviewed  /68   Pulse 68   Temp 98.2  F (36.8  C) (Oral)   Resp 16   Ht 1.778 m (5' 10\")   Wt 90.9 kg (200 lb 6.4 oz)   SpO2 98%   BMI 28.75 kg/m    General: Alert, comfortable appearing male in no acute distress.   Eyes: Pupils equal and 2mm, sclera clear.   ENT: MMM.   Cardiac:  Normal rate, regular.    Resp: Lungs are CTAB, unlabored on room air. Good air entry bilaterally.   Back: Well healing surgical incisions with no surrounding erythema or drainage.   Abd: Soft, non-distended, no TTP, active bowel sounds.   Neuro: No facial asymmetry.  Spontaneous movements grossly non-focal. Alert and with normal appearing sensorium. Gait is unassisted in the room.        Impression & Recommendations & Counseling:  Omega is a 69 year old man with metastatic renal cell carcinoma who has been off cancer treatments due to prolonged issues with PNA, surgery. " He had recent admission for failure to thrive and nausea, attributed to opioid induced constipation and associated nausea.     He feels his appetite is improving and his nausea has resolved with improved stool regimen.  He continues to be motivated to simplify his medications, and will stop the Cymbalta as a next step.  I do not recommend stopping the Lyrica at the same time, due to risk for the tingling in his left leg to become bothersome.  Now that he is off the steroid and NSAIDs, he can stop the omeprazole as well.      His nausea has resolved and Omega will taper off the ondansetron.  Should he be started on a highly emetic chemotherapy following his staging scans today, I recommend that he has a repeat EKG to evaluate his QTc as this was prolonged in the past.     Regarding goals of care, Omega is highly motivated to try another cancer treatment.  He knows that at some point, stopping cancer treatments will be the most helpful step, but would like to try the targeted therapy if it is offered to him.     1. Okay to stop Cymbalta after the 30mg after the 7 days at this dose.   2. Okay to stop the omeprazole.   3. Try cutting back on the ondansetron (Zofran) to 1/2 tab (4mg) as a first step, and even not taking that medication if you aren't nauseated.   4. No changes to your pain medications.     Additional information reviewed today:   No Known Allergies  Current Outpatient Medications   Medication Sig Dispense Refill     acetaminophen (TYLENOL) 500 MG tablet Take 1,000 mg by mouth 3 times daily 0800, 1400, 2200       calcium carbonate 600 mg-vitamin D 400 units (CALTRATE) 600-400 MG-UNIT per tablet Take 1 tablet by mouth 3 times daily       Dextromethorphan-guaiFENesin (MUCINEX DM)  MG 12 hr tablet Take 1 tablet by mouth 2 times daily as needed        diazepam (VALIUM) 5 MG tablet Take 1 pill 30 min before MRI and a second pill 2 minutes prior. 10 tablet 0     DULoxetine (CYMBALTA) 30 MG capsule Take 1  capsule (30 mg) by mouth daily for 7 days , then discontinue. 7 capsule 0     fluticasone (FLONASE) 50 MCG/ACT nasal spray Spray 1 spray into both nostrils daily as needed for rhinitis or allergies       folic acid (FOLVITE) 1 MG tablet Take 1 tablet (1 mg) by mouth daily . Future refills by PCP. 30 tablet 0     HYDROmorphone (DILAUDID) 2 MG tablet Take 1-2 tablets (2-4 mg) by mouth every 3 hours as needed for pain 60 tablet 0     lidocaine (LIDODERM) 5 % patch Place 1-3 patches onto the skin every 24 hours Apply to painful areas for 12 hours on, 12 hours off. 30 patch 3     loratadine (CLARITIN) 10 MG tablet Take 10 mg by mouth daily (with lunch)        medical cannabis (Patient's own supply.  Not a prescription) Cannabis Heater liquid 2 mL orally in AM   Cannabis capsule - 1 capsule daily at bedtime.     (This is NOT a prescription, and does not certify that the patient has a qualifying medical condition for medical cannabis.  The purpose of this order is  to document that the patient reports taking medical cannabis.)       melatonin 5 MG tablet Take 10 mg by mouth At Bedtime       morphine (MS CONTIN) 15 MG CR tablet Take by mouth 3 times daily 30mg qam, 15mg @@ 1500, 15mg at bedtime.       multivitamin, therapeutic (THERA-VIT) TABS tablet Take 1 tablet by mouth At Bedtime       omeprazole (PRILOSEC) 40 MG DR capsule Take 1 capsule (40 mg) by mouth daily 90 capsule 3     ondansetron (ZOFRAN) 8 MG tablet Take 8 mg by mouth every 8 hours as needed for nausea       polyethylene glycol (MIRALAX/GLYCOLAX) Packet Take 17 g by mouth daily as needed        pregabalin (LYRICA) 50 MG capsule Take 1 capsule (50 mg) by mouth 2 times daily 60 capsule 1     senna-docusate (SENOKOT-S/PERICOLACE) 8.6-50 MG tablet Take 2-4 tablets by mouth 2 times daily       vitamin D3 (CHOLECALCIFEROL) 2000 units (50 mcg) tablet Take 2,000 Units by mouth daily       dexamethasone (DECADRON) 4 MG tablet Take 1 tablet (4 mg) by mouth daily for 3  doses (Patient not taking: Reported on 11/19/2019) 3 tablet 0     everolimus (AFINITOR) 5 MG tablet CHEMO Take 1 tablet (5 mg) by mouth daily (Patient not taking: Reported on 11/19/2019) 30 tablet 0     Lenvatinib 18 MG, 10 mg + 2 x 4 mg capsules, (LENVIMA) 18 mg combo capsule CHEMOTHERAPY Take 18 mg dose (10 mg plus two 4 mg capsules) by mouth daily. (Patient not taking: Reported on 11/19/2019) 90 capsule 0     Past Medical History:   Diagnosis Date     Anemia      Bone metastasis (H) 09/28/2018     Cough      Pulmonary nodule      Renal cell carcinoma, right (H) 09/28/2018     Past Surgical History:   Procedure Laterality Date     BRONCHOSCOPY (RIGID OR FLEXIBLE), DIAGNOSTIC N/A 9/4/2019    Procedure: Bronchoscopy, With Bronchoalveolar Lavage;  Surgeon: Burton Toure MD;  Location: [facility]     CHOLECYSTECTOMY       HC REMOVAL HEEL SPUR, CALCANEUS  2004     OPTICAL TRACKING SYSTEM BIOPSY BONE RADIO FREQUENCY ABLATION N/A 11/1/2019    Procedure: Kyphoplasty-Radiofrequency ablation Lumbar 1; Minimally Invasive Surgery  left hemilaminectomy Lumbar 3-Lumbar 4;  Surgeon: Vickey Brower MD;  Location: [facility]       Key Data Reviewed:  LABS:   Lab Results   Component Value Date    WBC 5.9 11/12/2019    HGB 7.5 (L) 11/13/2019    HCT 23.8 (L) 11/12/2019     11/12/2019     11/13/2019    POTASSIUM 3.3 (L) 11/13/2019    CHLORIDE 105 11/13/2019    CO2 29 11/13/2019    BUN 6 (L) 11/13/2019    CR 0.78 11/13/2019    GLC 94 11/13/2019    SED 87 (H) 03/29/2019    NTBNPI 283 09/03/2019    TROPI <0.015 09/03/2019    AST 41 11/12/2019    ALT <6 11/12/2019    ALKPHOS 83 11/12/2019    BILITOTAL 0.4 11/12/2019    INR 1.25 (H) 09/04/2019     IMAGING:   Restaging scans planned for later today.      [place]  - Use of [place] substances consistent with history.     Thank you for continuing to involve me in the care of this patient.     Carly Mills MD / Palliative Medicine / Pager [phone] / After-Hours  Answering Service [phone] / Main Palliative Clinic - [facility] [phone] / [facility] Inpatient Team Consult Pager [phone] (answered 8am-430pm M-F)    Time spent: 35 minutes, >50% spent in counseling/coordination of care.

## 2019-11-20 NOTE — NURSING NOTE
"Oncology Rooming Note    November 20, 2019 4:38 PM   Javon Bianchi is a 69 year old male who presents for:    Chief Complaint   Patient presents with     Oncology Clinic Visit     Return; Renal Ca     Initial Vitals: /61   Pulse 67   Temp 98.2  F (36.8  C) (Oral)   Resp 14   Ht 1.778 m (5' 10\")   Wt 90.7 kg (200 lb)   SpO2 97%   BMI 28.70 kg/m   Estimated body mass index is 28.7 kg/m  as calculated from the following:    Height as of this encounter: 1.778 m (5' 10\").    Weight as of this encounter: 90.7 kg (200 lb). Body surface area is 2.12 meters squared.  Moderate Pain (5) Comment: Back   No LMP for male patient.  Allergies reviewed: Yes  Medications reviewed: Yes    Medications: Medication refills not needed today.  Pharmacy name entered into "[employer]:    [facility] - [place], [place] - [address] [facility] [place] [place] - [address] [facility]    Clinical concerns: May need refill for Tracy Lopez CMA              "

## 2019-11-20 NOTE — LETTER
RE: Javon Bianchi  [address]  [facility] [address]     Dear Colleague,    Thank you for referring your patient, Javon Bianchi, to the [facility]. Please see a copy of my visit note below.    [facility]  FOLLOW-UP VISIT NOTE  Date of visit: Nov 20, 2019    REASON FOR VISIT: mRCC to the bone (with no primary renal mass), here for routine follow up  HPI:  Javon Bianchi is a 69 year old male with a history significant for hyperlipidemia who had been in his usual health until he developed back pain in May of 2018. At that time, the pain was a stabbing like in the middle of lower back with burning like pain wrapping around this left hip to knee area. No trauma. He had MRI on 5/23/18 showed mild degenerative change with bulging disks L2-S1. A small benign hemangioma was noted in L2, no other marrow signal abnormality. Since then, the pain failed to improve despite steroid injections. In addition, he lost 25 lbs unintentionally since June along with chills a few times a day. Over time, the pain got worse to the point he could not ambulate after short distance and developed muscle weakness in lower extremities over time requiring a walker. Finally, he went to ER ([facility] in [place]) on 8/23/18, CT A/P was unremarkable. He was referred to neurology with obtaining MRI L spine. MRI on 8/30/18 revealed a pathological fracture at L3 vertebral body with height loss at 40% and diffuse involvement of a neoplastic process. IR guided biopsy on 8/31/18 showed poorly differentiated carcinoma. IHC was suggestive of possible renal cell carcinoma per pathology report (Renal cell immunochemistry is positive and along with the negative staining for CK7 and CK20 suggests the possibility of a renal carcinoma, however most renal cell carcinomas also express PAX8 which is negative in this case). Of note, CT chest/abdomen/pelvis at OSH was negative for primary  lesion. The patient was evaluated by neurosurgery who recommended no surgical intervention. He was discharged with TLSO brace and walker. He had palliative XRT locally from 9/11 for 10 fractions (Done in [place]). He was started on cabozantinib 60 mg initially, requiring dose reduction to 20 mg for issues with hand foot syndrome and poor tolerability. Switched to nivolumab due to progression in bone disease on 2/6/19. Had to hold Cycle #3 due to myalgia, which improved with prednisone.    ONCOLOGY HISTORY:    Cabozantinib: Started 60 mg daily on September 28, 2018.  Dec 10, 2018: dose reduced cabozantinib to 40 mg a day.  Jan 17, 2019: dose reduced cabozantinib to 29 mg a day.  Feb 11, 2019: C1D1 Nivolumab   3/12/19: c2 Nivolumab  4/8/19: held Nivolumab due to myalgias, started prednisone    Interval history:   He presents to clinic today for scheduled follow up visit for hospital follow up.  After our last visit Omega was pursuing XRT to the hips, which he finished with improved pain control.  Our plan was to start lenvatinib + everolimus, however he was going to have a spinal procedure from Dr Brower.  During the pre-op, he was noted to have PNA and was admitted to [place] from 8/15-8/15/19, given IV antibiotics, then discharged on Levaquin.  He then was meeting with palliative care and sent to the ED for continued worsening cough, LEVY and FTT.  He was admitted at [facility] 9/3/19-9/7/19 and had a bronch which didn't grow out any microorganisms and he was discharged on Bactrim to home.     Omega has been doing okay since last visit. Overall he has no new complains.   No fevers or sweats.  No cough, SOB or chest pain. No rashes or concerning lesions.    He is accompanied by several family members.     Wt Readings from Last 10 Encounters:   11/20/19 90.7 kg (200 lb)   11/19/19 90.9 kg (200 lb 6.4 oz)   11/13/19 84.8 kg (187 lb)   11/01/19 85.5 kg (188 lb 7.9 oz)   10/31/19 87 kg (191 lb 12.8 oz)   10/28/19 88.6 kg (195  lb 4.8 oz)   10/16/19 93 kg (205 lb)   10/16/19 93 kg (205 lb)   10/03/19 93.4 kg (206 lb)   10/01/19 95.3 kg (210 lb)     MEDICATIONS     Current Outpatient Medications   Medication Sig     acetaminophen (TYLENOL) 500 MG tablet Take 1,000 mg by mouth 3 times daily 0800, 1400, 2200     calcium carbonate 600 mg-vitamin D 400 units (CALTRATE) 600-400 MG-UNIT per tablet Take 1 tablet by mouth 3 times daily     Dextromethorphan-guaiFENesin (MUCINEX DM)  MG 12 hr tablet Take 1 tablet by mouth 2 times daily as needed      fluticasone (FLONASE) 50 MCG/ACT nasal spray Spray 1 spray into both nostrils daily as needed for rhinitis or allergies     folic acid (FOLVITE) 1 MG tablet Take 1 tablet (1 mg) by mouth daily . Future refills by PCP.     HYDROmorphone (DILAUDID) 2 MG tablet Take 1-2 tablets (2-4 mg) by mouth every 3 hours as needed for pain     lidocaine (LIDODERM) 5 % patch Place 1-3 patches onto the skin every 24 hours Apply to painful areas for 12 hours on, 12 hours off.     loratadine (CLARITIN) 10 MG tablet Take 10 mg by mouth daily (with lunch)      medical cannabis (Patient's own supply.  Not a prescription) Cannabis Heater liquid 2 mL orally in AM   Cannabis capsule - 1 capsule daily at bedtime.     (This is NOT a prescription, and does not certify that the patient has a qualifying medical condition for medical cannabis.  The purpose of this order is  to document that the patient reports taking medical cannabis.)     melatonin 5 MG tablet Take 10 mg by mouth At Bedtime     morphine (MS CONTIN) 15 MG CR tablet Take by mouth 3 times daily 30mg qam, 15mg @@ 1500, 15mg at bedtime.     multivitamin, therapeutic (THERA-VIT) TABS tablet Take 1 tablet by mouth At Bedtime     ondansetron (ZOFRAN) 8 MG tablet Take 8 mg by mouth every 8 hours as needed for nausea     polyethylene glycol (MIRALAX/GLYCOLAX) Packet Take 17 g by mouth daily as needed      pregabalin (LYRICA) 50 MG capsule Take 1 capsule (50 mg) by mouth  2 times daily     senna-docusate (SENOKOT-S/PERICOLACE) 8.6-50 MG tablet Take 2-4 tablets by mouth 2 times daily     vitamin D3 (CHOLECALCIFEROL) 2000 units (50 mcg) tablet Take 2,000 Units by mouth daily     diazepam (VALIUM) 5 MG tablet Take 1 pill 30 min before MRI and a second pill 2 minutes prior. (Patient not taking: Reported on 11/20/2019)     everolimus (AFINITOR) 5 MG tablet CHEMO Take 1 tablet (5 mg) by mouth daily (Patient not taking: Reported on 11/20/2019)     Lenvatinib 18 MG, 10 mg + 2 x 4 mg capsules, (LENVIMA) 18 mg combo capsule CHEMOTHERAPY Take 18 mg dose (10 mg plus two 4 mg capsules) by mouth daily. (Patient not taking: Reported on 11/20/2019)     No current facility-administered medications for this visit.         PHYSICAL EXAM:   Temp: 98.2  F (36.8  C) Temp src: Oral BP: 115/61 Pulse: 67   Resp: 14 SpO2: 97 %      ECOG performance status of 2.   GENERAL: Seated in chair in NAD.  HEENT: No icterus, no pallor. Moist mucous membranes. Oropharynx is clear.   NECK: Supple, normal ROM  LUNGS: Bilateral clear to ausculation  CARDIOVASCULAR: Regular rate and rhythm, no murmurs, gallops or rubs.   ABDOMEN: Soft, nontender and nondistended.   EXTREMITIES: No cyanosis, no clubbing, 2+ pitting edema to the knees bilaterally.   NEUROLOGIC: No focal deficits.  LABS/IMAGING:    Recent Labs   Lab Test 11/13/19  0624 11/12/19  0859 11/11/19  1858 10/28/19  1211 10/02/19  0730    139 135 134 135   POTASSIUM 3.3* 3.5 3.5 4.3 4.7   CHLORIDE 105 105 102 105 109   CO2 29 26 27 23 21   ANIONGAP 4 8 6 6 5   BUN 6* 7 10 12 9   CR 0.78 0.80 0.87 1.09 1.03   GLC 94 91 102* 105* 99   AUREA 7.6* 7.4* 8.1* 8.0* 8.5     Recent Labs   Lab Test 11/13/19 0624 11/12/19  0859 11/11/19 1858 10/02/19  0730 09/13/19  0902 09/07/19  0750 09/06/19  0710 09/05/19  0737   MAG 1.9 1.6 1.8 1.6 1.8 2.0 1.5* 1.6   PHOS  --  2.8 1.9*  --   --  3.0 2.9 2.9     Recent Labs   Lab Test 11/13/19  0624 11/12/19  0859 11/11/19  1054  11/02/19  0642 11/01/19  1206 10/31/19  1026 10/28/19  1211 10/02/19  0730 09/24/19  1606   WBC  --  5.9 7.6  --   --  4.3 5.8 4.9 3.1*   HGB 7.5* 7.4* 8.1* 7.7* 8.6* 8.2* 7.7* 7.7* 7.4*   PLT  --  256 287  --   --  263 210 225 170   MCV  --  104* 105*  --   --  110* 111* 103* 106*   NEUTROPHIL  --  71.0 67.0  --   --  66.5  --  66.0 64.2     Recent Labs   Lab Test 11/12/19  0859 11/11/19  1858 10/28/19  1211  09/04/19  0820   BILITOTAL 0.4 0.3 0.3   < >  --    ALKPHOS 83 92 82   < >  --    ALT <6 <6 11   < >  --    AST 41 53* 35   < >  --    ALBUMIN 2.2* 2.4* 2.8*   < >  --    LDH  --   --   --   --  353*    < > = values in this interval not displayed.     TSH   Date Value Ref Range Status   09/24/2019 5.76 (H) 0.40 - 4.00 mU/L Final   09/13/2019 5.08 (H) 0.40 - 4.00 mU/L Final   08/07/2019 1.53 0.40 - 4.00 mU/L Final     No results for input(s): CEA in the last 22871 hours.  Results for orders placed or performed during the hospital encounter of 11/19/19   MR Pelvis Bone wo & w Contrast    Narrative    Exam: MR PELVIC BONES WO & W CONTRAST    History: Renal cell carcinoma with extensive pelvic metastasis.    Techniques: Multiplanar multisequence imaging through the pelvis was  obtained before and after administration of intravenous gadolinium  contrast  using routine protocol.    Contrast: 10mL Gadavist    Comparison: MR 9/24/2019.    Findings:    Redemonstration extensive multiple metastatic foci involving the  visualized bones including lower lumbar spine posterior elements,  bilateral innominate bones, sacrum, proximal femurs. Overall there is  mixed interval response including some area of decreased marrow  infiltration and other area of increased marrow infiltration. For  instance right sacral and left posterior iliac lesion appears  increased compared to prior. On the other hands involvement of the  femurs appear mildly improved. Given the extent of involvement  particularly in the left proximal femur but  also right femoral neck,  these may be at the risk of pathologic fracture. Some lesion  demonstrate apparent increased enhancement pattern. For instance right  posterior iliac bone lesion is solidly enhancing (image 16 series 8),  previously no substantial enhancement.    Soft tissue metastatic foci are also present. Some of these regions  demonstrate less solid enhancement for instance the lesion over the  posterior aspect the left iliac bone (image 10 series 8) shows rim  enhancement previously solid soft tissue enhancement. Soft tissue  nodule along the right gluteal musculature superficial to the iliac  bone measures approximately 2.8 x 2.1 cm previously 2.0 x 1.4 cm using  similar measurement technique.    Compressed appearance of the L4 superior endplate, similar to prior  with mild edema subjacent superior endplate. Disc vacuum phenomenon is  present.    Postsurgical change left L4 laminectomy with fluid tract extending  from the dorsal epidural space through the surgical site to the level  near the skin surface.    Internal derangement of joint assessment is limited owing to chosen  field of view. Hamstring tendons, iliopsoas tendons, proximal rectus  femoris tendons are intact. Small edema overlying bilateral greater  trochanters. Severe fatty replacement/infiltration of bilateral  gluteus minimus musculature is relatively symmetric. The visualized  courses of bilateral sciatic nerves are unremarkable.    Nonspecific presacral edema. Trace free fluid, nonspecific.      Impression    IMPRESSION:  1. In this patient with extensive osseous and soft tissue metastatic  foci although renal cell carcinoma, overall mixed interval response  since September 2019 with area of some improvement as well as  progression of disease.  2. Extent of proximal femur lesions given the location, may be at risk  for pathologic fracture.  3. Postsurgical change left L4 laminectomy with fluid tract extending  from the dorsal  epidural space through the surgical site to the level  near the skin surface. Not fully assessed on this non-dedicated study  for spine.    CALIN CONY     EXAMINATION: CT CHEST/ABDOMEN/PELVIS W CONTRAST, 11/19/2019 11:23 AM     TECHNIQUE:  Helical CT images from the lung apices through the  symphysis pubis were obtained with contrast.  Coronal and sagittal  reformatted images were generated at a workstation for further  assessment.     CONTRAST:  123 ml Isovue 370.     COMPARISON: CT abdomen dated 11/11/2019, CT chest 9/24/2019, MR dated  9/24/2019.     HISTORY: RCC with extensive bony metastasis; use of contrast as per  renal function; Renal cell carcinoma, unspecified laterality (H); Bone  metastasis (H)     FINDINGS:     Lungs: Improved consolidative opacities in the lower lobes relative to  prior exams. There are residual reticular nodular opacities which are  greater on the left and the right. There is an unchanged 5 mm nodule  in the posterior left upper lobe, series 10 image 47. Additional  scattered sub-3 mm nodules and small calcified granulomas similar to  prior exam. No new or enlarging nodules. Mild lower lobe  bronchiectasis.  Heart and mediastinum: Central tracheobronchial tree is patent. Heart  size is normal. No pericardial effusion. Severe coronary artery  calcification.  Thoracic vasculature: Borderline dilatation of the ascending pulmonary  artery measuring up to 4.2 cm. Main pulmonary artery is mildly dilated  measuring up to 3.0 cm. Normal branching pattern of the great vessels  Lymph nodes: Slightly decreased size of the subcarinal node measuring  9 mm, compared to 11 mm on prior exam. Additional scattered  subcentimeter mediastinal nodes. No hilar or axillary lymphadenopathy.     Thyroid: No thyroid nodules.     Liver: No suspicious liver lesions. Portal veins appear patent.  Gallbladder: Surgically absent. Mild secondary dilatation of the  biliary tree. Common bile duct measures up to  9 mm.  Spleen: Mild splenomegaly. No focal mass.   Pancreas: Mild atrophy. No focal mass. Mildly prominent pancreatic  duct.  Adrenal glands: No adrenal nodules.  Kidneys: No hydronephrosis or obstructing renal stones. No renal mass.  There are a few subcentimeter hypodense lesions are present, too small  to characterize by CT.   Bladder / Pelvic organs: Bladder is within normal limits. No pelvic  masses. Multiple pelvic phleboliths.   Bowel: Hyperdense material in the proximal duodenum, likely medication  related. Mild fluid distention of the small bowel. No evidence of  obstruction. Large colonic stool burden. Diverticulosis without  secondary signs of acute diverticulitis.  Lymph nodes: No abdominal, pelvic or inguinal lymphadenopathy.  Peritoneum / Retroperitoneum: No free air or abnormal fluid  collections within the abdomen. Trace pelvic ascites. Presacral and  perirectal edema.  Abdominal vasculature: Major vascular structures of the abdomen are  patent normal in caliber. Moderate calcification of the infrarenal  aorta and iliac branches. Incidental retroaortic left renal vein.     Bones and soft tissues: Extensive osseous metastatic disease of the  spine and pelvis similar to prior exam. Kyphoplasty changes at L1  similar to prior exam. Unchanged compression deformity at L3. There  are numerous small sclerotic lesions in the scapulae and humeral heads  may represent metastatic disease or small bone islands. No acute  fracture.     Heterogeneous enhancing mass in the medial left gluteal region  measures 2.1 x 5.3 cm, series 9 image 457. There is also likely a mass  in the medial right gluteus jaye measuring 1.8 x 2.7 cm, series 9  image 458. Mass in the right gluteal fat along the bone superior to  this measures 1.8 x 2.6 cm, series 9 image 434. These are similar to  the recent outside examination. Linear tract in the fat overlying the  right iliac bone may represent a biopsy tract, series 9 image 463  also  stable. Mild body wall edema. Possible partial visualization of  scrotal edema.                                                           IMPRESSION:   1. Unchanged osseous metastatic disease with stable compression  deformities at L1 and L5 and postsurgical changes or vertebral plasty  at L1.   2. Left greater than right patchy and reticular nodular opacities in  the lower lungs may represent infection. Associated mild  bronchiectasis suggestive of chronic aspiration.  3. Soft tissue metastases in the gluteal regions similar to prior examination.  4. Borderline dilatation of the ascending aorta measuring up to 4.2 cm.  5. Mildly dilated main pulmonary artery, which is suggestive of  pulmonary arterial hypertension.  6. Mild overall third spacing of fluid.     I have personally reviewed the examination and initial interpretation  and I agree with the findings.     FRANK HASTINGS MD     EXAMINATION: NM BONE SCAN WHOLE BODY  Whole-body bone scan, 11/19/2019 1:51 PM      HISTORY: Restaging - bony metastasis; Renal cell carcinoma,  unspecified laterality (H); Bone metastasis (H)      ADDITIONAL INFORMATION: none     COMPARISON: 17/9/2019     TECHNIQUE: The patient received 23.6 mCi of Tc-99m MDP intravenously.  Whole body bone images were obtained at 3 hours.     FINDINGS:  Multifocal foci of tracer uptake in upper, mid and lower thoracic  spine. Abnormal tracer uptake in bilateral sacroiliac joint region,  bilateral acetabula, left femur, right 10th rib posteriorly, right  parasympheseal region.      In comparison to 7/19/19, interval progression of thoracic spine  lesions, and the rib lesion is new.                                                                        IMPRESSION: In this patient with renal cell carcinoma, there has been  interval progression:  1. New thoracic vertebral lesions and posterior right 10th rib involvement.  2. No significant change in the remainder of multiple osseous  lesions.     I have personally reviewed the examination and initial interpretation  and I agree with the findings.     MICHAEL SANTANA MD            Assessment /Plan   69 year old male with metastatic infiltrative lesion in L3 extending to L2 and L4, likely primary malignancy being renal cell carcinoma. However he does not have any evidence of a primary renal mass on imaging. Based on the available pathology data, we offered treatment for renal cell carcinoma with cabozantinib (CABOSUN data demonstrating cabozantinib superiority over sunitinib in the initial setting). PAX8 stain was negative, and the clear cell features were not seen (it was a poorly differentiated carcinoma). His scans were stable, however, tolerability remained an issue and he then progressed. He then started on Nivolumab, as there is encouraging data with IO in non-clear cell carcinoma. He developed myalgias and we had to stop therapy and start him on prednisone.  Our plan was to pursue lenvatinib + everolimus, however he needed to complete XRT to his hips and then was admitted in August for PNA, then admitted to [facility] for PNA.       I had a lengthy discussion with Omega who is accompanied by several family members at this visit including - his wife and his son.  He is being seen with labs and restaging scans at this clinic visit.  He had a CT scan of the chest/abdomen and pelvis, MRI pelvis and bone scan. I have reviewed actual images from his restaging scans. He does have some disease progression in his bones and pelvis. He has been off therapy for a while. Indeed some of his disease in pelvis is still showing signs of improvement. This could be delayed response from nivolumab.     I reviewed our options of continued observation or starting therapy. I reviewed options of VEGF TKI like axitinib. We had previously discussed lenvatinib with everolimus and he already has a supply available at his home and could start today. I reviewed the expected side  effects for both of these agents. Lenvatinib will have side effect profile very similar to cabozantinib that he had previously. I reviewed the side effects from everolimus. Mucositis is the biggest challenge with everolimus and diarrhea with lenvatinib. Additional teaching done by our clinic pharmacist.     I will have him return in 2-3 weeks to see Ms. Ruthie St and I will see him in 3 months with labs and restaging scans.      Over 45 min of direct face to face time spent with patient with more than 50% time spent in counseling and coordinating care.      Again, thank you for allowing me to participate in the care of your patient.      Sincerely,    Hua Anne MD

## 2019-11-20 NOTE — PROGRESS NOTES
[facility] CANCER CLINIC  FOLLOW-UP VISIT NOTE  Date of visit: Nov 20, 2019        REASON FOR VISIT: mRCC to the bone (with no primary renal mass), here for routine follow up  HPI:  Javon Bianchi is a 69 year old male with a history significant for hyperlipidemia who had been in his usual health until he developed back pain in May of 2018. At that time, the pain was a stabbing like in the middle of lower back with burning like pain wrapping around this left hip to knee area. No trauma. He had MRI on 5/23/18 showed mild degenerative change with bulging disks L2-S1. A small benign hemangioma was noted in L2, no other marrow signal abnormality. Since then, the pain failed to improve despite steroid injections. In addition, he lost 25 lbs unintentionally since June along with chills a few times a day. Over time, the pain got worse to the point he could not ambulate after short distance and developed muscle weakness in lower extremities over time requiring a walker. Finally, he went to ER ([facility] in [place]) on 8/23/18, CT A/P was unremarkable. He was referred to neurology with obtaining MRI L spine. MRI on 8/30/18 revealed a pathological fracture at L3 vertebral body with height loss at 40% and diffuse involvement of a neoplastic process. IR guided biopsy on 8/31/18 showed poorly differentiated carcinoma. IHC was suggestive of possible renal cell carcinoma per pathology report (Renal cell immunochemistry is positive and along with the negative staining for CK7 and CK20 suggests the possibility of a renal carcinoma, however most renal cell carcinomas also express PAX8 which is negative in this case). Of note, CT chest/abdomen/pelvis at OSH was negative for primary lesion. The patient was evaluated by neurosurgery who recommended no surgical intervention. He was discharged with TLSO brace and walker. He had palliative XRT locally from 9/11 for 10 fractions (Done in [place]). He was  started on cabozantinib 60 mg initially, requiring dose reduction to 20 mg for issues with hand foot syndrome and poor tolerability. Switched to nivolumab due to progression in bone disease on 2/6/19. Had to hold Cycle #3 due to myalgia, which improved with prednisone.    ONCOLOGY HISTORY:    Cabozantinib: Started 60 mg daily on September 28, 2018.  Dec 10, 2018: dose reduced cabozantinib to 40 mg a day.  Jan 17, 2019: dose reduced cabozantinib to 29 mg a day.  Feb 11, 2019: C1D1 Nivolumab   3/12/19: c2 Nivolumab  4/8/19: held Nivolumab due to myalgias, started prednisone    Interval history:   He presents to clinic today for scheduled follow up visit for hospital follow up.  After our last visit Omega was pursuing XRT to the hips, which he finished with improved pain control.  Our plan was to start lenvatinib + everolimus, however he was going to have a spinal procedure from Dr Brower.  During the pre-op, he was noted to have PNA and was admitted to [place] from 8/15-8/15/19, given IV antibiotics, then discharged on Levaquin.  He then was meeting with palliative care and sent to the ED for continued worsening cough, LEVY and FTT.  He was admitted at [facility] 9/3/19-9/7/19 and had a bronch which didn't grow out any microorganisms and he was discharged on Bactrim to home.     Omega has been doing okay since last visit. Overall he has no new complains.   No fevers or sweats.  No cough, SOB or chest pain. No rashes or concerning lesions.    He is accompanied by several family members.     Wt Readings from Last 10 Encounters:   11/20/19 90.7 kg (200 lb)   11/19/19 90.9 kg (200 lb 6.4 oz)   11/13/19 84.8 kg (187 lb)   11/01/19 85.5 kg (188 lb 7.9 oz)   10/31/19 87 kg (191 lb 12.8 oz)   10/28/19 88.6 kg (195 lb 4.8 oz)   10/16/19 93 kg (205 lb)   10/16/19 93 kg (205 lb)   10/03/19 93.4 kg (206 lb)   10/01/19 95.3 kg (210 lb)     MEDICATIONS     Current Outpatient Medications   Medication Sig     acetaminophen (TYLENOL) 500 MG  tablet Take 1,000 mg by mouth 3 times daily 0800, 1400, 2200     calcium carbonate 600 mg-vitamin D 400 units (CALTRATE) 600-400 MG-UNIT per tablet Take 1 tablet by mouth 3 times daily     Dextromethorphan-guaiFENesin (MUCINEX DM)  MG 12 hr tablet Take 1 tablet by mouth 2 times daily as needed      fluticasone (FLONASE) 50 MCG/ACT nasal spray Spray 1 spray into both nostrils daily as needed for rhinitis or allergies     folic acid (FOLVITE) 1 MG tablet Take 1 tablet (1 mg) by mouth daily . Future refills by PCP.     HYDROmorphone (DILAUDID) 2 MG tablet Take 1-2 tablets (2-4 mg) by mouth every 3 hours as needed for pain     lidocaine (LIDODERM) 5 % patch Place 1-3 patches onto the skin every 24 hours Apply to painful areas for 12 hours on, 12 hours off.     loratadine (CLARITIN) 10 MG tablet Take 10 mg by mouth daily (with lunch)      medical cannabis (Patient's own supply.  Not a prescription) Cannabis Heater liquid 2 mL orally in AM   Cannabis capsule - 1 capsule daily at bedtime.     (This is NOT a prescription, and does not certify that the patient has a qualifying medical condition for medical cannabis.  The purpose of this order is  to document that the patient reports taking medical cannabis.)     melatonin 5 MG tablet Take 10 mg by mouth At Bedtime     morphine (MS CONTIN) 15 MG CR tablet Take by mouth 3 times daily 30mg qam, 15mg @@ 1500, 15mg at bedtime.     multivitamin, therapeutic (THERA-VIT) TABS tablet Take 1 tablet by mouth At Bedtime     ondansetron (ZOFRAN) 8 MG tablet Take 8 mg by mouth every 8 hours as needed for nausea     polyethylene glycol (MIRALAX/GLYCOLAX) Packet Take 17 g by mouth daily as needed      pregabalin (LYRICA) 50 MG capsule Take 1 capsule (50 mg) by mouth 2 times daily     senna-docusate (SENOKOT-S/PERICOLACE) 8.6-50 MG tablet Take 2-4 tablets by mouth 2 times daily     vitamin D3 (CHOLECALCIFEROL) 2000 units (50 mcg) tablet Take 2,000 Units by mouth daily     diazepam  (VALIUM) 5 MG tablet Take 1 pill 30 min before MRI and a second pill 2 minutes prior. (Patient not taking: Reported on 11/20/2019)     everolimus (AFINITOR) 5 MG tablet CHEMO Take 1 tablet (5 mg) by mouth daily (Patient not taking: Reported on 11/20/2019)     Lenvatinib 18 MG, 10 mg + 2 x 4 mg capsules, (LENVIMA) 18 mg combo capsule CHEMOTHERAPY Take 18 mg dose (10 mg plus two 4 mg capsules) by mouth daily. (Patient not taking: Reported on 11/20/2019)     No current facility-administered medications for this visit.         PHYSICAL EXAM:   Temp: 98.2  F (36.8  C) Temp src: Oral BP: 115/61 Pulse: 67   Resp: 14 SpO2: 97 %      ECOG performance status of 2.   GENERAL: Seated in chair in NAD.  HEENT: No icterus, no pallor. Moist mucous membranes. Oropharynx is clear.   NECK: Supple, normal ROM  LUNGS: Bilateral clear to ausculation  CARDIOVASCULAR: Regular rate and rhythm, no murmurs, gallops or rubs.   ABDOMEN: Soft, nontender and nondistended.   EXTREMITIES: No cyanosis, no clubbing, 2+ pitting edema to the knees bilaterally.   NEUROLOGIC: No focal deficits.  LABS/IMAGING:    Recent Labs   Lab Test 11/13/19  0624 11/12/19  0859 11/11/19 1858 10/28/19  1211 10/02/19  0730    139 135 134 135   POTASSIUM 3.3* 3.5 3.5 4.3 4.7   CHLORIDE 105 105 102 105 109   CO2 29 26 27 23 21   ANIONGAP 4 8 6 6 5   BUN 6* 7 10 12 9   CR 0.78 0.80 0.87 1.09 1.03   GLC 94 91 102* 105* 99   AUREA 7.6* 7.4* 8.1* 8.0* 8.5     Recent Labs   Lab Test 11/13/19  0624 11/12/19  0859 11/11/19  1858 10/02/19  0730 09/13/19  0902 09/07/19  0750 09/06/19  0710 09/05/19  0737   MAG 1.9 1.6 1.8 1.6 1.8 2.0 1.5* 1.6   PHOS  --  2.8 1.9*  --   --  3.0 2.9 2.9     Recent Labs   Lab Test 11/13/19  0624 11/12/19  0859 11/11/19  1858 11/02/19  0642 11/01/19  1206 10/31/19  1026 10/28/19  1211 10/02/19  0730 09/24/19  1606   WBC  --  5.9 7.6  --   --  4.3 5.8 4.9 3.1*   HGB 7.5* 7.4* 8.1* 7.7* 8.6* 8.2* 7.7* 7.7* 7.4*   PLT  --  256 287  --   --  263 210  225 170   MCV  --  104* 105*  --   --  110* 111* 103* 106*   NEUTROPHIL  --  71.0 67.0  --   --  66.5  --  66.0 64.2     Recent Labs   Lab Test 11/12/19  0859 11/11/19  1858 10/28/19  1211  09/04/19  0820   BILITOTAL 0.4 0.3 0.3   < >  --    ALKPHOS 83 92 82   < >  --    ALT <6 <6 11   < >  --    AST 41 53* 35   < >  --    ALBUMIN 2.2* 2.4* 2.8*   < >  --    LDH  --   --   --   --  353*    < > = values in this interval not displayed.     TSH   Date Value Ref Range Status   09/24/2019 5.76 (H) 0.40 - 4.00 mU/L Final   09/13/2019 5.08 (H) 0.40 - 4.00 mU/L Final   08/07/2019 1.53 0.40 - 4.00 mU/L Final     No results for input(s): CEA in the last 81598 hours.  Results for orders placed or performed during the hospital encounter of 11/19/19   MR Pelvis Bone wo & w Contrast    Narrative    Exam: MR PELVIC BONES WO & W CONTRAST    History: Renal cell carcinoma with extensive pelvic metastasis.    Techniques: Multiplanar multisequence imaging through the pelvis was  obtained before and after administration of intravenous gadolinium  contrast  using routine protocol.    Contrast: 10mL Gadavist    Comparison: MR 9/24/2019.    Findings:    Redemonstration extensive multiple metastatic foci involving the  visualized bones including lower lumbar spine posterior elements,  bilateral innominate bones, sacrum, proximal femurs. Overall there is  mixed interval response including some area of decreased marrow  infiltration and other area of increased marrow infiltration. For  instance right sacral and left posterior iliac lesion appears  increased compared to prior. On the other hands involvement of the  femurs appear mildly improved. Given the extent of involvement  particularly in the left proximal femur but also right femoral neck,  these may be at the risk of pathologic fracture. Some lesion  demonstrate apparent increased enhancement pattern. For instance right  posterior iliac bone lesion is solidly enhancing (image 16 series  8),  previously no substantial enhancement.    Soft tissue metastatic foci are also present. Some of these regions  demonstrate less solid enhancement for instance the lesion over the  posterior aspect the left iliac bone (image 10 series 8) shows rim  enhancement previously solid soft tissue enhancement. Soft tissue  nodule along the right gluteal musculature superficial to the iliac  bone measures approximately 2.8 x 2.1 cm previously 2.0 x 1.4 cm using  similar measurement technique.    Compressed appearance of the L4 superior endplate, similar to prior  with mild edema subjacent superior endplate. Disc vacuum phenomenon is  present.    Postsurgical change left L4 laminectomy with fluid tract extending  from the dorsal epidural space through the surgical site to the level  near the skin surface.    Internal derangement of joint assessment is limited owing to chosen  field of view. Hamstring tendons, iliopsoas tendons, proximal rectus  femoris tendons are intact. Small edema overlying bilateral greater  trochanters. Severe fatty replacement/infiltration of bilateral  gluteus minimus musculature is relatively symmetric. The visualized  courses of bilateral sciatic nerves are unremarkable.    Nonspecific presacral edema. Trace free fluid, nonspecific.      Impression    IMPRESSION:  1. In this patient with extensive osseous and soft tissue metastatic  foci although renal cell carcinoma, overall mixed interval response  since September 2019 with area of some improvement as well as  progression of disease.  2. Extent of proximal femur lesions given the location, may be at risk  for pathologic fracture.  3. Postsurgical change left L4 laminectomy with fluid tract extending  from the dorsal epidural space through the surgical site to the level  near the skin surface. Not fully assessed on this non-dedicated study  for spine.    CALIN DONNELLY     EXAMINATION: CT CHEST/ABDOMEN/PELVIS W CONTRAST, 11/19/2019 11:23  AM     TECHNIQUE:  Helical CT images from the lung apices through the  symphysis pubis were obtained with contrast.  Coronal and sagittal  reformatted images were generated at a workstation for further  assessment.     CONTRAST:  123 ml Isovue 370.     COMPARISON: CT abdomen dated 11/11/2019, CT chest 9/24/2019, MR dated  9/24/2019.     HISTORY: RCC with extensive bony metastasis; use of contrast as per  renal function; Renal cell carcinoma, unspecified laterality (H); Bone  metastasis (H)     FINDINGS:     Lungs: Improved consolidative opacities in the lower lobes relative to  prior exams. There are residual reticular nodular opacities which are  greater on the left and the right. There is an unchanged 5 mm nodule  in the posterior left upper lobe, series 10 image 47. Additional  scattered sub-3 mm nodules and small calcified granulomas similar to  prior exam. No new or enlarging nodules. Mild lower lobe  bronchiectasis.  Heart and mediastinum: Central tracheobronchial tree is patent. Heart  size is normal. No pericardial effusion. Severe coronary artery  calcification.  Thoracic vasculature: Borderline dilatation of the ascending pulmonary  artery measuring up to 4.2 cm. Main pulmonary artery is mildly dilated  measuring up to 3.0 cm. Normal branching pattern of the great vessels  Lymph nodes: Slightly decreased size of the subcarinal node measuring  9 mm, compared to 11 mm on prior exam. Additional scattered  subcentimeter mediastinal nodes. No hilar or axillary lymphadenopathy.     Thyroid: No thyroid nodules.     Liver: No suspicious liver lesions. Portal veins appear patent.  Gallbladder: Surgically absent. Mild secondary dilatation of the  biliary tree. Common bile duct measures up to 9 mm.  Spleen: Mild splenomegaly. No focal mass.   Pancreas: Mild atrophy. No focal mass. Mildly prominent pancreatic  duct.  Adrenal glands: No adrenal nodules.  Kidneys: No hydronephrosis or obstructing renal stones. No renal  mass.  There are a few subcentimeter hypodense lesions are present, too small  to characterize by CT.   Bladder / Pelvic organs: Bladder is within normal limits. No pelvic  masses. Multiple pelvic phleboliths.   Bowel: Hyperdense material in the proximal duodenum, likely medication  related. Mild fluid distention of the small bowel. No evidence of  obstruction. Large colonic stool burden. Diverticulosis without  secondary signs of acute diverticulitis.  Lymph nodes: No abdominal, pelvic or inguinal lymphadenopathy.  Peritoneum / Retroperitoneum: No free air or abnormal fluid  collections within the abdomen. Trace pelvic ascites. Presacral and  perirectal edema.  Abdominal vasculature: Major vascular structures of the abdomen are  patent normal in caliber. Moderate calcification of the infrarenal  aorta and iliac branches. Incidental retroaortic left renal vein.     Bones and soft tissues: Extensive osseous metastatic disease of the  spine and pelvis similar to prior exam. Kyphoplasty changes at L1  similar to prior exam. Unchanged compression deformity at L3. There  are numerous small sclerotic lesions in the scapulae and humeral heads  may represent metastatic disease or small bone islands. No acute  fracture.     Heterogeneous enhancing mass in the medial left gluteal region  measures 2.1 x 5.3 cm, series 9 image 457. There is also likely a mass  in the medial right gluteus jaye measuring 1.8 x 2.7 cm, series 9  image 458. Mass in the right gluteal fat along the bone superior to  this measures 1.8 x 2.6 cm, series 9 image 434. These are similar to  the recent outside examination. Linear tract in the fat overlying the  right iliac bone may represent a biopsy tract, series 9 image 463 also  stable. Mild body wall edema. Possible partial visualization of  scrotal edema.                                                                         IMPRESSION:   1. Unchanged osseous metastatic disease with stable  compression  deformities at L1 and L5 and postsurgical changes or vertebral plasty  at L1.   2. Left greater than right patchy and reticular nodular opacities in  the lower lungs may represent infection. Associated mild  bronchiectasis suggestive of chronic aspiration.  3. Soft tissue metastases in the gluteal regions similar to prior examination.  4. Borderline dilatation of the ascending aorta measuring up to 4.2 cm.  5. Mildly dilated main pulmonary artery, which is suggestive of  pulmonary arterial hypertension.  6. Mild overall third spacing of fluid.     I have personally reviewed the examination and initial interpretation  and I agree with the findings.     FRANK HASTINGS MD     EXAMINATION: NM BONE SCAN WHOLE BODY  Whole-body bone scan, 11/19/2019 1:51 PM      HISTORY: Restaging - bony metastasis; Renal cell carcinoma,  unspecified laterality (H); Bone metastasis (H)      ADDITIONAL INFORMATION: none     COMPARISON: 17/9/2019     TECHNIQUE: The patient received 23.6 mCi of Tc-99m MDP intravenously.  Whole body bone images were obtained at 3 hours.     FINDINGS:  Multifocal foci of tracer uptake in upper, mid and lower thoracic  spine. Abnormal tracer uptake in bilateral sacroiliac joint region,  bilateral acetabula, left femur, right 10th rib posteriorly, right  parasympheseal region.      In comparison to 7/19/19, interval progression of thoracic spine  lesions, and the rib lesion is new.                                                                        IMPRESSION: In this patient with renal cell carcinoma, there has been  interval progression:  1. New thoracic vertebral lesions and posterior right 10th rib involvement.  2. No significant change in the remainder of multiple osseous lesions.     I have personally reviewed the examination and initial interpretation  and I agree with the findings.     MICHAEL SANTANA MD              Assessment /Plan   69 year old male with metastatic infiltrative lesion  in L3 extending to L2 and L4, likely primary malignancy being renal cell carcinoma. However he does not have any evidence of a primary renal mass on imaging. Based on the available pathology data, we offered treatment for renal cell carcinoma with cabozantinib (CABOSUN data demonstrating cabozantinib superiority over sunitinib in the initial setting). PAX8 stain was negative, and the clear cell features were not seen (it was a poorly differentiated carcinoma). His scans were stable, however, tolerability remained an issue and he then progressed. He then started on Nivolumab, as there is encouraging data with IO in non-clear cell carcinoma. He developed myalgias and we had to stop therapy and start him on prednisone.  Our plan was to pursue lenvatinib + everolimus, however he needed to complete XRT to his hips and then was admitted in August for PNA, then admitted to [facility] for PNA.       I had a lengthy discussion with Omega who is accompanied by several family members at this visit including - his wife and his son.  He is being seen with labs and restaging scans at this clinic visit.  He had a CT scan of the chest/abdomen and pelvis, MRI pelvis and bone scan. I have reviewed actual images from his restaging scans. He does have some disease progression in his bones and pelvis. He has been off therapy for a while. Indeed some of his disease in pelvis is still showing signs of improvement. This could be delayed response from nivolumab.     I reviewed our options of continued observation or starting therapy. I reviewed options of VEGF TKI like axitinib. We had previously discussed lenvatinib with everolimus and he already has a supply available at his home and could start today. I reviewed the expected side effects for both of these agents. Lenvatinib will have side effect profile very similar to cabozantinib that he had previously. I reviewed the side effects from everolimus. Mucositis is the biggest challenge with  everolimus and diarrhea with lenvatinib. Additional teaching done by our clinic pharmacist.     I will have him return in 2-3 weeks to see Ms. Ruthie Maciel and I will see him in 3 months with labs and restaging scans.      Over 45 min of direct face to face time spent with patient with more than 50% time spent in counseling and coordinating care.

## 2019-11-25 NOTE — TELEPHONE ENCOUNTER
Per Patient's request,  completed and faxed [employer] request for lodging dates 12/12/2019 - 12/13/2019. [employer] will contact Patient for confirmation of reservation.  will continue to provide support as needed.    Clair Husain [facility]  Outpatient Specialty Clinics  Direct Phone: [phone]  Pager:  [phone]

## 2019-12-02 NOTE — TELEPHONE ENCOUNTER
Oral Chemotherapy Monitoring Program     Placed call to patient in follow up of Lenvima/everolimus oral chemotherapy.     Left message requesting call back. No drug names were mentioned.       Rekha Clemente, PharmD  Oral Chemotherapy Monitoring Program  [facility]  [phone]  December 2, 2019

## 2019-12-02 NOTE — TELEPHONE ENCOUNTER
Oral Chemotherapy Monitoring Program    Primary Oncologist: Dr. Anne  Primary Oncology Clinic: [facility]   Cancer Diagnosis: RCC    Therapy History:  Lenvima 18 mg daily (10mg tab + 2 x 4mg tab) and Afinitor 5 mg daily  F6W3=2011/21/2019    **Of note, patient indicates that he started Lenvima on 11/21 and then realized that he did not start the Afinitor at the same time. He started Afinitor on 11/25 and took both medications until 11/27 when he took both 11/27 and 11/28 off for the Thanksgiving holiday. He then resumed taking both medications on 11/29.**    Drug Interaction Assessment: no new drug interactions identified.     Lab Monitoring Plan  CMP - [id number] x4, then monthly  CBC/TSH/Magnesium - monthly  Labs outside of [place]: [facility]. Phone: [phone]. Fax: [phone]. Standing orders sent on 7/23/2019.   Subjective/Objective:  Javon Bianchi is a 69 year old male contacted by phone for a follow-up visit for oral chemotherapy. I spoke to Omega and his wife, Patrizia, regarding how things are going since starting on Lenvima and Afinitor therapy. They confirm that he is taking Lenvima 18 mg (10mg tab + 2 x 4mg tab) and Afinitor 5 mg daily and he has been adherent to therapy. They report that they are still working to find the best balance for his bowel movements with the OTC products they use as a preventative bowel regimen. They report using Senokot-S taking 2 tablets twice daily and Miralax 1 capful twice daily mixed in 4-6oz of liquid. Omega reports having 1 larger BM in the morning and 3-4 smaller BMs in the afternoon/evening. He states that the biggest issue he has seen is that he feels gaseous and is uncertain whether it is going to cause a BM or flatulence. He has continued to remain hydrated and drinks close to 60oz of water per day. The only other item mentioned was concern was seeking to avoid cracked skin moving forward. He has intermittently been  using his moisturizing lotion.     ORAL CHEMOTHERAPY 12/3/2018 12/4/2018 12/9/2018 12/26/2018 1/7/2019 7/23/2019 12/2/2019   Drug Name Cabometyx (cabozantinib) Cabometyx (cabozantinib) Cabometyx (cabozantinib) Cabometyx (cabozantinib) Cabometyx (cabozantinib) Lenvatinib/Everolimus Lenvatinib/Everolimus   Current Dosage 60mg 60mg 60mg 40mg 40mg 18mg/5mg 18mg/5mg   Current Schedule Daily Daily Daily Daily Daily Daily Daily   Cycle Details Continuous Drug on Hold Drug on Hold Drug on Hold Continuous Continuous Continuous   Start Date of Last Cycle 12/1/2018 - 12/10/2018 12/12/2018 12/26/2018 - 11/21/2019   Planned next cycle start date - - - - 1/19/2019 - -   Doses missed in last 2 weeks 0 - - - - - 0   Adherence Assessment Adherent - - Non-adherent Non-adherent - Adherent   Reason for Non-adherence - - - Drug on hold;Wanted to avoid side-effects Drug on hold;Wanted to avoid side-effects - -   Adherence Intervention Recommended - - - - None - -   Adverse Effects Palmar-plantar erythrodysethesia syndrome;Nausea - - - Palmar-plantar erythrodysethesia syndrome - No AE identified during assessment   Nausea Grade 2 - - - - - -   Pharmacist Intervention(nausea) Yes - - - - - -   Intervention(s) Referral to oncology provider - - - - - -   Palmar-plantar Erythrodysethesia syndrome[hand-foot syndrome] Grade 2 - - - Grade 1 - -   Pharmacist Intervention(Palmar-plantar) Yes - - - No - -   Intervention(s) Referral to oncology provider - - - - - -   Oral Mucositis - - - - - - -   Pharmacist Intervention(mucositis) - - - - - - -   Home BPs - - - all BPs<140/90 all BPs<140/90 - -   Any new drug interactions? - - - - No - No   Is the dose as ordered appropriate for the patient? - - - - Yes - Yes   Is the patient currently in pain? - - - - - - -   Has the patient been assessed within the past 6 months for depression? - - - - - - -   Has the patient missed any days of school, work, or other routine activity? - - - - - - -       Last  "PHQ-2 Score on record:   PHQ-2 ( 1999 [employer]) 6/6/2019 11/15/2018   Q1: Little interest or pleasure in doing things 0 2   Q2: Feeling down, depressed or hopeless 0 3   PHQ-2 Score 0 5   Q1: Little interest or pleasure in doing things - More than half the days   Q2: Feeling down, depressed or hopeless - Nearly every day   PHQ-2 Score - 5       Vitals:  BP:   BP Readings from Last 1 Encounters:   11/20/19 115/61     Wt Readings from Last 1 Encounters:   11/20/19 90.7 kg (200 lb)     Estimated body surface area is 2.12 meters squared as calculated from the following:    Height as of 11/20/19: 1.778 m (5' 10\").    Weight as of 11/20/19: 90.7 kg (200 lb).    Labs:  _  Result Component Current Result Ref Range   Sodium 138 (11/13/2019) 133 - 144 mmol/L     _  Result Component Current Result Ref Range   Potassium 3.3 (L) (11/13/2019) 3.4 - 5.3 mmol/L     _  Result Component Current Result Ref Range   Calcium 7.6 (L) (11/13/2019) 8.5 - 10.1 mg/dL     _  Result Component Current Result Ref Range   Magnesium 1.9 (11/13/2019) 1.6 - 2.3 mg/dL     _  Result Component Current Result Ref Range   Phosphorus 2.8 (11/12/2019) 2.5 - 4.5 mg/dL     _  Result Component Current Result Ref Range   Albumin 2.2 (L) (11/12/2019) 3.4 - 5.0 g/dL     _  Result Component Current Result Ref Range   Urea Nitrogen 6 (L) (11/13/2019) 7 - 30 mg/dL     _  Result Component Current Result Ref Range   Creatinine 0.78 (11/13/2019) 0.66 - 1.25 mg/dL     _  Result Component Current Result Ref Range   AST 41 (11/12/2019) 0 - 45 U/L     _  Result Component Current Result Ref Range   ALT <6 (11/12/2019) 0 - 70 U/L     _  Result Component Current Result Ref Range   Bilirubin Total 0.4 (11/12/2019) 0.2 - 1.3 mg/dL     _  Result Component Current Result Ref Range   WBC 5.9 (11/12/2019) 4.0 - 11.0 10e9/L     _  Result Component Current Result Ref Range   Hemoglobin 7.5 (L) (11/13/2019) 13.3 - 17.7 g/dL     _  Result Component Current Result Ref Range   Platelet " Count 256 (11/12/2019) 150 - 450 10e9/L     _  Result Component Current Result Ref Range   Absolute Neutrophil 4.2 (11/12/2019) 1.6 - 8.3 10e9/L       Assessment:  Omega is tolerating Lenvima/Afinitor therapy well. He feels gaseous at times and is working on the best BM regimen.     Plan:  Continue Lenvima/Afinitor therapy as planned. Recommended using Gas-X take 1 tablet (125 mg) as needed for gas with a maximum of 500 mg/day.     Follow-Up:  12/13 labs and office visit with Radha.     Refill Due:  12/18 for 12/21      Rekha Clemente PharmD  Oral Chemotherapy Monitoring Program  [facility]  [phone]

## 2019-12-12 NOTE — LETTER
"    12/12/2019         RE: Javon Bianchi  [address]  [place] [address]        Dear Colleague,    Thank you for referring your patient, Javon Bianchi, to the [facility]. Please see a copy of my visit note below.    Palliative Care Outpatient Clinic Progress Note    Patient Name:  Javon Bianchi  Primary Provider:  LIANNE Wellington    Chief Complaint/Identifying Information:  Metastatic Renal Cell Carcinoma off chemotherapy for many months due to recurrent PNA, spine surgery.   History of L3 pathologic fracture found at diagnosis, now s/p kyphoplasty-radiofrequency ablation of L1, and left hemilaminectomy of L3/L4.  Most recently started on Lenvima and [facility] Palliative [facility] Opioid Prescribing Safety Plan     Opioid Safety Education: Reviewed by Nan Gonzalez  on 11/8/2018  Opioid Risk Assessment: Performed by Nan Gonzalez  on 11/8/2018 .  ORT score of 0.   Mood Disorder Assessment: Performed by Carly Mills on 04/25/19.  No concerns.    reviewed with every prescription, last reviewed by Carly Mills on  12/12/19     Additional recommendations based on patient's prognosis and indication for opioids include the following:     Expected prognosis: shorter  Risk: Low or Medium (ORT 0-7)  No further recommendations.     Interim History:  Javon Bianchi 69 year old male returns to be seen by palliative care today, accompanied by his wife Suzie.  Javon Bianchi was last seen by me three weeks ago, at which time he was interested in simplifying his regimen for neuropathy and his duloxetine was stopped.      Today, he reports that he has developed some hand and foot syndrome related to his treatments.  Joints are starting to ache again.  Foot pain is limiting his ability to ambulate freely.  Back is more painful recently, seems to be in the L1 area, \"flares out\" to both hips. He wonders about the specific results from his recent " "scans.     Appetite has improved.  Eating three meals per day. Constipation has been a struggle.  He finds that he is alternating between taking some senna, holding for a few days because he gets liquid stools.     Has been taking MS Contin 30/15/15, also needing hydromorphone 2 tabs per day, mostly at night. Has stopped the duloxetine.  Getting some feeling back in the LLE.  No changes in mood or anxiety. Legs still feel strong, no new weakness. No new incontinence.     Nausea has resolved. Not needing Zofran.       Social History:  Pertinent changes to social history/social situation since last visit: None.   Social History     Tobacco Use     Smoking status: Never Smoker     Smokeless tobacco: Never Used   Substance Use Topics     Alcohol use: Not Currently     Drug use: Yes     Types: Marijuana     Comment: medical cannabis               Physical Exam:   Vitals were reviewed  /81   Pulse 59   Temp 98.8  F (37.1  C) (Oral)   Resp 14   Ht 1.778 m (5' 10\")   Wt 92.8 kg (204 lb 9.6 oz)   SpO2 99%   BMI 29.36 kg/m     General: Alert, comfortable appearing male in no acute distress.   Eyes: Pupils equal and 2mm, sclera clear.   ENT: MMM.   Resp: Unlabored on room air. No audible wheeze.   Back: Kyphosis is present.  No TTP of the thoracic spine, mild TTP of the lumbar spine.    Abd: Soft, non-distended, no TTP, active bowel sounds.   Ext: Bilateral feet with scant erythema, no skin breakdown.  Warm and well perfused. Bilateral palms with some flaking skin, small areas of erythema, no breakdown.   Neuro: No facial asymmetry.  Spontaneous movements grossly non-focal. Alert and with normal appearing sensorium. Gait is unassisted in the room, assisted by cane for longer distances.      Impression & Recommendations & Counseling:  Omega is a 69 year old man with metastatic renal cell carcinoma who had a prolonged period off cancer treatments, now restarted on Lenvima and Afinitor. Much of today's visit was spent " reviewing Omega's scans and assisting with interpreting results.      His low back pain started suddenly after working on the [place] tree.  He has no emergent symptoms and will be evaluated by Dr. Brower today for a post-op check.     Hands/Feet pain- Try using the hydromorphone more during the day to help with the pain.     Omega asks about another opioid taper today but I do not feel that he is ready for this as his foot pain has limited his mobility.  He is in agreement with holding off on that change.     Additional information reviewed today:   No Known Allergies  Current Outpatient Medications   Medication Sig Dispense Refill     acetaminophen (TYLENOL) 500 MG tablet Take 1,000 mg by mouth 3 times daily 0800, 1400, 2200       calcium carbonate 600 mg-vitamin D 400 units (CALTRATE) 600-400 MG-UNIT per tablet Take 1 tablet by mouth 3 times daily       Dextromethorphan-guaiFENesin (MUCINEX DM)  MG 12 hr tablet Take 1 tablet by mouth 2 times daily as needed        diazepam (VALIUM) 5 MG tablet Take 1 pill 30 min before MRI and a second pill 2 minutes prior. 10 tablet 0     everolimus (AFINITOR) 5 MG tablet CHEMO Take 1 tablet (5 mg) by mouth daily 30 tablet 0     fluticasone (FLONASE) 50 MCG/ACT nasal spray Spray 1 spray into both nostrils daily as needed for rhinitis or allergies       folic acid (FOLVITE) 1 MG tablet Take 1 tablet (1 mg) by mouth daily . Future refills by PCP. 30 tablet 0     HYDROmorphone (DILAUDID) 2 MG tablet Take 1-2 tablets (2-4 mg) by mouth every 3 hours as needed for pain 60 tablet 0     Lenvatinib 18 MG, 10 mg + 2 x 4 mg capsules, (LENVIMA) 18 mg combo capsule CHEMOTHERAPY Take 18 mg dose (10 mg plus two 4 mg capsules) by mouth daily. 90 capsule 0     lidocaine (LIDODERM) 5 % patch Place 1-3 patches onto the skin every 24 hours Apply to painful areas for 12 hours on, 12 hours off. 30 patch 3     loratadine (CLARITIN) 10 MG tablet Take 10 mg by mouth daily (with lunch)         medical cannabis (Patient's own supply.  Not a prescription) Cannabis Heater liquid 2 mL orally in AM   Cannabis capsule - 1 capsule daily at bedtime.     (This is NOT a prescription, and does not certify that the patient has a qualifying medical condition for medical cannabis.  The purpose of this order is  to document that the patient reports taking medical cannabis.)       melatonin 5 MG tablet Take 10 mg by mouth At Bedtime       morphine (MS CONTIN) 15 MG CR tablet Take one tab midday and at bedtime.  Also taking MS Contin 30mg tab in AM. 60 tablet 0     morphine (MS CONTIN) 30 MG CR tablet Take 1 tablet (30 mg) by mouth daily Combine with MS Contin 15mg in afternoon and at bedtime. 30 tablet 0     multivitamin, therapeutic (THERA-VIT) TABS tablet Take 1 tablet by mouth At Bedtime       ondansetron (ZOFRAN) 8 MG tablet Take 8 mg by mouth every 8 hours as needed for nausea       polyethylene glycol (MIRALAX/GLYCOLAX) Packet Take 17 g by mouth daily as needed        pregabalin (LYRICA) 50 MG capsule Take 1 capsule (50 mg) by mouth 2 times daily 60 capsule 1     senna-docusate (SENOKOT-S/PERICOLACE) 8.6-50 MG tablet Take 2-4 tablets by mouth 2 times daily       vitamin D3 (CHOLECALCIFEROL) 2000 units (50 mcg) tablet Take 2,000 Units by mouth daily       Past Medical History:   Diagnosis Date     Anemia      Bone metastasis (H) 09/28/2018     Cough      Pulmonary nodule      Renal cell carcinoma, right (H) 09/28/2018     Past Surgical History:   Procedure Laterality Date     BRONCHOSCOPY (RIGID OR FLEXIBLE), DIAGNOSTIC N/A 9/4/2019    Procedure: Bronchoscopy, With Bronchoalveolar Lavage;  Surgeon: Burton Toure MD;  Location: [facility]     CHOLECYSTECTOMY       HC REMOVAL HEEL SPUR, CALCANEUS  2004     OPTICAL TRACKING SYSTEM BIOPSY BONE RADIO FREQUENCY ABLATION N/A 11/1/2019    Procedure: Kyphoplasty-Radiofrequency ablation Lumbar 1; Minimally Invasive Surgery  left hemilaminectomy Lumbar 3-Lumbar 4;  Surgeon:  "Vickey Brower MD;  Location: [facility] OR       Key Data Reviewed:  LABS:   Lab Results   Component Value Date    WBC 5.9 2019    HGB 7.5 (L) 2019    HCT 23.8 (L) 2019     2019     2019    POTASSIUM 3.3 (L) 2019    CHLORIDE 105 2019    CO2 29 2019    BUN 6 (L) 2019    CR 0.78 2019    GLC 94 2019    SED 87 (H) 2019    NTBNPI 283 2019    TROPI <0.015 2019    AST 41 2019    ALT <6 2019    ALKPHOS 83 2019    BILITOTAL 0.4 2019    INR 1.25 (H) 2019     IMAGIN19 restaging scans showed mixed response on MRI pelvis, possible increased risk for femur pathologic fracture.     Bone Scan:  \"IMPRESSION: In this patient with renal cell carcinoma, there has been  interval progression:  1. New thoracic vertebral lesions and posterior right 10th rib  involvement.  2. No significant change in the remainder of multiple osseous lesions.\"    CT C/A/P:  \"IMPRESSION:   1. Unchanged osseous metastatic disease with stable compression  deformities at L1 and L5 and postsurgical changes or vertebral plasty  at L1.   2. Left greater than right patchy and reticular nodular opacities in  the lower lungs may represent infection. Associated mild  bronchiectasis suggestive of chronic aspiration.  3. Soft tissue metastases in the gluteal regions similar to prior  examination.  4. Borderline dilatation of the ascending aorta measuring up to 4.2  cm.  5. Mildly dilated main pulmonary artery, which is suggestive of  pulmonary arterial hypertension.  6. Mild overall third spacing of fluid.\"    [place]  - Use of controlled substances consistent with history.     Thank you for continuing to involve me in the care of this patient.     Carly Mills MD / Palliative Medicine / Pager [phone] / After-Hours Answering Service [phone] / Main Palliative Clinic - [facility] [phone] / [facility] " "Inpatient Team Consult Pager [phone] (answered 8am-430pm M-F)    Time spent: 41 minutes, >50% spent in counseling/coordination of care.       Oncology Rooming Note    December 12, 2019 9:49 AM   Javon Bianchi is a 69 year old male who presents for:    Chief Complaint   Patient presents with     Palliative     Initial Vitals: /81   Pulse 59   Temp 98.8  F (37.1  C) (Oral)   Resp 14   Ht 1.778 m (5' 10\")   Wt 92.8 kg (204 lb 9.6 oz)   SpO2 99%   BMI 29.36 kg/m    Estimated body mass index is 29.36 kg/m  as calculated from the following:    Height as of this encounter: 1.778 m (5' 10\").    Weight as of this encounter: 92.8 kg (204 lb 9.6 oz). Body surface area is 2.14 meters squared.  Extreme Pain (8) Comment: Data Unavailable   No LMP for male patient.  Allergies reviewed: Yes  Medications reviewed: Yes    Medications: MEDICATION REFILLS NEEDED TODAY. Provider was notified.  Lyrica, Folic Acid, Zofran, Morphine will run out on New Years Ariella   Pharmacy name entered into [facility]:    [facility] - [place], [place] - [address] [facility] - [place], [place] - [address] [facility]    Clinical concerns: Skin, Joints, Would like to know what the results of the last MRI are.  Diarrhea - bowels need to be regulated.  Lina was notified.      Shari J. Schoenberger, [facility]              Again, thank you for allowing me to participate in the care of your patient.        Sincerely,        Carly Mills MD    "

## 2019-12-12 NOTE — PROGRESS NOTES
"Oncology Rooming Note    December 12, 2019 9:49 AM   Javon Bianchi is a 69 year old male who presents for:    Chief Complaint   Patient presents with     Palliative     Initial Vitals: /81   Pulse 59   Temp 98.8  F (37.1  C) (Oral)   Resp 14   Ht 1.778 m (5' 10\")   Wt 92.8 kg (204 lb 9.6 oz)   SpO2 99%   BMI 29.36 kg/m   Estimated body mass index is 29.36 kg/m  as calculated from the following:    Height as of this encounter: 1.778 m (5' 10\").    Weight as of this encounter: 92.8 kg (204 lb 9.6 oz). Body surface area is 2.14 meters squared.  Extreme Pain (8) Comment: Data Unavailable   No LMP for male patient.  Allergies reviewed: Yes  Medications reviewed: Yes    Medications: MEDICATION REFILLS NEEDED TODAY. Provider was notified.  Lyrica, Folic Acid, Zofran, Morphine will run out on New Years Ariella   Pharmacy name entered into [facility]:    [facility] - [place], [place] - [address] [facility] - [place], [place] - [address] [facility]    Clinical concerns: Skin, Joints, Would like to know what the results of the last MRI are.  Diarrhea - bowels need to be regulated.  Lina was notified.      Shari J. Schoenberger, [facility]            "

## 2019-12-12 NOTE — LETTER
"12/12/2019       RE: Javon Bianchi  [address]  [place] [address]     Dear Colleague,    Thank you for referring your patient, Javon Bianchi, to the [facility] at [facility]. Please see a copy of my visit note below.    Reason for Visit:  Chief Complaint   Patient presents with     RECHECK     DOS 11/1/19 Kyphoplasty - RFA L1; MIS left hemilaminectomy L3-L4       S>  69/m, 6 wks postop.    Accompanied by wife.  Happy with surgery.  Helped with left thigh/leg numbness; has more feeling now.  Back feels stronger.  Able to do more; walking, etc.  However, all of a sudden, got worse around Monday/tuesday this week (was setting up Xmas tree), started hurting again.    Oncologist is Dr. Anne.  Recently started on 2 new chemotx meds x past month.  However, says he is now developing side-effects (hand numbness).  Will meet with Oncology PA tomorrow (Radha Casanova); thinks he may need to back down on the Chemotx dose.    Has had 2 Radiotx sessions.        Oswestry (SPIKE) Questionnaire    OSWESTRY DISABILITY INDEX 12/12/2019   Count 10   Sum 24   Oswestry Score (%) 48          Visual Analog Pain Scale  Back Pain Scale 0-10: 6  Right leg pain: 0  Left leg pain: 4(after surgery pt now feels numbness and tingling)    PROMIS-10 Scores  Global Mental Health Score: (P) 11  Global Physical Health Score: (P) 11  PROMIS TOTAL - SUBSCORES: (P) 22    O>   Alert, oriented x 3, cooperative.  Not in CP distress.  Ht 1.778 m (5' 10\")   Wt 93 kg (205 lb 1.6 oz)   BMI 29.43 kg/m     Surgical incision well-healed, no sign of infection.  Ambulates independently.   Grossly neurologically intact.    Imaging:   EOS full spine AP lateral standing x-rays taken today show post kyphoplasty changes L1, with good cement containment.  Rest of spinal findings are stable and similar to prior x-rays.    Had recent pelvis MRI 11/19/2019.  This shows multiple bony lesions in the pelvis.  " Per radiologist report, there has been some signs of progression in some areas, and some improvement in other areas.  Radiologist commented about the proximal femoral lesions, and whether he is at risk for pathologic fracture.    A>   6 weeks status post kyphoplasty L1, with improved preoperative symptoms.    P>    Congratulated and reassured patient.  I am happy that the kyphoplasty procedure helped him.  I reassured him regarding his imaging findings.    His recent increase in back pain I believe is mainly due to overdoing things, as he himself suspects.  I hope that this will gradually get better over time as it settles down.  I do not identify any red flag findings.    Regarding his proximal femoral lesions, we reviewed previous and recent CT scans.  These do not seem to be very progressive.  Also they are not lytic in nature, and more blastic.  As such, I do not think that he is at high risk for sustaining pathologic fractures, and is not in dire need of prophylactic fixation.  Moreover, these lesions are quite asymptomatic.  He has no groin pain, no increased pain on hip internal rotation.  His previous left thigh pain has been improved by the kyphoplasty.    - External PT order placed ([facility]).    RTC prn.      Vickey Brower MD    Orthopaedic Spine Surgery  Dept Orthopaedic Surgery, [facility]  [phone] office, [phone] pager  [email or web address]

## 2019-12-12 NOTE — PROGRESS NOTES
"Palliative Care Outpatient Clinic Progress Note    Patient Name:  Javon Bianchi  Primary Provider:  LIANNE Wellington    Chief Complaint/Identifying Information:  Metastatic Renal Cell Carcinoma off chemotherapy for many months due to recurrent PNA, spine surgery.   History of L3 pathologic fracture found at diagnosis, now s/p kyphoplasty-radiofrequency ablation of L1, and left hemilaminectomy of L3/L4.  Most recently started on Lenvima and [facility] Opioid Prescribing Safety Plan     Opioid Safety Education: Reviewed by Nan Gonzalez  on 11/8/2018  Opioid Risk Assessment: Performed by Nan Gonzalez  on 11/8/2018 .  ORT score of 0.   Mood Disorder Assessment: Performed by Carly Mills on 04/25/19.  No concerns.    reviewed with every prescription, last reviewed by Carly Mills on 12/12/19     Additional recommendations based on patient's prognosis and indication for opioids include the following:     Expected prognosis: shorter  Risk: Low or Medium (ORT 0-7)  No further recommendations.     Interim History:  Javon Bianchi 69 year old male returns to be seen by palliative care today, accompanied by his wife Suzie.  Javon Bianchi was last seen by me three weeks ago, at which time he was interested in simplifying his regimen for neuropathy and his duloxetine was stopped.      Today, he reports that he has developed some hand and foot syndrome related to his treatments.  Joints are starting to ache again.  Foot pain is limiting his ability to ambulate freely.  Back is more painful recently, seems to be in the L1 area, \"flares out\" to both hips. He wonders about the specific results from his recent scans.     Appetite has improved.  Eating three meals per day. Constipation has been a struggle.  He finds that he is alternating between taking some senna, holding for a few days because he gets liquid stools.     Has been taking MS Contin 30/15/15, " "also needing hydromorphone 2 tabs per day, mostly at night. Has stopped the duloxetine.  Getting some feeling back in the LLE.  No changes in mood or anxiety. Legs still feel strong, no new weakness. No new incontinence.     Nausea has resolved. Not needing Zofran.       Social History:  Pertinent changes to social history/social situation since last visit: None.   Social History     Tobacco Use     Smoking status: Never Smoker     Smokeless tobacco: Never Used   Substance Use Topics     Alcohol use: Not Currently     Drug use: Yes     Types: Marijuana     Comment: medical cannabis               Physical Exam:   Vitals were reviewed  /81   Pulse 59   Temp 98.8  F (37.1  C) (Oral)   Resp 14   Ht 1.778 m (5' 10\")   Wt 92.8 kg (204 lb 9.6 oz)   SpO2 99%   BMI 29.36 kg/m    General: Alert, comfortable appearing male in no acute distress.   Eyes: Pupils equal and 2mm, sclera clear.   ENT: MMM.   Resp: Unlabored on room air. No audible wheeze.   Back: Kyphosis is present.  No TTP of the thoracic spine, mild TTP of the lumbar spine.    Abd: Soft, non-distended, no TTP, active bowel sounds.   Ext: Bilateral feet with scant erythema, no skin breakdown.  Warm and well perfused. Bilateral palms with some flaking skin, small areas of erythema, no breakdown.   Neuro: No facial asymmetry.  Spontaneous movements grossly non-focal. Alert and with normal appearing sensorium. Gait is unassisted in the room, assisted by cane for longer distances.      Impression & Recommendations & Counseling:  Omega is a 69 year old man with metastatic renal cell carcinoma who had a prolonged period off cancer treatments, now restarted on Lenvima and Afinitor. Much of today's visit was spent reviewing Omega's scans and assisting with interpreting results.      His low back pain started suddenly after working on the [place] tree.  He has no emergent symptoms and will be evaluated by Dr. Brower today for a post-op check.     Hands/Feet " pain- Try using the hydromorphone more during the day to help with the pain.     Omega asks about another opioid taper today but I do not feel that he is ready for this as his foot pain has limited his mobility.  He is in agreement with holding off on that change.     Additional information reviewed today:   No Known Allergies  Current Outpatient Medications   Medication Sig Dispense Refill     acetaminophen (TYLENOL) 500 MG tablet Take 1,000 mg by mouth 3 times daily 0800, 1400, 2200       calcium carbonate 600 mg-vitamin D 400 units (CALTRATE) 600-400 MG-UNIT per tablet Take 1 tablet by mouth 3 times daily       Dextromethorphan-guaiFENesin (MUCINEX DM)  MG 12 hr tablet Take 1 tablet by mouth 2 times daily as needed        diazepam (VALIUM) 5 MG tablet Take 1 pill 30 min before MRI and a second pill 2 minutes prior. 10 tablet 0     everolimus (AFINITOR) 5 MG tablet CHEMO Take 1 tablet (5 mg) by mouth daily 30 tablet 0     fluticasone (FLONASE) 50 MCG/ACT nasal spray Spray 1 spray into both nostrils daily as needed for rhinitis or allergies       folic acid (FOLVITE) 1 MG tablet Take 1 tablet (1 mg) by mouth daily . Future refills by PCP. 30 tablet 0     HYDROmorphone (DILAUDID) 2 MG tablet Take 1-2 tablets (2-4 mg) by mouth every 3 hours as needed for pain 60 tablet 0     Lenvatinib 18 MG, 10 mg + 2 x 4 mg capsules, (LENVIMA) 18 mg combo capsule CHEMOTHERAPY Take 18 mg dose (10 mg plus two 4 mg capsules) by mouth daily. 90 capsule 0     lidocaine (LIDODERM) 5 % patch Place 1-3 patches onto the skin every 24 hours Apply to painful areas for 12 hours on, 12 hours off. 30 patch 3     loratadine (CLARITIN) 10 MG tablet Take 10 mg by mouth daily (with lunch)        medical cannabis (Patient's own supply.  Not a prescription) Cannabis Heater liquid 2 mL orally in AM   Cannabis capsule - 1 capsule daily at bedtime.     (This is NOT a prescription, and does not certify that the patient has a qualifying medical  condition for medical cannabis.  The purpose of this order is  to document that the patient reports taking medical cannabis.)       melatonin 5 MG tablet Take 10 mg by mouth At Bedtime       morphine (MS CONTIN) 15 MG CR tablet Take one tab midday and at bedtime.  Also taking MS Contin 30mg tab in AM. 60 tablet 0     morphine (MS CONTIN) 30 MG CR tablet Take 1 tablet (30 mg) by mouth daily Combine with MS Contin 15mg in afternoon and at bedtime. 30 tablet 0     multivitamin, therapeutic (THERA-VIT) TABS tablet Take 1 tablet by mouth At Bedtime       ondansetron (ZOFRAN) 8 MG tablet Take 8 mg by mouth every 8 hours as needed for nausea       polyethylene glycol (MIRALAX/GLYCOLAX) Packet Take 17 g by mouth daily as needed        pregabalin (LYRICA) 50 MG capsule Take 1 capsule (50 mg) by mouth 2 times daily 60 capsule 1     senna-docusate (SENOKOT-S/PERICOLACE) 8.6-50 MG tablet Take 2-4 tablets by mouth 2 times daily       vitamin D3 (CHOLECALCIFEROL) 2000 units (50 mcg) tablet Take 2,000 Units by mouth daily       Past Medical History:   Diagnosis Date     Anemia      Bone metastasis (H) 09/28/2018     Cough      Pulmonary nodule      Renal cell carcinoma, right (H) 09/28/2018     Past Surgical History:   Procedure Laterality Date     BRONCHOSCOPY (RIGID OR FLEXIBLE), DIAGNOSTIC N/A 9/4/2019    Procedure: Bronchoscopy, With Bronchoalveolar Lavage;  Surgeon: Burton Toure MD;  Location:  GI     CHOLECYSTECTOMY       HC REMOVAL HEEL SPUR, CALCANEUS  2004     OPTICAL TRACKING SYSTEM BIOPSY BONE RADIO FREQUENCY ABLATION N/A 11/1/2019    Procedure: Kyphoplasty-Radiofrequency ablation Lumbar 1; Minimally Invasive Surgery  left hemilaminectomy Lumbar 3-Lumbar 4;  Surgeon: Vickey Brower MD;  Location:  OR       Key Data Reviewed:  LABS:   Lab Results   Component Value Date    WBC 5.9 11/12/2019    HGB 7.5 (L) 11/13/2019    HCT 23.8 (L) 11/12/2019     11/12/2019     11/13/2019    POTASSIUM  "3.3 (L) 2019    CHLORIDE 105 2019    CO2 29 2019    BUN 6 (L) 2019    CR 0.78 2019    GLC 94 2019    SED 87 (H) 2019    NTBNPI 283 2019    TROPI <0.015 2019    AST 41 2019    ALT <6 2019    ALKPHOS 83 2019    BILITOTAL 0.4 2019    INR 1.25 (H) 2019     IMAGIN19 restaging scans showed mixed response on MRI pelvis, possible increased risk for femur pathologic fracture.     Bone Scan:  \"IMPRESSION: In this patient with renal cell carcinoma, there has been  interval progression:  1. New thoracic vertebral lesions and posterior right 10th rib  involvement.  2. No significant change in the remainder of multiple osseous lesions.\"    CT C/A/P:  \"IMPRESSION:   1. Unchanged osseous metastatic disease with stable compression  deformities at L1 and L5 and postsurgical changes or vertebral plasty  at L1.   2. Left greater than right patchy and reticular nodular opacities in  the lower lungs may represent infection. Associated mild  bronchiectasis suggestive of chronic aspiration.  3. Soft tissue metastases in the gluteal regions similar to prior  examination.  4. Borderline dilatation of the ascending aorta measuring up to 4.2  cm.  5. Mildly dilated main pulmonary artery, which is suggestive of  pulmonary arterial hypertension.  6. Mild overall third spacing of fluid.\"    [place]  - Use of controlled substances consistent with history.     Thank you for continuing to involve me in the care of this patient.     Carly Mills MD / Palliative Medicine / Pager [phone] / After-Hours Answering Service [phone] / Main Palliative Clinic - [facility] [phone] / [facility] Inpatient Team Consult Pager [phone] (answered 8am-430pm M-F)    Time spent: 41 minutes, >50% spent in counseling/coordination of care.     "

## 2019-12-12 NOTE — PROGRESS NOTES
"Reason for Visit:  Chief Complaint   Patient presents with     RECHECK     DOS 11/1/19 Kyphoplasty - RFA L1; MIS left hemilaminectomy L3-L4       S>  69/m, 6 wks postop.    Accompanied by wife.  Happy with surgery.  Helped with left thigh/leg numbness; has more feeling now.  Back feels stronger.  Able to do more; walking, etc.  However, all of a sudden, got worse around Monday/tuesday this week (was setting up Xmas tree), started hurting again.    Oncologist is Dr. Anne.  Recently started on 2 new chemotx meds x past month.  However, says he is now developing side-effects (hand numbness).  Will meet with Oncology PA tomorrow (Radha Casanova); thinks he may need to back down on the Chemotx dose.    Has had 2 Radiotx sessions.        Oswestry (SPIKE) Questionnaire    OSWESTRY DISABILITY INDEX 12/12/2019   Count 10   Sum 24   Oswestry Score (%) 48          Visual Analog Pain Scale  Back Pain Scale 0-10: 6  Right leg pain: 0  Left leg pain: 4(after surgery pt now feels numbness and tingling)    PROMIS-10 Scores  Global Mental Health Score: (P) 11  Global Physical Health Score: (P) 11  PROMIS TOTAL - SUBSCORES: (P) 22    O>   Alert, oriented x 3, cooperative.  Not in CP distress.  Ht 1.778 m (5' 10\")   Wt 93 kg (205 lb 1.6 oz)   BMI 29.43 kg/m    Surgical incision well-healed, no sign of infection.  Ambulates independently.   Grossly neurologically intact.    Imaging:   EOS full spine AP lateral standing x-rays taken today show post kyphoplasty changes L1, with good cement containment.  Rest of spinal findings are stable and similar to prior x-rays.    Had recent pelvis MRI 11/19/2019.  This shows multiple bony lesions in the pelvis.  Per radiologist report, there has been some signs of progression in some areas, and some improvement in other areas.  Radiologist commented about the proximal femoral lesions, and whether he is at risk for pathologic fracture.    A>   6 weeks status post kyphoplasty L1, with improved " preoperative symptoms.    P>    Congratulated and reassured patient.  I am happy that the kyphoplasty procedure helped him.  I reassured him regarding his imaging findings.    His recent increase in back pain I believe is mainly due to overdoing things, as he himself suspects.  I hope that this will gradually get better over time as it settles down.  I do not identify any red flag findings.    Regarding his proximal femoral lesions, we reviewed previous and recent CT scans.  These do not seem to be very progressive.  Also they are not lytic in nature, and more blastic.  As such, I do not think that he is at high risk for sustaining pathologic fractures, and is not in dire need of prophylactic fixation.  Moreover, these lesions are quite asymptomatic.  He has no groin pain, no increased pain on hip internal rotation.  His previous left thigh pain has been improved by the kyphoplasty.    - External PT order placed ([facility]).    RTC prn.      Vickey Brower MD    Orthopaedic Spine Surgery  Dept Orthopaedic Surgery, [facility]  [phone] office, [phone] pager  [email or web address]

## 2019-12-12 NOTE — NURSING NOTE
"Reason For Visit:   Chief Complaint   Patient presents with     RECHECK     DOS 11/1/19 Kyphoplasty - RFA L1; MIS left hemilaminectomy L3-L4       Primary MD: Clinics, [facility]    ?  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  Currently working? No.  Work status?  Retired.    Date of injury: none.     Smoker: No  Request smoking cessation information: No    Ht 1.778 m (5' 10\")   Wt 93 kg (205 lb 1.6 oz)   BMI 29.43 kg/m      Pain Assessment  Patient Currently in Pain: Yes  0-10 Pain Scale: 6  Primary Pain Location: Back    Oswestry (SPIKE) Questionnaire    OSWESTRY DISABILITY INDEX 12/12/2019   Count 10   Sum 24   Oswestry Score (%) 48        Neck Disability Index (NDI) Questionnaire    No flowsheet data found.     Visual Analog Pain Scale  Back Pain Scale 0-10: 6  Right leg pain: 0  Left leg pain: 4(after surgery pt now feels numbness and tingling)    Promis 10 Assessment    PROMIS 10 12/12/2019   In general, would you say your health is: Fair   In general, would you say your quality of life is: Fair   In general, how would you rate your physical health? Fair   In general, how would you rate your mental health, including your mood and your ability to think? Good   In general, how would you rate your satisfaction with your social activities and relationships? Fair   In general, please rate how well you carry out your usual social activities and roles Fair   To what extent are you able to carry out your everyday physical activities such as walking, climbing stairs, carrying groceries, or moving a chair? A little   How often have you been bothered by emotional problems such as feeling anxious, depressed or irritable? Rarely   How would you rate your fatigue on average? Mild   How would you rate your pain on average?   0 = No Pain  to  10 = Worst Imaginable Pain 6   In general, would you say your health is: 2   In general, would you say your quality of life is: 2   In general, how would you rate your " physical health? 2   In general, how would you rate your mental health, including your mood and your ability to think? 3   In general, how would you rate your satisfaction with your social activities and relationships? 2   In general, please rate how well you carry out your usual social activities and roles. (This includes activities at home, at work and in your community, and responsibilities as a parent, child, spouse, employee, friend, etc.) 2   To what extent are you able to carry out your everyday physical activities such as walking, climbing stairs, carrying groceries, or moving a chair? 2   In the past 7 days, how often have you been bothered by emotional problems such as feeling anxious, depressed, or irritable? 2   In the past 7 days, how would you rate your fatigue on average? 2   In the past 7 days, how would you rate your pain on average, where 0 means no pain, and 10 means worst imaginable pain? 6   Global Mental Health Score 11   Global Physical Health Score 11   PROMIS TOTAL - SUBSCORES 22   Some recent data might be hidden      Liza Mcgee ATC

## 2019-12-12 NOTE — PATIENT INSTRUCTIONS
Thank you for coming into the Palliative Care Clinic today.     1. No changes to meds today.     Return to clinic 1 month for a follow up.     You can reach the Palliative Care Team during business hours at the following number: [phone] (Palliative Clinic Nurse Line).     To reach the Palliative Care Provider on-call after-hours or on holidays and weekends, call: [phone].  Please note that we are not able to provide pain medication refills on evenings or weekends.     ==================================================================    [facility]   The [facility]iative [facility] Team is committed to treating your pain and other symptoms. This handout is for all patients treated with opioid medications in our clinics.     What are opioid medicines?   Opioid medications are used to ease some types of pain and shortness of breath. They are sometimes called narcotics. They include: Morphine (MS Contin, Roxanol), Oxycodone (OxyContin, OxyFast, Percocet), Hydrocodone (Vicodin, Norco), Hydromorphone (Dilaudid), Fentanyl (Duragesic), Methadone (Dolophine).   Are opioid medicines safe to take?   Opioid medicines are safe when you follow the directions from your doctor or medical provider.  Opioids can cause serious side effects and become unsafe if you do not follow your instructions.   To make sure you are taking opioid medicines safely, we may ask you to bring in your pill bottles or to allow us to test your urine. Our goal is to keep you safe and to improve your ability to function & be active. If we don t think opioids are safely doing that, we will work with you to taper you off of them.   Do not drive unless your medical provider tells you it is safe.   Do not take opioids with alcohol or anxiety/sleep medicines unless your doctor tells you it is ok to do so.   Are opioids addictive?   Addiction means you crave a drug and have trouble limiting  the amount you use.   Addiction is more likely to happen if you take opioids to relieve stress or to feel altered. If you or your loved one feels this way when taking opioid medicines, let your medical provider know. We may refer you for additional assessment or services if this is a concern.   Your body will get used to the opioid medicines if you take them for more than two weeks in a row. This means if you stop them suddenly, you may have withdrawal. If this occurs, you will feel uncomfortable (nausea, diarrhea, increased pain). This does not mean you are addicted. Never stop taking your pain medicines all at once unless your medical provider tells you to. If you think you need less medicine, your medical provider will help you to safely lower your dose.   What is the safest way to store opioids?   Keep your medicines in a safe place away from children, teens, pets, and visitors. Be careful about who knows that you have these medicines.   How do I get rid of my old opioids?   Opioids should be brought to your [facility] drop-off site, or you can purchase a disposal kit from your local pharmacy. If neither of these options is available, the Food and Drug Administration recommends that you flush unused opioids down the toilet.   Do not share or sell your pain medicines. This is illegal and very dangerous. We cannot prescribe opioid pain medicines to you if do this.   How do I get refills?   Opioid medicines need signed paper prescriptions. This means it may take longer to refill than other medicines. Our clinic cannot prescribe them on weekends or at night.     Give us one week s notice when requesting a refill. This gives us the time we need to get it signed and back to you. It may be possible to mail prescriptions to you.

## 2019-12-13 NOTE — NURSING NOTE
Chief Complaint   Patient presents with     Blood Draw     Labs drawn via  by RN in lab. VS Taken.     Jennifer Graves RN

## 2019-12-13 NOTE — TELEPHONE ENCOUNTER
Per Patient's request,  completed and faxed [employer] request for lodging dates 2/10/2020 - 2/12/2020. [employer] will contact Patient for confirmation of reservation.  will continue to provide support as needed.    Clair Husain [facility]  Outpatient Specialty Clinics  Direct Phone: [phone]  Pager:  [phone]

## 2019-12-13 NOTE — PROGRESS NOTES
[facility]IN[facility]  FOLLOW-UP VISIT NOTE  Date of visit: Dec 13, 2019        REASON FOR VISIT: mRCC to the bone (with no primary renal mass), here for routine follow up  HPI:  Javon Bianchi is a 69 year old male with a history significant for hyperlipidemia who had been in his usual health until he developed back pain in May of 2018. At that time, the pain was a stabbing like in the middle of lower back with burning like pain wrapping around this left hip to knee area. No trauma. He had MRI on 5/23/18 showed mild degenerative change with bulging disks L2-S1. A small benign hemangioma was noted in L2, no other marrow signal abnormality. Since then, the pain failed to improve despite steroid injections. In addition, he lost 25 lbs unintentionally since June along with chills a few times a day. Over time, the pain got worse to the point he could not ambulate after short distance and developed muscle weakness in lower extremities over time requiring a walker. Finally, he went to ER ([facility] in [place]) on 8/23/18, CT A/P was unremarkable. He was referred to neurology with obtaining MRI L spine. MRI on 8/30/18 revealed a pathological fracture at L3 vertebral body with height loss at 40% and diffuse involvement of a neoplastic process. IR guided biopsy on 8/31/18 showed poorly differentiated carcinoma. IHC was suggestive of possible renal cell carcinoma per pathology report (Renal cell immunochemistry is positive and along with the negative staining for CK7 and CK20 suggests the possibility of a renal carcinoma, however most renal cell carcinomas also express PAX8 which is negative in this case). Of note, CT chest/abdomen/pelvis at OSH was negative for primary lesion. The patient was evaluated by neurosurgery who recommended no surgical intervention. He was discharged with TLSO brace and walker. He had palliative XRT locally from 9/11 for 10 fractions (Done in [place]). He was  started on cabozantinib 60 mg initially, requiring dose reduction to 20 mg for issues with hand foot syndrome and poor tolerability. Switched to nivolumab due to progression in bone disease on 2/6/19. Had to hold Cycle #3 due to myalgia, which improved with prednisone.    ONCOLOGY HISTORY:    Cabozantinib: Started 60 mg daily on September 28, 2018.  Dec 10, 2018: dose reduced cabozantinib to 40 mg a day.  Jan 17, 2019: dose reduced cabozantinib to 29 mg a day.  Feb 11, 2019: C1D1 Nivolumab   3/12/19: c2 Nivolumab  4/8/19: held Nivolumab due to myalgias, started prednisone  11//21/19: started lenvatinib (and then 11/25 started afinitor) took a break for Tgiving     Interval history:   Omega recently met with Dr Anne last month and started lenvatinib and afinitor, its been aobut 3 weeks with two days off over thanksgiving.  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  His stools are OK, he was admitted in November for constipation and since then has been taking miralax daily and senna BID.  Mostly having routine stools.  No HTN.  No rash, nausea or GERD.  Actually, prior to the HFS starting- wife states he looked the best he had in a long time and was eating well and active outside.  The same HFS occurred when he was on the cabometyx.         Wt Readings from Last 10 Encounters:   12/13/19 92.7 kg (204 lb 4.8 oz)   12/12/19 93 kg (205 lb 1.6 oz)   12/12/19 92.8 kg (204 lb 9.6 oz)   11/20/19 90.7 kg (200 lb)   11/19/19 90.9 kg (200 lb 6.4 oz)   11/13/19 84.8 kg (187 lb)   11/01/19 85.5 kg (188 lb 7.9 oz)   10/31/19 87 kg (191 lb 12.8 oz)   10/28/19 88.6 kg (195 lb 4.8 oz)   10/16/19 93 kg (205 lb)     MEDICATIONS     Current Outpatient Medications   Medication Sig     acetaminophen (TYLENOL) 500 MG tablet Take 1,000 mg by mouth 3 times daily 0800, 1400, 2200     calcium carbonate 600 mg-vitamin D 400 units (CALTRATE) 600-400  MG-UNIT per tablet Take 1 tablet by mouth 3 times daily     Dextromethorphan-guaiFENesin (MUCINEX DM)  MG 12 hr tablet Take 1 tablet by mouth 2 times daily as needed      diazepam (VALIUM) 5 MG tablet Take 1 pill 30 min before MRI and a second pill 2 minutes prior.     everolimus (AFINITOR) 5 MG tablet CHEMO Take 1 tablet (5 mg) by mouth daily     fluticasone (FLONASE) 50 MCG/ACT nasal spray Spray 1 spray into both nostrils daily as needed for rhinitis or allergies     folic acid (FOLVITE) 1 MG tablet Take 1 tablet (1 mg) by mouth daily . Future refills by PCP.     HYDROmorphone (DILAUDID) 2 MG tablet Take 1-2 tablets (2-4 mg) by mouth every 3 hours as needed for pain     Lenvatinib 18 MG, 10 mg + 2 x 4 mg capsules, (LENVIMA) 18 mg combo capsule CHEMOTHERAPY Take 18 mg dose (10 mg plus two 4 mg capsules) by mouth daily.     lidocaine (LIDODERM) 5 % patch Place 1-3 patches onto the skin every 24 hours Apply to painful areas for 12 hours on, 12 hours off.     loratadine (CLARITIN) 10 MG tablet Take 10 mg by mouth daily (with lunch)      medical cannabis (Patient's own supply.  Not a prescription) Cannabis Heater liquid 2 mL orally in AM   Cannabis capsule - 1 capsule daily at bedtime.     (This is NOT a prescription, and does not certify that the patient has a qualifying medical condition for medical cannabis.  The purpose of this order is  to document that the patient reports taking medical cannabis.)     melatonin 5 MG tablet Take 10 mg by mouth At Bedtime     [START ON 12/28/2019] morphine (MS CONTIN) 15 MG CR tablet Take one tab midday and at bedtime.  Also taking MS Contin 30mg tab in AM.     [START ON 12/28/2019] morphine (MS CONTIN) 30 MG CR tablet Take 1 tablet (30 mg) by mouth daily Combine with MS Contin 15mg in afternoon and at bedtime.     multivitamin, therapeutic (THERA-VIT) TABS tablet Take 1 tablet by mouth At Bedtime     ondansetron (ZOFRAN) 8 MG tablet Take 8 mg by mouth every 8 hours as needed  for nausea     polyethylene glycol (MIRALAX/GLYCOLAX) Packet Take 17 g by mouth daily as needed      pregabalin (LYRICA) 50 MG capsule Take 1 capsule (50 mg) by mouth 2 times daily     senna-docusate (SENOKOT-S/PERICOLACE) 8.6-50 MG tablet Take 2-4 tablets by mouth 2 times daily     vitamin D3 (CHOLECALCIFEROL) 2000 units (50 mcg) tablet Take 2,000 Units by mouth daily     Current Facility-Administered Medications   Medication     denosumab (XGEVA) injection 120 mg        PHYSICAL EXAM:   Temp: 97.5  F (36.4  C) Temp src: Oral BP: 103/64 Pulse: 93   Resp: 12 SpO2: 95 %      ECOG performance status of 2.   GENERAL: Seated in chair in NAD.  HEENT: No icterus, no pallor. Moist mucous membranes. Oropharynx is clear.   NECK: Supple, normal ROM  LUNGS: Bilateral clear to ausculation  CARDIOVASCULAR: Regular rate and rhythm, no murmurs, gallops or rubs.   ABDOMEN: Soft, nontender and nondistended.   EXTREMITIES: No cyanosis, no clubbing, 2+ pitting edema to the knees bilaterally.   NEUROLOGIC: No focal deficits.  Skin: feet show erythema with small 1-2 mm size blisters on the soles  LABS/IMAGIN2019 18:58 2019 08:59 2019 08:13   WBC 7.6 5.9 4.8   Hemoglobin 8.1 (L) 7.4 (L) 10.9 (L)   Hematocrit 25.6 (L) 23.8 (L) 35.4 (L)   Platelet Count 287 256 242   RBC Count 2.45 (L) 2.28 (L) 3.40 (L)    (H) 104 (H) 104 (H)   MCH 33.1 (H) 32.5 32.1   MCHC 31.6 31.1 (L) 30.8 (L)   RDW 16.0 (H) 16.0 (H) 15.1 (H)   Diff Method Manual Differential Manual Differential Manual Differential   % Neutrophils 67.0 71.0 53.1   % Lymphocytes 2.0 0.0 7.8   % Monocytes 30.0 28.0 37.4   % Eosinophils 0.0 0.0 1.7   % Basophils 0.0 1.0 0.0   % Metamyelocytes 1.0     Absolute Neutrophil 5.1 4.2 2.5      2019 08:59 2019 06:24 2019 08:13   Sodium 139 138 142   Potassium 3.5 3.3 (L) 3.6   Chloride 105 105 110 (H)   Carbon Dioxide 26 29 24   Urea Nitrogen 7 6 (L) 10   Creatinine 0.80 0.78 0.92   GFR Estimate 90  >90 83   GFR Estimate If Black >90 >90 >90   Calcium 7.4 (L) 7.6 (L) 8.1 (L)   Anion Gap 8 4 8   Magnesium 1.6 1.9 1.4 (L)   Phosphorus 2.8     Albumin 2.2 (L)  2.7 (L)   Protein Total 6.0 (L)  6.4 (L)   Bilirubin Total 0.4  0.4   Alkaline Phosphatase 83  82   ALT <6  16   AST 41  24            Assessment /Plan   69 year old male with metastatic infiltrative lesion in L3 extending to L2 and L4, likely primary malignancy being renal cell carcinoma. However he does not have any evidence of a primary renal mass on imaging. Based on the available pathology data, we offered treatment for renal cell carcinoma with cabozantinib (CABOSUN data demonstrating cabozantinib superiority over sunitinib in the initial setting). PAX8 stain was negative, and the clear cell features were not seen (it was a poorly differentiated carcinoma). His scans were stable, however, tolerability remained an issue and he then progressed. He then started on Nivolumab, as there is encouraging data with IO in non-clear cell carcinoma. He developed myalgias and we had to stop therapy and start him on prednisone.      Our plan was to pursue lenvatinib + everolimus, however he needed to complete XRT to his hips and then was admitted in August for PNA, then admitted to [facility] for PNA. He then underwent a laminectomy on 11/1 and was admitted 11/11-11/13 for constipation.  He officially started the TKI + mTORi on 11/21 week and has been on for 3 weeks with major issue being HFS.       We discussed this grade 3 toxicity currently on his feet.  He will HOLD therapy and be off likely through [place].  Then when he has returned to a grade 1, he can restart with lenvatinib at 14 mg daily (and continue Afinitor at 5 mg daily without dose adjustment).     He'll continue with MS Contin 30-15-15 and dilaudid prn for back pain and joint aches.     He'll continue with salt/soda for mucositis.      He was due for Xgeva today, which was missed.     He'll return in 8  weeks with restaging scans and to see Dr Anne.     Radha St PA-C

## 2019-12-13 NOTE — NURSING NOTE
"Oncology Rooming Note    December 13, 2019 8:21 AM   Javon Bianchi is a 69 year old male who presents for:    Chief Complaint   Patient presents with     Blood Draw     Labs drawn via  by RN in lab. VS Taken.     Initial Vitals: /64 (BP Location: Right arm, Patient Position: Sitting, Cuff Size: Adult Regular)   Pulse 93   Temp 97.5  F (36.4  C) (Oral)   Resp 12   Wt 92.7 kg (204 lb 4.8 oz)   SpO2 95%   BMI 29.31 kg/m   Estimated body mass index is 29.31 kg/m  as calculated from the following:    Height as of 12/12/19: 1.778 m (5' 10\").    Weight as of this encounter: 92.7 kg (204 lb 4.8 oz). Body surface area is 2.14 meters squared.  Severe Pain (7) Comment: Data Unavailable   No LMP for male patient.  Allergies reviewed: Yes  Medications reviewed: Yes    Medications: MEDICATION REFILLS NEEDED TODAY. Provider was notified.  Pharmacy name entered into [facility]:    [facility] - [place], [place] - [address] [facility] - [place], [place] - [address] [facility]    Clinical concerns: Lab Results and concerns about sores on his feet/legs. And would like to talk more about pain management medications. Also, questions about other possible topical treatments for his developing wounds from the medications.       Franco Raya, EMT              "

## 2019-12-17 NOTE — TELEPHONE ENCOUNTER
Last refill: 11/02   Last office visit: 12/12 - Has been taking MS Contin 30/15/15, also needing hydromorphone 2 tabs per day, mostly at night. Has stopped the duloxetine.  Getting some feeling back in the LLE.  No changes in mood or anxiety. Legs still feel strong, no new weakness. No new incontinence.   Scheduled for follow up - to be scheduled     Patient would like script  prescribed to [facility] Willy MITCHELL  Report reviewed.

## 2019-12-18 NOTE — TELEPHONE ENCOUNTER
Patient had PAF zee for Lenvima which  2019. Current test claim shows $950.42 copay for 30 day supply. This writer called patient to discuss [facility] Lenvima free drug program. Patient understood the application process, and agreed to send in financial documents as required by [facility]. Writer received the financial documents and faxed them into [facility].    [facility]  [facility]   Oncology Pharmacy Liaison  [email or web address]   [phone] (phone)   [phone] (fax)

## 2020-01-01 ENCOUNTER — HOSPITAL ENCOUNTER (OUTPATIENT)
Dept: NUCLEAR MEDICINE | Facility: CLINIC | Age: 70
Setting detail: NUCLEAR MEDICINE
End: 2020-08-31
Attending: INTERNAL MEDICINE
Payer: MEDICARE

## 2020-01-01 ENCOUNTER — TELEPHONE (OUTPATIENT)
Dept: ONCOLOGY | Facility: CLINIC | Age: 70
End: 2020-01-01

## 2020-01-01 ENCOUNTER — VIRTUAL VISIT (OUTPATIENT)
Dept: ONCOLOGY | Facility: CLINIC | Age: 70
End: 2020-01-01
Attending: INTERNAL MEDICINE
Payer: MEDICARE

## 2020-01-01 ENCOUNTER — HOSPITAL ENCOUNTER (OUTPATIENT)
Dept: NUCLEAR MEDICINE | Facility: CLINIC | Age: 70
Setting detail: NUCLEAR MEDICINE
End: 2020-02-11
Attending: PHYSICIAN ASSISTANT
Payer: MEDICARE

## 2020-01-01 ENCOUNTER — INFUSION THERAPY VISIT (OUTPATIENT)
Dept: INFUSION THERAPY | Facility: CLINIC | Age: 70
End: 2020-01-01
Attending: PHYSICIAN ASSISTANT
Payer: MEDICARE

## 2020-01-01 ENCOUNTER — TELEPHONE (OUTPATIENT)
Dept: PALLIATIVE CARE | Facility: CLINIC | Age: 70
End: 2020-01-01

## 2020-01-01 ENCOUNTER — CARE COORDINATION (OUTPATIENT)
Dept: PALLIATIVE CARE | Facility: CLINIC | Age: 70
End: 2020-01-01

## 2020-01-01 ENCOUNTER — VIRTUAL VISIT (OUTPATIENT)
Dept: PALLIATIVE CARE | Facility: CLINIC | Age: 70
End: 2020-01-01
Attending: INTERNAL MEDICINE
Payer: MEDICARE

## 2020-01-01 ENCOUNTER — HOSPITAL ENCOUNTER (OUTPATIENT)
Dept: MRI IMAGING | Facility: CLINIC | Age: 70
End: 2020-02-11
Attending: PHYSICIAN ASSISTANT
Payer: MEDICARE

## 2020-01-01 ENCOUNTER — VIRTUAL VISIT (OUTPATIENT)
Dept: ONCOLOGY | Facility: CLINIC | Age: 70
End: 2020-01-01
Attending: PHYSICIAN ASSISTANT
Payer: MEDICARE

## 2020-01-01 ENCOUNTER — DOCUMENTATION ONLY (OUTPATIENT)
Dept: ONCOLOGY | Facility: CLINIC | Age: 70
End: 2020-01-01

## 2020-01-01 ENCOUNTER — PATIENT OUTREACH (OUTPATIENT)
Dept: ONCOLOGY | Facility: CLINIC | Age: 70
End: 2020-01-01

## 2020-01-01 ENCOUNTER — APPOINTMENT (OUTPATIENT)
Dept: LAB | Facility: CLINIC | Age: 70
End: 2020-01-01
Attending: PHYSICIAN ASSISTANT
Payer: MEDICARE

## 2020-01-01 ENCOUNTER — APPOINTMENT (OUTPATIENT)
Dept: LAB | Facility: CLINIC | Age: 70
End: 2020-01-01
Attending: INTERNAL MEDICINE
Payer: MEDICARE

## 2020-01-01 ENCOUNTER — CARE COORDINATION (OUTPATIENT)
Dept: ONCOLOGY | Facility: CLINIC | Age: 70
End: 2020-01-01

## 2020-01-01 ENCOUNTER — HOSPITAL ENCOUNTER (OUTPATIENT)
Facility: CLINIC | Age: 70
Setting detail: SPECIMEN
End: 2020-01-21
Attending: INTERNAL MEDICINE
Payer: MEDICARE

## 2020-01-01 ENCOUNTER — NURSE TRIAGE (OUTPATIENT)
Dept: NURSING | Facility: CLINIC | Age: 70
End: 2020-01-01

## 2020-01-01 ENCOUNTER — TRANSFERRED RECORDS (OUTPATIENT)
Dept: HEALTH INFORMATION MANAGEMENT | Facility: CLINIC | Age: 70
End: 2020-01-01

## 2020-01-01 ENCOUNTER — HOSPITAL ENCOUNTER (OUTPATIENT)
Dept: NUCLEAR MEDICINE | Facility: CLINIC | Age: 70
Setting detail: NUCLEAR MEDICINE
End: 2020-04-07
Attending: PHYSICIAN ASSISTANT
Payer: MEDICARE

## 2020-01-01 ENCOUNTER — TELEPHONE (OUTPATIENT)
Dept: ORTHOPEDICS | Facility: CLINIC | Age: 70
End: 2020-01-01

## 2020-01-01 ENCOUNTER — ANCILLARY PROCEDURE (OUTPATIENT)
Dept: NUCLEAR MEDICINE | Facility: CLINIC | Age: 70
End: 2020-01-01
Attending: PHYSICIAN ASSISTANT
Payer: MEDICARE

## 2020-01-01 ENCOUNTER — HOSPITAL ENCOUNTER (OUTPATIENT)
Dept: CT IMAGING | Facility: CLINIC | Age: 70
Discharge: HOME | End: 2020-02-11
Attending: PHYSICIAN ASSISTANT | Admitting: PHYSICIAN ASSISTANT
Payer: MEDICARE

## 2020-01-01 ENCOUNTER — HOSPITAL ENCOUNTER (OUTPATIENT)
Dept: ULTRASOUND IMAGING | Facility: CLINIC | Age: 70
Discharge: HOME | End: 2020-01-21
Attending: INTERNAL MEDICINE | Admitting: INTERNAL MEDICINE
Payer: MEDICARE

## 2020-01-01 ENCOUNTER — HOSPITAL ENCOUNTER (OUTPATIENT)
Dept: MRI IMAGING | Facility: CLINIC | Age: 70
Discharge: HOME | End: 2020-01-22
Attending: INTERNAL MEDICINE | Admitting: INTERNAL MEDICINE
Payer: MEDICARE

## 2020-01-01 ENCOUNTER — MYC MEDICAL ADVICE (OUTPATIENT)
Dept: ONCOLOGY | Facility: CLINIC | Age: 70
End: 2020-01-01

## 2020-01-01 ENCOUNTER — HEALTH MAINTENANCE LETTER (OUTPATIENT)
Age: 70
End: 2020-01-01

## 2020-01-01 ENCOUNTER — ANCILLARY PROCEDURE (OUTPATIENT)
Dept: CT IMAGING | Facility: CLINIC | Age: 70
End: 2020-01-01
Attending: INTERNAL MEDICINE
Payer: MEDICARE

## 2020-01-01 ENCOUNTER — PATIENT OUTREACH (OUTPATIENT)
Dept: PALLIATIVE CARE | Facility: CLINIC | Age: 70
End: 2020-01-01

## 2020-01-01 VITALS
BODY MASS INDEX: 32.84 KG/M2 | WEIGHT: 228.9 LBS | OXYGEN SATURATION: 97 % | SYSTOLIC BLOOD PRESSURE: 124 MMHG | RESPIRATION RATE: 16 BRPM | HEART RATE: 57 BPM | TEMPERATURE: 98 F | DIASTOLIC BLOOD PRESSURE: 69 MMHG

## 2020-01-01 VITALS
SYSTOLIC BLOOD PRESSURE: 136 MMHG | BODY MASS INDEX: 31.12 KG/M2 | DIASTOLIC BLOOD PRESSURE: 71 MMHG | OXYGEN SATURATION: 97 % | HEART RATE: 63 BPM | RESPIRATION RATE: 16 BRPM | HEIGHT: 70 IN | TEMPERATURE: 97.9 F | WEIGHT: 217.4 LBS

## 2020-01-01 VITALS
HEART RATE: 56 BPM | OXYGEN SATURATION: 96 % | RESPIRATION RATE: 18 BRPM | SYSTOLIC BLOOD PRESSURE: 136 MMHG | BODY MASS INDEX: 31.31 KG/M2 | WEIGHT: 218.2 LBS | TEMPERATURE: 97.8 F | DIASTOLIC BLOOD PRESSURE: 69 MMHG

## 2020-01-01 VITALS
DIASTOLIC BLOOD PRESSURE: 72 MMHG | BODY MASS INDEX: 31.78 KG/M2 | RESPIRATION RATE: 18 BRPM | OXYGEN SATURATION: 94 % | TEMPERATURE: 97.9 F | HEART RATE: 71 BPM | SYSTOLIC BLOOD PRESSURE: 117 MMHG | WEIGHT: 221.5 LBS

## 2020-01-01 VITALS
WEIGHT: 220 LBS | OXYGEN SATURATION: 99 % | SYSTOLIC BLOOD PRESSURE: 121 MMHG | BODY MASS INDEX: 31.5 KG/M2 | RESPIRATION RATE: 16 BRPM | DIASTOLIC BLOOD PRESSURE: 74 MMHG | HEART RATE: 69 BPM | HEIGHT: 70 IN | TEMPERATURE: 97.6 F

## 2020-01-01 VITALS — WEIGHT: 210 LBS | BODY MASS INDEX: 30.13 KG/M2

## 2020-01-01 DIAGNOSIS — C79.51 BONE METASTASIS: ICD-10-CM

## 2020-01-01 DIAGNOSIS — C79.51 BONE METASTASIS: Primary | ICD-10-CM

## 2020-01-01 DIAGNOSIS — C64.9 RENAL CELL CARCINOMA, UNSPECIFIED LATERALITY (H): Primary | ICD-10-CM

## 2020-01-01 DIAGNOSIS — G89.3 CANCER ASSOCIATED PAIN: ICD-10-CM

## 2020-01-01 DIAGNOSIS — E03.4 HYPOTHYROIDISM DUE TO ACQUIRED ATROPHY OF THYROID: ICD-10-CM

## 2020-01-01 DIAGNOSIS — G89.3 CANCER ASSOCIATED PAIN: Primary | ICD-10-CM

## 2020-01-01 DIAGNOSIS — F41.9 ANXIETY: ICD-10-CM

## 2020-01-01 DIAGNOSIS — D64.9 SEVERE ANEMIA: ICD-10-CM

## 2020-01-01 DIAGNOSIS — C64.9 RENAL CELL CARCINOMA, UNSPECIFIED LATERALITY (H): ICD-10-CM

## 2020-01-01 DIAGNOSIS — G62.9 NEUROPATHY: ICD-10-CM

## 2020-01-01 DIAGNOSIS — F43.22 ADJUSTMENT DISORDER WITH ANXIOUS MOOD: ICD-10-CM

## 2020-01-01 DIAGNOSIS — D64.9 SEVERE ANEMIA: Primary | ICD-10-CM

## 2020-01-01 DIAGNOSIS — R60.0 BILATERAL LEG EDEMA: ICD-10-CM

## 2020-01-01 DIAGNOSIS — R29.898 LEG WEAKNESS, BILATERAL: ICD-10-CM

## 2020-01-01 DIAGNOSIS — M79.2 NEUROPATHIC PAIN: ICD-10-CM

## 2020-01-01 DIAGNOSIS — E03.9 HYPOTHYROIDISM, UNSPECIFIED TYPE: ICD-10-CM

## 2020-01-01 DIAGNOSIS — C79.51 METASTATIC CANCER TO SPINE (H): ICD-10-CM

## 2020-01-01 DIAGNOSIS — R11.0 NAUSEA: ICD-10-CM

## 2020-01-01 DIAGNOSIS — Z51.5 ENCOUNTER FOR PALLIATIVE CARE: Primary | ICD-10-CM

## 2020-01-01 DIAGNOSIS — E43 SEVERE MALNUTRITION (H): ICD-10-CM

## 2020-01-01 DIAGNOSIS — R53.0 NEOPLASTIC MALIGNANT RELATED FATIGUE: ICD-10-CM

## 2020-01-01 DIAGNOSIS — Z98.890 S/P SPINAL SURGERY: Primary | ICD-10-CM

## 2020-01-01 DIAGNOSIS — R21 RASH: ICD-10-CM

## 2020-01-01 DIAGNOSIS — Z51.5 ENCOUNTER FOR PALLIATIVE CARE: ICD-10-CM

## 2020-01-01 DIAGNOSIS — C64.9 METASTATIC RENAL CELL CARCINOMA, UNSPECIFIED LATERALITY (H): Primary | ICD-10-CM

## 2020-01-01 DIAGNOSIS — C64.2 RENAL CELL CARCINOMA, LEFT (H): Primary | ICD-10-CM

## 2020-01-01 DIAGNOSIS — C64.9 METASTATIC RENAL CELL CARCINOMA, UNSPECIFIED LATERALITY (H): ICD-10-CM

## 2020-01-01 DIAGNOSIS — I89.0 LYMPHEDEMA: ICD-10-CM

## 2020-01-01 DIAGNOSIS — C64.2 RENAL CELL CARCINOMA, LEFT (H): ICD-10-CM

## 2020-01-01 DIAGNOSIS — K59.03 DRUG-INDUCED CONSTIPATION: ICD-10-CM

## 2020-01-01 DIAGNOSIS — R60.0 BILATERAL LEG EDEMA: Primary | ICD-10-CM

## 2020-01-01 DIAGNOSIS — F43.22 ADJUSTMENT DISORDER WITH ANXIOUS MOOD: Primary | ICD-10-CM

## 2020-01-01 LAB
ALBUMIN SERPL-MCNC: 3 G/DL (ref 3.4–5)
ALBUMIN SERPL-MCNC: 3.1 G/DL (ref 3.4–5)
ALBUMIN SERPL-MCNC: 3.2 G/DL (ref 3.4–5)
ALBUMIN SERPL-MCNC: 3.3 G/DL (ref 3.4–5)
ALBUMIN UR-MCNC: 10 MG/DL
ALP SERPL-CCNC: 70 U/L (ref 40–150)
ALP SERPL-CCNC: 78 U/L (ref 40–150)
ALP SERPL-CCNC: 78 U/L (ref 40–150)
ALP SERPL-CCNC: 81 U/L (ref 40–150)
ALP SERPL-CCNC: 84 U/L (ref 40–150)
ALP SERPL-CCNC: 96 U/L (ref 40–150)
ALT SERPL W P-5'-P-CCNC: 25 U/L (ref 0–70)
ALT SERPL W P-5'-P-CCNC: 25 U/L (ref 0–70)
ALT SERPL W P-5'-P-CCNC: 32 U/L (ref 0–70)
ALT SERPL W P-5'-P-CCNC: 32 U/L (ref 0–70)
ALT SERPL W P-5'-P-CCNC: 34 U/L (ref 0–70)
ALT SERPL W P-5'-P-CCNC: 35 U/L (ref 0–70)
ANION GAP SERPL CALCULATED.3IONS-SCNC: 4 MMOL/L (ref 3–14)
ANION GAP SERPL CALCULATED.3IONS-SCNC: 6 MMOL/L (ref 3–14)
ANION GAP SERPL CALCULATED.3IONS-SCNC: 6 MMOL/L (ref 3–14)
ANISOCYTOSIS BLD QL SMEAR: ABNORMAL
ANISOCYTOSIS BLD QL SMEAR: ABNORMAL
ANISOCYTOSIS BLD QL SMEAR: SLIGHT
ANISOCYTOSIS BLD QL SMEAR: SLIGHT
AST SERPL W P-5'-P-CCNC: 33 U/L (ref 0–45)
AST SERPL W P-5'-P-CCNC: 37 U/L (ref 0–45)
AST SERPL W P-5'-P-CCNC: 38 U/L (ref 0–45)
AST SERPL W P-5'-P-CCNC: 43 U/L (ref 0–45)
BASOPHILS # BLD AUTO: 0 10*3/UL (ref 0–0.1)
BASOPHILS # BLD AUTO: 0 10E9/L (ref 0–0.2)
BASOPHILS # BLD AUTO: 0.1 10E9/L (ref 0–0.2)
BASOPHILS NFR BLD AUTO: 0 %
BASOPHILS NFR BLD AUTO: 0 %
BASOPHILS NFR BLD AUTO: 0.2 %
BASOPHILS NFR BLD AUTO: 0.9 %
BASOPHILS NFR BLD AUTO: 1 %
BASOPHILS NFR BLD AUTO: 1 %
BASOPHILS NFR BLD AUTO: 1.7 %
BILIRUB SERPL-MCNC: 0.3 MG/DL (ref 0.2–1.3)
BILIRUB SERPL-MCNC: 0.4 MG/DL (ref 0.2–1.3)
BUN SERPL-MCNC: 12 MG/DL (ref 7–30)
BUN SERPL-MCNC: 12 MG/DL (ref 7–30)
BUN SERPL-MCNC: 13 MG/DL (ref 7–30)
BUN SERPL-MCNC: 15 MG/DL (ref 7–30)
BUN SERPL-MCNC: 18 MG/DL (ref 7–30)
BUN SERPL-MCNC: 20 MG/DL (ref 7–30)
CALCIUM SERPL-MCNC: 8.3 MG/DL (ref 8.5–10.1)
CALCIUM SERPL-MCNC: 8.5 MG/DL (ref 8.5–10.1)
CALCIUM SERPL-MCNC: 8.6 MG/DL (ref 8.5–10.1)
CALCIUM SERPL-MCNC: 8.7 MG/DL (ref 8.5–10.1)
CALCIUM SERPL-MCNC: 8.9 MG/DL (ref 8.5–10.1)
CALCIUM SERPL-MCNC: 8.9 MG/DL (ref 8.5–10.1)
CHLORIDE SERPL-SCNC: 106 MMOL/L (ref 94–109)
CHLORIDE SERPL-SCNC: 110 MMOL/L (ref 94–109)
CHOLEST SERPL-MCNC: 174 MG/DL
CHOLEST SERPL-MCNC: 178 MG/DL
CO2 SERPL-SCNC: 26 MMOL/L (ref 20–32)
CO2 SERPL-SCNC: 26 MMOL/L (ref 20–32)
CO2 SERPL-SCNC: 28 MMOL/L (ref 20–32)
CO2 SERPL-SCNC: 28 MMOL/L (ref 20–32)
CO2 SERPL-SCNC: 29 MMOL/L (ref 20–32)
CO2 SERPL-SCNC: 29 MMOL/L (ref 20–32)
CREAT SERPL-MCNC: 0.92 MG/DL (ref 0.66–1.25)
CREAT SERPL-MCNC: 1 MG/DL (ref 0.66–1.25)
CREAT SERPL-MCNC: 1.02 MG/DL (ref 0.66–1.25)
CREAT SERPL-MCNC: 1.07 MG/DL (ref 0.66–1.25)
DIFFERENTIAL METHOD BLD: ABNORMAL
EOSINOPHIL # BLD AUTO: 0 10*3/UL (ref 0–0.4)
EOSINOPHIL # BLD AUTO: 0 10E9/L (ref 0–0.7)
EOSINOPHIL # BLD AUTO: 0 10E9/L (ref 0–0.7)
EOSINOPHIL # BLD AUTO: 0.1 10E9/L (ref 0–0.7)
EOSINOPHIL NFR BLD AUTO: 0 %
EOSINOPHIL NFR BLD AUTO: 0.3 %
EOSINOPHIL NFR BLD AUTO: 0.9 %
EOSINOPHIL NFR BLD AUTO: 1.3 %
EOSINOPHIL NFR BLD AUTO: 1.8 %
EOSINOPHIL NFR BLD AUTO: 3 %
ERYTHROCYTE [DISTWIDTH] IN BLOOD BY AUTOMATED COUNT: 15.7 % (ref 11.3–14.6)
ERYTHROCYTE [DISTWIDTH] IN BLOOD BY AUTOMATED COUNT: 16.4 % (ref 10–15)
ERYTHROCYTE [DISTWIDTH] IN BLOOD BY AUTOMATED COUNT: 17 % (ref 10–15)
ERYTHROCYTE [DISTWIDTH] IN BLOOD BY AUTOMATED COUNT: 17.2 % (ref 10–15)
ERYTHROCYTE [DISTWIDTH] IN BLOOD BY AUTOMATED COUNT: 17.5 % (ref 10–15)
ERYTHROCYTE [DISTWIDTH] IN BLOOD BY AUTOMATED COUNT: 17.6 % (ref 10–15)
ERYTHROCYTE [DISTWIDTH] IN BLOOD BY AUTOMATED COUNT: 18.2 % (ref 10–15)
GFR SERPL CREATININE-BSD FRML MDRD: 69 ML/MIN/{1.73_M2}
GFR SERPL CREATININE-BSD FRML MDRD: 73 ML/MIN/{1.73_M2}
GFR SERPL CREATININE-BSD FRML MDRD: 75 ML/MIN/{1.73_M2}
GFR SERPL CREATININE-BSD FRML MDRD: 82 ML/MIN/{1.73_M2}
GLUCOSE SERPL-MCNC: 102 MG/DL (ref 70–99)
GLUCOSE SERPL-MCNC: 115 MG/DL (ref 70–99)
GLUCOSE SERPL-MCNC: 81 MG/DL (ref 70–99)
GLUCOSE SERPL-MCNC: 88 MG/DL (ref 70–99)
GLUCOSE SERPL-MCNC: 93 MG/DL (ref 70–99)
GLUCOSE SERPL-MCNC: 96 MG/DL (ref 70–99)
HCT VFR BLD AUTO: 27 % (ref 38.4–49.7)
HCT VFR BLD AUTO: 28.4 % (ref 40–53)
HCT VFR BLD AUTO: 28.5 % (ref 40–53)
HCT VFR BLD AUTO: 29 % (ref 40–53)
HCT VFR BLD AUTO: 29.1 % (ref 40–53)
HCT VFR BLD AUTO: 34.4 % (ref 40–53)
HCT VFR BLD AUTO: 34.5 % (ref 40–53)
HDLC SERPL-MCNC: 48 MG/DL
HDLC SERPL-MCNC: 53 MG/DL
HEMOGLOBIN: 8.2 G/DL (ref 12.9–16.9)
HGB BLD-MCNC: 10.3 G/DL (ref 13.3–17.7)
HGB BLD-MCNC: 10.6 G/DL (ref 13.3–17.7)
HGB BLD-MCNC: 8.5 G/DL (ref 13.3–17.7)
HGB BLD-MCNC: 8.6 G/DL (ref 13.3–17.7)
HGB BLD-MCNC: 8.7 G/DL (ref 13.3–17.7)
HGB BLD-MCNC: 8.8 G/DL (ref 13.3–17.7)
IMM GRANULOCYTES # BLD: 0 10E9/L (ref 0–0.4)
IMM GRANULOCYTES NFR BLD: 0.3 %
IMMATURE GRANS (ABS): 0.1 X10E3/UL (ref 0–0.06)
IMMATURE GRANULOCYTES: 1.6 %
LDLC SERPL CALC-MCNC: 85 MG/DL
LDLC SERPL CALC-MCNC: 87 MG/DL
LYMPHOCYTES # BLD AUTO: 0.2 10*3/UL (ref 0.8–3.3)
LYMPHOCYTES # BLD AUTO: 0.2 10E9/L (ref 0.8–5.3)
LYMPHOCYTES # BLD AUTO: 0.3 10E9/L (ref 0.8–5.3)
LYMPHOCYTES # BLD AUTO: 0.4 10E9/L (ref 0.8–5.3)
LYMPHOCYTES # BLD AUTO: 1.9 10E9/L (ref 0.8–5.3)
LYMPHOCYTES NFR BLD AUTO: 3.7 %
LYMPHOCYTES NFR BLD AUTO: 46.3 %
LYMPHOCYTES NFR BLD AUTO: 5.3 %
LYMPHOCYTES NFR BLD AUTO: 6 %
LYMPHOCYTES NFR BLD AUTO: 6.1 %
LYMPHOCYTES NFR BLD AUTO: 7 %
LYMPHOCYTES NFR BLD AUTO: 8.8 %
MACROCYTES BLD QL SMEAR: PRESENT
MAGNESIUM SERPL-MCNC: 1.7 MG/DL (ref 1.6–2.3)
MAGNESIUM SERPL-MCNC: 1.9 MG/DL (ref 1.6–2.3)
MAGNESIUM SERPL-MCNC: 1.9 MG/DL (ref 1.6–2.3)
MAGNESIUM SERPL-MCNC: 2 MG/DL (ref 1.6–2.3)
MAGNESIUM SERPL-MCNC: 2.1 MG/DL (ref 1.6–2.3)
MAGNESIUM SERPL-MCNC: 2.2 MG/DL (ref 1.6–2.3)
MCH RBC QN AUTO: 28.4 PG (ref 26.5–33)
MCH RBC QN AUTO: 28.5 PG (ref 26.5–33)
MCH RBC QN AUTO: 29.1 PG (ref 26.7–33.1)
MCH RBC QN AUTO: 29.2 PG (ref 26.5–33)
MCH RBC QN AUTO: 29.3 PG (ref 26.5–33)
MCH RBC QN AUTO: 29.4 PG (ref 26.5–33)
MCH RBC QN AUTO: 30.6 PG (ref 26.5–33)
MCHC RBC AUTO-ENTMCNC: 29.9 G/DL (ref 31.5–36.5)
MCHC RBC AUTO-ENTMCNC: 30.2 G/DL (ref 31.5–36.5)
MCHC RBC AUTO-ENTMCNC: 30.3 G/DL (ref 31.5–36.5)
MCHC RBC AUTO-ENTMCNC: 30.4 G/DL (ref 31.6–35.5)
MCHC RBC AUTO-ENTMCNC: 30.7 G/DL (ref 31.5–36.5)
MCV RBC AUTO: 100 FL (ref 78–100)
MCV RBC AUTO: 95 FL (ref 78–100)
MCV RBC AUTO: 95 FL (ref 78–100)
MCV RBC AUTO: 95.7 FL (ref 81.4–99)
MCV RBC AUTO: 97 FL (ref 78–100)
MCV RBC AUTO: 97 FL (ref 78–100)
MCV RBC AUTO: 98 FL (ref 78–100)
METAMYELOCYTES # BLD: 0.1 10E9/L
METAMYELOCYTES NFR BLD MANUAL: 1.7 %
MICROCYTES BLD QL SMEAR: PRESENT
MONOCYTES # BLD AUTO: 0.2 10E9/L (ref 0–1.3)
MONOCYTES # BLD AUTO: 0.9 10E9/L (ref 0–1.3)
MONOCYTES # BLD AUTO: 1.1 10E9/L (ref 0–1.3)
MONOCYTES # BLD AUTO: 1.2 10E9/L (ref 0–1.3)
MONOCYTES # BLD AUTO: 1.3 10E9/L (ref 0–1.3)
MONOCYTES # BLD AUTO: 1.4 10E9/L (ref 0–1.3)
MONOCYTES # BLD AUTO: 2.4 10*3/UL (ref 0.2–0.9)
MONOCYTES NFR BLD AUTO: 21 %
MONOCYTES NFR BLD AUTO: 23.9 %
MONOCYTES NFR BLD AUTO: 24.4 %
MONOCYTES NFR BLD AUTO: 28.9 %
MONOCYTES NFR BLD AUTO: 29 %
MONOCYTES NFR BLD AUTO: 3.8 %
MONOCYTES NFR BLD AUTO: 37.3 %
MYELOCYTES # BLD: 0.1 10E9/L
MYELOCYTES NFR BLD MANUAL: 2.6 %
NEUTROPHILS # BLD AUTO: 1.9 10E9/L (ref 1.6–8.3)
NEUTROPHILS # BLD AUTO: 2.5 10E9/L (ref 1.6–8.3)
NEUTROPHILS # BLD AUTO: 2.5 10E9/L (ref 1.6–8.3)
NEUTROPHILS # BLD AUTO: 2.8 10E9/L (ref 1.6–8.3)
NEUTROPHILS # BLD AUTO: 2.8 10E9/L (ref 1.6–8.3)
NEUTROPHILS # BLD AUTO: 3.7 10*3/UL (ref 1.5–7.6)
NEUTROPHILS # BLD AUTO: 4 10E9/L (ref 1.6–8.3)
NEUTROPHILS NFR BLD AUTO: 48.3 %
NEUTROPHILS NFR BLD AUTO: 53.9 %
NEUTROPHILS NFR BLD AUTO: 56.9 %
NEUTROPHILS NFR BLD AUTO: 60.5 %
NEUTROPHILS NFR BLD AUTO: 63 %
NEUTROPHILS NFR BLD AUTO: 69 %
NEUTROPHILS NFR BLD AUTO: 70.8 %
NONHDLC SERPL-MCNC: 121 MG/DL
NONHDLC SERPL-MCNC: 130 MG/DL
NRBC # BLD AUTO: 0 10*3/UL
NRBC # BLD AUTO: 0 10*3/UL
NRBC BLD AUTO-RTO: 0 /100
NRBC BLD AUTO-RTO: 1 /100
OVALOCYTES BLD QL SMEAR: SLIGHT
PLATELET # BLD AUTO: 116 10E9/L (ref 150–450)
PLATELET # BLD AUTO: 127 10E9/L (ref 150–450)
PLATELET # BLD AUTO: 149 10E9/L (ref 150–450)
PLATELET # BLD AUTO: 150 10E9/L (ref 150–450)
PLATELET # BLD AUTO: 194 10E9/L (ref 150–450)
PLATELET # BLD AUTO: 217 10E9/L (ref 150–450)
PLATELET # BLD AUTO: 246 10^9/L (ref 130–375)
PLATELET # BLD EST: ABNORMAL 10*3/UL
POIKILOCYTOSIS BLD QL SMEAR: SLIGHT
POLYCHROMASIA BLD QL SMEAR: ABNORMAL
POTASSIUM SERPL-SCNC: 3.7 MMOL/L (ref 3.4–5.3)
POTASSIUM SERPL-SCNC: 4 MMOL/L (ref 3.4–5.3)
POTASSIUM SERPL-SCNC: 4 MMOL/L (ref 3.4–5.3)
POTASSIUM SERPL-SCNC: 4.1 MMOL/L (ref 3.4–5.3)
POTASSIUM SERPL-SCNC: 4.3 MMOL/L (ref 3.4–5.3)
POTASSIUM SERPL-SCNC: 4.4 MMOL/L (ref 3.4–5.3)
PROMYELOCYTES # BLD MANUAL: 0.4 10E9/L
PROMYELOCYTES NFR BLD MANUAL: 7.8 %
PROT SERPL-MCNC: 6.8 G/DL (ref 6.8–8.8)
PROT SERPL-MCNC: 6.9 G/DL (ref 6.8–8.8)
PROT SERPL-MCNC: 7 G/DL (ref 6.8–8.8)
PROT SERPL-MCNC: 7 G/DL (ref 6.8–8.8)
PROT SERPL-MCNC: 7.1 G/DL (ref 6.8–8.8)
PROT SERPL-MCNC: 7.1 G/DL (ref 6.8–8.8)
RBC # BLD AUTO: 2.82 10^12/L (ref 4.14–5.75)
RBC # BLD AUTO: 2.95 10E12/L (ref 4.4–5.9)
RBC # BLD AUTO: 2.99 10E12/L (ref 4.4–5.9)
RBC # BLD AUTO: 3 10E12/L (ref 4.4–5.9)
RBC # BLD AUTO: 3.05 10E12/L (ref 4.4–5.9)
RBC # BLD AUTO: 3.46 10E12/L (ref 4.4–5.9)
RBC # BLD AUTO: 3.5 10E12/L (ref 4.4–5.9)
RBC MORPH BLD: NORMAL
SODIUM SERPL-SCNC: 138 MMOL/L (ref 133–144)
SODIUM SERPL-SCNC: 139 MMOL/L (ref 133–144)
SODIUM SERPL-SCNC: 143 MMOL/L (ref 133–144)
T4 FREE SERPL-MCNC: 0.91 NG/DL (ref 0.76–1.46)
TOXIC GRANULES BLD QL SMEAR: PRESENT
TRIGL SERPL-MCNC: 171 MG/DL
TRIGL SERPL-MCNC: 227 MG/DL
TSH SERPL DL<=0.005 MIU/L-ACNC: 2.5 MU/L (ref 0.4–4)
TSH SERPL DL<=0.005 MIU/L-ACNC: 2.64 MU/L (ref 0.4–4)
TSH SERPL DL<=0.005 MIU/L-ACNC: 3.19 MU/L (ref 0.4–4)
TSH SERPL DL<=0.005 MIU/L-ACNC: 3.21 MU/L (ref 0.4–4)
TSH SERPL DL<=0.005 MIU/L-ACNC: 3.27 MU/L (ref 0.4–4)
TSH SERPL DL<=0.005 MIU/L-ACNC: 5.06 MU/L (ref 0.4–4)
WBC # BLD AUTO: 4 10E9/L (ref 4–11)
WBC # BLD AUTO: 4 10E9/L (ref 4–11)
WBC # BLD AUTO: 4.1 10E9/L (ref 4–11)
WBC # BLD AUTO: 4.7 10E9/L (ref 4–11)
WBC # BLD AUTO: 4.7 10E9/L (ref 4–11)
WBC # BLD AUTO: 5.6 10E9/L (ref 4–11)
WBC # BLD AUTO: 6.4 10^9/L (ref 3.2–11)

## 2020-01-01 PROCEDURE — G0463 HOSPITAL OUTPT CLINIC VISIT: HCPCS | Mod: ZF

## 2020-01-01 PROCEDURE — 83735 ASSAY OF MAGNESIUM: CPT | Performed by: INTERNAL MEDICINE

## 2020-01-01 PROCEDURE — 84443 ASSAY THYROID STIM HORMONE: CPT | Performed by: INTERNAL MEDICINE

## 2020-01-01 PROCEDURE — A9503 TC99M MEDRONATE: HCPCS | Performed by: INTERNAL MEDICINE

## 2020-01-01 PROCEDURE — 84443 ASSAY THYROID STIM HORMONE: CPT | Mod: TEL | Performed by: INTERNAL MEDICINE

## 2020-01-01 PROCEDURE — 78306 BONE IMAGING WHOLE BODY: CPT

## 2020-01-01 PROCEDURE — 25500064 ZZH RX 255 OP 636: Performed by: PHYSICIAN ASSISTANT

## 2020-01-01 PROCEDURE — 80053 COMPREHEN METABOLIC PANEL: CPT | Performed by: INTERNAL MEDICINE

## 2020-01-01 PROCEDURE — 71260 CT THORAX DX C+: CPT

## 2020-01-01 PROCEDURE — 99443: CPT | Performed by: INTERNAL MEDICINE

## 2020-01-01 PROCEDURE — 74177 CT ABD & PELVIS W/CONTRAST: CPT | Performed by: RADIOLOGY

## 2020-01-01 PROCEDURE — 99443 ZZC PHYSICIAN TELEPHONE EVALUATION 21-30 MIN: CPT | Performed by: INTERNAL MEDICINE

## 2020-01-01 PROCEDURE — 99215 OFFICE O/P EST HI 40 MIN: CPT | Mod: ZP | Performed by: INTERNAL MEDICINE

## 2020-01-01 PROCEDURE — 40001009 ZZH VIDEO/TELEPHONE VISIT; NO CHARGE

## 2020-01-01 PROCEDURE — 36415 COLL VENOUS BLD VENIPUNCTURE: CPT

## 2020-01-01 PROCEDURE — 99214 OFFICE O/P EST MOD 30 MIN: CPT | Mod: 95 | Performed by: INTERNAL MEDICINE

## 2020-01-01 PROCEDURE — 71260 CT THORAX DX C+: CPT | Performed by: RADIOLOGY

## 2020-01-01 PROCEDURE — A9585 GADOBUTROL INJECTION: HCPCS | Performed by: PHYSICIAN ASSISTANT

## 2020-01-01 PROCEDURE — 36415 COLL VENOUS BLD VENIPUNCTURE: CPT | Performed by: INTERNAL MEDICINE

## 2020-01-01 PROCEDURE — 93970 EXTREMITY STUDY: CPT

## 2020-01-01 PROCEDURE — 72158 MRI LUMBAR SPINE W/O & W/DYE: CPT

## 2020-01-01 PROCEDURE — 80061 LIPID PANEL: CPT | Performed by: INTERNAL MEDICINE

## 2020-01-01 PROCEDURE — 99443 ZZC PHYSICIAN TELEPHONE EVALUATION 21-30 MIN: CPT | Performed by: PHYSICIAN ASSISTANT

## 2020-01-01 PROCEDURE — 40000114 ZZH STATISTIC NO CHARGE CLINIC VISIT

## 2020-01-01 PROCEDURE — 96372 THER/PROPH/DIAG INJ SC/IM: CPT

## 2020-01-01 PROCEDURE — A9503 TC99M MEDRONATE: HCPCS | Performed by: PHYSICIAN ASSISTANT

## 2020-01-01 PROCEDURE — 80053 COMPREHEN METABOLIC PANEL: CPT | Mod: TEL | Performed by: INTERNAL MEDICINE

## 2020-01-01 PROCEDURE — 81003 URINALYSIS AUTO W/O SCOPE: CPT | Performed by: INTERNAL MEDICINE

## 2020-01-01 PROCEDURE — 98968 PH1 ASSMT&MGMT NQHP 21-30: CPT | Performed by: SOCIAL WORKER

## 2020-01-01 PROCEDURE — 25500064 ZZH RX 255 OP 636: Performed by: INTERNAL MEDICINE

## 2020-01-01 PROCEDURE — 84439 ASSAY OF FREE THYROXINE: CPT | Performed by: INTERNAL MEDICINE

## 2020-01-01 PROCEDURE — G0463 HOSPITAL OUTPT CLINIC VISIT: HCPCS | Mod: GT,ZF

## 2020-01-01 PROCEDURE — 99214 OFFICE O/P EST MOD 30 MIN: CPT | Mod: ZP | Performed by: PHYSICIAN ASSISTANT

## 2020-01-01 PROCEDURE — A9503 TC99M MEDRONATE: HCPCS

## 2020-01-01 PROCEDURE — 34300033 ZZH RX 343: Performed by: INTERNAL MEDICINE

## 2020-01-01 PROCEDURE — 85025 COMPLETE CBC W/AUTO DIFF WBC: CPT | Mod: TEL | Performed by: INTERNAL MEDICINE

## 2020-01-01 PROCEDURE — A9585 GADOBUTROL INJECTION: HCPCS | Performed by: INTERNAL MEDICINE

## 2020-01-01 PROCEDURE — 85025 COMPLETE CBC W/AUTO DIFF WBC: CPT | Performed by: INTERNAL MEDICINE

## 2020-01-01 PROCEDURE — 25000128 H RX IP 250 OP 636: Mod: ZF | Performed by: INTERNAL MEDICINE

## 2020-01-01 PROCEDURE — 72197 MRI PELVIS W/O & W/DYE: CPT

## 2020-01-01 PROCEDURE — 81003 URINALYSIS AUTO W/O SCOPE: CPT | Mod: TEL | Performed by: INTERNAL MEDICINE

## 2020-01-01 PROCEDURE — G0463 HOSPITAL OUTPT CLINIC VISIT: HCPCS

## 2020-01-01 PROCEDURE — 34300033 ZZH RX 343: Performed by: PHYSICIAN ASSISTANT

## 2020-01-01 PROCEDURE — 83735 ASSAY OF MAGNESIUM: CPT | Mod: TEL | Performed by: INTERNAL MEDICINE

## 2020-01-01 PROCEDURE — 80061 LIPID PANEL: CPT | Mod: TEL | Performed by: INTERNAL MEDICINE

## 2020-01-01 PROCEDURE — 99215 OFFICE O/P EST HI 40 MIN: CPT | Performed by: INTERNAL MEDICINE

## 2020-01-01 PROCEDURE — 25000128 H RX IP 250 OP 636: Performed by: PHYSICIAN ASSISTANT

## 2020-01-01 RX ORDER — METHYLPHENIDATE HYDROCHLORIDE 10 MG/1
TABLET ORAL
Qty: 60 TABLET | Refills: 0 | COMMUNITY
Start: 2020-01-01 | End: 2020-01-01

## 2020-01-01 RX ORDER — ONDANSETRON 8 MG/1
8 TABLET, FILM COATED ORAL EVERY 8 HOURS PRN
Qty: 60 TABLET | Refills: 3 | Status: SHIPPED | OUTPATIENT
Start: 2020-01-01

## 2020-01-01 RX ORDER — MORPHINE SULFATE 30 MG/1
30 TABLET, FILM COATED, EXTENDED RELEASE ORAL EVERY 8 HOURS
Qty: 90 TABLET | Refills: 0 | Status: SHIPPED | OUTPATIENT
Start: 2020-01-01 | End: 2020-01-01

## 2020-01-01 RX ORDER — LIDOCAINE 50 MG/G
1-3 PATCH TOPICAL EVERY 24 HOURS
Qty: 30 PATCH | Refills: 3 | Status: SHIPPED | OUTPATIENT
Start: 2020-01-01 | End: 2020-01-01

## 2020-01-01 RX ORDER — MORPHINE SULFATE 15 MG/1
15 TABLET, FILM COATED, EXTENDED RELEASE ORAL AT BEDTIME
Qty: 30 TABLET | Refills: 0 | Status: SHIPPED | OUTPATIENT
Start: 2020-01-01 | End: 2020-01-01

## 2020-01-01 RX ORDER — GADOBUTROL 604.72 MG/ML
10 INJECTION INTRAVENOUS ONCE
Status: COMPLETED | OUTPATIENT
Start: 2020-01-01 | End: 2020-01-01

## 2020-01-01 RX ORDER — PREGABALIN 50 MG/1
50 CAPSULE ORAL 2 TIMES DAILY
Qty: 60 CAPSULE | Refills: 1 | Status: SHIPPED | OUTPATIENT
Start: 2020-01-01 | End: 2020-01-01

## 2020-01-01 RX ORDER — MORPHINE SULFATE 30 MG/1
30 TABLET, FILM COATED, EXTENDED RELEASE ORAL EVERY 8 HOURS
Qty: 90 TABLET | Refills: 0 | Status: CANCELLED | OUTPATIENT
Start: 2020-01-01

## 2020-01-01 RX ORDER — HYDROMORPHONE HYDROCHLORIDE 2 MG/1
2-4 TABLET ORAL
Qty: 120 TABLET | Refills: 0 | Status: SHIPPED | OUTPATIENT
Start: 2020-01-01 | End: 2020-01-01

## 2020-01-01 RX ORDER — MORPHINE SULFATE 15 MG/1
15 TABLET, FILM COATED, EXTENDED RELEASE ORAL 2 TIMES DAILY
Qty: 60 TABLET | Refills: 0 | Status: SHIPPED | OUTPATIENT
Start: 2020-01-01 | End: 2020-01-01

## 2020-01-01 RX ORDER — MORPHINE SULFATE 30 MG/1
TABLET, FILM COATED, EXTENDED RELEASE ORAL
Qty: 30 TABLET | Refills: 0 | OUTPATIENT
Start: 2020-01-01

## 2020-01-01 RX ORDER — METHYLPHENIDATE HYDROCHLORIDE 10 MG/1
TABLET ORAL
Qty: 60 TABLET | Refills: 0 | Status: SHIPPED | OUTPATIENT
Start: 2020-01-01

## 2020-01-01 RX ORDER — HYDROMORPHONE HYDROCHLORIDE 2 MG/1
2-4 TABLET ORAL
Qty: 180 TABLET | Refills: 0 | Status: SHIPPED | OUTPATIENT
Start: 2020-01-01 | End: 2020-01-01

## 2020-01-01 RX ORDER — MORPHINE SULFATE 15 MG/1
TABLET, FILM COATED, EXTENDED RELEASE ORAL
Qty: 60 TABLET | Refills: 0 | Status: SHIPPED | OUTPATIENT
Start: 2020-01-01 | End: 2020-01-01

## 2020-01-01 RX ORDER — MORPHINE SULFATE 30 MG/1
30 TABLET, FILM COATED, EXTENDED RELEASE ORAL DAILY
Qty: 30 TABLET | Refills: 0 | Status: SHIPPED | OUTPATIENT
Start: 2020-01-01 | End: 2020-01-01

## 2020-01-01 RX ORDER — DULOXETIN HYDROCHLORIDE 60 MG/1
60 CAPSULE, DELAYED RELEASE ORAL DAILY
Qty: 30 CAPSULE | Refills: 2 | Status: SHIPPED | OUTPATIENT
Start: 2020-01-01 | End: 2020-01-01

## 2020-01-01 RX ORDER — PREGABALIN 50 MG/1
CAPSULE ORAL
Qty: 60 CAPSULE | Refills: 3 | Status: SHIPPED | OUTPATIENT
Start: 2020-01-01

## 2020-01-01 RX ORDER — DIAZEPAM 5 MG
TABLET ORAL
Qty: 10 TABLET | Refills: 0 | Status: SHIPPED | OUTPATIENT
Start: 2020-01-01

## 2020-01-01 RX ORDER — TC 99M MEDRONATE 20 MG/10ML
20-30 INJECTION, POWDER, LYOPHILIZED, FOR SOLUTION INTRAVENOUS ONCE
Status: COMPLETED | OUTPATIENT
Start: 2020-01-01 | End: 2020-01-01

## 2020-01-01 RX ORDER — FUROSEMIDE 40 MG
40 TABLET ORAL DAILY
Qty: 30 TABLET | Refills: 1 | Status: SHIPPED | OUTPATIENT
Start: 2020-01-01 | End: 2020-01-01

## 2020-01-01 RX ORDER — MORPHINE SULFATE 30 MG/1
30 TABLET, FILM COATED, EXTENDED RELEASE ORAL EVERY MORNING
Qty: 30 TABLET | Refills: 0 | Status: SHIPPED | OUTPATIENT
Start: 2020-01-01 | End: 2020-01-01

## 2020-01-01 RX ORDER — TC 99M MEDRONATE 20 MG/10ML
25 INJECTION, POWDER, LYOPHILIZED, FOR SOLUTION INTRAVENOUS ONCE
Status: COMPLETED | OUTPATIENT
Start: 2020-01-01 | End: 2020-01-01

## 2020-01-01 RX ORDER — HYDROMORPHONE HYDROCHLORIDE 2 MG/1
2-4 TABLET ORAL
Qty: 180 TABLET | Refills: 0 | Status: SHIPPED | OUTPATIENT
Start: 2020-01-01

## 2020-01-01 RX ORDER — METHYLPHENIDATE HYDROCHLORIDE 5 MG/1
TABLET ORAL
Qty: 60 TABLET | Refills: 0 | Status: SHIPPED | OUTPATIENT
Start: 2020-01-01 | End: 2020-01-01

## 2020-01-01 RX ORDER — FUROSEMIDE 20 MG
40 TABLET ORAL DAILY
Qty: 30 TABLET | Refills: 3 | Status: SHIPPED | OUTPATIENT
Start: 2020-01-01 | End: 2020-01-01

## 2020-01-01 RX ORDER — LIDOCAINE 50 MG/G
1-3 PATCH TOPICAL EVERY 24 HOURS
Qty: 30 PATCH | Refills: 3 | Status: SHIPPED | OUTPATIENT
Start: 2020-01-01

## 2020-01-01 RX ORDER — IOPAMIDOL 755 MG/ML
134 INJECTION, SOLUTION INTRAVASCULAR ONCE
Status: COMPLETED | OUTPATIENT
Start: 2020-01-01 | End: 2020-01-01

## 2020-01-01 RX ORDER — MORPHINE SULFATE 30 MG/1
30 TABLET, FILM COATED, EXTENDED RELEASE ORAL EVERY 8 HOURS
Qty: 90 TABLET | Refills: 0 | COMMUNITY
Start: 2020-01-01 | End: 2020-01-01

## 2020-01-01 RX ORDER — MORPHINE SULFATE 15 MG/1
TABLET, FILM COATED, EXTENDED RELEASE ORAL
Qty: 60 TABLET | Refills: 0 | COMMUNITY
Start: 2020-01-01 | End: 2020-01-01 | Stop reason: DRUGHIGH

## 2020-01-01 RX ORDER — MORPHINE SULFATE 60 MG/1
60 TABLET, FILM COATED, EXTENDED RELEASE ORAL EVERY 8 HOURS
Qty: 90 TABLET | Refills: 0 | COMMUNITY
Start: 2020-01-01 | End: 2020-01-01

## 2020-01-01 RX ORDER — DULOXETIN HYDROCHLORIDE 60 MG/1
60 CAPSULE, DELAYED RELEASE ORAL DAILY
Qty: 30 CAPSULE | Refills: 2 | Status: SHIPPED | OUTPATIENT
Start: 2020-01-01

## 2020-01-01 RX ORDER — LEVOTHYROXINE SODIUM 25 UG/1
25 TABLET ORAL DAILY
Qty: 30 TABLET | Refills: 3 | Status: SHIPPED | OUTPATIENT
Start: 2020-01-01 | End: 2020-01-01

## 2020-01-01 RX ORDER — MORPHINE SULFATE 30 MG/1
30 TABLET, FILM COATED, EXTENDED RELEASE ORAL EVERY 8 HOURS
Qty: 90 TABLET | Refills: 0 | Status: SHIPPED | OUTPATIENT
Start: 2020-01-01 | End: 2020-01-01 | Stop reason: DRUGHIGH

## 2020-01-01 RX ORDER — DIAZEPAM 5 MG
TABLET ORAL
Qty: 2 TABLET | Refills: 1 | Status: SHIPPED | OUTPATIENT
Start: 2020-01-01

## 2020-01-01 RX ORDER — DULOXETIN HYDROCHLORIDE 30 MG/1
30 CAPSULE, DELAYED RELEASE ORAL DAILY
Qty: 30 CAPSULE | Refills: 3 | Status: SHIPPED | OUTPATIENT
Start: 2020-01-01 | End: 2020-01-01

## 2020-01-01 RX ORDER — EVEROLIMUS 5 MG/1
5 TABLET ORAL DAILY
Qty: 30 TABLET | Refills: 3 | Status: SHIPPED | OUTPATIENT
Start: 2020-01-01 | End: 2020-01-01

## 2020-01-01 RX ORDER — MORPHINE SULFATE 15 MG/1
TABLET, FILM COATED, EXTENDED RELEASE ORAL
Qty: 60 TABLET | Refills: 0 | OUTPATIENT
Start: 2020-01-01

## 2020-01-01 RX ORDER — IOPAMIDOL 755 MG/ML
135 INJECTION, SOLUTION INTRAVASCULAR ONCE
Status: COMPLETED | OUTPATIENT
Start: 2020-01-01 | End: 2020-01-01

## 2020-01-01 RX ORDER — UREA 40 %
CREAM (GRAM) TOPICAL 2 TIMES DAILY
Qty: 28 G | Refills: 3 | Status: SHIPPED | OUTPATIENT
Start: 2020-01-01

## 2020-01-01 RX ORDER — MORPHINE SULFATE 60 MG/1
60 TABLET, FILM COATED, EXTENDED RELEASE ORAL EVERY 8 HOURS
Qty: 90 TABLET | Refills: 0 | Status: SHIPPED | OUTPATIENT
Start: 2020-01-01

## 2020-01-01 RX ORDER — FUROSEMIDE 40 MG
60 TABLET ORAL DAILY
Qty: 45 TABLET | Refills: 3 | Status: SHIPPED | OUTPATIENT
Start: 2020-01-01

## 2020-01-01 RX ORDER — MORPHINE SULFATE 15 MG/1
15 TABLET, FILM COATED, EXTENDED RELEASE ORAL EVERY 8 HOURS
Qty: 90 TABLET | Refills: 0 | Status: SHIPPED | OUTPATIENT
Start: 2020-01-01 | End: 2020-01-01 | Stop reason: DRUGHIGH

## 2020-01-01 RX ORDER — FUROSEMIDE 40 MG
40 TABLET ORAL DAILY
Qty: 90 TABLET | Refills: 3 | Status: SHIPPED | OUTPATIENT
Start: 2020-01-01 | End: 2020-01-01

## 2020-01-01 RX ORDER — PREGABALIN 50 MG/1
50 CAPSULE ORAL 2 TIMES DAILY
Qty: 60 CAPSULE | Refills: 2 | Status: SHIPPED | OUTPATIENT
Start: 2020-01-01 | End: 2020-01-01

## 2020-01-01 RX ORDER — EVEROLIMUS 5 MG/1
5 TABLET ORAL DAILY
Qty: 30 TABLET | Refills: 0 | Status: SHIPPED | OUTPATIENT
Start: 2020-01-01 | End: 2020-01-01

## 2020-01-01 RX ORDER — DULOXETIN HYDROCHLORIDE 60 MG/1
60 CAPSULE, DELAYED RELEASE ORAL DAILY
Qty: 30 CAPSULE | Refills: 3 | Status: SHIPPED | OUTPATIENT
Start: 2020-01-01 | End: 2020-01-01

## 2020-01-01 RX ORDER — LEVOTHYROXINE SODIUM 25 UG/1
25 TABLET ORAL DAILY
Qty: 90 TABLET | Refills: 3 | Status: SHIPPED | OUTPATIENT
Start: 2020-01-01

## 2020-01-01 RX ADMIN — TC 99M MEDRONATE 26 MCI.: 20 INJECTION, POWDER, LYOPHILIZED, FOR SOLUTION INTRAVENOUS at 11:44

## 2020-01-01 RX ADMIN — IOPAMIDOL 134 ML: 755 INJECTION, SOLUTION INTRAVASCULAR at 11:09

## 2020-01-01 RX ADMIN — DENOSUMAB 120 MG: 120 INJECTION SUBCUTANEOUS at 11:20

## 2020-01-01 RX ADMIN — TC 99M MEDRONATE 26.1 MCI.: 20 INJECTION, POWDER, LYOPHILIZED, FOR SOLUTION INTRAVENOUS at 09:56

## 2020-01-01 RX ADMIN — IOPAMIDOL 135 ML: 755 INJECTION, SOLUTION INTRAVENOUS at 10:26

## 2020-01-01 RX ADMIN — TC 99M MEDRONATE 24.4 MCI.: 20 INJECTION, POWDER, LYOPHILIZED, FOR SOLUTION INTRAVENOUS at 07:42

## 2020-01-01 RX ADMIN — GADOBUTROL 10 ML: 604.72 INJECTION INTRAVENOUS at 11:57

## 2020-01-01 RX ADMIN — DENOSUMAB 120 MG: 120 INJECTION SUBCUTANEOUS at 10:11

## 2020-01-01 RX ADMIN — GADOBUTROL 9 ML: 604.72 INJECTION INTRAVENOUS at 12:42

## 2020-01-01 RX ADMIN — TC 99M MEDRONATE 25.8 MCI.: 20 INJECTION, POWDER, LYOPHILIZED, FOR SOLUTION INTRAVENOUS at 10:00

## 2020-01-01 ASSESSMENT — PAIN SCALES - GENERAL
PAINLEVEL: SEVERE PAIN (6)
PAINLEVEL: SEVERE PAIN (6)
PAINLEVEL: SEVERE PAIN (7)
PAINLEVEL: NO PAIN (0)
PAINLEVEL: MODERATE PAIN (4)

## 2020-01-01 ASSESSMENT — MIFFLIN-ST. JEOR
SCORE: 1747.37
SCORE: 1759.16

## 2020-01-02 NOTE — PROGRESS NOTES
Omega called to report he had received notice that his [place] Cannabis program required renewing and would  in March.    I will pass this one to Dr Mills for next steps

## 2020-01-02 NOTE — TELEPHONE ENCOUNTER
"Oral Chemotherapy Monitoring Program    Primary Oncologist: Dr. Anne  Primary Oncology Clinic: [facility]   Cancer Diagnosis: RCC    Therapy History:  Lenvima 14mg by mouth daily (1x 10mg tab + 1x 4mg tab) and Afinitor 5mg by mouth daily  A1E6=3211/21/2019; experiences painful grade 3 HFS at Lenvima 18mg daily dose which required drug holding for 2 weeks.   C2D1= 12/26/2019; Lenvima restarted at dose reduction of 14mg daily      Drug Interaction Assessment: no new drug interactions identified.   Medication list checked (1/2): -Lenvima (14 MG Daily Dose) -Afinitor -MS Contin -HYDROmorphone -Folic Acid -Furosemide -Levothyroxine -Kenalog (Topical) -Urea (Topical) -Pregabalin -Multivitamins/Minerals -Senokot -Lidoderm -MiraLax -Calcium Carbonate and Vitamin D -Guaifenesin and Dextromethorphan -Flonase Allergy Relief -Loratadine -Cannabis -Melatonin -Ondansetron -Cholecalciferol    Lab Monitoring Plan  Lenvima:   CMP Q2 weeks for first 2 months, then monthly  TSH/T4 monthly  Mag monthly  Urine protein dipstick monthly for first 2 months, then every 2 months  EKG monthly for first 2 months, then every 2 months if high risk  Check BP weekly for first month, then Q2 weeks for second month, then monthly  Afinitor:   CBC monthly  CMP monthly  Lipid panel Q2 months  Check BP Q2 weeks for first 2 months, then monthly    Subjective/Objective:  Javon Bianchi is a 69 year old male contacted by phone for a follow-up visit for oral chemotherapy. I spoke to Omega who confirmed that he restarted therapy on 12/26/2019 at the reduced dose of Lenvima 14mg daily and continued dosing of Afinitor 5mg daily after holding for ~2 weeks due to grade 3 HFS. He reports that the peeling he was experienced is done and that his skin \"looks good.\" He reports that he has experienced a flare-up in his nerve pain, but outside of this he has not experienced side effects. He reports having ~6-7 days of therapy remaining at home while the [facility] " Free Drug application is in process given the high copayment amount.     ORAL CHEMOTHERAPY 12/4/2018 12/9/2018 12/26/2018 1/7/2019 7/23/2019 12/2/2019 1/2/2020   Drug Name Cabometyx (cabozantinib) Cabometyx (cabozantinib) Cabometyx (cabozantinib) Cabometyx (cabozantinib) Lenvatinib/Everolimus Lenvatinib/Everolimus Lenvatinib/Everolimus   Current Dosage 60mg 60mg 40mg 40mg 18mg/5mg 18mg/5mg (No Data)   Current Schedule Daily Daily Daily Daily Daily Daily Daily   Cycle Details Drug on Hold Drug on Hold Drug on Hold Continuous Continuous Continuous Continuous   Start Date of Last Cycle - 12/10/2018 12/12/2018 12/26/2018 - 11/21/2019 12/26/2019   Planned next cycle start date - - - 1/19/2019 - - -   Doses missed in last 2 weeks - - - - - 0 0   Adherence Assessment - - Non-adherent Non-adherent - Adherent Adherent   Reason for Non-adherence - - Drug on hold;Wanted to avoid side-effects Drug on hold;Wanted to avoid side-effects - - -   Adherence Intervention Recommended - - - None - - -   Adverse Effects - - - Palmar-plantar erythrodysethesia syndrome - No AE identified during assessment No AE identified during assessment   Nausea - - - - - - -   Pharmacist Intervention(nausea) - - - - - - -   Intervention(s) - - - - - - -   Palmar-plantar Erythrodysethesia syndrome[hand-foot syndrome] - - - Grade 1 - - -   Pharmacist Intervention(Palmar-plantar) - - - No - - -   Intervention(s) - - - - - - -   Oral Mucositis - - - - - - -   Pharmacist Intervention(mucositis) - - - - - - -   Home BPs - - all BPs<140/90 all BPs<140/90 - - -   Any new drug interactions? - - - No - No No   Is the dose as ordered appropriate for the patient? - - - Yes - Yes Yes   Is the patient currently in pain? - - - - - - -   Has the patient been assessed within the past 6 months for depression? - - - - - - -   Has the patient missed any days of school, work, or other routine activity? - - - - - - -       Last PHQ-2 Score on record:   PHQ-2 ( 1999 [employer])  "6/6/2019 11/15/2018   Q1: Little interest or pleasure in doing things 0 2   Q2: Feeling down, depressed or hopeless 0 3   PHQ-2 Score 0 5   Q1: Little interest or pleasure in doing things - More than half the days   Q2: Feeling down, depressed or hopeless - Nearly every day   PHQ-2 Score - 5       Vitals:  BP:   BP Readings from Last 1 Encounters:   12/13/19 103/64     Wt Readings from Last 1 Encounters:   12/13/19 92.7 kg (204 lb 4.8 oz)     Estimated body surface area is 2.14 meters squared as calculated from the following:    Height as of 12/12/19: 1.778 m (5' 10\").    Weight as of 12/13/19: 92.7 kg (204 lb 4.8 oz).    Labs:  _  Result Component Current Result Ref Range   Sodium 142 (12/13/2019) 133 - 144 mmol/L     _  Result Component Current Result Ref Range   Potassium 3.6 (12/13/2019) 3.4 - 5.3 mmol/L     _  Result Component Current Result Ref Range   Calcium 8.1 (L) (12/13/2019) 8.5 - 10.1 mg/dL     _  Result Component Current Result Ref Range   Magnesium 1.4 (L) (12/13/2019) 1.6 - 2.3 mg/dL     No results found for Phos within last 30 days.     _  Result Component Current Result Ref Range   Albumin 2.7 (L) (12/13/2019) 3.4 - 5.0 g/dL     _  Result Component Current Result Ref Range   Urea Nitrogen 10 (12/13/2019) 7 - 30 mg/dL     _  Result Component Current Result Ref Range   Creatinine 0.92 (12/13/2019) 0.66 - 1.25 mg/dL     _  Result Component Current Result Ref Range   AST 24 (12/13/2019) 0 - 45 U/L     _  Result Component Current Result Ref Range   ALT 16 (12/13/2019) 0 - 70 U/L     _  Result Component Current Result Ref Range   Bilirubin Total 0.4 (12/13/2019) 0.2 - 1.3 mg/dL     _  Result Component Current Result Ref Range   WBC 4.8 (12/13/2019) 4.0 - 11.0 10e9/L     _  Result Component Current Result Ref Range   Hemoglobin 10.9 (L) (12/13/2019) 13.3 - 17.7 g/dL     _  Result Component Current Result Ref Range   Platelet Count 242 (12/13/2019) 150 - 450 10e9/L     _  Result Component Current Result " Ref Range   Absolute Neutrophil 2.5 (12/13/2019) 1.6 - 8.3 10e9/L       Assessment:  Omega is tolerating therapy well.     Plan:  Continue Lenvima + Afinitor therapy as planned.     Follow-Up:  Week of 1/13 for lab work to be completed.      Rekha Clemente, PharmD  Oral Chemotherapy Monitoring Program  [facility]  [phone]

## 2020-01-03 NOTE — PROGRESS NOTES
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     Changes today:  - Pulmonary consulted, planning bronchoscopy 9/4  - Check urine histo, fungal serologies, LDH  - Continue Vanc/Zosyn/Azithro for now     #Recurrent pneumonia  Per previous notes, he has had several months of productive cough with mucus, starting early summer, associated with wheezing and chills. Treated by PCP with 10 days of amoxicillin, completed on 8/5. Felt better during that time but coughing never improved. CT chest done incidentally as part of staging 8/8 showed bilateral posterior opacities consistent with aspiration pneumonia. Upcoming surgery was canceled and he ws referred back to his PCP in [place] and was admitted 8/15-8/25 for pneumonia. Treated with vancomycin and pip-tazo x 7 days, but did not have resolution of symptoms despite 7days of antibiotics. On the 8th day, he was transitioned to levofloxacin. He developed a fever to 100.7 and so TMP-SMX was added and he did not have further fevers afterward. He had 5 more days of levofloxacin and TMP-SMX after discharge for total 14 days of antibiotics, completed on 8/30. Respiratory cultures only showed normal respiratory jacque. At palliative care appt today, he complained of increased fatigue, recurrent cough productive of green sputum over the last day, and mild dyspnea. CXR shows LLL consolidation, probably worse than seen on CT 8/8/19. CT PE showed no PE, but revealed consolidation in both posterior lower lobes concerning for aspiration with superimposed infection. He is not requiring O2, HDS, lactic acid 1.5, VBG wnl.  - Pulmonary consulted for consideration of bronchoscopy and assistance in diagnosis,  recs appreciated. Plan for bronchoscopy 9/4.  - Continue vancomycin, pip-tazo, and azithromycin (x9/3).  - Fungitell, galactomannan pending  - Gram stain with GPC. Follow sputum culture from 9/3.   - U leg negative. U histo/U strep pending  - SLP ok'd for regular diet     #MANASA  Baseline Cr around 0.65. Cr 1.17 at admission. Improving with IVF.  - IV NS @ 125 ml/hr  - Follow lytes and Cr     #Cancer related pain  - Continue PTA MS contin  - Continue PTA hydromorphone 2-4mg po q3h PRN  - Contnue PTA duloxetine 60mg daily and pregabalin 50mg BID  - Continue PTA bowel regimen     #Metastatic carcinoma with likely renal origin  #Metastatic disease in spine  #Left femur metastases  #L3 vertebral compression fracture  Presented with back pain, lower extremity weakness, and weight loss in mid 2018, eventually was found to have pathological L3 vertebral fracture on 8/2018. Biopsy showed poorly differentiated carcinoma with IHC c/w possible RCC. Other imaging negative for primary renal lesion. S/p palliative XRT. Started cabozantinib 9/28/18, which required dose reductions over the next several months because of HFS and poor tolerability. Switched to nivolumab because of progression in bone disease 2/2019. Received C1 2/11/19, C2 3/12/19, C3 5/21/19. Cycle 3 held for myalgias, which improved with prednisone. Started XRT this summer to lumbar spine. Also has been under evaluation for kyphoplasty vs starting lenvatinib/everolimus (has script, but has not started yet). Surgery delayed into mid September because of untreated pneumonia. Was to discuss with Dr. Anne 9/4.   - Keeping immune mediated toxicities on our differential      #Left leg edema  Secondary to metastatic disease. Takes furosemide PRN and JUVENTINO stockings/wrappings. LE US was negative for DVT in the ED.  - Hold furosemide with MANASA above     #Shoulder arthritis/pain  Complains of pain in both shoulders. Taking pain medications as above. Has also taken celecoxib in the  past with improvement.  - Pending bronchoscopy and pulm recs, may consider NSAIDs or steroids     #Anxiety - continue duloxetine  #Anemia - Baseline Hgb now has been around 8-9 range. Will monitor Hgb.     FEN  - IVF NS @ 125 ml/hr  - Regular diet  - Replace lytes per protocol    PPx  - VTE: Hold with procedures    Code status: Full code    Dispo: Anticipate will discharge home in 2-3 days pending further evaluation of pulmonary dysfunction.    Above plan discussed with staff physician, Dr. Jabier Manriquez ([facility]) CLAUDIA Treadwell  Hematology/Oncology  Pager: [phone]    Addendum:  Pt was seen and evaluated by me independently of the JEFFREY.  Reviewed labs, imaging and clinical course.  Personally reviewed CT chest which demonstrated worsening ground glass opacities in the lower lung fields bilaterally.  On exam, pt comfortable.  Heart regular.  Lungs with crackles at bases L > R.      Continue vanco/zosyn/azithro.  Consult pulmonary  We discussed the timing is atypical for immunotherapy related toxicity    Diana Eugene MD  .  Interval History   Afebrile overnight. Cough has improved, but was productive yesterday and was able to produce a sputum sample. Pain is most significant in his shoulders, elbows and wrists. Hopeful to have a final diagnosis on his recurrent lung problems. Wife at bedside and supportive.    Physical Exam   Temp: 97.9  F (36.6  C) Temp src: Oral BP: 106/66 Pulse: 75 Heart Rate: 80 Resp: 18 SpO2: 94 % O2 Device: None (Room air)    Vitals:    09/03/19 1707 09/03/19 2255   Weight: 93.8 kg (206 lb 12.8 oz) 92.5 kg (203 lb 14.4 oz)     Vital Signs with Ranges  Temp:  [95.9  F (35.5  C)-98.4  F (36.9  C)] 97.9  F (36.6  C)  Pulse:  [] 75  Heart Rate:  [62-86] 80  Resp:  [10-20] 18  BP: (106-133)/(65-82) 106/66  SpO2:  [94 %-99 %] 94 %  I/O last 3 completed shifts:  In: 980 [P.O.:180; I.V.:800]  Out: 950 [Urine:950]    Constitutional: Pleasant male seen sitting up in bed. No apparent distress,  and appears stated age.  Eyes: Lids and lashes normal, sclera clear, conjunctiva normal.  ENT: Normocephalic, oral cavity without erythema or exudates, gums normal and good dentition.   Respiratory: No increased work of breathing, crackles right base, clear left base.  Cardiovascular: Regular rate and rhythm, normal S1 and S2, and no murmur noted.  GI: No masses or scars. +BS. Soft. No tenderness on palpation.  Skin: Limited bruising, no bleeding, no redness, no warmth, no rashes, no lesions, no jaundice.  Extremities: There is no redness, warmth, or swelling of the joints. 1+ left lower extremity edema. No cyanosis.  Neurologic: Awake, alert, oriented to name, place and time.    Vascular access: PIV    Medications     - MEDICATION INSTRUCTIONS -       sodium chloride 125 mL/hr at 09/04/19 1140       acetaminophen  1,000 mg Oral TID     azithromycin  500 mg Intravenous Q24H     calcium carbonate 500 mg (elemental)  1 tablet Oral BID     dextromethorphan-guaiFENesin  1 tablet Oral Q12H     DULoxetine  60 mg Oral Daily     enoxaparin  40 mg Subcutaneous Q24H     Lidocaine  1-3 patch Transdermal Q24H     lidocaine   Transdermal Q8H     lidocaine   Transdermal Q24h     loratadine  10 mg Oral Daily     metoclopramide  5 mg Oral BID     morphine  30 mg Oral Daily at 2 pm     morphine  30 mg Oral At Bedtime     morphine  45 mg Oral QAM     omeprazole  40 mg Oral Daily     piperacillin-tazobactam  3.375 g Intravenous Q6H     pregabalin  50 mg Oral BID     senna-docusate  2 tablet Oral BID     sodium chloride (PF)  3 mL Intracatheter Q8H     vancomycin (VANCOCIN) IV  1,500 mg Intravenous Q24H     vitamin D3  2,000 Units Oral Daily       Data   ROUTINE IP LABS (Last four results)  BMP  Recent Labs   Lab 09/04/19  0820 09/03/19  1740    139   POTASSIUM 3.6 3.5   CHLORIDE 108 106   AUREA 6.9* 7.1*   CO2 24 23   BUN 10 13   CR 1.08 1.17   GLC 86 112*     CBC  Recent Labs   Lab 09/04/19  0820 09/03/19  1740   WBC 6.8 7.6    RBC 3.04* 3.14*   HGB 8.8* 9.1*   HCT 30.2* 30.4*   MCV 99 97   MCH 28.9 29.0   MCHC 29.1* 29.9*   RDW 17.4* 17.3*    217     INR  Recent Labs   Lab 09/04/19  0820   INR 1.25*          73 41

## 2020-01-06 NOTE — TELEPHONE ENCOUNTER
Pt called in to triage requesting refills of Lenvima and Afinitor. Stated he has 2 days left of Afinitor, and has run out of the Lenvima 10 mg tablets, but has 11 tablets left of the 4 mg. His daily dose is 14 mg, but he plans to take 3 tabs (12 mg) for the next 3 days if needed while he is waiting for his zee to go through. Spoke with oral chemo liason who will reach out again regarding zee. Routing to care team to advise pt if ok to take Lenvima at the lower dose.

## 2020-01-06 NOTE — TELEPHONE ENCOUNTER
Patient's Afinitor co-pay coming back at $3,000+. Applying patient for [employer] Patient Assistance Program, which includes an immediate 14-day supply of Afinitor supplied by [employer] while they process the application. Writer called patient to inform him of Afinitor patient assistance process, left voicemail requesting call back.    [facility]  [facility]   Oncology Pharmacy Liaison  [email or web address]   [phone] (phone)   [phone] (fax)

## 2020-01-14 NOTE — TELEPHONE ENCOUNTER
Oral Chemotherapy Monitoring Program     Placed call to patient in follow up of Everolimus and Lenvima therapy. I spoke to patient and his wife. I mentioned that we needed labs to be drawn soon to make sure his therapy is still safe. He will go to get his labs drawn in the next few days (likely going to get labs in [facility]). He said he would call our phone number to let us know when he gets his labs drawn. Patient mentioned recent dose reduction as drug supply was an issue. Patient took Lenvima at 12 mg daily x 3 days on 1/7 and then got a refill of his drug supply and resumed 14 mg daily. Patient did not miss any doses. No changes on the everolumus. Patient did mention new tingling sensation in feet and blisters on feet but nothing severe. Also, patient mentioned some edema in his foot/lower leg which I told patient to continue to watch and if it worsens to return to clinic. He mentioned needing to re-establish care with PCP (I stressed the importance of doing that up near [place]).      Patient has follow up on 1/21/2020 with palliative care and next appt for MD and labs on 2/12/2020.     Osmany Raymond, PharmD.  Oral Chemotherapy Monitoring Program  [phone]

## 2020-01-16 NOTE — ORAL ONC MGMT
Oral Chemotherapy Monitoring Program     Placed call to patient in follow up of Lenvima therapy. ANC is down to 1.1 as of last check. Omega said he feels good with no signs of fever or infection. He is using lotion on his hands and feet to reduce risk/severity of HFS. He endorses a weight gain of 10 lbs in the past two weeks. He attributes this to eating Carola candy but he also endorses some swelling in his legs. He is currently taking Lenvima 14mg daily. Omega said he was at the hospital today with his wife who is having some health problems of her own. I recommended that he see a local provider today to be evaluated for edema and hopefully get started on some medication to take off that extra fluid weight he seem to be carrying. He expressed understanding and thanked me for the call.     Ash MoralesD  [facility]  [phone]  January 16, 2020

## 2020-01-20 NOTE — TELEPHONE ENCOUNTER
"Anxiety last night with high BP.  Hydroxyzine taken with improvement.    Tonight having anxiety again at 6pm /72, pulse 57  168/93, pulse 70.  /101 recheck.      Normally BP runs 120s/ 70s, but has been creeping up since starting his \"tartgeted\" cancer drugs and thyroid medication.    Patient should see provider within three days.  Patient has an appointment in two days, and will call back for continued or worsening symptoms.    Nella James RN  [facility]              Reason for Disposition    Systolic BP  >= 160 OR Diastolic >= 100    Taking thyroid medications    Additional Information    Negative: Severe difficulty breathing (e.g., struggling for each breath, speaks in single words)    Negative: Bluish (or gray) lips or face now    Negative: Difficult to awaken or acting confused (e.g., disoriented, slurred speech)    Negative: Hysterical or combative behavior    Negative: Sounds like a life-threatening emergency to the triager    Negative: Chest pain    Negative: Palpitations, skipped heart beat, or rapid heart beat    Negative: Cough is main symptom    Negative: Suicide thoughts, threats, attempts, or questions    Negative: Depression is main problem or symptom (e.g., feelings of sadness or hopelessness)    Negative: [1] Difficulty breathing AND [2] persists > 10 minutes AND [3] not relieved by reassurance provided by triager    Negative: [1] Lightheadedness or dizziness AND [2] persists > 10 minutes AND [3] not relieved by reassurance provided by triager    Negative: [1] Known or suspected alcohol or drug abuse AND [2] feeling very shaky (i.e., visible tremors of hands)    Negative: Patient sounds very sick or weak to the triager    Negative: Difficult to awaken or acting confused (e.g., disoriented, slurred speech)    Negative: Severe difficulty breathing (e.g., struggling for each breath, speaks in single words)    Negative: [1] Weakness of the face, arm or leg on one side of the " body AND [2] new onset    Negative: [1] Numbness (i.e., loss of sensation) of the face, arm or leg on one side of the body AND [2] new onset    Negative: [1] Chest pain lasts > 5 minutes AND [2] history of heart disease  (i.e., heart attack, bypass surgery, angina, angioplasty, CHF)    Negative: [1] Chest pain AND [2] took nitrogylcerin AND [3] pain was not relieved    Negative: Sounds like a life-threatening emergency to the triager    Negative: [1] Systolic BP  >= 160 OR Diastolic >= 100 AND [2] cardiac or neurologic symptoms (e.g., chest pain, difficulty breathing, unsteady gait, blurred vision)    Negative: [1] Pregnant > 20 weeks AND [2] new hand or face swelling    Negative: [1] Pregnant > 20 weeks AND [2] BP Systolic BP  >= 140 OR Diastolic >= 90    Negative: [1] Systolic BP  >= 200 OR Diastolic >= 120  AND [2] having NO cardiac or neurologic symptoms    Negative: [1] Postpartum < 6 weeks AND [2] BP Systolic BP  >= 140 OR Diastolic >= 90    Negative: [1] Systolic BP  >= 180 OR Diastolic >= 110 AND [2] missed most recent dose of blood pressure medication    Negative: Systolic BP  >= 180 OR Diastolic >= 110    Negative: Ran out of BP medications    Negative: Symptoms interfere with work or school    Negative: Requesting to talk to a counselor (e.g., mental health worker, psychiatrist)    Negative: Patient sounds very upset or troubled to the triager    Negative: [1] Symptoms of anxiety or panic AND [2] has not been evaluated for this by physician    Negative: [1] Started on anti-anxiety medication AND [2] no relief    Negative: [1] Significant weight loss (or gain) AND [2] not dieting    Protocols used: HIGH BLOOD PRESSURE-A-AH, ANXIETY AND PANIC ATTACK-A-AH

## 2020-01-21 NOTE — PATIENT INSTRUCTIONS
Thank you for coming into the Palliative Care Clinic today.     1. U/S to look for clot in the leg.   2. Increase senna back to 4 twice daily.   3. Restart Cymbalta at 30mg daily.    4. MRI lumbar spine in [place], will coordinate for tomorrow.    5. Will recertify for cannabis.     Morphine refill sent.     Return to clinic 1 month for a follow up.     You can reach the Palliative Care Team during business hours at the following number: [phone] (Palliative Clinic Nurse Line).     To reach the Palliative Care Provider on-call after-hours or on holidays and weekends, call: [phone].  Please note that we are not able to provide pain medication refills on evenings or weekends.     ==================================================================    [facility]   The [facility] Palliative Care Team is committed to treating your pain and other symptoms. This handout is for all patients treated with opioid medications in our clinics.     What are opioid medicines?   Opioid medications are used to ease some types of pain and shortness of breath. They are sometimes called narcotics. They include: Morphine (MS Contin, Roxanol), Oxycodone (OxyContin, OxyFast, Percocet), Hydrocodone (Vicodin, Norco), Hydromorphone (Dilaudid), Fentanyl (Duragesic), Methadone (Dolophine).   Are opioid medicines safe to take?   Opioid medicines are safe when you follow the directions from your doctor or medical provider.  Opioids can cause serious side effects and become unsafe if you do not follow your instructions.   To make sure you are taking opioid medicines safely, we may ask you to bring in your pill bottles or to allow us to test your urine. Our goal is to keep you safe and to improve your ability to function & be active. If we don t think opioids are safely doing that, we will work with you to taper you off of them.   Do not drive unless your medical provider  tells you it is safe.   Do not take opioids with alcohol or anxiety/sleep medicines unless your doctor tells you it is ok to do so.   Are opioids addictive?   Addiction means you crave a drug and have trouble limiting the amount you use.   Addiction is more likely to happen if you take opioids to relieve stress or to feel altered. If you or your loved one feels this way when taking opioid medicines, let your medical provider know. We may refer you for additional assessment or services if this is a concern.   Your body will get used to the opioid medicines if you take them for more than two weeks in a row. This means if you stop them suddenly, you may have withdrawal. If this occurs, you will feel uncomfortable (nausea, diarrhea, increased pain). This does not mean you are addicted. Never stop taking your pain medicines all at once unless your medical provider tells you to. If you think you need less medicine, your medical provider will help you to safely lower your dose.   What is the safest way to store opioids?   Keep your medicines in a safe place away from children, teens, pets, and visitors. Be careful about who knows that you have these medicines.   How do I get rid of my old opioids?   Opioids should be brought to your [facility] drop-off site, or you can purchase a disposal kit from your local pharmacy. If neither of these options is available, the Food and Drug Administration recommends that you flush unused opioids down the toilet.   Do not share or sell your pain medicines. This is illegal and very dangerous. We cannot prescribe opioid pain medicines to you if do this.   How do I get refills?   Opioid medicines need signed paper prescriptions. This means it may take longer to refill than other medicines. Our clinic cannot prescribe them on weekends or at night.     Give us one week s notice when requesting a refill. This gives us the time we need to get it signed and back to you. It may be possible to mail  prescriptions to you.

## 2020-01-21 NOTE — LETTER
"    1/21/2020         RE: Javon Bianchi  [address]  [facility] [address]        Dear Colleague,    Thank you for referring your patient, Javon Bianchi, to the [facility]. Please see a copy of my visit note below.    Oncology Rooming Note    January 21, 2020 3:15 PM   Javon Bianchi is a 69 year old male who presents for:    Chief Complaint   Patient presents with     Oncology Clinic Visit     Initial Vitals: /74   Pulse 69   Temp 97.6  F (36.4  C) (Oral)   Resp 16   Ht 1.778 m (5' 10\")   SpO2 99%   BMI 29.31 kg/m    Estimated body mass index is 29.31 kg/m  as calculated from the following:    Height as of this encounter: 1.778 m (5' 10\").    Weight as of 12/13/19: 92.7 kg (204 lb 4.8 oz). Body surface area is 2.14 meters squared.  Data Unavailable Comment: Data Unavailable   No LMP for male patient.  Allergies reviewed: Yes  Medications reviewed: Yes    Medications: Medication refills not needed today.    Clinical concerns: no      R[facility] Thorpe [facility]              Palliative Care Outpatient Clinic Progress Note    Patient Name:  Javon Bianchi  Primary Provider:  LIANNE Wellington    Chief Complaint/Identifying Information:  Metastatic Renal Cell Carcinoma off chemotherapy for many months due to recurrent PNA, spine surgery.   History of L3 pathologic fracture found at diagnosis, now s/p kyphoplasty-radiofrequency ablation of L1, and left hemilaminectomy of L3/L4.    [facility] Outpatient Palliative Care Opioid Prescribing Safety Plan     Opioid Safety Education: Reviewed by Nan Gonzalez  on 11/8/2018  Opioid Risk Assessment: Performed by Nan Gonzalez  on 11/8/2018 .  ORT score of 0.   Mood Disorder Assessment: Performed by Carly Mills on 04/25/19.  No concerns.    reviewed with every prescription, last reviewed by Carly Mills on  01/21/20     Additional recommendations based on patient's prognosis and indication for opioids include the " "following:     Expected prognosis: shorter  Risk: Low or Medium (ORT 0-7)  No further recommendations.     Interim History:  Javon Bianchi 69 year old male returns to be seen by palliative care today, accompanied by his wife Suzie.  Javon Bianchi was last seen by me six weeks ago, at which time medications were consolidated (Cymbalta tapered, omeprazole stopped).  Since he was last seen, he has been receiving lenvatinib and afinitor.  He notes that he had an anxiety attack last weekend.  Anxiety attacks had been a part of his experience in December 2018, had been going to the ED for this. Had not been an issue until the past week.     Is now off the Cymbalta.  Tingling is in the LLE chronically, now in the RLE. Feels that his pain in his legs is worsening, limiting his ability to move around. Feels that his legs have slowly gotten weaker in the past few weeks, associated with pain but not due to the pain.     He has had edema in the LLE, which worsened over the last few weeks.  Both sides have been edematous, but he has had an acute worsening recently.  Driving back and forth from [employer] to come to his appointments.  Still taking Lyrica twice daily. Had been using Jamar hose, which have been painful to use. Using his cane to ambulate.     He notes that he continues to struggle with constipation. Taking miralax and senna 2 twice daily.  No groin numbness or incontinence.      Nausea comes and goes, connected with anxiety.      Social History:  Pertinent changes to social history/social situation since last visit: None.   Social History     Tobacco Use     Smoking status: Never Smoker     Smokeless tobacco: Never Used   Substance Use Topics     Alcohol use: Not Currently     Drug use: Yes     Types: Marijuana     Comment: medical cannabis               Physical Exam:   Vitals were reviewed  /74   Pulse 69   Temp 97.6  F (36.4  C) (Oral)   Resp 16   Ht 1.778 m (5' 10\")   Wt 99.8 kg (220 lb)   SpO2 99% "   BMI 31.57 kg/m     General: Alert, comfortable appearing male in no acute distress.   Eyes: Pupils equal and 2mm, sclera clear.   ENT: MMM.   Cardiac:  Normal rate, regular.    Resp: Lungs are CTAB, unlabored on room air. Good air entry bilaterally.   Abd: Soft, non-distended, no TTP, active bowel sounds.   Ext: Bilateral pitting pretibial edema present, L>R.   Neuro: No facial asymmetry.  Spontaneous movements grossly non-focal. Alert and with normal appearing sensorium. Using a wheelchair for transport, cane to assist with ambulation around the room.  Hip flexor strength is 4-/5 on the left, 4/5 on the right.  Bilateral hip flexion causes pain in the upper thighs, unclear if this is a true weakness vs limited ROM due to pain.  Sensation to light touch is diminished grossly to bilateral lower extremities.       Impression & Recommendations & Counseling:  Omega is a 69 year old man with metastatic renal cell carcinoma who had a prolonged break from cancer treatments due to recurrent PNA, surgery, who has now restarted his chemotherapy.  He presents to clinic today with asymmetric leg edema (in setting of malignancy, frequent long car rides).  Obtained an U/S which did not show DVT.  Equally concerning is the leg weakness vs pain which has been present over the past few weeks, not associated with incontinence.  Given his known history of lumbar disease, will obtain a lumbar MRI.  Discussed that if he is experiencing acute change in sensation or strength, or incontinence overnight, he should present to the ED.     Re: Anxiety, this is in the setting of progressing terminal illness and Cymbalta cessation.  Will restart Cymbalta.     1. U/S to evaluate for DVT  2. Increase senna back to 4 twice daily.   3. Restart Cymbalta at 30mg daily.    4. MRI lumbar spine in [place] per patient preference.   5. Recertify for cannabis.     Morphine refill.     Additional information reviewed today:   No Known  Allergies  Current Outpatient Medications   Medication Sig Dispense Refill     acetaminophen (TYLENOL) 500 MG tablet Take 1,000 mg by mouth 3 times daily 0800, 1400, 2200       calcium carbonate 600 mg-vitamin D 400 units (CALTRATE) 600-400 MG-UNIT per tablet Take 1 tablet by mouth 3 times daily       Dextromethorphan-guaiFENesin (MUCINEX DM)  MG 12 hr tablet Take 1 tablet by mouth 2 times daily as needed        diazepam (VALIUM) 5 MG tablet Take 1 pill 30 min before MRI and a second pill 2 minutes prior. 10 tablet 0     fluticasone (FLONASE) 50 MCG/ACT nasal spray Spray 1 spray into both nostrils daily as needed for rhinitis or allergies       folic acid (FOLVITE) 1 MG tablet Take 1 tablet (1 mg) by mouth daily . Future refills by PCP. 30 tablet 0     furosemide (LASIX) 20 MG tablet Take 1 tablet (20 mg) by mouth daily 30 tablet 1     HYDROmorphone (DILAUDID) 2 MG tablet Take 1-2 tablets (2-4 mg) by mouth every 3 hours as needed for pain 60 tablet 0     lenvatinib 14 MG, 10 mg + 4 mg capsules, (LENVIMA) 14 mg combo capsule Take 14 mg dose (10 mg + 4 mg capsule) by mouth daily. 60 capsule 0     levothyroxine (SYNTHROID/LEVOTHROID) 25 MCG tablet Take 1 tablet (25 mcg) by mouth daily 30 tablet 3     lidocaine (LIDODERM) 5 % patch Place 1-3 patches onto the skin every 24 hours Apply to painful areas for 12 hours on, 12 hours off. 30 patch 3     loratadine (CLARITIN) 10 MG tablet Take 10 mg by mouth daily (with lunch)        medical cannabis (Patient's own supply.  Not a prescription) Cannabis Heater liquid 2 mL orally in AM   Cannabis capsule - 1 capsule daily at bedtime.     (This is NOT a prescription, and does not certify that the patient has a qualifying medical condition for medical cannabis.  The purpose of this order is  to document that the patient reports taking medical cannabis.)       melatonin 5 MG tablet Take 10 mg by mouth At Bedtime       morphine (MS CONTIN) 15 MG CR tablet Take one tab midday and  at bedtime.  Also taking MS Contin 30mg tab in AM. 60 tablet 0     morphine (MS CONTIN) 30 MG CR tablet Take 1 tablet (30 mg) by mouth daily Combine with MS Contin 15mg in afternoon and at bedtime. 30 tablet 0     multivitamin, therapeutic (THERA-VIT) TABS tablet Take 1 tablet by mouth At Bedtime       ondansetron (ZOFRAN) 8 MG tablet Take 8 mg by mouth every 8 hours as needed for nausea       polyethylene glycol (MIRALAX/GLYCOLAX) Packet Take 17 g by mouth daily as needed        pregabalin (LYRICA) 50 MG capsule Take 1 capsule (50 mg) by mouth 2 times daily 60 capsule 1     senna-docusate (SENOKOT-S/PERICOLACE) 8.6-50 MG tablet Take 2-4 tablets by mouth 2 times daily       triamcinolone (KENALOG) 0.1 % external cream Apply topically 2 times daily 30 g 3     Urea 40 % CREA Externally apply topically 2 times daily 28 g 3     vitamin D3 (CHOLECALCIFEROL) 2000 units (50 mcg) tablet Take 2,000 Units by mouth daily       everolimus (AFINITOR) 5 MG tablet Take 1 tablet (5 mg) by mouth daily for 28 days 28 tablet 0     Past Medical History:   Diagnosis Date     Anemia      Bone metastasis (H) 09/28/2018     Cough      Pulmonary nodule      Renal cell carcinoma, right (H) 09/28/2018     Past Surgical History:   Procedure Laterality Date     BRONCHOSCOPY (RIGID OR FLEXIBLE), DIAGNOSTIC N/A 9/4/2019    Procedure: Bronchoscopy, With Bronchoalveolar Lavage;  Surgeon: Burton Toure MD;  [facility]     CHOLECYSTECTOMY       HC REMOVAL HEEL SPUR, CALCANEUS  2004     OPTICAL TRACKING SYSTEM BIOPSY BONE RADIO FREQUENCY ABLATION N/A 11/1/2019    Procedure: Kyphoplasty-Radiofrequency ablation Lumbar 1; Minimally Invasive Surgery  left hemilaminectomy Lumbar 3-Lumbar 4;  Surgeon: Vickey Brower MD;  Location:  OR       Key Data Reviewed:  LABS:   Lab Results   Component Value Date    WBC 4.8 12/13/2019    HGB 10.9 (L) 12/13/2019    HCT 35.4 (L) 12/13/2019     12/13/2019     12/13/2019    POTASSIUM  3.6 12/13/2019    CHLORIDE 110 (H) 12/13/2019    CO2 24 12/13/2019    BUN 10 12/13/2019    CR 0.92 12/13/2019    GLC 88 12/13/2019    SED 87 (H) 03/29/2019    NTBNPI 283 09/03/2019    TROPI <0.015 09/03/2019    AST 24 12/13/2019    ALT 16 12/13/2019    ALKPHOS 82 12/13/2019    BILITOTAL 0.4 12/13/2019    INR 1.25 (H) 09/04/2019     IMAGING:   U/S of the lower extremities done today, did not show DVT.      [place]  - Use of [place] substances consistent with history.     Thank you for continuing to involve me in the care of this patient.     Carly Mills MD / Palliative Medicine / Pager [phone] / After-Hours Answering Service [phone] / Main Palliative Clinic - [facility] [phone] / [facility] Inpatient Team Consult Pager [phone] (answered 8am-430pm M-F)    Time spent:40 minutes, >50% spent in counseling/coordination of care.       Again, thank you for allowing me to participate in the care of your patient.        Sincerely,        Carly Mills MD

## 2020-01-21 NOTE — PROGRESS NOTES
"Oncology Rooming Note    January 21, 2020 3:15 PM   Javon Bianchi is a 69 year old male who presents for:    Chief Complaint   Patient presents with     Oncology Clinic Visit     Initial Vitals: /74   Pulse 69   Temp 97.6  F (36.4  C) (Oral)   Resp 16   Ht 1.778 m (5' 10\")   SpO2 99%   BMI 29.31 kg/m   Estimated body mass index is 29.31 kg/m  as calculated from the following:    Height as of this encounter: 1.778 m (5' 10\").    Weight as of 12/13/19: 92.7 kg (204 lb 4.8 oz). Body surface area is 2.14 meters squared.  Data Unavailable Comment: Data Unavailable   No LMP for male patient.  Allergies reviewed: Yes  Medications reviewed: Yes    Medications: Medication refills not needed today.    Clinical concerns: no      Ruth Thorpe CMA            "

## 2020-01-21 NOTE — Clinical Note
"    1/21/2020         RE: Javon Bianchi  [address]  [facility] [address]        Dear Colleague,    Thank you for referring your patient, Javon Bianchi, to the [facility]CER [facility]. Please see a copy of my visit note below.    Oncology Rooming Note    January 21, 2020 3:15 PM   Javon Bianchi is a 69 year old male who presents for:    Chief Complaint   Patient presents with     Oncology Clinic Visit     Initial Vitals: /74   Pulse 69   Temp 97.6  F (36.4  C) (Oral)   Resp 16   Ht 1.778 m (5' 10\")   SpO2 99%   BMI 29.31 kg/m    Estimated body mass index is 29.31 kg/m  as calculated from the following:    Height as of this encounter: 1.778 m (5' 10\").    Weight as of 12/13/19: 92.7 kg (204 lb 4.8 oz). Body surface area is 2.14 meters squared.  Data Unavailable Comment: Data Unavailable   No LMP for male patient.  Allergies reviewed: Yes  Medications reviewed: Yes    Medications: Medication refills not needed today.    Clinical concerns: no      [facility] Thorpe [facility]              Palliative Care Outpatient Clinic Progress Note    Patient Name:  Javon Bianchi  Primary Provider:  LIANNE Wellington    Chief Complaint/Identifying Information:  Metastatic Renal Cell Carcinoma off chemotherapy for many months due to recurrent PNA, spine surgery.   History of L3 pathologic fracture found at diagnosis, now s/p kyphoplasty-radiofrequency ablation of L1, and left hemilaminectomy of L3/L4.    [facility] Outpatient Palliative Care Opioid Prescribing Safety Plan     Opioid Safety Education: Reviewed by Nan Gonzalez  on 11/8/2018  Opioid Risk Assessment: Performed by Nan Gonzalez  on 11/8/2018 .  ORT score of 0.   Mood Disorder Assessment: Performed by Carly Mills on 04/25/19.  No concerns.    reviewed with every prescription, last reviewed by Caryl Mills on  01/21/20     Additional recommendations based on patient's prognosis and indication for opioids include the " "following:     Expected prognosis: shorter  Risk: Low or Medium (ORT 0-7)  No further recommendations.     Interim History:  Javon Bianchi 69 year old male returns to be seen by palliative care today, accompanied by his wife Suzie.  Javon Bianchi was last seen by me six weeks ago, at which time medications were consolidated (Cymbalta tapered, omeprazole stopped).  Since he was last seen, he has been receiving lenvatinib and afinitor.  He notes that he had an anxiety attack last weekend.  Anxiety attacks had been a part of his experience in December 2018, had been going to the ED for this. Had not been an issue until the past week.     Is now off the Cymbalta.  Tingling is in the LLE chronically, now in the RLE. Feels that his pain in his legs is worsening, limiting his ability to move around. Feels that his legs have slowly gotten weaker in the past few weeks, associated with pain but not due to the pain.     He has had edema in the LLE, which worsened over the last few weeks.  Both sides have been edematous, but he has had an acute worsening recently.  Driving back and forth from [employer] to come to his appointments.  Still taking Lyrica twice daily. Had been using Jamar hose, which have been painful to use. Using his cane to ambulate.     He notes that he continues to struggle with constipation. Taking miralax and senna 2 twice daily.  No groin numbness or incontinence.      Nausea comes and goes, connected with anxiety.      Social History:  Pertinent changes to social history/social situation since last visit: None.   Social History     Tobacco Use     Smoking status: Never Smoker     Smokeless tobacco: Never Used   Substance Use Topics     Alcohol use: Not Currently     Drug use: Yes     Types: Marijuana     Comment: medical cannabis               Physical Exam:   Vitals were reviewed  /74   Pulse 69   Temp 97.6  F (36.4  C) (Oral)   Resp 16   Ht 1.778 m (5' 10\")   Wt 99.8 kg (220 lb)   SpO2 99% "   BMI 31.57 kg/m     General: Alert, comfortable appearing male in no acute distress.   Eyes: Pupils equal and 2mm, sclera clear.   ENT: MMM.   Cardiac:  Normal rate, regular.    Resp: Lungs are CTAB, unlabored on room air. Good air entry bilaterally.   Abd: Soft, non-distended, no TTP, active bowel sounds.   Ext: Bilateral pitting pretibial edema present, L>R.   Neuro: No facial asymmetry.  Spontaneous movements grossly non-focal. Alert and with normal appearing sensorium. Using a wheelchair for transport, cane to assist with ambulation around the room.  Hip flexor strength is 4-/5 on the left, 4/5 on the right.  Bilateral hip flexion causes pain in the upper thighs, unclear if this is a true weakness vs limited ROM due to pain.  Sensation to light touch is diminished grossly to bilateral lower extremities.       Impression & Recommendations & Counseling:  Omega is a 69 year old man with metastatic renal cell carcinoma who had a prolonged break from cancer treatments due to recurrent PNA, surgery, who has now restarted his chemotherapy.  He presents to clinic today with asymmetric leg edema (in setting of malignancy, frequent long car rides).  Obtained an U/S which did not show DVT.  Equally concerning is the leg weakness vs pain which has been present over the past few weeks, not associated with incontinence.  Given his known history of lumbar disease, will obtain a lumbar MRI.  Discussed that if he is experiencing acute change in sensation or strength, or incontinence overnight, he should present to the ED.     Re: Anxiety, this is in the setting of progressing terminal illness and Cymbalta cessation.  Will restart Cymbalta.     1. U/S to evaluate for DVT  2. Increase senna back to 4 twice daily.   3. Restart Cymbalta at 30mg daily.    4. MRI lumbar spine in [place] per patient preference.   5. Recertify for cannabis.     Morphine refill.     Additional information reviewed today:   No Known  Allergies  Current Outpatient Medications   Medication Sig Dispense Refill     acetaminophen (TYLENOL) 500 MG tablet Take 1,000 mg by mouth 3 times daily 0800, 1400, 2200       calcium carbonate 600 mg-vitamin D 400 units (CALTRATE) 600-400 MG-UNIT per tablet Take 1 tablet by mouth 3 times daily       Dextromethorphan-guaiFENesin (MUCINEX DM)  MG 12 hr tablet Take 1 tablet by mouth 2 times daily as needed        diazepam (VALIUM) 5 MG tablet Take 1 pill 30 min before MRI and a second pill 2 minutes prior. 10 tablet 0     fluticasone (FLONASE) 50 MCG/ACT nasal spray Spray 1 spray into both nostrils daily as needed for rhinitis or allergies       folic acid (FOLVITE) 1 MG tablet Take 1 tablet (1 mg) by mouth daily . Future refills by PCP. 30 tablet 0     furosemide (LASIX) 20 MG tablet Take 1 tablet (20 mg) by mouth daily 30 tablet 1     HYDROmorphone (DILAUDID) 2 MG tablet Take 1-2 tablets (2-4 mg) by mouth every 3 hours as needed for pain 60 tablet 0     lenvatinib 14 MG, 10 mg + 4 mg capsules, (LENVIMA) 14 mg combo capsule Take 14 mg dose (10 mg + 4 mg capsule) by mouth daily. 60 capsule 0     levothyroxine (SYNTHROID/LEVOTHROID) 25 MCG tablet Take 1 tablet (25 mcg) by mouth daily 30 tablet 3     lidocaine (LIDODERM) 5 % patch Place 1-3 patches onto the skin every 24 hours Apply to painful areas for 12 hours on, 12 hours off. 30 patch 3     loratadine (CLARITIN) 10 MG tablet Take 10 mg by mouth daily (with lunch)        medical cannabis (Patient's own supply.  Not a prescription) Cannabis Heater liquid 2 mL orally in AM   Cannabis capsule - 1 capsule daily at bedtime.     (This is NOT a prescription, and does not certify that the patient has a qualifying medical condition for medical cannabis.  The purpose of this order is  to document that the patient reports taking medical cannabis.)       melatonin 5 MG tablet Take 10 mg by mouth At Bedtime       morphine (MS CONTIN) 15 MG CR tablet Take one tab midday and  at bedtime.  Also taking MS Contin 30mg tab in AM. 60 tablet 0     morphine (MS CONTIN) 30 MG CR tablet Take 1 tablet (30 mg) by mouth daily Combine with MS Contin 15mg in afternoon and at bedtime. 30 tablet 0     multivitamin, therapeutic (THERA-VIT) TABS tablet Take 1 tablet by mouth At Bedtime       ondansetron (ZOFRAN) 8 MG tablet Take 8 mg by mouth every 8 hours as needed for nausea       polyethylene glycol (MIRALAX/GLYCOLAX) Packet Take 17 g by mouth daily as needed        pregabalin (LYRICA) 50 MG capsule Take 1 capsule (50 mg) by mouth 2 times daily 60 capsule 1     senna-docusate (SENOKOT-S/PERICOLACE) 8.6-50 MG tablet Take 2-4 tablets by mouth 2 times daily       triamcinolone (KENALOG) 0.1 % external cream Apply topically 2 times daily 30 g 3     Urea 40 % CREA Externally apply topically 2 times daily 28 g 3     vitamin D3 (CHOLECALCIFEROL) 2000 units (50 mcg) tablet Take 2,000 Units by mouth daily       everolimus (AFINITOR) 5 MG tablet Take 1 tablet (5 mg) by mouth daily for 28 days 28 tablet 0     Past Medical History:   Diagnosis Date     Anemia      Bone metastasis (H) 09/28/2018     Cough      Pulmonary nodule      Renal cell carcinoma, right (H) 09/28/2018     Past Surgical History:   Procedure Laterality Date     BRONCHOSCOPY (RIGID OR FLEXIBLE), DIAGNOSTIC N/A 9/4/2019    Procedure: Bronchoscopy, With Bronchoalveolar Lavage;  Surgeon: Burton Toure MD;  [facility]     CHOLECYSTECTOMY       HC REMOVAL HEEL SPUR, CALCANEUS  2004     OPTICAL TRACKING SYSTEM BIOPSY BONE RADIO FREQUENCY ABLATION N/A 11/1/2019    Procedure: Kyphoplasty-Radiofrequency ablation Lumbar 1; Minimally Invasive Surgery  left hemilaminectomy Lumbar 3-Lumbar 4;  Surgeon: Vickey Brower MD;  Location:  OR       Key Data Reviewed:  LABS:   Lab Results   Component Value Date    WBC 4.8 12/13/2019    HGB 10.9 (L) 12/13/2019    HCT 35.4 (L) 12/13/2019     12/13/2019     12/13/2019    POTASSIUM  3.6 12/13/2019    CHLORIDE 110 (H) 12/13/2019    CO2 24 12/13/2019    BUN 10 12/13/2019    CR 0.92 12/13/2019    GLC 88 12/13/2019    SED 87 (H) 03/29/2019    NTBNPI 283 09/03/2019    TROPI <0.015 09/03/2019    AST 24 12/13/2019    ALT 16 12/13/2019    ALKPHOS 82 12/13/2019    BILITOTAL 0.4 12/13/2019    INR 1.25 (H) 09/04/2019     IMAGING:   U/S of the lower extremities done today, did not show DVT.      [place]  - Use of [place] substances consistent with history.     Thank you for continuing to involve me in the care of this patient.     Carly Mills MD / Palliative Medicine / Pager [phone] / After-Hours Answering Service [phone] / Main Palliative Clinic - [facility] [phone] / [facility] Inpatient Team Consult Pager [phone] (answered 8am-430pm M-F)    Time spent:40 minutes, >50% spent in counseling/coordination of care.       Again, thank you for allowing me to participate in the care of your patient.        Sincerely,        Carly Mills MD

## 2020-01-21 NOTE — PROGRESS NOTES
Medical Assistant Note:  Javon Arias presents today for lab draw.    Patient seen by provider today: Yes: Lina.   present during visit today: Not Applicable.    Concerns: No Concerns.    Procedure:  Lab draw site: LAC, Needle type: Butterfly, Gauge: 23.    Post Assessment:  Labs drawn without difficulty: Yes.    Discharge Plan:  Departure Mode: Wheelchair.    Face to Face Time: 4 min.    Shari Schoenberger, CMA

## 2020-01-22 NOTE — ORAL ONC MGMT
Oral Chemotherapy Monitoring Program     Call placed to Omega to review labs with patient from 1/21/20. Labs were appropriate with WBC 4.0 and ANC 1.9. Radha St stated patient did not need labs again until he was seen by her on 2/12/20. At the end of our conversation patient told me that he stopped taking his Lenvima and Everolimus on Monday. His last dose of both drugs was 1/19/20. He stated he was starting to have bad tingling in his fingers and his feet were peeling and would have blisters on them soon if he didn't stop. He wondered what the plan should be. He stated he is using triamcinolone cream BID. I stated I would converse with MARCO Hu and get back to him.    Plan per Radha is to hold for 1 week and then Oral Chemo Pharmacist will follow up with patient to see how his hands and feet are. Also, needs to used a Urea based cream 3-4 times a day on both hands and feet and can continue using steroid cream.    Call relayed to Omega with above info and he understood to hold med until Monday when we speak with him. He will start using Urea based cream as well. Reviewed the importance of wearing proper fitting shoes and not having either feet or hands in extreme temperature conditions. Patient understood all and no questions remain.    Vaishnavi Flores, Pharm.D., [facility]  [phone]  01/22/20

## 2020-01-22 NOTE — TELEPHONE ENCOUNTER
Contacted patient to review the results from his lumbar MRI which returned this afternoon.  There are no acute changes to explain Omega's bilateral leg pain and weakness.  Metastatic bony disease with under-treated pain is most likely the cause at this time.  He continues to take MS Contin 15mg in AM, 15mg in afternoon, and 30mg at bedtime.  He has started to take more hydromorphone (2mg at night only), and has not been taking this medication during the day.     Reviewed the following recommendations:   -Okay to restart furosemide 20mg daily for bilateral lower extremity edema.  Elevation also encouraged.   -Will continue MS Contin at current dosing.   -Encouraged to use his PRN hydromorphone and journal this over the coming days, with plan to evaluate needs early next week and adjust long-acting opioid as needed.     Will send refill of hydromorphone to pharmacy.     Also reviewed recommendations with Patrizia, patient's wife (patient took Valium as pre-medication prior to MRI).     Carly Mills MD  Palliative Medicine

## 2020-01-27 NOTE — TELEPHONE ENCOUNTER
"Oral Chemotherapy Monitoring Program     Placed call to Omega in follow up of Afinitor/Lenvima oral chemotherapy as he has been holding therapy since 1/19/2020 due to increased tingling in his hands and his feet peeling on the verge of blistering. During the phone discussion on 1/22/20 with a colleague, Omega was instructed to use Urea based cream 3-4 times/day and continue using the triamcinolone steroid cream as needed.     I discussed with Omega how things have been going since holding therapy for one week. Omega reports today that he has mostly regained the ability to walk on his feet, but he still has areas of pain and \"hot spots.\" He endorses using Urea cream 2-3x/day. He also reports that his leg remains swollen despite use of furosemide 20mg daily and feels he is still retaining fluid. He questions whether he can increase the dose from 20mg to 40mg daily to assist with this.     He also reports being willing to restart therapy given that he has started to be able to walk on his feet again, but inquires about the potential for a dose reduction. I sent a message to Radha regarding the plan for both the furosemide dosing and continuation of Afinitor/Lenvima therapy.       Assessment/Plan:   Plan per Radha, \"I would be OK with the decrease in the lenvima to 10 mg daily. He can go up to 40 mg of Lasix in the AM, but needs to have  CMP checked in one week then to watch his creatinine and potassium.\"    Contacted the patient to discuss this care plan. Omega and his wife, Patrizia, were in agreement and understanding of the plan. They were instructed to contact the clinic should issues arise with restarting therapy. They plan to repeat labs on 2/4/20 in [place] and will follow-up with Radha in clinic on 2/12/20.       Follow-Up:   2/4 labs  2/12 labs and office visit with Radha Clemente PharmD  Oral Chemotherapy Monitoring Program  [facility]  [phone]  January 27, 2020  "

## 2020-01-30 NOTE — TELEPHONE ENCOUNTER
"Called Patrizia to further assess Pt.   States that Left leg has been numb since he \"got cancer 2 years ago\". Pt is concerned that after a procedure by Dr Brower on 11/1. Numbness is \"on the bottom of the ankle and the shin bone\".     States that ontiveros bone has new pain from the knee down pain rated 5-6/10, elevating leg alleviates pain somewhat, walking and sitting upright in a chair exacerbates the pain. No discoloration or temperature differential. Pt had a US at [facility] last week (1/21) to RO DVT. Pt has no other concerning Sx.    Pt wondering if he should reduce Lasix to 20 mg/day again with weight loss.    Informed Pt that 1) no Monday labs require fasting 2) palliative care team would be in touch regarding pain control and 3) would inquire about lasix dosing.    Paging Dr Anne.    Spoke with Dr Anne, as long as Pt is not feeling dried out and no dehydration Sx should continue 40mg/day. Can refill if needed. Called Patrizia to inform, she verbalized understanding. Has enough medication, will contact us if refill is necessary. Reiterated to ask for triage with Sx.  "

## 2020-01-30 NOTE — TELEPHONE ENCOUNTER
Oncology RN Care Coordination Note:     Patient's wife left a 3 minute symptomatic voicemail for this writer at 11:11am stating:    Omega has lost 13# since 1/21.  He was 220 on 1/21 and now he is 207.  He is on 40mg of lasix, he was on 20mg before and he still has fluid in his thighs and butt.  Does he need to have his potassium monitored?  He is supposed to have labs on Monday, but we don't know if they are supposed to be fasting labs or not and if they should be sooner than that because of the weight loss?  Or if he should keep taking 40m of Lasix?  His legs continue to be in pain, constantly at 5/10.  He takes the morphine, but he doesn't know how often.  He continues to have numbness in his shin and his leg gave out yesterday.  Also, his anxiety issue since starting Cymbalta.    Writer has routed this to triage for further assessment, also updated Radha St PA-C and Dr. Anne.    Clair López RN BSN   [facility]  Nurse Coordinator

## 2020-01-30 NOTE — PROGRESS NOTES
Call to Omega in [facility] to request  by Oncology team  to check in on his pain    Omega states that the swelling to his BLE has decreased since Lasix increased to 40mgs on 1/27,patient states that his pain is present in his lower extremities and different in character now swelling has reduced. denies any new numbness, tingling or weakness to his lower extremities or the presence of urinary or bowel incontinence.    Omega presently is taking x2 5mg doses of Oxycodone in a 24 hour period to treat his pain, I educated Omega and Patrizia that he can increase his prn Oxycodone to 5-10mgs q3 hours as needed for pain as prescribed by Dr Mills. I educated them on safety and signs and symptoms of sedation and safe administration of Opioids including how to evaluate the need to take pain medications.    Omega says that when he stands to walk he occasionally experiences a sharp pain to his shin and he has stumbled on two occasions in the past two weeks. He is taking measures to ensure he is steady standing before he walks to ensure he does not fall.    Omega and Patrizia report that his anxiety has reduced since starting Duloxetine     I  will check in with Omega to re assess pain tomorrow in response to  Increasing the use of prn medications.

## 2020-01-31 NOTE — PROGRESS NOTES
Call to patient to reassess pain    Patient states he is taking Dilaudid prn more frequesntly as prescribed  and finding  this effective for his joint and leg pain, he denies the presence of sedation. He has not further concerns at this time.

## 2020-02-03 NOTE — TELEPHONE ENCOUNTER
Rep from [facility] called stating that they had been faxed a prescription of Lenvima for patient but was not signed by the prescriber. Writer will have new Rx sent to [facility].    [facility]  [facility]   Oncology Pharmacy Liaison  [email or web address]   [phone] (phone)   [phone] (fax)

## 2020-02-04 NOTE — ORAL ONC MGMT
Oral Chemotherapy Monitoring Program     Primary Oncologist: Dr. Anne  Primary Oncology Clinic: [facility]  Cancer Diagnosis: RCC    Therapy: Lenvima (lenvatinib) 10mg daily + Afinitor (everolimus) 5mg daily  C1D1: 11/21/19    Held 1/19-1/27. Restarted at reduced dose on 1/27/20.    Drug Interaction Assessment: No new drug interactions identified.     Lab Monitoring Plan  CBC/CMP monthly and as directed    Subjective/Objective:  Javon Bianchi is a 69 year old male contacted by phone for a follow-up visit for oral chemotherapy. I spoke with Omega and his wife, Patrizia, on a shared call. Omega and Patrizia indicated that he has felt quite a bit better on the reduced dose of Lenvima, with complete resolution of his symptoms of hand-foot syndrome. He stated that he has not experienced any soreness or blistering of his hands since restarting therapy on 1/27/20. He has not used any urea-based cream on his hands since he hasn't felt the need for it. He continues to feel constipated, requiring close management with senna and Miralax. Of note, Omega is using hydromorphone 3-4 times per day for pain -- typically 3 doses throughout the day and one dose during the night if he wakes up. He is experiencing anxiety related to his treatment but feels it is manageable. He takes hydroxyzine as needed for episodes of anxiety, which occur 1-2 times per week. Omega recently increased his dose of furosemide to 40mg qAM as directed. His weight has fluctuated, but he thinks he was down to about 202 lbs at his local lab visit on 2/3/20. Other weights in his chart are 200 lbs (11/20/19), 205 lbs (12/12/19), and 220 lbs (1/21/20). His edema has improved dramatically; according to Patrizia, he is not carrying extra fluid in his lower legs or buttocks anymore.    Lab results from 02/03/2020:  HEMOGRAM/DIFFERENTIAL (02/03/2020 10:00 AM CST)  Component Value Ref Range   WBC 5.1 3.2 - 11.0 10*9/L   RBC 3.19 (L) 4.14 - 5.76 10*12/L   HGB 9.9 (L)  12.9 - 16.9 g/dL   HCT 31.7 (L) 38.4 - 49.7 %   MCV 99.4 (H) 81.4 - 99.0 fL   MCH 31.0 26.7 - 33.1 pg   MCHC 31.2 (L) 31.6 - 35.5 g/dL   RDW 16.0 (H) 11.3 - 14.6 %    130 - 375 10*9/L   Neutrophils % 51.7 %   Lymphocytes % 6.1 %   Monocytes % 40.8 %   Eosinophils % 0.6 %   Basophils % 0.4 %   Immature Granulocytes % 0.4 %   Neutrophils Absolute 2.6 1.5 - 7.6 10*9/L   Lymphocytes Absolute 0.3 (L) 0.8 - 3.3 10*9/L   Monocytes Absolute 2.1 (H) 0.2 - 0.9 10*9/L   Eosinophils Absolute 0.0 0.0 - 0.4 10*9/L   Basophils Absolute 0.0 0.0 - 0.1 10*9/L   Immature Granulocytes Absolute 0.02 0.00 - 0.06 10*9/L     THYROID SCREEN WITH REFLEX (02/03/2020 10:00 AM CST)  Component Value Ref Range   Thyroid Stimulating Hormone 2.56 0.40 - 3.99 uIU/mL      COMPREHENSIVE METABOLIC PANEL (02/03/2020 10:00 AM CST)  Component Value Ref Range   Sodium 141 134 - 143 mEq/L   Potassium 4.2 3.4 - 5.1 mEq/L   Chloride 107 99 - 110 mEq/L   Carbon Dioxide 25 19 - 29 mEq/L   Anion Gap 9.0 3.0 - 15.0 mEq/L   Blood Urea Nitrogen 11 5 - 24 mg/dL   Creatinine 0.95 0.70 - 1.20 mg/dL   Glomerular Filtration Rate >60 >60 mL/min/1.73 m*2   Calcium 8.5 8.4 - 10.5 mg/dL   Glucose 100 (H) 70 - 99 mg/dL   Protein, Total 6.4 6.0 - 8.0 g/dL   Albumin 3.4 (L) 3.5 - 5.0 g/dL   Alkaline Phosphatase 78 40 - 150 IU/L   Aspartate Aminotransferase 36 10 - 40 IU/L   Alanine Aminotransferase 21 6 - 40 IU/L   Bilirubin, Total 0.3 0.2 - 1.2 mg/dL     MAGNESIUM (02/03/2020 10:00 AM CST)  Magnesium 1.8        Assessment:  Renal Cell Carcinoma: Doing well on Lenvima/Afinitor. Symptoms of hand-foot syndrome much better at reduced dose. Omega feels confident in his ability to manage constipation with senna and Miralax without further intervention.     Weight loss: Lots of fluctuation since November 2019. Weight loss of 18 lbs between 1/21/20 (220 lbs, in clinic) and 2/4/20 (202 lbs, self-reported). Some of this is attributable to fluid loss from increased furosemide  dose. Other factors might include dietary changes, differences in clothing or outerwear when being weighed in clinic, or differences in scales at this clinic, his local clinic, and at home.     Furosemide/labs: Creatinine and potassium WNL. Other labs stable from previous.      Plan:  Renal Cell Carcinoma: Continue Lenvima and Afinitor at current doses. Education provided on urea-based cream, use as needed if symptoms of HFS return.     Weight loss: Continue to monitor at clinic visits.     Furosemide/labs: Continue current dose (40mg qAM) since creatinine and potassium are stable.       Follow-Up:  Review clinic visit, 2/12/20 with Radha St.      Refill Due:  2/12/20     Marquis Solis  Oncology Pharmacy Intern   [facility]   [phone]

## 2020-02-12 NOTE — PROGRESS NOTES
[facility]INIC  FOLLOW-UP VISIT NOTE  Date of visit: Feb 12, 2020        REASON FOR VISIT: mRCC to the bone (with no primary renal mass), here for routine follow up  HPI:  Javon Bianchi is a 69 year old male with a history significant for hyperlipidemia who had been in his usual health until he developed back pain in May of 2018. At that time, the pain was a stabbing like in the middle of lower back with burning like pain wrapping around this left hip to knee area. No trauma. He had MRI on 5/23/18 showed mild degenerative change with bulging disks L2-S1. A small benign hemangioma was noted in L2, no other marrow signal abnormality. Since then, the pain failed to improve despite steroid injections. In addition, he lost 25 lbs unintentionally since June along with chills a few times a day. Over time, the pain got worse to the point he could not ambulate after short distance and developed muscle weakness in lower extremities over time requiring a walker. Finally, he went to ER ([facility] in [place]) on 8/23/18, CT A/P was unremarkable. He was referred to neurology with obtaining MRI L spine. MRI on 8/30/18 revealed a pathological fracture at L3 vertebral body with height loss at 40% and diffuse involvement of a neoplastic process. IR guided biopsy on 8/31/18 showed poorly differentiated carcinoma. IHC was suggestive of possible renal cell carcinoma per pathology report (Renal cell immunochemistry is positive and along with the negative staining for CK7 and CK20 suggests the possibility of a renal carcinoma, however most renal cell carcinomas also express PAX8 which is negative in this case). Of note, CT chest/abdomen/pelvis at OSH was negative for primary lesion. The patient was evaluated by neurosurgery who recommended no surgical intervention. He was discharged with TLSO brace and walker. He had palliative XRT locally from 9/11 for 10 fractions (Done in [place]). He was  started on cabozantinib 60 mg initially, requiring dose reduction to 20 mg for issues with hand foot syndrome and poor tolerability. Switched to nivolumab due to progression in bone disease on 2/6/19. Had to hold Cycle #3 due to myalgia, which improved with prednisone.    ONCOLOGY HISTORY:    Cabozantinib: Started 60 mg daily on September 28, 2018.  Dec 10, 2018: dose reduced cabozantinib to 40 mg a day.  Jan 17, 2019: dose reduced cabozantinib to 29 mg a day.  Feb 11, 2019: C1D1 Nivolumab   3/12/19: c2 Nivolumab  4/8/19: held Nivolumab due to myalgias, started prednisone  11//21/19: started lenvatinib (and then 11/25 started afinitor) took a break for Tgiving  1/27- dropped to 10 mg of the lenvatinib due to HFS     Interval history:   Omega has been on the combination of lenvatinib + afinitor for about 3 months.  He has had a few breaks in the lenvatinib for HFS.  Recently, we cut back to 10 mg and he has been on this for 2 weeks and feels its tolerable. Mucositis is mostly gone, no GERD. Intermittent nausea, takes zofran once at bedtime. ANABELLA has been persistent, they are wrapping legs during day and taking 40 mg of Lasix.  Fairly sedentary during the day, rests 1-2 x a day and will take a nap as well.  Hips continue to hurt, moving is harder than it used to be. Sitting and resting, no pain, but walking is 6/10. Taking 30-45-45 of MS Contin, taking dilaudid 3-4 x day.  Doing PT 2 x week, doesn't feel that they do much for him. Had been off of Cymbalta for 3 months, was placed back on it 1/21 from [facility], its helping but still getting anxiety around 4-5 pm each day.  Hydroxyzine helps and distraction from Suzie.       Wt Readings from Last 10 Encounters:   02/12/20 98.6 kg (217 lb 6.4 oz)   01/21/20 99.8 kg (220 lb)   12/13/19 92.7 kg (204 lb 4.8 oz)   12/12/19 93 kg (205 lb 1.6 oz)   12/12/19 92.8 kg (204 lb 9.6 oz)   11/20/19 90.7 kg (200 lb)   11/19/19 90.9 kg (200 lb 6.4 oz)   11/13/19 84.8 kg (187 lb)    11/01/19 85.5 kg (188 lb 7.9 oz)   10/31/19 87 kg (191 lb 12.8 oz)     MEDICATIONS     Current Outpatient Medications   Medication Sig     acetaminophen (TYLENOL) 500 MG tablet Take 1,000 mg by mouth 3 times daily 0800, 1400, 2200     calcium carbonate 600 mg-vitamin D 400 units (CALTRATE) 600-400 MG-UNIT per tablet Take 1 tablet by mouth 3 times daily     Dextromethorphan-guaiFENesin (MUCINEX DM)  MG 12 hr tablet Take 1 tablet by mouth 2 times daily as needed      DULoxetine (CYMBALTA) 30 MG capsule Take 1 capsule (30 mg) by mouth daily     fluticasone (FLONASE) 50 MCG/ACT nasal spray Spray 1 spray into both nostrils daily as needed for rhinitis or allergies     furosemide (LASIX) 20 MG tablet Take 1 tablet (20 mg) by mouth daily (Patient taking differently: Take 40 mg by mouth daily )     HYDROmorphone (DILAUDID) 2 MG tablet Take 1-2 tablets (2-4 mg) by mouth every 3 hours as needed for pain     lenvatinib 14 MG, 10 mg + 4 mg capsules, (LENVIMA) 14 mg combo capsule Take 14 mg dose (10 mg + 4 mg capsule) by mouth daily.     levothyroxine (SYNTHROID/LEVOTHROID) 25 MCG tablet Take 1 tablet (25 mcg) by mouth daily     lidocaine (LIDODERM) 5 % patch Place 1-3 patches onto the skin every 24 hours Apply to painful areas for 12 hours on, 12 hours off.     loratadine (CLARITIN) 10 MG tablet Take 10 mg by mouth daily (with lunch)      medical cannabis (Patient's own supply.  Not a prescription) Cannabis Heater liquid 2 mL orally in AM   Cannabis capsule - 1 capsule daily at bedtime.     (This is NOT a prescription, and does not certify that the patient has a qualifying medical condition for medical cannabis.  The purpose of this order is  to document that the patient reports taking medical cannabis.)     melatonin 5 MG tablet Take 10 mg by mouth At Bedtime     morphine (MS CONTIN) 15 MG CR tablet Take one tab midday and at bedtime.  Also taking MS Contin 30mg tab in AM.     morphine (MS CONTIN) 30 MG CR tablet Take  1 tablet (30 mg) by mouth daily Combine with MS Contin 15mg in afternoon and at bedtime.     multivitamin, therapeutic (THERA-VIT) TABS tablet Take 1 tablet by mouth At Bedtime     ondansetron (ZOFRAN) 8 MG tablet Take 8 mg by mouth every 8 hours as needed for nausea     polyethylene glycol (MIRALAX/GLYCOLAX) Packet Take 17 g by mouth daily as needed      pregabalin (LYRICA) 50 MG capsule Take 1 capsule (50 mg) by mouth 2 times daily     senna-docusate (SENOKOT-S/PERICOLACE) 8.6-50 MG tablet Take 2-4 tablets by mouth 2 times daily     Urea 40 % CREA Externally apply topically 2 times daily     vitamin D3 (CHOLECALCIFEROL) 2000 units (50 mcg) tablet Take 2,000 Units by mouth daily     diazepam (VALIUM) 5 MG tablet Take 1 pill 30 min before MRI and a second pill 2 minutes prior. (Patient not taking: Reported on 2020)     everolimus (AFINITOR) 5 MG tablet Take 1 tablet (5 mg) by mouth daily for 28 days     folic acid (FOLVITE) 1 MG tablet Take 1 tablet (1 mg) by mouth daily . Future refills by PCP. (Patient not taking: Reported on 2020)     triamcinolone (KENALOG) 0.1 % external cream Apply topically 2 times daily (Patient not taking: Reported on 2020)     No current facility-administered medications for this visit.         PHYSICAL EXAM:   Temp: 97.9  F (36.6  C) Temp src: Oral BP: 136/71 Pulse: 63   Resp: 16 SpO2: 97 %      ECOG performance status of 2.   GENERAL: Seated in chair in NAD.  HEENT: No icterus, no pallor. Moist mucous membranes. Oropharynx is clear.   NECK: Supple, normal ROM  LUNGS: Bilateral clear to ausculation  CARDIOVASCULAR: Regular rate and rhythm, no murmurs, gallops or rubs.   ABDOMEN: Soft, nontender and nondistended.   EXTREMITIES: No cyanosis, no clubbing, 2+ pitting edema to the knees bilaterally.   NEUROLOGIC: No focal deficits.  Skin:feet/hands are looking good now  LABS/IMAGIN/13/2019 08:13 2020 15:06 2020 08:08   WBC 4.8 4.0 5.6   Hemoglobin 10.9 (L)  10.6 (L) 10.3 (L)   Hematocrit 35.4 (L) 34.5 (L) 34.4 (L)   Platelet Count 242 194 217   RBC Count 3.40 (L) 3.46 (L) 3.50 (L)    (H) 100 98   MCH 32.1 30.6 29.4   MCHC 30.8 (L) 30.7 (L) 29.9 (L)   RDW 15.1 (H) 17.2 (H) 16.4 (H)   Diff Method Manual Differential Automated Method Manual Differential   % Neutrophils 53.1 48.3 70.8   % Lymphocytes 7.8 46.3 5.3   % Monocytes 37.4 3.8 23.9   % Eosinophils 1.7 1.3 0.0   % Basophils 0.0 0.0 0.0   % Immature Granulocytes  0.3    Nucleated RBCs  0    Absolute Neutrophil 2.5 1.9 4.0      2/12/2020 08:08   Sodium 138   Potassium 4.0   Chloride 106   Carbon Dioxide 28   Urea Nitrogen 13   Creatinine 1.00   GFR Estimate 75   GFR Estimate If Black 87   Calcium 8.7   Anion Gap 4   Magnesium 1.9   Albumin 3.2 (L)   Protein Total 7.1   Bilirubin Total 0.4   Alkaline Phosphatase 78   ALT 25   AST 33   TSH 3.27       CT CAP IMPRESSION:   1. Extensive diffuse sclerotic bony metastasis again identified most  marked in the visualized spine, bony pelvis, and proximal femurs. The  majority of the metastases are stable however there is increased  sclerosis involving the T5 and T11 vertebral bodies. Uncertain if this  is due to post treatment response or progression.  2. Soft tissue metastasis abutting the posterior iliac bones  bilaterally slightly decreased in size.  3. Benign angiomyolipoma left kidney. No enhancing renal lesions  identified.  4. Few stable bilateral pulmonary nodules with the largest in the  right upper lobe having a mean diameter of 4.5 mm.   5. Bilateral calcified pulmonary granulomata.  6. Bibasilar atelectasis and/or patchy infiltrate has decreased. A  more focal nodular area of consolidation right lower lobe medially  remains stable compared to most recent exam.   7. Fatty infiltration of the liver.  8. Postop cholecystectomy.   9. Please see above findings for numerous additional details    MRI PELVIS IMPRESSION:    1. Redemonstration extensive osseous  metastases without substantial  interval change in comparison to MRI from November 2019.  2. Mild interval decrease in sizes of the likely soft tissue  metastasis in the gluteal musculatures.    BONE SCAN IMPRESSION: In this patient with history of renal cell carcinoma:  1. Mildly increased radiotracer uptake involving the proximal left  tibia.  2. Unchanged multifocal radiotracer involving the thoracic and lumbar  spine, posterior right 10th rib, left femur, and pelvis. These  correspond to sclerotic metastatic lesions seen on 2/11/20 dated CT.  3. Radiotracer uptake involving the anterior sixth rib correlates with  healing fracture as previously described.         Assessment /Plan   69 year old male with metastatic infiltrative lesion in L3 extending to L2 and L4, likely primary malignancy being renal cell carcinoma. However he does not have any evidence of a primary renal mass on imaging. Based on the available pathology data, we offered treatment for renal cell carcinoma with cabozantinib (CABOSUN data demonstrating cabozantinib superiority over sunitinib in the initial setting). PAX8 stain was negative, and the clear cell features were not seen (it was a poorly differentiated carcinoma). His scans were stable, however, tolerability remained an issue and he then progressed. He then started on Nivolumab, as there is encouraging data with IO in non-clear cell carcinoma. He developed myalgias and we had to stop therapy and start him on prednisone.  Omega had a break over the summer of 2019.  He had a terrible PNA from August to September.  He then underwent a laminectomy on 11/1 and was admitted 11/11-11/13 for constipation.  He officially started the TKI + mTORi on 11/21/19 week and has been on for 3 months now.     Omega Suzie and his son and I reviewed the results of the CT and MRI.  We looked at actual images of the bone scan.  Overall, I am pleased that his imaging is stable.  We reviewed the lesion in the left  tibia is more notable today.  He has been having pain here.  We discussed staying on this regimen for now and pursuing XRT to the left tibial lesion.      Omega had a grade 3 toxicity currently on his feet and now is at 10 mg of the lenvatinib.  We will stay at this dose for now.  Hands/feet doing great.     Edema: ongoing ANABELLA that worsened this winter.  Protein levels are stable, no new kidney or cardiac or liver issues.  Omega continues to be fairly sedentary, but I think his prior pelvis radiation has also led to some of this lymphedema in his legs.  They are doing compression socks and ace wraps and lasix daily.     He'll continue with MS Contin 30-45-45 and dilaudid prn for back pain and joint aches.     He'll continue with salt/soda for mucositis.      Xgeva-given today. Continue on oral calcium + D    Will RTC in 8 weeks for labs and bone scan and to see Dr Dayana St PA-C

## 2020-02-12 NOTE — NURSING NOTE
"Oncology Rooming Note    February 12, 2020 9:09 AM   Javon Bianchi is a 69 year old male who presents for:    Chief Complaint   Patient presents with     Blood Draw     labs drawn. VS taken     Oncology Clinic Visit     RETURN VISIT; RENAL CANCER FOLLOW UP      Initial Vitals: /71 (BP Location: Right arm, Patient Position: Sitting, Cuff Size: Adult Regular)   Pulse 63   Temp 97.9  F (36.6  C) (Oral)   Resp 16   Ht 1.778 m (5' 10\")   Wt 98.6 kg (217 lb 6.4 oz)   SpO2 97%   BMI 31.19 kg/m   Estimated body mass index is 31.19 kg/m  as calculated from the following:    Height as of this encounter: 1.778 m (5' 10\").    Weight as of this encounter: 98.6 kg (217 lb 6.4 oz). Body surface area is 2.21 meters squared.  Severe Pain (6) Comment: Data Unavailable   No LMP for male patient.  Allergies reviewed: Yes  Medications reviewed: Yes    Medications: MEDICATION REFILLS NEEDED TODAY. Provider was notified. Patient needs a refill on Lyrica    Pharmacy name entered into [facility]:    [facility] - [place], [place] - [address] [facility] - [place] - [address] [facility] - [place], [place] - [address]    Clinical concerns:     Patient needs a refill on Lyrica    Lasix - with next refill he needs to be refilled at [facility].     March 1st leaving for [place] - will need refills by the the 26th. (Synthroid, Morphine 15 mg & 30 mg Dilaudid)    Labs - are we still needing them every 2 weeks?    Calcium shot, is this still needed?    About 2 weeks ago he started to take his hydroxyzine out of an old script he has at home. He would like to have something for anxiety.  Ruthie St was notified.      Amisha Basurto              "

## 2020-02-12 NOTE — PROGRESS NOTES
Oncology RN Care Coordination Note:     Omega was seen by Radha St PA-C today and she requested I fax her progress note to [facility] in [place], [place].     I was also sent a message from Lyndsay [place] clinic coordinator requesting patient's imaging from 2/11/2020 be placed on a disc and sent to [facility] [address] [place] [address].     I have faxed Radha's progress note to Dr. Cameron Husain at [phone] @ [facility] in [place].  The fax was electronically sent successfully.     I obtained the imaging on a disc for Omega (CT, bone scan and MR) and it has been placed in the outgoing mail for tomorrow.     Clair López, RN BSN   [facility]  Nurse Coordinator

## 2020-02-12 NOTE — PROGRESS NOTES
Blood drawn via vpt by Paramedic in lab. VS taken. Pt checked into next appointment.   Haider Cordova  Paramedic

## 2020-02-19 NOTE — TELEPHONE ENCOUNTER
Patient requiring a refill of Lidocaine patches.    States that he has been more anxious recently and states that he has been using Hydroxyzine for this, he states that it makes him feel a little drowsy  When he combines with his Dilaudid.    He asks whether the Cymbalta could be increased to help with anxiety. I will ask Dr Mills if this would be appropriate.

## 2020-02-27 NOTE — TELEPHONE ENCOUNTER
Health Call Center    Phone Message    May a detailed message be left on voicemail: yes     Reason for Call: Order(s): Other:   Reason for requested: PT Orders  Date needed: ASAP  Provider name: Dr. Brower   Pt is requesting to extend his PT orders as his order has . Please fax order to [facility] at [phone].       Action Taken: Message routed to:  [facility] ([facility] Ortho    Travel Screening: Not Applicable

## 2020-03-13 NOTE — TELEPHONE ENCOUNTER
Patient's PAN zee exhausted of funds, and PAN zee currently closed for re-enrollment. This writer called [facility] to check status of application that had been sent in early January. [employer] rep stated that they needed a new patient application submitted. Writer completed patient application and submitted. Writer ran test claim for 14 day supply of Everolimus utilizing the remaining funds of the PAN zee which came to a co-pay of roughly $3. Writer will continue to check status of [employer] application.    Luis Aquino  [facility]   Oncology Pharmacy Liaison  [email or web address]   [phone] (phone)   [phone] (fax)

## 2020-03-13 NOTE — TELEPHONE ENCOUNTER
This writer called patient to inform him that he will be able to order Lenvima from [facility] Assistance through the end of the year. Writer gave patient [facility]'s phone number ([phone]). Writer also informed patient that application to [employer] had been resubmitted today for Afinitor. Patient understood to expect a determination in the next 1-2 weeks.    Writer asked patient how much medication he currently has on hand. Patient stated that as of today, he has 24 capsules of Lenvima, and 14 tablets of Everolimus.     Writer informed patient that when he needs a refill of Everolimus, he can have a 14 day supply filled through [employer] for a $3 copay using the remainder of his PAN zee. Patient had no further questions at this time.    Luis Aquino  [facility] [place] [facility]   Oncology Pharmacy Liaison  [email or web address]   [phone] (phone)   [phone] (fax)

## 2020-03-16 NOTE — TELEPHONE ENCOUNTER
Luis Aquino  [facility]   Oncology Pharmacy Liaison  [email or web address]   [phone] (phone)   [phone] (fax)

## 2020-03-19 NOTE — TELEPHONE ENCOUNTER
Yohannes, [employer] Rep, stated that the trial of Afinitor should be set to ship tomorrow, 3/20.     [facility]   Oncology Pharmacy Liaison  [email or web address]   [phone] (phone)   [phone] (fax)

## 2020-04-01 NOTE — TELEPHONE ENCOUNTER
4/1/2020 - Patient called needing a refill or his oral chemo medication. Care team has been updated.    -Lyndsay RUDD

## 2020-04-02 NOTE — TELEPHONE ENCOUNTER
Luis Aquino  [facility]   Oncology Pharmacy Liaison  [email or web address]   [phone] (phone)   [phone] (fax)

## 2020-04-02 NOTE — TELEPHONE ENCOUNTER
Writer called patient to inform him of [employer] Patient Assistance approval through the end of the year. Patient stated that he had received the 14 day trial shipment of Afinitor, and that he would need a refill on Afinitor by 04/09/2020. Writer informed patient that he would contact [employer] to inform them of this timeline, and assured patient that [facility] would be able to deliver medication prior to this date.    Patient had no further questions at this time regarding the status of [employer] application, and understood to expect deliver coordination efforts from [employer].    Luis Aquino  [facility] [place] [facility]   Oncology Pharmacy Liaison  [email or web address]   [phone] (phone)   [phone] (fax)

## 2020-04-07 NOTE — PATIENT INSTRUCTIONS
Recommendations:  Pain:   - Continue MS Contin 30mg every morning, 15mg afternoon, 15mg before bed   - continue scheduled tylenol 1000mg three times a day with MS Contin  - continue hydromorphone 2mg BID PRN    Constipation:   - continue senna 4 tabs BID  - Increase miralax and prune juice mix so you take it every day and if not having daily bowel movement, increase to twice a day.    Mood / coping   - schedule an appointment with palliative care , Mitra Alvares for counseling to help better support you.   - continue duloxetine 60mg at bedtime     Follow up in 6 weeks.       Continue to contact JEANNINE Kilgore, RN palliative care nurse clinician for any questions at [phone]  After hours. Will connect you with on call MD. [phone]       Follow up:      Reasons to Call    If you are having worsening/uncontrolled symptoms we want you to call!    You or your other physicians make any changes to medications we have prescribed.        Madhuri Feliciano MD  Palliative Care Physician        [facility]   The [facility] Palliative Care Team is committed to treating your pain and other symptoms. This handout is for all patients treated with opioid medications in our clinics.     What are opioid medicines?   Opioid medications are used to ease some types of pain and shortness of breath. They are sometimes called narcotics. They include: Morphine (MS Contin, Roxanol), Oxycodone (OxyContin, OxyFast, Percocet), Hydrocodone (Vicodin, Norco), Hydromorphone (Dilaudid), Fentanyl (Duragesic), Methadone (Dolophine).   Are opioid medicines safe to take?   Opioid medicines are safe when you follow the directions from your doctor or medical provider.  Opioids can cause serious side effects and become unsafe if you do not follow your instructions.   To make sure you are taking opioid medicines safely, we may ask you to bring in your pill bottles  or to allow us to test your urine. Our goal is to keep you safe and to improve your ability to function & be active. If we don t think opioids are safely doing that, we will work with you to taper you off of them.   Do not drive unless your medical provider tells you it is safe.   Do not take opioids with alcohol or anxiety/sleep medicines unless your doctor tells you it is ok to do so.   Are opioids addictive?   Addiction means you crave a drug and have trouble limiting the amount you use.   Addiction is more likely to happen if you take opioids to relieve stress or to feel altered. If you or your loved one feels this way when taking opioid medicines, let your medical provider know. We may refer you for additional assessment or services if this is a concern.   Your body will get used to the opioid medicines if you take them for more than two weeks in a row. This means if you stop them suddenly, you may have withdrawal. If this occurs, you will feel uncomfortable (nausea, diarrhea, increased pain). This does not mean you are addicted. Never stop taking your pain medicines all at once unless your medical provider tells you to. If you think you need less medicine, your medical provider will help you to safely lower your dose.   What is the safest way to store opioids?   Keep your medicines in a safe place away from children, teens, pets, and visitors. Be careful about who knows that you have these medicines.   How do I get rid of my old opioids?   Opioids should be brought to your [facility] drop-off site, or you can purchase a disposal kit from your local pharmacy. If neither of these options is available, the Food and Drug Administration recommends that you flush unused opioids down the toilet.   Do not share or sell your pain medicines. This is illegal and very dangerous. We cannot prescribe opioid pain medicines to you if do this.   How do I get refills?   Opioid medicines need signed paper prescriptions. This means  it may take longer to refill than other medicines. Our clinic cannot prescribe them on weekends or at night.     Give us one week s notice when requesting a refill. This gives us the time we need to get it signed and back to you. It may be possible to mail prescriptions to you.

## 2020-04-07 NOTE — PROGRESS NOTES
"Javon Bianchi is a 69 year old male who is being evaluated via a billable telephone visit.      The patient has been notified of following:     \"This telephone visit will be conducted via a call between you and your physician/provider. We have found that certain health care needs can be provided without the need for a physical exam.  This service lets us provide the care you need with a short phone conversation.  If a prescription is necessary we can send it directly to your pharmacy.  If lab work is needed we can place an order for that and you can then stop by our lab to have the test done at a later time.    Telephone visits are billed at different rates depending on your insurance coverage. During this emergency period, for some insurers they may be billed the same as an in-person visit.  Please reach out to your insurance provider with any questions.    If during the course of the call the physician/provider feels a telephone visit is not appropriate, you will not be charged for this service.\"    Patient has given verbal consent for Telephone visit?  Yes    Javon Bianchi complains of    Chief Complaint   Patient presents with     Telephone     Renal Cell Carcinoma       I have reviewed and updated the patient's Allergy List and Medication List.    ALLERGIES  Patient has no known allergies.     Sore inside right nostril causing a bloody nose regularly from scab. Hands look \"waxy\" so has some questions about that. Hasn't had calcium shot for a while so not sure what labs will show.  Would like refill for Levothyroxine and Lasix.     Recent Labs   Lab Test 04/07/20  1147 02/12/20  0808 01/21/20  1506 12/13/19  0813 11/13/19  0624    138 143 142 138   POTASSIUM 4.0 4.0 4.3 3.6 3.3*   CHLORIDE 106 106 110* 110* 105   CO2 28 28 29 24 29   ANIONGAP 4 4 4 8 4   BUN 15 13 18 10 6*   CR 1.02 1.00 1.00 0.92 0.78   * 88 93 88 94   AUREA 8.3* 8.7 8.9 8.1* 7.6*     Recent Labs   Lab Test 04/07/20  1147 " 02/12/20  0808 01/21/20  1506 12/13/19  0813 11/13/19  0624 11/12/19  0859 11/11/19  1858  09/07/19  0750 09/06/19  0710 09/05/19  0737   MAG 2.1 1.9 1.7 1.4* 1.9 1.6 1.8   < > 2.0 1.5* 1.6   PHOS  --   --   --   --   --  2.8 1.9*  --  3.0 2.9 2.9    < > = values in this interval not displayed.     Recent Labs   Lab Test 04/07/20  1147 03/13/20 02/12/20  0808 01/21/20  1506 12/13/19  0813   WBC 4.1 6.4 5.6 4.0 4.8   HGB 8.8* 8.2* 10.3* 10.6* 10.9*   * 246 217 194 242   MCV 97 95.7 98 100 104*   NEUTROPHIL 69.0 56.9 70.8 48.3 53.1     Recent Labs   Lab Test 04/07/20  1147 02/12/20  0808 01/21/20  1506  09/04/19  0820   BILITOTAL 0.3 0.4 0.3   < >  --    ALKPHOS 84 78 70   < >  --    ALT 32 25 25   < >  --    AST 37 33 38   < >  --    ALBUMIN 3.2* 3.2* 3.2*   < >  --    LDH  --   --   --   --  353*    < > = values in this interval not displayed.     TSH   Date Value Ref Range Status   04/07/2020 2.64 0.40 - 4.00 mU/L Final   02/12/2020 3.27 0.40 - 4.00 mU/L Final   01/21/2020 5.06 (H) 0.40 - 4.00 mU/L Final     No results for input(s): CEA in the last 41211 hours.  Results for orders placed or performed during the hospital encounter of 04/07/20   NM Bone whole body    Narrative    EXAMINATION: NM BONE SCAN WHOLE BODY  Whole-body bone scan, 4/7/2020 10:52 AM     HISTORY: metastatic RCC to bone, reassess after 8 weeks on oral  therapy; Renal cell carcinoma, unspecified laterality (H)     ADDITIONAL INFORMATION: none    COMPARISON: Nuclear medicine bone scan 2/11/2020    TECHNIQUE: The patient received 24.4 mCi of Tc-99m MDP intravenously.  Whole body bone images were obtained at 3 hours.    FINDINGS: Focal areas of radiotracer uptake within the left lateral  aspect of approximately T12, right lateral aspect of approximately L1,  and bilaterally at L2. Faint uptake at approximately T3-4 vertebral  bodies. Stable focal uptake at approximately right rib 6. Stable  relatively diffuse uptake throughout the left  proximal femur and  throughout the left femoral diaphysis. Similar findings in the  proximal metadiaphysis and diaphysis of the left tibia. There is also  stable radiotracer uptake in the right acetabulum and pubis.    Physiologic uptake in the kidneys and bladder.      Impression    IMPRESSION: Stable bony metastatic disease.    I have personally reviewed the examination and initial interpretation  and I agree with the findings.    WESLEY CUMMINS MD                Assessment /Plan   69 year old male with metastatic infiltrative lesion in L3 extending to L2 and L4, likely primary malignancy being renal cell carcinoma. However he does not have any evidence of a primary renal mass on imaging. Based on the available pathology data, we offered treatment for renal cell carcinoma with cabozantinib (CABOSUN data demonstrating cabozantinib superiority over sunitinib in the initial setting). PAX8 stain was negative, and the clear cell features were not seen (it was a poorly differentiated carcinoma). His scans were stable, however, tolerability remained an issue and he then progressed. He then started on Nivolumab, as there is encouraging data with IO in non-clear cell carcinoma. He developed myalgias and we had to stop therapy and start him on prednisone.      He has been started on lenvatinib + everolimus, however he needed breaks to complete XRT to his tibia. He has been doing well except for hand foot syndrome. He had bone scan done prior to this clinic visit and it is stable. There are no new sites of disease. He only had a bone scan without a CT for some reason. I would like to see him in 6 weeks time with labs and restaging CT chest, abdomen and pelvis prior to visit. He has restarted his lenvatinib and everolimus now and we will be able to assess response with restaging CT scans.     His nose bleeds are likely from lenvatinib. Encouraged him to be cautious and not pick his nose or blow his nose hard; he should apply  vaseline in the area so that it is not dry/dehydrated. He can use afrin nasal drops.     I have refilled his medications as per his request.        Phone call duration: 35 minutes     Hua Anne    Hematologist and Medical Oncologist  [facility]

## 2020-04-07 NOTE — PROGRESS NOTES
"Javon Bianchi is a 69 year old male who is being evaluated via a billable telephone visit.      The patient has been notified of following:     \"This telephone visit will be conducted via a call between you and your physician/provider. We have found that certain health care needs can be provided without the need for a physical exam.  This service lets us provide the care you need with a short phone conversation.  If a prescription is necessary we can send it directly to your pharmacy.  If lab work is needed we can place an order for that and you can then stop by our lab to have the test done at a later time.    If during the course of the call the physician/provider feels a telephone visit is not appropriate, you will not be charged for this service.\"     Physician has received verbal consent for a Telephone Visit from the patient? Yes    Javon Bianchi complains of    Chief Complaint   Patient presents with     Telephone     TELEPHONE VISIT; RENAL CELL CARCINOMA METS TO SPINE        I have reviewed and updated the patient's Allergies and Medication List.    ALLERGIES  Patient has no known allergies.    Refill on morphine, lyrica, lasix. .  Refill on levothyroxine as well.  Discuss how to move forward with calcium injections.     Please call mobile number for appointment, [phone].     Hattie Lechuga, [facility]    Palliative Care Outpatient Clinic Progress Note    Patient Name:  Javon Bianchi  Primary Provider:  Srinath Conner    Chief Complaint / Identifying information:     69 year old male with metastatic infiltrative lesion in L3 extending to L2 and L4, likely primary malignancy being renal cell carcinoma. However he does not have any evidence of a primary renal mass on imaging. Based on the available pathology data, we offered treatment for renal cell carcinoma with cabozantinib. His scans were stable, however, tolerability remained an issue and he then progressed. He then started on Nivolumab,but he " developed myalgias and we had to stop therapy and start him on prednisone.  Omega had a break over the summer of 2019.  He had a terrible PNA from August to September.  He then underwent a laminectomy on 11/1 and was admitted 11/11-11/13 for constipation.  He officially started the lenvatinib + afinitor on 11/21/19 week and has been on for 3 months now. Had repeat scans 4/7 - results pending     [facility] Outpatient Palliative Care Opioid Prescribing Safety Plan     Opioid Safety Education: Reviewed by Nan Gonzalez  on 11/8/2018  Opioid Risk Assessment: Performed by Nan Gonzalez  on 11/8/2018 .  ORT score of 0.   Mood Disorder Assessment: Performed by Carly Milsl on 04/25/19.  No concerns.    reviewed with every prescription, last reviewed by Madhuri Feliciano MD on 4/7/2020.     Expected prognosis: shorter  Risk: Low or Medium (ORT 0-7)  No further recommendations.     Last Palliative care appointment: 1/21/20  Recommendations:   1. U/S to evaluate for DVT  2. Increase senna back to 4 twice daily.   3. Restart Cymbalta at 30mg daily.    4. MRI lumbar spine in [place] per patient preference.   5. Recertify for cannabis.      Morphine refill.      Reviewed: yes    Interim History:  Javon Bianchi 69 year old telephone visit today due to COVID19. Wife, Suzie, is on the call as well. Generally not too many side effects from cancer pills that he is taking. Occasional leg swelling and when swollen, increased pain but today, no sig leg swelling.      Pain in back, in hips b/l where cancer is at and more walks more pain he has. And pain in right leg from knee down to toes - when leg swells, more pain. s/p 10 session of radiation ending on 3/27. Generally feels likes pain under control.  More pain in the morning and takes a while for Morphine 30mg to kick in so takes the scheduled hydromorphone at 8am.     Medications:  MS Contin 30mg, 15mg, 15mg  Tylenol 1000mg TID   Dilaudid 2mg (1 tab) at  8am and PRN during the night any time from midnight to 4am.     Short trip to [place] and during that trip, had to take increased daytime morphine to 30mg for about 5 days or so (took extra 15mg tabs during this time) in order to control the pain. He knew that Dr White was not available so did not call to tell us that he needed increased medication during this time.     Sore on inside of right nostril and when blows nose, will blow off scab and has some bleeding.     Hands look very waxy.    Stomach upset or sore almost daily and really acts up around bowel movements with sharp pain across the stomach and gurgling and then resolves with bowel movement but can take 2 hours to have a bowel movement. Taking senna 4 tabs BID and as needed miralax and prune juice mix - once a day every 3-4 days.    Appetite is good. No nausea    Sleeping a lot. Sleeps the whole day. Nap in the morning and afternoon. Wakes up at 7am and goes to sleep at 8pm. After breakfast naps for an hour and 1-2 hours after lunch and sometimes naps 4-5pm. When not napping sitting at lazy boy chair or at desk. Doing jigsaw puzzles to try and keep his mind active.     Mood - doing ok. Talked about trying to see a palliative care  / counselor. Continues to take cymbalta at 8pm every night.     Lyrica 50mg BID at noon and 8mg. Lyrica started about a year ago. Taking it to help with nerve pain. Still with lots of nerve pain in legs, especially when touches leg or when leg is swollen, Using JUVENTINO stockings and wrap and keeps leg elevated.     Review of Systems:  ROS: 10 point ROS neg other than the symptoms noted above in the HPI       Social History:    Pertinent changes to social history/social situation since last visit: [place] [facility] but that is closed now so staying at daughter's condo (always empty as she lives in [place]) in [place]. Returned to [place]   Advance Directive Status:   DNR/DNI        No Known Allergies  Current Outpatient Medications   Medication Sig Dispense Refill     acetaminophen (TYLENOL) 500 MG tablet Take 1,000 mg by mouth 3 times daily 0800, 1400, 2200       calcium carbonate 600 mg-vitamin D 400 units (CALTRATE) 600-400 MG-UNIT per tablet Take 1 tablet by mouth 3 times daily       Dextromethorphan-guaiFENesin (MUCINEX DM)  MG 12 hr tablet Take 1 tablet by mouth 2 times daily as needed        diazepam (VALIUM) 5 MG tablet Take 1 pill 30 min before MRI and a second pill 2 minutes prior. (Patient not taking: Reported on 2/12/2020) 10 tablet 0     DULoxetine 60 MG PO capsule Take 1 capsule (60 mg) by mouth daily 30 capsule 3     everolimus (AFINITOR) 5 MG tablet Take 1 tablet (5 mg) by mouth daily for 28 days 30 tablet 3     everolimus (AFINITOR) 5 MG tablet Take 1 tablet (5 mg) by mouth daily for 28 days 30 tablet 0     everolimus (AFINITOR) 5 MG tablet Take 1 tablet (5 mg) by mouth daily for 28 days 28 tablet 0     fluticasone (FLONASE) 50 MCG/ACT nasal spray Spray 1 spray into both nostrils daily as needed for rhinitis or allergies       folic acid (FOLVITE) 1 MG tablet Take 1 tablet (1 mg) by mouth daily . Future refills by PCP. (Patient not taking: Reported on 2/12/2020) 30 tablet 0     furosemide (LASIX) 20 MG tablet Take 2 tablets (40 mg) by mouth daily 30 tablet 3     furosemide (LASIX) 20 MG tablet Take 1 tablet (20 mg) by mouth daily (Patient taking differently: Take 40 mg by mouth daily ) 30 tablet 1     furosemide (LASIX) 40 MG tablet Take 1 tablet (40 mg) by mouth daily 30 tablet 1     HYDROmorphone (DILAUDID) 2 MG tablet Take 1-2 tablets (2-4 mg) by mouth every 3 hours as needed for pain 120 tablet 0     lenvatinib (LENVIMA) 10 MG capsule Take 1 capsule (10 mg) by mouth daily 30 capsule 3     lenvatinib (LENVIMA) 10 MG capsule Take 1 capsule (10 mg) by mouth daily 30 capsule 3     levothyroxine (SYNTHROID/LEVOTHROID) 25 MCG tablet Take 1 tablet (25 mcg) by  mouth daily 30 tablet 3     lidocaine 5 % EX patch Place 1-3 patches onto the skin every 24 hours Apply to painful areas for 12 hours on, 12 hours off. 30 patch 3     loratadine (CLARITIN) 10 MG tablet Take 10 mg by mouth daily (with lunch)        medical cannabis (Patient's own supply.  Not a prescription) Cannabis Heater liquid 2 mL orally in AM   Cannabis capsule - 1 capsule daily at bedtime.     (This is NOT a prescription, and does not certify that the patient has a qualifying medical condition for medical cannabis.  The purpose of this order is  to document that the patient reports taking medical cannabis.)       melatonin 5 MG tablet Take 10 mg by mouth At Bedtime       morphine (MS CONTIN) 15 MG CR tablet Take one tab midday and at bedtime.  Also taking MS Contin 30mg tab in AM. 60 tablet 0     morphine (MS CONTIN) 30 MG CR tablet Take 1 tablet (30 mg) by mouth daily Combine with MS Contin 15mg in afternoon and at bedtime. 30 tablet 0     multivitamin, therapeutic (THERA-VIT) TABS tablet Take 1 tablet by mouth At Bedtime       ondansetron (ZOFRAN) 8 MG tablet Take 8 mg by mouth every 8 hours as needed for nausea       polyethylene glycol (MIRALAX/GLYCOLAX) Packet Take 17 g by mouth daily as needed        pregabalin (LYRICA) 50 MG capsule Take 1 capsule (50 mg) by mouth 2 times daily 60 capsule 1     senna-docusate (SENOKOT-S/PERICOLACE) 8.6-50 MG tablet Take 2-4 tablets by mouth 2 times daily       triamcinolone (KENALOG) 0.1 % external cream Apply topically 2 times daily (Patient not taking: Reported on 2/12/2020) 30 g 3     Urea 40 % CREA Externally apply topically 2 times daily 28 g 3     vitamin D3 (CHOLECALCIFEROL) 2000 units (50 mcg) tablet Take 2,000 Units by mouth daily       Past Medical History:   Diagnosis Date     Anemia      Bone metastasis (H) 09/28/2018     Cough      Pulmonary nodule      Renal cell carcinoma, right (H) 09/28/2018     Past Surgical History:   Procedure Laterality Date      "BRONCHOSCOPY (RIGID OR FLEXIBLE), DIAGNOSTIC N/A 9/4/2019    Procedure: Bronchoscopy, With Bronchoalveolar Lavage;  Surgeon: Burton Toure MD;  Location: [facility] GI     CHOLECYSTECTOMY       HC REMOVAL HEEL SPUR, CALCANEUS  2004     OPTICAL TRACKING SYSTEM BIOPSY BONE RADIO FREQUENCY ABLATION N/A 11/1/2019    Procedure: Kyphoplasty-Radiofrequency ablation Lumbar 1; Minimally Invasive Surgery  left hemilaminectomy Lumbar 3-Lumbar 4;  Surgeon: Vickey Brower MD;  Location: [facility] OR     \"physical exam\":  Omega was alert and engaged on the phone. He was able to tell me all his medications, he was oriented, appropriate, affect full, pleasant. Sensorium intact.     Key Data Reviewed:   GFR 73; lab 3.2; platelets 149  Scans 4/7 pending     Impression:     69 year old male with metastatic infiltrative lesion in L3 extending to L2 and L4, likely primary malignancy being renal cell carcinoma. However he does not have any evidence of a primary renal mass on imaging. Based on the available pathology data, we offered treatment for renal cell carcinoma with cabozantinib. His scans were stable, however, tolerability remained an issue and he then progressed. He then started on Nivolumab,but he developed myalgias and we had to stop therapy and start him on prednisone.  Omega had a break over the summer of 2019.  He had a terrible PNA from August to September.  He then underwent a laminectomy on 11/1 and was admitted 11/11-11/13 for constipation.  He officially started the lenvatinib + afinitor on 11/21/19 week and has been on for 3 months now.     Recommendations & Counseling:    Pain:   - Continue MS Contin 30mg, 15mg, 15mg (note that Omega used extra 15mg tablets x about 5 days when he drove to [place] with his wife in the beginning of March)  - continue scheduled tylenol 1000mg three times a day with MS Contin  - continue hydromorphone 2mg BID PRN    Constipation:   - continue senna 4 tabs BID  - Increase miralax and prune " juice mix so you take it every day and if not having daily bowel movement, increase to twice a day.    Mood / coping - schedule an appointment with palliative care , Mitra Alvares for counseling to help better support you.   - continue duloxetine 60mg at bedtime     Follow up in 6 weeks. [facility] will [facility] in the [facility].      Phone number to contact with all questions: Continue to contact JEANNINE Kilgore, RN palliative care nurse clinician [phone]    After hours. Will connect you with on call MD. [phone]    Duration of telephone visit: 31 minutes    Madhuri Feliciano MD / Palliative Medicine / Pager [phone] / After-Hours Answering Service [phone] / Main Palliative Clinic - [phone] / [facility] Inpatient Team Consult Pager [phone] (answered 8am-430pm M-F)

## 2020-04-08 NOTE — TELEPHONE ENCOUNTER
Requested medications MS Contin, Cymbalta and Lyrica   Pill count - has 21 days remaining (scripts forward dated)   Last refill: 03/19   Last office visit: 4/07  Scheduled for follow up - TBD    Patient would like script  eprescribed     [place]  Report reviewed.

## 2020-04-14 NOTE — PROGRESS NOTES
Patient called requesting relationship counseling, states Dr Feliciano had offered this during his last conversation with her.    Patient offered 4/16 however he was not available therefore scheduled 4/30 with Mitra ALLEN.    Patient open to having this as a video visit and was sent instructions on how to download the gloria

## 2020-04-15 NOTE — TELEPHONE ENCOUNTER
Call received from spouse, Patrizia.    She reports that Omega is a cancer patient. He is treated with targeted oral chemotherapy.    ~3 pm Temp = 99.9   10 pm - Temp = 101     He is also experiencing:  - Weakness  - Generalized body aches  - Lower left back pain    He has had pneumonia in the past.    Per protocol, ER advised.    COVID 19 Nurse Triage Plan/Patient Instructions    Please be aware that novel coronavirus (COVID-19) may be circulating in the community. If you develop symptoms such as fever, cough, or SOB or if you have concerns about the presence of another infection including coronavirus (COVID-19), please contact your health care provider or visit [email or web address].     Disposition/Instructions    Patient to go to ED and follow protocol based instructions. Follow System Ambulatory Workflow for COVID 19.     Bring Your Own Device:  Please also bring your smart device(s) (smart phones, tablets, laptops) and their charging cables for your personal use and to communicate with your care team during your visit.    Thank you for limiting contact with others, wearing a simple mask to cover your cough, practice good hand hygiene habits and accessing our virtual services where possible to limit the spread of this virus.    For more information about COVID19 and options for caring for yourself at home, please visit the CDC website at https://www.cdc.gov/coronavirus/2019-ncov/about/steps-when-sick.html  For more options for care at [facility], please visit our website at [email or web address]    For more information, please use the [place] Department of Health ([facility]) COVID-19 Hotlines (Interpreters available):     Health questions: Phone Number: [phone] or [phone] and Hours: 7 a.m. to 7 p.m.    Schools and  questions: Phone Number: [phone] or [phone] and Hours 7 a.m. to 7 p.m.    Johana Leos RN  [facility]      Reason for  Disposition    [1] Neutropenia known or suspected (e.g., recent cancer chemotherapy) AND [2] fever > 100.4 F (38.0 C)    Additional Information    Negative: Shock suspected (e.g., cold/pale/clammy skin, too weak to stand, low BP, rapid pulse)    Negative: Difficult to awaken or acting confused (e.g., disoriented, slurred speech)    Negative: Bluish (or gray) lips or face now    Negative: New onset rash with many purple (or blood-colored) spots or dots    Negative: Sounds like a life-threatening emergency to the triager    Negative: Fever > 104 F (40 C)    Protocols used: CANCER - FEVER-A-AH

## 2020-04-23 NOTE — TELEPHONE ENCOUNTER
[facility] Call Center    Phone Message    May a detailed message be left on voicemail: yes     Reason for Call: Other: Veronica with [facility] in [place] calling to f/u on PT orders request they faxed on 4/20. Please f/u and fax paperwork back to [phone], thank you.    Action Taken: Message routed to:  [facility] Ortho    Travel Screening: Not Applicable

## 2020-04-24 NOTE — TELEPHONE ENCOUNTER
See phone message from Therapy.    I completed New PT orders & faxed as requested.  S.O./Vaishnavi Smith RN.

## 2020-04-30 NOTE — LETTER
4/30/2020         RE: Javon Bianchi  [address]  [facility] [address]        Dear Colleague,    Thank you for referring your patient, Javon Bianchi, to the [facility]. Please see a copy of my visit note below.    Telemedicine Visit: The patient's condition can be safely assessed and treated via synchronous audio and visual telemedicine encounter.      Reason for Telemedicine Visit: COVID precuations     Originating Site (Patient Location): Patient's home    Distant Site (Provider Location): [facility]: [facility]    Consent:  The patient/guardian has verbally consented to: the potential risks and benefits of telemedicine (video visit) versus in person care; bill my insurance or make self-payment for services provided; and responsibility for payment of non-covered services.     Mode of Communication:  Telephone.  Attempted to use [employer] for a video visit but the technology was not working so session was conducted by phone.     As the provider I attest to compliance with applicable laws and regulations related to telemedicine.     Palliative Care Counseling Services - Initial Assessment    PLEASE NOTE:  THIS IS A MENTAL HEALTH NOTE.  OTHER PROVIDERS VIEWING THIS NOTE SHOULD USE THIS ONLY FOR UNDERSTANDING THE CONTEXT OF THE PATIENT S EXPERIENCE.  TOPICS DESCRIBED IN THIS NOTE SHOULD NOT BE REFERENCED TO THE PATIENT BY MEDICAL PROVIDERS    Omega Bianchi is a 69 year old man with diagnosis of renal cell carcinoma. Today's visit was conducted by phone with Omega and his wife, Patrizia.  We did attempt to use [employer], but we were unable to get the connection to work.      Referred by: Dr. Gonzalez (palliative)    Presenting Issues: Anxiety    Preferred Name: Omega     Mental Status Exam: (List all that apply)      Appearance: Appropriate      Eye Contact: Good       Orientation: Yes, x4      Mood: Normal      Affect: Appropriate      Thought Content: Clear          Thought Form: Logical      Psychomotor Behavior: Normal    Family:       Marital status:     Years : 52    Years together: 55 (high school [employer]     Name of spouse/partner: Patrizia Duff       Children: Candida (52 yo) Niko (43 yo) Carol (44 yo) Vane (41 yo) 3 [place], 1 lives in [place].       Parents: Father . Mother living (92 yo)       Siblings:       Other: good friends    Support system: Family and Friends    Living situation: House   Difficulty accessing and/or getting around living space: yes   Other concerns: no Omega is having more difficulty with fatigue and with upkeep of their home. Kareem and Patrizia are in the process of getting the home ready to put on the market and hope to sell it and then move to a condo that they own this summer.     Employment history:      Current employment status:  Not asked        Kind of work:  Not asked       Spouses/SO current employment status: Not asked       Kind of work: not asked     Education highest level: not asked     Financial:       Descriptor: Not asked       Health insurance: Not asked     Legal concerns: Not asked        Area(s) of concern:     Health Care Directive: Has one:  yes       If yes, copy in EMR: Yes       Basic information regarding health care directive provided: no        Health Care Agent(s): Named in health care directive   POLST?     Medical History/Issues (patient account): Omega was diagnosed with renal cell carcinoma a year and a half ago.  He is currently on a targeted oral therapy to control the cancer which he understands is not curative in intent.  When Omega was first diagnosed he was told that his prognosis was likely weeks to months.  It has been a tremendous surprise to still be alive over a year later and while this is welcome, Omega and Patrizia find that it has also led to feelings of uncertainty and frustration as they try to get a sense of what to prepare for.  "    Pain/Discomfort Issues: Pain and Anxiety Omega notes that on a typical day his pain rates at a 5 in his hips.  Patrizia shared worry that when he is in his dying process \"will there be anything other than pain for him?\"  We talked about hospice as a support to help with symptom management in his dying process and about the role of palliative care in working to manage his pain at this time. Omega also developed anxiety symptoms after his cancer diagnosis.  He reports no prior history of depresison or anxiety. After his cancer diagnosis he had several anxiety attacks that warranted going to the ED for an evaluation.  He and Patrizia are both on Cymbalta now and report that it has been very helpful in managing these anxiety symptoms.     Coping: See above. Omega did develop symptoms of anxious distress and anxiety attacks following his cancer diagnosis. These have felt well managed on cymbalta. Both Omega and Patrizia share that it has been difficult to sit with so much uncertainty with regards to his illness, and this has been exacerbated by the global uncertainty of the COVID19 pandemic.  Though Omega and Patrizia noted that they were already practicing social distancing due to his cancer, so it hasn't felt like a significant adjustment at this time. Omega noted that he has days \"where it just feels like doom and gloom\" and days where feels he can think pragmatically about end of life, and other days where it feels hard and sad to do so.  These periods of sadness tend to \"come and go\" and are not persistent. We talked about differences between grief and depression which Omega and Patrizia said was helpful.     Sleep: no concerns    Sexual Health/Intimacy: not asked     Mental Health History and Current Review of Symptoms (patient account):     Depression in past?: no    Treatment in past?:  no   Treatment currently?:  no    Depression symptoms currently?:  - Anhedonia:  no  - Hopeless or down mood:  Yes --related to grieving process.  " Moods to not persist past a few days, typically and are connected to feelings of sadness for losses related to illness.   - Sleep problems (too much or too little):  no  - Fatigue:  yes  - Appetite (too much or too little):  no  - Excessively negative self perception:  no  - Trouble concentrating:  no  - Motor slowness:  no  - Current and/or recent thoughts of suicide:  no    Suicide risk screen:  - Past thoughts of suicide?  no  - Past attempts to end own life?  no  - If yes, how?   - Protective factors currently?   - Safety plan needed currently?     Anxiety in past?: no not prior to cancer diagnosis   Treatment in past?  no   Treatment currently?  yes on Cymbalta for anxious distress that has developed since the cancer diagnosis     Anxiety symptoms currently?  - Nervous, on edge:  yes  - Sleep problems:  no  - Worrying a lot/hard to manage worries:  yes  Specific recent areas of worry/fear/concern: related to illness  - Tense, hard to relax:  yes  - Feeling restless, hard to sit still:    - Irritable:  yes  - Feeling of dread/ afraid that something awful may happen:    - How long?   - Impacts on daily life?     Any other mental health diagnosis or symptoms in past?:  no    Any family history of mental health concerns?    Not asked     Positive Bipolar Disorder screen?   Not asked   Positive Thought Disorder screen?  Not asked   History of learning difficulties?  Not asked       Grief vs Depression:  Endorses symptoms for: Grief    Psychological Trauma and/or Major Losses:   None identified   Nightmares?    no  Thought about when not wanting to think about? no  Tried hard not to think about it? no  Avoided situations that reminded you of it? no  [place] constantly on guard, watchful, or easily startled? no  Felt numb or detached from others, activities, or your surroundings? no    Safety Screen:  History of being harmed or controlled by someone close to you?  Not asked  Being hurt or controlled by someone close to  you?  Not asked   Worried will be harmed in future?  Not asked   Worried will harm someone else in future? Not aske     CD History:   Using alcohol actively? yes    Amount per week: Rosy report that they are social drinkers, and used to enjoy going out for dinner and drinks with friends.  Due to social distancing orders in place they are not going out at this time.   Current concerns (self or other) about alcohol or drug use? no  Past concerns and/or CD treatment? no      Medications:   Any current concerns? no  Taking as prescribed currently? yes     Senait/Spirituality:       Belong to a senait community: no     Specify:  Rosy stopped attending their Religion to limit social contact. It sounds like they have not maintained contact with the Zoroastrian       Identify with a particular Confucianist: Faith      Identify as spiritual: Not asked       How find expression:         Perceived Needs: Rosy shared that they feel they are coping well, over all at this time but are open to checking in in a few months to continues to assess coping.  They also have my phone number to reach me if needed.     Resource needs/Referrals: None    Assessment:      Omega Bianchi is a 69 year old man. They were referred by Dr. Mills for assessment of coping and participate in the interview by phone. They are diagnosed with renal cell carcinoma and in addition to their medical diagnosis also meets criteria for adjustment disorder with anxious distress. Their symptoms include anxiety attacks, feelings of dread / feeling overwhelmed, some irritablity. These symptoms began in the months after Omega's cancer diagnosis     Omega presents as a 69 year old man, facing renal cell carcinoma and adjustment disorder with anxious distress . Their relationships are identified as supportive. Main supports are family and a close knit group of friends. . Other complicating situations in their life include current COVID19  pandemic has added a layer of global anxiety and uncertainty.  Due to social distancing precautions they are also not seeing friends or family in person (though they previously limited in person time with friends due to Omega's cancer).     Other mental health diagnoses that were considered include: generalized anxiety disorder,.  The symptoms related to these possible diagnoses can currently be explained by no prior history of anxious distress until the cancer diagnosis.  He has responded very well to cymbalta and feels his symptoms are managed at this time.          Intervention: Initial palliative care counseling / clinical social work evaluation was conducted.  Palliative Care Counseling interventions available were discussed, including counseling related to serious illness, behavioral interventions for symptom management, consultation regarding goals of care/health care directive/POLST, and other interventions specific to the patient's situation or concerns.     Plan: Will follow up in 3 months.     DSM5 Diagnoses:   Adjustment Disorders  309.24 (F43.22) With anxiety    Time Spent with Patient/Family: 50 minutes  (Start 1:30 , end 2:20)    FABIAN Xiao, [facility]   [facility]all[facility]    Pgr:[phone]  Ph: [phone]      DO NOT SEND ANY LETTERS              Again, thank you for allowing me to participate in the care of your patient.        Sincerely,        ALEJANDRO Xiao

## 2020-04-30 NOTE — LETTER
4/30/2020         RE: Javon Bianchi  [address]  [facility] [address]        Dear Colleague,    Thank you for referring your patient, Javon Bianchi, to the [facility]. Please see a copy of my visit note below.    Telemedicine Visit: The patient's condition can be safely assessed and treated via synchronous audio and visual telemedicine encounter.      Reason for Telemedicine Visit: COVID precuations     Originating Site (Patient Location): Patient's home    Distant Site (Provider Location): [facility]: [facility]    Consent:  The patient/guardian has verbally consented to: the potential risks and benefits of telemedicine (video visit) versus in person care; bill my insurance or make self-payment for services provided; and responsibility for payment of non-covered services.     Mode of Communication:  Telephone.  Attempted to use [employer] for a video visit but the technology was not working so session was conducted by phone.     As the provider I attest to compliance with applicable laws and regulations related to telemedicine.     Palliative Care Counseling Services - Initial Assessment    PLEASE NOTE:  THIS IS A MENTAL HEALTH NOTE.  OTHER PROVIDERS VIEWING THIS NOTE SHOULD USE THIS ONLY FOR UNDERSTANDING THE CONTEXT OF THE PATIENT S EXPERIENCE.  TOPICS DESCRIBED IN THIS NOTE SHOULD NOT BE REFERENCED TO THE PATIENT BY MEDICAL PROVIDERS    Omega Bianchi is a 69 year old man with diagnosis of renal cell carcinoma. Today's visit was conducted by phone with Omega and his wife, Patrizia.  We did attempt to use [employer], but we were unable to get the connection to work.      Referred by: Dr. Gonzalez (palliative)    Presenting Issues: Anxiety    Preferred Name: Omega     Mental Status Exam: (List all that apply)      Appearance: Appropriate      Eye Contact: Good       Orientation: Yes, x4      Mood: Normal      Affect: Appropriate      Thought Content: Clear          Thought Form: Logical      Psychomotor Behavior: Normal    Family:       Marital status:     Years : 52    Years together: 55 (high school [employer]     Name of spouse/partner: Patrizia Duff       Children: Candida (50 yo) Niko (43 yo) Carol (42 yo) Vane (41 yo) 3 of [place] children [place], 1 lives in [place].       Parents: Father . Mother living (94 yo)       Siblings:       Other: good friends    Support system: Family and Friends    Living situation: House   Difficulty accessing and/or getting around living space: yes   Other concerns: no Omega is having more difficulty with fatigue and with upkeep of their home. Kareem and Patrizia are in the process of getting the home ready to put on the market and hope to sell it and then move to a condo that they own this summer.     Employment history:      Current employment status:  Not asked        Kind of work:  Not asked       Spouses/SO current employment status: Not asked       Kind of work: not asked     Education highest level: not asked     Financial:       Descriptor: Not asked       Health insurance: Not asked     Legal concerns: Not asked        Area(s) of concern:     Health Care Directive: Has one:  yes       If yes, copy in EMR: Yes       Basic information regarding health care directive provided: no        Health Care Agent(s): Named in health care directive   POLST?     Medical History/Issues (patient account): Omega was diagnosed with renal cell carcinoma a year and a half ago.  He is currently on a targeted oral therapy to control the cancer which he understands is not curative in intent.  When Omega was first diagnosed he was told that his prognosis was likely weeks to months.  It has been a tremendous surprise to still be alive over a year later and while this is welcome, Omega and Patrizia find that it has also led to feelings of uncertainty and frustration as they try to get a sense of what to prepare for.  "    Pain/Discomfort Issues: Pain and Anxiety Omega notes that on a typical day his pain rates at a 5 in his hips.  Patrizia shared worry that when he is in his dying process \"will there be anything other than pain for him?\"  We talked about hospice as a support to help with symptom management in his dying process and about the role of palliative care in working to manage his pain at this time. Omega also developed anxiety symptoms after his cancer diagnosis.  He reports no prior history of depresison or anxiety. After his cancer diagnosis he had several anxiety attacks that warranted going to the ED for an evaluation.  He and Patrizia are both on Cymbalta now and report that it has been very helpful in managing these anxiety symptoms.     Coping: See above. Omega did develop symptoms of anxious distress and anxiety attacks following his cancer diagnosis. These have felt well managed on cymbalta. Both Omega and Patrizia share that it has been difficult to sit with so much uncertainty with regards to his illness, and this has been exacerbated by the global uncertainty of the COVID19 pandemic.  Though Omega and Patrizia noted that they were already practicing social distancing due to his cancer, so it hasn't felt like a significant adjustment at this time. Omega noted that he has days \"where it just feels like doom and gloom\" and days where feels he can think pragmatically about end of life, and other days where it feels hard and sad to do so.  These periods of sadness tend to \"come and go\" and are not persistent. We talked about differences between grief and depression which Omega and Patrizia said was helpful.     Sleep: no concerns    Sexual Health/Intimacy: not asked     Mental Health History and Current Review of Symptoms (patient account):     Depression in past?: no    Treatment in past?:  no   Treatment currently?:  no    Depression symptoms currently?:  - Anhedonia:  no  - Hopeless or down mood:  Yes --related to grieving process.  " Moods to not persist past a few days, typically and are connected to feelings of sadness for losses related to illness.   - Sleep problems (too much or too little):  no  - Fatigue:  yes  - Appetite (too much or too little):  no  - Excessively negative self perception:  no  - Trouble concentrating:  no  - Motor slowness:  no  - Current and/or recent thoughts of suicide:  no    Suicide risk screen:  - Past thoughts of suicide?  no  - Past attempts to end own life?  no  - If yes, how?   - Protective factors currently?   - Safety plan needed currently?     Anxiety in past?: no not prior to cancer diagnosis   Treatment in past?  no   Treatment currently?  yes on Cymbalta for anxious distress that has developed since the cancer diagnosis     Anxiety symptoms currently?  - Nervous, on edge:  yes  - Sleep problems:  no  - Worrying a lot/hard to manage worries:  yes  Specific recent areas of worry/fear/concern: related to illness  - Tense, hard to relax:  yes  - Feeling restless, hard to sit still:    - Irritable:  yes  - Feeling of dread/ afraid that something awful may happen:    - How long?   - Impacts on daily life?     Any other mental health diagnosis or symptoms in past?:  no    Any family history of mental health concerns?    Not asked     Positive Bipolar Disorder screen?   Not asked   Positive Thought Disorder screen?  Not asked   History of learning difficulties?  Not asked       Grief vs Depression:  Endorses symptoms for: Grief    Psychological Trauma and/or Major Losses:   None identified   Nightmares?    no  Thought about when not wanting to think about? no  Tried hard not to think about it? no  Avoided situations that reminded you of it? no  [place] constantly on guard, watchful, or easily startled? no  Felt numb or detached from others, activities, or your surroundings? no    Safety Screen:  History of being harmed or controlled by someone close to you?  Not asked  Being hurt or controlled by someone close to  you?  Not asked   Worried will be harmed in future?  Not asked   Worried will harm someone else in future? Not aske     CD History:   Using alcohol actively? yes    Amount per week: Rosy report that they are social drinkers, and used to enjoy going out for dinner and drinks with friends.  Due to social distancing orders in place they are not going out at this time.   Current concerns (self or other) about alcohol or drug use? no  Past concerns and/or CD treatment? no      Medications:   Any current concerns? no  Taking as prescribed currently? yes     Senait/Spirituality:       Belong to a senait community: no     Specify:  Rosy stopped attending their Gnosticist to limit social contact. It sounds like they have not maintained contact with the Spiritism       Identify with a particular Mu-ism: Methodist      Identify as spiritual: Not asked       How find expression:         Perceived Needs: Rosy shared that they feel they are coping well, over all at this time but are open to checking in in a few months to continues to assess coping.  They also have my phone number to reach me if needed.     Resource needs/Referrals: None    Assessment:      Omega Bianchi is a 69 year old man. They were referred by Dr. Mills for assessment of coping and participate in the interview by phone. They are diagnosed with renal cell carcinoma and in addition to their medical diagnosis also meets criteria for adjustment disorder with anxious distress. Their symptoms include anxiety attacks, feelings of dread / feeling overwhelmed, some irritablity. These symptoms began in the months after Omega's cancer diagnosis     Omega presents as a 69 year old man, facing renal cell carcinoma and adjustment disorder with anxious distress . Their relationships are identified as supportive. Main supports are family and a close knit group of friends. . Other complicating situations in their life include current COVID19  pandemic has added a layer of global anxiety and uncertainty.  Due to social distancing precautions they are also not seeing friends or family in person (though they previously limited in person time with friends due to Omega's cancer).     Other mental health diagnoses that were considered include: generalized anxiety disorder,.  The symptoms related to these possible diagnoses can currently be explained by no prior history of anxious distress until the cancer diagnosis.  He has responded very well to cymbalta and feels his symptoms are managed at this time.          Intervention: Initial palliative care counseling / clinical social work evaluation was conducted.  Palliative Care Counseling interventions available were discussed, including counseling related to serious illness, behavioral interventions for symptom management, consultation regarding goals of care/health care directive/POLST, and other interventions specific to the patient's situation or concerns.     Plan: Will follow up in 3 months.     DSM5 Diagnoses:   Adjustment Disorders  309.24 (F43.22) With anxiety    Time Spent with Patient/Family: 50 minutes  (Start 1:30 , end 2:20)    FABIAN Xiao, [facility]   Palliative Care    Pgr:[phone]  Ph: [phone]      DO NOT SEND ANY LETTERS              Again, thank you for allowing me to participate in the care of your patient.        Sincerely,        ALEJANDRO Xiao

## 2020-04-30 NOTE — PROGRESS NOTES
Telemedicine Visit: The patient's condition can be safely assessed and treated via synchronous audio and visual telemedicine encounter.      Reason for Telemedicine Visit: COVID precuations     Originating Site (Patient Location): Patient's home    Distant Site (Provider Location): [facility] Clinics: [facility]    Consent:  The patient/guardian has verbally consented to: the potential risks and benefits of telemedicine (video visit) versus in person care; bill my insurance or make self-payment for services provided; and responsibility for payment of non-covered services.     Mode of Communication:  Telephone.  Attempted to use [employer] for a video visit but the technology was not working so session was conducted by phone.     As the provider I attest to compliance with applicable laws and regulations related to telemedicine.     Palliative Care Counseling Services - Initial Assessment    PLEASE NOTE:  THIS IS A MENTAL HEALTH NOTE.  OTHER PROVIDERS VIEWING THIS NOTE SHOULD USE THIS ONLY FOR UNDERSTANDING THE CONTEXT OF THE PATIENT S EXPERIENCE.  TOPICS DESCRIBED IN THIS NOTE SHOULD NOT BE REFERENCED TO THE PATIENT BY MEDICAL PROVIDERS    Omega Bianchi is a 69 year old man with diagnosis of renal cell carcinoma. Today's visit was conducted by phone with Omega and his wife, Patrizia.  We did attempt to use [employer], but we were unable to get the connection to work.      Referred by: Dr. Gonzalez (palliative)    Presenting Issues: Anxiety    Preferred Name: Omega     Mental Status Exam: (List all that apply)      Appearance: Appropriate      Eye Contact: Good       Orientation: Yes, x4      Mood: Normal      Affect: Appropriate      Thought Content: Clear         Thought Form: Logical      Psychomotor Behavior: Normal    Family:       Marital status:     Years : 52    Years together: 55 (high school [employer]     Name of spouse/partner: Patrizia Duff       Children: Candida (52 yo) Niko  "(45 yo) Carol (42 yo) Vane (39 yo) 3 [place], 1 lives in [place].       Parents: Father . Mother living (92 yo)       Siblings:       Other: good friends    Support system: Family and Friends    Living situation: House   Difficulty accessing and/or getting around living space: yes   Other concerns: no Omega is having more difficulty with fatigue and with upkeep of their home. He and Patrizia are in the process of getting the home ready to put on the market and hope to sell it and then move to a condo that they own this summer.     Employment history:      Current employment status:  Not asked        Kind of work:  Not asked       Spouses/SO current employment status: Not asked       Kind of work: not asked     Education highest level: not asked     Financial:       Descriptor: Not asked       Health insurance: Not asked     Legal concerns: Not asked        Area(s) of concern:     Health Care Directive: Has one:  yes       If yes, copy in EMR: Yes       Basic information regarding health care directive provided: no        Health Care Agent(s): Named in health care directive   POLST?     Medical History/Issues (patient account): Omega was diagnosed with renal cell carcinoma a year and a half ago.  He is currently on a targeted oral therapy to control the cancer which he understands is not curative in intent.  When Omega was first diagnosed he was told that his prognosis was likely weeks to months.  It has been a tremendous surprise to still be alive over a year later and while this is welcome, Omega and Patrizia find that it has also led to feelings of uncertainty and frustration as they try to get a sense of what to prepare for.     Pain/Discomfort Issues: Pain and Anxiety Omega notes that on a typical day his pain rates at a 5 in his hips.  Patrizia shared worry that when he is in his dying process \"will there be anything other than pain for him?\"  We talked about hospice as a " "support to help with symptom management in his dying process and about the role of palliative care in working to manage his pain at this time. Omega also developed anxiety symptoms after his cancer diagnosis.  He reports no prior history of depresison or anxiety. After his cancer diagnosis he had several anxiety attacks that warranted going to the ED for an evaluation.  Kareem and Patrizia are both on Cymbalta now and report that it has been very helpful in managing these anxiety symptoms.     Coping: See above. Omega did develop symptoms of anxious distress and anxiety attacks following his cancer diagnosis. These have felt well managed on cymbalta. Both Omega and Patrizia share that it has been difficult to sit with so much uncertainty with regards to his illness, and this has been exacerbated by the global uncertainty of the COVID19 pandemic.  Though Omega and Patrizia noted that they were already practicing social distancing due to his cancer, so it hasn't felt like a significant adjustment at this time. Omega noted that he has days \"where it just feels like doom and gloom\" and days where feels he can think pragmatically about end of life, and other days where it feels hard and sad to do so.  These periods of sadness tend to \"come and go\" and are not persistent. We talked about differences between grief and depression which Omega and Patrizia said was helpful.     Sleep: no concerns    Sexual Health/Intimacy: not asked     Mental Health History and Current Review of Symptoms (patient account):     Depression in past?: no    Treatment in past?:  no   Treatment currently?:  no    Depression symptoms currently?:  - Anhedonia:  no  - Hopeless or down mood:  Yes --related to grieving process.  Moods to not persist past a few days, typically and are connected to feelings of sadness for losses related to illness.   - Sleep problems (too much or too little):  no  - Fatigue:  yes  - Appetite (too much or too little):  no  - Excessively " negative self perception:  no  - Trouble concentrating:  no  - Motor slowness:  no  - Current and/or recent thoughts of suicide:  no    Suicide risk screen:  - Past thoughts of suicide?  no  - Past attempts to end own life?  no  - If yes, how?   - Protective factors currently?   - Safety plan needed currently?     Anxiety in past?: no not prior to cancer diagnosis   Treatment in past?  no   Treatment currently?  yes on Cymbalta for anxious distress that has developed since the cancer diagnosis     Anxiety symptoms currently?  - Nervous, on edge:  yes  - Sleep problems:  no  - Worrying a lot/hard to manage worries:  yes  Specific recent areas of worry/fear/concern: related to illness  - Tense, hard to relax:  yes  - Feeling restless, hard to sit still:    - Irritable:  yes  - Feeling of dread/ afraid that something awful may happen:    - How long?   - Impacts on daily life?     Any other mental health diagnosis or symptoms in past?:  no    Any family history of mental health concerns?    Not asked     Positive Bipolar Disorder screen?   Not asked   Positive Thought Disorder screen?  Not asked   History of learning difficulties?  Not asked       Grief vs Depression:  Endorses symptoms for: Grief    Psychological Trauma and/or Major Losses:   None identified   Nightmares?    no  Thought about when not wanting to think about? no  Tried hard not to think about it? no  Avoided situations that reminded you of it? no  Hermann constantly on guard, watchful, or easily startled? no  Felt numb or detached from others, activities, or your surroundings? no    Safety Screen:  History of being harmed or controlled by someone close to you?  Not asked  Being hurt or controlled by someone close to you?  Not asked   Worried will be harmed in future?  Not asked   Worried will harm someone else in future? Not aske     CD History:   Using alcohol actively? yes    Amount per week: Omega and Patrizia report that they are social drinkers, and used  to enjoy going out for dinner and drinks with friends.  Due to social distancing orders in place they are not going out at this time.   Current concerns (self or other) about alcohol or drug use? no  Past concerns and/or CD treatment? no      Medications:   Any current concerns? no  Taking as prescribed currently? yes     Senait/Spirituality:       Belong to a senait community: no     Specify:  Rosy stopped attending their Religious to limit social contact. It sounds like they have not maintained contact with the [employer]       Identify with a particular Orthodox: Baptism      Identify as spiritual: Not asked       How find expression:         Perceived Needs: Rosy shared that they feel they are coping well, over all at this time but are open to checking in in a few months to continues to assess coping.  They also have my phone number to reach me if needed.     Resource needs/Referrals: None    Assessment:      Omega Bianchi is a 69 year old man. They were referred by Dr. Mills for assessment of coping and participate in the interview by phone. They are diagnosed with renal cell carcinoma and in addition to their medical diagnosis also meets criteria for adjustment disorder with anxious distress. Their symptoms include anxiety attacks, feelings of dread / feeling overwhelmed, some irritablity. These symptoms began in the months after Omega's cancer diagnosis     Omega presents as a 69 year old man, facing renal cell carcinoma and adjustment disorder with anxious distress . Their relationships are identified as supportive. Main supports are family and a close knit group of friends. . Other complicating situations in their life include current COVID19 pandemic has added a layer of global anxiety and uncertainty.  Due to social distancing precautions they are also not seeing friends or family in person (though they previously limited in person time with friends due to Ashwins cancer).     Other  mental health diagnoses that were considered include: generalized anxiety disorder,.  The symptoms related to these possible diagnoses can currently be explained by no prior history of anxious distress until the cancer diagnosis.  He has responded very well to cymbalta and feels his symptoms are managed at this time.          Intervention: Initial palliative care counseling / clinical social work evaluation was conducted.  Palliative Care Counseling interventions available were discussed, including counseling related to serious illness, behavioral interventions for symptom management, consultation regarding goals of care/health care directive/POLST, and other interventions specific to the patient's situation or concerns.     Plan: Will follow up in 3 months.     DSM5 Diagnoses:   Adjustment Disorders  309.24 (F43.22) With anxiety    Time Spent with Patient/Family: 50 minutes  (Start 1:30 , end 2:20)    FABIAN Xiao, [facility]   Palliative Care    Pgr:[phone]  Ph: [phone]      DO NOT SEND ANY LETTERS

## 2020-05-19 NOTE — PROGRESS NOTES
"Javon Bianchi is a 69 year old male who is being evaluated via a billable video visit.      The patient has been notified of following:     \"This video visit will be conducted via a call between you and your physician/provider. We have found that certain health care needs can be provided without the need for an in-person physical exam.  This service lets us provide the care you need with a video conversation.  If a prescription is necessary we can send it directly to your pharmacy.  If lab work is needed we can place an order for that and you can then stop by our lab to have the test done at a later time.    Video visits are billed at different rates depending on your insurance coverage.  Please reach out to your insurance provider with any questions.    If during the course of the call the physician/provider feels a video visit is not appropriate, you will not be charged for this service.\"    Patient has given verbal consent for Video visit? Yes    How would you like to obtain your AVS? Amrit    Patient would like the video invitation sent by: Send to e-mail at: [email or web address]    Will anyone else be joining your video visit? No       Refill on Zofran;    Leonila Navarrete, [facility]  FOLLOW-UP VISIT NOTE  Date of visit: May 19, 2020        REASON FOR VISIT: mRCC to the bone (with no primary renal mass), here for routine follow up  HPI:  Javon Bianchi is a 69 year old male with a history significant for hyperlipidemia who had been in his usual health until he developed back pain in May of 2018. At that time, the pain was a stabbing like in the middle of lower back with burning like pain wrapping around this left hip to knee area. No trauma. He had MRI on 5/23/18 showed mild degenerative change with bulging disks L2-S1. A small benign hemangioma was noted in L2, no other marrow signal abnormality. Since then, the pain failed to improve despite steroid " injections. In addition, he lost 25 lbs unintentionally since June along with chills a few times a day. Over time, the pain got worse to the point he could not ambulate after short distance and developed muscle weakness in lower extremities over time requiring a walker. Finally, he went to ER ([facility] in [place]) on 8/23/18, CT A/P was unremarkable. He was referred to neurology with obtaining MRI L spine. MRI on 8/30/18 revealed a pathological fracture at L3 vertebral body with height loss at 40% and diffuse involvement of a neoplastic process. IR guided biopsy on 8/31/18 showed poorly differentiated carcinoma. IHC was suggestive of possible renal cell carcinoma per pathology report (Renal cell immunochemistry is positive and along with the negative staining for CK7 and CK20 suggests the possibility of a renal carcinoma, however most renal cell carcinomas also express PAX8 which is negative in this case). Of note, CT chest/abdomen/pelvis at OSH was negative for primary lesion. The patient was evaluated by neurosurgery who recommended no surgical intervention. He was discharged with TLSO brace and walker. He had palliative XRT locally from 9/11 for 10 fractions (Done in [place]). He was started on cabozantinib 60 mg initially, requiring dose reduction to 20 mg for issues with hand foot syndrome and poor tolerability. Switched to nivolumab due to progression in bone disease on 2/6/19. Had to hold Cycle #3 due to myalgia, which improved with prednisone.    ONCOLOGY HISTORY:    Cabozantinib: Started 60 mg daily on September 28, 2018.  Dec 10, 2018: dose reduced cabozantinib to 40 mg a day.  Jan 17, 2019: dose reduced cabozantinib to 29 mg a day.  Feb 11, 2019: C1D1 Nivolumab   3/12/19: c2 Nivolumab  4/8/19: held Nivolumab due to myalgias, started prednisone  11//21/19: started lenvatinib (and then 11/25 started afinitor) took a break for Tgiving  1/27- dropped to 10 mg of the lenvatinib due to HFS     Interval  history:   Omega has been on the combination of lenvatinib + afinitor for about 6 months.      He has been followed over video visit. He has been tolerating the current regimen with some difficulty. His symptoms are being managed with assistance of palliative care team.     He has pain in his abdomen which was specially bothersome yesterday. He has pain in both hips that extends from the hip to the ribs.     Mucositis is mostly gone, no GERD. Intermittent nausea, takes zofran once at bedtime. ANABELLA has been persistent, they are wrapping legs during day and taking 40 mg of Lasix.  Fairly sedentary during the day, rests 1-2 x a day and will take a nap as well.        Wt Readings from Last 10 Encounters:   05/19/20 95.3 kg (210 lb)   04/07/20 99 kg (218 lb 3.2 oz)   02/12/20 98.6 kg (217 lb 6.4 oz)   01/21/20 99.8 kg (220 lb)   12/13/19 92.7 kg (204 lb 4.8 oz)   12/12/19 93 kg (205 lb 1.6 oz)   12/12/19 92.8 kg (204 lb 9.6 oz)   11/20/19 90.7 kg (200 lb)   11/19/19 90.9 kg (200 lb 6.4 oz)   11/13/19 84.8 kg (187 lb)     MEDICATIONS     Current Outpatient Medications   Medication Sig     calcium carbonate 600 mg-vitamin D 400 units (CALTRATE) 600-400 MG-UNIT per tablet Take 1 tablet by mouth 3 times daily     Dextromethorphan-guaiFENesin (MUCINEX DM)  MG 12 hr tablet Take 1 tablet by mouth 2 times daily as needed      diazepam (VALIUM) 5 MG tablet Take 1 pill 30 min before MRI and a second pill 2 minutes prior.     DULoxetine (CYMBALTA) 60 MG capsule Take 1 capsule (60 mg) by mouth daily     everolimus (AFINITOR) 5 MG tablet Take 1 tablet (5 mg) by mouth daily for 28 days     fluticasone (FLONASE) 50 MCG/ACT nasal spray Spray 1 spray into both nostrils daily as needed for rhinitis or allergies     furosemide (LASIX) 40 MG tablet Take 1 tablet (40 mg) by mouth daily     lenvatinib (LENVIMA) 10 MG capsule Take 1 capsule (10 mg) by mouth daily     levothyroxine (SYNTHROID/LEVOTHROID) 25 MCG tablet Take 1 tablet (25 mcg)  by mouth daily     lidocaine 5 % EX patch Place 1-3 patches onto the skin every 24 hours Apply to painful areas for 12 hours on, 12 hours off.     loratadine (CLARITIN) 10 MG tablet Take 10 mg by mouth daily (with lunch)      medical cannabis (Patient's own supply.  Not a prescription) Cannabis Heater liquid 2 mL orally in AM   Cannabis capsule - 1 capsule daily at bedtime.     (This is NOT a prescription, and does not certify that the patient has a qualifying medical condition for medical cannabis.  The purpose of this order is  to document that the patient reports taking medical cannabis.)     melatonin 5 MG tablet Take 10 mg by mouth At Bedtime     multivitamin, therapeutic (THERA-VIT) TABS tablet Take 1 tablet by mouth At Bedtime     polyethylene glycol (MIRALAX/GLYCOLAX) Packet Take 17 g by mouth daily as needed      pregabalin (LYRICA) 50 MG capsule Take 1 capsule (50 mg) by mouth 2 times daily     senna-docusate (SENOKOT-S/PERICOLACE) 8.6-50 MG tablet Take 2-4 tablets by mouth 2 times daily     Urea 40 % CREA Externally apply topically 2 times daily     vitamin D3 (CHOLECALCIFEROL) 2000 units (50 mcg) tablet Take 2,000 Units by mouth daily     HYDROmorphone (DILAUDID) 2 MG tablet Take 1-2 tablets (2-4 mg) by mouth every 3 hours as needed for pain     [START ON 5/25/2020] morphine (MS CONTIN) 15 MG CR tablet Take one tab midday and at bedtime.  Also taking MS Contin 30mg tab in AM.     [START ON 5/25/2020] morphine (MS CONTIN) 30 MG CR tablet Take 1 tablet (30 mg) by mouth every morning and take MS Contin 15mg mid day and before bed.     ondansetron (ZOFRAN) 8 MG tablet Take 1 tablet (8 mg) by mouth every 8 hours as needed for nausea     triamcinolone (KENALOG) 0.1 % external cream Apply topically 2 times daily     No current facility-administered medications for this visit.         PHYSICAL EXAM:     Middle aged male in no acute distress  Breathing comfortably, no tachypnea  Speech and hearing  normal  Pleasant mood and congruent affect  Moving all extremities, no focal neurologic deficits apparent    LABS/IMAGING:      Recent Labs   Lab Test 05/15/20  1013 04/07/20  1147 02/12/20  0808 01/21/20  1506 12/13/19  0813    138 138 143 142   POTASSIUM 4.4 4.0 4.0 4.3 3.6   CHLORIDE 106 106 106 110* 110*   CO2 29 28 28 29 24   ANIONGAP 4 4 4 4 8   BUN 20 15 13 18 10   CR 1.07 1.02 1.00 1.00 0.92   GLC 81 102* 88 93 88   AUREA 8.9 8.3* 8.7 8.9 8.1*     Recent Labs   Lab Test 05/15/20  1013 04/07/20  1147 02/12/20  0808 01/21/20  1506 12/13/19  0813  11/12/19  0859 11/11/19  1858  09/07/19  0750 09/06/19  0710 09/05/19  0737   MAG 2.0 2.1 1.9 1.7 1.4*   < > 1.6 1.8   < > 2.0 1.5* 1.6   PHOS  --   --   --   --   --   --  2.8 1.9*  --  3.0 2.9 2.9    < > = values in this interval not displayed.     Recent Labs   Lab Test 05/15/20  1013 04/07/20  1147 03/13/20 02/12/20  0808 01/21/20  1506   WBC 4.0 4.1 6.4 5.6 4.0   HGB 8.6* 8.8* 8.2* 10.3* 10.6*   * 149* 246 217 194   MCV 97 97 95.7 98 100   NEUTROPHIL 63.0 69.0 56.9 70.8 48.3     Recent Labs   Lab Test 05/15/20  1013 04/07/20  1147 02/12/20  0808  09/04/19  0820   BILITOTAL 0.3 0.3 0.4   < >  --    ALKPHOS 81 84 78   < >  --    ALT 35 32 25   < >  --    AST 43 37 33   < >  --    ALBUMIN 3.3* 3.2* 3.2*   < >  --    LDH  --   --   --   --  353*    < > = values in this interval not displayed.     TSH   Date Value Ref Range Status   05/15/2020 2.50 0.40 - 4.00 mU/L Final   04/07/2020 2.64 0.40 - 4.00 mU/L Final   02/12/2020 3.27 0.40 - 4.00 mU/L Final     No results for input(s): CEA in the last 21832 hours.  Results for orders placed or performed in visit on 05/15/20   CT Chest/Abdomen/Pelvis w Contrast    Narrative    Chest, abdomen and pelvis CT with contrast    INDICATION: Follow-up renal cell carcinoma, pain in left hip and lower  back, bone metastasis, severe anemia, bilateral leg edema, severe and  nutrition, hypothyroidism due to acquired atrophy of  thyroid    COMPARISON: Most recent CT dated 2/11/2020 end bone scan of 4/7/2020    Contrast: 134 mL intravenous [id number]    CHEST: 2 mm right upper lobe nodule (series 10 image 16) is unchanged.  1 mm nodule in right upper lobe (series 10 image 31) is unchanged. 3  mm right upper lobe nodule (series 10 image 39) is also unchanged. 1  mm nodule in the posterior right upper lobe (series 10 image 39) is  unchanged. 8 mm superior segment right lower lobe groundglass opacity  (series 10 image 75) is unchanged. 2 mm calcified left upper lobe  granuloma (series 10 image 79) is unchanged. Left lower lobe  pleural-based lateral subsegmental atelectasis is unchanged. Other  bilateral lower lobe subsegmental atelectasis an mechanical fibrosis  is unchanged. Calcified right lower lobe granuloma (series 10 image  110) is unchanged. Pleural-based nodular scarring in the paravertebral  right lower lobe (series 10 image 122) is also unchanged. No pleural  or pericardial effusion. Stable, nonenlarged mediastinal lymph nodes  are again present. No enlarged axillary or hilar lymph nodes. There is  atherosclerosis of the left subclavian artery origin as well as in the  thoracic aorta and coronary arteries.    ABDOMEN/PELVIS: Liver and spleen appear normal. Cholecystectomy clips  are present). The right adrenal gland and right kidney appear normal.  Subcentimeter left renal cyst or angiomyolipoma appears unchanged.  There is growth of a hypodense left adrenal nodule which is  well-circumscribed oblong cyst (series 7 image 143), this measures 3.7  x 1.7 cm, previously 1.9 x 0.8 cm. Retroaortic left renal vein. There  is a preaortic vein, not the left side of the right-sided IVC which  dilates just anterior to the anterior portion of the left adrenal  gland, this is unchanged. This is not obviously connecting to the left  kidney. Result prescribed calcification in the abdominal aorta and  iliac artery territories. No enlarged mesenteric  or retroperitoneal  lymph nodes. No enlarged deep pelvic or inguinal lymph nodes. Urinary  bladder appears normal. Prostate appears grossly normal.    Bones: Stable bone islands in the humeral and femoral heads. Patchy  sclerosis in the right side of T11 is unchanged. Possible  vertebroplasty at L1 with compression deformities. Unchanged L3  compression deformity with sclerosis noted. Sclerosis of the bony  pelvic ring also appears similar. There is no new sclerotic or lytic  focus.      Impression    IMPRESSION:  1. No interval change in the extensive osseous metastases.  2. Interval growth of left adrenal hypodense nodule, consider further  detailed imaging such as adrenal protocol CT or MRI as appropriate to  exclude metastasis.  3. Unchanged small bilateral lung nodules.  4. Unchanged right lobe groundglass opacity, likely inflammation/infection recommend continued close attention on follow-up to exclude atypical metastasis.  5. Atherosclerosis.  6. Retroaortic left renal vein.  7. Cholecystectomy.  8. Unchanged subcentimeter left renal cyst or angiomyolipoma.    SONDRA ZAPATA MD              Assessment /Plan   69 year old male with metastatic infiltrative lesion in L3 extending to L2 and L4, likely primary malignancy being renal cell carcinoma. However he does not have any evidence of a primary renal mass on imaging. Based on the available pathology data, we offered treatment for renal cell carcinoma with cabozantinib (CABOSUN data demonstrating cabozantinib superiority over sunitinib in the initial setting). PAX8 stain was negative, and the clear cell features were not seen (it was a poorly differentiated carcinoma). His scans were stable, however, tolerability remained an issue and he then progressed. He then started on Nivolumab, as there is encouraging data with IO in non-clear cell carcinoma. He developed myalgias and we had to stop therapy and start him on prednisone.  Omega had a break over the  summer of 2019.  He had a terrible PNA from August to September.  He then underwent a laminectomy on 11/1 and was admitted 11/11-11/13 for constipation.  He officially started the TKI + mTORi on 11/21/19 week and has been on for 6+ months now.     He has been able to tolerate the regimen fairly. He does have a lot of side effects but none of those associated with medications are bad enough to consider dose adjustment or medication change. He has pain in both hips which has been present for a while now. He now has pain extending in the abdomen. I reviewed the CT scan myself but could not identify any lesion that should cause pain. Official read only suggests growth in a lesion in left adrenal nodule which might have nothing to do with disease. This should not be cause for pain.     Edema: ongoing ANABELLA that worsened this winter.  Protein levels are stable with albumin of 3.3 g/dl, stable renal function with creatinine of 1.07. No known new kidney or cardiac or liver issues.  They are doing compression socks and ace wraps and lasix daily.     Pain being managed by palliative care team and he has consultation with Dr. Feliciano today. Appreciate help from palliative care team in managing his health.     Xgeva-given 2/12/20. Continue on oral calcium + D    Will RTC in 6 weeks for labs to see Ms. Ruthie St and 3 months to see me with repeat scans. Will refer to radiation therapy for his pain.      Video-Visit Details    Type of service:  Video Visit  Originating Location (pt. Location): Home    Distant Location (provider location):  [facility]     Platform used for Video Visit: Loyd    Over 35 min of time spent with more than 50% time spent in counseling and coordinating care.    Hua Anne    Hematologist and Medical Oncologist  [facility]

## 2020-05-19 NOTE — PROGRESS NOTES
"Javon Bianchi is a 69 year old male who is being evaluated via a billable video visit.      The patient has been notified of following:     \"This video visit will be conducted via a call between you and your physician/provider. We have found that certain health care needs can be provided without the need for an in-person physical exam.  This service lets us provide the care you need with a video conversation.  If a prescription is necessary we can send it directly to your pharmacy.  If lab work is needed we can place an order for that and you can then stop by our lab to have the test done at a later time.    Video visits are billed at different rates depending on your insurance coverage.  Please reach out to your insurance provider with any questions.    If during the course of the call the physician/provider feels a video visit is not appropriate, you will not be charged for this service.\"    Patient has given verbal consent for Video visit? Yes    How would you like to obtain your AVS? Amrit    Patient would like the video invitation sent by: Send to e-mail at: [email or web address]    Will anyone else be joining your video visit? No        Leonila Navarrete CMA      Video-Visit Details    Type of service:  Video Visit    Video Start Time: 2:40 PM  Video End Time: 3:11pm     Originating Location (pt. Location): Home    Distant Location (provider location):  [facility]     Platform used for Video Visit: Jeremiah        "

## 2020-05-19 NOTE — PROGRESS NOTES
"Javon Bianchi is a 69 year old male who is being evaluated via a billable video visit.      The patient has been notified of following:     \"This video visit will be conducted via a call between you and your physician/provider. We have found that certain health care needs can be provided without the need for an in-person physical exam.  This service lets us provide the care you need with a video conversation.  If a prescription is necessary we can send it directly to your pharmacy.  If lab work is needed we can place an order for that and you can then stop by our lab to have the test done at a later time.    Video visits are billed at different rates depending on your insurance coverage.  Please reach out to your insurance provider with any questions.    If during the course of the call the physician/provider feels a video visit is not appropriate, you will not be charged for this service.\"    Patient has given verbal consent for Video visit? Yes    How would you like to obtain your AVS? Benjaminhart    Patient would like the video invitation sent by: Send to e-mail at: [email or web address]    Will anyone else be joining your video visit? No      I have reviewed and updated the patient's allergies and medication list.     Concerns: a lot more pain, tumor growth on adrenal gland- Dr. Anne feels talking with [place] would be helpful, mouth sores- swishing mouth fountain soda  Refills: dilaudid- 13 tabs left, takes 2 or more, morphine 15mg 22 tabs left, morphine 30mg 11 tabs left,  Needs a Rx for Tracy Schumacher LPN        Video-Visit Details    Type of service:  Video Visit    Video Start Time: 2:40 PM  Video End Time: 3:10 PM    Originating Location (pt. Location): Home    Distant Location (provider location):  [facility]     Platform used for Video Visit: [facility]        Palliative Care Outpatient Clinic Progress Note    Patient Name:  Javon Bianchi  Primary Provider:  Ubaldo" Srinath MONDRAGON    Chief Complaint / Identifying information:     69 year old male with metastatic infiltrative lesion in L3 extending to L2 and L4, likely primary malignancy being renal cell carcinoma.  He started on Nivolumab but he developed myalgias and therapy was stopped and start him on prednisone. He had a break over the summer of 2019 and developed a severe PNA from August to September.  He then underwent a laminectomy on 11/1 and was admitted 11/11-11/13 for constipation.  He officially started the lenvatinib + afinitor on 11/21/19.     [facility] Outpatient Palliative Care Opioid Prescribing Safety Plan     Opioid Safety Education: Reviewed by Nan Gonzalez  on 11/8/2018  Opioid Risk Assessment: Performed by Nan Gonzalez  on 11/8/2018 .  ORT score of 0.   Mood Disorder Assessment: Performed by Carly Mills on 04/25/19.  No concerns.    reviewed with every prescription, last reviewed by Madhuri Feliciano MD on 5/19/2020.     Expected prognosis: shorter  Risk: Low or Medium (ORT 0-7)  No further recommendations.     Last Palliative care appointment: 4/7/20  Recommendations:   Pain:   - Continue MS Contin 30mg, 15mg, 15mg (note that Omega used extra 15mg tablets x about 5 days when he drove to [place] with his wife in the beginning of March)  - continue scheduled tylenol 1000mg three times a day with MS Contin  - continue hydromorphone 2mg BID PRN    Constipation:   - continue senna 4 tabs BID  - Increase miralax and prune juice mix so you take it every day and if not having daily bowel movement, increase to twice a day.    Mood / coping - schedule an appointment with palliative care , Mitra Alvares for counseling to help better support you.   - continue duloxetine 60mg at bedtime      Reviewed: yes    Interim History:  Javon Bianchi 69 year old who has a video visit today due to COVID19. His wife, Suzie, sat next to him during the video visit. They were told by Dr Anne  "that he had more cancer in his left adrenal gland but no increased cancer in his bones. Yesterday had so much pain in both hips and especially in left hip into \"belly\" and up to ribs. When pain was so severe, went to bed and put biofreeze on it and then was able to eat something later and then pain subsided by midnight and was able to sleep. Today typical right hip pain present. Just started feeling spasms of pain below rib cage on the left side. Feels like nerve pain. Occurring so often.     Just sold house so trying to wrap up so many things and yesterday in car from 2-6pm and pain really set in yesterday around 5pm. Maybe car ride but also going in and out of a lot of stores. Can't stand more than 15 minutes before has to sit down. Was looking at fireplaces yesterday. Bought a condo and will move in there. Condo [place] so much [place] to prepare for the move.     Dr. Anne said the plan was that he would talk with radiation oncology and follow up with bone scan and CT scan in 3 months. Omega asked if they were treating adrenal gland for cancer and Dr Anne said no, said to continue the current treatment with  lenvatinib + afinitor. Dr Anne did say that no spread of cancer in the bones which Omega and Suzie are happy about.  Choices for adrenal cancer - watch, radiation or surgery but Dr Anne not in favor of the surgical option. Omega has had great response to radiation for cancer in bones with excellent decrease in pain.     Omega still tries not to take hydromorphone during the day. The pills he is on are working -MS Contin 30mg, hydromorphone 2mg, tylenol 1000mg at 8am; 3pm and 10pm MS Contin 15mg and tylenol 1000mg; often will wake up to go to the bathroom at 1am and then will take another hydromorphone 2mg. Omega has been managing this pain for years and it was through pain management that his cancer was diagnosed. Since starting morphine, " pain has been 3-4/10 and very manageable.     Daily bowel movements taking miralax and prune juice once a day as twice a day too much although occasionally needs extra dose. Senna 4 tabs BID.     Appetite is ok but mouth sore so has to pick and choose what he can eat but he is hungry. Gained weight.     Has a lot of edema in left leg as not using wraps everyday. Still using Jamar stockings. Today went to physical therapist so didn't use wrap.     Had appointment with palliative care .  Appointment went well and he has a follow up appointment. [place] the appointment was very helpful.     Taking zofran daily with cancer medications to prevent nausea and settle his stomach.     Review of Systems:  ROS: 10 point ROS neg other than the symptoms noted above in the HPI       Social History:    Pertinent changes to social history/social situation since last visit: Sold house and in a month moving into [employer] - much smaller than currently living.   Advance Directive Status:  DNR/DNI         No Known Allergies  Current Outpatient Medications   Medication Sig Dispense Refill     acetaminophen (TYLENOL) 500 MG tablet Take 1,000 mg by mouth 3 times daily 0800, 1400, 2200       calcium carbonate 600 mg-vitamin D 400 units (CALTRATE) 600-400 MG-UNIT per tablet Take 1 tablet by mouth 3 times daily       Dextromethorphan-guaiFENesin (MUCINEX DM)  MG 12 hr tablet Take 1 tablet by mouth 2 times daily as needed        diazepam (VALIUM) 5 MG tablet Take 1 pill 30 min before MRI and a second pill 2 minutes prior. (Patient not taking: Reported on 2/12/2020) 10 tablet 0     DULoxetine 60 MG PO capsule Take 1 capsule (60 mg) by mouth daily 30 capsule 3     everolimus (AFINITOR) 5 MG tablet Take 1 tablet (5 mg) by mouth daily for 28 days 30 tablet 3     everolimus (AFINITOR) 5 MG tablet Take 1 tablet (5 mg) by mouth daily for 28 days 30 tablet 0     everolimus (AFINITOR) 5 MG tablet Take 1 tablet (5 mg) by mouth daily for 28  days 28 tablet 0     fluticasone (FLONASE) 50 MCG/ACT nasal spray Spray 1 spray into both nostrils daily as needed for rhinitis or allergies       folic acid (FOLVITE) 1 MG tablet Take 1 tablet (1 mg) by mouth daily . Future refills by PCP. (Patient not taking: Reported on 2/12/2020) 30 tablet 0     furosemide (LASIX) 20 MG tablet Take 2 tablets (40 mg) by mouth daily 30 tablet 3     furosemide (LASIX) 20 MG tablet Take 1 tablet (20 mg) by mouth daily (Patient taking differently: Take 40 mg by mouth daily ) 30 tablet 1     furosemide (LASIX) 40 MG tablet Take 1 tablet (40 mg) by mouth daily 30 tablet 1     HYDROmorphone (DILAUDID) 2 MG tablet Take 1-2 tablets (2-4 mg) by mouth every 3 hours as needed for pain 120 tablet 0     lenvatinib (LENVIMA) 10 MG capsule Take 1 capsule (10 mg) by mouth daily 30 capsule 3     lenvatinib (LENVIMA) 10 MG capsule Take 1 capsule (10 mg) by mouth daily 30 capsule 3     levothyroxine (SYNTHROID/LEVOTHROID) 25 MCG tablet Take 1 tablet (25 mcg) by mouth daily 30 tablet 3     lidocaine 5 % EX patch Place 1-3 patches onto the skin every 24 hours Apply to painful areas for 12 hours on, 12 hours off. 30 patch 3     loratadine (CLARITIN) 10 MG tablet Take 10 mg by mouth daily (with lunch)        medical cannabis (Patient's own supply.  Not a prescription) Cannabis Heater liquid 2 mL orally in AM   Cannabis capsule - 1 capsule daily at bedtime.     (This is NOT a prescription, and does not certify that the patient has a qualifying medical condition for medical cannabis.  The purpose of this order is  to document that the patient reports taking medical cannabis.)       melatonin 5 MG tablet Take 10 mg by mouth At Bedtime       morphine (MS CONTIN) 15 MG CR tablet Take one tab midday and at bedtime.  Also taking MS Contin 30mg tab in AM. 60 tablet 0     morphine (MS CONTIN) 30 MG CR tablet Take 1 tablet (30 mg) by mouth daily Combine with MS Contin 15mg in afternoon and at bedtime. 30 tablet 0      multivitamin, therapeutic (THERA-VIT) TABS tablet Take 1 tablet by mouth At Bedtime       ondansetron (ZOFRAN) 8 MG tablet Take 8 mg by mouth every 8 hours as needed for nausea       polyethylene glycol (MIRALAX/GLYCOLAX) Packet Take 17 g by mouth daily as needed        pregabalin (LYRICA) 50 MG capsule Take 1 capsule (50 mg) by mouth 2 times daily 60 capsule 1     senna-docusate (SENOKOT-S/PERICOLACE) 8.6-50 MG tablet Take 2-4 tablets by mouth 2 times daily       triamcinolone (KENALOG) 0.1 % external cream Apply topically 2 times daily (Patient not taking: Reported on 2/12/2020) 30 g 3     Urea 40 % CREA Externally apply topically 2 times daily 28 g 3     vitamin D3 (CHOLECALCIFEROL) 2000 units (50 mcg) tablet Take 2,000 Units by mouth daily       Past Medical History:   Diagnosis Date     Anemia      Bone metastasis (H) 09/28/2018     Cough      Pulmonary nodule      Renal cell carcinoma, right (H) 09/28/2018     Past Surgical History:   Procedure Laterality Date     BRONCHOSCOPY (RIGID OR FLEXIBLE), DIAGNOSTIC N/A 9/4/2019    Procedure: Bronchoscopy, With Bronchoalveolar Lavage;  Surgeon: Burton Toure MD;  Location: [facility]     CHOLECYSTECTOMY       HC REMOVAL HEEL SPUR, CALCANEUS  2004     OPTICAL TRACKING SYSTEM BIOPSY BONE RADIO FREQUENCY ABLATION N/A 11/1/2019    Procedure: Kyphoplasty-Radiofrequency ablation Lumbar 1; Minimally Invasive Surgery  left hemilaminectomy Lumbar 3-Lumbar 4;  Surgeon: Vickey Brower MD;  Location: [facility] OR     General: alert, well groomed, appears stated age, in NAD  Eyes: EOMI, sclera clear  HEENT: NC/AT; no temporal wasting, mucous membranes moist  Lungs: no increased work of breathing, speaking full sentences   Neuro: A&O x 3; CN II-XII grossly intact; sitting in chair at counter  Neuropsych: alert, engaged in conversation, appropriate, affect full, sensorium intact      Key Data Reviewed:   GFR 69, platelets 127,     Scan 5/15:   IMPRESSION:  1. No interval  change in the extensive osseous metastases.  2. Interval growth of left adrenal hypodense nodule, consider further detailed imaging such as adrenal protocol CT or MRI as appropriate to exclude metastasis.  3. Unchanged small bilateral lung nodules.  4. Unchanged right lobe groundglass opacity, likely inflammation/infection recommend continued close attention on  follow-up to exclude atypical metastasis.  5. Atherosclerosis.  6. Retroaortic left renal vein.  7. Cholecystectomy.  8. Unchanged subcentimeter left renal cyst or angiomyolipoma.    Impression:     69 year old male with metastatic infiltrative lesion in L3 extending to L2 and L4, likely primary malignancy being renal cell carcinoma.  Omega had a break over the summer of 2019. He had a terrible PNA from August to September.  He then underwent a laminectomy on 11/1/19.  Started the lenvatinib + afinitor on 11/21/19   Recommendations & Counseling:    Pain:   - Continue MS Contin 30mg, 15mg, 15mg   - continue scheduled tylenol 1000mg three times a day with MS Contin  - continue hydromorphone 2mg BID PRN    Constipation:   - continue senna 4 tabs BID  - miralax and prune juice mix every day or BID for daily bowel movement    Mood / coping  - continue to follow with palliative care , Mitra Alvares for counseling .   - continue duloxetine 60mg at bedtime     Follow up 2-3 months     Madhuri Feliciano MD / Palliative Medicine / Pager [phone] / After-Hours Answering Service [phone] / Main Palliative Clinic - [phone] / [facility] Inpatient Team Consult Pager [phone] (answered 8am-430pm M-F)

## 2020-05-20 NOTE — PATIENT INSTRUCTIONS
Recommendations:    Pain:   - Continue Morphine ER 30mg, 15mg, 15mg   - continue scheduled tylenol 1000mg three times a day with Morphine ER  - continue hydromorphone 2mg if needed twice a day     Constipation:   - continue senna 4 tabs twice a day  - miralax and prune juice mix every day or twice a day for daily bowel movement     Mood / coping  - continue to follow with palliative care , Mitra Alvares for counseling .   - continue duloxetine 60mg at bedtime      Follow up 2-3 months       Reasons to Call    If you are having worsening/uncontrolled symptoms we want you to call!    You or your other physicians make any changes to medications we have prescribed.      Important Phone Numbers    Uzma Bernal. Palliative Care RN. Answered 8 am to 4 pm Tuesday - Friday ONLY. [phone]    Appointment Line.[phone]     After hours. Will connect you with on call MD. [phone]      Madhuri Feliciano MD  Palliative Care Physician  Clinic Number [phone]

## 2020-06-02 NOTE — TELEPHONE ENCOUNTER
Patient called to report that he was experiencing increased pain to his left leg hip, buttock and thigh. Omega states that he usually takes 1 tab (2mgs) of Dilaudid twice daily for the pain and last night took 2 tabs (4ngs)    Omega was informed that his prescription was for 2-4mgs every 3 hours and that he could increase his use of the Dilaudid. Omega will add another dose during the daytime. I will pass this information onto Dr Feliciano.     Suzie reports that Omega`s thinking appears to have slowed down , she states last night he also appeared goofy and thought there were people outside and wanted to know where his guns were.     Suzie and Omega state that he is experiencing increased swelling to his left leg which they state has been a long standing problem. They  Previously have used JUVENTINO hose and ACE bandages they ask few new supplies, I will discuss with Dr Feliciano if a lymphedema therapy referral is appropriate.     Omega has a follow up appointment with Dr Feliciano 7/28

## 2020-06-03 NOTE — TELEPHONE ENCOUNTER
Dr Feliciano has ordered Lymphedema therapy for swelling to left leg    Patient ask that order be sent to [facility] in [place]

## 2020-06-08 NOTE — PROGRESS NOTES
Oncology RN Care Coordination Note:     Patient called and left a voicemail stating he was wondering if it was ok to go to his PCP for lower leg swelling.  He states he already spoke with palliative care and they advised it was ok for him to follow up with his PCP.  He left a voicemail ask if that's ok.    I sent a message to the triage team asking them to follow up with this patient in the morning regarding this lower leg swelling.     Clair López RN BSN   [facility]  Nurse Coordinator

## 2020-06-08 NOTE — TELEPHONE ENCOUNTER
Received rquest from pharmacy for refill of lidocaine patches for patient.     Last refill: 2/19/2020  Last office visit: 5/19/2020  Scheduled for follow up 7/28/2020     Will route request to MD for review.     Reviewed [place]  Report.

## 2020-06-08 NOTE — PROGRESS NOTES
Omega called in to report that he continues with swelling to his left leg, he states this has been an ongoing problem he is going to make an appointment with his PCP to get it checked out.     He is receiving Lymphedema therapy in [place]     I will pass on this information to his oncology team

## 2020-06-09 NOTE — TELEPHONE ENCOUNTER
Received request from RN[facility] to call pt re left leg swelling. Has been on and off for past 6-8 months. Last week it started to be constant swelling. Is wearing TEDS daily and wrapping as able. Shin area is painful to the touch. Back of calf is swollen but not painful. Swelling goes from ankle to mid thigh. Pain on right is from knee to upper calf and is mild.  Is on 40mg Lasix daily.     Paged to Dr Anne @ 1890    Per Dr Anne, he will put in order for Ultrasound and will have pt see his PCP if he can get in this week, otherwise can see an JEFFREY in clinic next week as we dont currently have openings.     Pt will call PCP and if cannot get in this week, will call triage and we will have him see an JEFFREY in person next week. He would like to get the US done at [facility]. Message sent to scheduling and Clair López, PAULCC

## 2020-06-10 NOTE — TELEPHONE ENCOUNTER
Patient called to report that he was in the ED at [facility] in [place] last night with left  leg swelling and increased pain (Please see note in Epic)   CT pelvis performed    He was instructed that the leg swelling was due to a pubic rami fracture and was instructed to use crutches and a wheelchair to mobilize.    Due to his increased Dilaudid use  he was ordered to increase his MS Contin to 30mgs/15mgs/30mgs eight hours apart.     He was instructed to communicate this change to his palliative care and oncology team.    I will communicate this information to his care team

## 2020-06-10 NOTE — TELEPHONE ENCOUNTER
Requested orders from message below faxed to [facility] at [phone].     Successful transmission verified by [employer].     Hattie Lechuga CMA

## 2020-06-10 NOTE — TELEPHONE ENCOUNTER
----- Message from Clair López RN sent at 6/10/2020  2:14 PM CDT -----  Regarding: RE: [facility]  [phone]    ----- Message -----  From: Hattie Lechuga CMA  Sent: 6/10/2020  10:12 AM CDT  To: Cayla Palafox RN, Clair López RN  Subject: RE: [facility]                                       Do you have the fax number available?  If not, do you want them faxed just to [facility] generally?  Any special department that it needs to be faxed to? Once we have that I can fax them for you.     Thank you,  Hattie  ----- Message -----  From: Clair López RN  Sent: 6/9/2020   3:27 PM CDT  To: Cayla Palafox RN, [facility]  Subject: RE: [facility]                                       Cayla - I can't fax remotely - I don't have access, did you get a number where this is supposed to be sent to?    Back of house team,     Please fax orders for US when Dr. Anen places them.     Thank you,   Clair   ----- Message -----  From: Cayla Palafox RN  Sent: 6/9/2020   3:21 PM CDT  To: Clair López RN  Subject: [facility]                                           Please fax US order to [facility] for pt once Dr Anne puts in.Rolf Kulkarni

## 2020-06-15 NOTE — TELEPHONE ENCOUNTER
Dr Feliciano recommends that Omega increases the MS Contin to 30mgs Q8. Patient to be scheduled for an in person visit at next opening.

## 2020-06-15 NOTE — TELEPHONE ENCOUNTER
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     He was instructed to increase his MS Contin to 30/30/30mgs if this was ineffective at treating his pain.     Omega is currently using prn Hydromorphone 2-4mgs 3 times a day for breakthrough pain but is reluctant to increase this as it makes him feel wacky. I will discuss with Dr Feliciano next steps in treating his pain.    He would like to have an in person visit with a provider to co inside with his visit to his oncology team 6/29, he and his wife are finding virtual visits difficult. I will try to accommodate this .

## 2020-06-15 NOTE — TELEPHONE ENCOUNTER
Requested medications MS Contin   Last refill: 05/27   Last office visit: 4/7  Scheduled for follow up 7/28     Patient would like script  eprescribed     [place]  Report reviewed.

## 2020-06-16 NOTE — ORAL ONC MGMT
Oral Chemotherapy Monitoring Program    Primary Oncologist: Dr. Anne  Primary Oncology Clinic: [facility]  Cancer Diagnosis: RCC    Therapy History:  Lenvima 10mg:  Take one capsule by mouth daily  Afinitor 5mg:  Take 1 tablet by mouth daily  C1D1= 11/21/2019    Drug Interaction Assessment: No new notable drug interactions were identified  Medications assessed:  Lidocaine (Topical)-MS Contin-Dilaudid-Ondansetron-DULoxetine-Pregabalin-Furosemide-Levothyroxine-Everolimus-Lenvatinib-Urea Hydrating-DiazePAM-Triamcinolone (Topical)-Multivitamins/Minerals (with ADEK, Folate, Iron)-Senna-MiraLax [OTC]-Calcium Carbonate and Vitamin D-Dextromethorphan-GuaiFENesin-Fluticasone (Nasal)-Loratadine-Melatonin-Cholecalciferol-Cannabis    Lab Monitoring Plan:  Lenvima:   CMP Q2 weeks for first 2 months, then monthly  TSH/T4 monthly  Mag monthly  Urine protein dipstick monthly for first 2 months, then every 2 months  EKG monthly for first 2 months, then every 2 months if high risk  Check BP weekly for first month, then Q2 weeks for second month, then monthly  Afinitor:   CBC monthly  CMP monthly  Lipid panel Q2 months  Check BP Q2 weeks for first 2 months, then monthly    Subjective/Objective:  Javon Bianchi is a 69 year old male contacted by phone for oral chemotherapy follow up. Omega confirmed his dose of 1 tablet of afinitor daily and one capsule of lenvima daily. He reports not missing any doses in the last two weeks. Omega expressed that he does experience some side effects from the medications including mouth sores, swelling, diarrhea and constipation. He uses both senna and miralax to manage the diarrhea and constipation. He regularly swishes with the salt water and baking soda rinse recipe given to him from the OC team. He reports that this helps, and he continues to do it if he feels that his mouth sores are coming back. He has dealt with swelling of his leg leg for a while, and was just seen by a physician for it.  Omega experienced a lot of peeling on his hands and feet on previous cabometyx, but has not recently. He said his appetite has been good, and that he is no longer having fevers like he was 3 weeks ago. His pain is being managed by palliative care. He is looking forward to his upcoming appointment in clinic.    ORAL CHEMOTHERAPY 7/23/2019 12/2/2019 1/2/2020 1/22/2020 1/27/2020 2/4/2020 6/16/2020   Drug Name Lenvatinib/Everolimus Lenvatinib/Everolimus Lenvatinib/Everolimus Lenvatinib/Everolimus Lenvatinib/Everolimus Lenvatinib/Everolimus Lenvatinib/Everolimus   Current Dosage 18mg/5mg 18mg/5mg (No Data) (No Data) Other Other Other   Current Schedule Daily Daily Daily Daily Daily Daily Daily   Cycle Details Continuous Continuous Continuous Drug on Hold Continuous Continuous Continuous   Start Date of Last Cycle - 11/21/2019 12/26/2019 - - 1/27/2020 -   Planned next cycle start date - - - - - - -   Doses missed in last 2 weeks - 0 0 - - 0 0   Adherence Assessment - Adherent Adherent - - Adherent Adherent   Reason for Non-adherence - - - - - - -   Adherence Intervention Recommended - - - - - - -   Adverse Effects - No AE identified during assessment No AE identified during assessment - - Palmar-plantar erythrodysethesia syndrome Constipation;Diarrhea;Oral mucositis   Nausea - - - - - - -   Pharmacist Intervention(nausea) - - - - - - -   Intervention(s) - - - - - - -   Constipation - - - - - - Grade 1   Pharmacist Intervention(constipation) - - - - - - No   Diarrhea - - - - - - Grade 1   Pharmacist Intervention(diarrhea) - - - - - - No   Palmar-plantar Erythrodysethesia syndrome[hand-foot syndrome] - - - - - Resolved due to intervention -   Pharmacist Intervention(Palmar-plantar) - - - - - No -   Intervention(s) - - - - - - -   Oral Mucositis - - - - - - -   Pharmacist Intervention(mucositis) - - - - - - No   Home BPs - - - - - - -   Any new drug interactions? - No No - No - -   Is the dose as ordered appropriate for the  patient? - Yes Yes - Yes - -   Is the patient currently in pain? - - - - - - -   Has the patient been assessed within the past 6 months for depression? - - - - - - -   Has the patient missed any days of school, work, or other routine activity? - - - - - - -       Vitals:  BP:   BP Readings from Last 1 Encounters:   04/07/20 136/69     Wt Readings from Last 1 Encounters:   05/19/20 95.3 kg (210 lb)       Labs:  No results found for NA within last 30 days.     No results found for K within last 30 days.     No results found for CA within last 30 days.     No results found for Mag within last 30 days.     No results found for Phos within last 30 days.     No results found for ALBUMIN within last 30 days.     No results found for BUN within last 30 days.     No results found for CR within last 30 days.     No results found for AST within last 30 days.     No results found for ALT within last 30 days.     No results found for BILITOTAL within last 30 days.     No results found for WBC within last 30 days.     No results found for HGB within last 30 days.     No results found for PLT within last 30 days.     No results found for ANC within last 30 days.       Assessment:  Omega is tolerating the lenvima and afinitor. He has mouth sores occasionally in which he is able to manage with salt and baking soda rinse. He experiences diarrhea and constipation and uses senna and miralax to manage.    Plan:  -Continue Lenvima and Afinitor as prescribed.  -Continue to manage constipation and diarrhea with senna and miralax  -Continue to use salt and baking soda rinse  -Monitor swelling in leg    Follow-Up:  -Review provider appt and labs on 06/29    Emelilucía Oswald  Pharmacy Intern  [facility]  [phone]

## 2020-06-18 NOTE — TELEPHONE ENCOUNTER
Call to patient to assess pain after increase of MS Contin on 06/15, patient reports that he is taking MS Contin Q8 and had increased his Dilaudid to 4mgs three times a day.    He does not feel that he needs further changes to his medications at this time, is due to see his oncology team next week, will get him scheduled with Dr Feliciano for and in person visit

## 2020-06-25 NOTE — TELEPHONE ENCOUNTER
Patient reports improved  Pain control with using more Oxycodone - uses 6 tabs per day in addition to MS Contin 30mgs Q8    Requested medications Hydromorphone   Pill count 2  PRN use per 24hrs 6 tabs  Days till out  1  Last refill: 05/20  Last office visit: 5/19 Jodie   Scheduled for follow up 7/14 Jodie     Patient would like script  E prescribed     MN  Report reviewed.

## 2020-06-29 NOTE — NURSING NOTE
Chief Complaint   Patient presents with     Blood Draw     labs drawn with vpt by rn.  vs taken     Labs drawn with vpt by rn; urine collected and sent as well.  Pt tolerated well.  VS taken.  Pt checked in for next appt.    Tigist Bender RN

## 2020-06-29 NOTE — NURSING NOTE
Xgeva 120MG GIVEN IN CLINIC TODAY. Pt tolerated well. See pierce Arevalo, CMA on 6/29/2020 at 11:24 AM

## 2020-06-29 NOTE — PROGRESS NOTES
Reason for Visit: f/u [facility]    Oncology HPI: Javon Bianchi is a 69 year old male with a history significant for hyperlipidemia who had been in his usual health until he developed back pain in May of 2018. At that time, the pain was a stabbing like in the middle of lower back with burning like pain wrapping around this left hip to knee area. No trauma. He had MRI on 5/23/18 showed mild degenerative change with bulging disks L2-S1. A small benign hemangioma was noted in L2, no other marrow signal abnormality. Since then, the pain failed to improve despite steroid injections. In addition, he lost 25 lbs unintentionally since June along with chills a few times a day.   Over time, the pain got worse to the point he could not ambulate after short distance and developed muscle weakness in lower extremities over time requiring a walker. Finally, he went to ER ([facility] in [place]) on 8/23/18, CT A/P was unremarkable. He was referred to neurology with obtaining MRI L spine. MRI on 8/30/18 revealed a pathological fracture at L3 vertebral body with height loss at 40% and diffuse involvement of a neoplastic process. IR guided biopsy on 8/31/18 showed poorly differentiated carcinoma. IHC was suggestive of possible renal cell carcinoma per pathology report (Renal cell immunochemistry is positive and along with the negative staining for CK7 and CK20 suggests the possibility of a renal carcinoma, however most renal cell carcinomas also express PAX8 which is negative in this case). Of note, CT chest/abdomen/pelvis at OSH was negative for primary lesion. The patient was evaluated by neurosurgery who recommended no surgical intervention. He was discharged with TLSO brace and walker. He had palliative XRT locally from 9/11/18 for 10 fractions.    ONCOLOGY HISTORY:   Cabozantinib: Started 60 mg daily on September 28, 2018.  Dec 10, 2018: dose reduced cabozantinib to 40 mg a day.  Jan 17, 2019: dose reduced cabozantinib to 29 mg a  day.  Feb 11, 2019: C1D1 Nivolumab   3/12/19: c2 Nivolumab  4/8/19: held Nivolumab due to myalgias, started prednisone  September '19:He had palliative XRT locally from 10 fractions (Done in [place])  11//21/19: started lenvatinib (and then 11/25 started afinitor)   1/27/20-present: dropped to 10 mg of the lenvatinib due to HFS, continues on afinitor      Interval history: Omega joined by his wife Suzie and daughter Carol on the phone. He states his R hip pain is preventing him from sleep. He has been taking the 30 mg MS contin q8 hours and 1-2 tabs 2 mg dilaudid as needed (usually not every 3 hours). Once he lays down for the night he only tolerates a position for a short amount of time and then he has to get up. He also gets up every few hours overnight to urinate, which can aggravate the pain as well. Has not tried cannabis at night but notes it makes him sleepy when he takes it in the morning. Omega also states his left leg edema is very bothersome and no longer responding as well to lasix and ace bandages. He takes the lasix at noon and doesn't necessarily notice higher volume of urine output following. He manages his constipation with senna and miralax. Appetite is okay, if he feels he isn't getting enough protein he will have a boost but lately he has not needed one. He noticed a sore in his mouth, left lower jaw in the back of his mouth. He saw the dentist recently and they didn't think anything of it--took xrays there without acute finding. He is now ambulating with a walker, before his fracture he was using a cane.     No sob/cp/rash/fever/chills.     Current Outpatient Medications   Medication Sig Dispense Refill     calcium carbonate 600 mg-vitamin D 400 units (CALTRATE) 600-400 MG-UNIT per tablet Take 1 tablet by mouth 3 times daily       Dextromethorphan-guaiFENesin (MUCINEX DM)  MG 12 hr tablet Take 1 tablet by mouth 2 times daily as needed        diazepam (VALIUM) 5 MG tablet Take 1 pill 30  min before MRI and a second pill 2 minutes prior. 10 tablet 0     DULoxetine (CYMBALTA) 60 MG capsule Take 1 capsule (60 mg) by mouth daily 30 capsule 2     everolimus (AFINITOR) 5 MG tablet Take 1 tablet (5 mg) by mouth daily for 28 days 30 tablet 3     fluticasone (FLONASE) 50 MCG/ACT nasal spray Spray 1 spray into both nostrils daily as needed for rhinitis or allergies       furosemide (LASIX) 40 MG tablet Take 1 tablet (40 mg) by mouth daily 90 tablet 3     HYDROmorphone (DILAUDID) 2 MG tablet Take 1-2 tablets (2-4 mg) by mouth every 3 hours as needed for pain 180 tablet 0     lenvatinib (LENVIMA) 10 MG capsule Take 1 capsule (10 mg) by mouth daily 30 capsule 3     levothyroxine (SYNTHROID/LEVOTHROID) 25 MCG tablet Take 1 tablet (25 mcg) by mouth daily 90 tablet 3     lidocaine (LIDODERM) 5 % patch Place 1-3 patches onto the skin every 24 hours Apply to painful areas for 12 hours on, 12 hours off. 30 patch 3     lidocaine (LIDODERM) 5 % patch Place 1-3 patches onto the skin every 24 hours Apply to painful areas for 12 hours on, 12 hours off. 30 patch 3     loratadine (CLARITIN) 10 MG tablet Take 10 mg by mouth daily (with lunch)        medical cannabis (Patient's own supply.  Not a prescription) Cannabis Heater liquid 2 mL orally in AM   Cannabis capsule - 1 capsule daily at bedtime.     (This is NOT a prescription, and does not certify that the patient has a qualifying medical condition for medical cannabis.  The purpose of this order is  to document that the patient reports taking medical cannabis.)       melatonin 5 MG tablet Take 10 mg by mouth At Bedtime       morphine (MS CONTIN) 30 MG CR tablet Take 1 tablet (30 mg) by mouth every 8 hours 90 tablet 0     multivitamin, therapeutic (THERA-VIT) TABS tablet Take 1 tablet by mouth At Bedtime       ondansetron (ZOFRAN) 8 MG tablet Take 1 tablet (8 mg) by mouth every 8 hours as needed for nausea 60 tablet 3     polyethylene glycol (MIRALAX/GLYCOLAX) Packet Take  17 g by mouth daily as needed        pregabalin (LYRICA) 50 MG capsule Take 1 capsule (50 mg) by mouth 2 times daily 60 capsule 2     senna-docusate (SENOKOT-S/PERICOLACE) 8.6-50 MG tablet Take 2-4 tablets by mouth 2 times daily       triamcinolone (KENALOG) 0.1 % external cream Apply topically 2 times daily 30 g 3     Urea 40 % CREA Externally apply topically 2 times daily 28 g 3     vitamin D3 (CHOLECALCIFEROL) 2000 units (50 mcg) tablet Take 2,000 Units by mouth daily          No Known Allergies      Exam: Blood pressure 124/69, pulse 57, temperature 98  F (36.7  C), temperature source Tympanic, resp. rate 16, weight 103.8 kg (228 lb 14.4 oz), SpO2 97 %.  Wt Readings from Last 4 Encounters:   05/19/20 95.3 kg (210 lb)   04/07/20 99 kg (218 lb 3.2 oz)   02/12/20 98.6 kg (217 lb 6.4 oz)   01/21/20 99.8 kg (220 lb)     General: No acute distress  HEENT: Sclera anicteric. Oral mucosa pink and moist.  Area of exposed bone (?3mm) on left lower jaw, just behind last tooth   Lymph: No lymphadenopathy in neck  Heart: Regular, rate, and rhythm  Lungs: Clear to ascultation bilaterally  Abdomen: Positive bowel sounds. Soft, non-distended, non-tender. No organomegaly or mass.   Extremities: 2+ left extremity edema with taut skin, 1+ right lower extremity edema.    Neuro: Cranial nerves grossly intact  Rash: none    Labs:    6/29/2020 09:21   Sodium 138   Potassium 4.1   Chloride 106   Carbon Dioxide 26   Urea Nitrogen 12   Creatinine 1.00   GFR Estimate 75   GFR Estimate If Black 87   Calcium 8.5   Anion Gap 6   Magnesium 1.9   Albumin 3.1 (L)   Protein Total 7.0   Bilirubin Total 0.4   Alkaline Phosphatase 78   ALT 34   AST 38   TSH 3.19   Glucose 96   WBC 4.7   Hemoglobin 8.7 (L)   Hematocrit 29.1 (L)   Platelet Count 150   RBC Count 3.05 (L)   MCV 95   MCH 28.5   MCHC 29.9 (L)   RDW 17.5 (H)   Diff Method Manual Differential   % Neutrophils 53.9   % Lymphocytes 6.1   % Monocytes 24.4   % Eosinophils 1.8   % Basophils 1.7    % Metamyelocytes 1.7   % Myelocytes 2.6   % Promyelocytes 7.8   Nucleated RBCs 1 (H)   Absolute Neutrophil 2.5   Absolute Lymphocytes 0.3 (L)   Absolute Monocytes 1.1   Absolute Eosinophils 0.1   Absolute Basophils 0.1   Absolute Metamyelocytes 0.1 (H)   Absolute Myelocytes 0.1 (H)   Absolute Promyelocytes 0.4 (H)   Absolute Nucleated RBC 0.0   Anisocytosis Moderate   Poikilocytosis Slight   Polychromasia Marked   Ovalocytes Slight   Microcytes Present   Macrocytes Present   Platelet Estimate Confirming automated cell count       Imaging:   Bone Scan 6/26 IMPRESSION:   Bony metastasis is grossly unchanged compared to 4/7/2020 exam. No new  focal bony uptake to indicate progression.        Impression/plan: 69 year old male with metastatic RCC, started the TKI + mTORi on 11/21/19 and has been on for 6+ months now. He has been able to tolerate the regimen fairly well.   Omega has low energy and feels he is slowing down. Reinforced that this regimen is helping prevent cancer progression but it will not make it better. He is tolerating the medication without AE's warranting dose reduction at this time and he is finding quality in his life still.   -Currently continue with lenvatinib 10 mg and afinitor 5mg   -Discussed perhaps lower lenvatinib in the future if mouth sore does not heal or gets worse  -consider ritalin for fatigue  -routine restaging again in August      Edema: Left leg > right. DVT was ruled out with his 6/9 visit. He has had RT to the left leg in the past and it has always been more swollen than the right, in addition to XRT of both pelvis'. He has a lymphedema consult but he was not able to get in with them until July. Their ace bandage has lost some elasticity.   -Increase lasix from 40 to 60 (and take in the morning).  -DME order for new ace wrap   -Will see lymphedema next month to develop more specific plan    -Restaging 8/31, Dr. Anne 9/1    Pain: Increasing pain with laying down for bed; aggravated  by frequent urination overnight.   -start to take cannabis at night instead of in AM,   -take dilaudid at night right when laying down for bed, and then again 3 hours later if awake throughout night  -Consider increasing MS contin from 30-30-30 to 30-30-45 mg q8 hours  -Follows with Palliative, next appt mid July    Poor sleep: Secondary to pain and trips to the bathroom q2 hours. Omega was taking his lasix at noon instead of in the AM. Also has to take a big glass of water with his lenvatinib and afinitor at 6pm when he takes them every night.   -Discussed taking lasix in the morning to prevent its effects from lingering into the evening  -Move up lenvatinib and afinitor every hours or so each day until it can be taken earlier in the day  -Try cannabis at bedtime instead of morning  -Avoid liquid intake after 4pm    Bone metastasis: ONJ noted on exam today. Discussed options--Xgeva important given recent pathologic fracture. Xgeva-given 2/12/20.   -Proceed with Xgeva today, benefit outweighing risk after talking with Omega   -Consider lowering lenvatinib if the ONJ worsenes  -Continue on oral calcium + D    Miriam Roca CNP  I saw patient and performed the ROS and exam myself.  The assessment and plan were mutually discussed and this note was edited to reflect my findings.  Radha St PA-C

## 2020-06-29 NOTE — LETTER
6/29/2020         RE: Javon Bianchi  [address]  [place] [address]        Dear Colleague,    Thank you for referring your patient, Javon Bianchi, to the [facility]. Please see a copy of my visit note below.    Reason for Visit: f/u [facility]    Oncology HPI: Javon Bianchi is a 69 year old male with a history significant for hyperlipidemia who had been in his usual health until he developed back pain in May of 2018. At that time, the pain was a stabbing like in the middle of lower back with burning like pain wrapping around this left hip to knee area. No trauma. He had MRI on 5/23/18 showed mild degenerative change with bulging disks L2-S1. A small benign hemangioma was noted in L2, no other marrow signal abnormality. Since then, the pain failed to improve despite steroid injections. In addition, he lost 25 lbs unintentionally since June along with chills a few times a day.   Over time, the pain got worse to the point he could not ambulate after short distance and developed muscle weakness in lower extremities over time requiring a walker. Finally, he went to ER ([facility] in [place]) on 8/23/18, CT A/P was unremarkable. He was referred to neurology with obtaining MRI L spine. MRI on 8/30/18 revealed a pathological fracture at L3 vertebral body with height loss at 40% and diffuse involvement of a neoplastic process. IR guided biopsy on 8/31/18 showed poorly differentiated carcinoma. IHC was suggestive of possible renal cell carcinoma per pathology report (Renal cell immunochemistry is positive and along with the negative staining for CK7 and CK20 suggests the possibility of a renal carcinoma, however most renal cell carcinomas also express PAX8 which is negative in this case). Of note, CT chest/abdomen/pelvis at OSH was negative for primary lesion. The patient was evaluated by neurosurgery who recommended no surgical intervention. He was discharged with TLSO brace  and walker. He had palliative XRT locally from 9/11/18 for 10 fractions.    ONCOLOGY HISTORY:   Cabozantinib: Started 60 mg daily on September 28, 2018.  Dec 10, 2018: dose reduced cabozantinib to 40 mg a day.  Jan 17, 2019: dose reduced cabozantinib to 29 mg a day.  Feb 11, 2019: C1D1 Nivolumab   3/12/19: c2 Nivolumab  4/8/19: held Nivolumab due to myalgias, started prednisone  September '19:He had palliative XRT locally from 10 fractions (Done in [place])  11//21/19: started lenvatinib (and then 11/25 started afinitor)   1/27/20-present: dropped to 10 mg of the lenvatinib due to HFS, continues on afinitor      Interval history: Omega joined by his wife Suzie and daughter Carol on the phone. He states his R hip pain is preventing him from sleep. He has been taking the 30 mg MS contin q8 hours and 1-2 tabs 2 mg dilaudid as needed (usually not every 3 hours). Once he lays down for the night he only tolerates a position for a short amount of time and then he has to get up. He also gets up every few hours overnight to urinate, which can aggravate the pain as well. Has not tried cannabis at night but notes it makes him sleepy when he takes it in the morning. Omega also states his left leg edema is very bothersome and no longer responding as well to lasix and ace bandages. He takes the lasix at noon and doesn't necessarily notice higher volume of urine output following. He manages his constipation with senna and miralax. Appetite is okay, if he feels he isn't getting enough protein he will have a boost but lately he has not needed one. He noticed a sore in his mouth, left lower jaw in the back of his mouth. He saw the dentist recently and they didn't think anything of it--took xrays there without acute finding. He is now ambulating with a walker, before his fracture he was using a cane.     No sob/cp/rash/fever/chills.     Current Outpatient Medications   Medication Sig Dispense Refill     calcium carbonate 600  mg-vitamin D 400 units (CALTRATE) 600-400 MG-UNIT per tablet Take 1 tablet by mouth 3 times daily       Dextromethorphan-guaiFENesin (MUCINEX DM)  MG 12 hr tablet Take 1 tablet by mouth 2 times daily as needed        diazepam (VALIUM) 5 MG tablet Take 1 pill 30 min before MRI and a second pill 2 minutes prior. 10 tablet 0     DULoxetine (CYMBALTA) 60 MG capsule Take 1 capsule (60 mg) by mouth daily 30 capsule 2     everolimus (AFINITOR) 5 MG tablet Take 1 tablet (5 mg) by mouth daily for 28 days 30 tablet 3     fluticasone (FLONASE) 50 MCG/ACT nasal spray Spray 1 spray into both nostrils daily as needed for rhinitis or allergies       furosemide (LASIX) 40 MG tablet Take 1 tablet (40 mg) by mouth daily 90 tablet 3     HYDROmorphone (DILAUDID) 2 MG tablet Take 1-2 tablets (2-4 mg) by mouth every 3 hours as needed for pain 180 tablet 0     lenvatinib (LENVIMA) 10 MG capsule Take 1 capsule (10 mg) by mouth daily 30 capsule 3     levothyroxine (SYNTHROID/LEVOTHROID) 25 MCG tablet Take 1 tablet (25 mcg) by mouth daily 90 tablet 3     lidocaine (LIDODERM) 5 % patch Place 1-3 patches onto the skin every 24 hours Apply to painful areas for 12 hours on, 12 hours off. 30 patch 3     lidocaine (LIDODERM) 5 % patch Place 1-3 patches onto the skin every 24 hours Apply to painful areas for 12 hours on, 12 hours off. 30 patch 3     loratadine (CLARITIN) 10 MG tablet Take 10 mg by mouth daily (with lunch)        medical cannabis (Patient's own supply.  Not a prescription) Cannabis Heater liquid 2 mL orally in AM   Cannabis capsule - 1 capsule daily at bedtime.     (This is NOT a prescription, and does not certify that the patient has a qualifying medical condition for medical cannabis.  The purpose of this order is  to document that the patient reports taking medical cannabis.)       melatonin 5 MG tablet Take 10 mg by mouth At Bedtime       morphine (MS CONTIN) 30 MG CR tablet Take 1 tablet (30 mg) by mouth every 8 hours 90  tablet 0     multivitamin, therapeutic (THERA-VIT) TABS tablet Take 1 tablet by mouth At Bedtime       ondansetron (ZOFRAN) 8 MG tablet Take 1 tablet (8 mg) by mouth every 8 hours as needed for nausea 60 tablet 3     polyethylene glycol (MIRALAX/GLYCOLAX) Packet Take 17 g by mouth daily as needed        pregabalin (LYRICA) 50 MG capsule Take 1 capsule (50 mg) by mouth 2 times daily 60 capsule 2     senna-docusate (SENOKOT-S/PERICOLACE) 8.6-50 MG tablet Take 2-4 tablets by mouth 2 times daily       triamcinolone (KENALOG) 0.1 % external cream Apply topically 2 times daily 30 g 3     Urea 40 % CREA Externally apply topically 2 times daily 28 g 3     vitamin D3 (CHOLECALCIFEROL) 2000 units (50 mcg) tablet Take 2,000 Units by mouth daily          No Known Allergies      Exam: Blood pressure 124/69, pulse 57, temperature 98  F (36.7  C), temperature source Tympanic, resp. rate 16, weight 103.8 kg (228 lb 14.4 oz), SpO2 97 %.  Wt Readings from Last 4 Encounters:   05/19/20 95.3 kg (210 lb)   04/07/20 99 kg (218 lb 3.2 oz)   02/12/20 98.6 kg (217 lb 6.4 oz)   01/21/20 99.8 kg (220 lb)     General: No acute distress  HEENT: Sclera anicteric. Oral mucosa pink and moist.  Area of exposed bone (?3mm) on left lower jaw, just behind last tooth   Lymph: No lymphadenopathy in neck  Heart: Regular, rate, and rhythm  Lungs: Clear to ascultation bilaterally  Abdomen: Positive bowel sounds. Soft, non-distended, non-tender. No organomegaly or mass.   Extremities: 2+ left extremity edema with taut skin, 1+ right lower extremity edema.    Neuro: Cranial nerves grossly intact  Rash: none    Labs:    6/29/2020 09:21   Sodium 138   Potassium 4.1   Chloride 106   Carbon Dioxide 26   Urea Nitrogen 12   Creatinine 1.00   GFR Estimate 75   GFR Estimate If Black 87   Calcium 8.5   Anion Gap 6   Magnesium 1.9   Albumin 3.1 (L)   Protein Total 7.0   Bilirubin Total 0.4   Alkaline Phosphatase 78   ALT 34   AST 38   TSH 3.19   Glucose 96   WBC 4.7    Hemoglobin 8.7 (L)   Hematocrit 29.1 (L)   Platelet Count 150   RBC Count 3.05 (L)   MCV 95   MCH 28.5   MCHC 29.9 (L)   RDW 17.5 (H)   Diff Method Manual Differential   % Neutrophils 53.9   % Lymphocytes 6.1   % Monocytes 24.4   % Eosinophils 1.8   % Basophils 1.7   % Metamyelocytes 1.7   % Myelocytes 2.6   % Promyelocytes 7.8   Nucleated RBCs 1 (H)   Absolute Neutrophil 2.5   Absolute Lymphocytes 0.3 (L)   Absolute Monocytes 1.1   Absolute Eosinophils 0.1   Absolute Basophils 0.1   Absolute Metamyelocytes 0.1 (H)   Absolute Myelocytes 0.1 (H)   Absolute Promyelocytes 0.4 (H)   Absolute Nucleated RBC 0.0   Anisocytosis Moderate   Poikilocytosis Slight   Polychromasia Marked   Ovalocytes Slight   Microcytes Present   Macrocytes Present   Platelet Estimate Confirming automated cell count       Imaging:   Bone Scan 6/26 IMPRESSION:   Bony metastasis is grossly unchanged compared to 4/7/2020 exam. No new  focal bony uptake to indicate progression.        Impression/plan: 69 year old male with metastatic RCC, started the TKI + mTORi on 11/21/19 and has been on for 6+ months now. He has been able to tolerate the regimen fairly well.   Omega has low energy and feels he is slowing down. Reinforced that this regimen is helping prevent cancer progression but it will not make it better. He is tolerating the medication without AE's warranting dose reduction at this time and he is finding quality in his life still.   -Currently continue with lenvatinib 10 mg and afinitor 5mg   -Discussed perhaps lower lenvatinib in the future if mouth sore does not heal or gets worse  -consider ritalin for fatigue  -routine restaging again in August      Edema: Left leg > right. DVT was ruled out with his 6/9 visit. He has had RT to the left leg in the past and it has always been more swollen than the right, in addition to XRT of both pelvis'. He has a lymphedema consult but he was not able to get in with them until July. Their ace bandage has  lost some elasticity.   -Increase lasix from 40 to 60 (and take in the morning).  -DME order for new ace wrap   -Will see lymphedema next month to develop more specific plan    -Restaging 8/31, Dr. Anne 9/1    Pain: Increasing pain with laying down for bed; aggravated by frequent urination overnight.   -start to take cannabis at night instead of in AM,   -take dilaudid at night right when laying down for bed, and then again 3 hours later if awake throughout night  -Consider increasing MS contin from 30-30-30 to 30-30-45 mg q8 hours  -Follows with Palliative, next appt mid July    Poor sleep: Secondary to pain and trips to the bathroom q2 hours. Omega was taking his lasix at noon instead of in the AM. Also has to take a big glass of water with his lenvatinib and afinitor at 6pm when he takes them every night.   -Discussed taking lasix in the morning to prevent its effects from lingering into the evening  -Move up lenvatinib and afinitor every hours or so each day until it can be taken earlier in the day  -Try cannabis at bedtime instead of morning  -Avoid liquid intake after 4pm    Bone metastasis: ONJ noted on exam today. Discussed options--Xgeva important given recent pathologic fracture. Xgeva-given 2/12/20.   -Proceed with Xgeva today, benefit outweighing risk after talking with Omega   -Consider lowering lenvatinib if the ONJ worsenes  -Continue on oral calcium + D    Miriam Roca CNP  I saw patient and performed the ROS and exam myself.  The assessment and plan were mutually discussed and this note was edited to reflect my findings.  Radha St PA-C      Sincerely,        Ruthie St PA-C

## 2020-06-29 NOTE — NURSING NOTE
"Oncology Rooming Note    June 29, 2020 9:42 AM   Javon Bianchi is a 69 year old male who presents for:    Chief Complaint   Patient presents with     Blood Draw     labs drawn with vpt by rn.  vs taken     Oncology Clinic Visit     Pt is here for Renal Cancer     Initial Vitals: Blood Pressure 124/69 (BP Location: Right arm, Patient Position: Sitting, Cuff Size: Adult Regular)   Pulse 57   Temperature 98  F (36.7  C) (Tympanic)   Respiration 16   Weight 103.8 kg (228 lb 14.4 oz)   Oxygen Saturation 97%   Body Mass Index 32.84 kg/m   Estimated body mass index is 32.84 kg/m  as calculated from the following:    Height as of 2/12/20: 1.778 m (5' 10\").    Weight as of this encounter: 103.8 kg (228 lb 14.4 oz). Body surface area is 2.26 meters squared.  Severe Pain (7) Comment: Data Unavailable   No LMP for male patient.  Allergies reviewed: Yes  Medications reviewed: Yes    Medications: Medication refills not needed today.  Pharmacy name entered into [facility]:    [facility] - [place], [place] - [address] [facility] - [place] - [address] [facility] - [place], [place] - [address]    Clinical concerns: none       Olamide Mulligan MA            "

## 2020-07-10 NOTE — PROGRESS NOTES
"Javon Bianchi is a 69 year old male who is being evaluated via a billable video visit.      The patient has been notified of following:     \"This video visit will be conducted via a call between you and your physician/provider. We have found that certain health care needs can be provided without the need for an in-person physical exam.  This service lets us provide the care you need with a video conversation.  If a prescription is necessary we can send it directly to your pharmacy.  If lab work is needed we can place an order for that and you can then stop by our lab to have the test done at a later time.    Video visits are billed at different rates depending on your insurance coverage.  Please reach out to your insurance provider with any questions.    If during the course of the call the physician/provider feels a video visit is not appropriate, you will not be charged for this service.\"    Patient has given verbal consent for Video visit? Yes    How would you like to obtain your AVS? MyChart       Will anyone else be joining your video visit? No          Secondary Video Option (vMobo), send text message to:  [phone]    I have reviewed and updated the patient's allergies and medication list. Patient was asked if they had any patient reported vital signs to present, if yes, please see documented vitals.  Patient was also asked for their current weight and height, if presented, documented in vitals.    Concerns: Extreme Back Pain 8-9/10 which has persisted for at least 3 weeks, with breakthrough pain up to a 10/10.  Also, he has noticed an increase of shortness of breath without any activity. Left leg swelling has increased as well.        Refills: completed      HERMELINDA Haywood        Video-Visit Details    Type of service:  Video Visit    Video Start Time: 8:21am  Video End Time: 8:54am    Originating Location (pt. Location): Home    Distant Location (provider location):  [facility]" "CLINIC     Platform used for Video Visit: [facility]        Palliative Care Outpatient Clinic Progress Note    Patient Name:  Javon Bianchi  Primary Provider:  Srinath Conner    Chief Complaint / Identifying information:   69 year old male with metastatic infiltrative lesion in L3 extending to L2 and L4, likely primary malignancy being renal cell carcinoma.  He started on Nivolumab but he developed myalgias and therapy was stopped and start him on prednisone. He had a break over the summer of 2019 and developed a severe PNA from August to September. He then underwent a laminectomy on 11/1/19 and was admitted 11/11-11/13 for constipation.  He officially started the lenvatinib + afinitor on 11/21/19. Increased hip pain in 6/2020. Bone Scan 6/26/20:   Bony metastasis is grossly unchanged compared to 4/7/2020 exam.    ED on 6/9/20 with increasing right hip pain - MS Contin increased to 30mg q8hrs     Last Palliative care appointment: 5/19/20  Recommendations:   Pain:   - Continue MS Contin 30mg, 15mg, 15mg   - continue scheduled tylenol 1000mg three times a day with MS Contin  - continue hydromorphone 2mg BID PRN     Constipation:   - continue senna 4 tabs BID  - miralax and prune juice mix every day or BID for daily bowel movement     Mood / coping  - continue to follow with palliative care , Mtira Alvares for counseling .   - continue duloxetine 60mg at bedtime      Reviewed: yes    Interim History:  Javon Bianchi 69 year old with a video visit today for follow up. He is at home and his wife, Patrizia, joined him for the video visit.  He has been feeling miserable recently and had several ED visits and tells Patrizia that he wants to live but just not like he is living now. Not sure he wants to stop cancer directed treatments. Not sure about hospice and needs more information. [facility] in [place]    Legs swollen, left leg is \"huge\". Working with lymphedema - has wraps but feels like that is " just making the fluid go to his lungs and makes him SOB.     He can't sleep at night as so uncomfortable. Maybe 2 hour stretches in hospital bed, then up and walking around then lies back down, can't get comfortable. Also significant anxiety at night. Taking hydroxyzine at night for past 4 nights and that helps. Thinks about his cancer and no further treatment and that makes him so anxious. Anxiety during the day as well unless distracted. Video visit with palliative care , Mitra Alvares, this week again.     During day when has things to do, going into town with his wife, much more alert and able to not feels a miserable but at home, falls asleep every time sitting for more than 2 minutes. Can't focus, can't do work on computer.    Pain: All over his body. In back at night, can't sleep.   - MS Contin 30mg, 30mg, 45mg. This is an increase after an ED visit or visit with doctor.   - hydromorphone 2-4mg PO - 1-3 tabs during day and taking 1 tab before bed and then in middle of the night  - lyrica 50mg BID  - cymbalta 60mg hs  - tylenol   - lidoderm patches  - medical cannabis    Hospice recommendation: We discussed that his cancer can't be cured and how distressing that is for Omega. His anxiety around that is very hard for him to handle and he worries constantly about his cancer and no cure. He wonders about hospice but doesn't know if that is the path he wants to take. He wants to live but feels miserable now. I encouraged him to find out more about hospice as I felt that he was having so many symptoms from his cancer and wondered if hospice would be a better way to manage symptoms for whatever time he has left. Omega and Patrizia agreed that getting more information would be helpful.     No nausea, eating ok. Very anxious and uncomfortable.     Falling asleep during the day whenever not out of house, can't focus, wonders if the opioids are making him fall asleep. A few times he has dropped things that  he is holding because he falls asleep in the middle of doing things.       Review of Systems:  ROS: 10 point ROS neg other than the symptoms noted above in the HPI       No Known Allergies  Current Outpatient Medications   Medication Sig Dispense Refill     calcium carbonate 600 mg-vitamin D 400 units (CALTRATE) 600-400 MG-UNIT per tablet Take 1 tablet by mouth 3 times daily       Dextromethorphan-guaiFENesin (MUCINEX DM)  MG 12 hr tablet Take 1 tablet by mouth 2 times daily as needed        diazepam (VALIUM) 5 MG tablet Take 1 pill 30 min before MRI and a second pill 2 minutes prior. 10 tablet 0     DULoxetine (CYMBALTA) 60 MG capsule Take 1 capsule (60 mg) by mouth daily 30 capsule 2     everolimus (AFINITOR) 5 MG tablet Take 1 tablet (5 mg) by mouth daily for 28 days 30 tablet 3     fluticasone (FLONASE) 50 MCG/ACT nasal spray Spray 1 spray into both nostrils daily as needed for rhinitis or allergies       furosemide (LASIX) 40 MG tablet Take 1.5 tablets (60 mg) by mouth daily 45 tablet 3     HYDROmorphone (DILAUDID) 2 MG tablet Take 1-2 tablets (2-4 mg) by mouth every 3 hours as needed for pain 180 tablet 0     lenvatinib (LENVIMA) 10 MG capsule Take 1 capsule (10 mg) by mouth daily 30 capsule 3     levothyroxine (SYNTHROID/LEVOTHROID) 25 MCG tablet Take 1 tablet (25 mcg) by mouth daily 90 tablet 3     lidocaine (LIDODERM) 5 % patch Place 1-3 patches onto the skin every 24 hours Apply to painful areas for 12 hours on, 12 hours off. 30 patch 3     lidocaine (LIDODERM) 5 % patch Place 1-3 patches onto the skin every 24 hours Apply to painful areas for 12 hours on, 12 hours off. 30 patch 3     loratadine (CLARITIN) 10 MG tablet Take 10 mg by mouth daily (with lunch)        medical cannabis (Patient's own supply.  Not a prescription) Cannabis Heater liquid 2 mL orally in AM   Cannabis capsule - 1 capsule daily at bedtime.     (This is NOT a prescription, and does not certify that the patient has a qualifying  medical condition for medical cannabis.  The purpose of this order is  to document that the patient reports taking medical cannabis.)       melatonin 5 MG tablet Take 10 mg by mouth At Bedtime       morphine (MS CONTIN) 30 MG CR tablet Take 1 tablet (30 mg) by mouth every 8 hours 90 tablet 0     multivitamin, therapeutic (THERA-VIT) TABS tablet Take 1 tablet by mouth At Bedtime       ondansetron (ZOFRAN) 8 MG tablet Take 1 tablet (8 mg) by mouth every 8 hours as needed for nausea 60 tablet 3     order for DME One 6 inch width 21 feet long ace wrap needed for lymphedema management 1 Device 0     polyethylene glycol (MIRALAX/GLYCOLAX) Packet Take 17 g by mouth daily as needed        pregabalin (LYRICA) 50 MG capsule Take 1 capsule (50 mg) by mouth 2 times daily 60 capsule 2     senna-docusate (SENOKOT-S/PERICOLACE) 8.6-50 MG tablet Take 2-4 tablets by mouth 2 times daily       triamcinolone (KENALOG) 0.1 % external cream Apply topically 2 times daily 30 g 3     Urea 40 % CREA Externally apply topically 2 times daily 28 g 3     vitamin D3 (CHOLECALCIFEROL) 2000 units (50 mcg) tablet Take 2,000 Units by mouth daily       Past Medical History:   Diagnosis Date     Anemia      Bone metastasis (H) 09/28/2018     Cough      Pulmonary nodule      Renal cell carcinoma, right (H) 09/28/2018     Past Surgical History:   Procedure Laterality Date     BRONCHOSCOPY (RIGID OR FLEXIBLE), DIAGNOSTIC N/A 9/4/2019    Procedure: Bronchoscopy, With Bronchoalveolar Lavage;  Surgeon: Burton Toure MD;  Location: [facility]     CHOLECYSTECTOMY       HC REMOVAL HEEL SPUR, CALCANEUS  2004     OPTICAL TRACKING SYSTEM BIOPSY BONE RADIO FREQUENCY ABLATION N/A 11/1/2019    Procedure: Kyphoplasty-Radiofrequency ablation Lumbar 1; Minimally Invasive Surgery  left hemilaminectomy Lumbar 3-Lumbar 4;  Surgeon: Vickey Brower MD;  Location:  OR           There were no vitals taken for this visit.  General: alert, well groomed, sitting in  chair, appears stated age, in NAD  Eyes: EOMI, sclera clear  HEENT: NC/AT; no temporal wasting, mucous membranes moist  Lungs: no increased work of breathing, speaking full sentences  Neuro: A&O x 3; CN II-XII grossly intact;   Neuropsych: engaged, affect full, appears tired; sensorium intact      Key Data Reviewed:  reviewed    Impression & Recommendations & Counselin yr old with metastatic renal cell cancer with stable disease but huge symptom burden currently.   Recommendations:  Pain:   - MS Contin 30mg in the morning, 30mg middle of the day, 45mg at night before bed  - hydromorphone 2-4mg every 3 hours if needed for pain. At night, be sure to take 2 tablets of the dilaudid so that pain better controlled at night.  - continue tylenol 1000mg three times a day  - coninue lyrica 50mg morning and night  - lidocaine patches - Will try to help get insurance approval  - continue cymbalta 60mg a day  - continue medical cannabis  - I will talk with Interventional pain management and place a referral to see if there are any injections that can be done to help you with your pain.    Anxiety management:   - continue cymbalta   - continue hydroxyzine every night.   - continue to meet with palliative care , Mitra Alvares    Fatigue:   - start ritalin 2.5mg first thing in the morning and at noon x 3 days and if no side effects, increase to 5mg every morning and at noon.   - if ritalin increases your anxiety, please call us to let us know.     Hospice informational interview - referral needed with [facility] in [place] -     Lymphedema - continue to follow with lymphedema specialists.     Follow up:    4-6 weeks        Thank you for involving us in the patient's care.   Madhuri Feliciano MD / Palliative Medicine / Pager [phone] / After-Hours Answering Service [phone] / Main Palliative Clinic - [phone] / [facility] Inpatient Team Consult Pager [phone] (answered 8am-430pm M-F)

## 2020-07-13 NOTE — TELEPHONE ENCOUNTER
Requested medications Lyrica, Duloxetine, MS Contin 30mgs,Hydromorphone 2mgs    Last refill: Hydromorphone 06/25 180 tabs  Ms Contin 06/16  Last office visit: 05/19  Scheduled for follow up 06/24    Patient would like script  eprescribed     [place]  Report reviewed.              Last visit - 05/19 - Dr Feliciano   Next Visit - 7/14 - Dr Feliciano

## 2020-07-14 NOTE — TELEPHONE ENCOUNTER
Prior Authorization Retail Medication Request    Medication/Dose: Lidocaine patches   ICD code (if different than what is on RX):  G89.3 - Neoplasm related pain   Previously Tried and Failed:  Taking Dilaudid, Hydromorphone and Duloxetine   Rationale: Take Lidocaine patches to reduce need for Opioids     I

## 2020-07-14 NOTE — TELEPHONE ENCOUNTER
Central Prior Authorization Team   Phone: [phone]      PA Initiation    Medication: Lidocaine patches 5% -PA initiated  Insurance Company: [employer] - Phone [phone] Fax [phone]  Pharmacy Filling the Rx: [facility] - [place], [place] - [address] [address]  Filling Pharmacy Phone: [phone]  Filling Pharmacy Fax:    Start Date: 7/14/2020

## 2020-07-14 NOTE — TELEPHONE ENCOUNTER
Prior Authorization Approval    Authorization Effective Date: 4/15/2020  Authorization Expiration Date: 7/14/2021  Medication: Lidocaine patches 5% -PA approved  Approved Dose/Quantity:   Reference #:     Insurance Company: Ele [employer] - Phone [phone] Fax [phone]  Expected CoPay:       CoPay Card Available:      Foundation Assistance Needed:    Which Pharmacy is filling the prescription (Not needed for infusion/clinic administered): [facility] - [place], [place] - [address] [address]  Pharmacy Notified: Yes  Patient Notified: No-Pharmacy will contact

## 2020-07-14 NOTE — LETTER
7/14/2020       RE: Javon Bianchi  [address]  [facility] [address]     Dear Colleague,    Thank you for referring your patient, Javon Bianchi, to the [facility] at [facility]. Please see a copy of my visit note below.    Javon Bianchi is a 69 year old male who is being evaluated via a billable video visit.         Secondary Video Option (Miyaobabei), send text message to:  [phone]    I have reviewed and updated the patient's allergies and medication list. Patient was asked if they had any patient reported vital signs to present, if yes, please see documented vitals.  Patient was also asked for their current weight and height, if presented, documented in vitals.    Concerns: Extreme Back Pain 8-9/10 which has persisted for at least 3 weeks, with breakthrough pain up to a 10/10.  Also, he has noticed an increase of shortness of breath without any activity. Left leg swelling has increased as well.        Refills: completed      HERMELINDA Haywood        Video-Visit Details    Type of service:  Video Visit    Video Start Time: 8:21am  Video End Time: 8:54am    Originating Location (pt. Location): Home    Distant Location (provider location):  [facility]     Platform used for Video Visit: [facility]        Palliative Care Outpatient Clinic Progress Note    Patient Name:  Javno Bianchi  Primary Provider:  Srinath Conner    Chief Complaint / Identifying information:   69 year old male with metastatic infiltrative lesion in L3 extending to L2 and L4, likely primary malignancy being renal cell carcinoma.  He started on Nivolumab but he developed myalgias and therapy was stopped and start him on prednisone. He had a break over the summer of 2019 and developed a severe PNA from August to September. He then underwent a laminectomy on 11/1/19 and was admitted 11/11-11/13 for constipation.  He officially started the  "lenvatinib + afinitor on 11/21/19. Increased hip pain in 6/2020. Bone Scan 6/26/20:   Bony metastasis is grossly unchanged compared to 4/7/2020 exam.    ED on 6/9/20 with increasing right hip pain - MS Contin increased to 30mg q8hrs     Last Palliative care appointment: 5/19/20  Recommendations:   Pain:   - Continue MS Contin 30mg, 15mg, 15mg   - continue scheduled tylenol 1000mg three times a day with MS Contin  - continue hydromorphone 2mg BID PRN     Constipation:   - continue senna 4 tabs BID  - miralax and prune juice mix every day or BID for daily bowel movement     Mood / coping  - continue to follow with palliative care , Mitra Alvares for counseling .   - continue duloxetine 60mg at bedtime      Reviewed: yes    Interim History:  Javon Bianchi 69 year old with a video visit today for follow up. He is at home and his wife, Patrizia, joined him for the video visit.  He has been feeling miserable recently and had several ED visits and tells Patrizia that he wants to live but just not like he is living now. Not sure he wants to stop cancer directed treatments. Not sure about hospice and needs more information. [facility] in [place]    Legs swollen, left leg is \"huge\". Working with lymphedema - has wraps but feels like that is just making the fluid go to his lungs and makes him SOB.     He can't sleep at night as so uncomfortable. Maybe 2 hour stretches in hospital bed, then up and walking around then lies back down, can't get comfortable. Also significant anxiety at night. Taking hydroxyzine at night for past 4 nights and that helps. Thinks about his cancer and no further treatment and that makes him so anxious. Anxiety during the day as well unless distracted. Video visit with palliative care , Mitra Alvares, this week again.     During day when has things to do, going into town with his wife, much more alert and able to not feels a miserable but at home, falls asleep " every time sitting for more than 2 minutes. Can't focus, can't do work on computer.    Pain: All over his body. In back at night, can't sleep.   - MS Contin 30mg, 30mg, 45mg. This is an increase after an ED visit or visit with doctor.   - hydromorphone 2-4mg PO - 1-3 tabs during day and taking 1 tab before bed and then in middle of the night  - lyrica 50mg BID  - cymbalta 60mg hs  - tylenol   - lidoderm patches  - medical cannabis    Hospice recommendation: We discussed that his cancer can't be cured and how distressing that is for Omega. His anxiety around that is very hard for him to handle and he worries constantly about his cancer and no cure. He wonders about hospice but doesn't know if that is the path he wants to take. He wants to live but feels miserable now. I encouraged him to find out more about hospice as I felt that he was having so many symptoms from his cancer and wondered if hospice would be a better way to manage symptoms for whatever time he has left. Omega and Patrizia agreed that getting more information would be helpful.     No nausea, eating ok. Very anxious and uncomfortable.     Falling asleep during the day whenever not out of house, can't focus, wonders if the opioids are making him fall asleep. A few times he has dropped things that he is holding because he falls asleep in the middle of doing things.       Review of Systems:  ROS: 10 point ROS neg other than the symptoms noted above in the HPI       No Known Allergies  Current Outpatient Medications   Medication Sig Dispense Refill     calcium carbonate 600 mg-vitamin D 400 units (CALTRATE) 600-400 MG-UNIT per tablet Take 1 tablet by mouth 3 times daily       Dextromethorphan-guaiFENesin (MUCINEX DM)  MG 12 hr tablet Take 1 tablet by mouth 2 times daily as needed        diazepam (VALIUM) 5 MG tablet Take 1 pill 30 min before MRI and a second pill 2 minutes prior. 10 tablet 0     DULoxetine (CYMBALTA) 60 MG capsule Take 1 capsule (60 mg)  by mouth daily 30 capsule 2     everolimus (AFINITOR) 5 MG tablet Take 1 tablet (5 mg) by mouth daily for 28 days 30 tablet 3     fluticasone (FLONASE) 50 MCG/ACT nasal spray Spray 1 spray into both nostrils daily as needed for rhinitis or allergies       furosemide (LASIX) 40 MG tablet Take 1.5 tablets (60 mg) by mouth daily 45 tablet 3     HYDROmorphone (DILAUDID) 2 MG tablet Take 1-2 tablets (2-4 mg) by mouth every 3 hours as needed for pain 180 tablet 0     lenvatinib (LENVIMA) 10 MG capsule Take 1 capsule (10 mg) by mouth daily 30 capsule 3     levothyroxine (SYNTHROID/LEVOTHROID) 25 MCG tablet Take 1 tablet (25 mcg) by mouth daily 90 tablet 3     lidocaine (LIDODERM) 5 % patch Place 1-3 patches onto the skin every 24 hours Apply to painful areas for 12 hours on, 12 hours off. 30 patch 3     lidocaine (LIDODERM) 5 % patch Place 1-3 patches onto the skin every 24 hours Apply to painful areas for 12 hours on, 12 hours off. 30 patch 3     loratadine (CLARITIN) 10 MG tablet Take 10 mg by mouth daily (with lunch)        medical cannabis (Patient's own supply.  Not a prescription) Cannabis Heater liquid 2 mL orally in AM   Cannabis capsule - 1 capsule daily at bedtime.     (This is NOT a prescription, and does not certify that the patient has a qualifying medical condition for medical cannabis.  The purpose of this order is  to document that the patient reports taking medical cannabis.)       melatonin 5 MG tablet Take 10 mg by mouth At Bedtime       morphine (MS CONTIN) 30 MG CR tablet Take 1 tablet (30 mg) by mouth every 8 hours 90 tablet 0     multivitamin, therapeutic (THERA-VIT) TABS tablet Take 1 tablet by mouth At Bedtime       ondansetron (ZOFRAN) 8 MG tablet Take 1 tablet (8 mg) by mouth every 8 hours as needed for nausea 60 tablet 3     order for DME One 6 inch width 21 feet long ace wrap needed for lymphedema management 1 Device 0     polyethylene glycol (MIRALAX/GLYCOLAX) Packet Take 17 g by mouth daily  as needed        pregabalin (LYRICA) 50 MG capsule Take 1 capsule (50 mg) by mouth 2 times daily 60 capsule 2     senna-docusate (SENOKOT-S/PERICOLACE) 8.6-50 MG tablet Take 2-4 tablets by mouth 2 times daily       triamcinolone (KENALOG) 0.1 % external cream Apply topically 2 times daily 30 g 3     Urea 40 % CREA Externally apply topically 2 times daily 28 g 3     vitamin D3 (CHOLECALCIFEROL) 2000 units (50 mcg) tablet Take 2,000 Units by mouth daily       Past Medical History:   Diagnosis Date     Anemia      Bone metastasis (H) 2018     Cough      Pulmonary nodule      Renal cell carcinoma, right (H) 2018     Past Surgical History:   Procedure Laterality Date     BRONCHOSCOPY (RIGID OR FLEXIBLE), DIAGNOSTIC N/A 2019    Procedure: Bronchoscopy, With Bronchoalveolar Lavage;  Surgeon: Burton Toure MD;  Location:  GI     CHOLECYSTECTOMY       HC REMOVAL HEEL SPUR, CALCANEUS       OPTICAL TRACKING SYSTEM BIOPSY BONE RADIO FREQUENCY ABLATION N/A 2019    Procedure: Kyphoplasty-Radiofrequency ablation Lumbar 1; Minimally Invasive Surgery  left hemilaminectomy Lumbar 3-Lumbar 4;  Surgeon: Vickey Brower MD;  Location: [facility] OR           There were no vitals taken for this visit.  General: alert, well groomed, sitting in chair, appears stated age, in NAD  Eyes: EOMI, sclera clear  HEENT: NC/AT; no temporal wasting, mucous membranes moist  Lungs: no increased work of breathing, speaking full sentences  Neuro: A&O x 3; CN II-XII grossly intact;   Neuropsych: engaged, affect full, appears tired; sensorium intact      Key Data Reviewed:  reviewed    Impression & Recommendations & Counselin yr old with metastatic renal cell cancer with stable disease but huge symptom burden currently.   Recommendations:  Pain:   - MS Contin 30mg in the morning, 30mg middle of the day, 45mg at night before bed  - hydromorphone 2-4mg every 3 hours if needed for pain. At night, be sure to take 2  tablets of the dilaudid so that pain better controlled at night.  - continue tylenol 1000mg three times a day  - coninue lyrica 50mg morning and night  - lidocaine patches - Will try to help get insurance approval  - continue cymbalta 60mg a day  - continue medical cannabis  - I will talk with Interventional pain management and place a referral to see if there are any injections that can be done to help you with your pain.    Anxiety management:   - continue cymbalta   - continue hydroxyzine every night.   - continue to meet with palliative care , Mitra Alvares    Fatigue:   - start ritalin 2.5mg first thing in the morning and at noon x 3 days and if no side effects, increase to 5mg every morning and at noon.   - if ritalin increases your anxiety, please call us to let us know.     Hospice informational interview - referral needed with [facility] in [place] -     Lymphedema - continue to follow with lymphedema specialists.     Follow up:    4-6 weeks        Thank you for involving us in the patient's care.   Madhuri Feliciano MD / Palliative Medicine / Pager [phone] / After-Hours Answering Service [phone] / Main Palliative Clinic - [phone] / [facility] Inpatient Team Consult Pager [phone] (answered 8am-430pm M-F)

## 2020-07-14 NOTE — TELEPHONE ENCOUNTER
Patients wife confirms that patient is taking Ms Contin 30mgs in the morning and lunchtime and 45mgs at nighttime.    She states that she has 2 -3 weeks supply of the MS Contin 15mgs remaining     I will set up a forward dated script for Dr Feliciano to approve     Last refill per  05/27 - 60 tabs

## 2020-07-14 NOTE — PATIENT INSTRUCTIONS
Recommendations:  Pain:   - MS Contin 30mg in the morning, 30mg middle of the day, 45mg at night before bed  - hydromorphone 2-4mg every 3 hours if needed for pain. At night, be sure to take 2 tablets of the dilaudid so that pain better controlled at night.  - continue tylenol 1000mg three times a day  - coninue lyrica 50mg morning and night  - lidocaine patches - Will try to help get insurance approval  - continue cymbalta 60mg a day  - continue medical cannabis  - I will talk with Interventional pain management and place a referral to see if there are any injections that can be done to help you with your pain.    Anxiety management:   - continue cymbalta   - continue hydroxyzine every night.   - continue to meet with palliative care , Mitra Alvares    Fatigue:   - start ritalin 2.5mg first thing in the morning and at noon x 3 days and if no side effects, increase to 5mg every morning and at noon.   - if ritalin increases your anxiety, please call us to let us know.     Hospice informational interview - referral needed with [facility] in [place] -     Follow up:    4-6 weeks    Reasons to Call    If you are having worsening/uncontrolled symptoms we want you to call!    You or your other physicians make any changes to medications we have prescribed.      Important Phone Numbers  -   JEANNINE Kilgore, RN palliative care nurse clinician [phone]    After hours. Will connect you with on call MD. [phone]    Appointment Line.[phone]     Madhuri Feliciano MD  Palliative Care Physician  Clinic Number [phone]

## 2020-07-16 NOTE — PROGRESS NOTES
Telemedicine Visit: The patient's condition can be safely assessed and treated via synchronous audio and visual telemedicine encounter.      Reason for Telemedicine Visit: COVID19 precautions    Originating Site (Patient Location): Patient's home    Distant Site (Provider Location): [facility] Clinics: [facility]    Consent:  The patient/guardian has verbally consented to: the potential risks and benefits of telemedicine (video visit) versus in person care; bill my insurance or make self-payment for services provided; and responsibility for payment of non-covered services.     Mode of Communication:  Video Conference via imagine    As the provider I attest to compliance with applicable laws and regulations related to telemedicine.    Palliative Care Clinical Social Work Return Appointment    PLEASE NOTE:  THIS IS A MENTAL HEALTH NOTE.  OTHER PROVIDERS VIEWING THIS NOTE SHOULD USE THIS ONLY FOR UNDERSTANDING THE CONTEXT OF THE PATIENT'S EXPERIENCE.  TOPICS DESCRIBED IN THIS NOTE SHOULD NOT BE REFERENCED TO THE PATIENT BY MEDICAL PROVIDERS.    Omega Bianchi is a 69 year old man with diagnosis of renal cell carcinoma.   Completed a follow up visit this afternoon for psychosocial support related to coping with illness and goals of care.  Visit was done via phone as Omega did not have access to tech for a video visit.    Mental Status Exam:(List all that apply)      Appearance: Appropriate      Eye Contact: Good       Orientation: Yes, x4      Mood: Normal      Affect: Appropriate      Thought Content: Clear         Thought Form: Logical      Psychomotor Behavior: Normal    Visit themes: goals of care, considering hospice    Adjustment to Illness: Since I last spoke with Omega and his wife he has had more dicussions with his medical team about where he is with his illness and whether or not to pursue hospice support.  He shared understanding that his cancer is progressing through treatment  "and that he continues to have pain and discomfort.  He shared that he worries mostly about the pain.  Also shared that when his father enrolled in hospice he deteriorated very quickly and he worries about loosing alert time with his family. His wife, a retired RN, is in the process of looking into hospice agencies in their area and Omega seemed to find comfort in knowing that \"she is accepting of what is happening\"  Facilitated processing about his grief for multiple changes that have occurred since we last spoke including the sale of his house and move to a condo, his decision to stop driving, and overall changes in his health.      Mental Health (thoughts, feelings, actions, coping, and symptoms): sadness, but also finds comfort in his lizy, and believes that his family will be ok after he is gone.     Helpful activities: time with famiy    Helpful cognitions: not explored     Unhelpful and/or triggering or exacerbating factors: not explored     Body-Mind Skills Education:     Relationships:     End of Life and Advance Care Planning: Talked about enrolling in hospice.  He and his family are taking steps towards enrolling.      Main therapeutic interventions provided this session include:   Provide psychoeducation, Facilitate processing of thoughts and feelings and Facilitate goals of care discussion    Plan:  Will return for psychotherapy with palliative care focus as needed.     Time spent with patient/family:  30 minutes  (Start 1:30, end 2:00)      FBAIAN Xiao, [facility]  Palliative Care Clinical        DO NOT SEND ANY LETTERS                "

## 2020-07-16 NOTE — LETTER
7/16/2020         RE: Javon Bianchi  [address]  [facility] [address]        Dear Colleague,    Thank you for referring your patient, Javon Bianchi, to the [facility]. Please see a copy of my visit note below.    Telemedicine Visit: The patient's condition can be safely assessed and treated via synchronous audio and visual telemedicine encounter.      Reason for Telemedicine Visit: COVID19 precautions    Originating Site (Patient Location): Patient's home    Distant Site (Provider Location): [facility]: [facility] Palliative [facility]    Consent:  The patient/guardian has verbally consented to: the potential risks and benefits of telemedicine (video visit) versus in person care; bill my insurance or make self-payment for services provided; and responsibility for payment of non-covered services.     Mode of Communication:  Video Conference via iCetana    As the provider I attest to compliance with applicable laws and regulations related to telemedicine.    Palliative Care Clinical Social Work Return Appointment    PLEASE NOTE:  THIS IS A MENTAL HEALTH NOTE.  OTHER PROVIDERS VIEWING THIS NOTE SHOULD USE THIS ONLY FOR UNDERSTANDING THE CONTEXT OF THE PATIENT'S EXPERIENCE.  TOPICS DESCRIBED IN THIS NOTE SHOULD NOT BE REFERENCED TO THE PATIENT BY MEDICAL PROVIDERS.    Omega Bianchi is a 69 year old man with diagnosis of renal cell carcinoma.   Completed a follow up visit this afternoon for psychosocial support related to coping with illness and goals of care.  Visit was done via phone as Omega did not have access to tech for a video visit.    Mental Status Exam:(List all that apply)      Appearance: Appropriate      Eye Contact: Good       Orientation: Yes, x4      Mood: Normal      Affect: Appropriate      Thought Content: Clear         Thought Form: Logical      Psychomotor Behavior: Normal    Visit themes: goals of care, considering hospice    Adjustment to  "Illness: Since I last spoke with Omega and his wife he has had more dicussions with his medical team about where he is with his illness and whether or not to pursue hospice support.  He shared understanding that his cancer is progressing through treatment and that he continues to have pain and discomfort.  He shared that he worries mostly about the pain.  Also shared that when his father enrolled in hospice he deteriorated very quickly and he worries about loosing alert time with his family. His wife, a retired RN, is in the process of looking into hospice agencies in their area and Omega seemed to find comfort in knowing that \"she is accepting of what is happening\"  Facilitated processing about his grief for multiple changes that have occurred since we last spoke including the sale of his house and move to a condo, his decision to stop driving, and overall changes in his health.      Mental Health (thoughts, feelings, actions, coping, and symptoms): sadness, but also finds comfort in his lizy, and believes that his family will be ok after he is gone.     Helpful activities: time with famiy    Helpful cognitions: not explored     Unhelpful and/or triggering or exacerbating factors: not explored     Body-Mind Skills Education:     Relationships:     End of Life and Advance Care Planning: Talked about enrolling in hospice.  He and his family are taking steps towards enrolling.      Main therapeutic interventions provided this session include:   Provide psychoeducation, Facilitate processing of thoughts and feelings and Facilitate goals of care discussion    Plan:  Will return for psychotherapy with palliative care focus as needed.     Time spent with patient/family:  30 minutes  (Start 1:30, end 2:00)      FABIAN Xiao, [facility]  Palliative Care Clinical        DO NOT SEND ANY LETTERS                  Again, thank you for allowing me to participate in the care of your patient.  "       Sincerely,        Mitra Alvares, LICSW

## 2020-07-16 NOTE — TELEPHONE ENCOUNTER
Patient requesting Hospice informational visit, asking for [facility] out of [place].    Call made to [facility] who will contact patient to set up a visit

## 2020-07-16 NOTE — LETTER
7/16/2020         RE: Javon Bianchi  [address]  [facility] [id number]        Dear Colleague,    Thank you for referring your patient, Javon Bianchi, to the [facility]. Please see a copy of my visit note below.    Telemedicine Visit: The patient's condition can be safely assessed and treated via synchronous audio and visual telemedicine encounter.      Reason for Telemedicine Visit: COVID19 precautions    Originating Site (Patient Location): Patient's home    Distant Site (Provider Location): [facility]: [facility]    Consent:  The patient/guardian has verbally consented to: the potential risks and benefits of telemedicine (video visit) versus in person care; bill my insurance or make self-payment for services provided; and responsibility for payment of non-covered services.     Mode of Communication:  Video Conference via [employer]    As the provider I attest to compliance with applicable laws and regulations related to telemedicine.    Palliative Care Clinical Social Work Return Appointment    PLEASE NOTE:  THIS IS A MENTAL HEALTH NOTE.  OTHER PROVIDERS VIEWING THIS NOTE SHOULD USE THIS ONLY FOR UNDERSTANDING THE CONTEXT OF THE PATIENT'S EXPERIENCE.  TOPICS DESCRIBED IN THIS NOTE SHOULD NOT BE REFERENCED TO THE PATIENT BY MEDICAL PROVIDERS.    Omega Bianchi is a 69 year old man with diagnosis of renal cell carcinoma.   Completed a follow up visit this afternoon for psychosocial support related to coping with illness and goals of care.  Visit was done via phone as Omega did not have access to tech for a video visit.    Mental Status Exam:(List all that apply)      Appearance: Appropriate      Eye Contact: Good       Orientation: Yes, x4      Mood: Normal      Affect: Appropriate      Thought Content: Clear         Thought Form: Logical      Psychomotor Behavior: Normal    Visit themes: goals of care, considering hospice    Adjustment to  "Illness: Since I last spoke with Omega and his wife he has had more dicussions with his medical team about where he is with his illness and whether or not to pursue hospice support.  He shared understanding that his cancer is progressing through treatment and that he continues to have pain and discomfort.  He shared that he worries mostly about the pain.  Also shared that when his father enrolled in hospice he deteriorated very quickly and he worries about loosing alert time with his family. His wife, a retired RN, is in the process of looking into hospice agencies in their area and Omega seemed to find comfort in knowing that \"she is accepting of what is happening\"  Facilitated processing about his grief for multiple changes that have occurred since we last spoke including the sale of his house and move to a condo, his decision to stop driving, and overall changes in his health.      Mental Health (thoughts, feelings, actions, coping, and symptoms): sadness, but also finds comfort in his lizy, and believes that his family will be ok after he is gone.     Helpful activities: time with famiy    Helpful cognitions: not explored     Unhelpful and/or triggering or exacerbating factors: not explored     Body-Mind Skills Education:     Relationships:     End of Life and Advance Care Planning: Talked about enrolling in hospice.  He and his family are taking steps towards enrolling.      Main therapeutic interventions provided this session include:   Provide psychoeducation, Facilitate processing of thoughts and feelings and Facilitate goals of care discussion    Plan:  Will return for psychotherapy with palliative care focus as needed.     Time spent with patient/family:  30 minutes  (Start 1:30, end 2:00)      FABIAN Xiao, [facility]  Palliative Care Clinical        DO NOT SEND ANY LETTERS                  Again, thank you for allowing me to participate in the care of your patient.  "       Sincerely,        Mitra Alvares, LICSW

## 2020-07-22 NOTE — TELEPHONE ENCOUNTER
Call made to Omega to see how he was tolerating the newly prescribed Ritalin.     Omega states that he was tolerating it and states he feels less tired in the morning. He denies any concerns with this new medications.

## 2020-07-28 NOTE — PROGRESS NOTES
"Javon Bianchi is a 69 year old male who is being evaluated via a billable telephone visit.      The patient has been notified of following:     \"This telephone visit will be conducted via a call between you and your physician/provider. We have found that certain health care needs can be provided without the need for a physical exam.  This service lets us provide the care you need with a short phone conversation.  If a prescription is necessary we can send it directly to your pharmacy.  If lab work is needed we can place an order for that and you can then stop by our lab to have the test done at a later time.    Telephone visits are billed at different rates depending on your insurance coverage. During this emergency period, for some insurers they may be billed the same as an in-person visit.  Please reach out to your insurance provider with any questions.    If during the course of the call the physician/provider feels a telephone visit is not appropriate, you will not be charged for this service.\"    Patient has given verbal consent for Telephone visit?  Yes    What phone number would you like to be contacted at? [phone]    How would you like to obtain your AVS? MyChart  Reason for Visit: f/u [facility]     Oncology HPI: Javon Bianchi is a 69 year old male with a history significant for hyperlipidemia who had been in his usual health until he developed back pain in May of 2018. At that time, the pain was a stabbing like in the middle of lower back with burning like pain wrapping around this left hip to knee area. No trauma. He had MRI on 5/23/18 showed mild degenerative change with bulging disks L2-S1. A small benign hemangioma was noted in L2, no other marrow signal abnormality. Since then, the pain failed to improve despite steroid injections. In addition, he lost 25 lbs unintentionally since June along with chills a few times a day.   Over time, the pain got worse to the point he could not ambulate after short " distance and developed muscle weakness in lower extremities over time requiring a walker. Finally, he went to ER ([facility] in [place]) on 8/23/18, CT A/P was unremarkable. He was referred to neurology with obtaining MRI L spine. MRI on 8/30/18 revealed a pathological fracture at L3 vertebral body with height loss at 40% and diffuse involvement of a neoplastic process. IR guided biopsy on 8/31/18 showed poorly differentiated carcinoma. IHC was suggestive of possible renal cell carcinoma per pathology report (Renal cell immunochemistry is positive and along with the negative staining for CK7 and CK20 suggests the possibility of a renal carcinoma, however most renal cell carcinomas also express PAX8 which is negative in this case). Of note, CT chest/abdomen/pelvis at OSH was negative for primary lesion. The patient was evaluated by neurosurgery who recommended no surgical intervention. He was discharged with TLSO brace and walker. He had palliative XRT locally from 9/11/18 for 10 fractions.     ONCOLOGY HISTORY:   Cabozantinib: Started 60 mg daily on September 28, 2018.  Dec 10, 2018: dose reduced cabozantinib to 40 mg a day.  Jan 17, 2019: dose reduced cabozantinib to 29 mg a day.  Feb 11, 2019: C1D1 Nivolumab   3/12/19: c2 Nivolumab  4/8/19: held Nivolumab due to myalgias, started prednisone  September '19:He had palliative XRT locally from 10 fractions (Done in [place])  11//21/19: started lenvatinib (and then 11/25 started afinitor)   1/27/20-present: dropped to 10 mg of the lenvatinib due to HFS, continues on afinitor        Interval history: Omega joined by his wife Suzie and daughter Carol on the phone. He states his R hip pain is ongoing, in addition to upper thoracic pain and pelvis.  He is constantly in a state of 5/10 pain levels that never get better. . He has been taking the 30 mg MS contin q8 hours and 1-2 tabs 2 mg dilaudid as needed (usually not every 3 hours). He is sleeping better- using  cannabis and prn hydroxyzine for anxiety.  He is taking his meds earlier so he urinates less at night.  He has had occ where he wakes up still in a dream and then a few seconds clears and realizes he is awake, wonders if too much sedated meds at home?  Ritalin has helped tremendously, is now awake in the AM and more ambulatory.  Omega started a compression boot for his ANABELLA and this is helping, but also mobilizes the edema to his thigh/hip which causes worse pain. He manages his constipation with senna and miralax. Appetite is okay, if he feels he isn't getting enough protein he will have a boost but lately he has not needed one. He noticed a sore in his mouth, left lower jaw in the back of his mouth. He saw the dentist recently and they didn't think anything of it--took xrays there without acute finding. He is now ambulating with a walker, before his fracture he was using a cane.      No sob/cp/rash/fever/chills.      Current Outpatient Prescriptions          Current Outpatient Medications   Medication Sig Dispense Refill     calcium carbonate 600 mg-vitamin D 400 units (CALTRATE) 600-400 MG-UNIT per tablet Take 1 tablet by mouth 3 times daily         Dextromethorphan-guaiFENesin (MUCINEX DM)  MG 12 hr tablet Take 1 tablet by mouth 2 times daily as needed          diazepam (VALIUM) 5 MG tablet Take 1 pill 30 min before MRI and a second pill 2 minutes prior. 10 tablet 0     DULoxetine (CYMBALTA) 60 MG capsule Take 1 capsule (60 mg) by mouth daily 30 capsule 2     everolimus (AFINITOR) 5 MG tablet Take 1 tablet (5 mg) by mouth daily for 28 days 30 tablet 3     fluticasone (FLONASE) 50 MCG/ACT nasal spray Spray 1 spray into both nostrils daily as needed for rhinitis or allergies         furosemide (LASIX) 40 MG tablet Take 1 tablet (40 mg) by mouth daily 90 tablet 3     HYDROmorphone (DILAUDID) 2 MG tablet Take 1-2 tablets (2-4 mg) by mouth every 3 hours as needed for pain 180 tablet 0     lenvatinib (LENVIMA) 10  MG capsule Take 1 capsule (10 mg) by mouth daily 30 capsule 3     levothyroxine (SYNTHROID/LEVOTHROID) 25 MCG tablet Take 1 tablet (25 mcg) by mouth daily 90 tablet 3     lidocaine (LIDODERM) 5 % patch Place 1-3 patches onto the skin every 24 hours Apply to painful areas for 12 hours on, 12 hours off. 30 patch 3     lidocaine (LIDODERM) 5 % patch Place 1-3 patches onto the skin every 24 hours Apply to painful areas for 12 hours on, 12 hours off. 30 patch 3     loratadine (CLARITIN) 10 MG tablet Take 10 mg by mouth daily (with lunch)          medical cannabis (Patient's own supply.  Not a prescription) Cannabis Heater liquid 2 mL orally in AM   Cannabis capsule - 1 capsule daily at bedtime.      (This is NOT a prescription, and does not certify that the patient has a qualifying medical condition for medical cannabis.  The purpose of this order is  to document that the patient reports taking medical cannabis.)         melatonin 5 MG tablet Take 10 mg by mouth At Bedtime         morphine (MS CONTIN) 30 MG CR tablet Take 1 tablet (30 mg) by mouth every 8 hours 90 tablet 0     multivitamin, therapeutic (THERA-VIT) TABS tablet Take 1 tablet by mouth At Bedtime         ondansetron (ZOFRAN) 8 MG tablet Take 1 tablet (8 mg) by mouth every 8 hours as needed for nausea 60 tablet 3     polyethylene glycol (MIRALAX/GLYCOLAX) Packet Take 17 g by mouth daily as needed          pregabalin (LYRICA) 50 MG capsule Take 1 capsule (50 mg) by mouth 2 times daily 60 capsule 2     senna-docusate (SENOKOT-S/PERICOLACE) 8.6-50 MG tablet Take 2-4 tablets by mouth 2 times daily         triamcinolone (KENALOG) 0.1 % external cream Apply topically 2 times daily 30 g 3     Urea 40 % CREA Externally apply topically 2 times daily 28 g 3     vitamin D3 (CHOLECALCIFEROL) 2000 units (50 mcg) tablet Take 2,000 Units by mouth daily                No Known Allergies        Exam: Blood pressure 124/69, pulse 57, temperature 98  F (36.7  C), temperature  source Tympanic, resp. rate 16, weight 103.8 kg (228 lb 14.4 oz), SpO2 97 %.      Wt Readings from Last 4 Encounters:   05/19/20 95.3 kg (210 lb)   04/07/20 99 kg (218 lb 3.2 oz)   02/12/20 98.6 kg (217 lb 6.4 oz)   01/21/20 99.8 kg (220 lb)   Voice is clear and strong. No audible stridor, wheezing, or respiratory distress. The remainder of PE was deferred due to PHE.        Labs:   None today          Impression/plan: 69 year old male with metastatic RCC, started the TKI + mTORi on 11/21/19 and has been on for 6+ months now. He has been able to tolerate the regimen fairly well.   Rosy and I had a long conversation today about goals of care.  He is thinking about stopping the therapy and going on hospice, but not quite ready yet. He is tolerating the medication without AE's warranting dose reduction at this time and he is finding quality in his life still.  We did discuss a trial of stopping to see if he feels better, but he is clear that the bone pains are truly the biggest issue and the other AE from the drugs are fairly minor.  For now, he would like to stay on therapy until he sees Dr Anne in the fall.   -Currently continue with lenvatinib 10 mg and afinitor 5mg   -continue ritalin for fatigue  -routine restaging again in August        Edema: Left leg > right. DVT was ruled out with his 6/9 visit. He has had RT to the left leg in the past and it has always been more swollen than the right, in addition to XRT of both pelvis'. He has a lymphedema consult but he was not able to get in with them until July. Their ace bandage has lost some elasticity.   -continue lymphedema therapy, helping     Pain: Increasing pain with laying down for bed; aggravated by frequent urination overnight.   -using cannabis at night which helps with sleep  -taking prn hydroxyzine for anxiety  -using prn dilaudid  -Continue MS contin q8  -Follows with Palliative, next appt mid July       Bone metastasis: has documented ONJ,  however currently with his recent path fracture, need to stay on Xgeva.   -Consider lowering lenvatinib if the ONJ worsenes  -Continue on oral calcium + D      MARSHA Stringer    Phone call duration: 38 minutes    Radha St PA-C

## 2020-08-03 NOTE — TELEPHONE ENCOUNTER
Dr Feliciano from chart check had increased MS Contin to 30/30/45mgs Q8 hours on 07/14 - I called Vladislav who confirms they did make this change    Dr Feliciano has ordered an increase in MS Contin to 45mgs Q8 - patient will require a new prescription of 15mgs #90 tabs - previous script sent 7/14 was cancelled by calling the [facility]     Suzie and Javon confirm their understanding of this dose change

## 2020-08-03 NOTE — TELEPHONE ENCOUNTER
Patient called to report that he had been experiencing increased lower back pain for the last 5 days which had become worse over the last 3 days and last night was bad enough to stop him from sleeping.    He confirms he is using MS Contin 30mgs  every 8 hours and now needs Hydromorphone 4mgs every 3 hours around the clock to control the pain.    Omega denies the presence of any other changes, he states that the fluid from his lower extremities is being moved to his abdomen as he is wearing lymphedema compression wraps. He states that this does cause some discomfort.    I will contact a palliative care provider for next steps

## 2020-08-14 NOTE — TELEPHONE ENCOUNTER
Requested medications Ms Contin 30 mgs tabs  Days till out  5  Last refill 07/14  Last office visit: 7/14  Scheduled for follow up 8/25    Patient would like script  eprescribed     [place]  Report reviewed.

## 2020-08-24 NOTE — PROGRESS NOTES
"Javon Bianchi is a 70 year old male who is being evaluated via a billable video visit.      The patient has been notified of following:     \"This video visit will be conducted via a call between you and your physician/provider. We have found that certain health care needs can be provided without the need for an in-person physical exam.  This service lets us provide the care you need with a video conversation.  If a prescription is necessary we can send it directly to your pharmacy.  If lab work is needed we can place an order for that and you can then stop by our lab to have the test done at a later time.    Video visits are billed at different rates depending on your insurance coverage.  Please reach out to your insurance provider with any questions.    If during the course of the call the physician/provider feels a video visit is not appropriate, you will not be charged for this service.\"    Patient has given verbal consent for Video visit? Yes  How would you like to obtain your AVS? MyChart  If you are dropped from the video visit, the video invite should be resent to: Text to cell phone: [phone]   Will anyone else be joining your video visit? No       Vitals - Patient Reported  Weight (Patient Reported): 97.1 kg (214 lb)  Height (Patient Reported): 177.8 cm (5' 10\")  BMI (Based on Pt Reported Ht/Wt): 30.71  Pain Score: Extreme Pain (8)  Pain Loc: Mid Back(pelvic area)      I have reviewed and updated the patient's allergies and medication list.    Concerns: No concerns  Refills: No refills needed.        Gail Lopez CMA          Video-Visit Details    Type of service:  Video Visit    Video Start Time: 3:58pm  Video End Time: 4:33 PM    Originating Location (pt. Location): Home    Distant Location (provider location):  [facility]     Platform used for Video Visit: [facility]      Palliative Care Outpatient Clinic Progress Note    Patient Name:  Javon Bianchi  Primary Provider:  " Srinath Conner    Chief Complaint / Identifying information:   70 year old male with metastatic infiltrative lesion in L3 extending to L2 and L4, likely primary malignancy being renal cell carcinoma.  He started on Nivolumab but he developed myalgias and therapy was stopped and start him on prednisone. He had a break over the summer of 2019 and developed a severe PNA from August to September. He then underwent a laminectomy on 11/1/19 and was admitted 11/11-11/13 for constipation.  He officially started the lenvatinib + afinitor on 11/21/19. Increased hip pain in 6/2020. Bone Scan 6/26/20 showing bony metastasis is grossly unchanged compared to 4/7/2020 exam.       Last Palliative care appointment: 7/14/20  Recommendations:   Pain:   - MS Contin 30mg in the morning, 30mg middle of the day, 45mg at night before bed  - hydromorphone 2-4mg every 3 hours if needed for pain. At night, be sure to take 2 tablets of the dilaudid so that pain better controlled at night.  - continue tylenol 1000mg three times a day  - coninue lyrica 50mg morning and night  - lidocaine patches - Will try to help get insurance approval  - continue cymbalta 60mg a day  - continue medical cannabis  - I will talk with Interventional pain management and place a referral to see if there are any injections that can be done to help you with your pain.     Anxiety management:   - continue cymbalta   - continue hydroxyzine every night.   - continue to meet with palliative care , Mitra Alvares     Fatigue:   - start ritalin 2.5mg first thing in the morning and at noon x 3 days and if no side effects, increase to 5mg every morning and at noon.   - if ritalin increases your anxiety, please call us to let us know.      Hospice informational interview - referral needed with [facility] in [place] -      Lymphedema - continue to follow with lymphedema specialists.      Reviewed: yes    Interim History:  Javon Bianchi 70 year old who has  "a video visit today for follow up in his home and accompanied by his wife. Omega states he is having lots of pain and not doing as well as he wishes he was doing. Doing lymphedema in [place] area and seems to be going ok. Hooked up to circulation pump but as soon as all the fluid out of legs, it goes into his abdomen and he doesn't feel well.  He has pain in back and both shoulders and right hip and feels his main complaint is pain. He feels he is not getting much benefit from morphine. Taking morphine 45mg TID and takes 2 dilaudid every 3 hours. Ritalin 5mg at 7am and noon and that has helped in the day time. Mentally fuzzy now and hard to stay on tract mentally but the ritalin is helping him and allowing him to be more active during the day.     Bowel problems with increased opioids. At first had constipation and then has loose stools but now feels the issues are fairly well managed. Has appointment with hospice in beginning of September to see if they can better control the pain as that is the main problem. He is using medical cannabis but still with having problems sleeping.     Feeling increased anxiety with realization that \"his body is falling apart\".  Wonders what hospice can help with and what they could add to the picture and questions if they would just focus on pain management or what else they would help with. He is very uncertain what he should do regarding hospice vs continuing oral treatments. He states he is miserable, pain not controlled and sig anxiety knowing his body is falling apart. I again explained hospice philosophy and explained that hospice is not just for pain management but for people who have decided to no longer pursue life prolonging treatments like chemotherapy and instead have decided to focus on feeling as good as possible for whatever time he has left. He wants to talk with Dr. Anne again at the beginning of September to see what his recommondation is as Omega can't decide what is " the best course and he wants more treatment if it would help him and he wants better pain control and he wants help managing his anxiety with knowing his life is ending.     Review of Systems:  ROS: 10 point ROS neg other than the symptoms noted above in the HPI         No Known Allergies  Current Outpatient Medications   Medication Sig Dispense Refill     calcium carbonate 600 mg-vitamin D 400 units (CALTRATE) 600-400 MG-UNIT per tablet Take 1 tablet by mouth 3 times daily       Dextromethorphan-guaiFENesin (MUCINEX DM)  MG 12 hr tablet Take 1 tablet by mouth 2 times daily as needed        diazepam (VALIUM) 5 MG tablet Take 1 pill 30 min before MRI and a second pill 2 minutes prior. 10 tablet 0     DULoxetine (CYMBALTA) 60 MG capsule Take 1 capsule (60 mg) by mouth daily 30 capsule 2     everolimus (AFINITOR) 5 MG tablet Take 1 tablet (5 mg) by mouth daily for 28 days 30 tablet 3     fluticasone (FLONASE) 50 MCG/ACT nasal spray Spray 1 spray into both nostrils daily as needed for rhinitis or allergies       furosemide (LASIX) 40 MG tablet Take 1.5 tablets (60 mg) by mouth daily 45 tablet 3     HYDROmorphone (DILAUDID) 2 MG tablet Take 1-2 tablets (2-4 mg) by mouth every 3 hours as needed for pain 180 tablet 0     lenvatinib (LENVIMA) 10 MG capsule Take 1 capsule (10 mg) by mouth daily 30 capsule 3     levothyroxine (SYNTHROID/LEVOTHROID) 25 MCG tablet Take 1 tablet (25 mcg) by mouth daily 90 tablet 3     lidocaine (LIDODERM) 5 % patch Place 1-3 patches onto the skin every 24 hours Apply to painful areas for 12 hours on, 12 hours off. 30 patch 3     lidocaine (LIDODERM) 5 % patch Place 1-3 patches onto the skin every 24 hours Apply to painful areas for 12 hours on, 12 hours off. 30 patch 3     loratadine (CLARITIN) 10 MG tablet Take 10 mg by mouth daily (with lunch)        medical cannabis (Patient's own supply.  Not a prescription) Cannabis Heater liquid 2 mL orally in AM   Cannabis capsule - 1 capsule daily  at bedtime.     (This is NOT a prescription, and does not certify that the patient has a qualifying medical condition for medical cannabis.  The purpose of this order is  to document that the patient reports taking medical cannabis.)       melatonin 5 MG tablet Take 10 mg by mouth At Bedtime       methylphenidate (RITALIN) 5 MG tablet Take  1 tablet every morning and at noon. 60 tablet 0     morphine (MS CONTIN) 15 MG CR tablet Take 1 tablet (15 mg) by mouth every 8 hours Take with Ms Contin 30mg tablet to equal MS Contin 45mgs 90 tablet 0     morphine (MS CONTIN) 30 MG CR tablet Take 1 tablet (30 mg) by mouth every 8 hours Take with 15mg tablet to equal 45mgs 90 tablet 0     multivitamin, therapeutic (THERA-VIT) TABS tablet Take 1 tablet by mouth At Bedtime       ondansetron (ZOFRAN) 8 MG tablet Take 1 tablet (8 mg) by mouth every 8 hours as needed for nausea 60 tablet 3     order for DME One 6 inch width 21 feet long ace wrap needed for lymphedema management 1 Device 0     polyethylene glycol (MIRALAX/GLYCOLAX) Packet Take 17 g by mouth daily as needed        pregabalin (LYRICA) 50 MG capsule Take 1 capsule by mouth 2 times daily 60 capsule 3     senna-docusate (SENOKOT-S/PERICOLACE) 8.6-50 MG tablet Take 2-4 tablets by mouth 2 times daily       triamcinolone (KENALOG) 0.1 % external cream Apply topically 2 times daily 30 g 3     Urea 40 % CREA Externally apply topically 2 times daily 28 g 3     vitamin D3 (CHOLECALCIFEROL) 2000 units (50 mcg) tablet Take 2,000 Units by mouth daily       Past Medical History:   Diagnosis Date     Anemia      Bone metastasis (H) 09/28/2018     Cough      Pulmonary nodule      Renal cell carcinoma, right (H) 09/28/2018     Past Surgical History:   Procedure Laterality Date     BRONCHOSCOPY (RIGID OR FLEXIBLE), DIAGNOSTIC N/A 9/4/2019    Procedure: Bronchoscopy, With Bronchoalveolar Lavage;  Surgeon: Burton Toure MD;  [facility]     CHOLECYSTECTOMY       HC REMOVAL HEEL  SPUR, CALCANEUS       OPTICAL TRACKING SYSTEM BIOPSY BONE RADIO FREQUENCY ABLATION N/A 2019    Procedure: Kyphoplasty-Radiofrequency ablation Lumbar 1; Minimally Invasive Surgery  left hemilaminectomy Lumbar 3-Lumbar 4;  Surgeon: Vickey Brower MD;  [facility]: [facility]           There were no vitals taken for this visit.  General: alert, appears slightly older than stated age, well groomed, well nourished, in NAD  Eyes: EOMI, sclera clear  HEENT: NC/AT; mucous membranes moist  Lungs: no increased work of breathing while sitting, speaking full sentences  Neuro: A&O x 3; CN II-XII grossly intact;   Neuropsych: alert, engaged, appropriate; sensorium intact      Key Data Reviewed:  reviewed    Impression & Recommendations & Counselin yr old with metastatic renal cell cancer with stable disease but huge symptom burden currently    - Increase MS Contin 60mg TID  - hydromorphone 2-4mg every 3 hours if needed for pain. At night, be sure to take 2 tablets of the dilaudid so that pain better controlled at night.  - continue tylenol 1000mg three times a day  - continue lyrica 50mg morning and night and will increase if pain not controlled with increased MS Contin.  - lidocaine patches - Will try to help get insurance approval  - continue cymbalta 60mg a day  - continue medical cannabis     Anxiety / depression  - continue cymbalta  - Increase ritalin 10mg at 7am and noon.   - continue visits with palliative care , Mitra Alvares  - consider addition of selective serotonin reuptake inhibitor or other antidepressant / antianxiety    Constipation:   - continue senna 4 tabs BID  - continue miralax and prune juice BID     Goals: Omega waiting to talk with Dr Anne in the beginning of September before making decision around hospice -     Thank you for involving us in the patient's care.   Madhuri Feliciano MD / Palliative Medicine / Pager [phone] / After-Hours Answering Service  [phone] / Main Palliative Clinic - [phone] / [facility] Inpatient Team Consult Pager [phone] (answered 8am-430pm M-F)

## 2020-08-25 NOTE — LETTER
"8/25/2020     RE: Javon Bianchi  [address]  [place] [address]     Dear Colleague,    Thank you for referring your patient, Javon Bianchi, to the [facility] at [facility]. Please see a copy of my visit note below.    Javon Bianchi is a 70 year old male who is being evaluated via a billable video visit.      The patient has been notified of following:     \"This video visit will be conducted via a call between you and your physician/provider. We have found that certain health care needs can be provided without the need for an in-person physical exam.  This service lets us provide the care you need with a video conversation.  If a prescription is necessary we can send it directly to your pharmacy.  If lab work is needed we can place an order for that and you can then stop by our lab to have the test done at a later time.    Video visits are billed at different rates depending on your insurance coverage.  Please reach out to your insurance provider with any questions.    If during the course of the call the physician/provider feels a video visit is not appropriate, you will not be charged for this service.\"    Patient has given verbal consent for Video visit? Yes  How would you like to obtain your AVS? MyChart  If you are dropped from the video visit, the video invite should be resent to: Text to cell phone: [phone]   Will anyone else be joining your video visit? No       Vitals - Patient Reported  Weight (Patient Reported): 97.1 kg (214 lb)  Height (Patient Reported): 177.8 cm (5' 10\")  BMI (Based on Pt Reported Ht/Wt): 30.71  Pain Score: Extreme Pain (8)  Pain Loc: Mid Back(pelvic area)      I have reviewed and updated the patient's allergies and medication list.    Concerns: No concerns  Refills: No refills needed.        Gail Lopez CMA          Video-Visit Details    Type of service:  Video Visit    Video Start Time: " 3:58pm  Video End Time: 4:33 PM    Originating Location (pt. Location): Home    Distant Location (provider location):  [facility]     Platform used for Video Visit: [facility]      Palliative Care Outpatient Clinic Progress Note    Patient Name:  Javon Bianchi  Primary Provider:  Srinath Conner    Chief Complaint / Identifying information:   70 year old male with metastatic infiltrative lesion in L3 extending to L2 and L4, likely primary malignancy being renal cell carcinoma.  He started on Nivolumab but he developed myalgias and therapy was stopped and start him on prednisone. He had a break over the summer of 2019 and developed a severe PNA from August to September. He then underwent a laminectomy on 11/1/19 and was admitted 11/11-11/13 for constipation.  He officially started the lenvatinib + afinitor on 11/21/19. Increased hip pain in 6/2020. Bone Scan 6/26/20 showing bony metastasis is grossly unchanged compared to 4/7/2020 exam.       Last Palliative care appointment: 7/14/20  Recommendations:   Pain:   - MS Contin 30mg in the morning, 30mg middle of the day, 45mg at night before bed  - hydromorphone 2-4mg every 3 hours if needed for pain. At night, be sure to take 2 tablets of the dilaudid so that pain better controlled at night.  - continue tylenol 1000mg three times a day  - coninue lyrica 50mg morning and night  - lidocaine patches - Will try to help get insurance approval  - continue cymbalta 60mg a day  - continue medical cannabis  - I will talk with Interventional pain management and place a referral to see if there are any injections that can be done to help you with your pain.     Anxiety management:   - continue cymbalta   - continue hydroxyzine every night.   - continue to meet with palliative care , Mitra Alvares     Fatigue:   - start ritalin 2.5mg first thing in the morning and at noon x 3 days and if no side effects, increase to 5mg every morning and at  "noon.   - if ritalin increases your anxiety, please call us to let us know.      Hospice informational interview - referral needed with [facility] in [place] -      Lymphedema - continue to follow with lymphedema specialists.      Reviewed: yes    Interim History:  Javon Bianchi 70 year old who has a video visit today for follow up in his home and accompanied by his wife. Omega states he is having lots of pain and not doing as well as he wishes he was doing. Doing lymphedema in [place] area and seems to be going ok. Hooked up to circulation pump but as soon as all the fluid out of legs, it goes into his abdomen and he doesn't feel well.  He has pain in back and both shoulders and right hip and feels his main complaint is pain. He feels he is not getting much benefit from morphine. Taking morphine 45mg TID and takes 2 dilaudid every 3 hours. Ritalin 5mg at 7am and noon and that has helped in the day time. Mentally fuzzy now and hard to stay on tract mentally but the ritalin is helping him and allowing him to be more active during the day.     Bowel problems with increased opioids. At first had constipation and then has loose stools but now feels the issues are fairly well managed. Has appointment with hospice in beginning of September to see if they can better control the pain as that is the main problem. He is using medical cannabis but still with having problems sleeping.     Feeling increased anxiety with realization that \"his body is falling apart\".  Wonders what hospice can help with and what they could add to the picture and questions if they would just focus on pain management or what else they would help with. He is very uncertain what he should do regarding hospice vs continuing oral treatments. He states he is miserable, pain not controlled and sig anxiety knowing his body is falling apart. I again explained hospice philosophy and explained that hospice is not just for pain management but for people who " have decided to no longer pursue life prolonging treatments like chemotherapy and instead have decided to focus on feeling as good as possible for whatever time he has left. He wants to talk with Dr. Anne again at the beginning of September to see what his recommondation is as Omega can't decide what is the best course and he wants more treatment if it would help him and he wants better pain control and he wants help managing his anxiety with knowing his life is ending.     Review of Systems:  ROS: 10 point ROS neg other than the symptoms noted above in the HPI         No Known Allergies  Current Outpatient Medications   Medication Sig Dispense Refill     calcium carbonate 600 mg-vitamin D 400 units (CALTRATE) 600-400 MG-UNIT per tablet Take 1 tablet by mouth 3 times daily       Dextromethorphan-guaiFENesin (MUCINEX DM)  MG 12 hr tablet Take 1 tablet by mouth 2 times daily as needed        diazepam (VALIUM) 5 MG tablet Take 1 pill 30 min before MRI and a second pill 2 minutes prior. 10 tablet 0     DULoxetine (CYMBALTA) 60 MG capsule Take 1 capsule (60 mg) by mouth daily 30 capsule 2     everolimus (AFINITOR) 5 MG tablet Take 1 tablet (5 mg) by mouth daily for 28 days 30 tablet 3     fluticasone (FLONASE) 50 MCG/ACT nasal spray Spray 1 spray into both nostrils daily as needed for rhinitis or allergies       furosemide (LASIX) 40 MG tablet Take 1.5 tablets (60 mg) by mouth daily 45 tablet 3     HYDROmorphone (DILAUDID) 2 MG tablet Take 1-2 tablets (2-4 mg) by mouth every 3 hours as needed for pain 180 tablet 0     lenvatinib (LENVIMA) 10 MG capsule Take 1 capsule (10 mg) by mouth daily 30 capsule 3     levothyroxine (SYNTHROID/LEVOTHROID) 25 MCG tablet Take 1 tablet (25 mcg) by mouth daily 90 tablet 3     lidocaine (LIDODERM) 5 % patch Place 1-3 patches onto the skin every 24 hours Apply to painful areas for 12 hours on, 12 hours off. 30 patch 3     lidocaine (LIDODERM) 5 % patch Place 1-3 patches onto the skin  every 24 hours Apply to painful areas for 12 hours on, 12 hours off. 30 patch 3     loratadine (CLARITIN) 10 MG tablet Take 10 mg by mouth daily (with lunch)        medical cannabis (Patient's own supply.  Not a prescription) Cannabis Heater liquid 2 mL orally in AM   Cannabis capsule - 1 capsule daily at bedtime.     (This is NOT a prescription, and does not certify that the patient has a qualifying medical condition for medical cannabis.  The purpose of this order is  to document that the patient reports taking medical cannabis.)       melatonin 5 MG tablet Take 10 mg by mouth At Bedtime       methylphenidate (RITALIN) 5 MG tablet Take  1 tablet every morning and at noon. 60 tablet 0     morphine (MS CONTIN) 15 MG CR tablet Take 1 tablet (15 mg) by mouth every 8 hours Take with Ms Contin 30mg tablet to equal MS Contin 45mgs 90 tablet 0     morphine (MS CONTIN) 30 MG CR tablet Take 1 tablet (30 mg) by mouth every 8 hours Take with 15mg tablet to equal 45mgs 90 tablet 0     multivitamin, therapeutic (THERA-VIT) TABS tablet Take 1 tablet by mouth At Bedtime       ondansetron (ZOFRAN) 8 MG tablet Take 1 tablet (8 mg) by mouth every 8 hours as needed for nausea 60 tablet 3     order for DME One 6 inch width 21 feet long ace wrap needed for lymphedema management 1 Device 0     polyethylene glycol (MIRALAX/GLYCOLAX) Packet Take 17 g by mouth daily as needed        pregabalin (LYRICA) 50 MG capsule Take 1 capsule by mouth 2 times daily 60 capsule 3     senna-docusate (SENOKOT-S/PERICOLACE) 8.6-50 MG tablet Take 2-4 tablets by mouth 2 times daily       triamcinolone (KENALOG) 0.1 % external cream Apply topically 2 times daily 30 g 3     Urea 40 % CREA Externally apply topically 2 times daily 28 g 3     vitamin D3 (CHOLECALCIFEROL) 2000 units (50 mcg) tablet Take 2,000 Units by mouth daily       Past Medical History:   Diagnosis Date     Anemia      Bone metastasis (H) 09/28/2018     Cough      Pulmonary nodule      Renal  cell carcinoma, right (H) 2018     Past Surgical History:   Procedure Laterality Date     BRONCHOSCOPY (RIGID OR FLEXIBLE), DIAGNOSTIC N/A 2019    Procedure: Bronchoscopy, With Bronchoalveolar Lavage;  Surgeon: Burton Toure MD;  [facility]     CHOLECYSTECTOMY       HC REMOVAL HEEL SPUR, CALCANEUS       OPTICAL TRACKING SYSTEM BIOPSY BONE RADIO FREQUENCY ABLATION N/A 2019    Procedure: Kyphoplasty-Radiofrequency ablation Lumbar 1; Minimally Invasive Surgery  left hemilaminectomy Lumbar 3-Lumbar 4;  Surgeon: Vickey Brower MD;  Location[facility]           There were no vitals taken for this visit.  General: alert, appears slightly older than stated age, well groomed, well nourished, in NAD  Eyes: EOMI, sclera clear  HEENT: NC/AT; mucous membranes moist  Lungs: no increased work of breathing while sitting, speaking full sentences  Neuro: A&O x 3; CN II-XII grossly intact;   Neuropsych: alert, engaged, appropriate; sensorium intact      Key Data Reviewed:  reviewed    Impression & Recommendations & Counselin yr old with metastatic renal cell cancer with stable disease but huge symptom burden currently    - Increase MS Contin 60mg TID  - hydromorphone 2-4mg every 3 hours if needed for pain. At night, be sure to take 2 tablets of the dilaudid so that pain better controlled at night.  - continue tylenol 1000mg three times a day  - continue lyrica 50mg morning and night and will increase if pain not controlled with increased MS Contin.  - lidocaine patches - Will try to help get insurance approval  - continue cymbalta 60mg a day  - continue medical cannabis     Anxiety / depression  - continue cymbalta  - Increase ritalin 10mg at 7am and noon.   - continue visits with palliative care , Mitra Alvares  - consider addition of selective serotonin reuptake inhibitor or other antidepressant / antianxiety    Constipation:   - continue senna 4 tabs BID  - continue  miralax and prune juice BID     Goals: Omega waiting to talk with Dr Anne in the beginning of September before making decision around hospice -     Thank you for involving us in the patient's care.   Madhuri Feliciano MD / Palliative Medicine / Pager [phone] / After-Hours Answering Service [phone] / Main Palliative Clinic - [phone] / [facility] Inpatient Team Consult Pager [phone] (answered 8am-430pm M-F)

## 2020-08-27 NOTE — PROGRESS NOTES
Patient never called back regarding medication, however hydromorphone pended based on zora's below call. Will route to MD for urgent fill this afternoon.

## 2020-08-27 NOTE — PROGRESS NOTES
"Received VM from patient asking about morphine script he understood Dr. Feliciano was sending him following his apt earlier this week.  He notes he is starting to run low.    Reviewed note from his apt - \"Increase MS Contin 60mg TID.\" Per Epic review, patient's med was changed historically, no new script sent. Per [place]  report patient got #90 30mg tabs on 8/15/2020, dose increased on 8/25/2020.    Attempted to call patient, but was unable to reach him. Left VM advising I had received his message and am forwarding his request to Dr. Feliciano for review and approval, likely tomorrow.     Last refill: 8/15/2020, dose increased 8/25/2020 but no script provided  Last office visit: 8/25/2020    Will route request to MD for review.     Reviewed [place]  Report.  "

## 2020-08-27 NOTE — PROGRESS NOTES
Spouse called in to state it is the dilaudid pt is out of, not the morphine. Last took dilaudid at 9 am, took 4 mg then. State they have the morphine and does not need this at this time. Stating pt is entirely out and needs refilled today. Routing to team as high priority and transferred spouse to [facility] vm for cb.

## 2020-08-31 NOTE — TELEPHONE ENCOUNTER
Pt called in to triage requesting refill of valium (#2 tablets) for Nuc Med scan today at 1 pm. State he forgot to bring any from home and is already at the hospital checking in. State he has his pain meds but will need valium to help relax for amount of time he has to be on table. Last filled by palliative care 1/21/20, per palliative care may be faster to have filled by Oncology. Paged Dr. Anne.    Received call back from Dr. Anne, sent to discharge pharmacy, pt informed.

## 2020-09-01 NOTE — PROGRESS NOTES
Chief Complaint   Patient presents with     Blood Draw     VPT blood draw and vitals     Venipuncture labs drawn from left arm.

## 2020-09-01 NOTE — NURSING NOTE
"Oncology Rooming Note    September 1, 2020 9:37 AM   Javon Bianchi is a 70 year old male who presents for:    Chief Complaint   Patient presents with     Blood Draw     VPT blood draw and vitals     Oncology Clinic Visit     Renal Cancer     Initial Vitals: /72   Pulse 71   Temp 97.9  F (36.6  C) (Oral)   Resp 18   Wt 100.5 kg (221 lb 8 oz)   SpO2 94%   BMI 31.78 kg/m   Estimated body mass index is 31.78 kg/m  as calculated from the following:    Height as of 2/12/20: 1.778 m (5' 10\").    Weight as of this encounter: 100.5 kg (221 lb 8 oz). Body surface area is 2.23 meters squared.  No Pain (0) Comment: Data Unavailable   No LMP for male patient.  Allergies reviewed: Yes  Medications reviewed: Yes    Medications: Medication refills not needed today.  Pharmacy name entered into [employer]:    [facility] - [place], [place] - [address] [facility] - [place] - [address] [facility] - [place], [place] - [address]    Clinical concerns: No new concerns today. Dr. Anne was notified.      Sydnie Luna MA            "

## 2020-09-01 NOTE — LETTER
9/1/2020     RE: Javon Bianchi  [address]  [place] [facility] [address]    Dear Colleague,    Thank you for referring your patient, Javon Bianchi, to the [facility]. Please see a copy of my visit note below.    Chief Complaint   Patient presents with     Blood Draw     VPT blood draw and vitals     Venipuncture labs drawn from left arm.       Reason for Visit: f/u [facility]     Oncology HPI: Javon Bianchi is a 69 year old male with a history significant for hyperlipidemia who had been in his usual health until he developed back pain in May of 2018. At that time, the pain was a stabbing like in the middle of lower back with burning like pain wrapping around this left hip to knee area. No trauma. He had MRI on 5/23/18 showed mild degenerative change with bulging disks L2-S1. A small benign hemangioma was noted in L2, no other marrow signal abnormality. Since then, the pain failed to improve despite steroid injections. In addition, he lost 25 lbs unintentionally since June along with chills a few times a day.   Over time, the pain got worse to the point he could not ambulate after short distance and developed muscle weakness in lower extremities over time requiring a walker. Finally, he went to ER ([facility] in [place]) on 8/23/18, CT A/P was unremarkable. He was referred to neurology with obtaining MRI L spine. MRI on 8/30/18 revealed a pathological fracture at L3 vertebral body with height loss at 40% and diffuse involvement of a neoplastic process. IR guided biopsy on 8/31/18 showed poorly differentiated carcinoma. IHC was suggestive of possible renal cell carcinoma per pathology report (Renal cell immunochemistry is positive and along with the negative staining for CK7 and CK20 suggests the possibility of a renal carcinoma, however most renal cell carcinomas also express PAX8 which is negative in this case). Of note, CT chest/abdomen/pelvis at OSH was negative for primary  lesion. The patient was evaluated by neurosurgery who recommended no surgical intervention. He was discharged with TLSO brace and walker. He had palliative XRT locally from 9/11/18 for 10 fractions.     ONCOLOGY HISTORY:   Cabozantinib: Started 60 mg daily on September 28, 2018.  Dec 10, 2018: dose reduced cabozantinib to 40 mg a day.  Jan 17, 2019: dose reduced cabozantinib to 29 mg a day.  Feb 11, 2019: C1D1 Nivolumab   3/12/19: c2 Nivolumab  4/8/19: held Nivolumab due to myalgias, started prednisone  September '19:He had palliative XRT locally from 10 fractions (Done in [place])  11//21/19: started lenvatinib (and then 11/25 started afinitor)   1/27/20-present: dropped to 10 mg of the lenvatinib due to HFS, continues on afinitor        Interval history: Omega joined by his wife Suzie and son and was seen in person at this visit.     He states his R hip pain is ongoing, in addition to upper thoracic pain and pelvis.  He is constantly in a state of 5/10 pain levels that never get better. . He has been taking the 45 mg MS contin q8 hours and 2 tabs 2 mg dilaudid as needed (usually every 3 hours). He is not feeling any better despite therapy for his kidney cancer.      No sob/cp/rash/fever/chills.     Current Outpatient Medications   Medication Sig     calcium carbonate 600 mg-vitamin D 400 units (CALTRATE) 600-400 MG-UNIT per tablet Take 1 tablet by mouth 3 times daily     Dextromethorphan-guaiFENesin (MUCINEX DM)  MG 12 hr tablet Take 1 tablet by mouth 2 times daily as needed      diazepam (VALIUM) 5 MG tablet Use 1 tab 30 min before scan and another right before scan.     diazepam (VALIUM) 5 MG tablet Take 1 pill 30 min before MRI and a second pill 2 minutes prior.     DULoxetine (CYMBALTA) 60 MG capsule Take 1 capsule (60 mg) by mouth daily     fluticasone (FLONASE) 50 MCG/ACT nasal spray Spray 1 spray into both nostrils daily as needed for rhinitis or allergies     furosemide (LASIX) 40 MG tablet Take 1.5  tablets (60 mg) by mouth daily     HYDROmorphone (DILAUDID) 2 MG tablet Take 1-2 tablets (2-4 mg) by mouth every 3 hours as needed for pain     lenvatinib (LENVIMA) 10 MG capsule Take 1 capsule (10 mg) by mouth daily     levothyroxine (SYNTHROID/LEVOTHROID) 25 MCG tablet Take 1 tablet (25 mcg) by mouth daily     lidocaine (LIDODERM) 5 % patch Place 1-3 patches onto the skin every 24 hours Apply to painful areas for 12 hours on, 12 hours off.     lidocaine (LIDODERM) 5 % patch Place 1-3 patches onto the skin every 24 hours Apply to painful areas for 12 hours on, 12 hours off.     loratadine (CLARITIN) 10 MG tablet Take 10 mg by mouth daily (with lunch)      medical cannabis (Patient's own supply.  Not a prescription) Cannabis Heater liquid 2 mL orally in AM   Cannabis capsule - 1 capsule daily at bedtime.     (This is NOT a prescription, and does not certify that the patient has a qualifying medical condition for medical cannabis.  The purpose of this order is  to document that the patient reports taking medical cannabis.)     melatonin 5 MG tablet Take 10 mg by mouth At Bedtime     morphine (MS CONTIN) 60 MG 12 hr tablet Take 1 tablet (60 mg) by mouth every 8 hours     multivitamin, therapeutic (THERA-VIT) TABS tablet Take 1 tablet by mouth At Bedtime     ondansetron (ZOFRAN) 8 MG tablet Take 1 tablet (8 mg) by mouth every 8 hours as needed for nausea     order for DME One 6 inch width 21 feet long ace wrap needed for lymphedema management     polyethylene glycol (MIRALAX/GLYCOLAX) Packet Take 17 g by mouth daily as needed      pregabalin (LYRICA) 50 MG capsule Take 1 capsule by mouth 2 times daily     senna-docusate (SENOKOT-S/PERICOLACE) 8.6-50 MG tablet Take 2-4 tablets by mouth 2 times daily     triamcinolone (KENALOG) 0.1 % external cream Apply topically 2 times daily     Urea 40 % CREA Externally apply topically 2 times daily     vitamin D3 (CHOLECALCIFEROL) 2000 units (50 mcg) tablet Take 2,000 Units by mouth  daily     everolimus (AFINITOR) 5 MG tablet Take 1 tablet (5 mg) by mouth daily for 28 days     methylphenidate (RITALIN) 10 MG tablet Take  1 tablet every morning and at noon.     No current facility-administered medications for this visit.            No Known Allergies        Exam: Blood pressure 124/69, pulse 57, temperature 98  F (36.7  C), temperature source Tympanic, resp. rate 16, weight 103.8 kg (228 lb 14.4 oz), SpO2 97 %.      Wt Readings from Last 4 Encounters:   05/19/20 95.3 kg (210 lb)   04/07/20 99 kg (218 lb 3.2 oz)   02/12/20 98.6 kg (217 lb 6.4 oz)   01/21/20 99.8 kg (220 lb)   Voice is clear and strong. No audible stridor, wheezing, or respiratory distress. The remainder of PE was deferred due to PHE.        Labs:   Recent Labs   Lab Test 09/01/20  0919 06/29/20  0921 05/15/20  1013 04/07/20  1147 02/12/20  0808    138 139 138 138   POTASSIUM 3.7 4.1 4.4 4.0 4.0   CHLORIDE 106 106 106 106 106   CO2 26 26 29 28 28   ANIONGAP 6 6 4 4 4   BUN 12 12 20 15 13   CR 0.92 1.00 1.07 1.02 1.00   * 96 81 102* 88   AUREA 8.6 8.5 8.9 8.3* 8.7     Recent Labs   Lab Test 09/01/20  0919 06/29/20  0921 05/15/20  1013 04/07/20  1147 02/12/20  0808  11/12/19  0859 11/11/19  1858  09/07/19  0750 09/06/19  0710 09/05/19  0737   MAG 2.2 1.9 2.0 2.1 1.9   < > 1.6 1.8   < > 2.0 1.5* 1.6   PHOS  --   --   --   --   --   --  2.8 1.9*  --  3.0 2.9 2.9    < > = values in this interval not displayed.     Recent Labs   Lab Test 09/01/20  0919 06/29/20  0921 05/15/20  1013 04/07/20  1147 03/13/20   WBC 4.7 4.7 4.0 4.1 6.4   HGB 8.5* 8.7* 8.6* 8.8* 8.2*   * 150 127* 149* 246   MCV 95 95 97 97 95.7   NEUTROPHIL 60.5 53.9 63.0 69.0 56.9     Recent Labs   Lab Test 09/01/20  0919 06/29/20  0921 05/15/20  1013  09/04/19  0820   BILITOTAL 0.4 0.4 0.3   < >  --    ALKPHOS 96 78 81   < >  --    ALT 32 34 35   < >  --    AST 38 38 43   < >  --    ALBUMIN 3.0* 3.1* 3.3*   < >  --    LDH  --   --   --   --  353*    < > =  values in this interval not displayed.     TSH   Date Value Ref Range Status   09/01/2020 3.21 0.40 - 4.00 mU/L Final   06/29/2020 3.19 0.40 - 4.00 mU/L Final   05/15/2020 2.50 0.40 - 4.00 mU/L Final     No results for input(s): CEA in the last 04966 hours.  Results for orders placed or performed during the hospital encounter of 08/31/20   NM Bone Scan Whole Body    Narrative    EXAMINATION: NM BONE SCAN WHOLE BODY  Whole-body bone scan, 8/31/2020 2:00 PM     HISTORY: Restaging - bony metastasis; Renal cell carcinoma,  unspecified laterality (H); Bone metastasis (H); Hypothyroidism due to  acquired atrophy of thyroid     ADDITIONAL INFORMATION: none    COMPARISON: Bone scan 6/26/2020    TECHNIQUE: The patient received 25.8 mCi of Tc-99m MDP intravenously.  Whole body bone images were attempted to be obtained at 3 hours.  Patient aborted study early due to pain.    FINDINGS: Study was aborted early by patient due to pain. Only images  of the upper calvarium were able to be obtained. Images are degraded  by artifact and noise, however do not convincingly demonstrate lesion  in the calvarial vertex.      Impression    IMPRESSION:   1. Study aborted early by patient due to pain. Only images of the  upper calvarium were able to be obtained.  2. No convincing lesion in the visualized skull.     I have personally reviewed the examination and initial interpretation  and I agree with the findings.    WILD HSIEH MD               Impression/plan:   Metastatic kidney cancer with extensive bony metastases having progressed on several lines of therapy  Intractable pain in his legs and abdomen   ECOG performance status of 3    I had a discussion with Frieda who is accompanied by his wife and his son at this clinic visit.  He was scheduled to have restaging CT scan and bone scan yesterday but could not tolerate it.  His bone scan was aborted after only upper calvarium had been imaged.  I try to locate images and almost  nothing was covered.     I explained him the goals of palliative therapy which is essential to improve his quality of life.  He has been doing very poorly and has been consistently struggling over the last 6 months to a year.  He has not had any good days in the recent past.  I reminded him that the goals of therapy was not to prolong his suffering but to offer him a better quality of life.  If we are able to do this then we have to question our motives of therapy and change her management.  Although we could try several other lines of therapy will be continued pain and side effects from treatment would be near certain.  It is extremely unlikely that subsequent lines of therapy would be any radically different from what he has had in the past.    I would recommend hospice therapy for him.  I explained him the meaning of hospice which has been reviewed with him on several occasions in the past.  Under hospice would be only focusing on his symptoms and not trying to fix every single lab or scans.  To this end we would not need to check his labs or scans which only add to his discomfort.  This is initially disconcerting but as we removed to focus from labs and scans to symptoms, it would allay a lot of his anxiety and will give him much more time to spend with his friends and family.     I would always be available for any help however the hospice team has a physician of their own.  They can always reach me if they have any questions that I could to help address.    All questions for Omega and his family were answered to their satisfaction.  They would like to sign up for hospice locally in [place] with [facility].    Over 45 min of direct face to face time spent with patient with more than 50% time spent in counseling and coordinating care.      Again, thank you for allowing me to participate in the care of your patient.      Sincerely,      Hua Anne MD

## 2020-09-02 NOTE — PROGRESS NOTES
Reason for Visit: f/u [facility]     Oncology HPI: Javon Bianchi is a 69 year old male with a history significant for hyperlipidemia who had been in his usual health until he developed back pain in May of 2018. At that time, the pain was a stabbing like in the middle of lower back with burning like pain wrapping around this left hip to knee area. No trauma. He had MRI on 5/23/18 showed mild degenerative change with bulging disks L2-S1. A small benign hemangioma was noted in L2, no other marrow signal abnormality. Since then, the pain failed to improve despite steroid injections. In addition, he lost 25 lbs unintentionally since June along with chills a few times a day.   Over time, the pain got worse to the point he could not ambulate after short distance and developed muscle weakness in lower extremities over time requiring a walker. Finally, he went to ER ([facility] in [place]) on 8/23/18, CT A/P was unremarkable. He was referred to neurology with obtaining MRI L spine. MRI on 8/30/18 revealed a pathological fracture at L3 vertebral body with height loss at 40% and diffuse involvement of a neoplastic process. IR guided biopsy on 8/31/18 showed poorly differentiated carcinoma. IHC was suggestive of possible renal cell carcinoma per pathology report (Renal cell immunochemistry is positive and along with the negative staining for CK7 and CK20 suggests the possibility of a renal carcinoma, however most renal cell carcinomas also express PAX8 which is negative in this case). Of note, CT chest/abdomen/pelvis at OSH was negative for primary lesion. The patient was evaluated by neurosurgery who recommended no surgical intervention. He was discharged with TLSO brace and walker. He had palliative XRT locally from 9/11/18 for 10 fractions.     ONCOLOGY HISTORY:   Cabozantinib: Started 60 mg daily on September 28, 2018.  Dec 10, 2018: dose reduced cabozantinib to 40 mg a day.  Jan 17, 2019: dose reduced cabozantinib to 29 mg  a day.  Feb 11, 2019: C1D1 Nivolumab   3/12/19: c2 Nivolumab  4/8/19: held Nivolumab due to myalgias, started prednisone  September '19:He had palliative XRT locally from 10 fractions (Done in [place])  11//21/19: started lenvatinib (and then 11/25 started afinitor)   1/27/20-present: dropped to 10 mg of the lenvatinib due to HFS, continues on afinitor        Interval history: Omega joined by his wife Suzie and son and was seen in person at this visit.     He states his R hip pain is ongoing, in addition to upper thoracic pain and pelvis.  He is constantly in a state of 5/10 pain levels that never get better. . He has been taking the 45 mg MS contin q8 hours and 2 tabs 2 mg dilaudid as needed (usually every 3 hours). He is not feeling any better despite therapy for his kidney cancer.      No sob/cp/rash/fever/chills.     Current Outpatient Medications   Medication Sig     calcium carbonate 600 mg-vitamin D 400 units (CALTRATE) 600-400 MG-UNIT per tablet Take 1 tablet by mouth 3 times daily     Dextromethorphan-guaiFENesin (MUCINEX DM)  MG 12 hr tablet Take 1 tablet by mouth 2 times daily as needed      diazepam (VALIUM) 5 MG tablet Use 1 tab 30 min before scan and another right before scan.     diazepam (VALIUM) 5 MG tablet Take 1 pill 30 min before MRI and a second pill 2 minutes prior.     DULoxetine (CYMBALTA) 60 MG capsule Take 1 capsule (60 mg) by mouth daily     fluticasone (FLONASE) 50 MCG/ACT nasal spray Spray 1 spray into both nostrils daily as needed for rhinitis or allergies     furosemide (LASIX) 40 MG tablet Take 1.5 tablets (60 mg) by mouth daily     HYDROmorphone (DILAUDID) 2 MG tablet Take 1-2 tablets (2-4 mg) by mouth every 3 hours as needed for pain     lenvatinib (LENVIMA) 10 MG capsule Take 1 capsule (10 mg) by mouth daily     levothyroxine (SYNTHROID/LEVOTHROID) 25 MCG tablet Take 1 tablet (25 mcg) by mouth daily     lidocaine (LIDODERM) 5 % patch Place 1-3 patches onto the skin every 24  hours Apply to painful areas for 12 hours on, 12 hours off.     lidocaine (LIDODERM) 5 % patch Place 1-3 patches onto the skin every 24 hours Apply to painful areas for 12 hours on, 12 hours off.     loratadine (CLARITIN) 10 MG tablet Take 10 mg by mouth daily (with lunch)      medical cannabis (Patient's own supply.  Not a prescription) Cannabis Heater liquid 2 mL orally in AM   Cannabis capsule - 1 capsule daily at bedtime.     (This is NOT a prescription, and does not certify that the patient has a qualifying medical condition for medical cannabis.  The purpose of this order is  to document that the patient reports taking medical cannabis.)     melatonin 5 MG tablet Take 10 mg by mouth At Bedtime     morphine (MS CONTIN) 60 MG 12 hr tablet Take 1 tablet (60 mg) by mouth every 8 hours     multivitamin, therapeutic (THERA-VIT) TABS tablet Take 1 tablet by mouth At Bedtime     ondansetron (ZOFRAN) 8 MG tablet Take 1 tablet (8 mg) by mouth every 8 hours as needed for nausea     order for DME One 6 inch width 21 feet long ace wrap needed for lymphedema management     polyethylene glycol (MIRALAX/GLYCOLAX) Packet Take 17 g by mouth daily as needed      pregabalin (LYRICA) 50 MG capsule Take 1 capsule by mouth 2 times daily     senna-docusate (SENOKOT-S/PERICOLACE) 8.6-50 MG tablet Take 2-4 tablets by mouth 2 times daily     triamcinolone (KENALOG) 0.1 % external cream Apply topically 2 times daily     Urea 40 % CREA Externally apply topically 2 times daily     vitamin D3 (CHOLECALCIFEROL) 2000 units (50 mcg) tablet Take 2,000 Units by mouth daily     everolimus (AFINITOR) 5 MG tablet Take 1 tablet (5 mg) by mouth daily for 28 days     methylphenidate (RITALIN) 10 MG tablet Take  1 tablet every morning and at noon.     No current facility-administered medications for this visit.            No Known Allergies        Exam: Blood pressure 124/69, pulse 57, temperature 98  F (36.7  C), temperature source Tympanic, resp.  rate 16, weight 103.8 kg (228 lb 14.4 oz), SpO2 97 %.      Wt Readings from Last 4 Encounters:   05/19/20 95.3 kg (210 lb)   04/07/20 99 kg (218 lb 3.2 oz)   02/12/20 98.6 kg (217 lb 6.4 oz)   01/21/20 99.8 kg (220 lb)   Voice is clear and strong. No audible stridor, wheezing, or respiratory distress. The remainder of PE was deferred due to PHE.        Labs:   Recent Labs   Lab Test 09/01/20 0919 06/29/20  0921 05/15/20  1013 04/07/20  1147 02/12/20  0808    138 139 138 138   POTASSIUM 3.7 4.1 4.4 4.0 4.0   CHLORIDE 106 106 106 106 106   CO2 26 26 29 28 28   ANIONGAP 6 6 4 4 4   BUN 12 12 20 15 13   CR 0.92 1.00 1.07 1.02 1.00   * 96 81 102* 88   AUREA 8.6 8.5 8.9 8.3* 8.7     Recent Labs   Lab Test 09/01/20 0919 06/29/20  0921 05/15/20  1013 04/07/20  1147 02/12/20  0808  11/12/19  0859 11/11/19  1858  09/07/19  0750 09/06/19  0710 09/05/19  0737   MAG 2.2 1.9 2.0 2.1 1.9   < > 1.6 1.8   < > 2.0 1.5* 1.6   PHOS  --   --   --   --   --   --  2.8 1.9*  --  3.0 2.9 2.9    < > = values in this interval not displayed.     Recent Labs   Lab Test 09/01/20  0919 06/29/20  0921 05/15/20  1013 04/07/20  1147 03/13/20   WBC 4.7 4.7 4.0 4.1 6.4   HGB 8.5* 8.7* 8.6* 8.8* 8.2*   * 150 127* 149* 246   MCV 95 95 97 97 95.7   NEUTROPHIL 60.5 53.9 63.0 69.0 56.9     Recent Labs   Lab Test 09/01/20  0919 06/29/20  0921 05/15/20  1013  09/04/19  0820   BILITOTAL 0.4 0.4 0.3   < >  --    ALKPHOS 96 78 81   < >  --    ALT 32 34 35   < >  --    AST 38 38 43   < >  --    ALBUMIN 3.0* 3.1* 3.3*   < >  --    LDH  --   --   --   --  353*    < > = values in this interval not displayed.     TSH   Date Value Ref Range Status   09/01/2020 3.21 0.40 - 4.00 mU/L Final   06/29/2020 3.19 0.40 - 4.00 mU/L Final   05/15/2020 2.50 0.40 - 4.00 mU/L Final     No results for input(s): CEA in the last 24825 hours.  Results for orders placed or performed during the hospital encounter of 08/31/20   NM Bone Scan Whole Body    Narrative     EXAMINATION: NM BONE SCAN WHOLE BODY  Whole-body bone scan, 8/31/2020 2:00 PM     HISTORY: Restaging - bony metastasis; Renal cell carcinoma,  unspecified laterality (H); Bone metastasis (H); Hypothyroidism due to  acquired atrophy of thyroid     ADDITIONAL INFORMATION: none    COMPARISON: Bone scan 6/26/2020    TECHNIQUE: The patient received 25.8 mCi of Tc-99m MDP intravenously.  Whole body bone images were attempted to be obtained at 3 hours.  Patient aborted study early due to pain.    FINDINGS: Study was aborted early by patient due to pain. Only images  of the upper calvarium were able to be obtained. Images are degraded  by artifact and noise, however do not convincingly demonstrate lesion  in the calvarial vertex.      Impression    IMPRESSION:   1. Study aborted early by patient due to pain. Only images of the  upper calvarium were able to be obtained.  2. No convincing lesion in the visualized skull.     I have personally reviewed the examination and initial interpretation  and I agree with the findings.    WILD HSIEH MD               Impression/plan:   Metastatic kidney cancer with extensive bony metastases having progressed on several lines of therapy  Intractable pain in his legs and abdomen   ECOG performance status of 3    I had a discussion with Frieda who is accompanied by his wife and his son at this clinic visit.  He was scheduled to have restaging CT scan and bone scan yesterday but could not tolerate it.  His bone scan was aborted after only upper calvarium had been imaged.  I try to locate images and almost nothing was covered.     I explained him the goals of palliative therapy which is essential to improve his quality of life.  He has been doing very poorly and has been consistently struggling over the last 6 months to a year.  He has not had any good days in the recent past.  I reminded him that the goals of therapy was not to prolong his suffering but to offer him a better quality of  life.  If we are able to do this then we have to question our motives of therapy and change her management.  Although we could try several other lines of therapy will be continued pain and side effects from treatment would be near certain.  It is extremely unlikely that subsequent lines of therapy would be any radically different from what he has had in the past.    I would recommend hospice therapy for him.  I explained him the meaning of hospice which has been reviewed with him on several occasions in the past.  Under hospice would be only focusing on his symptoms and not trying to fix every single lab or scans.  To this end we would not need to check his labs or scans which only add to his discomfort.  This is initially disconcerting but as we removed to focus from labs and scans to symptoms, it would allay a lot of his anxiety and will give him much more time to spend with his friends and family.     I would always be available for any help however the hospice team has a physician of their own.  They can always reach me if they have any questions that I could to help address.    All questions for Omega and his family were answered to their satisfaction.  They would like to sign up for hospice locally in [place] with [facility].    Over 45 min of direct face to face time spent with patient with more than 50% time spent in counseling and coordinating care.

## 2020-09-02 NOTE — TELEPHONE ENCOUNTER
Juliana from [facility] requesting order for eval and enter into hospice (order in ) along with last few visit notes and medicare ID info faxed to [phone]    Routed to TATUM  And verbal order called to N[place]is @ [phone]

## 2020-09-02 NOTE — TELEPHONE ENCOUNTER
Requested information faxed to number given.    Fax transmisson verified via rightfax.    Hattie Vega, CMA

## 2020-09-03 NOTE — TELEPHONE ENCOUNTER
Patient confirms that he has enrolled with Hospice   He asks for a refill of Ritalin   Last Ritalin refill 08/11

## 2020-09-19 NOTE — PROGRESS NOTES
ORAL CHEMOTHERAPY DISCONTINUATION       Primary Oncologist:  Dr. Anne  Primary Oncology Clinic: [facility]  Cancer Diagnosis:  RCC  Therapy History:  Lenvima/Everolimus   C1D1= 11/21/2019  C2D1= 12/26/2019  Therapy Ended On:  9/1/2020  Reason For Discontinuation: patient enrolled into hospice    Additional Notes:  Thank you for the opportunity to be a part of this patient's oral chemotherapy. The oncology pharmacy will no longer be following this patient for oral chemotherapy changes. Feel free to contact us.

## 2021-05-10 NOTE — ANESTHESIA PREPROCEDURE EVALUATION
Anesthesia Pre-Procedure Evaluation    Patient: Javon Bianchi   MRN:     [id number] Gender:   male   Age:    69 year old :      1950        Preoperative Diagnosis: Spine Mets, Stenosis, Radiculopathy   Procedure(s):  Kyphoplasty-Radio Frequency Ablation  Lumbar 1  Minimally Invasive Surgery Left Hemilaminectomy Lumbar 3-4     Past Medical History:   Diagnosis Date     Bone metastasis (H) 2018     Cough      Renal cell carcinoma, right (H) 2018      Past Surgical History:   Procedure Laterality Date     CHOLECYSTECTOMY            Anesthesia Evaluation     . Pt has had prior anesthetic. Type: General    No history of anesthetic complications          ROS/MED HX    ENT/Pulmonary:     (+), recent URI unresolved 19: . Other pulmonary disease pulmonary nodule, productive cough, wheezing .   (-) tobacco use, asthma and COPD   Neurologic:  - neg neurologic ROS    (-) seizures, CVA and TIA   Cardiovascular:     (+) ----. : . . . :. . Previous cardiac testing Echodate:2017results:Stress Testdate: results:ECG reviewed date:3/14/19 results: date: results:          METS/Exercise Tolerance:  3 - Able to walk 1-2 blocks without stopping   Hematologic:     (+) Anemia, -     (-) history of blood clots and History of Transfusion   Musculoskeletal: Comment: Edema of left leg  (+) arthritis,  other musculoskeletal- compression fracture L3, left leg/hip pain, shoulder pain       GI/Hepatic: Comment: Upset stomach, difficulty swallowing foods - eating soft diet only, decreased appetite        Renal/Genitourinary:  - ROS Renal section negative       Endo:  - neg endo ROS       Psychiatric:     (+) psychiatric history anxiety      Infectious Disease:  - neg infectious disease ROS       Malignancy:   (+) Malignancy History of Other  Other CA Renal cell carcinoma metastatic to bone Active status post Chemo and Radiation         Other:    (+) H/O Chronic Pain,H/O chronic opiod use , no other significant disability  Problem: Dysphagia (Adult)  Goal: *Acute Goals and Plan of Care (Insert Text)  Description: Patient will:  1. Tolerate PO trials with 0 s/s overt distress in 4/5 trials  2. Utilize compensatory swallow strategies/maneuvers (decrease bite/sip, size/rate, alt. liq/sol) with min cues in 4/5 trials  3. Perform oral-motor/laryngeal exercises to increase oropharyngeal swallow function with min cues  4. Complete an objective swallow study (i.e., MBSS) to assess swallow integrity, r/o aspiration, and determine of safest LRD, min A as indicated/ordered by MD     Recommend:   NPO with PEG   Strict aspiration precautions (HOB >30 degrees at all times, Oral care TID)  MBS to objectively assess oropharyngeal swallow integrity and rule out silent aspiration     Outcome: Progressing Towards Goal    SPEECH LANGUAGE PATHOLOGY BEDSIDE SWALLOW EVALUATION    Patient: Roni Saldana (39 y.o. male)  Date: 5/10/2021  Primary Diagnosis: Angioedema due to angiotensin converting enzyme inhibitor (ACE-I) [T78. 3XXA, T46.4X5A]  Acute respiratory failure with hypoxia (HCC) [J96.01]  DVT (deep vein thrombosis) in pregnancy [O22.30]  Acute encephalopathy [G93.40]  Procedure(s) (LRB):  PERCUTANEOUS ENDOSCOPIC GASTROSTOMY TUBE INSERTION/ BEDSIDE (N/A) 7 Days Post-Op   Precautions: Aspiration, Fall, Skin  PLOF: Unknown     ASSESSMENT :  Based on the objective data described below, the patient presents with mild oral and suspected mod pharyngeal dysphagia. Pt alert and oriented to person with trach in place with aphonia. Oral mech exam revealed limited natural dentition; otherwise, structures functional for speech/deglutition. Pt demo mildly delayed oral prep phase with mild swallow delay. Pt demo delayed cough noted on ice chip presentations on 1/5 presentations. Recommend continue NPO with PEG with MBS to further assess oropharyngeal swallow integrity. D/w pt, MD and RN.       Patient will benefit from skilled intervention to address the                        PHYSICAL EXAM:   Mental Status/Neuro: A/A/O; Age Appropriate   Airway: Facies: Feasible  Mallampati: I  Mouth/Opening: Full  TM distance: > 6 cm  Neck ROM: Limited   Respiratory: Auscultation: Rhonchi     Resp. Rate: Normal      CV: Rhythm: Regular  Heart: Normal Sounds  Pulses: Normal   Comments: Bilateral legs wrapped in JUVENTINO stockings. Rhonchi is lower lung fields. With deep inspiration the patient has productive cough                     LABS:  CBC:   Lab Results   Component Value Date    WBC 7.9 08/07/2019    WBC 8.4 07/19/2019    HGB 9.7 (L) 08/07/2019    HGB 9.5 (L) 07/19/2019    HCT 32.5 (L) 08/07/2019    HCT 32.0 (L) 07/19/2019     08/07/2019     07/19/2019     BMP:   Lab Results   Component Value Date     08/07/2019     07/19/2019    POTASSIUM 3.5 08/07/2019    POTASSIUM 4.4 07/19/2019    CHLORIDE 106 08/07/2019    CHLORIDE 102 07/19/2019    CO2 28 08/07/2019    CO2 27 07/19/2019    BUN 7 08/07/2019    BUN 10 07/19/2019    CR 0.64 (L) 08/07/2019    CR 0.60 (L) 07/19/2019     (H) 08/07/2019     (H) 07/19/2019     COAGS:   Lab Results   Component Value Date    INR 1.3 (A) 08/31/2018     POC: No results found for: BGM, HCG, HCGS  OTHER:   Lab Results   Component Value Date    LACT 0.8 05/13/2019    AUREA 8.0 (L) 08/07/2019    MAG 1.9 08/07/2019    ALBUMIN 2.8 (L) 08/07/2019    PROTTOTAL 6.2 (L) 08/07/2019    ALT 16 08/07/2019    AST 23 08/07/2019    ALKPHOS 115 08/07/2019    BILITOTAL 0.4 08/07/2019    LIPASE 85 08/07/2019    AMYLASE 27 (L) 08/07/2019    TSH 1.53 08/07/2019    T4 1.08 02/11/2019    CRP 36.9 (H) 06/06/2019    SED 87 (H) 03/29/2019        Preop Vitals    BP Readings from Last 3 Encounters:   08/15/19 133/74   08/13/19 120/62   08/07/19 114/64    Pulse Readings from Last 3 Encounters:   08/15/19 74   08/13/19 85   08/07/19 65      Resp Readings from Last 3 Encounters:   08/15/19 19   08/13/19 16   08/07/19 16    SpO2 Readings from Last  above impairments. Patient's rehabilitation potential is considered to be Fair  Factors which may influence rehabilitation potential include:   []            None noted  [x]            Mental ability/status  [x]            Medical condition  []            Home/family situation and support systems  [x]            Safety awareness  []            Pain tolerance/management  []            Other:      PLAN :  Recommendations and Planned Interventions:  As above   Frequency/Duration: Patient will be followed by speech-language pathology 1-2 times per day/4-7 days per week to address goals. Discharge Recommendations: To Be Determined     SUBJECTIVE:   Patient stated yeah    OBJECTIVE:     Past Medical History:   Diagnosis Date    DDD (degenerative disc disease), lumbar     Diabetes ([address]     Diabetic neuropathy ([facility] [address]     Diabetic retinopathy ([facility] [address]     DM2 (diabetes mellitus, type 2) ([facility] [address]     HLD (hyperlipidemia)     HTN (hypertension)     Obesity (BMI 30-39. 9)    History reviewed. No pertinent surgical history. Prior Level of Function/Home Situation:   Home Situation  Home Environment: Other (comment)( DORA, sedated/intubated)  One/Two Story Residence: Other (Comment)( [facility], sedated/intubated)  Living Alone: No  Support Systems: Parent  Patient Expects to be Discharged to[de-identified] Unknown  Current DME Used/Available at Home: None( [facility]ORA, sedated/intubated)  Diet prior to admission: unknown   Current Diet:  NPO with PEG    Cognitive and Communication Status:  Neurologic State: Alert  Orientation Level: Oriented to person  Cognition: Follows commands  Perception: Cues to attend right visual field  Perseveration: No perseveration noted  Safety/Judgement: Fall prevention  Oral Assessment:  Oral Assessment  Labial: No impairment  Dentition: Natural;Limited;Poor  Oral Hygiene: Good  Lingual: No impairment  Velum: No impairment  Mandible: No impairment  P.O.  Trials:  Patient Position: 45 at [facility]  Vocal quality prior to P.O.: 3 Encounters:   08/15/19 93%   08/13/19 94%   08/07/19 94%      Temp Readings from Last 1 Encounters:   08/15/19 99  F (37.2  C) (Oral)    Ht Readings from Last 1 Encounters:   08/15/19 1.829 m (6')      Wt Readings from Last 1 Encounters:   08/15/19 96.1 kg (211 lb 14.4 oz)    Estimated body mass index is 28.74 kg/m  as calculated from the following:    Height as of this encounter: 1.829 m (6').    Weight as of this encounter: 96.1 kg (211 lb 14.4 oz).     LDA:  Peripheral IV 06/26/19 Lower forearm (Active)   Number of days: 50        JZG FV AN PLAN NO PONV RULE       PAC Discussion and Assessment    ASA Classification: 3  Case is suitable for: [facility]  Anesthetic techniques and relevant risks discussed: GA  Invasive monitoring and risk discussed:   Types:   Possibility and Risk of blood transfusion discussed:   NPO instructions given:   Additional anesthetic preparation and risks discussed:   Needs early admission to pre-op area:   Other:     PAC Resident/NP Anesthesia Assessment:  Omega is a 69 year old man who is scheduled for kyphoplasty-radio frequency ablation lumbar 1, minimally invasive surgery left hemilaminectomy lumber 3-4 on 8/21/19 by Dr. Brower in treatment of metastatic cancer to the spine.  PAC referral for risk assessment and optimization for anesthesia with comorbid conditions of pulmonary nodules, productive cough, wheezing and bilateral posterior opacities seen on CT scan, anemia, decreased appetite, difficulty swallowing, weight loss, renal cell carcinoma with metastasis to bone, arthritis in shoulders, chronic pain, anxiety:    Pre-operative considerations:  1.  Cardiac:  Functional status- METS 3, the patient can walk 1 block or 1/4 mile with walker or cane.  Intermediate risk surgery with 0.4% (RCRI #) risk of major adverse cardiac event.   ~ The patient has no cardiac diagnosis. His METS are decreased secondary to his bone metastasis. He has had stress test in 2016 and stress echo in  Breathy;Hoarse;Low volume;Strain(Related to trach)  Consistency Presented: Ice chips  How Presented: SLP-fed/presented;Cup/sip;Spoon  Bolus Acceptance: No impairment  Bolus Formation/Control: Impaired  Type of Impairment: Delayed;Poor;Posterior;Mastication  Propulsion: Delayed (# of seconds); Discoordination  Oral Residue: Less than 10% of bolus; Lingual  Initiation of Swallow: Delayed (# of seconds)  Laryngeal Elevation: Functional  Aspiration Signs/Symptoms: Change vocal quality;Weak cough;Clear throat  Pharyngeal Phase Characteristics: Poor endurance;Easily fatigued   Effective Modifications: None  Cues for Modifications: Minimal  Oral Phase Severity: Mild  Pharyngeal Phase Severity : Moderate    PAIN:  Start of Eval: 0  End of Eval: 0     After treatment:   []            Patient left in no apparent distress sitting up in chair  [x]            Patient left in no apparent distress in bed  [x]            Call bell left within reach  [x]            Nursing notified  []            Family present  []            Caregiver present  []            Bed alarm activated    COMMUNICATION/EDUCATION:   [x]            Aspiration precautions; swallow safety; compensatory techniques. []            Patient/family have participated as able in goal setting and plan of care. [x]            Patient/family agree to work toward stated goals and plan of care. []            Patient understands intent and goals of therapy; neutral about participation. []            Patient unable to participate in goal setting/plan of care; educ ongoing with interdisciplinary staff  [x]         Posted safety precautions in patient's room.     Thank you for this referral,  Josue Almaraz M.S., [id number] [facility]  Speech-Language Pathologist 2017 without ischemia. EF was 55% in 2017. He had an EKG in 3/14/19 with sinus rhythm. He denies any cardiac symptoms.     2.  Pulm:  Airway feasible.  AGNIESZKA risk: Low  ~ The patient has known stable pulmonary nodules  ~ He reports 2 months of a productive cough with mucous, wheezing and chills. He reports that he will have 1-3 times a day, 2 hour episodes of coughing up phlegm/mucuos. The worst is before he falls asleep. He was treated by his PCP for 10 days of amoxicillin and stopped on 8/5/19. He reports he felt better during that time but his coughing is just as bad as prior. He did have a CT chest on 8/8/19 which showed bilateral posterior opacities consistent with aspiration pneumonia. He has bilateral lower lobe rhonchi on exam. In review of the patient's chart with Dr. Richards, he has had a steady decrease in his oxygen saturations as well.     3. Heme:  Anemia - hgb 9.7 on 8/7/19.     4. GI:  Risk of PONV score = 2.  If > 2, anti-emetic intervention recommended.  ~ The patient reports a decrease in appetite with increased difficulty with swallowing. He is only eating a soft diet now. He notes a 50 lbs weight loss. He denies heartburn symptoms and continues on a bowel regimen and omeprazole. He is scheduled for a swallow study tomorrow.     5. : Renal cell carcinoma - treated with radiation and Cabozantinib and Nivolumab. Followed by oncology.     6. Psych: Anxiety - continue Cymbalta.   ~ Chronic pain - the patient takes MS contin, hydromorphone, medical cannabis and Lyrica. Patient seen by PharmD today. Morphine eq= 154-218 mg    7. Musculoskeletal: Left femur metastasis - radiation therapy and has edema secondary to issues with leg - He takes lasix PRN and continues with JUVENTINO stockings and wrappings.   ~ Shoulder pain/arthritis - consideration for careful positioning to minimize discomfort.   ~ Pathologic compression fracture to L3 and spine mets - Patient uses walker or cane. Fall precautions as needed.      VTE risk: 3%    HAVE RECOMMENDED THAT THE PATIENT TREAT HIS PNEUMONIA PRIOR TO HAVING SURGERY. HE PREFERS TO HAVE THIS DONE CLOSER TO HOME BY HIS PCP. THE PATIENT'S SURGICAL TEAM HAS BEEN NOTIFIED AND RECOMMENDATION WOULD BE TO WAIT 4 WEEKS AFTER TREATMENT BEFORE ANESTHESIA.     Patient also evaluated by Dr. Richards. See recommendations below.     For further details of assessment, testing, and physical exam please see H and P completed on same date.    Nell Schrader PA-C        Mid-Level Provider/Resident: Nell Schrader PA-C  Date: 8/15/19  Time:     Attending Anesthesiologist Anesthesia Assessment:  I have examined the patient and reviewed the medical record.  I have discussed the patient with the JEFFREY and concur with her assessment  The patient is scheduled for L1 kyphoplasty and L3-L4 hemilaminectomy for metastatic renal cell CA to vertebral column  The patient today complains of 3-4 weeks of progressive productive cough and generalized SOB and weakness.  He has had one course of amoxicillin which did not change symptoms  The patient did have CT of chest 8/8/2019 that noted bilateral lowerlobe ground glass opacities consistent with aspiration /pneumonia.  Today he has frequent productive cough, SpO2 is decreased to 93% from `100% at earlier visit, and he has rhonchi and rales on exam  The surgery is urgent but not emergent.  We recommend patient have pulmonic process addressed prior to surgery and anesthesia.  We discussed this with the patient and he prefers to have this done at [facility] since he lives in [facility].  We will notify surgery team to reschedule in 4 weeks assuming he has cleared with antibiotic therapy.      Reviewed and Signed by PAC Anesthesiologist  Anesthesiologist: Vasyl Richards MD  Date: 8/15/2019  Time:   Pass/Fail:   Disposition:     PAC Pharmacist Assessment:  Pain Medication Clarification Note - PAC Pharmacist  Javon Bianchi was seen and interviewed during time of PAC Clinic  appointment on August 15, 2019 in preparation for the planned procedure with Dr Brower on 8/21/19 at [facility] for Kyphoplasty-Radio Frequency Ablation  Lumbar 1, Minimally Invasive Surgery Left Hemilaminectomy Lumbar 3-4.     The current plan is for the procedure to be a same day procedure with patient discharging home after the procedure.  The purpose of this note is to verify the patient's home pain regimen.  If the plan changes and the patient is to be admitted to inpatient status postoperatively and primary team would like assistance with managing postoperative pain please consult the inpatient pain management service for specific pain management recommendations (available at [phone] from 8 AM - 3 PM Mon - Fri and available via phone answering service 24/7 at [phone]).     Based on [place] Prescription Monitoring Profile and patient interview:     - OUTPATIENT MEDICATIONS (related to pain management):  -- Long-acting opioid: MS Contin [id number] (dose was decreased this week).  -- Short-acting opioid: Hydromorphone 2-4mg po q3h prn, uses ~ 2 x 2mg per day  -- Intrathecal pump: none  -- Oral adjuvant(s): Pregabalin 50mg po bid  -- Topicals: none  -- Bowel Regimen: Senna S, polyethylene glycol   -- Other relevant medications: Duloxetine 60mg po daily.    Average daily oral morphine equivalent (OME): 120 mg of OME/day     Verbal consent was given by patient to access pharmacy records and [place] Prescription Monitoring Profile: Yes    Reviewed and Signed by PAC Pharmacist  Pharmacist: Shruti Smith PharmD, MS  Date: August 15, 2019  Time:2:39pm    Nell Schrader PA-C

## 2021-10-26 NOTE — PROGRESS NOTES
Form on Dr Sánchez's desk to review and sign   Palliative Care Outpatient Clinic Progress Note    Patient Name:  Javon Bianchi  Primary Provider:  LIANNE Wellington    Chief Complaint/Identifying Information:  Metastatic Renal Cell Carcinoma off chemotherapy for many months due to recurrent PNA, spine surgery.   History of L3 pathologic fracture found at diagnosis, now s/p kyphoplasty-radiofrequency ablation of L1, and left hemilaminectomy of L3/L4.    [facility] Outpatient Palliative Care Opioid Prescribing Safety Plan     Opioid Safety Education: Reviewed by Nan Gonzalez  on 11/8/2018  Opioid Risk Assessment: Performed by Nan Gonzalez  on 11/8/2018 .  ORT score of 0.   Mood Disorder Assessment: Performed by Carly Mills on 04/25/19.  No concerns.    reviewed with every prescription, last reviewed by Carly Mills on 01/21/20     Additional recommendations based on patient's prognosis and indication for opioids include the following:     Expected prognosis: shorter  Risk: Low or Medium (ORT 0-7)  No further recommendations.     Interim History:  Javon Bianchi 69 year old male returns to be seen by palliative care today, accompanied by his wife Suzie.  Javon Bianchi was last seen by me six weeks ago, at which time medications were consolidated (Cymbalta tapered, omeprazole stopped).  Since he was last seen, he has been receiving lenvatinib and afinitor.  He notes that he had an anxiety attack last weekend.  Anxiety attacks had been a part of his experience in December 2018, had been going to the ED for this. Had not been an issue until the past week.     Is now off the Cymbalta.  Tingling is in the LLE chronically, now in the RLE. Feels that his pain in his legs is worsening, limiting his ability to move around. Feels that his legs have slowly gotten weaker in the past few weeks, associated with pain but not due to the pain.     He has had edema in the LLE, which worsened over the last few weeks.  Both sides have been  "edematous, but he has had an acute worsening recently.  Driving back and forth from [place] to come to his appointments.  Still taking Lyrica twice daily. Had been using Jamar hose, which have been painful to use. Using his cane to ambulate.     He notes that he continues to struggle with constipation. Taking miralax and senna 2 twice daily.  No groin numbness or incontinence.      Nausea comes and goes, connected with anxiety.      Social History:  Pertinent changes to social history/social situation since last visit: None.   Social History     Tobacco Use     Smoking status: Never Smoker     Smokeless tobacco: Never Used   Substance Use Topics     Alcohol use: Not Currently     Drug use: Yes     Types: Marijuana     Comment: medical cannabis               Physical Exam:   Vitals were reviewed  /74   Pulse 69   Temp 97.6  F (36.4  C) (Oral)   Resp 16   Ht 1.778 m (5' 10\")   Wt 99.8 kg (220 lb)   SpO2 99%   BMI 31.57 kg/m    General: Alert, comfortable appearing male in no acute distress.   Eyes: Pupils equal and 2mm, sclera clear.   ENT: MMM.   Cardiac:  Normal rate, regular.    Resp: Lungs are CTAB, unlabored on room air. Good air entry bilaterally.   Abd: Soft, non-distended, no TTP, active bowel sounds.   Ext: Bilateral pitting pretibial edema present, L>R.   Neuro: No facial asymmetry.  Spontaneous movements grossly non-focal. Alert and with normal appearing sensorium. Using a wheelchair for transport, cane to assist with ambulation around the room.  Hip flexor strength is 4-/5 on the left, 4/5 on the right.  Bilateral hip flexion causes pain in the upper thighs, unclear if this is a true weakness vs limited ROM due to pain.  Sensation to light touch is diminished grossly to bilateral lower extremities.       Impression & Recommendations & Counseling:  Omega is a 69 year old man with metastatic renal cell carcinoma who had a prolonged break from cancer treatments due to recurrent PNA, surgery, " who has now restarted his chemotherapy.  He presents to clinic today with asymmetric leg edema (in setting of malignancy, frequent long car rides).  Obtained an U/S which did not show DVT.  Equally concerning is the leg weakness vs pain which has been present over the past few weeks, not associated with incontinence.  Given his known history of lumbar disease, will obtain a lumbar MRI.  Discussed that if he is experiencing acute change in sensation or strength, or incontinence overnight, he should present to the ED.     Re: Anxiety, this is in the setting of progressing terminal illness and Cymbalta cessation.  Will restart Cymbalta.     1. U/S to evaluate for DVT  2. Increase senna back to 4 twice daily.   3. Restart Cymbalta at 30mg daily.    4. MRI lumbar spine in [place] per patient preference.   5. Recertify for cannabis.     Morphine refill.     Additional information reviewed today:   No Known Allergies  Current Outpatient Medications   Medication Sig Dispense Refill     acetaminophen (TYLENOL) 500 MG tablet Take 1,000 mg by mouth 3 times daily 0800, 1400, 2200       calcium carbonate 600 mg-vitamin D 400 units (CALTRATE) 600-400 MG-UNIT per tablet Take 1 tablet by mouth 3 times daily       Dextromethorphan-guaiFENesin (MUCINEX DM)  MG 12 hr tablet Take 1 tablet by mouth 2 times daily as needed        diazepam (VALIUM) 5 MG tablet Take 1 pill 30 min before MRI and a second pill 2 minutes prior. 10 tablet 0     fluticasone (FLONASE) 50 MCG/ACT nasal spray Spray 1 spray into both nostrils daily as needed for rhinitis or allergies       folic acid (FOLVITE) 1 MG tablet Take 1 tablet (1 mg) by mouth daily . Future refills by PCP. 30 tablet 0     furosemide (LASIX) 20 MG tablet Take 1 tablet (20 mg) by mouth daily 30 tablet 1     HYDROmorphone (DILAUDID) 2 MG tablet Take 1-2 tablets (2-4 mg) by mouth every 3 hours as needed for pain 60 tablet 0     lenvatinib 14 MG, 10 mg + 4 mg capsules, (LENVIMA) 14 mg  combo capsule Take 14 mg dose (10 mg + 4 mg capsule) by mouth daily. 60 capsule 0     levothyroxine (SYNTHROID/LEVOTHROID) 25 MCG tablet Take 1 tablet (25 mcg) by mouth daily 30 tablet 3     lidocaine (LIDODERM) 5 % patch Place 1-3 patches onto the skin every 24 hours Apply to painful areas for 12 hours on, 12 hours off. 30 patch 3     loratadine (CLARITIN) 10 MG tablet Take 10 mg by mouth daily (with lunch)        medical cannabis (Patient's own supply.  Not a prescription) Cannabis Heater liquid 2 mL orally in AM   Cannabis capsule - 1 capsule daily at bedtime.     (This is NOT a prescription, and does not certify that the patient has a qualifying medical condition for medical cannabis.  The purpose of this order is  to document that the patient reports taking medical cannabis.)       melatonin 5 MG tablet Take 10 mg by mouth At Bedtime       morphine (MS CONTIN) 15 MG CR tablet Take one tab midday and at bedtime.  Also taking MS Contin 30mg tab in AM. 60 tablet 0     morphine (MS CONTIN) 30 MG CR tablet Take 1 tablet (30 mg) by mouth daily Combine with MS Contin 15mg in afternoon and at bedtime. 30 tablet 0     multivitamin, therapeutic (THERA-VIT) TABS tablet Take 1 tablet by mouth At Bedtime       ondansetron (ZOFRAN) 8 MG tablet Take 8 mg by mouth every 8 hours as needed for nausea       polyethylene glycol (MIRALAX/GLYCOLAX) Packet Take 17 g by mouth daily as needed        pregabalin (LYRICA) 50 MG capsule Take 1 capsule (50 mg) by mouth 2 times daily 60 capsule 1     senna-docusate (SENOKOT-S/PERICOLACE) 8.6-50 MG tablet Take 2-4 tablets by mouth 2 times daily       triamcinolone (KENALOG) 0.1 % external cream Apply topically 2 times daily 30 g 3     Urea 40 % CREA Externally apply topically 2 times daily 28 g 3     vitamin D3 (CHOLECALCIFEROL) 2000 units (50 mcg) tablet Take 2,000 Units by mouth daily       everolimus (AFINITOR) 5 MG tablet Take 1 tablet (5 mg) by mouth daily for 28 days 28 tablet 0      Past Medical History:   Diagnosis Date     Anemia      Bone metastasis (H) 09/28/2018     Cough      Pulmonary nodule      Renal cell carcinoma, right (H) 09/28/2018     Past Surgical History:   Procedure Laterality Date     BRONCHOSCOPY (RIGID OR FLEXIBLE), DIAGNOSTIC N/A 9/4/2019    Procedure: Bronchoscopy, With Bronchoalveolar Lavage;  Surgeon: Burton Toure MD;  Location: [facility]     CHOLECYSTECTOMY       HC REMOVAL HEEL SPUR, CALCANEUS  2004     OPTICAL TRACKING SYSTEM BIOPSY BONE RADIO FREQUENCY ABLATION N/A 11/1/2019    Procedure: Kyphoplasty-Radiofrequency ablation Lumbar 1; Minimally Invasive Surgery  left hemilaminectomy Lumbar 3-Lumbar 4;  Surgeon: Vickey Brower MD;  Location:  OR       Key Data Reviewed:  LABS:   Lab Results   Component Value Date    WBC 4.8 12/13/2019    HGB 10.9 (L) 12/13/2019    HCT 35.4 (L) 12/13/2019     12/13/2019     12/13/2019    POTASSIUM 3.6 12/13/2019    CHLORIDE 110 (H) 12/13/2019    CO2 24 12/13/2019    BUN 10 12/13/2019    CR 0.92 12/13/2019    GLC 88 12/13/2019    SED 87 (H) 03/29/2019    NTBNPI 283 09/03/2019    TROPI <0.015 09/03/2019    AST 24 12/13/2019    ALT 16 12/13/2019    ALKPHOS 82 12/13/2019    BILITOTAL 0.4 12/13/2019    INR 1.25 (H) 09/04/2019     IMAGING:   U/S of the lower extremities done today, did not show DVT.      [place]  - Use of controlled substances consistent with history.     Thank you for continuing to involve me in the care of this patient.     Carly Mills MD / Palliative Medicine / Pager [phone] / After-Hours Answering Service [phone] / Main Palliative Clinic - [facility] [phone] / [facility] Inpatient Team Consult Pager [phone] (answered 8am-430pm M-F)    Time spent:40 minutes, >50% spent in counseling/coordination of care.

## 2022-02-17 PROBLEM — R62.51 FAILURE TO THRIVE IN PEDIATRIC PATIENT: Status: ACTIVE | Noted: 2019-01-01

## 2023-03-23 NOTE — TELEPHONE ENCOUNTER
Oncology RN Care Coordination Note:     Patient's wife left a symptomatic voicemail stating the following:   Omega is really suffering, he is slowly starving to death, his stomach pains are bad when he eats, he can't eat because he fears the pain.  He is so weak he can't walk across the room.     Writer has routed this message to triage for further assessment.    Clair López, RN BSN   [facility]  Nurse Coordinator        
Received a call back from both Pt and his wife. Consulted with [facility]. Depending on severity of Sx Pt could go to PCP. Discussed discrepancies with both Pt and Pt's SO. After some debate regarding symptom severity on their part, callers stated they would like to go to [facility] ED, arrival about 5:30. I called ahead to the [facility] ED and gave report to the charge RN.  
Spoke with Pt. He describes some pain in his stomach rated 3/10. This Pain causes him not to want to eat, but does not increase when he does eat. Says today he ate 5 oz OJ, 30 Oz H2O, a mug of hot chocolate, a piece of toast, and 2 candy bars.  Discussed that with reported weakness and weight loss we think he should be evaluated in an ED. Pt did not feel that was necessary. Pt said that he is able to cross the room without much weakness, simply becomes more weak after eating.   Pt is going to discuss an ED visit with his wife, may go to [facility].  
Other

## 2023-04-03 PROBLEM — C79.51 MALIGNANT NEOPLASM METASTATIC TO BONE (H): Status: ACTIVE | Noted: 2018-09-28

## 2024-10-25 NOTE — ANESTHESIA CARE TRANSFER NOTE
Patient: Javon Bianchi    Procedure(s):  Kyphoplasty-Radiofrequency ablation Lumbar 1; Minimally Invasive Surgery  left hemilaminectomy Lumbar 3-Lumbar 4    Diagnosis: Metastatic cancer to spine (H) [C79.51]  Lumbar stenosis with neurogenic claudication [M48.062]  Lumbar radiculopathy [M54.16]  Diagnosis Additional Information: No value filed.    Anesthesia Type:   General     Note:  Airway :Face Mask  Patient transferred to:PACU  Comments: Arrived in PACU, report to RN, vitals stable, patient comfortable.  Handoff Report: Identifed the Patient, Identified the Reponsible Provider, Reviewed the pertinent medical history, Discussed the surgical course, Reviewed Intra-OP anesthesia mangement and issues during anesthesia, Set expectations for post-procedure period and Allowed opportunity for questions and acknowledgement of understanding      Vitals: (Last set prior to Anesthesia Care Transfer)    CRNA VITALS  11/1/2019 1839 - 11/1/2019 1923      11/1/2019             NIBP:  118/75    Pulse:  81                Electronically Signed By: SOBIA Zuñiga CRNA  November 1, 2019  7:23 PM   Detail Level: Detailed Quality 226: Preventive Care And Screening: Tobacco Use: Screening And Cessation Intervention: Patient screened for tobacco use and is an ex/non-smoker

## (undated) DEVICE — DRAPE MAYO STAND 23X54 8337

## (undated) DEVICE — Device

## (undated) DEVICE — ESU GROUND PAD UNIVERSAL W/O CORD

## (undated) DEVICE — COVER CAMERA IN-LIGHT DISP LT-C02

## (undated) DEVICE — LINEN BACK PACK 5440

## (undated) DEVICE — DRAPE STERI TOWEL LG 1010

## (undated) DEVICE — SPONGE COTTONOID 1/2X1/2" 80-1400

## (undated) DEVICE — GLOVE PROTEXIS MICRO 8.0  2D73PM80

## (undated) DEVICE — DRSG TEGADERM 4X4 3/4" 1626W

## (undated) DEVICE — GOWN XLG DISP 9545

## (undated) DEVICE — DRAPE LAP W/ARMBOARD 29410

## (undated) DEVICE — ESU ELEC BLADE 2.75" COATED/INSULATED E1455

## (undated) DEVICE — SYR 50ML LL W/O NDL 309653

## (undated) DEVICE — GLOVE PROTEXIS BLUE W/NEU-THERA 8.5  2D73EB85

## (undated) DEVICE — DRSG DRAIN 4X4" 7086

## (undated) DEVICE — SUCTION FRAZIER 12FR W/HANDLE K73

## (undated) DEVICE — SYR BULB IRRIG 50ML LATEX FREE 0035280

## (undated) DEVICE — LINEN TOWEL PACK X5 5464

## (undated) DEVICE — SU VICRYL 2-0 CT-2 27" UND J269H

## (undated) DEVICE — SU VICRYL 2-0 CT-1 27" UND J259H

## (undated) DEVICE — DRAPE IOBAN INCISE 23X17" 6650EZ

## (undated) DEVICE — DRAPE POUCH INSTRUMENT 1018

## (undated) DEVICE — POSITIONER ARMBOARD FOAM 1PAIR LF FP-ARMB1

## (undated) DEVICE — DECANTER TRANSFER DEVICE 2008S

## (undated) DEVICE — ESU ELEC BLADE 6" COATED/INSULATED E1455-6

## (undated) DEVICE — DRAPE O ARM TUBE 9732722

## (undated) DEVICE — SUCTION MANIFOLD DORNOCH ULTRA CART UL-CL500

## (undated) DEVICE — MIDAS REX DISSECTING TOOL 2.5MM  T12MH25

## (undated) DEVICE — GLOVE RADIATION RESISTANT SZ 8.0 95-496

## (undated) DEVICE — TUBING SUCTION MEDI-VAC 1/4"X20' N620A

## (undated) DEVICE — DRSG TELFA 3X8" 1238

## (undated) DEVICE — NDL SPINAL 20GA 3.5" 405182

## (undated) DEVICE — SPONGE SURGIFOAM 12 1972

## (undated) DEVICE — SU MONOCRYL 3-0 PS-2 18" UND Y497G

## (undated) DEVICE — SU VICRYL 0 CT-1 3X27" J430T

## (undated) DEVICE — MARKER SPHERES PASSIVE MEDT PACK 5 8801075

## (undated) DEVICE — DRAIN HEMOVAC RESERVOIR KIT 10FR 1/8" MED 00-2550-002-10

## (undated) DEVICE — DRAPE MICROSCOPE MINI LEICA AR8033652

## (undated) DEVICE — PREP CHLORAPREP 26ML TINTED ORANGE  260815

## (undated) DEVICE — ADH SKIN CLOSURE PREMIERPRO EXOFIN 1.0ML 3470

## (undated) DEVICE — LIGHT HANDLE X2

## (undated) DEVICE — BLADE KNIFE SURG 10 371110

## (undated) DEVICE — DRSG PRIMAPORE 02X3" 7133

## (undated) DEVICE — SOL WATER IRRIG 1000ML BOTTLE 2F7114

## (undated) RX ORDER — FENTANYL CITRATE 50 UG/ML
INJECTION, SOLUTION INTRAMUSCULAR; INTRAVENOUS
Status: DISPENSED
Start: 2019-09-04

## (undated) RX ORDER — LIDOCAINE HYDROCHLORIDE 20 MG/ML
INJECTION, SOLUTION EPIDURAL; INFILTRATION; INTRACAUDAL; PERINEURAL
Status: DISPENSED
Start: 2019-01-01

## (undated) RX ORDER — ACETAMINOPHEN 325 MG/1
TABLET ORAL
Status: DISPENSED
Start: 2019-01-01

## (undated) RX ORDER — LIDOCAINE HYDROCHLORIDE 10 MG/ML
INJECTION, SOLUTION INFILTRATION; PERINEURAL
Status: DISPENSED
Start: 2019-03-14

## (undated) RX ORDER — HYDROMORPHONE HYDROCHLORIDE 1 MG/ML
INJECTION, SOLUTION INTRAMUSCULAR; INTRAVENOUS; SUBCUTANEOUS
Status: DISPENSED
Start: 2019-01-01

## (undated) RX ORDER — VANCOMYCIN HYDROCHLORIDE 1 G/20ML
INJECTION, POWDER, LYOPHILIZED, FOR SOLUTION INTRAVENOUS
Status: DISPENSED
Start: 2019-01-01

## (undated) RX ORDER — DEXAMETHASONE SODIUM PHOSPHATE 4 MG/ML
INJECTION, SOLUTION INTRA-ARTICULAR; INTRALESIONAL; INTRAMUSCULAR; INTRAVENOUS; SOFT TISSUE
Status: DISPENSED
Start: 2019-01-01

## (undated) RX ORDER — PROPOFOL 10 MG/ML
INJECTION, EMULSION INTRAVENOUS
Status: DISPENSED
Start: 2019-01-01

## (undated) RX ORDER — ONDANSETRON 2 MG/ML
INJECTION INTRAMUSCULAR; INTRAVENOUS
Status: DISPENSED
Start: 2019-01-01

## (undated) RX ORDER — FENTANYL CITRATE 50 UG/ML
INJECTION, SOLUTION INTRAMUSCULAR; INTRAVENOUS
Status: DISPENSED
Start: 2019-01-01

## (undated) RX ORDER — BUPIVACAINE HYDROCHLORIDE AND EPINEPHRINE 2.5; 5 MG/ML; UG/ML
INJECTION, SOLUTION EPIDURAL; INFILTRATION; INTRACAUDAL; PERINEURAL
Status: DISPENSED
Start: 2019-01-01

## (undated) RX ORDER — SODIUM CHLORIDE 9 MG/ML
INJECTION, SOLUTION INTRAVENOUS
Status: DISPENSED
Start: 2019-03-14

## (undated) RX ORDER — ALBUTEROL SULFATE 0.83 MG/ML
SOLUTION RESPIRATORY (INHALATION)
Status: DISPENSED
Start: 2019-09-04

## (undated) RX ORDER — FENTANYL CITRATE 50 UG/ML
INJECTION, SOLUTION INTRAMUSCULAR; INTRAVENOUS
Status: DISPENSED
Start: 2019-03-14

## (undated) RX ORDER — LIDOCAINE HYDROCHLORIDE 40 MG/ML
SOLUTION TOPICAL
Status: DISPENSED
Start: 2019-09-04

## (undated) RX ORDER — LIDOCAINE HYDROCHLORIDE 10 MG/ML
INJECTION, SOLUTION EPIDURAL; INFILTRATION; INTRACAUDAL; PERINEURAL
Status: DISPENSED
Start: 2019-09-04

## (undated) RX ORDER — LIDOCAINE HYDROCHLORIDE AND EPINEPHRINE 10; 10 MG/ML; UG/ML
INJECTION, SOLUTION INFILTRATION; PERINEURAL
Status: DISPENSED
Start: 2019-09-04

## (undated) RX ORDER — LIDOCAINE HYDROCHLORIDE 20 MG/ML
SOLUTION OROPHARYNGEAL
Status: DISPENSED
Start: 2019-09-04

## (undated) RX ORDER — KETOROLAC TROMETHAMINE 30 MG/ML
INJECTION, SOLUTION INTRAMUSCULAR; INTRAVENOUS
Status: DISPENSED
Start: 2019-01-01

## (undated) RX ORDER — IODIXANOL 320 MG/ML
INJECTION, SOLUTION INTRAVASCULAR
Status: DISPENSED
Start: 2019-01-01

## (undated) RX ORDER — GABAPENTIN 100 MG/1
CAPSULE ORAL
Status: DISPENSED
Start: 2019-01-01

## (undated) RX ORDER — CEFAZOLIN SODIUM 2 G/100ML
INJECTION, SOLUTION INTRAVENOUS
Status: DISPENSED
Start: 2019-01-01